# Patient Record
Sex: FEMALE | Race: BLACK OR AFRICAN AMERICAN | Employment: OTHER | URBAN - METROPOLITAN AREA
[De-identification: names, ages, dates, MRNs, and addresses within clinical notes are randomized per-mention and may not be internally consistent; named-entity substitution may affect disease eponyms.]

---

## 2017-05-18 ENCOUNTER — HOSPITAL ENCOUNTER (EMERGENCY)
Age: 42
Discharge: HOME | End: 2017-05-18
Attending: EMERGENCY MEDICINE
Payer: COMMERCIAL

## 2017-05-18 VITALS
HEIGHT: 65 IN | DIASTOLIC BLOOD PRESSURE: 105 MMHG | HEART RATE: 97 BPM | WEIGHT: 185 LBS | BODY MASS INDEX: 30.82 KG/M2 | TEMPERATURE: 97.9 F | RESPIRATION RATE: 16 BRPM | OXYGEN SATURATION: 99 % | SYSTOLIC BLOOD PRESSURE: 158 MMHG

## 2017-05-18 DIAGNOSIS — L60.0 INGROWING NAIL WITH INFECTION: Primary | ICD-10-CM

## 2017-05-18 PROCEDURE — 99283 EMERGENCY DEPT VISIT LOW MDM: CPT

## 2017-05-18 RX ORDER — ACETAMINOPHEN AND CODEINE PHOSPHATE 300; 30 MG/1; MG/1
1 TABLET ORAL EVERY 6 HOURS PRN
Qty: 20 TABLET | Refills: 0 | Status: SHIPPED | OUTPATIENT
Start: 2017-05-18 | End: 2017-07-10 | Stop reason: ALTCHOICE

## 2017-05-18 RX ORDER — DOXYCYCLINE HYCLATE 100 MG
100 TABLET ORAL 2 TIMES DAILY
Qty: 20 TABLET | Refills: 0 | Status: SHIPPED | OUTPATIENT
Start: 2017-05-18 | End: 2017-05-28

## 2017-05-18 ASSESSMENT — ENCOUNTER SYMPTOMS: COLOR CHANGE: 1

## 2017-05-18 ASSESSMENT — PAIN SCALES - GENERAL: PAINLEVEL_OUTOF10: 5

## 2017-05-18 ASSESSMENT — PAIN DESCRIPTION - FREQUENCY: FREQUENCY: CONTINUOUS

## 2017-05-18 ASSESSMENT — PAIN DESCRIPTION - ORIENTATION: ORIENTATION: LEFT

## 2017-05-18 ASSESSMENT — PAIN DESCRIPTION - LOCATION: LOCATION: TOE (COMMENT WHICH ONE)

## 2017-07-10 ENCOUNTER — OFFICE VISIT (OUTPATIENT)
Dept: PODIATRY | Age: 42
End: 2017-07-10
Payer: COMMERCIAL

## 2017-07-10 VITALS
WEIGHT: 175 LBS | BODY MASS INDEX: 29.16 KG/M2 | HEART RATE: 72 BPM | SYSTOLIC BLOOD PRESSURE: 130 MMHG | HEIGHT: 65 IN | DIASTOLIC BLOOD PRESSURE: 78 MMHG

## 2017-07-10 DIAGNOSIS — L60.1 ONYCHOLYSIS OF TOENAIL: Primary | ICD-10-CM

## 2017-07-10 DIAGNOSIS — M79.675 PAIN IN TOE OF LEFT FOOT: ICD-10-CM

## 2017-07-10 PROCEDURE — 11730 AVULSION NAIL PLATE SIMPLE 1: CPT | Performed by: PODIATRIST

## 2017-07-10 PROCEDURE — 99203 OFFICE O/P NEW LOW 30 MIN: CPT | Performed by: PODIATRIST

## 2017-07-10 RX ORDER — LIDOCAINE HYDROCHLORIDE 10 MG/ML
5 INJECTION, SOLUTION INFILTRATION; PERINEURAL ONCE
Status: COMPLETED | OUTPATIENT
Start: 2017-07-10 | End: 2017-07-10

## 2017-07-10 RX ADMIN — LIDOCAINE HYDROCHLORIDE 5 ML: 10 INJECTION, SOLUTION INFILTRATION; PERINEURAL at 14:52

## 2017-07-21 ENCOUNTER — TELEPHONE (OUTPATIENT)
Dept: PODIATRY | Age: 42
End: 2017-07-21

## 2017-07-24 ENCOUNTER — TELEPHONE (OUTPATIENT)
Dept: PODIATRY | Age: 42
End: 2017-07-24

## 2017-07-31 ENCOUNTER — OFFICE VISIT (OUTPATIENT)
Dept: PODIATRY | Age: 42
End: 2017-07-31
Payer: COMMERCIAL

## 2017-07-31 VITALS
HEIGHT: 65 IN | DIASTOLIC BLOOD PRESSURE: 93 MMHG | BODY MASS INDEX: 29.52 KG/M2 | WEIGHT: 177.2 LBS | SYSTOLIC BLOOD PRESSURE: 155 MMHG | TEMPERATURE: 98.3 F | HEART RATE: 77 BPM

## 2017-07-31 DIAGNOSIS — L60.1 ONYCHOLYSIS OF TOENAIL: Primary | ICD-10-CM

## 2017-07-31 DIAGNOSIS — M79.675 PAIN IN TOE OF LEFT FOOT: ICD-10-CM

## 2017-07-31 PROCEDURE — 99024 POSTOP FOLLOW-UP VISIT: CPT | Performed by: PODIATRIST

## 2017-10-30 ENCOUNTER — HOSPITAL ENCOUNTER (EMERGENCY)
Age: 42
Discharge: HOME | End: 2017-10-30
Attending: EMERGENCY MEDICINE
Payer: COMMERCIAL

## 2017-10-30 VITALS
TEMPERATURE: 98.1 F | DIASTOLIC BLOOD PRESSURE: 95 MMHG | SYSTOLIC BLOOD PRESSURE: 151 MMHG | WEIGHT: 180 LBS | HEART RATE: 83 BPM | OXYGEN SATURATION: 100 % | RESPIRATION RATE: 14 BRPM | BODY MASS INDEX: 29.99 KG/M2 | HEIGHT: 65 IN

## 2017-10-30 DIAGNOSIS — L03.031 CELLULITIS OF RIGHT TOE: ICD-10-CM

## 2017-10-30 DIAGNOSIS — I10 ESSENTIAL HYPERTENSION: Primary | ICD-10-CM

## 2017-10-30 PROCEDURE — 99282 EMERGENCY DEPT VISIT SF MDM: CPT

## 2017-10-30 RX ORDER — DOXYCYCLINE HYCLATE 100 MG
100 TABLET ORAL 2 TIMES DAILY
Qty: 20 TABLET | Refills: 0 | Status: SHIPPED | OUTPATIENT
Start: 2017-10-30 | End: 2017-11-20 | Stop reason: ALTCHOICE

## 2017-10-30 RX ORDER — AMLODIPINE BESYLATE 10 MG/1
10 TABLET ORAL DAILY
Qty: 20 TABLET | Refills: 0 | Status: SHIPPED | OUTPATIENT
Start: 2017-10-30 | End: 2017-12-18 | Stop reason: SDUPTHER

## 2017-10-30 ASSESSMENT — ENCOUNTER SYMPTOMS
COLOR CHANGE: 0
DIARRHEA: 0
SHORTNESS OF BREATH: 0
EYE DISCHARGE: 0
FACIAL SWELLING: 0
VOMITING: 0
EYE REDNESS: 0
COUGH: 0
ABDOMINAL PAIN: 0
CONSTIPATION: 0

## 2017-10-30 ASSESSMENT — PAIN SCALES - GENERAL: PAINLEVEL_OUTOF10: 6

## 2017-10-30 NOTE — ED NOTES
\"pus from my toe\" had toenail removed 3 months ago denies injury also hasn't taken B/P meds for 2 months \"ran out no FMD\"        Aron Keys RN  10/30/17 7677

## 2017-10-30 NOTE — ED PROVIDER NOTES
[address] [place] [address] ED  eMERGENCY dEPARTMENT eNCOUnter      Pt Name: Perry Merida  MRN: [id number]  [place] 1975  Date of evaluation: 10/30/2017  Provider: David Em MD    CHIEF COMPLAINT       Chief Complaint   Patient presents with    Toe Pain     L great toe         HISTORY OF PRESENT ILLNESS  (Location/Symptom, Timing/Onset, Context/Setting, Quality, Duration, Modifying Factors, Severity.)   Perry Merida is a 43 y.o. female who presents to the emergency department For pain to his right great toe. She states a few months ago she had the toenail removed and now over the past few days it's been draining some pus. She also stopped taking her blood pressure medication a few months ago because she doesn't have a doctor. No chest pain headache or dizziness or shortness of breath. The pain in her toe is moderate and continuous. No injury specifically to the toe. Nursing Notes were reviewed. ALLERGIES     Asa [aspirin]; Ibuprofen; Lopid [gemfibrozil]; Nortriptyline; Pcn [penicillins]; and Sulfa antibiotics    CURRENT MEDICATIONS       Previous Medications    BLOOD PRESSURE KIT    Use as directed daily dx. hypertension       PAST MEDICAL HISTORY         Diagnosis Date    Abnormal Pap smear of cervix     ASCUS    Chronic back pain     Depression 2014    Headache(784.0)     Heart murmur     Hypertension     Migraine headache with aura 2013    Obesity 2013       SURGICAL HISTORY           Procedure Laterality Date    TUBAL LIGATION           FAMILY HISTORY           Problem Relation Age of Onset    Diabetes Mother     High Blood Pressure Mother      Family Status   Relation Status    Mother         SOCIAL HISTORY      reports that she has never smoked. She has never used smokeless tobacco. She reports that she drinks alcohol. She reports that she does not use drugs.     REVIEW OF SYSTEMS    (2-9 systems for level 4, 10 or more for level 5)     Review of Systems Constitutional: Negative for chills, fatigue and fever. HENT: Negative for congestion, ear discharge and facial swelling. Eyes: Negative for discharge and redness. Respiratory: Negative for cough and shortness of breath. Cardiovascular: Negative for chest pain. Gastrointestinal: Negative for abdominal pain, constipation, diarrhea and vomiting. Genitourinary: Negative for dysuria and hematuria. Musculoskeletal: Negative for arthralgias. Skin: Negative for color change and rash. Neurological: Negative for syncope, numbness and headaches. Hematological: Negative for adenopathy. Psychiatric/Behavioral: Negative for confusion. The patient is not nervous/anxious. Except as noted above the remainder of the review of systems was reviewed and negative. PHYSICAL EXAM    (up to 7 for level 4, 8 or more for level 5)     Vitals:    10/30/17 1525 10/30/17 1545   BP: (!) 205/122 (!) 151/95   Pulse: 70 83   Resp: 14    Temp: 98.1 °F (36.7 °C)    TempSrc: Oral    SpO2: 100%    Weight: 180 lb (81.6 kg)    Height: 5' 5\" (1.651 m)        Physical Exam      DIAGNOSTIC RESULTS     EKG: All EKG's are interpreted by the Emergency Department Physician who either signs or Co-signs this chart in the absence of a cardiologist.    Dictated    RADIOLOGY:   Non-plain film images such as CT, Ultrasound and MRI are read by the radiologist. Plain radiographic images are visualized and preliminarily interpreted by the emergency physician with the below findings:    Not indicated     Interpretation per the Radiologist below, if available at the time of this note:        LABS:  Labs Reviewed - No data to display    All other labs were within normal range or not returned as of this dictation.     EMERGENCY DEPARTMENT COURSE and DIFFERENTIAL DIAGNOSIS/MDM:   Vitals:    Vitals:    10/30/17 1525 10/30/17 1545   BP: (!) 205/122 (!) 151/95   Pulse: 70 83   Resp: 14    Temp: 98.1 °F (36.7 °C)    TempSrc: Oral    SpO2: 100% Weight: 180 lb (81.6 kg)    Height: 5' 5\" (1.651 m)        Orders Placed This Encounter   Medications    doxycycline hyclate (VIBRA-TABS) 100 MG tablet     Sig: Take 1 tablet by mouth 2 times daily     Dispense:  20 tablet     Refill:  0    amLODIPine (NORVASC) 10 MG tablet     Sig: Take 1 tablet by mouth daily     Dispense:  20 tablet     Refill:  0       Medical Decision Making: The patient's blood pressure is elevated and she is being given a refill for that medication. She will also be placed on an antibiotic for her toe and will follow-up with podiatry that she has seen previously. Treatment diagnosis and follow-up were discussed with the patient. CONSULTS:  None    PROCEDURES:  None    FINAL IMPRESSION      1. Essential hypertension    2. Cellulitis of right toe          DISPOSITION/PLAN   DISPOSITION Decision to Discharge    PATIENT REFERRED TO:   No follow-up provider specified.     DISCHARGE MEDICATIONS:     New Prescriptions    AMLODIPINE (NORVASC) 10 MG TABLET    Take 1 tablet by mouth daily    DOXYCYCLINE HYCLATE (VIBRA-TABS) 100 MG TABLET    Take 1 tablet by mouth 2 times daily         (Please note that portions of this note were completed with a voice recognition program.  Efforts were made to edit the dictations but occasionally words are mis-transcribed.)    Nicole Bonner MD  Attending Emergency Physician             Nicole Bonner MD  10/30/17 [id number]

## 2017-11-20 ENCOUNTER — OFFICE VISIT (OUTPATIENT)
Dept: PODIATRY | Age: 42
End: 2017-11-20
Payer: COMMERCIAL

## 2017-11-20 VITALS
DIASTOLIC BLOOD PRESSURE: 70 MMHG | TEMPERATURE: 98.1 F | WEIGHT: 184 LBS | HEIGHT: 65 IN | BODY MASS INDEX: 30.66 KG/M2 | SYSTOLIC BLOOD PRESSURE: 120 MMHG

## 2017-11-20 DIAGNOSIS — L60.1 ONYCHOLYSIS OF TOENAIL: Primary | ICD-10-CM

## 2017-11-20 DIAGNOSIS — L03.031 PARONYCHIA OF GREAT TOE OF RIGHT FOOT: ICD-10-CM

## 2017-11-20 PROCEDURE — 10060 I&D ABSCESS SIMPLE/SINGLE: CPT | Performed by: PODIATRIST

## 2017-11-20 NOTE — PROGRESS NOTES
Podiatry Clinic    HPI:   Maryjo Sethi is a 43 y.o. female who presents to clinic for follow up of right great toenail infection. Seen recently in ED where pus was drained and she was given PO antibiotics which she completed. It has been two weeks. She has been cleaning and soaking with antibiotic ointment and bandaid. No pain. Denies N/V/F/C/N/V/SOB/CP. PHYSICAL EXAM:     Vitals:    11/20/17 1458   BP: 120/70   Temp:      General: AAO x 3 in NAD. Vascular: DP/PT pulses are palpable bilaterally. CFT<3 seconds to digits bilaterally. Hair growth present to the level of the digits bilaterally. Neurological: Sensation intact to light touch to level of digits bilaterally. Musculoskeletal: Lower extremity muscle strength 5/5 bilaterally. No POP to left hallux and nail bed. Decreased 1st MTPJ ROM bilaterally L20 degrees DF, R25 degrees DF. Large dorsal exostosis bilaterally. Derm: Lower extremity skin is warm and dry bilaterally. Minimal purulence drained from Right great toe nail. No periungual rubor and edema. ASSESSMENT:     1. Onycholysis of toenail     2. Paronychia of great toe of right foot  02027 - [place] DRAIN SKIN ABSCESS SIMPLE       PLAN:   -Patient examined and evaluated. -Diagnosis and treatment options were discussed with patient.  -Debrided loose Right hallux nail and drained pus. Washed with soap and water.  Bacitracin and band-aid.  -No antibiotics necessary at this time  -RTC 2 weeks to check Right great toenail again  -Discussed with Dr. Nela Fernández    Electronically signed by Alfredo Hunt DPM  on 11/20/2017 at 3:27 PM

## 2017-12-04 ENCOUNTER — OFFICE VISIT (OUTPATIENT)
Dept: PODIATRY | Age: 42
End: 2017-12-04
Payer: COMMERCIAL

## 2017-12-04 ENCOUNTER — HOSPITAL ENCOUNTER (OUTPATIENT)
Age: 42
Discharge: HOME | End: 2017-12-04
Payer: COMMERCIAL

## 2017-12-04 ENCOUNTER — HOSPITAL ENCOUNTER (OUTPATIENT)
Dept: GENERAL RADIOLOGY | Age: 42
Discharge: HOME | End: 2017-12-04
Payer: COMMERCIAL

## 2017-12-04 VITALS
DIASTOLIC BLOOD PRESSURE: 86 MMHG | WEIGHT: 184.2 LBS | SYSTOLIC BLOOD PRESSURE: 150 MMHG | HEART RATE: 78 BPM | HEIGHT: 65 IN | BODY MASS INDEX: 30.69 KG/M2

## 2017-12-04 DIAGNOSIS — M79.675 PAIN IN TOE OF LEFT FOOT: Primary | ICD-10-CM

## 2017-12-04 DIAGNOSIS — M79.675 PAIN IN TOE OF LEFT FOOT: ICD-10-CM

## 2017-12-04 DIAGNOSIS — I73.9 CLAUDICATION (HCC): ICD-10-CM

## 2017-12-04 PROCEDURE — 73630 X-RAY EXAM OF FOOT: CPT

## 2017-12-04 PROCEDURE — 99213 OFFICE O/P EST LOW 20 MIN: CPT | Performed by: PODIATRIST

## 2017-12-04 NOTE — PROGRESS NOTES
Podiatry Clinic    HPI:   Teddy Aguillon is a 43 y.o. female who presents to clinic for follow up of right great toenail infection. Patient states she finished her antibiotics and has been doing daily soaks and bandaids with bacitracin. She states she is now having pain is digits 1-5 on the left. She also complains of severe cramping in her left calf while walking. She states that when she elevates her left leg her symptoms are worse and her entire foot goes numb. Denies n/v/f/c. Denies history of smoking or vascular issues. PHYSICAL EXAM:     Vitals:    12/04/17 1440   BP: (!) 150/86   Pulse: 78     General: AAO x 3 in NAD. Vascular: DP/PT pulses are palpable right, nonpalpable left. L DP/PT pulse monophasic and faint on doppler. CFT delayed to digits on left. Hair growth present to the level of the digits bilaterally. Neurological: Sensation intact to light touch to level of digits bilaterally. Musculoskeletal: Lower extremity muscle strength 5/5 bilaterally. No POP to left hallux and nail bed. Decreased 1st MTPJ ROM bilaterally L20 degrees DF, R25 degrees DF. Large dorsal exostosis bilaterally. Derm:  Digits of left foot cool and cyanotic. No erythema. No purulent drainage. No open wounds. ASSESSMENT:     1. Pain in toe of left foot  VL Lower Extremity Arterial Segmental Pressures W Ppg    XR FOOT LEFT (MIN 3 VIEWS)   2. Claudication (HCC)  VL Lower Extremity Arterial Segmental Pressures W Ppg       PLAN:   -Patient examined and evaluated. -Diagnosis and treatment options were discussed with patient. -PVR w/ PPG  -XRAY L foot  -Nitroglycerin ointment to toes  -Avoid cold to foot and tight shoe gear  -foot in dependent position   -Avoid nicotine/smoking  -RTC 1 week. Electronically signed by Hannah Magana DPM  on 12/4/2017 at 3:28 PM   I performed a history and physical examination of the patient and discussed management with the resident.  I reviewed the residents note and agree

## 2017-12-04 NOTE — PATIENT INSTRUCTIONS
Appointment December 18th at 130pm  [facility]     [address] [address] [phone]

## 2017-12-05 ENCOUNTER — HOSPITAL ENCOUNTER (OUTPATIENT)
Dept: VASCULAR LAB | Age: 42
Discharge: HOME | End: 2017-12-05
Payer: COMMERCIAL

## 2017-12-05 DIAGNOSIS — I73.9 CLAUDICATION (HCC): ICD-10-CM

## 2017-12-05 DIAGNOSIS — M79.675 PAIN IN TOE OF LEFT FOOT: ICD-10-CM

## 2017-12-05 PROCEDURE — 93923 UPR/LXTR ART STDY 3+ LVLS: CPT

## 2017-12-11 ENCOUNTER — OFFICE VISIT (OUTPATIENT)
Dept: PODIATRY | Age: 42
End: 2017-12-11
Payer: COMMERCIAL

## 2017-12-11 VITALS
HEART RATE: 84 BPM | BODY MASS INDEX: 30.82 KG/M2 | DIASTOLIC BLOOD PRESSURE: 86 MMHG | HEIGHT: 65 IN | WEIGHT: 185 LBS | SYSTOLIC BLOOD PRESSURE: 137 MMHG

## 2017-12-11 DIAGNOSIS — I73.9 CLAUDICATION (HCC): Primary | ICD-10-CM

## 2017-12-11 DIAGNOSIS — L60.1 ONYCHOLYSIS OF TOENAIL: ICD-10-CM

## 2017-12-11 PROCEDURE — 99213 OFFICE O/P EST LOW 20 MIN: CPT | Performed by: STUDENT IN AN ORGANIZED HEALTH CARE EDUCATION/TRAINING PROGRAM

## 2017-12-15 ENCOUNTER — INITIAL CONSULT (OUTPATIENT)
Dept: VASCULAR SURGERY | Age: 42
End: 2017-12-15
Payer: COMMERCIAL

## 2017-12-15 VITALS
RESPIRATION RATE: 17 BRPM | OXYGEN SATURATION: 98 % | HEART RATE: 74 BPM | WEIGHT: 184.97 LBS | BODY MASS INDEX: 30.82 KG/M2 | HEIGHT: 65 IN | SYSTOLIC BLOOD PRESSURE: 138 MMHG | DIASTOLIC BLOOD PRESSURE: 80 MMHG

## 2017-12-15 DIAGNOSIS — I73.9 CLAUDICATION OF LEFT LOWER EXTREMITY (HCC): Primary | ICD-10-CM

## 2017-12-15 PROCEDURE — 99244 OFF/OP CNSLTJ NEW/EST MOD 40: CPT | Performed by: SURGERY

## 2017-12-15 PROCEDURE — G8417 CALC BMI ABV UP PARAM F/U: HCPCS | Performed by: SURGERY

## 2017-12-15 PROCEDURE — G8427 DOCREV CUR MEDS BY ELIG CLIN: HCPCS | Performed by: SURGERY

## 2017-12-15 PROCEDURE — G8484 FLU IMMUNIZE NO ADMIN: HCPCS | Performed by: SURGERY

## 2017-12-15 ASSESSMENT — ENCOUNTER SYMPTOMS
GASTROINTESTINAL NEGATIVE: 1
COLOR CHANGE: 1
EYES NEGATIVE: 1
ALLERGIC/IMMUNOLOGIC NEGATIVE: 1
SHORTNESS OF BREATH: 0

## 2017-12-15 NOTE — LETTER
[address] [facility] [place]    [address]  MOB 2 [address] [facility]  [address] [address]  Phone: [phone]  Fax: [phone]    Nury Berry MD        December 15, 2017     Christiana Crouch  [address] [facility] [address] [place] [address] [facility] [address] [id number]    Patient: Perry Merida  MR Number: [id number]  YOB: 1975  Date of Visit: 12/15/2017    Dear Dr. Christiana Crouch: I saw Kb Coates today in the office for left calf pain. Below are the relevant portions of my assessment and plan of care. If you have questions, please do not hesitate to call me. I look forward to following Gale along with you.     Sincerely,        Nury Berry MD

## 2017-12-15 NOTE — PROGRESS NOTES
Division of Vascular Surgery        New Consult      Physician Requesting Consult:  Adarsh Santiago DPM     Reason for Consult:   Left foot pain, abnormal FELIPA/PVR    Chief Complaint:      My left leg hurts    History of Present Illness:      Marilu Rothman is a 43 y.o. woman who presents with left calf pain with ambulation. Over the past several months she has noticed decreasing walking distance before the pain starts. She use to be able to walk several blocks before the pain started, now she can barely make it a block before the calf pain gets triggered. She does notice relief when she stops and takes a break. She has noticed some numbness and tingling with prolonged walking. She describes the pain as cramps and a dull ache. She has also noticed some swelling in her left leg when she has been on her feet for a long time. Medical History:     HTN    Surgical History:     Tubal ligation    Family History:     Family History   Problem Relation Age of Onset    Diabetes Mother     High Blood Pressure Mother        Allergies:       Asa [aspirin]; Ibuprofen; Lopid [gemfibrozil]; Nortriptyline; Pcn [penicillins]; and Sulfa antibiotics    Social History:     Tobacco:    reports that she has never smoked. She has never used smokeless tobacco.  Alcohol:      reports that she drinks alcohol. Drug Use:  reports that she does not use drugs. Occupation:  Works at [employer], on her feet most of the time    Review of Systems:     Review of Systems   Constitutional: Negative. HENT: Negative. Eyes: Negative. Respiratory: Negative for shortness of breath. Cardiovascular: Positive for leg swelling. Negative for chest pain and palpitations. Gastrointestinal: Negative. Endocrine: Negative. Genitourinary: Negative. Musculoskeletal: Negative. Skin: Positive for color change. Allergic/Immunologic: Negative. Neurological: Negative for numbness. Hematological: Negative. Jeyson Bentley MD on 12/15/17 at 10:34 AM      [address] [place] [address],[address]: [phone]  C: [phone]  Email: [email or web address]

## 2017-12-18 ENCOUNTER — HOSPITAL ENCOUNTER (OUTPATIENT)
Age: 42
Setting detail: SPECIMEN
Discharge: HOME | End: 2017-12-18
Payer: COMMERCIAL

## 2017-12-18 ENCOUNTER — OFFICE VISIT (OUTPATIENT)
Dept: FAMILY MEDICINE CLINIC | Age: 42
End: 2017-12-18
Payer: COMMERCIAL

## 2017-12-18 VITALS
WEIGHT: 185.2 LBS | TEMPERATURE: 97.6 F | BODY MASS INDEX: 30.86 KG/M2 | HEIGHT: 65 IN | DIASTOLIC BLOOD PRESSURE: 86 MMHG | SYSTOLIC BLOOD PRESSURE: 150 MMHG | HEART RATE: 64 BPM

## 2017-12-18 DIAGNOSIS — Z13.1 DIABETES MELLITUS SCREENING: ICD-10-CM

## 2017-12-18 DIAGNOSIS — I10 ESSENTIAL HYPERTENSION: Primary | ICD-10-CM

## 2017-12-18 DIAGNOSIS — E78.5 HYPERLIPIDEMIA, UNSPECIFIED HYPERLIPIDEMIA TYPE: ICD-10-CM

## 2017-12-18 DIAGNOSIS — Z23 NEED FOR INFLUENZA VACCINATION: ICD-10-CM

## 2017-12-18 LAB
CHOLESTEROL/HDL RATIO: 4.9
CHOLESTEROL: 226 MG/DL
ESTIMATED AVERAGE GLUCOSE: 123 MG/DL
HBA1C MFR BLD: 5.9 % (ref 4–6)
HDLC SERPL-MCNC: 46 MG/DL
LDL CHOLESTEROL: 136 MG/DL (ref 0–130)
TRIGL SERPL-MCNC: 222 MG/DL
VLDLC SERPL CALC-MCNC: ABNORMAL MG/DL (ref 1–30)

## 2017-12-18 PROCEDURE — G8427 DOCREV CUR MEDS BY ELIG CLIN: HCPCS | Performed by: STUDENT IN AN ORGANIZED HEALTH CARE EDUCATION/TRAINING PROGRAM

## 2017-12-18 PROCEDURE — 1036F TOBACCO NON-USER: CPT | Performed by: STUDENT IN AN ORGANIZED HEALTH CARE EDUCATION/TRAINING PROGRAM

## 2017-12-18 PROCEDURE — 90688 IIV4 VACCINE SPLT 0.5 ML IM: CPT | Performed by: STUDENT IN AN ORGANIZED HEALTH CARE EDUCATION/TRAINING PROGRAM

## 2017-12-18 PROCEDURE — G8417 CALC BMI ABV UP PARAM F/U: HCPCS | Performed by: STUDENT IN AN ORGANIZED HEALTH CARE EDUCATION/TRAINING PROGRAM

## 2017-12-18 PROCEDURE — 99213 OFFICE O/P EST LOW 20 MIN: CPT | Performed by: STUDENT IN AN ORGANIZED HEALTH CARE EDUCATION/TRAINING PROGRAM

## 2017-12-18 PROCEDURE — G8484 FLU IMMUNIZE NO ADMIN: HCPCS | Performed by: STUDENT IN AN ORGANIZED HEALTH CARE EDUCATION/TRAINING PROGRAM

## 2017-12-18 PROCEDURE — 90471 IMMUNIZATION ADMIN: CPT | Performed by: STUDENT IN AN ORGANIZED HEALTH CARE EDUCATION/TRAINING PROGRAM

## 2017-12-18 RX ORDER — BLOOD PRESSURE TEST KIT
1 KIT MISCELLANEOUS DAILY
Qty: 1 KIT | Refills: 0 | Status: SHIPPED | OUTPATIENT
Start: 2017-12-18 | End: 2019-12-23 | Stop reason: ALTCHOICE

## 2017-12-18 RX ORDER — AMLODIPINE BESYLATE 10 MG/1
10 TABLET ORAL DAILY
Qty: 20 TABLET | Refills: 0 | Status: SHIPPED | OUTPATIENT
Start: 2017-12-18 | End: 2018-01-09 | Stop reason: SDUPTHER

## 2017-12-18 ASSESSMENT — ENCOUNTER SYMPTOMS
ABDOMINAL PAIN: 0
SHORTNESS OF BREATH: 0
CONSTIPATION: 0
CHEST TIGHTNESS: 0
DIARRHEA: 0
COUGH: 0

## 2017-12-18 NOTE — PROGRESS NOTES
Attending Physician Statement  I have discussed the care off. First name Sykesincluding pertinent history and exam findings,  with the resident. I have seen and examined the patient and the key elements of all parts of the encounter have been performed by me. I agree with the assessment, plan and orders as documented by the resident.   (GC Modifier)    NP to establish care  HTN- out of meds  Lipid Panel-  HM- updated  Pap next visit
Negative. Neurological: Positive for headaches. Negative for weakness and light-headedness. Objective:    BP (!) 150/86 (Site: Left Arm, Position: Sitting, Cuff Size: Large Adult)   Pulse 64   Temp 97.6 °F (36.4 °C) (Temporal)   Ht 5' 5\" (1.651 m)   Wt 185 lb 3.2 oz (84 kg)   LMP 11/30/2017   BMI 30.82 kg/m²    BP Readings from Last 3 Encounters:   12/18/17 (!) 150/86   12/15/17 138/80   12/11/17 137/86       Physical Exam   Constitutional: She is oriented to person, place, and time. She appears well-developed and well-nourished. Cardiovascular: Normal rate and regular rhythm. Pulmonary/Chest: Breath sounds normal. She is in respiratory distress. Musculoskeletal: Normal range of motion. Neurological: She is alert and oriented to person, place, and time. Lab Results   Component Value Date    WBC 7.7 11/15/2015    HGB 13.2 11/15/2015    HCT 38.6 11/15/2015     11/15/2015    CHOL 208 (H) 07/23/2014    TRIG 339 (H) 07/23/2014    HDL 37 (L) 07/23/2014    LDLDIRECT 108 (H) 05/13/2013    ALT 17 11/15/2015    AST 25 11/15/2015     11/15/2015    K 3.4 (L) 11/15/2015    CL 99 11/15/2015    CREATININE 0.65 11/15/2015    BUN 9 11/15/2015    CO2 24 11/15/2015    TSH 2.10 07/23/2014    LABA1C 5.7 07/23/2014     Lab Results   Component Value Date    CALCIUM 8.7 11/15/2015     Lab Results   Component Value Date    LDLCHOLESTEROL 103 07/23/2014    LDLDIRECT 108 (H) 05/13/2013       Assessment and Plan:    1. Essential hypertension  - Pt reports non compliance in last 3 weeks. Discussed importance of daily BP medication for control  - amLODIPine (NORVASC) 10 MG tablet; Take 1 tablet by mouth daily  Dispense: 20 tablet; Refill: 0  - Blood Pressure KIT; 1 Package by Does not apply route daily  Dispense: 1 kit; Refill: 0    2. Hyperlipidemia, unspecified hyperlipidemia type  - Based on Lipid Panel in 7/2014: ASCVD of 9.6 %. Will repeat today  - Lipid Panel; Future    3.  Diabetes

## 2017-12-18 NOTE — PATIENT INSTRUCTIONS
Thank you for letting us take care of you today. We hope all your questions were addressed. If a question was overlooked or something else comes to mind after you return home, please contact a member of your Care Team listed below. Please make sure you have a routine office visit set up to follow-up on [address]. Your Care Team at [place] [address] is Team #1  Rah Nowak MD (Faculty)  Haily Perdue MD (Faculty  Triciasiri Leone MD (Resident)  Lee Ann Szymanski MD (Resident)  Ning Miranda MD (Resident)  Lin Mayers MD (Resident)  Luis Patricio MD (Resident)  Jessa Kincaid [facility]  Martell Nava [place], [facility], ([address] [facility])  IFRAH Castillo, ([address] [facility])  Layne Cunha, Ph.D., ([address] [facility])  Papi Mack [facility] (Clinical Pharmacist)     Office phone number: [phone]    If you need to get in right away due to illness, please be advised we have \"Same Day\" appointments available Monday-Friday. Please call us at [phone] option #1 to schedule your \"Same Day\" appointment.

## 2017-12-19 ENCOUNTER — TELEPHONE (OUTPATIENT)
Dept: FAMILY MEDICINE CLINIC | Age: 42
End: 2017-12-19

## 2017-12-19 ENCOUNTER — HOSPITAL ENCOUNTER (OUTPATIENT)
Dept: CARDIAC CATH/INVASIVE PROCEDURES | Age: 42
Discharge: HOME | End: 2017-12-19
Payer: COMMERCIAL

## 2017-12-19 VITALS
TEMPERATURE: 97.9 F | SYSTOLIC BLOOD PRESSURE: 149 MMHG | OXYGEN SATURATION: 98 % | BODY MASS INDEX: 30.82 KG/M2 | HEIGHT: 65 IN | HEART RATE: 97 BPM | DIASTOLIC BLOOD PRESSURE: 81 MMHG | WEIGHT: 185 LBS | RESPIRATION RATE: 14 BRPM

## 2017-12-19 DIAGNOSIS — E78.5 HYPERLIPIDEMIA, UNSPECIFIED HYPERLIPIDEMIA TYPE: Primary | ICD-10-CM

## 2017-12-19 LAB
GFR NON-AFRICAN AMERICAN: >60 ML/MIN
GFR SERPL CREATININE-BSD FRML MDRD: >60 ML/MIN
GFR SERPL CREATININE-BSD FRML MDRD: NORMAL ML/MIN/{1.73_M2}
GLUCOSE BLD-MCNC: 104 MG/DL (ref 74–100)
HCG, PREGNANCY URINE (POC): NEGATIVE
POC CHLORIDE: 105 MMOL/L (ref 98–107)
POC CREATININE: 0.57 MG/DL (ref 0.51–1.19)
POC HEMATOCRIT: 40 % (ref 36–46)
POC HEMOGLOBIN: 13.6 G/DL (ref 12–16)
POC POTASSIUM: 3.9 MMOL/L (ref 3.5–4.5)
POC SODIUM: 144 MMOL/L (ref 138–146)

## 2017-12-19 PROCEDURE — C1760 CLOSURE DEV, VASC: HCPCS

## 2017-12-19 PROCEDURE — 76937 US GUIDE VASCULAR ACCESS: CPT | Performed by: SURGERY

## 2017-12-19 PROCEDURE — 75710 ARTERY X-RAYS ARM/LEG: CPT

## 2017-12-19 PROCEDURE — 6360000002 HC RX W HCPCS

## 2017-12-19 PROCEDURE — C1894 INTRO/SHEATH, NON-LASER: HCPCS

## 2017-12-19 PROCEDURE — 7100000011 HC PHASE II RECOVERY - ADDTL 15 MIN

## 2017-12-19 PROCEDURE — 75774 ARTERY X-RAY EACH VESSEL: CPT

## 2017-12-19 PROCEDURE — 85014 HEMATOCRIT: CPT

## 2017-12-19 PROCEDURE — 7100000010 HC PHASE II RECOVERY - FIRST 15 MIN

## 2017-12-19 PROCEDURE — 84132 ASSAY OF SERUM POTASSIUM: CPT

## 2017-12-19 PROCEDURE — 82565 ASSAY OF CREATININE: CPT

## 2017-12-19 PROCEDURE — 82435 ASSAY OF BLOOD CHLORIDE: CPT

## 2017-12-19 PROCEDURE — 6360000002 HC RX W HCPCS: Performed by: INTERNAL MEDICINE

## 2017-12-19 PROCEDURE — 84295 ASSAY OF SERUM SODIUM: CPT

## 2017-12-19 PROCEDURE — 75710 ARTERY X-RAYS ARM/LEG: CPT | Performed by: SURGERY

## 2017-12-19 PROCEDURE — 36247 INS CATH ABD/L-EXT ART 3RD: CPT

## 2017-12-19 PROCEDURE — C1887 CATHETER, GUIDING: HCPCS

## 2017-12-19 PROCEDURE — 75625 CONTRAST EXAM ABDOMINL AORTA: CPT | Performed by: SURGERY

## 2017-12-19 PROCEDURE — 84703 CHORIONIC GONADOTROPIN ASSAY: CPT

## 2017-12-19 PROCEDURE — 75625 CONTRAST EXAM ABDOMINL AORTA: CPT

## 2017-12-19 PROCEDURE — 82947 ASSAY GLUCOSE BLOOD QUANT: CPT

## 2017-12-19 PROCEDURE — C1769 GUIDE WIRE: HCPCS

## 2017-12-19 PROCEDURE — 36247 INS CATH ABD/L-EXT ART 3RD: CPT | Performed by: SURGERY

## 2017-12-19 RX ORDER — SODIUM CHLORIDE 9 MG/ML
INJECTION, SOLUTION INTRAVENOUS CONTINUOUS
Status: DISCONTINUED | OUTPATIENT
Start: 2017-12-19 | End: 2017-12-20 | Stop reason: HOSPADM

## 2017-12-19 RX ORDER — ONDANSETRON 2 MG/ML
4 INJECTION INTRAMUSCULAR; INTRAVENOUS ONCE
Status: COMPLETED | OUTPATIENT
Start: 2017-12-19 | End: 2017-12-19

## 2017-12-19 RX ORDER — ATORVASTATIN CALCIUM 10 MG/1
10 TABLET, FILM COATED ORAL DAILY
Qty: 30 TABLET | Refills: 3 | Status: ON HOLD | OUTPATIENT
Start: 2017-12-19 | End: 2018-10-06

## 2017-12-19 RX ADMIN — ONDANSETRON 4 MG: 2 INJECTION, SOLUTION INTRAMUSCULAR; INTRAVENOUS at 10:57

## 2017-12-19 RX ADMIN — SODIUM CHLORIDE: 9 INJECTION, SOLUTION INTRAVENOUS at 08:24

## 2017-12-19 NOTE — PROGRESS NOTES
Returned to room 8 on [facility] awake alert, rt. Groin bandaid in tact clean et dry, post procedure instructions given to pt. And family, taking fluids well, both feet warm to touch pulses audible.

## 2017-12-19 NOTE — TELEPHONE ENCOUNTER
Based on new results for Lipid panel screening, pts ASCVD score of 9.9 %. Started patient on Lipitor 10mg. Called patient and left voicemail indicating need to  medications from her pharmacy and to call back if any questions.

## 2017-12-20 ENCOUNTER — TELEPHONE (OUTPATIENT)
Dept: VASCULAR SURGERY | Age: 42
End: 2017-12-20

## 2017-12-20 DIAGNOSIS — I99.8 ISCHEMIC LEG: Primary | ICD-10-CM

## 2017-12-20 DIAGNOSIS — I73.9 CLAUDICATION (HCC): ICD-10-CM

## 2017-12-20 NOTE — OP NOTE
[address] [facility] [address]                                 OPERATIVE REPORT    PATIENT NAME: Delvin Floyd                    :        1975  MED REC NO:   [id number]                             ROOM:  ACCOUNT NO:   [de-identified]                           ADMIT DATE: 2017  PROVIDER:     Aubrey Gray MD    DATE OF PROCEDURE:  2017    PREOPERATIVE DIAGNOSIS:  Lifestyle-limiting left leg claudication. POSTOPERATIVE DIAGNOSIS:  Lifestyle-limiting left leg claudication. PROCEDURE:  1. Ultrasound-guided access to the right common femoral artery. 2.  Abdominal aortogram.  3.  Selective catheterization of left superficial femoral artery. 4.  Left lower extremity angiogram with run-off to the foot. 5.  Percutaneous closure of right common femoral artery with StarClose. ANESTHESIA:  Local with moderate sedation. SURGEON:  Aubrey Gray MD    ASSISTANT:  Burt Durán MD    INDICATIONS FOR PROCEDURE:  The patient is a 42-year-old female who  presented to clinic with progressive worsening of claudication symptoms of  her left leg. It started roughly over the past couple of years, but over  the last several months, it has gotten progressively worse, to the point  where she can barely walk one block before having excruciating cramping  and burning pain down her left calf. She noticed some darkening and  discoloration of her left toes. She denies any rest pain, numbness, or  tingling, and does not have any wounds. On exam, she had no palpable pedal  pulses on the left side. Given these findings, she was recommended to  undergo a left lower extremity angiogram.  She understood the risks and  benefits of the procedure and elected to continue. DESCRIPTION OF PROCEDURE:  The patient was brought to the catheterization  suite. An appropriate timeout was performed.   Bilateral groin sites were  prepped and draped in the standard sterile fashion. The patient was given  sedation with fentanyl and Versed. The right groin was anesthetized and  under ultrasound guidance, the right common femoral artery was  compressible. It was accessed using micropuncture technique and upsized to  a short 5-Yi sheath. An Omni Flush catheter was then brought up into  the abdominal aorta over a wire and an abdominal aortogram was performed. The patient had a very narrow aortic bifurcation and there was some  difficulty getting up and over with the Omni Flush catheter, so using a  Glidewire and angled Glidecath, we went over the aortic bifurcation and  parked the Gregg Jamaalreedy in the left common femoral artery and performed an  oblique-view femoral angiogram.  At this point the superficial femoral artery was  selectively catheterized, and then a right lower extremity angiogram with  runoff to the foot was performed with multiple views. No intervention was done. The catheter  and wires were removed. The right common femoral artery was successfully  StarClosed. RADIOGRAPHIC FINDINGS:  The patient has normal-appearing abdominal aorta. Bilateral common iliac, internal iliac, and external iliac are patent. There is a small plaque in the common femoral artery on the left side,  which is not flow-limiting. The profunda and superficial femoral artery  are patent without any evidence of stenosis. There is a abrupt occlusion  of the popliteal artery right at the level of the knee. There is  significant amount of geniculate collateralization that reconstitute a  peroneal artery at the mid to distal calf. The peroneal artery is the main  runoff to the foot, which gives branches that fill the plantars. The  anterior tibial artery and posterior tibial artery are occluded. There is  some faint reconstitution of a DP through the branches of the peroneal  artery at the level of ankle.     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 She is not  amenable to percutaneous intervention and she will need open  revascularization. I will have her evaluated by Cardiology for risk  stratification, as well as performing an echocardiogram to look for a  potential source of an embolic event that might have caused her pathology. I will also obtain bilateral arterial duplexes to rule out popliteal  aneurysms as the source of reason for occlusion or pathology leading to  this, given that she is young and without any atherosclerotic risk factors. I will also get vein mapping to evaluate her conduit for potential distal  bypass.         Chio Mix MD    D: 12/19/2017 17:37:11       T: 12/20/2017 4:25:25     [email or web address]  Job#: [id number]     Doc#: [id number]    CC:

## 2018-01-03 ENCOUNTER — HOSPITAL ENCOUNTER (OUTPATIENT)
Dept: VASCULAR LAB | Age: 43
Discharge: HOME | End: 2018-01-03
Payer: COMMERCIAL

## 2018-01-03 DIAGNOSIS — I99.8 ISCHEMIC LEG: ICD-10-CM

## 2018-01-03 DIAGNOSIS — I73.9 CLAUDICATION (HCC): ICD-10-CM

## 2018-01-03 PROCEDURE — 93925 LOWER EXTREMITY STUDY: CPT

## 2018-01-03 PROCEDURE — 93970 EXTREMITY STUDY: CPT

## 2018-01-07 ENCOUNTER — APPOINTMENT (OUTPATIENT)
Dept: GENERAL RADIOLOGY | Age: 43
End: 2018-01-07
Payer: COMMERCIAL

## 2018-01-07 ENCOUNTER — HOSPITAL ENCOUNTER (EMERGENCY)
Age: 43
Discharge: HOME | End: 2018-01-07
Attending: EMERGENCY MEDICINE
Payer: COMMERCIAL

## 2018-01-07 VITALS
DIASTOLIC BLOOD PRESSURE: 72 MMHG | SYSTOLIC BLOOD PRESSURE: 139 MMHG | HEART RATE: 71 BPM | OXYGEN SATURATION: 98 % | RESPIRATION RATE: 16 BRPM | TEMPERATURE: 98.1 F

## 2018-01-07 DIAGNOSIS — L03.032 CELLULITIS OF TOE OF LEFT FOOT: Primary | ICD-10-CM

## 2018-01-07 LAB
C-REACTIVE PROTEIN: 6.3 MG/L (ref 0–5)
HCT VFR BLD CALC: 39.5 % (ref 36.3–47.1)
HEMOGLOBIN: 12.6 G/DL (ref 11.9–15.1)
MCH RBC QN AUTO: 29.4 PG (ref 25.2–33.5)
MCHC RBC AUTO-ENTMCNC: 31.9 G/DL (ref 28.4–34.8)
MCV RBC AUTO: 92.3 FL (ref 82.6–102.9)
PDW BLD-RTO: 12 % (ref 11.8–14.4)
PLATELET # BLD: 376 K/UL (ref 138–453)
PMV BLD AUTO: 9.7 FL (ref 8.1–13.5)
RBC # BLD: 4.28 M/UL (ref 3.95–5.11)
URIC ACID: 5.2 MG/DL (ref 2.4–5.7)
WBC # BLD: 9.1 K/UL (ref 3.5–11.3)

## 2018-01-07 PROCEDURE — 84550 ASSAY OF BLOOD/URIC ACID: CPT

## 2018-01-07 PROCEDURE — 85027 COMPLETE CBC AUTOMATED: CPT

## 2018-01-07 PROCEDURE — G0382 LEV 3 HOSP TYPE B ED VISIT: HCPCS

## 2018-01-07 PROCEDURE — 6370000000 HC RX 637 (ALT 250 FOR IP): Performed by: EMERGENCY MEDICINE

## 2018-01-07 PROCEDURE — 73630 X-RAY EXAM OF FOOT: CPT

## 2018-01-07 PROCEDURE — 86140 C-REACTIVE PROTEIN: CPT

## 2018-01-07 RX ORDER — ACETAMINOPHEN 325 MG/1
650 TABLET ORAL EVERY 6 HOURS PRN
Qty: 30 TABLET | Refills: 0 | Status: SHIPPED | OUTPATIENT
Start: 2018-01-07 | End: 2018-10-22

## 2018-01-07 RX ORDER — ACETAMINOPHEN 325 MG/1
650 TABLET ORAL ONCE
Status: COMPLETED | OUTPATIENT
Start: 2018-01-07 | End: 2018-01-07

## 2018-01-07 RX ORDER — DOXYCYCLINE 100 MG/1
100 TABLET ORAL 2 TIMES DAILY
Qty: 14 TABLET | Refills: 0 | Status: SHIPPED | OUTPATIENT
Start: 2018-01-07 | End: 2018-01-14

## 2018-01-07 RX ADMIN — ACETAMINOPHEN 650 MG: 325 TABLET ORAL at 17:38

## 2018-01-07 ASSESSMENT — PAIN SCALES - GENERAL
PAINLEVEL_OUTOF10: 5
PAINLEVEL_OUTOF10: 5

## 2018-01-07 ASSESSMENT — ENCOUNTER SYMPTOMS
DIARRHEA: 0
COLOR CHANGE: 1
CONSTIPATION: 0
NAUSEA: 0
SHORTNESS OF BREATH: 0
VOMITING: 0

## 2018-01-07 NOTE — ED PROVIDER NOTES
History Narrative    No narrative on file       Family History   Problem Relation Age of Onset    Diabetes Mother     High Blood Pressure Mother     Heart Attack Mother     Heart Disease Mother     Kidney Disease Mother     High Blood Pressure Father        Allergies:  Rozanna Knight [aspirin]; Ibuprofen; Lopid [gemfibrozil]; Nortriptyline; Pcn [penicillins]; and Sulfa antibiotics    Home Medications:  Prior to Admission medications    Medication Sig Start Date End Date Taking? Authorizing Provider   acetaminophen (TYLENOL) 325 MG tablet Take 2 tablets by mouth every 6 hours as needed for Pain 1/7/18  Yes Tor Maradiaga MD   doxycycline monohydrate (ADOXA) 100 MG tablet Take 1 tablet by mouth 2 times daily for 7 days 1/7/18 1/14/18 Yes Tor Maradiaga MD   atorvastatin (LIPITOR) 10 MG tablet Take 1 tablet by mouth daily 12/19/17   Gavin Medina MD   amLODIPine (NORVASC) 10 MG tablet Take 1 tablet by mouth daily 12/18/17   Gavin Medina MD   Blood Pressure KIT 1 Package by Does not apply route daily 12/18/17   Gavin Medina MD   nitroglycerin (NITRO-BID) 2 % ointment Place 0.5 inches onto the skin every 6 hours 12/4/17   Tomi Osei DPM       REVIEW OF SYSTEMS    (2-9 systems for level 4, 10 or more for level 5)      Review of Systems   Constitutional: Positive for chills. Negative for fatigue and fever. Respiratory: Negative for shortness of breath. Cardiovascular: Negative for chest pain. Gastrointestinal: Negative for constipation, diarrhea, nausea and vomiting. Skin: Positive for color change and wound. PHYSICAL EXAM   (up to 7 for level 4, 8 or more for level 5)      INITIAL VITALS:   /72   Pulse 71   Temp 98.1 °F (36.7 °C) (Oral)   Resp 16   LMP 11/30/2017   SpO2 98%     Physical Exam   Constitutional: She appears well-developed and well-nourished. No distress. HENT:   Head: Normocephalic and atraumatic.    Cardiovascular: Normal rate, regular rhythm, normal heart

## 2018-01-08 ENCOUNTER — OFFICE VISIT (OUTPATIENT)
Dept: VASCULAR SURGERY | Age: 43
End: 2018-01-08
Payer: COMMERCIAL

## 2018-01-08 VITALS
BODY MASS INDEX: 30.82 KG/M2 | HEIGHT: 65 IN | OXYGEN SATURATION: 98 % | HEART RATE: 71 BPM | DIASTOLIC BLOOD PRESSURE: 70 MMHG | RESPIRATION RATE: 17 BRPM | WEIGHT: 184.97 LBS | SYSTOLIC BLOOD PRESSURE: 118 MMHG

## 2018-01-08 DIAGNOSIS — I73.9 PAD (PERIPHERAL ARTERY DISEASE) (HCC): Primary | ICD-10-CM

## 2018-01-08 PROCEDURE — 99213 OFFICE O/P EST LOW 20 MIN: CPT | Performed by: SURGERY

## 2018-01-08 RX ORDER — DOXYCYCLINE HYCLATE 100 MG/1
100 CAPSULE ORAL 2 TIMES DAILY
COMMUNITY
End: 2018-01-15 | Stop reason: ALTCHOICE

## 2018-01-08 ASSESSMENT — ENCOUNTER SYMPTOMS
GASTROINTESTINAL NEGATIVE: 1
SHORTNESS OF BREATH: 0
CHEST TIGHTNESS: 0
ALLERGIC/IMMUNOLOGIC NEGATIVE: 1
EYES NEGATIVE: 1

## 2018-01-08 NOTE — PROGRESS NOTES
Division of Vascular Surgery        Postoperative Follow Up    Left lower extremity angiogram (12/19/2017)    History of Present Illness:      Shima Quintanilla is a 43 y.o. woman presents for postoperative follow up after undergoing a diagnostic left lower extremity angiogram which revealed popliteal artery occlusion with reconstitution of her peroneal artery which gave branches to fill the plantar vessels in her foot. This was an unusual finding in her since she does not have all the classical features of someone with this much extensive arterial occlusive disease. She is not a diabetic or smoker. She was told that she had a heart murmur as a child and has felt palpitations. She denies any chest pain or shortness of breath. Today she is feeling much better, she is able to walk a little bit longer before the pain comes into her feet. She still has intermittent numbness and tingling in her toes but denies symptoms suggestive of rest pain. She has developed a toe nail infection from her left big toe and was seen in the emergency room yesterday. An x-ray was performed which did not reveal osteo, but given the pain and drainage she was started on antibiotics. Review of Systems:     Review of Systems   Constitutional: Negative for chills and fever. HENT: Negative. Eyes: Negative. Respiratory: Negative for chest tightness and shortness of breath. Cardiovascular: Positive for palpitations. Negative for chest pain and leg swelling. Gastrointestinal: Negative. Endocrine: Negative. Genitourinary: Negative. Musculoskeletal: Negative. Skin: Positive for wound. Allergic/Immunologic: Negative. Neurological: Positive for numbness. Hematological: Negative. Psychiatric/Behavioral: Negative.       Physical Exam:     Vitals:  /70 (Site: Left Arm, Position: Sitting, Cuff Size: Medium Adult)   Pulse 71   Resp 17   Ht 5' 5\" (1.651 m)   Wt 184 lb 15.5 oz (83.9 kg)   LMP

## 2018-01-10 ENCOUNTER — HOSPITAL ENCOUNTER (OUTPATIENT)
Age: 43
Discharge: HOME | End: 2018-01-10
Payer: COMMERCIAL

## 2018-01-10 LAB
THYROXINE, FREE: 1.34 NG/DL (ref 0.93–1.7)
TSH SERPL DL<=0.05 MIU/L-ACNC: 2.22 MIU/L (ref 0.3–5)

## 2018-01-10 PROCEDURE — 84439 ASSAY OF FREE THYROXINE: CPT

## 2018-01-10 PROCEDURE — 84443 ASSAY THYROID STIM HORMONE: CPT

## 2018-01-10 PROCEDURE — 36415 COLL VENOUS BLD VENIPUNCTURE: CPT

## 2018-01-16 ENCOUNTER — ANESTHESIA (OUTPATIENT)
Dept: OPERATING ROOM | Age: 43
DRG: 181 | End: 2018-01-16
Payer: COMMERCIAL

## 2018-01-16 ENCOUNTER — APPOINTMENT (OUTPATIENT)
Dept: GENERAL RADIOLOGY | Age: 43
DRG: 181 | End: 2018-01-16
Attending: SURGERY
Payer: COMMERCIAL

## 2018-01-16 ENCOUNTER — HOSPITAL ENCOUNTER (INPATIENT)
Age: 43
LOS: 6 days | Discharge: HOME | DRG: 181 | End: 2018-01-22
Attending: SURGERY | Admitting: SURGERY
Payer: COMMERCIAL

## 2018-01-16 ENCOUNTER — ANESTHESIA EVENT (OUTPATIENT)
Dept: OPERATING ROOM | Age: 43
DRG: 181 | End: 2018-01-16
Payer: COMMERCIAL

## 2018-01-16 VITALS — OXYGEN SATURATION: 100 % | TEMPERATURE: 98.6 F

## 2018-01-16 VITALS — SYSTOLIC BLOOD PRESSURE: 124 MMHG | TEMPERATURE: 98.5 F | OXYGEN SATURATION: 100 % | DIASTOLIC BLOOD PRESSURE: 58 MMHG

## 2018-01-16 DIAGNOSIS — Z98.890 S/P PERIPHERAL ARTERY BYPASS: Primary | ICD-10-CM

## 2018-01-16 PROBLEM — I70.209 OCCLUSION OF ARTERY OF LEG (HCC): Status: ACTIVE | Noted: 2018-01-16

## 2018-01-16 PROBLEM — I70.202 LEFT POPLITEAL ARTERY OCCLUSION (HCC): Status: ACTIVE | Noted: 2018-01-16

## 2018-01-16 LAB
ACTIVATED CLOTTING TIME: 152 SEC (ref 79–149)
ALLEN TEST: ABNORMAL
ALLEN TEST: ABNORMAL
CALCIUM IONIZED: 1.11 MMOL/L (ref 1.13–1.33)
CALCIUM IONIZED: 1.17 MMOL/L (ref 1.13–1.33)
CARBOXYHEMOGLOBIN: 1.2 % (ref 0–5)
CARBOXYHEMOGLOBIN: 1.3 % (ref 0–5)
CHLORIDE, WHOLE BLOOD: 104 MMOL/L (ref 98–110)
CHLORIDE, WHOLE BLOOD: 105 MMOL/L (ref 98–110)
FIO2: ABNORMAL
FIO2: ABNORMAL
GFR NON-AFRICAN AMERICAN: 54 ML/MIN
GFR SERPL CREATININE-BSD FRML MDRD: >60 ML/MIN
GFR SERPL CREATININE-BSD FRML MDRD: ABNORMAL ML/MIN/{1.73_M2}
GLUCOSE BLD-MCNC: 110 MG/DL (ref 74–100)
GLUCOSE BLD-MCNC: 127 MG/DL (ref 65–105)
GLUCOSE BLD-MCNC: 148 MG/DL (ref 65–105)
HCG, PREGNANCY URINE (POC): NEGATIVE
HCO3 ARTERIAL: 22.6 MMOL/L (ref 22–27)
HCO3 ARTERIAL: 22.6 MMOL/L (ref 22–27)
HCT VFR BLD CALC: 29.7 %
HCT VFR BLD CALC: 29.8 %
HEMOGLOBIN: 9.6 GM/DL
HEMOGLOBIN: 9.6 GM/DL
METHEMOGLOBIN: ABNORMAL % (ref 0–1.5)
METHEMOGLOBIN: ABNORMAL % (ref 0–1.5)
MODE: ABNORMAL
MODE: ABNORMAL
NEGATIVE BASE EXCESS, ART: 0.5 MMOL/L (ref 0–2)
NEGATIVE BASE EXCESS, ART: 0.7 MMOL/L (ref 0–2)
NOTIFICATION TIME: ABNORMAL
NOTIFICATION TIME: ABNORMAL
NOTIFICATION: ABNORMAL
NOTIFICATION: ABNORMAL
O2 DEVICE/FLOW/%: ABNORMAL
O2 DEVICE/FLOW/%: ABNORMAL
O2 SAT, ARTERIAL: 99.8 % (ref 94–100)
O2 SAT, ARTERIAL: 99.8 % (ref 94–100)
OXYHEMOGLOBIN: ABNORMAL % (ref 95–98)
OXYHEMOGLOBIN: ABNORMAL % (ref 95–98)
PATIENT TEMP: 37
PATIENT TEMP: 37
PCO2 ARTERIAL: 33.1 MMHG (ref 32–45)
PCO2 ARTERIAL: 33.8 MMHG (ref 32–45)
PCO2, ART, TEMP ADJ: ABNORMAL (ref 32–45)
PCO2, ART, TEMP ADJ: ABNORMAL (ref 32–45)
PEEP/CPAP: ABNORMAL
PEEP/CPAP: ABNORMAL
PH ARTERIAL: 7.44 (ref 7.35–7.45)
PH ARTERIAL: 7.45 (ref 7.35–7.45)
PH, ART, TEMP ADJ: ABNORMAL (ref 7.35–7.45)
PH, ART, TEMP ADJ: ABNORMAL (ref 7.35–7.45)
PO2 ARTERIAL: 182 MMHG (ref 75–95)
PO2 ARTERIAL: 207 MMHG (ref 75–95)
PO2, ART, TEMP ADJ: ABNORMAL MMHG (ref 75–95)
PO2, ART, TEMP ADJ: ABNORMAL MMHG (ref 75–95)
POC CREATININE: 1.11 MG/DL (ref 0.51–1.19)
POSITIVE BASE EXCESS, ART: ABNORMAL MMOL/L (ref 0–2)
POSITIVE BASE EXCESS, ART: ABNORMAL MMOL/L (ref 0–2)
POTASSIUM, WHOLE BLOOD: 3.8 MMOL/L (ref 3.6–5)
POTASSIUM, WHOLE BLOOD: 4.2 MMOL/L (ref 3.6–5)
PSV: ABNORMAL
PSV: ABNORMAL
PT. POSITION: ABNORMAL
PT. POSITION: ABNORMAL
RESPIRATORY RATE: ABNORMAL
RESPIRATORY RATE: ABNORMAL
SAMPLE SITE: ABNORMAL
SAMPLE SITE: ABNORMAL
SET RATE: ABNORMAL
SET RATE: ABNORMAL
SODIUM, WHOLE BLOOD: 137 MMOL/L (ref 136–145)
SODIUM, WHOLE BLOOD: 138 MMOL/L (ref 136–145)
TEXT FOR RESPIRATORY: ABNORMAL
TEXT FOR RESPIRATORY: ABNORMAL
TOTAL HB: ABNORMAL G/DL (ref 12–16)
TOTAL HB: ABNORMAL G/DL (ref 12–16)
TOTAL RATE: ABNORMAL
TOTAL RATE: ABNORMAL
VT: ABNORMAL
VT: ABNORMAL

## 2018-01-16 PROCEDURE — 82962 GLUCOSE BLOOD TEST: CPT

## 2018-01-16 PROCEDURE — 82435 ASSAY OF BLOOD CHLORIDE: CPT

## 2018-01-16 PROCEDURE — 04CL0ZZ EXTIRPATION OF MATTER FROM LEFT FEMORAL ARTERY, OPEN APPROACH: ICD-10-PCS | Performed by: SURGERY

## 2018-01-16 PROCEDURE — 87086 URINE CULTURE/COLONY COUNT: CPT

## 2018-01-16 PROCEDURE — 2500000003 HC RX 250 WO HCPCS: Performed by: NURSE ANESTHETIST, CERTIFIED REGISTERED

## 2018-01-16 PROCEDURE — 35875 REMOVAL OF CLOT IN GRAFT: CPT | Performed by: SURGERY

## 2018-01-16 PROCEDURE — 86920 COMPATIBILITY TEST SPIN: CPT

## 2018-01-16 PROCEDURE — 2580000003 HC RX 258: Performed by: NURSE ANESTHETIST, CERTIFIED REGISTERED

## 2018-01-16 PROCEDURE — 3700000001 HC ADD 15 MINUTES (ANESTHESIA): Performed by: SURGERY

## 2018-01-16 PROCEDURE — 2720000010 HC SURG SUPPLY STERILE: Performed by: SURGERY

## 2018-01-16 PROCEDURE — 82330 ASSAY OF CALCIUM: CPT

## 2018-01-16 PROCEDURE — 84703 CHORIONIC GONADOTROPIN ASSAY: CPT

## 2018-01-16 PROCEDURE — 2580000003 HC RX 258: Performed by: ANESTHESIOLOGY

## 2018-01-16 PROCEDURE — 3700000000 HC ANESTHESIA ATTENDED CARE: Performed by: SURGERY

## 2018-01-16 PROCEDURE — 86870 RBC ANTIBODY IDENTIFICATION: CPT

## 2018-01-16 PROCEDURE — 7100000000 HC PACU RECOVERY - FIRST 15 MIN: Performed by: SURGERY

## 2018-01-16 PROCEDURE — 6360000002 HC RX W HCPCS: Performed by: NURSE ANESTHETIST, CERTIFIED REGISTERED

## 2018-01-16 PROCEDURE — 6360000002 HC RX W HCPCS: Performed by: SURGERY

## 2018-01-16 PROCEDURE — 041L09M BYPASS LEFT FEMORAL ARTERY TO PERONEAL ARTERY WITH AUTOLOGOUS VENOUS TISSUE, OPEN APPROACH: ICD-10-PCS | Performed by: SURGERY

## 2018-01-16 PROCEDURE — 85018 HEMOGLOBIN: CPT

## 2018-01-16 PROCEDURE — 2580000003 HC RX 258: Performed by: SURGERY

## 2018-01-16 PROCEDURE — 7100000001 HC PACU RECOVERY - ADDTL 15 MIN: Performed by: SURGERY

## 2018-01-16 PROCEDURE — B41GZZZ FLUOROSCOPY OF LEFT LOWER EXTREMITY ARTERIES: ICD-10-PCS | Performed by: SURGERY

## 2018-01-16 PROCEDURE — C1769 GUIDE WIRE: HCPCS | Performed by: SURGERY

## 2018-01-16 PROCEDURE — 86901 BLOOD TYPING SEROLOGIC RH(D): CPT

## 2018-01-16 PROCEDURE — 82947 ASSAY GLUCOSE BLOOD QUANT: CPT

## 2018-01-16 PROCEDURE — 73590 X-RAY EXAM OF LOWER LEG: CPT

## 2018-01-16 PROCEDURE — A6402 STERILE GAUZE <= 16 SQ IN: HCPCS | Performed by: SURGERY

## 2018-01-16 PROCEDURE — 6360000002 HC RX W HCPCS: Performed by: ANESTHESIOLOGY

## 2018-01-16 PROCEDURE — 6360000002 HC RX W HCPCS: Performed by: STUDENT IN AN ORGANIZED HEALTH CARE EDUCATION/TRAINING PROGRAM

## 2018-01-16 PROCEDURE — A6403 STERILE GAUZE>16 <= 48 SQ IN: HCPCS | Performed by: SURGERY

## 2018-01-16 PROCEDURE — 85347 COAGULATION TIME ACTIVATED: CPT

## 2018-01-16 PROCEDURE — C1894 INTRO/SHEATH, NON-LASER: HCPCS | Performed by: SURGERY

## 2018-01-16 PROCEDURE — 0QBK0ZZ EXCISION OF LEFT FIBULA, OPEN APPROACH: ICD-10-PCS | Performed by: SURGERY

## 2018-01-16 PROCEDURE — 88311 DECALCIFY TISSUE: CPT

## 2018-01-16 PROCEDURE — 86880 COOMBS TEST DIRECT: CPT

## 2018-01-16 PROCEDURE — 84295 ASSAY OF SERUM SODIUM: CPT

## 2018-01-16 PROCEDURE — 6360000002 HC RX W HCPCS

## 2018-01-16 PROCEDURE — 35566 ART BYP FEM-ANT-POST TIB/PRL: CPT | Performed by: SURGERY

## 2018-01-16 PROCEDURE — 3600000003 HC SURGERY LEVEL 3 BASE: Performed by: SURGERY

## 2018-01-16 PROCEDURE — 82805 BLOOD GASES W/O2 SATURATION: CPT

## 2018-01-16 PROCEDURE — 36600 WITHDRAWAL OF ARTERIAL BLOOD: CPT

## 2018-01-16 PROCEDURE — 3600000013 HC SURGERY LEVEL 3 ADDTL 15MIN: Performed by: SURGERY

## 2018-01-16 PROCEDURE — 84132 ASSAY OF SERUM POTASSIUM: CPT

## 2018-01-16 PROCEDURE — A6454 SELF-ADHER BAND W>=3" <5"/YD: HCPCS | Performed by: SURGERY

## 2018-01-16 PROCEDURE — A6446 CONFORM BAND S W>=3" <5"/YD: HCPCS | Performed by: SURGERY

## 2018-01-16 PROCEDURE — 6370000000 HC RX 637 (ALT 250 FOR IP): Performed by: SURGERY

## 2018-01-16 PROCEDURE — 6360000004 HC RX CONTRAST MEDICATION: Performed by: SURGERY

## 2018-01-16 PROCEDURE — 2580000003 HC RX 258: Performed by: STUDENT IN AN ORGANIZED HEALTH CARE EDUCATION/TRAINING PROGRAM

## 2018-01-16 PROCEDURE — 06BQ0ZZ EXCISION OF LEFT SAPHENOUS VEIN, OPEN APPROACH: ICD-10-PCS | Performed by: SURGERY

## 2018-01-16 PROCEDURE — 86902 BLOOD TYPE ANTIGEN DONOR EA: CPT

## 2018-01-16 PROCEDURE — 2000000000 HC ICU R&B

## 2018-01-16 PROCEDURE — 2580000003 HC RX 258

## 2018-01-16 PROCEDURE — 86900 BLOOD TYPING SEROLOGIC ABO: CPT

## 2018-01-16 PROCEDURE — 85014 HEMATOCRIT: CPT

## 2018-01-16 PROCEDURE — 2500000003 HC RX 250 WO HCPCS

## 2018-01-16 PROCEDURE — 88304 TISSUE EXAM BY PATHOLOGIST: CPT

## 2018-01-16 PROCEDURE — 2500000003 HC RX 250 WO HCPCS: Performed by: SURGERY

## 2018-01-16 PROCEDURE — 86850 RBC ANTIBODY SCREEN: CPT

## 2018-01-16 PROCEDURE — 82565 ASSAY OF CREATININE: CPT

## 2018-01-16 RX ORDER — SODIUM CHLORIDE 9 MG/ML
INJECTION, SOLUTION INTRAVENOUS CONTINUOUS
Status: DISCONTINUED | OUTPATIENT
Start: 2018-01-17 | End: 2018-01-22 | Stop reason: HOSPADM

## 2018-01-16 RX ORDER — HEPARIN SODIUM 10000 [USP'U]/100ML
18 INJECTION, SOLUTION INTRAVENOUS CONTINUOUS
Status: DISCONTINUED | OUTPATIENT
Start: 2018-01-16 | End: 2018-01-16

## 2018-01-16 RX ORDER — FENTANYL CITRATE 50 UG/ML
50 INJECTION, SOLUTION INTRAMUSCULAR; INTRAVENOUS EVERY 5 MIN PRN
Status: DISCONTINUED | OUTPATIENT
Start: 2018-01-16 | End: 2018-01-16 | Stop reason: HOSPADM

## 2018-01-16 RX ORDER — HEPARIN SODIUM 1000 [USP'U]/ML
80 INJECTION, SOLUTION INTRAVENOUS; SUBCUTANEOUS PRN
Status: DISCONTINUED | OUTPATIENT
Start: 2018-01-16 | End: 2018-01-19

## 2018-01-16 RX ORDER — FENTANYL CITRATE 50 UG/ML
25 INJECTION, SOLUTION INTRAMUSCULAR; INTRAVENOUS EVERY 5 MIN PRN
Status: DISCONTINUED | OUTPATIENT
Start: 2018-01-16 | End: 2018-01-16 | Stop reason: HOSPADM

## 2018-01-16 RX ORDER — LABETALOL HYDROCHLORIDE 5 MG/ML
5 INJECTION, SOLUTION INTRAVENOUS EVERY 10 MIN PRN
Status: DISCONTINUED | OUTPATIENT
Start: 2018-01-16 | End: 2018-01-16 | Stop reason: HOSPADM

## 2018-01-16 RX ORDER — HYDRALAZINE HYDROCHLORIDE 20 MG/ML
5 INJECTION INTRAMUSCULAR; INTRAVENOUS EVERY 10 MIN PRN
Status: DISCONTINUED | OUTPATIENT
Start: 2018-01-16 | End: 2018-01-16 | Stop reason: HOSPADM

## 2018-01-16 RX ORDER — HEPARIN SODIUM 1000 [USP'U]/ML
40 INJECTION, SOLUTION INTRAVENOUS; SUBCUTANEOUS PRN
Status: DISCONTINUED | OUTPATIENT
Start: 2018-01-16 | End: 2018-01-19

## 2018-01-16 RX ORDER — ONDANSETRON 2 MG/ML
4 INJECTION INTRAMUSCULAR; INTRAVENOUS
Status: COMPLETED | OUTPATIENT
Start: 2018-01-16 | End: 2018-01-16

## 2018-01-16 RX ORDER — OXYCODONE HYDROCHLORIDE AND ACETAMINOPHEN 5; 325 MG/1; MG/1
1 TABLET ORAL EVERY 4 HOURS PRN
Status: DISCONTINUED | OUTPATIENT
Start: 2018-01-16 | End: 2018-01-16 | Stop reason: HOSPADM

## 2018-01-16 RX ORDER — NEOSTIGMINE METHYLSULFATE 1 MG/ML
INJECTION, SOLUTION INTRAVENOUS PRN
Status: DISCONTINUED | OUTPATIENT
Start: 2018-01-16 | End: 2018-01-16 | Stop reason: SDUPTHER

## 2018-01-16 RX ORDER — SODIUM CHLORIDE 0.9 % (FLUSH) 0.9 %
10 SYRINGE (ML) INJECTION PRN
Status: DISCONTINUED | OUTPATIENT
Start: 2018-01-16 | End: 2018-01-22 | Stop reason: HOSPADM

## 2018-01-16 RX ORDER — CLINDAMYCIN PHOSPHATE 150 MG/ML
INJECTION, SOLUTION INTRAVENOUS PRN
Status: DISCONTINUED | OUTPATIENT
Start: 2018-01-16 | End: 2018-01-16 | Stop reason: SDUPTHER

## 2018-01-16 RX ORDER — HEPARIN SODIUM 10000 [USP'U]/100ML
18 INJECTION, SOLUTION INTRAVENOUS CONTINUOUS
Status: DISCONTINUED | OUTPATIENT
Start: 2018-01-17 | End: 2018-01-19

## 2018-01-16 RX ORDER — LIDOCAINE HYDROCHLORIDE 10 MG/ML
INJECTION, SOLUTION INFILTRATION; PERINEURAL PRN
Status: DISCONTINUED | OUTPATIENT
Start: 2018-01-16 | End: 2018-01-16 | Stop reason: SDUPTHER

## 2018-01-16 RX ORDER — ROCURONIUM BROMIDE 10 MG/ML
INJECTION, SOLUTION INTRAVENOUS PRN
Status: DISCONTINUED | OUTPATIENT
Start: 2018-01-16 | End: 2018-01-16 | Stop reason: SDUPTHER

## 2018-01-16 RX ORDER — FAMOTIDINE 20 MG/1
20 TABLET, FILM COATED ORAL 2 TIMES DAILY
Status: DISCONTINUED | OUTPATIENT
Start: 2018-01-17 | End: 2018-01-22 | Stop reason: HOSPADM

## 2018-01-16 RX ORDER — MORPHINE SULFATE 2 MG/ML
2 INJECTION, SOLUTION INTRAMUSCULAR; INTRAVENOUS
Status: CANCELLED | OUTPATIENT
Start: 2018-01-16

## 2018-01-16 RX ORDER — GLYCOPYRROLATE 0.2 MG/ML
INJECTION INTRAMUSCULAR; INTRAVENOUS PRN
Status: DISCONTINUED | OUTPATIENT
Start: 2018-01-16 | End: 2018-01-16 | Stop reason: SDUPTHER

## 2018-01-16 RX ORDER — MORPHINE SULFATE/0.9% NACL/PF 1 MG/ML
SYRINGE (ML) INJECTION CONTINUOUS
Status: DISCONTINUED | OUTPATIENT
Start: 2018-01-16 | End: 2018-01-20

## 2018-01-16 RX ORDER — DEXAMETHASONE SODIUM PHOSPHATE 10 MG/ML
INJECTION INTRAMUSCULAR; INTRAVENOUS PRN
Status: DISCONTINUED | OUTPATIENT
Start: 2018-01-16 | End: 2018-01-16 | Stop reason: SDUPTHER

## 2018-01-16 RX ORDER — MORPHINE SULFATE 4 MG/ML
4 INJECTION, SOLUTION INTRAMUSCULAR; INTRAVENOUS
Status: CANCELLED | OUTPATIENT
Start: 2018-01-16

## 2018-01-16 RX ORDER — CLOPIDOGREL BISULFATE 75 MG/1
300 TABLET ORAL ONCE
Status: COMPLETED | OUTPATIENT
Start: 2018-01-17 | End: 2018-01-17

## 2018-01-16 RX ORDER — HEPARIN SODIUM 1000 [USP'U]/ML
INJECTION, SOLUTION INTRAVENOUS; SUBCUTANEOUS PRN
Status: DISCONTINUED | OUTPATIENT
Start: 2018-01-16 | End: 2018-01-16 | Stop reason: HOSPADM

## 2018-01-16 RX ORDER — ACETAMINOPHEN 325 MG/1
650 TABLET ORAL EVERY 4 HOURS PRN
Status: DISCONTINUED | OUTPATIENT
Start: 2018-01-16 | End: 2018-01-22 | Stop reason: HOSPADM

## 2018-01-16 RX ORDER — ONDANSETRON 2 MG/ML
4 INJECTION INTRAMUSCULAR; INTRAVENOUS EVERY 6 HOURS PRN
Status: DISCONTINUED | OUTPATIENT
Start: 2018-01-16 | End: 2018-01-20

## 2018-01-16 RX ORDER — SODIUM CHLORIDE, SODIUM LACTATE, POTASSIUM CHLORIDE, CALCIUM CHLORIDE 600; 310; 30; 20 MG/100ML; MG/100ML; MG/100ML; MG/100ML
INJECTION, SOLUTION INTRAVENOUS CONTINUOUS
Status: DISCONTINUED | OUTPATIENT
Start: 2018-01-16 | End: 2018-01-16

## 2018-01-16 RX ORDER — MAGNESIUM HYDROXIDE 1200 MG/15ML
LIQUID ORAL CONTINUOUS PRN
Status: DISCONTINUED | OUTPATIENT
Start: 2018-01-16 | End: 2018-01-16 | Stop reason: HOSPADM

## 2018-01-16 RX ORDER — HEPARIN SODIUM 1000 [USP'U]/ML
80 INJECTION, SOLUTION INTRAVENOUS; SUBCUTANEOUS PRN
Status: DISCONTINUED | OUTPATIENT
Start: 2018-01-16 | End: 2018-01-16

## 2018-01-16 RX ORDER — HEPARIN SODIUM 1000 [USP'U]/ML
80 INJECTION, SOLUTION INTRAVENOUS; SUBCUTANEOUS ONCE
Status: COMPLETED | OUTPATIENT
Start: 2018-01-17 | End: 2018-01-17

## 2018-01-16 RX ORDER — PROPOFOL 10 MG/ML
INJECTION, EMULSION INTRAVENOUS PRN
Status: DISCONTINUED | OUTPATIENT
Start: 2018-01-16 | End: 2018-01-16 | Stop reason: SDUPTHER

## 2018-01-16 RX ORDER — HEPARIN SODIUM 1000 [USP'U]/ML
80 INJECTION, SOLUTION INTRAVENOUS; SUBCUTANEOUS ONCE
Status: DISCONTINUED | OUTPATIENT
Start: 2018-01-16 | End: 2018-01-16

## 2018-01-16 RX ORDER — HEPARIN SODIUM 1000 [USP'U]/ML
40 INJECTION, SOLUTION INTRAVENOUS; SUBCUTANEOUS PRN
Status: DISCONTINUED | OUTPATIENT
Start: 2018-01-16 | End: 2018-01-16

## 2018-01-16 RX ORDER — ESMOLOL HYDROCHLORIDE 10 MG/ML
INJECTION INTRAVENOUS PRN
Status: DISCONTINUED | OUTPATIENT
Start: 2018-01-16 | End: 2018-01-16 | Stop reason: SDUPTHER

## 2018-01-16 RX ORDER — CLINDAMYCIN PHOSPHATE 900 MG/50ML
900 INJECTION INTRAVENOUS EVERY 8 HOURS
Status: COMPLETED | OUTPATIENT
Start: 2018-01-17 | End: 2018-01-17

## 2018-01-16 RX ORDER — ATORVASTATIN CALCIUM 10 MG/1
10 TABLET, FILM COATED ORAL DAILY
Status: DISCONTINUED | OUTPATIENT
Start: 2018-01-17 | End: 2018-01-22 | Stop reason: HOSPADM

## 2018-01-16 RX ORDER — OXYCODONE HYDROCHLORIDE 5 MG/1
5 TABLET ORAL EVERY 4 HOURS PRN
Status: DISCONTINUED | OUTPATIENT
Start: 2018-01-16 | End: 2018-01-22 | Stop reason: HOSPADM

## 2018-01-16 RX ORDER — SODIUM CHLORIDE 0.9 % (FLUSH) 0.9 %
10 SYRINGE (ML) INJECTION EVERY 12 HOURS SCHEDULED
Status: DISCONTINUED | OUTPATIENT
Start: 2018-01-17 | End: 2018-01-22 | Stop reason: HOSPADM

## 2018-01-16 RX ORDER — SODIUM CHLORIDE, SODIUM LACTATE, POTASSIUM CHLORIDE, CALCIUM CHLORIDE 600; 310; 30; 20 MG/100ML; MG/100ML; MG/100ML; MG/100ML
INJECTION, SOLUTION INTRAVENOUS CONTINUOUS PRN
Status: DISCONTINUED | OUTPATIENT
Start: 2018-01-16 | End: 2018-01-16 | Stop reason: SDUPTHER

## 2018-01-16 RX ORDER — CLOPIDOGREL BISULFATE 75 MG/1
75 TABLET ORAL DAILY
Status: DISCONTINUED | OUTPATIENT
Start: 2018-01-17 | End: 2018-01-22 | Stop reason: HOSPADM

## 2018-01-16 RX ORDER — OXYCODONE HYDROCHLORIDE 5 MG/1
10 TABLET ORAL EVERY 4 HOURS PRN
Status: DISCONTINUED | OUTPATIENT
Start: 2018-01-16 | End: 2018-01-22 | Stop reason: HOSPADM

## 2018-01-16 RX ORDER — SODIUM CHLORIDE 9 MG/ML
INJECTION, SOLUTION INTRAVENOUS CONTINUOUS PRN
Status: DISCONTINUED | OUTPATIENT
Start: 2018-01-16 | End: 2018-01-16 | Stop reason: SDUPTHER

## 2018-01-16 RX ORDER — HEPARIN SODIUM 10000 [USP'U]/100ML
INJECTION, SOLUTION INTRAVENOUS CONTINUOUS PRN
Status: DISCONTINUED | OUTPATIENT
Start: 2018-01-16 | End: 2018-01-16 | Stop reason: SDUPTHER

## 2018-01-16 RX ORDER — NALOXONE HYDROCHLORIDE 0.4 MG/ML
0.4 INJECTION, SOLUTION INTRAMUSCULAR; INTRAVENOUS; SUBCUTANEOUS PRN
Status: DISCONTINUED | OUTPATIENT
Start: 2018-01-16 | End: 2018-01-20

## 2018-01-16 RX ORDER — NITROGLYCERIN 20 MG/100ML
5 INJECTION INTRAVENOUS
Status: DISCONTINUED | OUTPATIENT
Start: 2018-01-16 | End: 2018-01-16

## 2018-01-16 RX ORDER — ONDANSETRON 2 MG/ML
4 INJECTION INTRAMUSCULAR; INTRAVENOUS EVERY 6 HOURS PRN
Status: DISCONTINUED | OUTPATIENT
Start: 2018-01-16 | End: 2018-01-16 | Stop reason: SDUPTHER

## 2018-01-16 RX ORDER — NITROGLYCERIN 10 MG/100ML
INJECTION INTRAVENOUS CONTINUOUS PRN
Status: DISCONTINUED | OUTPATIENT
Start: 2018-01-16 | End: 2018-01-16 | Stop reason: HOSPADM

## 2018-01-16 RX ORDER — ONDANSETRON 2 MG/ML
INJECTION INTRAMUSCULAR; INTRAVENOUS PRN
Status: DISCONTINUED | OUTPATIENT
Start: 2018-01-16 | End: 2018-01-16 | Stop reason: SDUPTHER

## 2018-01-16 RX ORDER — AMLODIPINE BESYLATE 10 MG/1
10 TABLET ORAL DAILY
Status: DISCONTINUED | OUTPATIENT
Start: 2018-01-17 | End: 2018-01-22 | Stop reason: HOSPADM

## 2018-01-16 RX ORDER — FENTANYL CITRATE 50 UG/ML
INJECTION, SOLUTION INTRAMUSCULAR; INTRAVENOUS PRN
Status: DISCONTINUED | OUTPATIENT
Start: 2018-01-16 | End: 2018-01-16 | Stop reason: SDUPTHER

## 2018-01-16 RX ORDER — LIDOCAINE HYDROCHLORIDE 10 MG/ML
INJECTION, SOLUTION EPIDURAL; INFILTRATION; INTRACAUDAL; PERINEURAL PRN
Status: DISCONTINUED | OUTPATIENT
Start: 2018-01-16 | End: 2018-01-16 | Stop reason: SDUPTHER

## 2018-01-16 RX ORDER — HEPARIN SODIUM 5000 [USP'U]/ML
5000 INJECTION, SOLUTION INTRAVENOUS; SUBCUTANEOUS ONCE
Status: COMPLETED | OUTPATIENT
Start: 2018-01-16 | End: 2018-01-16

## 2018-01-16 RX ORDER — HEPARIN SODIUM AND DEXTROSE 10000; 5 [USP'U]/100ML; G/100ML
1000 INJECTION INTRAVENOUS CONTINUOUS
Status: DISPENSED | OUTPATIENT
Start: 2018-01-16 | End: 2018-01-16

## 2018-01-16 RX ORDER — DIPHENHYDRAMINE HYDROCHLORIDE 50 MG/ML
12.5 INJECTION INTRAMUSCULAR; INTRAVENOUS
Status: DISCONTINUED | OUTPATIENT
Start: 2018-01-16 | End: 2018-01-16 | Stop reason: HOSPADM

## 2018-01-16 RX ADMIN — GLYCOPYRROLATE 0.6 MG: 0.2 INJECTION INTRAMUSCULAR; INTRAVENOUS at 21:43

## 2018-01-16 RX ADMIN — FENTANYL CITRATE 50 MCG: 50 INJECTION INTRAMUSCULAR; INTRAVENOUS at 19:51

## 2018-01-16 RX ADMIN — FENTANYL CITRATE 50 MCG: 50 INJECTION INTRAMUSCULAR; INTRAVENOUS at 15:32

## 2018-01-16 RX ADMIN — PROPOFOL 50 MG: 10 INJECTION, EMULSION INTRAVENOUS at 08:47

## 2018-01-16 RX ADMIN — HEPARIN SODIUM AND DEXTROSE 18 UNITS/KG/HR: 10000; 5 INJECTION INTRAVENOUS at 18:47

## 2018-01-16 RX ADMIN — PHENYLEPHRINE HYDROCHLORIDE 50 MCG/MIN: 10 INJECTION INTRAVENOUS at 07:56

## 2018-01-16 RX ADMIN — FENTANYL CITRATE 50 MCG: 50 INJECTION INTRAMUSCULAR; INTRAVENOUS at 08:47

## 2018-01-16 RX ADMIN — PHENYLEPHRINE HYDROCHLORIDE 100 MCG: 10 INJECTION INTRAVENOUS at 07:50

## 2018-01-16 RX ADMIN — SODIUM CHLORIDE, POTASSIUM CHLORIDE, SODIUM LACTATE AND CALCIUM CHLORIDE: 600; 310; 30; 20 INJECTION, SOLUTION INTRAVENOUS at 19:06

## 2018-01-16 RX ADMIN — PHENYLEPHRINE HYDROCHLORIDE 100 MCG: 10 INJECTION INTRAVENOUS at 19:55

## 2018-01-16 RX ADMIN — PHENYLEPHRINE HYDROCHLORIDE 100 MCG: 10 INJECTION INTRAVENOUS at 07:56

## 2018-01-16 RX ADMIN — FENTANYL CITRATE 50 MCG: 50 INJECTION, SOLUTION INTRAMUSCULAR; INTRAVENOUS at 17:15

## 2018-01-16 RX ADMIN — PROPOFOL 150 MG: 10 INJECTION, EMULSION INTRAVENOUS at 07:39

## 2018-01-16 RX ADMIN — NEOSTIGMINE METHYLSULFATE 4 MG: 1 INJECTION, SOLUTION INTRAVENOUS at 21:43

## 2018-01-16 RX ADMIN — ROCURONIUM BROMIDE 50 MG: 10 INJECTION INTRAVENOUS at 19:18

## 2018-01-16 RX ADMIN — FENTANYL CITRATE 50 MCG: 50 INJECTION INTRAMUSCULAR; INTRAVENOUS at 21:45

## 2018-01-16 RX ADMIN — FENTANYL CITRATE 25 MCG: 50 INJECTION INTRAMUSCULAR; INTRAVENOUS at 11:10

## 2018-01-16 RX ADMIN — ONDANSETRON 4 MG: 2 INJECTION INTRAMUSCULAR; INTRAVENOUS at 21:45

## 2018-01-16 RX ADMIN — ROCURONIUM BROMIDE 50 MG: 10 INJECTION INTRAVENOUS at 07:39

## 2018-01-16 RX ADMIN — GLYCOPYRROLATE 0.4 MG: 0.2 INJECTION INTRAMUSCULAR; INTRAVENOUS at 15:30

## 2018-01-16 RX ADMIN — ONDANSETRON 8 MG: 2 INJECTION INTRAMUSCULAR; INTRAVENOUS at 07:39

## 2018-01-16 RX ADMIN — HYDROMORPHONE HYDROCHLORIDE 0.5 MG: 1 INJECTION, SOLUTION INTRAMUSCULAR; INTRAVENOUS; SUBCUTANEOUS at 18:04

## 2018-01-16 RX ADMIN — ESMOLOL HYDROCHLORIDE 40 MG: 10 INJECTION INTRAVENOUS at 08:18

## 2018-01-16 RX ADMIN — NEOSTIGMINE METHYLSULFATE 3 MG: 1 INJECTION, SOLUTION INTRAMUSCULAR; INTRAVENOUS; SUBCUTANEOUS at 15:30

## 2018-01-16 RX ADMIN — FENTANYL CITRATE 100 MCG: 50 INJECTION INTRAMUSCULAR; INTRAVENOUS at 09:41

## 2018-01-16 RX ADMIN — PHENYLEPHRINE HYDROCHLORIDE 100 MCG: 10 INJECTION INTRAVENOUS at 20:23

## 2018-01-16 RX ADMIN — ESMOLOL HYDROCHLORIDE 30 MG: 10 INJECTION INTRAVENOUS at 07:45

## 2018-01-16 RX ADMIN — FENTANYL CITRATE 100 MCG: 50 INJECTION INTRAMUSCULAR; INTRAVENOUS at 07:39

## 2018-01-16 RX ADMIN — FENTANYL CITRATE 50 MCG: 50 INJECTION INTRAMUSCULAR; INTRAVENOUS at 21:57

## 2018-01-16 RX ADMIN — ESMOLOL HYDROCHLORIDE 20 MG: 10 INJECTION INTRAVENOUS at 16:34

## 2018-01-16 RX ADMIN — SODIUM CHLORIDE, POTASSIUM CHLORIDE, SODIUM LACTATE AND CALCIUM CHLORIDE: 600; 310; 30; 20 INJECTION, SOLUTION INTRAVENOUS at 16:16

## 2018-01-16 RX ADMIN — SODIUM CHLORIDE: 9 INJECTION, SOLUTION INTRAVENOUS at 23:57

## 2018-01-16 RX ADMIN — FENTANYL CITRATE 50 MCG: 50 INJECTION INTRAMUSCULAR; INTRAVENOUS at 16:42

## 2018-01-16 RX ADMIN — FENTANYL CITRATE 50 MCG: 50 INJECTION INTRAMUSCULAR; INTRAVENOUS at 20:14

## 2018-01-16 RX ADMIN — ROCURONIUM BROMIDE 25 MG: 10 INJECTION INTRAVENOUS at 12:48

## 2018-01-16 RX ADMIN — CLINDAMYCIN PHOSPHATE 900 MG: 150 INJECTION, SOLUTION INTRAMUSCULAR; INTRAVENOUS at 08:08

## 2018-01-16 RX ADMIN — FENTANYL CITRATE 25 MCG: 50 INJECTION INTRAMUSCULAR; INTRAVENOUS at 15:44

## 2018-01-16 RX ADMIN — SODIUM CHLORIDE, POTASSIUM CHLORIDE, SODIUM LACTATE AND CALCIUM CHLORIDE: 600; 310; 30; 20 INJECTION, SOLUTION INTRAVENOUS at 07:44

## 2018-01-16 RX ADMIN — DEXAMETHASONE SODIUM PHOSPHATE 10 MG: 10 INJECTION INTRAMUSCULAR; INTRAVENOUS at 20:32

## 2018-01-16 RX ADMIN — ROCURONIUM BROMIDE 10 MG: 10 INJECTION INTRAVENOUS at 11:10

## 2018-01-16 RX ADMIN — PHENYLEPHRINE HYDROCHLORIDE 100 MCG: 10 INJECTION INTRAVENOUS at 08:34

## 2018-01-16 RX ADMIN — FENTANYL CITRATE 50 MCG: 50 INJECTION INTRAMUSCULAR; INTRAVENOUS at 21:29

## 2018-01-16 RX ADMIN — MORPHINE SULFATE 30 MG: 1 INJECTION INTRAVENOUS at 22:50

## 2018-01-16 RX ADMIN — FENTANYL CITRATE 50 MCG: 50 INJECTION INTRAMUSCULAR; INTRAVENOUS at 19:29

## 2018-01-16 RX ADMIN — HEPARIN SODIUM 5000 UNITS: 5000 INJECTION, SOLUTION INTRAVENOUS; SUBCUTANEOUS at 18:43

## 2018-01-16 RX ADMIN — CLINDAMYCIN PHOSPHATE 900 MG: 150 INJECTION, SOLUTION INTRAMUSCULAR; INTRAVENOUS at 14:45

## 2018-01-16 RX ADMIN — HEPARIN SODIUM 1000 UNITS: 1000 INJECTION, SOLUTION INTRAVENOUS; SUBCUTANEOUS at 13:50

## 2018-01-16 RX ADMIN — FENTANYL CITRATE 50 MCG: 50 INJECTION, SOLUTION INTRAMUSCULAR; INTRAVENOUS at 16:57

## 2018-01-16 RX ADMIN — SODIUM CHLORIDE, POTASSIUM CHLORIDE, SODIUM LACTATE AND CALCIUM CHLORIDE: 600; 310; 30; 20 INJECTION, SOLUTION INTRAVENOUS at 12:21

## 2018-01-16 RX ADMIN — FENTANYL CITRATE 50 MCG: 50 INJECTION INTRAMUSCULAR; INTRAVENOUS at 19:18

## 2018-01-16 RX ADMIN — HEPARIN SODIUM 8000 UNITS: 1000 INJECTION, SOLUTION INTRAVENOUS; SUBCUTANEOUS at 12:20

## 2018-01-16 RX ADMIN — SODIUM CHLORIDE, POTASSIUM CHLORIDE, SODIUM LACTATE AND CALCIUM CHLORIDE: 600; 310; 30; 20 INJECTION, SOLUTION INTRAVENOUS at 06:49

## 2018-01-16 RX ADMIN — FENTANYL CITRATE 50 MCG: 50 INJECTION INTRAMUSCULAR; INTRAVENOUS at 16:12

## 2018-01-16 RX ADMIN — FENTANYL CITRATE 50 MCG: 50 INJECTION INTRAMUSCULAR; INTRAVENOUS at 22:15

## 2018-01-16 RX ADMIN — SODIUM CHLORIDE: 9 INJECTION, SOLUTION INTRAVENOUS at 15:16

## 2018-01-16 RX ADMIN — HEPARIN SODIUM 1000 UNITS: 1000 INJECTION, SOLUTION INTRAVENOUS; SUBCUTANEOUS at 13:13

## 2018-01-16 RX ADMIN — FENTANYL CITRATE 50 MCG: 50 INJECTION INTRAMUSCULAR; INTRAVENOUS at 08:40

## 2018-01-16 RX ADMIN — LIDOCAINE HYDROCHLORIDE 50 MG: 10 INJECTION, SOLUTION INFILTRATION; PERINEURAL at 19:18

## 2018-01-16 RX ADMIN — SODIUM CHLORIDE, POTASSIUM CHLORIDE, SODIUM LACTATE AND CALCIUM CHLORIDE: 600; 310; 30; 20 INJECTION, SOLUTION INTRAVENOUS at 12:22

## 2018-01-16 RX ADMIN — DEXAMETHASONE SODIUM PHOSPHATE 10 MG: 10 INJECTION INTRAMUSCULAR; INTRAVENOUS at 07:48

## 2018-01-16 RX ADMIN — FENTANYL CITRATE 25 MCG: 50 INJECTION INTRAMUSCULAR; INTRAVENOUS at 15:52

## 2018-01-16 RX ADMIN — ROCURONIUM BROMIDE 15 MG: 10 INJECTION INTRAVENOUS at 14:39

## 2018-01-16 RX ADMIN — PROPOFOL 200 MG: 10 INJECTION, EMULSION INTRAVENOUS at 19:18

## 2018-01-16 RX ADMIN — FENTANYL CITRATE 75 MCG: 50 INJECTION INTRAMUSCULAR; INTRAVENOUS at 12:20

## 2018-01-16 RX ADMIN — ROCURONIUM BROMIDE 50 MG: 10 INJECTION INTRAVENOUS at 09:41

## 2018-01-16 RX ADMIN — PHENYLEPHRINE HYDROCHLORIDE 100 MCG: 10 INJECTION INTRAVENOUS at 11:40

## 2018-01-16 RX ADMIN — HEPARIN SODIUM AND DEXTROSE 15000 UNITS/HR: 10000; 5 INJECTION INTRAVENOUS at 19:10

## 2018-01-16 RX ADMIN — LIDOCAINE HYDROCHLORIDE 50 MG: 10 INJECTION, SOLUTION EPIDURAL; INFILTRATION; INTRACAUDAL; PERINEURAL at 07:39

## 2018-01-16 RX ADMIN — HEPARIN SODIUM AND DEXTROSE 500 UNITS/HR: 10000; 5 INJECTION INTRAVENOUS at 15:58

## 2018-01-16 ASSESSMENT — PULMONARY FUNCTION TESTS
PIF_VALUE: 21
PIF_VALUE: 23
PIF_VALUE: 21
PIF_VALUE: 20
PIF_VALUE: 22
PIF_VALUE: 22
PIF_VALUE: 21
PIF_VALUE: 23
PIF_VALUE: 21
PIF_VALUE: 25
PIF_VALUE: 17
PIF_VALUE: 21
PIF_VALUE: 23
PIF_VALUE: 23
PIF_VALUE: 3
PIF_VALUE: 26
PIF_VALUE: 23
PIF_VALUE: 20
PIF_VALUE: 21
PIF_VALUE: 21
PIF_VALUE: 25
PIF_VALUE: 23
PIF_VALUE: 22
PIF_VALUE: 21
PIF_VALUE: 28
PIF_VALUE: 21
PIF_VALUE: 21
PIF_VALUE: 22
PIF_VALUE: 23
PIF_VALUE: 22
PIF_VALUE: 23
PIF_VALUE: 22
PIF_VALUE: 23
PIF_VALUE: 3
PIF_VALUE: 23
PIF_VALUE: 21
PIF_VALUE: 22
PIF_VALUE: 23
PIF_VALUE: 2
PIF_VALUE: 26
PIF_VALUE: 21
PIF_VALUE: 23
PIF_VALUE: 21
PIF_VALUE: 22
PIF_VALUE: 21
PIF_VALUE: 21
PIF_VALUE: 23
PIF_VALUE: 22
PIF_VALUE: 2
PIF_VALUE: 22
PIF_VALUE: 21
PIF_VALUE: 21
PIF_VALUE: 22
PIF_VALUE: 23
PIF_VALUE: 23
PIF_VALUE: 20
PIF_VALUE: 26
PIF_VALUE: 22
PIF_VALUE: 19
PIF_VALUE: 23
PIF_VALUE: 21
PIF_VALUE: 21
PIF_VALUE: 24
PIF_VALUE: 25
PIF_VALUE: 23
PIF_VALUE: 4
PIF_VALUE: 21
PIF_VALUE: 28
PIF_VALUE: 26
PIF_VALUE: 23
PIF_VALUE: 23
PIF_VALUE: 22
PIF_VALUE: 23
PIF_VALUE: 21
PIF_VALUE: 21
PIF_VALUE: 22
PIF_VALUE: 23
PIF_VALUE: 17
PIF_VALUE: 23
PIF_VALUE: 4
PIF_VALUE: 23
PIF_VALUE: 24
PIF_VALUE: 17
PIF_VALUE: 23
PIF_VALUE: 21
PIF_VALUE: 21
PIF_VALUE: 3
PIF_VALUE: 21
PIF_VALUE: 21
PIF_VALUE: 22
PIF_VALUE: 16
PIF_VALUE: 21
PIF_VALUE: 21
PIF_VALUE: 20
PIF_VALUE: 3
PIF_VALUE: 20
PIF_VALUE: 22
PIF_VALUE: 20
PIF_VALUE: 25
PIF_VALUE: 1
PIF_VALUE: 4
PIF_VALUE: 23
PIF_VALUE: 0
PIF_VALUE: 17
PIF_VALUE: 22
PIF_VALUE: 21
PIF_VALUE: 5
PIF_VALUE: 21
PIF_VALUE: 23
PIF_VALUE: 23
PIF_VALUE: 21
PIF_VALUE: 26
PIF_VALUE: 23
PIF_VALUE: 28
PIF_VALUE: 21
PIF_VALUE: 24
PIF_VALUE: 22
PIF_VALUE: 21
PIF_VALUE: 23
PIF_VALUE: 26
PIF_VALUE: 21
PIF_VALUE: 21
PIF_VALUE: 0
PIF_VALUE: 21
PIF_VALUE: 23
PIF_VALUE: 5
PIF_VALUE: 21
PIF_VALUE: 23
PIF_VALUE: 26
PIF_VALUE: 21
PIF_VALUE: 25
PIF_VALUE: 20
PIF_VALUE: 27
PIF_VALUE: 21
PIF_VALUE: 0
PIF_VALUE: 23
PIF_VALUE: 23
PIF_VALUE: 17
PIF_VALUE: 26
PIF_VALUE: 2
PIF_VALUE: 6
PIF_VALUE: 19
PIF_VALUE: 23
PIF_VALUE: 22
PIF_VALUE: 23
PIF_VALUE: 4
PIF_VALUE: 21
PIF_VALUE: 21
PIF_VALUE: 27
PIF_VALUE: 21
PIF_VALUE: 1
PIF_VALUE: 24
PIF_VALUE: 21
PIF_VALUE: 21
PIF_VALUE: 23
PIF_VALUE: 21
PIF_VALUE: 1
PIF_VALUE: 21
PIF_VALUE: 28
PIF_VALUE: 22
PIF_VALUE: 23
PIF_VALUE: 20
PIF_VALUE: 23
PIF_VALUE: 25
PIF_VALUE: 22
PIF_VALUE: 23
PIF_VALUE: 21
PIF_VALUE: 28
PIF_VALUE: 26
PIF_VALUE: 21
PIF_VALUE: 26
PIF_VALUE: 23
PIF_VALUE: 21
PIF_VALUE: 23
PIF_VALUE: 23
PIF_VALUE: 22
PIF_VALUE: 25
PIF_VALUE: 16
PIF_VALUE: 22
PIF_VALUE: 21
PIF_VALUE: 20
PIF_VALUE: 18
PIF_VALUE: 21
PIF_VALUE: 23
PIF_VALUE: 24
PIF_VALUE: 2
PIF_VALUE: 3
PIF_VALUE: 20
PIF_VALUE: 21
PIF_VALUE: 26
PIF_VALUE: 20
PIF_VALUE: 23
PIF_VALUE: 17
PIF_VALUE: 26
PIF_VALUE: 23
PIF_VALUE: 22
PIF_VALUE: 21
PIF_VALUE: 17
PIF_VALUE: 26
PIF_VALUE: 22
PIF_VALUE: 21
PIF_VALUE: 17
PIF_VALUE: 25
PIF_VALUE: 25
PIF_VALUE: 21
PIF_VALUE: 25
PIF_VALUE: 23
PIF_VALUE: 23
PIF_VALUE: 22
PIF_VALUE: 21
PIF_VALUE: 27
PIF_VALUE: 21
PIF_VALUE: 22
PIF_VALUE: 1
PIF_VALUE: 26
PIF_VALUE: 22
PIF_VALUE: 21
PIF_VALUE: 21
PIF_VALUE: 23
PIF_VALUE: 26
PIF_VALUE: 23
PIF_VALUE: 21
PIF_VALUE: 23
PIF_VALUE: 2
PIF_VALUE: 26
PIF_VALUE: 20
PIF_VALUE: 22
PIF_VALUE: 23
PIF_VALUE: 17
PIF_VALUE: 2
PIF_VALUE: 21
PIF_VALUE: 22
PIF_VALUE: 15
PIF_VALUE: 17
PIF_VALUE: 23
PIF_VALUE: 23
PIF_VALUE: 22
PIF_VALUE: 21
PIF_VALUE: 21
PIF_VALUE: 23
PIF_VALUE: 22
PIF_VALUE: 21
PIF_VALUE: 23
PIF_VALUE: 21
PIF_VALUE: 21
PIF_VALUE: 23
PIF_VALUE: 21
PIF_VALUE: 23
PIF_VALUE: 6
PIF_VALUE: 21
PIF_VALUE: 0
PIF_VALUE: 20
PIF_VALUE: 23
PIF_VALUE: 26
PIF_VALUE: 21
PIF_VALUE: 26
PIF_VALUE: 2
PIF_VALUE: 17
PIF_VALUE: 26
PIF_VALUE: 21
PIF_VALUE: 23
PIF_VALUE: 23
PIF_VALUE: 24
PIF_VALUE: 1
PIF_VALUE: 21
PIF_VALUE: 27
PIF_VALUE: 21
PIF_VALUE: 5
PIF_VALUE: 24
PIF_VALUE: 26
PIF_VALUE: 21
PIF_VALUE: 26
PIF_VALUE: 1
PIF_VALUE: 22
PIF_VALUE: 17
PIF_VALUE: 21
PIF_VALUE: 17
PIF_VALUE: 22
PIF_VALUE: 26
PIF_VALUE: 20
PIF_VALUE: 23
PIF_VALUE: 21
PIF_VALUE: 23
PIF_VALUE: 21
PIF_VALUE: 5
PIF_VALUE: 21
PIF_VALUE: 16
PIF_VALUE: 23
PIF_VALUE: 23
PIF_VALUE: 22
PIF_VALUE: 21
PIF_VALUE: 21
PIF_VALUE: 1
PIF_VALUE: 23
PIF_VALUE: 21
PIF_VALUE: 25
PIF_VALUE: 21
PIF_VALUE: 18
PIF_VALUE: 22
PIF_VALUE: 23
PIF_VALUE: 24
PIF_VALUE: 23
PIF_VALUE: 21
PIF_VALUE: 0
PIF_VALUE: 21
PIF_VALUE: 4
PIF_VALUE: 21
PIF_VALUE: 3
PIF_VALUE: 21
PIF_VALUE: 26
PIF_VALUE: 22
PIF_VALUE: 21
PIF_VALUE: 23
PIF_VALUE: 21
PIF_VALUE: 21
PIF_VALUE: 22
PIF_VALUE: 20
PIF_VALUE: 21
PIF_VALUE: 27
PIF_VALUE: 26
PIF_VALUE: 26
PIF_VALUE: 34
PIF_VALUE: 21
PIF_VALUE: 22
PIF_VALUE: 17
PIF_VALUE: 21
PIF_VALUE: 22
PIF_VALUE: 21
PIF_VALUE: 23
PIF_VALUE: 2
PIF_VALUE: 22
PIF_VALUE: 26
PIF_VALUE: 21
PIF_VALUE: 4
PIF_VALUE: 21
PIF_VALUE: 23
PIF_VALUE: 23
PIF_VALUE: 21
PIF_VALUE: 22
PIF_VALUE: 22
PIF_VALUE: 23
PIF_VALUE: 21
PIF_VALUE: 2
PIF_VALUE: 23
PIF_VALUE: 21
PIF_VALUE: 20
PIF_VALUE: 25
PIF_VALUE: 17
PIF_VALUE: 22
PIF_VALUE: 23
PIF_VALUE: 25
PIF_VALUE: 21
PIF_VALUE: 21
PIF_VALUE: 26
PIF_VALUE: 20
PIF_VALUE: 23
PIF_VALUE: 1
PIF_VALUE: 21
PIF_VALUE: 22
PIF_VALUE: 21
PIF_VALUE: 20
PIF_VALUE: 22
PIF_VALUE: 21
PIF_VALUE: 37
PIF_VALUE: 21
PIF_VALUE: 16
PIF_VALUE: 21
PIF_VALUE: 25
PIF_VALUE: 17
PIF_VALUE: 37
PIF_VALUE: 21
PIF_VALUE: 2
PIF_VALUE: 1
PIF_VALUE: 21
PIF_VALUE: 25
PIF_VALUE: 21
PIF_VALUE: 23
PIF_VALUE: 23
PIF_VALUE: 26
PIF_VALUE: 21
PIF_VALUE: 21
PIF_VALUE: 25
PIF_VALUE: 23
PIF_VALUE: 17
PIF_VALUE: 23
PIF_VALUE: 28
PIF_VALUE: 23
PIF_VALUE: 21
PIF_VALUE: 23
PIF_VALUE: 21
PIF_VALUE: 26
PIF_VALUE: 21
PIF_VALUE: 22
PIF_VALUE: 22
PIF_VALUE: 17
PIF_VALUE: 23
PIF_VALUE: 23
PIF_VALUE: 19
PIF_VALUE: 16
PIF_VALUE: 22
PIF_VALUE: 25
PIF_VALUE: 21
PIF_VALUE: 4
PIF_VALUE: 23
PIF_VALUE: 22
PIF_VALUE: 23
PIF_VALUE: 23
PIF_VALUE: 25
PIF_VALUE: 23
PIF_VALUE: 26
PIF_VALUE: 23
PIF_VALUE: 26
PIF_VALUE: 21
PIF_VALUE: 23
PIF_VALUE: 21
PIF_VALUE: 26
PIF_VALUE: 23
PIF_VALUE: 21
PIF_VALUE: 17
PIF_VALUE: 22
PIF_VALUE: 26
PIF_VALUE: 22
PIF_VALUE: 23
PIF_VALUE: 19
PIF_VALUE: 23
PIF_VALUE: 21
PIF_VALUE: 24
PIF_VALUE: 23
PIF_VALUE: 6
PIF_VALUE: 21
PIF_VALUE: 21
PIF_VALUE: 23
PIF_VALUE: 21
PIF_VALUE: 25
PIF_VALUE: 23
PIF_VALUE: 21
PIF_VALUE: 1
PIF_VALUE: 5
PIF_VALUE: 26
PIF_VALUE: 23
PIF_VALUE: 20
PIF_VALUE: 21
PIF_VALUE: 23
PIF_VALUE: 21
PIF_VALUE: 4
PIF_VALUE: 21
PIF_VALUE: 21
PIF_VALUE: 19
PIF_VALUE: 26
PIF_VALUE: 6
PIF_VALUE: 23
PIF_VALUE: 24
PIF_VALUE: 21
PIF_VALUE: 22
PIF_VALUE: 21
PIF_VALUE: 25
PIF_VALUE: 24
PIF_VALUE: 20
PIF_VALUE: 21
PIF_VALUE: 23
PIF_VALUE: 21
PIF_VALUE: 22
PIF_VALUE: 21
PIF_VALUE: 26
PIF_VALUE: 22
PIF_VALUE: 22
PIF_VALUE: 21
PIF_VALUE: 5
PIF_VALUE: 23
PIF_VALUE: 21
PIF_VALUE: 21
PIF_VALUE: 10
PIF_VALUE: 20
PIF_VALUE: 22
PIF_VALUE: 16
PIF_VALUE: 22
PIF_VALUE: 2
PIF_VALUE: 21
PIF_VALUE: 21
PIF_VALUE: 22
PIF_VALUE: 21
PIF_VALUE: 21
PIF_VALUE: 0
PIF_VALUE: 21
PIF_VALUE: 23
PIF_VALUE: 22
PIF_VALUE: 22
PIF_VALUE: 20
PIF_VALUE: 21
PIF_VALUE: 23
PIF_VALUE: 22
PIF_VALUE: 18
PIF_VALUE: 17
PIF_VALUE: 21
PIF_VALUE: 23
PIF_VALUE: 1
PIF_VALUE: 21
PIF_VALUE: 23
PIF_VALUE: 22
PIF_VALUE: 23
PIF_VALUE: 20
PIF_VALUE: 21
PIF_VALUE: 3
PIF_VALUE: 24
PIF_VALUE: 21
PIF_VALUE: 28
PIF_VALUE: 23
PIF_VALUE: 26
PIF_VALUE: 21
PIF_VALUE: 14
PIF_VALUE: 21
PIF_VALUE: 23
PIF_VALUE: 20
PIF_VALUE: 24
PIF_VALUE: 2
PIF_VALUE: 21
PIF_VALUE: 26
PIF_VALUE: 21
PIF_VALUE: 21
PIF_VALUE: 22
PIF_VALUE: 21
PIF_VALUE: 17
PIF_VALUE: 23
PIF_VALUE: 5
PIF_VALUE: 23
PIF_VALUE: 27
PIF_VALUE: 22
PIF_VALUE: 21
PIF_VALUE: 22
PIF_VALUE: 7
PIF_VALUE: 21
PIF_VALUE: 27
PIF_VALUE: 22
PIF_VALUE: 23
PIF_VALUE: 20
PIF_VALUE: 22
PIF_VALUE: 25
PIF_VALUE: 17
PIF_VALUE: 21
PIF_VALUE: 21
PIF_VALUE: 23
PIF_VALUE: 23
PIF_VALUE: 26
PIF_VALUE: 23
PIF_VALUE: 20
PIF_VALUE: 17
PIF_VALUE: 23
PIF_VALUE: 0
PIF_VALUE: 14
PIF_VALUE: 27
PIF_VALUE: 21
PIF_VALUE: 26
PIF_VALUE: 2
PIF_VALUE: 21
PIF_VALUE: 23
PIF_VALUE: 16
PIF_VALUE: 18
PIF_VALUE: 21
PIF_VALUE: 21
PIF_VALUE: 24
PIF_VALUE: 16
PIF_VALUE: 22
PIF_VALUE: 22
PIF_VALUE: 20
PIF_VALUE: 22
PIF_VALUE: 23
PIF_VALUE: 21
PIF_VALUE: 26
PIF_VALUE: 18
PIF_VALUE: 26
PIF_VALUE: 17
PIF_VALUE: 16
PIF_VALUE: 25
PIF_VALUE: 21
PIF_VALUE: 23
PIF_VALUE: 17
PIF_VALUE: 21
PIF_VALUE: 21
PIF_VALUE: 19
PIF_VALUE: 27
PIF_VALUE: 26
PIF_VALUE: 21
PIF_VALUE: 18
PIF_VALUE: 22
PIF_VALUE: 26
PIF_VALUE: 21
PIF_VALUE: 23
PIF_VALUE: 23
PIF_VALUE: 21
PIF_VALUE: 17
PIF_VALUE: 24
PIF_VALUE: 20
PIF_VALUE: 21
PIF_VALUE: 21
PIF_VALUE: 25
PIF_VALUE: 20
PIF_VALUE: 23
PIF_VALUE: 27
PIF_VALUE: 1
PIF_VALUE: 21
PIF_VALUE: 23
PIF_VALUE: 21
PIF_VALUE: 16
PIF_VALUE: 21
PIF_VALUE: 21
PIF_VALUE: 25
PIF_VALUE: 23
PIF_VALUE: 21
PIF_VALUE: 6
PIF_VALUE: 26
PIF_VALUE: 17
PIF_VALUE: 25
PIF_VALUE: 21
PIF_VALUE: 21
PIF_VALUE: 20
PIF_VALUE: 22
PIF_VALUE: 22
PIF_VALUE: 25
PIF_VALUE: 26
PIF_VALUE: 21
PIF_VALUE: 17
PIF_VALUE: 21
PIF_VALUE: 22
PIF_VALUE: 26
PIF_VALUE: 25
PIF_VALUE: 21
PIF_VALUE: 25
PIF_VALUE: 23
PIF_VALUE: 23
PIF_VALUE: 21
PIF_VALUE: 21
PIF_VALUE: 23
PIF_VALUE: 20
PIF_VALUE: 20
PIF_VALUE: 21
PIF_VALUE: 17
PIF_VALUE: 20
PIF_VALUE: 23
PIF_VALUE: 26
PIF_VALUE: 21
PIF_VALUE: 22
PIF_VALUE: 23
PIF_VALUE: 23
PIF_VALUE: 21
PIF_VALUE: 22
PIF_VALUE: 21
PIF_VALUE: 17
PIF_VALUE: 22
PIF_VALUE: 22
PIF_VALUE: 23
PIF_VALUE: 16
PIF_VALUE: 25
PIF_VALUE: 26
PIF_VALUE: 23
PIF_VALUE: 2
PIF_VALUE: 21
PIF_VALUE: 20
PIF_VALUE: 18
PIF_VALUE: 26
PIF_VALUE: 18
PIF_VALUE: 26
PIF_VALUE: 22
PIF_VALUE: 23
PIF_VALUE: 2
PIF_VALUE: 3
PIF_VALUE: 20
PIF_VALUE: 21
PIF_VALUE: 27
PIF_VALUE: 17

## 2018-01-16 ASSESSMENT — PAIN SCALES - GENERAL
PAINLEVEL_OUTOF10: 8
PAINLEVEL_OUTOF10: 10
PAINLEVEL_OUTOF10: 8
PAINLEVEL_OUTOF10: 0
PAINLEVEL_OUTOF10: 10
PAINLEVEL_OUTOF10: 10

## 2018-01-16 ASSESSMENT — PAIN DESCRIPTION - LOCATION
LOCATION: TOE (COMMENT WHICH ONE)
LOCATION: LEG;TOE (COMMENT WHICH ONE)
LOCATION: TOE (COMMENT WHICH ONE)
LOCATION: LEG

## 2018-01-16 ASSESSMENT — LIFESTYLE VARIABLES
SMOKING_STATUS: 0
SMOKING_STATUS: 0

## 2018-01-16 ASSESSMENT — PAIN - FUNCTIONAL ASSESSMENT: PAIN_FUNCTIONAL_ASSESSMENT: 0-10

## 2018-01-16 ASSESSMENT — PAIN DESCRIPTION - PAIN TYPE
TYPE: ACUTE PAIN
TYPE: ACUTE PAIN;SURGICAL PAIN

## 2018-01-16 ASSESSMENT — PAIN DESCRIPTION - DESCRIPTORS
DESCRIPTORS: CONSTANT
DESCRIPTORS: CONSTANT
DESCRIPTORS: BURNING

## 2018-01-16 ASSESSMENT — PAIN DESCRIPTION - ORIENTATION: ORIENTATION: LEFT
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
## 2018-01-16 NOTE — ANESTHESIA PRE PROCEDURE
Department of Anesthesiology  Preprocedure Note       Name:  Demetrio Navarro   Age:  43 y.o.  :  1975                                          MRN:  [id number]         Date:  2018      Surgeon: Eric Chavez): Lea Ragsdale MD    Procedure: Procedure(s):  LEG BYPASS FEMORAL TO PERONEAL WITH VEIN    Medications prior to admission:   Prior to Admission medications    Medication Sig Start Date End Date Taking?  Authorizing Provider   amLODIPine (NORVASC) 10 MG tablet take 1 tablet by mouth once daily 18  Yes Js Arciniega MD   acetaminophen (TYLENOL) 325 MG tablet Take 2 tablets by mouth every 6 hours as needed for Pain 18  Yes Edin Olivas MD   atorvastatin (LIPITOR) 10 MG tablet Take 1 tablet by mouth daily 17  Yes Js Arciniega MD   Blood Pressure KIT 1 Package by Does not apply route daily 17   Js Arciniega MD       Current medications:    Current Facility-Administered Medications   Medication Dose Route Frequency Provider Last Rate Last Dose    lactated ringers infusion   Intravenous Continuous Milan Smith  mL/hr at 18 0649      fentaNYL (SUBLIMAZE) injection 25 mcg  25 mcg Intravenous Q5 Min PRN Milan Smith MD        fentaNYL (SUBLIMAZE) injection 50 mcg  50 mcg Intravenous Q5 Min PRN Milan Smith MD   50 mcg at 18 1715    gelatin adsorbable (GELFOAM) powder    PRN Lea Ragsdale MD   1 g at 18 0730    heparin (porcine) injection    PRN Lea Ragsdale MD   1,000 Units at 18 1350    thrombin spray    PRN Lea Ragsdale MD   10,000 Units at 18 0730    sodium chloride 0.9 % irrigation    Continuous PRN Lea Ragsdale MD   2,000 mL at 18 1500    gelatin adsorbable (GELFOAM) sponge    PRN Lea Ragsdale MD   1 each at 18 1503    iohexol (OMNIPAQUE 180) injection    PRN Lea Ragsdale MD   50 mL at 18 1526    HYDROmorphone (DILAUDID) injection 0.25 mg with intra op angiography of leg    TUBAL LIGATION  1997       Social History:    Social History   Substance Use Topics    Smoking status: Never Smoker    Smokeless tobacco: Never Used    Alcohol use Yes      Comment: WINE 4 O 6 GLASSES  A YEAR                                Counseling given: Not Answered      Vital Signs (Current):   Vitals:    01/16/18 1715 01/16/18 1730 01/16/18 1745 01/16/18 1800   BP: (!) 158/87 (!) 149/84     Pulse: 111 102 111    Resp: 13 13 16 12   Temp:       TempSrc:       SpO2: 100% 100% 99%    Weight:       Height:                                                  BP Readings from Last 3 Encounters:   01/16/18 (!) 149/84   01/16/18 (!) 124/58   01/08/18 118/70       NPO Status: Time of last liquid consumption: 2345                        Time of last solid consumption: 2345                        Date of last liquid consumption: 01/15/18                        Date of last solid food consumption: 01/15/18    BMI:   Wt Readings from Last 3 Encounters:   01/16/18 187 lb (84.8 kg)   01/08/18 184 lb 15.5 oz (83.9 kg)   12/19/17 185 lb (83.9 kg)     Body mass index is 31.12 kg/m².     CBC:   Lab Results   Component Value Date    WBC 9.1 01/07/2018    RBC 4.28 01/07/2018    HGB 9.6 01/16/2018    HCT 29.7 01/16/2018    MCV 92.3 01/07/2018    RDW 12.0 01/07/2018     01/07/2018       CMP:   Lab Results   Component Value Date     01/16/2018     11/15/2015    K 4.2 01/16/2018    K 3.4 11/15/2015    CL 99 11/15/2015    CO2 24 11/15/2015    BUN 9 11/15/2015    CREATININE 1.11 01/16/2018    CREATININE 0.65 11/15/2015    GFRAA >60 11/15/2015    LABGLOM 54 01/16/2018    GLUCOSE 116 11/15/2015    PROT 7.7 11/15/2015    CALCIUM 8.7 11/15/2015    BILITOT 0.41 11/15/2015    ALKPHOS 76 11/15/2015    AST 25 11/15/2015    ALT 17 11/15/2015       POC Tests:   Recent Labs      01/16/18   1526   POCGLU  127*       Coags: No results found for: PROTIME, INR, APTT    HCG (If Applicable):   Lab 1/11/18  Yes Gavin Medina MD   acetaminophen (TYLENOL) 325 MG tablet Take 2 tablets by mouth every 6 hours as needed for Pain 1/7/18  Yes Tor Maradiaga MD   atorvastatin (LIPITOR) 10 MG tablet Take 1 tablet by mouth daily 12/19/17  Yes Gavin Medina MD   Blood Pressure KIT 1 Package by Does not apply route daily 12/18/17   Gavin Medina MD       Current medications:    Current Facility-Administered Medications   Medication Dose Route Frequency Provider Last Rate Last Dose    lactated ringers infusion   Intravenous Continuous Indira Urbina  mL/hr at 01/16/18 0649      fentaNYL (SUBLIMAZE) injection 25 mcg  25 mcg Intravenous Q5 Min PRN Indira Urbina MD        fentaNYL (SUBLIMAZE) injection 50 mcg  50 mcg Intravenous Q5 Min PRN Indira Urbina MD   50 mcg at 01/16/18 1715    gelatin adsorbable (GELFOAM) powder    PRN Susi Robles MD   1 g at 01/16/18 0730    heparin (porcine) injection    PRN Susi Robles MD   1,000 Units at 01/16/18 1350    thrombin spray    PRN Susi Robles MD   10,000 Units at 01/16/18 0730    sodium chloride 0.9 % irrigation    Continuous PRN Susi Robles MD   2,000 mL at 01/16/18 1500    gelatin adsorbable (GELFOAM) sponge    PRCHRIST Robles MD   1 each at 01/16/18 1503    iohexol (OMNIPAQUE 180) injection    PRCHRIST Robles MD   50 mL at 01/16/18 1526    HYDROmorphone (DILAUDID) injection 0.25 mg  0.25 mg Intravenous Q5 Min PRCHRIST Urbina MD        HYDROmorphone (DILAUDID) injection 0.5 mg  0.5 mg Intravenous Q5 Min PRCHRIST Urbina MD   0.5 mg at 01/16/18 1804    heparin (porcine) injection 6,800 Units  80 Units/kg Intravenous PRN Susi Robles MD        heparin (porcine) injection 3,400 Units  40 Units/kg Intravenous PRN Susi Robles MD        heparin 25,000 units in dextrose 5% 250 mL infusion  18 Units/kg/hr Intravenous Continuous Susi Robles MD SpO2: 100% 100% 99%    Weight:       Height:                                                  BP Readings from Last 3 Encounters:   01/16/18 (!) 149/84   01/16/18 (!) 124/58   01/08/18 118/70       NPO Status: Time of last liquid consumption: 2345                        Time of last solid consumption: 2345                        Date of last liquid consumption: 01/15/18                        Date of last solid food consumption: 01/15/18    BMI:   Wt Readings from Last 3 Encounters:   01/16/18 187 lb (84.8 kg)   01/08/18 184 lb 15.5 oz (83.9 kg)   12/19/17 185 lb (83.9 kg)     Body mass index is 31.12 kg/m².     CBC:   Lab Results   Component Value Date    WBC 9.1 01/07/2018    RBC 4.28 01/07/2018    HGB 9.6 01/16/2018    HCT 29.7 01/16/2018    MCV 92.3 01/07/2018    RDW 12.0 01/07/2018     01/07/2018       CMP:   Lab Results   Component Value Date     01/16/2018     11/15/2015    K 4.2 01/16/2018    K 3.4 11/15/2015    CL 99 11/15/2015    CO2 24 11/15/2015    BUN 9 11/15/2015    CREATININE 1.11 01/16/2018    CREATININE 0.65 11/15/2015    GFRAA >60 11/15/2015    LABGLOM 54 01/16/2018    GLUCOSE 116 11/15/2015    PROT 7.7 11/15/2015    CALCIUM 8.7 11/15/2015    BILITOT 0.41 11/15/2015    ALKPHOS 76 11/15/2015    AST 25 11/15/2015    ALT 17 11/15/2015       POC Tests:   Recent Labs      01/16/18   1526   POCGLU  127*       Coags: No results found for: PROTIME, INR, APTT    HCG (If Applicable):   Lab Results   Component Value Date    PREGTESTUR NEGATIVE 11/15/2015    HCG NEGATIVE 01/16/2018        ABGs:   Lab Results   Component Value Date    PHART 7.450 01/16/2018    PO2ART 207.0 01/16/2018    TRS1FEG 33.1 01/16/2018    EYF2VNC 22.6 01/16/2018    R6BOKPLF 99.8 01/16/2018        Type & Screen (If Applicable):  No results found for: LABABO, [address]    Anesthesia Evaluation  Patient summary reviewed no history of anesthetic complications:   Airway: Mallampati: II  TM distance: >3 FB   Neck ROM: full  Mouth opening: > = 3 FB Dental: normal exam         Pulmonary:Negative Pulmonary ROS and normal exam        (-) not a current smoker          Patient did not smoke on day of surgery. Cardiovascular:  Exercise tolerance: poor (<4 METS),   (+) hypertension: no interval change, hyperlipidemia      ECG reviewed      Echocardiogram reviewed    Cleared by cardiology              Neuro/Psych:   (+) headaches:, psychiatric history: stable with treatment            GI/Hepatic/Renal: Neg GI/Hepatic/Renal ROS            Endo/Other:    (+) : arthritis: OA., . Pt had PAT visit. Abdominal:           Vascular:   + PVD, aortic or cerebral, . Anesthesia Plan      general     ASA 3       Induction: intravenous. arterial line    Anesthetic plan and risks discussed with patient. Plan discussed with CRNA.                   Carmen Jamison MD   1/16/2018

## 2018-01-17 LAB
ABSOLUTE EOS #: <0.03 K/UL (ref 0–0.44)
ABSOLUTE IMMATURE GRANULOCYTE: 0.08 K/UL (ref 0–0.3)
ABSOLUTE LYMPH #: 0.92 K/UL (ref 1.1–3.7)
ABSOLUTE MONO #: 0.37 K/UL (ref 0.1–1.2)
ANION GAP SERPL CALCULATED.3IONS-SCNC: 14 MMOL/L (ref 9–17)
ANION GAP SERPL CALCULATED.3IONS-SCNC: 16 MMOL/L (ref 9–17)
BASOPHILS # BLD: 0 % (ref 0–2)
BASOPHILS ABSOLUTE: <0.03 K/UL (ref 0–0.2)
BUN BLDV-MCNC: 10 MG/DL (ref 6–20)
BUN BLDV-MCNC: 8 MG/DL (ref 6–20)
BUN/CREAT BLD: ABNORMAL (ref 9–20)
BUN/CREAT BLD: ABNORMAL (ref 9–20)
CALCIUM SERPL-MCNC: 7.9 MG/DL (ref 8.6–10.4)
CALCIUM SERPL-MCNC: 8.4 MG/DL (ref 8.6–10.4)
CHLORIDE BLD-SCNC: 100 MMOL/L (ref 98–107)
CHLORIDE BLD-SCNC: 97 MMOL/L (ref 98–107)
CO2: 22 MMOL/L (ref 20–31)
CO2: 22 MMOL/L (ref 20–31)
CREAT SERPL-MCNC: 0.69 MG/DL (ref 0.5–0.9)
CREAT SERPL-MCNC: 0.7 MG/DL (ref 0.5–0.9)
CULTURE: NO GROWTH
CULTURE: NORMAL
DIFFERENTIAL TYPE: ABNORMAL
EKG ATRIAL RATE: 143 BPM
EKG P AXIS: 78 DEGREES
EKG P-R INTERVAL: 130 MS
EKG Q-T INTERVAL: 352 MS
EKG QRS DURATION: 76 MS
EKG QTC CALCULATION (BAZETT): 543 MS
EKG R AXIS: -3 DEGREES
EKG T AXIS: 49 DEGREES
EKG VENTRICULAR RATE: 143 BPM
EOSINOPHILS RELATIVE PERCENT: 0 % (ref 1–4)
GFR AFRICAN AMERICAN: >60 ML/MIN
GFR AFRICAN AMERICAN: >60 ML/MIN
GFR NON-AFRICAN AMERICAN: >60 ML/MIN
GFR NON-AFRICAN AMERICAN: >60 ML/MIN
GFR SERPL CREATININE-BSD FRML MDRD: ABNORMAL ML/MIN/{1.73_M2}
GLUCOSE BLD-MCNC: 164 MG/DL (ref 70–99)
GLUCOSE BLD-MCNC: 178 MG/DL (ref 65–105)
GLUCOSE BLD-MCNC: 179 MG/DL (ref 70–99)
HCT VFR BLD CALC: 24.9 % (ref 36.3–47.1)
HCT VFR BLD CALC: 29.6 % (ref 36.3–47.1)
HCT VFR BLD CALC: 31.1 % (ref 36.3–47.1)
HEMOGLOBIN: 10.2 G/DL (ref 11.9–15.1)
HEMOGLOBIN: 8.1 G/DL (ref 11.9–15.1)
HEMOGLOBIN: 9.6 G/DL (ref 11.9–15.1)
IMMATURE GRANULOCYTES: 1 %
LYMPHOCYTES # BLD: 6 % (ref 24–43)
Lab: NORMAL
MAGNESIUM: 1.5 MG/DL (ref 1.6–2.6)
MAGNESIUM: 2.3 MG/DL (ref 1.6–2.6)
MCH RBC QN AUTO: 29.1 PG (ref 25.2–33.5)
MCH RBC QN AUTO: 29.2 PG (ref 25.2–33.5)
MCHC RBC AUTO-ENTMCNC: 32.4 G/DL (ref 28.4–34.8)
MCHC RBC AUTO-ENTMCNC: 32.8 G/DL (ref 28.4–34.8)
MCV RBC AUTO: 89.1 FL (ref 82.6–102.9)
MCV RBC AUTO: 89.7 FL (ref 82.6–102.9)
MONOCYTES # BLD: 2 % (ref 3–12)
NRBC AUTOMATED: 0 PER 100 WBC
NRBC AUTOMATED: 0 PER 100 WBC
PARTIAL THROMBOPLASTIN TIME: 117.6 SEC (ref 21.3–31.3)
PARTIAL THROMBOPLASTIN TIME: 39.4 SEC (ref 21.3–31.3)
PARTIAL THROMBOPLASTIN TIME: 51.4 SEC (ref 21.3–31.3)
PARTIAL THROMBOPLASTIN TIME: 58.2 SEC (ref 21.3–31.3)
PDW BLD-RTO: 12.5 % (ref 11.8–14.4)
PDW BLD-RTO: 12.5 % (ref 11.8–14.4)
PHOSPHORUS: 2.7 MG/DL (ref 2.6–4.5)
PLATELET # BLD: 267 K/UL (ref 138–453)
PLATELET # BLD: 281 K/UL (ref 138–453)
PLATELET ESTIMATE: ABNORMAL
PMV BLD AUTO: 10.2 FL (ref 8.1–13.5)
PMV BLD AUTO: 10.4 FL (ref 8.1–13.5)
POTASSIUM SERPL-SCNC: 3.7 MMOL/L (ref 3.7–5.3)
POTASSIUM SERPL-SCNC: 4.5 MMOL/L (ref 3.7–5.3)
RBC # BLD: 3.3 M/UL (ref 3.95–5.11)
RBC # BLD: 3.49 M/UL (ref 3.95–5.11)
RBC # BLD: ABNORMAL 10*6/UL
SEG NEUTROPHILS: 91 % (ref 36–65)
SEGMENTED NEUTROPHILS ABSOLUTE COUNT: 14.25 K/UL (ref 1.5–8.1)
SODIUM BLD-SCNC: 135 MMOL/L (ref 135–144)
SODIUM BLD-SCNC: 136 MMOL/L (ref 135–144)
SPECIMEN DESCRIPTION: NORMAL
STATUS: NORMAL
TROPONIN INTERP: NORMAL
TROPONIN INTERP: NORMAL
TROPONIN T: <0.03 NG/ML
TROPONIN T: <0.03 NG/ML
WBC # BLD: 14.1 K/UL (ref 3.5–11.3)
WBC # BLD: 15.6 K/UL (ref 3.5–11.3)
WBC # BLD: ABNORMAL 10*3/UL

## 2018-01-17 PROCEDURE — 93005 ELECTROCARDIOGRAM TRACING: CPT

## 2018-01-17 PROCEDURE — 2060000000 HC ICU INTERMEDIATE R&B

## 2018-01-17 PROCEDURE — 83735 ASSAY OF MAGNESIUM: CPT

## 2018-01-17 PROCEDURE — 80048 BASIC METABOLIC PNL TOTAL CA: CPT

## 2018-01-17 PROCEDURE — 6370000000 HC RX 637 (ALT 250 FOR IP): Performed by: STUDENT IN AN ORGANIZED HEALTH CARE EDUCATION/TRAINING PROGRAM

## 2018-01-17 PROCEDURE — 85027 COMPLETE CBC AUTOMATED: CPT

## 2018-01-17 PROCEDURE — 85730 THROMBOPLASTIN TIME PARTIAL: CPT

## 2018-01-17 PROCEDURE — 2500000003 HC RX 250 WO HCPCS: Performed by: STUDENT IN AN ORGANIZED HEALTH CARE EDUCATION/TRAINING PROGRAM

## 2018-01-17 PROCEDURE — 84484 ASSAY OF TROPONIN QUANT: CPT

## 2018-01-17 PROCEDURE — 94770 HC ETCO2 MONITOR DAILY: CPT

## 2018-01-17 PROCEDURE — 99024 POSTOP FOLLOW-UP VISIT: CPT | Performed by: SURGERY

## 2018-01-17 PROCEDURE — 6360000002 HC RX W HCPCS: Performed by: STUDENT IN AN ORGANIZED HEALTH CARE EDUCATION/TRAINING PROGRAM

## 2018-01-17 PROCEDURE — 94762 N-INVAS EAR/PLS OXIMTRY CONT: CPT

## 2018-01-17 PROCEDURE — 82947 ASSAY GLUCOSE BLOOD QUANT: CPT

## 2018-01-17 PROCEDURE — 2500000003 HC RX 250 WO HCPCS

## 2018-01-17 PROCEDURE — 84100 ASSAY OF PHOSPHORUS: CPT

## 2018-01-17 PROCEDURE — 2580000003 HC RX 258: Performed by: STUDENT IN AN ORGANIZED HEALTH CARE EDUCATION/TRAINING PROGRAM

## 2018-01-17 PROCEDURE — 85025 COMPLETE CBC W/AUTO DIFF WBC: CPT

## 2018-01-17 PROCEDURE — 85018 HEMOGLOBIN: CPT

## 2018-01-17 PROCEDURE — 85014 HEMATOCRIT: CPT

## 2018-01-17 RX ORDER — POTASSIUM CHLORIDE 20 MEQ/1
40 TABLET, EXTENDED RELEASE ORAL PRN
Status: DISCONTINUED | OUTPATIENT
Start: 2018-01-17 | End: 2018-01-22 | Stop reason: HOSPADM

## 2018-01-17 RX ORDER — POTASSIUM CHLORIDE 29.8 MG/ML
20 INJECTION INTRAVENOUS PRN
Status: DISCONTINUED | OUTPATIENT
Start: 2018-01-17 | End: 2018-01-17

## 2018-01-17 RX ORDER — CLINDAMYCIN HYDROCHLORIDE 150 MG/1
300 CAPSULE ORAL EVERY 8 HOURS SCHEDULED
Status: DISCONTINUED | OUTPATIENT
Start: 2018-01-17 | End: 2018-01-22 | Stop reason: HOSPADM

## 2018-01-17 RX ORDER — LORAZEPAM 2 MG/ML
0.5 INJECTION INTRAMUSCULAR EVERY 6 HOURS PRN
Status: DISCONTINUED | OUTPATIENT
Start: 2018-01-17 | End: 2018-01-22 | Stop reason: HOSPADM

## 2018-01-17 RX ORDER — HYDRALAZINE HYDROCHLORIDE 20 MG/ML
10 INJECTION INTRAMUSCULAR; INTRAVENOUS EVERY 6 HOURS PRN
Status: DISCONTINUED | OUTPATIENT
Start: 2018-01-17 | End: 2018-01-22 | Stop reason: HOSPADM

## 2018-01-17 RX ORDER — POTASSIUM CHLORIDE 7.45 MG/ML
10 INJECTION INTRAVENOUS PRN
Status: DISCONTINUED | OUTPATIENT
Start: 2018-01-17 | End: 2018-01-22 | Stop reason: HOSPADM

## 2018-01-17 RX ORDER — LABETALOL HYDROCHLORIDE 5 MG/ML
10 INJECTION, SOLUTION INTRAVENOUS EVERY 4 HOURS PRN
Status: DISCONTINUED | OUTPATIENT
Start: 2018-01-17 | End: 2018-01-18

## 2018-01-17 RX ORDER — METOPROLOL TARTRATE 5 MG/5ML
5 INJECTION INTRAVENOUS ONCE
Status: COMPLETED | OUTPATIENT
Start: 2018-01-17 | End: 2018-01-17

## 2018-01-17 RX ORDER — POTASSIUM CHLORIDE 7.45 MG/ML
10 INJECTION INTRAVENOUS PRN
Status: DISCONTINUED | OUTPATIENT
Start: 2018-01-17 | End: 2018-01-17

## 2018-01-17 RX ORDER — METOPROLOL TARTRATE 5 MG/5ML
INJECTION INTRAVENOUS
Status: COMPLETED
Start: 2018-01-17 | End: 2018-01-17

## 2018-01-17 RX ORDER — PROMETHAZINE HYDROCHLORIDE 25 MG/ML
12.5 INJECTION, SOLUTION INTRAMUSCULAR; INTRAVENOUS ONCE
Status: COMPLETED | OUTPATIENT
Start: 2018-01-17 | End: 2018-01-17

## 2018-01-17 RX ORDER — MAGNESIUM SULFATE 1 G/100ML
1 INJECTION INTRAVENOUS PRN
Status: DISCONTINUED | OUTPATIENT
Start: 2018-01-17 | End: 2018-01-22 | Stop reason: HOSPADM

## 2018-01-17 RX ORDER — POTASSIUM CHLORIDE 20MEQ/15ML
40 LIQUID (ML) ORAL PRN
Status: DISCONTINUED | OUTPATIENT
Start: 2018-01-17 | End: 2018-01-22 | Stop reason: HOSPADM

## 2018-01-17 RX ORDER — MAGNESIUM SULFATE 1 G/100ML
2 INJECTION INTRAVENOUS ONCE
Status: DISCONTINUED | OUTPATIENT
Start: 2018-01-17 | End: 2018-01-22 | Stop reason: HOSPADM

## 2018-01-17 RX ORDER — LABETALOL HYDROCHLORIDE 5 MG/ML
10 INJECTION, SOLUTION INTRAVENOUS ONCE
Status: COMPLETED | OUTPATIENT
Start: 2018-01-17 | End: 2018-01-17

## 2018-01-17 RX ADMIN — FAMOTIDINE 20 MG: 20 TABLET, FILM COATED ORAL at 00:52

## 2018-01-17 RX ADMIN — HYDRALAZINE HYDROCHLORIDE 10 MG: 20 INJECTION INTRAMUSCULAR; INTRAVENOUS at 20:43

## 2018-01-17 RX ADMIN — OXYCODONE HYDROCHLORIDE 10 MG: 5 TABLET ORAL at 14:39

## 2018-01-17 RX ADMIN — CLINDAMYCIN HYDROCHLORIDE 300 MG: 150 CAPSULE ORAL at 21:44

## 2018-01-17 RX ADMIN — MORPHINE SULFATE 30 MG: 1 INJECTION INTRAVENOUS at 14:39

## 2018-01-17 RX ADMIN — LORAZEPAM 0.5 MG: 2 INJECTION INTRAMUSCULAR; INTRAVENOUS at 23:05

## 2018-01-17 RX ADMIN — CLINDAMYCIN PHOSPHATE 900 MG: 18 INJECTION, SOLUTION INTRAVENOUS at 00:31

## 2018-01-17 RX ADMIN — OXYCODONE HYDROCHLORIDE 10 MG: 5 TABLET ORAL at 00:53

## 2018-01-17 RX ADMIN — CLINDAMYCIN PHOSPHATE 900 MG: 18 INJECTION, SOLUTION INTRAVENOUS at 08:14

## 2018-01-17 RX ADMIN — AMLODIPINE BESYLATE 10 MG: 10 TABLET ORAL at 18:14

## 2018-01-17 RX ADMIN — METOPROLOL TARTRATE 5 MG: 5 INJECTION, SOLUTION INTRAVENOUS at 19:05

## 2018-01-17 RX ADMIN — OXYCODONE HYDROCHLORIDE 10 MG: 5 TABLET ORAL at 10:16

## 2018-01-17 RX ADMIN — OXYCODONE HYDROCHLORIDE 10 MG: 5 TABLET ORAL at 18:18

## 2018-01-17 RX ADMIN — ONDANSETRON 4 MG: 2 INJECTION INTRAMUSCULAR; INTRAVENOUS at 01:34

## 2018-01-17 RX ADMIN — OXYCODONE HYDROCHLORIDE 10 MG: 5 TABLET ORAL at 22:19

## 2018-01-17 RX ADMIN — HEPARIN SODIUM 6800 UNITS: 1000 INJECTION, SOLUTION INTRAVENOUS; SUBCUTANEOUS at 00:57

## 2018-01-17 RX ADMIN — HEPARIN SODIUM AND DEXTROSE 17 UNITS/KG/HR: 10000; 5 INJECTION INTRAVENOUS at 10:16

## 2018-01-17 RX ADMIN — HEPARIN SODIUM 3400 UNITS: 1000 INJECTION, SOLUTION INTRAVENOUS; SUBCUTANEOUS at 23:03

## 2018-01-17 RX ADMIN — OXYCODONE HYDROCHLORIDE 10 MG: 5 TABLET ORAL at 05:43

## 2018-01-17 RX ADMIN — CLOPIDOGREL 75 MG: 75 TABLET, FILM COATED ORAL at 08:08

## 2018-01-17 RX ADMIN — MAGNESIUM SULFATE HEPTAHYDRATE 1 G: 1 INJECTION, SOLUTION INTRAVENOUS at 03:48

## 2018-01-17 RX ADMIN — LABETALOL HYDROCHLORIDE 10 MG: 5 INJECTION, SOLUTION INTRAVENOUS at 21:05

## 2018-01-17 RX ADMIN — POTASSIUM CHLORIDE 40 MEQ: 20 TABLET, EXTENDED RELEASE ORAL at 02:44

## 2018-01-17 RX ADMIN — CLOPIDOGREL BISULFATE 300 MG: 75 TABLET ORAL at 00:53

## 2018-01-17 RX ADMIN — FAMOTIDINE 20 MG: 20 TABLET, FILM COATED ORAL at 21:44

## 2018-01-17 RX ADMIN — POTASSIUM PHOSPHATE, MONOBASIC AND POTASSIUM PHOSPHATE, DIBASIC 20 MMOL: 224; 236 INJECTION, SOLUTION, CONCENTRATE INTRAVENOUS at 04:55

## 2018-01-17 RX ADMIN — PROMETHAZINE HYDROCHLORIDE 12.5 MG: 25 INJECTION INTRAMUSCULAR; INTRAVENOUS at 03:17

## 2018-01-17 RX ADMIN — LABETALOL HYDROCHLORIDE 10 MG: 5 INJECTION, SOLUTION INTRAVENOUS at 01:37

## 2018-01-17 RX ADMIN — ATORVASTATIN CALCIUM 10 MG: 10 TABLET, FILM COATED ORAL at 08:08

## 2018-01-17 RX ADMIN — MAGNESIUM SULFATE HEPTAHYDRATE 1 G: 1 INJECTION, SOLUTION INTRAVENOUS at 02:45

## 2018-01-17 RX ADMIN — FAMOTIDINE 20 MG: 20 TABLET, FILM COATED ORAL at 08:08

## 2018-01-17 RX ADMIN — METOROPROLOL TARTRATE 5 MG: 5 INJECTION, SOLUTION INTRAVENOUS at 03:45

## 2018-01-17 ASSESSMENT — PAIN SCALES - GENERAL
PAINLEVEL_OUTOF10: 10
PAINLEVEL_OUTOF10: 8

## 2018-01-17 NOTE — OP NOTE
[address] [facility] [address]                                 OPERATIVE REPORT    PATIENT NAME: Courtney Barragan                    :        1975  MED REC NO:   [id number]                             ROOM:       1004  ACCOUNT NO:   [de-identified]                           ADMIT DATE: 2018  PROVIDER:     Mohini Rangel MD    DATE OF PROCEDURE:  2018    PREOPERATIVE DIAGNOSIS:  Occlusion of left leg SFA to peroneal artery  bypass. POSTOPERATIVE DIAGNOSIS:  Occlusion of left SFA to peroneal artery bypass  graft secondary to vasospasm. SURGEON:  Mohini Rangel MD    ANESTHESIA:  General.    INDICATION FOR PROCEDURE:  The patient just recently underwent a left leg  distal bypass. I evaluated her in the PACU and could not feel a graft  pulse or a dopplerable signal.  I was concerned that the bypass graft was  occluded. I recommended reevaluation in the operating room in an attempt  to salvage the bypass. I did explain to her that her arteries were  spasming and that this could be the cause. I explained to her that it is  a high risk that the graft may not stay open despite all my efforts given  her outflow to her foot. She understood the risks and benefits of  procedures, and I was able to obtain verbal consent from her and her  sister. DESCRIPTION OF PROCEDURE:  The patient was brought to the operating room  after appropriate anesthesia was delivered. The entire left leg was  prepped and draped in a standard sterile fashion. Time-out was performed  and agreed upon. I began by opening up the lateral calf incision and  identifying the bypass graft. It appeared to have an acute thrombus in it. A small graftotomy was created. An attempt at passing a Karuna catheter  proximally was unsuccessful given the valves. I tried passing a wire up  first; however, this was still unsuccessful.   I could not get arteries in her  foot.         Daniel Palomo MD    D: 01/16/2018 23:25:31       T: 01/16/2018 23:28:04     [email or web address]  Job#: [id number]     Doc#: [id number]    CC:

## 2018-01-17 NOTE — OP NOTE
the anastomosis,  it was flushed to remove any clot or debris. The anastomosis was completed  and did require several repair stitches for hemostasis. At this point the  bypass graft was then accessed from the medial thigh using the  micropuncture needle, and the micropuncture sheath was then placed  antegrade. A completion angiogram was performed, which initially showed  good filling of the bypass graft and the peroneal artery; however, there  was an area that appeared to be vasospasm distal to the anastomosis, and a  magged-up view angiogram was performed at the distal ankle and foot. At  this time, no contrast reached the foot. There appeared to be bleeding  noticed from one of the counterincisions. An angiogram was performed,  which showed extravasation from one of the side branches that had been  originally tied off. Another counterincision was made over this area. Bypass graft was controlled, and the side branch was identified and closed  with 7-0 Prolene suture. A repeat angiogram was performed, which again  showed filling of the bypass graft distally with flow retrograde and  antegrade in the peroneal artery, again there was a small area of filling defect  noted distal to the anastomosis, but contrast was getting past it, and this  likely appeared to be vasospasm related to the coronary dilators. There  was good continuous wave Doppler distal to the anastomosis that augmented  with compression of the graft. At this point, all the wound beds had been  irrigated and dried. The medial thigh incisions were closed in several  layers of Vicryl suture followed by Monocryl skin glue. The  counterincisions were closed with interrupted vertical mattress nylon  sutures, as well as the lateral calf incision. There was good pulse that  could be felt in the bypass graft as crossed over the leg. Sterile  dressings were applied along with a soft cast using Webril and Coban.   A  hole was made to access the bypass graft for vascular interrogation. The  patient was then extubated and brought to the recovery room in stable  condition.         Andre Carver MD    D: 01/16/2018 23:13:50       T: 01/16/2018 23:21:18     [email or web address]  Job#: [id number]     Doc#: [id number]    CC:

## 2018-01-17 NOTE — ANESTHESIA PRE PROCEDURE
ANGIOGRAM OF LEFT LEG    OTHER SURGICAL HISTORY Left 01/16/2018    femoral to peroneal bypass using vein    OTHER SURGICAL HISTORY Left 01/16/2018    fibulectomy with intra op angiography of leg    TUBAL LIGATION  1997       Social History:    Social History   Substance Use Topics    Smoking status: Never Smoker    Smokeless tobacco: Never Used    Alcohol use Yes      Comment: WINE 4 O 6 GLASSES  A YEAR                                Counseling given: Not Answered      Vital Signs (Current): There were no vitals filed for this visit.                                            BP Readings from Last 3 Encounters:   01/16/18 (!) 145/78   01/16/18 (!) 124/58   01/08/18 118/70       NPO Status:                                                                                 BMI:   Wt Readings from Last 3 Encounters:   01/16/18 187 lb (84.8 kg)   01/08/18 184 lb 15.5 oz (83.9 kg)   12/19/17 185 lb (83.9 kg)     There is no height or weight on file to calculate BMI.    CBC:   Lab Results   Component Value Date    WBC 9.1 01/07/2018    RBC 4.28 01/07/2018    HGB 9.6 01/16/2018    HCT 29.7 01/16/2018    MCV 92.3 01/07/2018    RDW 12.0 01/07/2018     01/07/2018       CMP:   Lab Results   Component Value Date     01/16/2018     11/15/2015    K 4.2 01/16/2018    K 3.4 11/15/2015    CL 99 11/15/2015    CO2 24 11/15/2015    BUN 9 11/15/2015    CREATININE 1.11 01/16/2018    CREATININE 0.65 11/15/2015    GFRAA >60 11/15/2015    LABGLOM 54 01/16/2018    GLUCOSE 116 11/15/2015    PROT 7.7 11/15/2015    CALCIUM 8.7 11/15/2015    BILITOT 0.41 11/15/2015    ALKPHOS 76 11/15/2015    AST 25 11/15/2015    ALT 17 11/15/2015       POC Tests:   Recent Labs      01/16/18   1526   POCGLU  127*       Coags: No results found for: PROTIME, INR, APTT    HCG (If Applicable):   Lab Results   Component Value Date    PREGTESTUR NEGATIVE 11/15/2015    HCG NEGATIVE 01/16/2018        ABGs:   Lab Results   Component Value Date

## 2018-01-18 ENCOUNTER — APPOINTMENT (OUTPATIENT)
Dept: GENERAL RADIOLOGY | Age: 43
DRG: 181 | End: 2018-01-18
Attending: SURGERY
Payer: COMMERCIAL

## 2018-01-18 LAB
ANION GAP SERPL CALCULATED.3IONS-SCNC: 10 MMOL/L (ref 9–17)
BUN BLDV-MCNC: 10 MG/DL (ref 6–20)
BUN/CREAT BLD: ABNORMAL (ref 9–20)
CALCIUM IONIZED: 1.06 MMOL/L (ref 1.13–1.33)
CALCIUM SERPL-MCNC: 7.4 MG/DL (ref 8.6–10.4)
CHLORIDE BLD-SCNC: 96 MMOL/L (ref 98–107)
CO2: 25 MMOL/L (ref 20–31)
CREAT SERPL-MCNC: 0.56 MG/DL (ref 0.5–0.9)
GFR AFRICAN AMERICAN: >60 ML/MIN
GFR NON-AFRICAN AMERICAN: >60 ML/MIN
GFR SERPL CREATININE-BSD FRML MDRD: ABNORMAL ML/MIN/{1.73_M2}
GFR SERPL CREATININE-BSD FRML MDRD: ABNORMAL ML/MIN/{1.73_M2}
GLUCOSE BLD-MCNC: 154 MG/DL (ref 70–99)
MAGNESIUM: 2.1 MG/DL (ref 1.6–2.6)
PARTIAL THROMBOPLASTIN TIME: 56.3 SEC (ref 21.3–31.3)
PARTIAL THROMBOPLASTIN TIME: 60.6 SEC (ref 21.3–31.3)
PARTIAL THROMBOPLASTIN TIME: 79 SEC (ref 21.3–31.3)
PLATELET # BLD: 261 K/UL (ref 138–453)
POTASSIUM SERPL-SCNC: 4.1 MMOL/L (ref 3.7–5.3)
SODIUM BLD-SCNC: 131 MMOL/L (ref 135–144)

## 2018-01-18 PROCEDURE — 6370000000 HC RX 637 (ALT 250 FOR IP): Performed by: STUDENT IN AN ORGANIZED HEALTH CARE EDUCATION/TRAINING PROGRAM

## 2018-01-18 PROCEDURE — 2580000003 HC RX 258: Performed by: STUDENT IN AN ORGANIZED HEALTH CARE EDUCATION/TRAINING PROGRAM

## 2018-01-18 PROCEDURE — 80048 BASIC METABOLIC PNL TOTAL CA: CPT

## 2018-01-18 PROCEDURE — 2060000000 HC ICU INTERMEDIATE R&B

## 2018-01-18 PROCEDURE — 2500000003 HC RX 250 WO HCPCS: Performed by: STUDENT IN AN ORGANIZED HEALTH CARE EDUCATION/TRAINING PROGRAM

## 2018-01-18 PROCEDURE — 99024 POSTOP FOLLOW-UP VISIT: CPT | Performed by: SURGERY

## 2018-01-18 PROCEDURE — 36415 COLL VENOUS BLD VENIPUNCTURE: CPT

## 2018-01-18 PROCEDURE — 85049 AUTOMATED PLATELET COUNT: CPT

## 2018-01-18 PROCEDURE — 6360000002 HC RX W HCPCS: Performed by: STUDENT IN AN ORGANIZED HEALTH CARE EDUCATION/TRAINING PROGRAM

## 2018-01-18 PROCEDURE — 6370000000 HC RX 637 (ALT 250 FOR IP): Performed by: SURGERY

## 2018-01-18 PROCEDURE — 94762 N-INVAS EAR/PLS OXIMTRY CONT: CPT

## 2018-01-18 PROCEDURE — 94770 HC ETCO2 MONITOR DAILY: CPT

## 2018-01-18 PROCEDURE — 51798 US URINE CAPACITY MEASURE: CPT

## 2018-01-18 PROCEDURE — 83735 ASSAY OF MAGNESIUM: CPT

## 2018-01-18 PROCEDURE — 85730 THROMBOPLASTIN TIME PARTIAL: CPT

## 2018-01-18 PROCEDURE — 74018 RADEX ABDOMEN 1 VIEW: CPT

## 2018-01-18 PROCEDURE — 51701 INSERT BLADDER CATHETER: CPT

## 2018-01-18 PROCEDURE — 82330 ASSAY OF CALCIUM: CPT

## 2018-01-18 RX ORDER — 0.9 % SODIUM CHLORIDE 0.9 %
500 INTRAVENOUS SOLUTION INTRAVENOUS ONCE
Status: COMPLETED | OUTPATIENT
Start: 2018-01-18 | End: 2018-01-18

## 2018-01-18 RX ORDER — METOPROLOL TARTRATE 5 MG/5ML
5 INJECTION INTRAVENOUS EVERY 6 HOURS PRN
Status: DISCONTINUED | OUTPATIENT
Start: 2018-01-18 | End: 2018-01-22 | Stop reason: HOSPADM

## 2018-01-18 RX ORDER — METOPROLOL TARTRATE 5 MG/5ML
5 INJECTION INTRAVENOUS ONCE
Status: COMPLETED | OUTPATIENT
Start: 2018-01-18 | End: 2018-01-18

## 2018-01-18 RX ORDER — 0.9 % SODIUM CHLORIDE 0.9 %
500 INTRAVENOUS SOLUTION INTRAVENOUS ONCE
Status: DISCONTINUED | OUTPATIENT
Start: 2018-01-18 | End: 2018-01-18 | Stop reason: SDUPTHER

## 2018-01-18 RX ORDER — DOCUSATE SODIUM 100 MG/1
100 CAPSULE, LIQUID FILLED ORAL 2 TIMES DAILY
Status: DISCONTINUED | OUTPATIENT
Start: 2018-01-18 | End: 2018-01-22 | Stop reason: HOSPADM

## 2018-01-18 RX ORDER — DIPHENHYDRAMINE HYDROCHLORIDE 50 MG/ML
25 INJECTION INTRAMUSCULAR; INTRAVENOUS EVERY 6 HOURS PRN
Status: DISCONTINUED | OUTPATIENT
Start: 2018-01-18 | End: 2018-01-22 | Stop reason: HOSPADM

## 2018-01-18 RX ADMIN — FAMOTIDINE 20 MG: 20 TABLET, FILM COATED ORAL at 09:23

## 2018-01-18 RX ADMIN — SODIUM CHLORIDE 500 ML: 9 INJECTION, SOLUTION INTRAVENOUS at 20:31

## 2018-01-18 RX ADMIN — CLOPIDOGREL 75 MG: 75 TABLET, FILM COATED ORAL at 09:23

## 2018-01-18 RX ADMIN — CLINDAMYCIN HYDROCHLORIDE 300 MG: 150 CAPSULE ORAL at 20:11

## 2018-01-18 RX ADMIN — CLINDAMYCIN HYDROCHLORIDE 300 MG: 150 CAPSULE ORAL at 06:45

## 2018-01-18 RX ADMIN — Medication 10 ML: at 21:02

## 2018-01-18 RX ADMIN — ACETAMINOPHEN 650 MG: 325 TABLET ORAL at 11:35

## 2018-01-18 RX ADMIN — HEPARIN SODIUM AND DEXTROSE 19 UNITS/KG/HR: 10000; 5 INJECTION INTRAVENOUS at 03:28

## 2018-01-18 RX ADMIN — OXYCODONE HYDROCHLORIDE 5 MG: 5 TABLET ORAL at 23:56

## 2018-01-18 RX ADMIN — ONDANSETRON 4 MG: 2 INJECTION INTRAMUSCULAR; INTRAVENOUS at 21:00

## 2018-01-18 RX ADMIN — ATORVASTATIN CALCIUM 10 MG: 10 TABLET, FILM COATED ORAL at 09:23

## 2018-01-18 RX ADMIN — HEPARIN SODIUM 3400 UNITS: 1000 INJECTION, SOLUTION INTRAVENOUS; SUBCUTANEOUS at 15:25

## 2018-01-18 RX ADMIN — METOPROLOL TARTRATE 5 MG: 5 INJECTION, SOLUTION INTRAVENOUS at 14:37

## 2018-01-18 RX ADMIN — MORPHINE SULFATE 30 MG: 1 INJECTION INTRAVENOUS at 01:04

## 2018-01-18 RX ADMIN — METOPROLOL TARTRATE 5 MG: 5 INJECTION, SOLUTION INTRAVENOUS at 05:23

## 2018-01-18 RX ADMIN — OXYCODONE HYDROCHLORIDE 10 MG: 5 TABLET ORAL at 09:23

## 2018-01-18 RX ADMIN — AMLODIPINE BESYLATE 10 MG: 10 TABLET ORAL at 09:23

## 2018-01-18 RX ADMIN — METOPROLOL TARTRATE 5 MG: 5 INJECTION, SOLUTION INTRAVENOUS at 22:32

## 2018-01-18 RX ADMIN — OXYCODONE HYDROCHLORIDE 10 MG: 5 TABLET ORAL at 13:50

## 2018-01-18 RX ADMIN — DOCUSATE SODIUM 100 MG: 100 CAPSULE ORAL at 20:11

## 2018-01-18 RX ADMIN — FAMOTIDINE 20 MG: 20 TABLET, FILM COATED ORAL at 20:11

## 2018-01-18 RX ADMIN — MORPHINE SULFATE 30 MG: 1 INJECTION INTRAVENOUS at 16:40

## 2018-01-18 RX ADMIN — METOPROLOL TARTRATE 5 MG: 5 INJECTION, SOLUTION INTRAVENOUS at 02:00

## 2018-01-18 RX ADMIN — ONDANSETRON 4 MG: 2 INJECTION INTRAMUSCULAR; INTRAVENOUS at 05:21

## 2018-01-18 RX ADMIN — CALCIUM GLUCONATE 2 G: 94 INJECTION, SOLUTION INTRAVENOUS at 15:24

## 2018-01-18 RX ADMIN — CLINDAMYCIN HYDROCHLORIDE 300 MG: 150 CAPSULE ORAL at 15:07

## 2018-01-18 RX ADMIN — DOCUSATE SODIUM 100 MG: 100 CAPSULE ORAL at 11:36

## 2018-01-18 RX ADMIN — HEPARIN SODIUM AND DEXTROSE 21 UNITS/KG/HR: 10000; 5 INJECTION INTRAVENOUS at 18:01

## 2018-01-18 RX ADMIN — DIPHENHYDRAMINE HYDROCHLORIDE 25 MG: 50 INJECTION, SOLUTION INTRAMUSCULAR; INTRAVENOUS at 15:06

## 2018-01-18 RX ADMIN — OXYCODONE HYDROCHLORIDE 10 MG: 5 TABLET ORAL at 03:41

## 2018-01-18 ASSESSMENT — PAIN SCALES - GENERAL
PAINLEVEL_OUTOF10: 10
PAINLEVEL_OUTOF10: 8
PAINLEVEL_OUTOF10: 9

## 2018-01-18 ASSESSMENT — PAIN DESCRIPTION - PAIN TYPE: TYPE: SURGICAL PAIN

## 2018-01-18 ASSESSMENT — PAIN DESCRIPTION - LOCATION: LOCATION: FOOT

## 2018-01-18 ASSESSMENT — PAIN DESCRIPTION - DESCRIPTORS: DESCRIPTORS: CONSTANT

## 2018-01-18 NOTE — FLOWSHEET NOTE
visited patient per request for  visit. Patient was laying down in hospital bed, with her son, daughter, and family friend present in room supporting her. Patient shared that she underwent \"surgery\" and her \"wound is opening and bleeding. \" Patient shared that she is \"in pain\" and is \"feeling funny. \" Patient requested prayer from .  provided presence, support, and prayer at bedside. Patient also requested  inform nurse of patient's needs.  spoke with nurse about patient's concern over her \"blood pressure. \" Patient thanked  for presence and support. Chaplains will remain available to offer spiritual and emotional support as needed. Lindsey Moya     01/17/18 6949   Encounter Summary   Services provided to: Patient and family together   Referral/Consult From: Patient   Support System Children;Family members   Continue Visiting (1/17/2018)   Complexity of Encounter Moderate   Length of Encounter 15 minutes   Spiritual Assessment Completed Yes   Routine   Type Post-procedure   Assessment Tearful; Anxious; Fearful;Helplessness   Intervention Active listening;Explored feelings, thoughts, concerns;Nurtured hope;Prayer;Sustaining presence/ Ministry of presence   Outcome Comfort;Expressed gratitude   Spiritual/Quaker   Type Spiritual support

## 2018-01-19 LAB
ABSOLUTE EOS #: 0.06 K/UL (ref 0–0.44)
ABSOLUTE IMMATURE GRANULOCYTE: 0.13 K/UL (ref 0–0.3)
ABSOLUTE LYMPH #: 1.69 K/UL (ref 1.1–3.7)
ABSOLUTE MONO #: 1.4 K/UL (ref 0.1–1.2)
BASOPHILS # BLD: 0 % (ref 0–2)
BASOPHILS ABSOLUTE: 0.03 K/UL (ref 0–0.2)
CALCIUM IONIZED: 1.1 MMOL/L (ref 1.13–1.33)
DIFFERENTIAL TYPE: ABNORMAL
EOSINOPHILS RELATIVE PERCENT: 0 % (ref 1–4)
HCT VFR BLD CALC: 16.1 % (ref 36.3–47.1)
HCT VFR BLD CALC: 16.2 % (ref 36.3–47.1)
HEMOGLOBIN: 5.2 G/DL (ref 11.9–15.1)
HEMOGLOBIN: 5.2 G/DL (ref 11.9–15.1)
IMMATURE GRANULOCYTES: 1 %
LYMPHOCYTES # BLD: 10 % (ref 24–43)
MCH RBC QN AUTO: 29.4 PG (ref 25.2–33.5)
MCH RBC QN AUTO: 29.4 PG (ref 25.2–33.5)
MCHC RBC AUTO-ENTMCNC: 32.1 G/DL (ref 28.4–34.8)
MCHC RBC AUTO-ENTMCNC: 32.3 G/DL (ref 28.4–34.8)
MCV RBC AUTO: 91 FL (ref 82.6–102.9)
MCV RBC AUTO: 91.5 FL (ref 82.6–102.9)
MONOCYTES # BLD: 8 % (ref 3–12)
NRBC AUTOMATED: 0 PER 100 WBC
NRBC AUTOMATED: 0 PER 100 WBC
PARTIAL THROMBOPLASTIN TIME: 71.6 SEC (ref 21.3–31.3)
PDW BLD-RTO: 12.7 % (ref 11.8–14.4)
PDW BLD-RTO: 12.8 % (ref 11.8–14.4)
PLATELET # BLD: 197 K/UL (ref 138–453)
PLATELET # BLD: 209 K/UL (ref 138–453)
PLATELET ESTIMATE: ABNORMAL
PMV BLD AUTO: 10.3 FL (ref 8.1–13.5)
PMV BLD AUTO: 10.4 FL (ref 8.1–13.5)
RBC # BLD: 1.77 M/UL (ref 3.95–5.11)
RBC # BLD: 1.77 M/UL (ref 3.95–5.11)
RBC # BLD: ABNORMAL 10*6/UL
SEG NEUTROPHILS: 81 % (ref 36–65)
SEGMENTED NEUTROPHILS ABSOLUTE COUNT: 13.79 K/UL (ref 1.5–8.1)
SURGICAL PATHOLOGY REPORT: NORMAL
WBC # BLD: 17.1 K/UL (ref 3.5–11.3)
WBC # BLD: 17.4 K/UL (ref 3.5–11.3)
WBC # BLD: ABNORMAL 10*3/UL

## 2018-01-19 PROCEDURE — 86900 BLOOD TYPING SEROLOGIC ABO: CPT

## 2018-01-19 PROCEDURE — 94770 HC ETCO2 MONITOR DAILY: CPT

## 2018-01-19 PROCEDURE — 6360000002 HC RX W HCPCS: Performed by: STUDENT IN AN ORGANIZED HEALTH CARE EDUCATION/TRAINING PROGRAM

## 2018-01-19 PROCEDURE — 6370000000 HC RX 637 (ALT 250 FOR IP): Performed by: STUDENT IN AN ORGANIZED HEALTH CARE EDUCATION/TRAINING PROGRAM

## 2018-01-19 PROCEDURE — 36430 TRANSFUSION BLD/BLD COMPNT: CPT

## 2018-01-19 PROCEDURE — 36415 COLL VENOUS BLD VENIPUNCTURE: CPT

## 2018-01-19 PROCEDURE — 85730 THROMBOPLASTIN TIME PARTIAL: CPT

## 2018-01-19 PROCEDURE — 94762 N-INVAS EAR/PLS OXIMTRY CONT: CPT

## 2018-01-19 PROCEDURE — 82330 ASSAY OF CALCIUM: CPT

## 2018-01-19 PROCEDURE — 6370000000 HC RX 637 (ALT 250 FOR IP): Performed by: SURGERY

## 2018-01-19 PROCEDURE — 2060000000 HC ICU INTERMEDIATE R&B

## 2018-01-19 PROCEDURE — P9016 RBC LEUKOCYTES REDUCED: HCPCS

## 2018-01-19 PROCEDURE — 85027 COMPLETE CBC AUTOMATED: CPT

## 2018-01-19 PROCEDURE — 2580000003 HC RX 258: Performed by: STUDENT IN AN ORGANIZED HEALTH CARE EDUCATION/TRAINING PROGRAM

## 2018-01-19 PROCEDURE — 85025 COMPLETE CBC W/AUTO DIFF WBC: CPT

## 2018-01-19 RX ORDER — 0.9 % SODIUM CHLORIDE 0.9 %
250 INTRAVENOUS SOLUTION INTRAVENOUS ONCE
Status: COMPLETED | OUTPATIENT
Start: 2018-01-19 | End: 2018-01-19

## 2018-01-19 RX ORDER — BISACODYL 10 MG
10 SUPPOSITORY, RECTAL RECTAL DAILY
Status: DISCONTINUED | OUTPATIENT
Start: 2018-01-19 | End: 2018-01-22 | Stop reason: HOSPADM

## 2018-01-19 RX ORDER — POLYETHYLENE GLYCOL 3350 17 G/17G
17 POWDER, FOR SOLUTION ORAL DAILY
Status: DISCONTINUED | OUTPATIENT
Start: 2018-01-19 | End: 2018-01-22 | Stop reason: HOSPADM

## 2018-01-19 RX ORDER — KETOROLAC TROMETHAMINE 30 MG/ML
30 INJECTION, SOLUTION INTRAMUSCULAR; INTRAVENOUS EVERY 6 HOURS
Status: DISCONTINUED | OUTPATIENT
Start: 2018-01-19 | End: 2018-01-22 | Stop reason: HOSPADM

## 2018-01-19 RX ORDER — HEPARIN SODIUM 5000 [USP'U]/ML
5000 INJECTION, SOLUTION INTRAVENOUS; SUBCUTANEOUS EVERY 8 HOURS SCHEDULED
Status: DISCONTINUED | OUTPATIENT
Start: 2018-01-19 | End: 2018-01-22 | Stop reason: HOSPADM

## 2018-01-19 RX ADMIN — KETOROLAC TROMETHAMINE 30 MG: 30 INJECTION, SOLUTION INTRAMUSCULAR at 23:29

## 2018-01-19 RX ADMIN — Medication 10 ML: at 23:29

## 2018-01-19 RX ADMIN — POLYETHYLENE GLYCOL 3350 17 G: 17 POWDER, FOR SOLUTION ORAL at 09:18

## 2018-01-19 RX ADMIN — DOCUSATE SODIUM 100 MG: 100 CAPSULE ORAL at 09:17

## 2018-01-19 RX ADMIN — CLINDAMYCIN HYDROCHLORIDE 300 MG: 150 CAPSULE ORAL at 06:11

## 2018-01-19 RX ADMIN — OXYCODONE HYDROCHLORIDE 5 MG: 5 TABLET ORAL at 21:05

## 2018-01-19 RX ADMIN — MORPHINE SULFATE 30 MG: 1 INJECTION INTRAVENOUS at 09:54

## 2018-01-19 RX ADMIN — ONDANSETRON 4 MG: 2 INJECTION INTRAMUSCULAR; INTRAVENOUS at 14:01

## 2018-01-19 RX ADMIN — CLINDAMYCIN HYDROCHLORIDE 300 MG: 150 CAPSULE ORAL at 14:53

## 2018-01-19 RX ADMIN — FAMOTIDINE 20 MG: 20 TABLET, FILM COATED ORAL at 09:28

## 2018-01-19 RX ADMIN — DOCUSATE SODIUM 100 MG: 100 CAPSULE ORAL at 21:05

## 2018-01-19 RX ADMIN — Medication 10 ML: at 21:09

## 2018-01-19 RX ADMIN — SODIUM CHLORIDE 250 ML: 9 INJECTION, SOLUTION INTRAVENOUS at 08:55

## 2018-01-19 RX ADMIN — CALCIUM GLUCONATE 2 G: 98 INJECTION, SOLUTION INTRAVENOUS at 14:53

## 2018-01-19 RX ADMIN — FAMOTIDINE 20 MG: 20 TABLET, FILM COATED ORAL at 21:09

## 2018-01-19 RX ADMIN — CLINDAMYCIN HYDROCHLORIDE 300 MG: 150 CAPSULE ORAL at 21:05

## 2018-01-19 RX ADMIN — BISACODYL 10 MG: 10 SUPPOSITORY RECTAL at 17:31

## 2018-01-19 RX ADMIN — KETOROLAC TROMETHAMINE 30 MG: 30 INJECTION, SOLUTION INTRAMUSCULAR at 17:31

## 2018-01-19 RX ADMIN — Medication 10 ML: at 09:18

## 2018-01-19 RX ADMIN — ATORVASTATIN CALCIUM 10 MG: 10 TABLET, FILM COATED ORAL at 09:17

## 2018-01-19 RX ADMIN — CLOPIDOGREL 75 MG: 75 TABLET, FILM COATED ORAL at 09:17

## 2018-01-19 RX ADMIN — MAGNESIUM HYDROXIDE 30 ML: 400 SUSPENSION ORAL at 06:10

## 2018-01-19 RX ADMIN — HEPARIN SODIUM 5000 UNITS: 5000 INJECTION, SOLUTION INTRAVENOUS; SUBCUTANEOUS at 21:09

## 2018-01-19 ASSESSMENT — PAIN DESCRIPTION - PAIN TYPE
TYPE: SURGICAL PAIN
TYPE: SURGICAL PAIN

## 2018-01-19 ASSESSMENT — PAIN DESCRIPTION - LOCATION
LOCATION: LEG
LOCATION: LEG

## 2018-01-19 ASSESSMENT — PAIN DESCRIPTION - ORIENTATION
ORIENTATION: LEFT
ORIENTATION: LEFT

## 2018-01-19 ASSESSMENT — PAIN SCALES - GENERAL
PAINLEVEL_OUTOF10: 8
PAINLEVEL_OUTOF10: 10
PAINLEVEL_OUTOF10: 10
PAINLEVEL_OUTOF10: 9

## 2018-01-20 LAB
ABO/RH: NORMAL
ANTIBODY IDENTIFICATION: NORMAL
ANTIBODY SCREEN: NEGATIVE
ARM BAND NUMBER: NORMAL
BLD PROD TYP BPU: NORMAL
BLD PROD TYP BPU: NORMAL
CALCIUM IONIZED: 1.12 MMOL/L (ref 1.13–1.33)
CROSSMATCH RESULT: NORMAL
CROSSMATCH RESULT: NORMAL
DAT IGG: NEGATIVE
DISPENSE STATUS BLOOD BANK: NORMAL
DISPENSE STATUS BLOOD BANK: NORMAL
EXPIRATION DATE: NORMAL
HCT VFR BLD CALC: 21.1 % (ref 36.3–47.1)
HEMOGLOBIN: 7.2 G/DL (ref 11.9–15.1)
MCH RBC QN AUTO: 30.6 PG (ref 25.2–33.5)
MCHC RBC AUTO-ENTMCNC: 34.1 G/DL (ref 28.4–34.8)
MCV RBC AUTO: 89.8 FL (ref 82.6–102.9)
NRBC AUTOMATED: 0 PER 100 WBC
PDW BLD-RTO: 13.5 % (ref 11.8–14.4)
PLATELET # BLD: 175 K/UL (ref 138–453)
PMV BLD AUTO: 10.2 FL (ref 8.1–13.5)
RBC # BLD: 2.35 M/UL (ref 3.95–5.11)
TRANSFUSION STATUS: NORMAL
TRANSFUSION STATUS: NORMAL
UNIT DIVISION: 0
UNIT DIVISION: 0
UNIT NUMBER: NORMAL
UNIT NUMBER: NORMAL
WBC # BLD: 12.6 K/UL (ref 3.5–11.3)

## 2018-01-20 PROCEDURE — 6360000002 HC RX W HCPCS: Performed by: STUDENT IN AN ORGANIZED HEALTH CARE EDUCATION/TRAINING PROGRAM

## 2018-01-20 PROCEDURE — 82330 ASSAY OF CALCIUM: CPT

## 2018-01-20 PROCEDURE — 36415 COLL VENOUS BLD VENIPUNCTURE: CPT

## 2018-01-20 PROCEDURE — 6370000000 HC RX 637 (ALT 250 FOR IP): Performed by: SURGERY

## 2018-01-20 PROCEDURE — 97116 GAIT TRAINING THERAPY: CPT

## 2018-01-20 PROCEDURE — G8978 MOBILITY CURRENT STATUS: HCPCS

## 2018-01-20 PROCEDURE — G8979 MOBILITY GOAL STATUS: HCPCS

## 2018-01-20 PROCEDURE — 6370000000 HC RX 637 (ALT 250 FOR IP): Performed by: STUDENT IN AN ORGANIZED HEALTH CARE EDUCATION/TRAINING PROGRAM

## 2018-01-20 PROCEDURE — 2060000000 HC ICU INTERMEDIATE R&B

## 2018-01-20 PROCEDURE — 97162 PT EVAL MOD COMPLEX 30 MIN: CPT

## 2018-01-20 PROCEDURE — 99024 POSTOP FOLLOW-UP VISIT: CPT | Performed by: SURGERY

## 2018-01-20 PROCEDURE — 2580000003 HC RX 258: Performed by: STUDENT IN AN ORGANIZED HEALTH CARE EDUCATION/TRAINING PROGRAM

## 2018-01-20 PROCEDURE — 97530 THERAPEUTIC ACTIVITIES: CPT

## 2018-01-20 PROCEDURE — 85027 COMPLETE CBC AUTOMATED: CPT

## 2018-01-20 PROCEDURE — 94762 N-INVAS EAR/PLS OXIMTRY CONT: CPT

## 2018-01-20 RX ORDER — MORPHINE SULFATE 4 MG/ML
4 INJECTION, SOLUTION INTRAMUSCULAR; INTRAVENOUS
Status: DISCONTINUED | OUTPATIENT
Start: 2018-01-20 | End: 2018-01-22 | Stop reason: HOSPADM

## 2018-01-20 RX ORDER — MORPHINE SULFATE 2 MG/ML
2 INJECTION, SOLUTION INTRAMUSCULAR; INTRAVENOUS
Status: DISCONTINUED | OUTPATIENT
Start: 2018-01-20 | End: 2018-01-22 | Stop reason: HOSPADM

## 2018-01-20 RX ADMIN — SODIUM CHLORIDE: 9 INJECTION, SOLUTION INTRAVENOUS at 03:13

## 2018-01-20 RX ADMIN — LORAZEPAM 0.5 MG: 2 INJECTION INTRAMUSCULAR; INTRAVENOUS at 23:23

## 2018-01-20 RX ADMIN — Medication 10 ML: at 22:43

## 2018-01-20 RX ADMIN — DOCUSATE SODIUM 100 MG: 100 CAPSULE ORAL at 08:38

## 2018-01-20 RX ADMIN — MORPHINE SULFATE 2 MG: 2 INJECTION, SOLUTION INTRAMUSCULAR; INTRAVENOUS at 14:45

## 2018-01-20 RX ADMIN — MORPHINE SULFATE 30 MG: 1 INJECTION INTRAVENOUS at 02:08

## 2018-01-20 RX ADMIN — OXYCODONE HYDROCHLORIDE 5 MG: 5 TABLET ORAL at 08:37

## 2018-01-20 RX ADMIN — HEPARIN SODIUM 5000 UNITS: 5000 INJECTION, SOLUTION INTRAVENOUS; SUBCUTANEOUS at 13:18

## 2018-01-20 RX ADMIN — KETOROLAC TROMETHAMINE 30 MG: 30 INJECTION, SOLUTION INTRAMUSCULAR at 06:06

## 2018-01-20 RX ADMIN — OXYCODONE HYDROCHLORIDE 5 MG: 5 TABLET ORAL at 18:21

## 2018-01-20 RX ADMIN — OXYCODONE HYDROCHLORIDE 5 MG: 5 TABLET ORAL at 13:17

## 2018-01-20 RX ADMIN — MORPHINE SULFATE 4 MG: 4 INJECTION INTRAVENOUS at 22:31

## 2018-01-20 RX ADMIN — ATORVASTATIN CALCIUM 10 MG: 10 TABLET, FILM COATED ORAL at 08:38

## 2018-01-20 RX ADMIN — FAMOTIDINE 20 MG: 20 TABLET, FILM COATED ORAL at 22:41

## 2018-01-20 RX ADMIN — OXYCODONE HYDROCHLORIDE 5 MG: 5 TABLET ORAL at 04:38

## 2018-01-20 RX ADMIN — KETOROLAC TROMETHAMINE 30 MG: 30 INJECTION, SOLUTION INTRAMUSCULAR at 11:41

## 2018-01-20 RX ADMIN — CLINDAMYCIN HYDROCHLORIDE 300 MG: 150 CAPSULE ORAL at 06:07

## 2018-01-20 RX ADMIN — FAMOTIDINE 20 MG: 20 TABLET, FILM COATED ORAL at 08:38

## 2018-01-20 RX ADMIN — HEPARIN SODIUM 5000 UNITS: 5000 INJECTION, SOLUTION INTRAVENOUS; SUBCUTANEOUS at 06:07

## 2018-01-20 RX ADMIN — KETOROLAC TROMETHAMINE 30 MG: 30 INJECTION, SOLUTION INTRAMUSCULAR at 18:20

## 2018-01-20 RX ADMIN — CLINDAMYCIN HYDROCHLORIDE 300 MG: 150 CAPSULE ORAL at 13:18

## 2018-01-20 RX ADMIN — Medication 10 ML: at 06:07

## 2018-01-20 RX ADMIN — HEPARIN SODIUM 5000 UNITS: 5000 INJECTION, SOLUTION INTRAVENOUS; SUBCUTANEOUS at 22:42

## 2018-01-20 RX ADMIN — POLYETHYLENE GLYCOL 3350 17 G: 17 POWDER, FOR SOLUTION ORAL at 08:38

## 2018-01-20 RX ADMIN — CLINDAMYCIN HYDROCHLORIDE 300 MG: 150 CAPSULE ORAL at 22:41

## 2018-01-20 RX ADMIN — MAGNESIUM HYDROXIDE 30 ML: 400 SUSPENSION ORAL at 06:06

## 2018-01-20 RX ADMIN — CLOPIDOGREL 75 MG: 75 TABLET, FILM COATED ORAL at 08:38

## 2018-01-20 ASSESSMENT — PAIN DESCRIPTION - ORIENTATION: ORIENTATION: LEFT

## 2018-01-20 ASSESSMENT — PAIN SCALES - GENERAL
PAINLEVEL_OUTOF10: 10
PAINLEVEL_OUTOF10: 7
PAINLEVEL_OUTOF10: 10
PAINLEVEL_OUTOF10: 4
PAINLEVEL_OUTOF10: 7
PAINLEVEL_OUTOF10: 5
PAINLEVEL_OUTOF10: 0

## 2018-01-20 ASSESSMENT — PAIN DESCRIPTION - PAIN TYPE: TYPE: ACUTE PAIN;SURGICAL PAIN

## 2018-01-20 ASSESSMENT — PAIN DESCRIPTION - LOCATION: LOCATION: LEG

## 2018-01-20 NOTE — PLAN OF CARE
Problem: Cardiovascular  Goal: Heart rate and blood presure are normal for age  Outcome: Ongoing  SR, ST on monitor. Denies CP, Denies SOB VS stable as charted. Problem: Falls - Risk of  Goal: Absence of falls  Outcome: Ongoing  Patient alert, oriented times 4 uses call light appropriately. Patient able to stand pivot with 1-2 standby assist with standard walker to and from bed to commode/chair without difficulty. Slipper sock on right foot, tray table, call light within reach, bed/chair wheels are locked, hourly rounding provided. Problem: Pain:  Goal: Pain level will decrease  Pain level will decrease   Outcome: Ongoing  Patient states pain is improving with currently medications. Medication given as needed See MAR.

## 2018-01-21 PROCEDURE — 6370000000 HC RX 637 (ALT 250 FOR IP): Performed by: STUDENT IN AN ORGANIZED HEALTH CARE EDUCATION/TRAINING PROGRAM

## 2018-01-21 PROCEDURE — 97535 SELF CARE MNGMENT TRAINING: CPT

## 2018-01-21 PROCEDURE — G8987 SELF CARE CURRENT STATUS: HCPCS

## 2018-01-21 PROCEDURE — 2060000000 HC ICU INTERMEDIATE R&B

## 2018-01-21 PROCEDURE — 97165 OT EVAL LOW COMPLEX 30 MIN: CPT

## 2018-01-21 PROCEDURE — 94762 N-INVAS EAR/PLS OXIMTRY CONT: CPT

## 2018-01-21 PROCEDURE — G8988 SELF CARE GOAL STATUS: HCPCS

## 2018-01-21 PROCEDURE — 6360000002 HC RX W HCPCS: Performed by: STUDENT IN AN ORGANIZED HEALTH CARE EDUCATION/TRAINING PROGRAM

## 2018-01-21 PROCEDURE — 2580000003 HC RX 258: Performed by: STUDENT IN AN ORGANIZED HEALTH CARE EDUCATION/TRAINING PROGRAM

## 2018-01-21 RX ORDER — PSEUDOEPHEDRINE HCL 30 MG
100 TABLET ORAL 2 TIMES DAILY
Qty: 60 CAPSULE | Refills: 0 | Status: SHIPPED | OUTPATIENT
Start: 2018-01-21 | End: 2019-03-21 | Stop reason: ALTCHOICE

## 2018-01-21 RX ORDER — OXYCODONE HYDROCHLORIDE 5 MG/1
TABLET ORAL
Qty: 30 TABLET | Refills: 0 | Status: SHIPPED | OUTPATIENT
Start: 2018-01-21 | End: 2018-01-28

## 2018-01-21 RX ORDER — CLOPIDOGREL BISULFATE 75 MG/1
75 TABLET ORAL DAILY
Qty: 30 TABLET | Refills: 3 | Status: SHIPPED | OUTPATIENT
Start: 2018-01-22 | End: 2018-08-03 | Stop reason: SDUPTHER

## 2018-01-21 RX ORDER — CLINDAMYCIN HYDROCHLORIDE 300 MG/1
300 CAPSULE ORAL EVERY 8 HOURS SCHEDULED
Qty: 15 CAPSULE | Refills: 0 | Status: SHIPPED | OUTPATIENT
Start: 2018-01-21 | End: 2018-01-26

## 2018-01-21 RX ADMIN — FAMOTIDINE 20 MG: 20 TABLET, FILM COATED ORAL at 09:21

## 2018-01-21 RX ADMIN — KETOROLAC TROMETHAMINE 30 MG: 30 INJECTION, SOLUTION INTRAMUSCULAR at 00:17

## 2018-01-21 RX ADMIN — Medication 10 ML: at 21:54

## 2018-01-21 RX ADMIN — CLINDAMYCIN HYDROCHLORIDE 300 MG: 150 CAPSULE ORAL at 06:22

## 2018-01-21 RX ADMIN — OXYCODONE HYDROCHLORIDE 5 MG: 5 TABLET ORAL at 17:47

## 2018-01-21 RX ADMIN — KETOROLAC TROMETHAMINE 30 MG: 30 INJECTION, SOLUTION INTRAMUSCULAR at 06:22

## 2018-01-21 RX ADMIN — MORPHINE SULFATE 4 MG: 4 INJECTION INTRAVENOUS at 01:40

## 2018-01-21 RX ADMIN — HEPARIN SODIUM 5000 UNITS: 5000 INJECTION, SOLUTION INTRAVENOUS; SUBCUTANEOUS at 21:41

## 2018-01-21 RX ADMIN — OXYCODONE HYDROCHLORIDE 5 MG: 5 TABLET ORAL at 06:22

## 2018-01-21 RX ADMIN — CLINDAMYCIN HYDROCHLORIDE 300 MG: 150 CAPSULE ORAL at 21:41

## 2018-01-21 RX ADMIN — HEPARIN SODIUM 5000 UNITS: 5000 INJECTION, SOLUTION INTRAVENOUS; SUBCUTANEOUS at 14:19

## 2018-01-21 RX ADMIN — OXYCODONE HYDROCHLORIDE 5 MG: 5 TABLET ORAL at 12:07

## 2018-01-21 RX ADMIN — CLOPIDOGREL 75 MG: 75 TABLET, FILM COATED ORAL at 09:21

## 2018-01-21 RX ADMIN — KETOROLAC TROMETHAMINE 30 MG: 30 INJECTION, SOLUTION INTRAMUSCULAR at 12:06

## 2018-01-21 RX ADMIN — CLINDAMYCIN HYDROCHLORIDE 300 MG: 150 CAPSULE ORAL at 14:19

## 2018-01-21 RX ADMIN — OXYCODONE HYDROCHLORIDE 5 MG: 5 TABLET ORAL at 21:52

## 2018-01-21 RX ADMIN — AMLODIPINE BESYLATE 10 MG: 10 TABLET ORAL at 09:21

## 2018-01-21 RX ADMIN — KETOROLAC TROMETHAMINE 30 MG: 30 INJECTION, SOLUTION INTRAMUSCULAR at 17:47

## 2018-01-21 RX ADMIN — FAMOTIDINE 20 MG: 20 TABLET, FILM COATED ORAL at 21:41

## 2018-01-21 RX ADMIN — ATORVASTATIN CALCIUM 10 MG: 10 TABLET, FILM COATED ORAL at 09:21

## 2018-01-21 RX ADMIN — HEPARIN SODIUM 5000 UNITS: 5000 INJECTION, SOLUTION INTRAVENOUS; SUBCUTANEOUS at 06:22

## 2018-01-21 RX ADMIN — OXYCODONE HYDROCHLORIDE 5 MG: 5 TABLET ORAL at 02:05

## 2018-01-21 ASSESSMENT — PAIN DESCRIPTION - PAIN TYPE
TYPE: ACUTE PAIN;SURGICAL PAIN

## 2018-01-21 ASSESSMENT — PAIN DESCRIPTION - ORIENTATION
ORIENTATION: LEFT

## 2018-01-21 ASSESSMENT — PAIN SCALES - GENERAL
PAINLEVEL_OUTOF10: 10
PAINLEVEL_OUTOF10: 6
PAINLEVEL_OUTOF10: 6
PAINLEVEL_OUTOF10: 4
PAINLEVEL_OUTOF10: 9
PAINLEVEL_OUTOF10: 3
PAINLEVEL_OUTOF10: 8
PAINLEVEL_OUTOF10: 5
PAINLEVEL_OUTOF10: 9

## 2018-01-21 ASSESSMENT — PAIN DESCRIPTION - LOCATION
LOCATION: LEG;INCISION
LOCATION: LEG
LOCATION: INCISION;LEG
LOCATION: LEG
LOCATION: LEG

## 2018-01-22 VITALS
RESPIRATION RATE: 14 BRPM | BODY MASS INDEX: 32.42 KG/M2 | WEIGHT: 194.6 LBS | DIASTOLIC BLOOD PRESSURE: 50 MMHG | SYSTOLIC BLOOD PRESSURE: 119 MMHG | HEIGHT: 65 IN | TEMPERATURE: 98.6 F | HEART RATE: 94 BPM | OXYGEN SATURATION: 100 %

## 2018-01-22 LAB
ALLEN TEST: POSITIVE
ANION GAP SERPL CALCULATED.3IONS-SCNC: 14 MMOL/L (ref 9–17)
BUN BLDV-MCNC: 10 MG/DL (ref 6–20)
BUN/CREAT BLD: ABNORMAL (ref 9–20)
CALCIUM SERPL-MCNC: 8.6 MG/DL (ref 8.6–10.4)
CHLORIDE BLD-SCNC: 100 MMOL/L (ref 98–107)
CO2: 22 MMOL/L (ref 20–31)
CREAT SERPL-MCNC: 0.59 MG/DL (ref 0.5–0.9)
EKG ATRIAL RATE: 103 BPM
EKG P AXIS: 48 DEGREES
EKG P-R INTERVAL: 160 MS
EKG Q-T INTERVAL: 356 MS
EKG QRS DURATION: 84 MS
EKG QTC CALCULATION (BAZETT): 466 MS
EKG R AXIS: -5 DEGREES
EKG T AXIS: -3 DEGREES
EKG VENTRICULAR RATE: 103 BPM
FIO2: 28
GFR AFRICAN AMERICAN: >60 ML/MIN
GFR NON-AFRICAN AMERICAN: >60 ML/MIN
GFR SERPL CREATININE-BSD FRML MDRD: ABNORMAL ML/MIN/{1.73_M2}
GFR SERPL CREATININE-BSD FRML MDRD: ABNORMAL ML/MIN/{1.73_M2}
GLUCOSE BLD-MCNC: 112 MG/DL (ref 70–99)
GLUCOSE BLD-MCNC: 116 MG/DL (ref 74–100)
HCT VFR BLD CALC: 23.3 % (ref 36.3–47.1)
HEMOGLOBIN: 7.2 G/DL (ref 11.9–15.1)
MCH RBC QN AUTO: 29.5 PG (ref 25.2–33.5)
MCHC RBC AUTO-ENTMCNC: 30.9 G/DL (ref 28.4–34.8)
MCV RBC AUTO: 95.5 FL (ref 82.6–102.9)
MODE: ABNORMAL
NEGATIVE BASE EXCESS, ART: ABNORMAL (ref 0–2)
NRBC AUTOMATED: 0.1 PER 100 WBC
O2 DEVICE/FLOW/%: ABNORMAL
PATIENT TEMP: ABNORMAL
PDW BLD-RTO: 13.1 % (ref 11.8–14.4)
PLATELET # BLD: 301 K/UL (ref 138–453)
PMV BLD AUTO: 9.7 FL (ref 8.1–13.5)
POC HCO3: 27 MMOL/L (ref 21–28)
POC O2 SATURATION: 99 % (ref 94–98)
POC PCO2 TEMP: ABNORMAL MM HG
POC PCO2: 34.4 MM HG (ref 35–48)
POC PH TEMP: ABNORMAL
POC PH: 7.5 (ref 7.35–7.45)
POC PO2 TEMP: ABNORMAL MM HG
POC PO2: 109.9 MM HG (ref 83–108)
POSITIVE BASE EXCESS, ART: 4 (ref 0–3)
POTASSIUM SERPL-SCNC: 4.4 MMOL/L (ref 3.7–5.3)
RBC # BLD: 2.44 M/UL (ref 3.95–5.11)
SAMPLE SITE: ABNORMAL
SODIUM BLD-SCNC: 136 MMOL/L (ref 135–144)
TCO2 (CALC), ART: 28 MMOL/L (ref 22–29)
TROPONIN INTERP: NORMAL
TROPONIN T: <0.03 NG/ML
WBC # BLD: 14.3 K/UL (ref 3.5–11.3)

## 2018-01-22 PROCEDURE — 6360000002 HC RX W HCPCS: Performed by: STUDENT IN AN ORGANIZED HEALTH CARE EDUCATION/TRAINING PROGRAM

## 2018-01-22 PROCEDURE — 97530 THERAPEUTIC ACTIVITIES: CPT

## 2018-01-22 PROCEDURE — 6370000000 HC RX 637 (ALT 250 FOR IP): Performed by: STUDENT IN AN ORGANIZED HEALTH CARE EDUCATION/TRAINING PROGRAM

## 2018-01-22 PROCEDURE — 80048 BASIC METABOLIC PNL TOTAL CA: CPT

## 2018-01-22 PROCEDURE — 36415 COLL VENOUS BLD VENIPUNCTURE: CPT

## 2018-01-22 PROCEDURE — 36600 WITHDRAWAL OF ARTERIAL BLOOD: CPT

## 2018-01-22 PROCEDURE — 97116 GAIT TRAINING THERAPY: CPT

## 2018-01-22 PROCEDURE — 82947 ASSAY GLUCOSE BLOOD QUANT: CPT

## 2018-01-22 PROCEDURE — 6370000000 HC RX 637 (ALT 250 FOR IP): Performed by: SURGERY

## 2018-01-22 PROCEDURE — 93005 ELECTROCARDIOGRAM TRACING: CPT

## 2018-01-22 PROCEDURE — 85027 COMPLETE CBC AUTOMATED: CPT

## 2018-01-22 PROCEDURE — 2580000003 HC RX 258: Performed by: STUDENT IN AN ORGANIZED HEALTH CARE EDUCATION/TRAINING PROGRAM

## 2018-01-22 PROCEDURE — 84484 ASSAY OF TROPONIN QUANT: CPT

## 2018-01-22 PROCEDURE — 82803 BLOOD GASES ANY COMBINATION: CPT

## 2018-01-22 PROCEDURE — 99024 POSTOP FOLLOW-UP VISIT: CPT | Performed by: SURGERY

## 2018-01-22 RX ADMIN — FAMOTIDINE 20 MG: 20 TABLET, FILM COATED ORAL at 08:31

## 2018-01-22 RX ADMIN — CLOPIDOGREL 75 MG: 75 TABLET, FILM COATED ORAL at 08:30

## 2018-01-22 RX ADMIN — ATORVASTATIN CALCIUM 10 MG: 10 TABLET, FILM COATED ORAL at 08:32

## 2018-01-22 RX ADMIN — Medication 10 ML: at 12:17

## 2018-01-22 RX ADMIN — KETOROLAC TROMETHAMINE 30 MG: 30 INJECTION, SOLUTION INTRAMUSCULAR at 00:34

## 2018-01-22 RX ADMIN — DOCUSATE SODIUM 100 MG: 100 CAPSULE ORAL at 08:30

## 2018-01-22 RX ADMIN — OXYCODONE HYDROCHLORIDE 10 MG: 5 TABLET ORAL at 08:30

## 2018-01-22 RX ADMIN — OXYCODONE HYDROCHLORIDE 10 MG: 5 TABLET ORAL at 14:23

## 2018-01-22 RX ADMIN — LORAZEPAM 0.5 MG: 2 INJECTION INTRAMUSCULAR; INTRAVENOUS at 00:34

## 2018-01-22 RX ADMIN — KETOROLAC TROMETHAMINE 30 MG: 30 INJECTION, SOLUTION INTRAMUSCULAR at 05:37

## 2018-01-22 RX ADMIN — HEPARIN SODIUM 5000 UNITS: 5000 INJECTION, SOLUTION INTRAVENOUS; SUBCUTANEOUS at 05:38

## 2018-01-22 RX ADMIN — KETOROLAC TROMETHAMINE 30 MG: 30 INJECTION, SOLUTION INTRAMUSCULAR at 12:17

## 2018-01-22 RX ADMIN — POLYETHYLENE GLYCOL 3350 17 G: 17 POWDER, FOR SOLUTION ORAL at 08:31

## 2018-01-22 RX ADMIN — CLINDAMYCIN HYDROCHLORIDE 300 MG: 150 CAPSULE ORAL at 05:37

## 2018-01-22 RX ADMIN — AMLODIPINE BESYLATE 10 MG: 10 TABLET ORAL at 08:30

## 2018-01-22 ASSESSMENT — PAIN SCALES - GENERAL
PAINLEVEL_OUTOF10: 10
PAINLEVEL_OUTOF10: 6
PAINLEVEL_OUTOF10: 10
PAINLEVEL_OUTOF10: 10
PAINLEVEL_OUTOF10: 7
PAINLEVEL_OUTOF10: 9

## 2018-01-22 ASSESSMENT — PAIN DESCRIPTION - PAIN TYPE
TYPE: ACUTE PAIN;SURGICAL PAIN
TYPE: ACUTE PAIN;SURGICAL PAIN

## 2018-01-22 ASSESSMENT — PAIN DESCRIPTION - ORIENTATION
ORIENTATION: LEFT
ORIENTATION: LEFT

## 2018-01-22 ASSESSMENT — PAIN DESCRIPTION - LOCATION
LOCATION: INCISION;LEG
LOCATION: INCISION;LEG

## 2018-01-22 NOTE — FLOWSHEET NOTE
RRT was called at 3:02am. Pt was being evaluated when  arrived on unit. Per RN pt got out of bed and walked to nursing station sat down was confused so RN called RRT. Per MD Pt appears to be oriented to person and stabilized.      01/22/18 0302   Encounter Summary   Services provided to: Patient   Referral/Consult From: Multi-disciplinary team   Continue Visiting (1/22/18)   Complexity of Encounter High   Length of Encounter 1 hour   Crisis   Type Rapid response   Assessment Approachable   Intervention Active listening;Sustaining presence/ Ministry of presence   Outcome Coping

## 2018-01-22 NOTE — PLAN OF CARE
Problem: Falls - Risk of  Goal: Absence of falls  Outcome: Ongoing  No falls this shift. Bed locked and in low position, side rails x2, bed alarm on, call light within reach. Non skid socks on. Patient calls out for assistance. Will continue to monitor. Problem: Risk for Impaired Skin Integrity  Goal: Tissue integrity - skin and mucous membranes  Structural intactness and normal physiological function of skin and  mucous membranes. Outcome: Ongoing  No signs of skin breakdown. Assisted with repositioning as needed. Will continue to monitor. Problem: Pain:  Goal: Pain level will decrease  Pain level will decrease   Outcome: Ongoing  Pain control with Monique q4hrs, morphine q2rhs, and Toradol scheduled q6hrs.

## 2018-01-22 NOTE — PROGRESS NOTES
Division of Vascular Surgery             Progress Note      Name: Leonarda Washington  MRN: [id number]         Overnight Events:     Tachycardic overnight given 500cc NS bolus without response and 5mg lopressor X1 with brief relief. Straight cath X1 for urinary retention      Subjective:   Pt seen and examined. C/o R LE pain along incision and foot. Sensation intact RLE. C/o Abdominal pain and bloating this am, no bm, minimal flatus. No nausea or vomiting. C/o slight sob, fatigue. Denies cp. Physical Exam:     Vitals:  /74   Pulse 130   Temp 99.3 °F (37.4 °C) (Oral)   Resp 18   Ht 5' 5\" (1.651 m)   Wt 187 lb (84.8 kg)   LMP 01/03/2018 (Exact Date)   SpO2 100%   BMI 31.12 kg/m²     General appearance - alert, well appearing and in no acute distress  Mental status - oriented to person, place and time with normal affect  Head - normocephalic and atraumatic  Neck - supple, no carotid bruits, thyroid not palpable, no JVD  Chest - clear to auscultation, normal effort  Heart - Tachycardic, S1+S2  Abdomen - soft, mild ttp LLQ, mild distended, no rebound or guarding. Neurological - normal speech, no focal findings or movement disorder noted, cranial nerves II through XII grossly intact  Extremities - L GSV fem to peroneal bypass with palpable pulse, Doppler signal to L graft and peroneal a distal to anastomosis. LLE PT/DP venous signals appreciated. RLE palpable DP/PT. SILT to L foot, moves all toes and dorsi/plantar flexion. Dressing taken down, small amount dry blood medial distal thigh incision. All incisions c/d/i no active bleeding noted. No purulent drainage. Mild edema, and hematoma noted medial thight LLE. Compartments softDressing and ace wrap re-applied.    Skin - no gross lesions, rashes, or induration noted        Imaging:         Assessment:   POD#3 s/p L SFA to peroneal a RGSV bypass, complicated postoperatively with loss of pulse taken back to OR for:   1) Re-exploration of proximal and
Division of Vascular Surgery             Progress Note      Name: Loc Barahona  MRN: [id number]         Overnight Events:     none      Subjective:   Pt seen and examined. Pt able to ambulate yesterday with assistance and walker, in room. C/o pain RLE this mornng, still adequately controlled with analgesics. +flatus/bm. Tolerating diet. Denies cp/sob. Physical Exam:     Vitals:  /72   Pulse 95   Temp 98.6 °F (37 °C) (Oral)   Resp 16   Ht 5' 5\" (1.651 m)   Wt 192 lb (87.1 kg)   LMP 01/03/2018 (Exact Date)   SpO2 100%   BMI 31.95 kg/m²     General appearance - alert, well appearing and in no acute distress  Mental status - oriented to person, place and time with normal affect  Head - normocephalic and atraumatic  Neck - supple, no carotid bruits, thyroid not palpable, no JVD  Chest - clear to auscultation, normal effort  Heart - Tachycardic, S1+S2  Abdomen - soft, nttp, mild distended, no rebound or guarding. Neurological - normal speech, no focal findings or movement disorder noted, cranial nerves II through XII grossly intact  Extremities - L GSV fem to peroneal bypass with palpable pulse, Doppler signal to L graft and peroneal a distal to anastomosis. LLE PT/DP venous signals appreciated. RLE palpable DP/PT. SILT to L foot, moves all toes and dorsi/plantar flexion. No drainage from incision noted. Incision c/d/i. Mild edema, and hematoma noted medial thight LLE. Skin - no gross lesions, rashes, or induration noted        Imaging:         Assessment:   POD#5 s/p L SFA to peroneal a RGSV bypass, complicated postoperatively with loss of pulse taken back to OR for:   1) Re-exploration of proximal and distal left SFA to peroneal artery RGSV bypass  2) Graft and peroneal artery thrombectomy  3) Completion angiogram  4) Infusion of nitroglycerin into peroneal artery  5) Acute blood loss anemia 2/2 to surgery - improving       Plan:     1. Continue medical and supportive care  2.  Continue abx -
Division of Vascular Surgery             Progress Note      Name: Radha Shaffer  MRN: [id number]         Overnight Events:     Pt HTN overnight night, during re-positioning, RLE distal thigh dressing became saturated with blood. Dressing taken down, no active bleeding noted. Maintained doppler signals to graft, at/dp with diastolic flow. Dressing reapplied. hgb 8.1. Subjective:   Pt seen and examined. C/o R LE pain along incision and foot. Sensation intact RLE. Also c/o LLQ abdominal pain, no flatus since sx or bm for 4 days. No nausea or belching. Denies cp/sob. Physical Exam:     Vitals:  /65   Pulse 111   Temp 100 °F (37.8 °C) (Oral)   Resp 20   Ht 5' 5\" (1.651 m)   Wt 187 lb (84.8 kg)   LMP 01/03/2018 (Exact Date)   SpO2 100%   BMI 31.12 kg/m²     General appearance - alert, well appearing and in no acute distress  Mental status - oriented to person, place and time with normal affect  Head - normocephalic and atraumatic  Neck - supple, no carotid bruits, thyroid not palpable, no JVD  Chest - clear to auscultation, normal effort  Heart - normal rate, regular rhythm, no murmurs  Abdomen - soft, non-tender, non-distended, bowel sounds present all four quadrants, no masses  Neurological - normal speech, no focal findings or movement disorder noted, cranial nerves II through XII grossly intact  Extremities - L GSV fem to peroneal bypass with palpable pulse, Doppler signal to L AT and PT. RLE palpable DP/PT. SILT to L foot, moves all toes and dorsi/plantar flexion.    Skin - no gross lesions, rashes, or induration noted        Imaging:         Assessment:   POD#2 s/p L SFA to peroneal a RGSV bypass, complicated postoperatively with loss of pulse taken back to OR for:   1) Re-exploration of proximal and distal left SFA to peroneal artery RGSV bypass  2) Graft and peroneal artery thrombectomy  3) Completion angiogram  4) Infusion of nitroglycerin into peroneal artery      Plan:
Patient with bleeding from upper thigh, medial incision. Bleeding was noted to occur with increase in BP from 110's to 140 up to 190's, 's. She was also repositioned and rolled to her side when an episode of bleeding occurred. She is complaining of pain to her knee. Lopressor 5 mg IV x1 ordered with improvement in BP and HR. On arrival no active bleeding noted. Compartments soft. Sensation intact, gross motor intact. Doppler signal present at graft, DP, and PT. Dressing replaced.      - continue vascular checks  - SBP goal < 140  - ativan prn anxiety  - continue plavix and heparin gtt with PTT goal 70-90  - will continue to follow closely     Electronically signed by [employer],  on 1/17/2018 at 11:40 PM
Physical Therapy  DATE: 2018    NAME: Carter Wells  MRN: [id number]   : 1975    Patient not seen this date for Physical Therapy due to:  [] Blood transfusion in progress  [] Hemodialysis  []  Patient Declined  [] Spine Precautions   [] Strict Bedrest  [] Surgery/ Procedure  [] Testing      [x] Other- low Hgb (5.3) plans to transfuse per RN Ninoska. Will check back after transfusion. [] PT being discontinued at this time. Patient independent. No further needs. [] PT being discontinued at this time as the patient has been transferred to palliative care. No further needs.     Ernie Ghosh, PT
Physical Therapy  DATE: 2018    NAME: Courtney Cannon  MRN: [id number]   : 1975    Patient not seen this date for Physical Therapy due to:  [] Blood transfusion in progress  [] Hemodialysis  [x]  Patient Declined--pt's pain 10/10; pt due for pain meds at 1320; discussed with RN--will return around 1400 and mobilize pt. Pt agreeable to this plan  [] Spine Precautions   [] Strict Bedrest  [] Surgery/ Procedure  [] Testing      [] Other        [] PT being discontinued at this time. Patient independent. No further needs. [] PT being discontinued at this time as the patient has been transferred to palliative care. No further needs.     Galina Ceron, PT
Physical Therapy  DATE: 2018    NAME: Ervin Martinez  MRN: [id number]   : 1975    Patient not seen this date for Physical Therapy due to:  [] Blood transfusion in progress  [] Hemodialysis  []  Patient Declined  [] Spine Precautions   [] Strict Bedrest  [] Surgery/ Procedure  [] Testing      [x] Other--per RN \"pt having an allergic reaction\" 2nd attempt to see pt; will check 18         [] PT being discontinued at this time. Patient independent. No further needs. [] PT being discontinued at this time as the patient has been transferred to palliative care. No further needs.     Audra Chavarria, PT
RN spoke with Dr. Yulisa Sykes regarding patient heart rate continuing to be in 120s at rest and patient not voiding since noon when sharpe d/c. Orders received to give 500ml NS bolus and to bladder scan and if scan greater than 350 to then straight cath.
Smoking Cessation - topics covered   []  Health Risks  []  Benefits of Quitting   []  Smoking Cessation  [x]  Patient has no history of tobacco use  []  Patient is former smoker. [x]  No need for tobacco cessation education. []  Booklet given  []  Patient verbalizes understanding. []  Patient denies need for tobacco cessation education.   Jacqueline Tan  8:26 AM
Assessment   Body structures, Functions, Activity limitations: Decreased functional mobility ; Decreased endurance  Assessment: Ambulation limited by significant pain; however, pt displays no LOB with RW   Prognosis: Good  Patient Education: importance of mobility  REQUIRES PT FOLLOW UP: Yes  Activity Tolerance  Activity Tolerance: Patient limited by pain     Goals  Short term goals  Time Frame for Short term goals: 14 visits  Short term goal 1: Pt to ambulate 150ft with RW and no LOB. Short term goal 2: Pt to perform all bed mob and transfers independently. Short term goal 3: Pt to tolerate 20-30 mins ther ex/act for improved strength and endurance. Short term goal 4: Pt to demonstrate good standing balance for decrease fall risk. Patient Goals   Patient goals : Home following discharge. Plan    Plan  Times per week: 5 x week  Times per day: Daily  Current Treatment Recommendations: Strengthening, Balance Training, Neuromuscular Re-education, Home Exercise Program, Safety Education & Training, Endurance Training, Functional Mobility Training, Transfer Training, Gait Training, Stair training  Safety Devices  Type of devices:  All fall risk precautions in place, Left in chair, Call light within reach, Gait belt     Therapy Time   Individual Concurrent Group Co-treatment   Time In 0820         Time Out 0851         Minutes [address], [place]
term goals: 14 visits  Short term goal 1: Pt to ambulate 150ft with RW and no LOB. Short term goal 2: Pt to perform all bed mob and transfers independently. Short term goal 3: Pt to tolerate 20-30 mins ther ex/act for improved strength and endurance. Short term goal 4: Pt to demonstrate good standing balance for decrease fall risk. Patient Goals   Patient goals : Home following discharge.         Therapy Time   Individual Concurrent Group Co-treatment   Time In 9777         Time Out 0859         Minutes 45                 Gretchen Vang PT, DPT

## 2018-02-05 ENCOUNTER — HOSPITAL ENCOUNTER (EMERGENCY)
Age: 43
Discharge: HOME | End: 2018-02-05
Attending: EMERGENCY MEDICINE
Payer: COMMERCIAL

## 2018-02-05 VITALS
WEIGHT: 180 LBS | HEART RATE: 86 BPM | OXYGEN SATURATION: 100 % | SYSTOLIC BLOOD PRESSURE: 134 MMHG | RESPIRATION RATE: 16 BRPM | BODY MASS INDEX: 29.99 KG/M2 | DIASTOLIC BLOOD PRESSURE: 76 MMHG | HEIGHT: 65 IN | TEMPERATURE: 98.1 F

## 2018-02-05 DIAGNOSIS — G89.18 POST-OP PAIN: Primary | ICD-10-CM

## 2018-02-05 LAB
ABSOLUTE EOS #: 0.2 K/UL (ref 0–0.4)
ABSOLUTE IMMATURE GRANULOCYTE: ABNORMAL K/UL (ref 0–0.3)
ABSOLUTE LYMPH #: 1.5 K/UL (ref 1–4.8)
ABSOLUTE MONO #: 0.5 K/UL (ref 0.2–0.8)
ANION GAP SERPL CALCULATED.3IONS-SCNC: 13 MMOL/L (ref 9–17)
BASOPHILS # BLD: 1 % (ref 0–2)
BASOPHILS ABSOLUTE: 0 K/UL (ref 0–0.2)
BUN BLDV-MCNC: 10 MG/DL (ref 6–20)
BUN/CREAT BLD: 14 (ref 9–20)
CALCIUM SERPL-MCNC: 8.8 MG/DL (ref 8.6–10.4)
CHLORIDE BLD-SCNC: 103 MMOL/L (ref 98–107)
CO2: 24 MMOL/L (ref 20–31)
CREAT SERPL-MCNC: 0.7 MG/DL (ref 0.5–0.9)
DIFFERENTIAL TYPE: ABNORMAL
EOSINOPHILS RELATIVE PERCENT: 2 % (ref 1–4)
GFR AFRICAN AMERICAN: >60 ML/MIN
GFR NON-AFRICAN AMERICAN: >60 ML/MIN
GFR SERPL CREATININE-BSD FRML MDRD: ABNORMAL ML/MIN/{1.73_M2}
GFR SERPL CREATININE-BSD FRML MDRD: ABNORMAL ML/MIN/{1.73_M2}
GLUCOSE BLD-MCNC: 106 MG/DL (ref 70–99)
HCT VFR BLD CALC: 25.9 % (ref 36–46)
HEMOGLOBIN: 8.8 G/DL (ref 12–16)
IMMATURE GRANULOCYTES: ABNORMAL %
LYMPHOCYTES # BLD: 18 % (ref 24–44)
MCH RBC QN AUTO: 31.2 PG (ref 26–34)
MCHC RBC AUTO-ENTMCNC: 34.1 G/DL (ref 31–37)
MCV RBC AUTO: 91.5 FL (ref 80–100)
MONOCYTES # BLD: 6 % (ref 1–7)
NRBC AUTOMATED: ABNORMAL PER 100 WBC
PDW BLD-RTO: 14.4 % (ref 11.5–14.5)
PLATELET # BLD: 574 K/UL (ref 130–400)
PLATELET ESTIMATE: ABNORMAL
PMV BLD AUTO: ABNORMAL FL (ref 6–12)
POTASSIUM SERPL-SCNC: 3.8 MMOL/L (ref 3.7–5.3)
RBC # BLD: 2.83 M/UL (ref 4–5.2)
RBC # BLD: ABNORMAL 10*6/UL
SEG NEUTROPHILS: 73 % (ref 36–66)
SEGMENTED NEUTROPHILS ABSOLUTE COUNT: 6.5 K/UL (ref 1.8–7.7)
SODIUM BLD-SCNC: 140 MMOL/L (ref 135–144)
WBC # BLD: 8.7 K/UL (ref 3.5–11)
WBC # BLD: ABNORMAL 10*3/UL

## 2018-02-05 PROCEDURE — 99283 EMERGENCY DEPT VISIT LOW MDM: CPT

## 2018-02-05 PROCEDURE — 80048 BASIC METABOLIC PNL TOTAL CA: CPT

## 2018-02-05 PROCEDURE — 85025 COMPLETE CBC W/AUTO DIFF WBC: CPT

## 2018-02-05 RX ORDER — OXYCODONE HYDROCHLORIDE AND ACETAMINOPHEN 5; 325 MG/1; MG/1
1 TABLET ORAL EVERY 6 HOURS PRN
Qty: 30 TABLET | Refills: 0 | Status: SHIPPED | OUTPATIENT
Start: 2018-02-05 | End: 2018-02-10

## 2018-02-05 ASSESSMENT — PAIN DESCRIPTION - ORIENTATION: ORIENTATION: LEFT

## 2018-02-05 ASSESSMENT — PAIN DESCRIPTION - DESCRIPTORS: DESCRIPTORS: SHARP;CONSTANT

## 2018-02-05 ASSESSMENT — ENCOUNTER SYMPTOMS
DIARRHEA: 0
VOMITING: 0
SINUS PRESSURE: 0
WHEEZING: 0
ABDOMINAL DISTENTION: 0
SORE THROAT: 0
SHORTNESS OF BREATH: 0
CHEST TIGHTNESS: 0
CONSTIPATION: 0
NAUSEA: 0

## 2018-02-05 ASSESSMENT — PAIN DESCRIPTION - LOCATION: LOCATION: LEG

## 2018-02-05 ASSESSMENT — PAIN SCALES - WONG BAKER: WONGBAKER_NUMERICALRESPONSE: 10

## 2018-02-05 ASSESSMENT — PAIN SCALES - GENERAL: PAINLEVEL_OUTOF10: 10

## 2018-02-05 NOTE — ED NOTES
Pt. C/o pain, swelling and redness to left leg getting worse since surgery 1/16. Pt. Has not had any post surgery followup. Left foot slightly swollen and red. Multiple incision sites to leg noted. Long distal thigh incision causing significant pain. Small amount of dark drainage noted with swelling. Pain and swelling also noted to small incision to shin. Denies known fever at home.       Rashard Morales RN  02/05/18 5003

## 2018-02-05 NOTE — ED PROVIDER NOTES
[address] [place] [address] ED  eMERGENCY dEPARTMENT eNCOUnter      Pt Name: Demi Montano  MRN: [id number]  [place] 1975  Date of evaluation: 2/5/2018  Provider: Stephanie Ho DPM    CHIEF COMPLAINT       Chief Complaint   Patient presents with    Post-op Problem     post-op 1/16 [facility] left leg         HISTORY OF PRESENT ILLNESS   (Location/Symptom, Timing/Onset, Context/Setting, Quality, Duration, Modifying Factors, Severity)  Note limiting factors. Demi Montano is a 43 y.o. female who presents to the emergency department with concerns of postoperative infection. Patient is s/p L SFA to popliteal bypass (DOS: 1/16/18). Patient was given clindamycin postoperatively. Patient states that she finished her antibiotics. Patient states that she is still taking her heparin and plavix. Patient states that she noticed worsening pain in her left leg. Patient states that she noticed some purulent discharge and blood from incisions. Patient states that she washes the incision every other day. Patient states that she has not submerged her incision in water. Patient states that the leg is painful to ambulate on. Pt denies any n/v/f. Pt admits to chills. [facility]    Nursing Notes were reviewed. REVIEW OF SYSTEMS    (2-9 systems for level 4, 10 or more for level 5)     Review of Systems   Constitutional: Positive for activity change and chills. Negative for appetite change and fever. HENT: Negative for congestion, sinus pressure and sore throat. Respiratory: Negative for chest tightness, shortness of breath and wheezing. Cardiovascular: Positive for leg swelling. Negative for chest pain and palpitations. Gastrointestinal: Negative for abdominal distention, constipation, diarrhea, nausea and vomiting. Musculoskeletal: Positive for gait problem and myalgias. Skin: Positive for wound. Neurological: Positive for numbness. Negative for dizziness, syncope and light-headedness.        Except as noted above the remainder

## 2018-02-05 NOTE — ED PROVIDER NOTES
The patient was seen and examined by me in conjunction with the resident. I agree with his/her assessment and treatment plan. Medical Decision Making: I have spoken to the patient's vascular surgeon, Dr. Ian Gonzalez, and follow-up is arranged for this Friday. She is provided Percocet. I do not suspect infection. Findings discussed with the patient. She was provided a prescription for Percocet. CONSULTS:  None    PROCEDURES:  None    FINAL IMPRESSION      1. Post-op pain         DISPOSITION/PLAN   DISPOSITION Decision To Discharge 02/05/2018 05:14:32 PM       PATIENT REFERRED TO:   Kulwinder Darden MD  [facility] [id number]      As needed    [facility] ED  [address] [facility] [address] [phone]    If symptoms worsen    [place] [place], [address] [facility] [id number]  [facility] [address] [phone]    Call in 1 day  you are to be seen on Friday this week      DISCHARGE MEDICATIONS:     New Prescriptions    OXYCODONE-ACETAMINOPHEN (PERCOCET) 5-325 MG PER TABLET    Take 1 tablet by mouth every 6 hours as needed for Pain for up to 5 days.          (Please note that portions of this note were completed with a voice recognition program.  Efforts were made to edit the dictations but occasionally words are mis-transcribed.)    Stu Ruiz MD  Attending Emergency Physician         Stu Ruiz MD  02/05/18 8998

## 2018-02-09 ENCOUNTER — OFFICE VISIT (OUTPATIENT)
Dept: VASCULAR SURGERY | Age: 43
End: 2018-02-09

## 2018-02-09 VITALS
WEIGHT: 179.9 LBS | DIASTOLIC BLOOD PRESSURE: 78 MMHG | SYSTOLIC BLOOD PRESSURE: 122 MMHG | HEART RATE: 78 BPM | RESPIRATION RATE: 17 BRPM | HEIGHT: 65 IN | OXYGEN SATURATION: 98 % | BODY MASS INDEX: 29.97 KG/M2

## 2018-02-09 DIAGNOSIS — L03.032 ABSCESS OF GREAT TOENAIL OF LEFT FOOT: ICD-10-CM

## 2018-02-09 DIAGNOSIS — I73.9 PAD (PERIPHERAL ARTERY DISEASE) (HCC): Primary | ICD-10-CM

## 2018-02-09 PROCEDURE — 99024 POSTOP FOLLOW-UP VISIT: CPT | Performed by: SURGERY

## 2018-02-09 RX ORDER — CLINDAMYCIN HYDROCHLORIDE 300 MG/1
300 CAPSULE ORAL 3 TIMES DAILY
Qty: 21 CAPSULE | Refills: 0 | Status: SHIPPED | OUTPATIENT
Start: 2018-02-09 | End: 2018-02-16

## 2018-02-10 ASSESSMENT — ENCOUNTER SYMPTOMS
EYES NEGATIVE: 1
GASTROINTESTINAL NEGATIVE: 1
SHORTNESS OF BREATH: 0
ALLERGIC/IMMUNOLOGIC NEGATIVE: 1

## 2018-02-10 NOTE — PROGRESS NOTES
serosang drainage from thigh incision. Bruising noted along bypass graft as it crosses over shin. Great doppler signal of bypass graft and DP     Imaging/Labs:     None perfomred    Assessment and Plan:     Left leg Revascularization  · Will start clindamycin for purulent drainage coming from toe nail  · Setting up referral to podiatry for evaluation, will likely need un-ji of left great toe nail  · Continue to increase mobility  · Wrap and elevated leg to help with swelling in foot  · Local wound care to all incisions and daily washing  · Follow up in 2 weeks for wound check and graft duplex    Electronically signed by Rito Jung MD on 2/10/18 at 12:00 PM      [address] [place] [address],[address]: [phone]  C: [phone]  Email: [email or web address]

## 2018-02-15 ENCOUNTER — HOSPITAL ENCOUNTER (OUTPATIENT)
Dept: VASCULAR LAB | Age: 43
Discharge: HOME | End: 2018-02-15
Payer: COMMERCIAL

## 2018-02-15 DIAGNOSIS — I73.9 PAD (PERIPHERAL ARTERY DISEASE) (HCC): ICD-10-CM

## 2018-02-15 PROCEDURE — 93926 LOWER EXTREMITY STUDY: CPT

## 2018-02-26 ENCOUNTER — OFFICE VISIT (OUTPATIENT)
Dept: VASCULAR SURGERY | Age: 43
End: 2018-02-26

## 2018-02-26 VITALS
HEART RATE: 70 BPM | HEIGHT: 65 IN | WEIGHT: 179.9 LBS | OXYGEN SATURATION: 98 % | BODY MASS INDEX: 29.97 KG/M2 | SYSTOLIC BLOOD PRESSURE: 120 MMHG | RESPIRATION RATE: 17 BRPM | DIASTOLIC BLOOD PRESSURE: 70 MMHG

## 2018-02-26 DIAGNOSIS — I73.9 PAD (PERIPHERAL ARTERY DISEASE) (HCC): Primary | ICD-10-CM

## 2018-02-26 PROCEDURE — 99024 POSTOP FOLLOW-UP VISIT: CPT | Performed by: SURGERY

## 2018-02-26 NOTE — PROGRESS NOTES
Shayna Bryant is here today for a wound check following removal of her sutures after undergoing left SFA to peroneal artery bypass with vein on 1/16/18. I had started her on antibiotics and wanted her to be seen by podiatry for purulent drainage noted from below her great toenail. She was unable to make the appointment but did finish the course of antibiotics. She denies any fevers, the pain in her toe is gone as is the drainage. Her wounds have essentially healed, there are a few spots of scabbing, but all her wounds are closed. The swelling has also decreased significantly. She continues to have a strong pulse in her bypass graft as it crosses over her shin, and continues to have biphasic signal in her DP. Overall I am happy with her progress, I would still like her to see podiatry to evaluate her toenail and I did not make her any promises that her toenail may need to be taken off or not, but I am glad that the purulent drainage has stopped. She should continue taking plavix and lipitor. She can return to work, but I did tell her to take it easy, and if she notices increased swelling in her legs because she is standing for long period of time then she may need to continue wrapping her legs. I would like to see her back in 3 months and get repeat duplex of her bypass for surveillance.     Electronically signed by Jacek Cruz MD on 2/26/18 at 2:45 PM

## 2018-05-09 DIAGNOSIS — I10 ESSENTIAL HYPERTENSION: ICD-10-CM

## 2018-05-12 RX ORDER — AMLODIPINE BESYLATE 10 MG/1
TABLET ORAL
Qty: 30 TABLET | Refills: 1 | Status: SHIPPED | OUTPATIENT
Start: 2018-05-12 | End: 2018-08-03 | Stop reason: SDUPTHER

## 2018-06-04 ENCOUNTER — TELEPHONE (OUTPATIENT)
Dept: VASCULAR SURGERY | Age: 43
End: 2018-06-04

## 2018-09-25 ENCOUNTER — TELEPHONE (OUTPATIENT)
Dept: ADMINISTRATIVE | Age: 43
End: 2018-09-25

## 2018-10-01 ENCOUNTER — HOSPITAL ENCOUNTER (INPATIENT)
Age: 43
LOS: 5 days | Discharge: HOME HEALTH | DRG: 206 | End: 2018-10-06
Attending: EMERGENCY MEDICINE | Admitting: INTERNAL MEDICINE
Payer: COMMERCIAL

## 2018-10-01 DIAGNOSIS — A41.9 SEPTICEMIA (HCC): Primary | ICD-10-CM

## 2018-10-01 DIAGNOSIS — E78.5 HYPERLIPIDEMIA, UNSPECIFIED HYPERLIPIDEMIA TYPE: ICD-10-CM

## 2018-10-01 DIAGNOSIS — E87.20 LACTIC ACIDOSIS: ICD-10-CM

## 2018-10-01 DIAGNOSIS — I99.8 ISCHEMIC LEG: ICD-10-CM

## 2018-10-01 DIAGNOSIS — N17.9 AKI (ACUTE KIDNEY INJURY) (HCC): ICD-10-CM

## 2018-10-01 DIAGNOSIS — I70.209 OCCLUSION OF ARTERY OF LEG (HCC): ICD-10-CM

## 2018-10-01 PROBLEM — L03.115 CELLULITIS OF RIGHT LOWER EXTREMITY: Status: ACTIVE | Noted: 2018-10-01

## 2018-10-01 LAB
ABSOLUTE EOS #: 0.04 K/UL (ref 0–0.44)
ABSOLUTE IMMATURE GRANULOCYTE: 0.12 K/UL (ref 0–0.3)
ABSOLUTE LYMPH #: 2.43 K/UL (ref 1.1–3.7)
ABSOLUTE MONO #: 0.92 K/UL (ref 0.1–1.2)
ALBUMIN SERPL-MCNC: 4.3 G/DL (ref 3.5–5.2)
ALBUMIN/GLOBULIN RATIO: 1.2 (ref 1–2.5)
ALP BLD-CCNC: 124 U/L (ref 35–104)
ALT SERPL-CCNC: 23 U/L (ref 5–33)
ANION GAP SERPL CALCULATED.3IONS-SCNC: 21 MMOL/L (ref 9–17)
AST SERPL-CCNC: 20 U/L
BASOPHILS # BLD: 0 % (ref 0–2)
BASOPHILS ABSOLUTE: 0.07 K/UL (ref 0–0.2)
BILIRUB SERPL-MCNC: 0.44 MG/DL (ref 0.3–1.2)
BUN BLDV-MCNC: 20 MG/DL (ref 6–20)
BUN/CREAT BLD: ABNORMAL (ref 9–20)
CALCIUM SERPL-MCNC: 9.9 MG/DL (ref 8.6–10.4)
CHLORIDE BLD-SCNC: 97 MMOL/L (ref 98–107)
CO2: 20 MMOL/L (ref 20–31)
CREAT SERPL-MCNC: 1.52 MG/DL (ref 0.5–0.9)
DIFFERENTIAL TYPE: ABNORMAL
EKG ATRIAL RATE: 103 BPM
EKG P AXIS: 54 DEGREES
EKG P-R INTERVAL: 142 MS
EKG Q-T INTERVAL: 374 MS
EKG QRS DURATION: 68 MS
EKG QTC CALCULATION (BAZETT): 489 MS
EKG R AXIS: -12 DEGREES
EKG T AXIS: 7 DEGREES
EKG VENTRICULAR RATE: 103 BPM
EOSINOPHILS RELATIVE PERCENT: 0 % (ref 1–4)
GFR AFRICAN AMERICAN: 45 ML/MIN
GFR NON-AFRICAN AMERICAN: 37 ML/MIN
GFR SERPL CREATININE-BSD FRML MDRD: ABNORMAL ML/MIN/{1.73_M2}
GFR SERPL CREATININE-BSD FRML MDRD: ABNORMAL ML/MIN/{1.73_M2}
GLUCOSE BLD-MCNC: 171 MG/DL (ref 70–99)
HCT VFR BLD CALC: 33 % (ref 36.3–47.1)
HEMOGLOBIN: 10.6 G/DL (ref 11.9–15.1)
IMMATURE GRANULOCYTES: 1 %
INR BLD: 2
LACTIC ACID, WHOLE BLOOD: 2.4 MMOL/L (ref 0.7–2.1)
LACTIC ACID, WHOLE BLOOD: 5.5 MMOL/L (ref 0.7–2.1)
LYMPHOCYTES # BLD: 14 % (ref 24–43)
MCH RBC QN AUTO: 28.6 PG (ref 25.2–33.5)
MCHC RBC AUTO-ENTMCNC: 32.1 G/DL (ref 28.4–34.8)
MCV RBC AUTO: 89.2 FL (ref 82.6–102.9)
MONOCYTES # BLD: 5 % (ref 3–12)
NRBC AUTOMATED: 0 PER 100 WBC
PARTIAL THROMBOPLASTIN TIME: 32 SEC (ref 20.5–30.5)
PDW BLD-RTO: 13.2 % (ref 11.8–14.4)
PLATELET # BLD: 764 K/UL (ref 138–453)
PLATELET ESTIMATE: ABNORMAL
PMV BLD AUTO: 9.1 FL (ref 8.1–13.5)
POTASSIUM SERPL-SCNC: 4.8 MMOL/L (ref 3.7–5.3)
PROTHROMBIN TIME: 20.6 SEC (ref 9–12)
RBC # BLD: 3.7 M/UL (ref 3.95–5.11)
RBC # BLD: ABNORMAL 10*6/UL
SEG NEUTROPHILS: 80 % (ref 36–65)
SEGMENTED NEUTROPHILS ABSOLUTE COUNT: 13.32 K/UL (ref 1.5–8.1)
SODIUM BLD-SCNC: 138 MMOL/L (ref 135–144)
TOTAL PROTEIN: 8 G/DL (ref 6.4–8.3)
WBC # BLD: 16.9 K/UL (ref 3.5–11.3)
WBC # BLD: ABNORMAL 10*3/UL

## 2018-10-01 PROCEDURE — 85610 PROTHROMBIN TIME: CPT

## 2018-10-01 PROCEDURE — 96374 THER/PROPH/DIAG INJ IV PUSH: CPT

## 2018-10-01 PROCEDURE — 93005 ELECTROCARDIOGRAM TRACING: CPT

## 2018-10-01 PROCEDURE — 80053 COMPREHEN METABOLIC PANEL: CPT

## 2018-10-01 PROCEDURE — G0384 LEV 5 HOSP TYPE B ED VISIT: HCPCS

## 2018-10-01 PROCEDURE — 2060000000 HC ICU INTERMEDIATE R&B

## 2018-10-01 PROCEDURE — 87040 BLOOD CULTURE FOR BACTERIA: CPT

## 2018-10-01 PROCEDURE — 85730 THROMBOPLASTIN TIME PARTIAL: CPT

## 2018-10-01 PROCEDURE — 2580000003 HC RX 258: Performed by: STUDENT IN AN ORGANIZED HEALTH CARE EDUCATION/TRAINING PROGRAM

## 2018-10-01 PROCEDURE — 6360000002 HC RX W HCPCS: Performed by: STUDENT IN AN ORGANIZED HEALTH CARE EDUCATION/TRAINING PROGRAM

## 2018-10-01 PROCEDURE — 2500000003 HC RX 250 WO HCPCS: Performed by: STUDENT IN AN ORGANIZED HEALTH CARE EDUCATION/TRAINING PROGRAM

## 2018-10-01 PROCEDURE — 6370000000 HC RX 637 (ALT 250 FOR IP): Performed by: STUDENT IN AN ORGANIZED HEALTH CARE EDUCATION/TRAINING PROGRAM

## 2018-10-01 PROCEDURE — 83605 ASSAY OF LACTIC ACID: CPT

## 2018-10-01 PROCEDURE — 85025 COMPLETE CBC W/AUTO DIFF WBC: CPT

## 2018-10-01 RX ORDER — DIPHENHYDRAMINE HCL 25 MG
25 TABLET ORAL ONCE
Status: COMPLETED | OUTPATIENT
Start: 2018-10-01 | End: 2018-10-01

## 2018-10-01 RX ORDER — 0.9 % SODIUM CHLORIDE 0.9 %
30 INTRAVENOUS SOLUTION INTRAVENOUS ONCE
Status: COMPLETED | OUTPATIENT
Start: 2018-10-01 | End: 2018-10-01

## 2018-10-01 RX ORDER — CLINDAMYCIN PHOSPHATE 600 MG/50ML
600 INJECTION INTRAVENOUS ONCE
Status: COMPLETED | OUTPATIENT
Start: 2018-10-01 | End: 2018-10-01

## 2018-10-01 RX ORDER — HEPARIN SODIUM 10000 [USP'U]/100ML
18 INJECTION, SOLUTION INTRAVENOUS CONTINUOUS
Status: DISCONTINUED | OUTPATIENT
Start: 2018-10-01 | End: 2018-10-05

## 2018-10-01 RX ORDER — HEPARIN SODIUM 1000 [USP'U]/ML
80 INJECTION, SOLUTION INTRAVENOUS; SUBCUTANEOUS ONCE
Status: DISCONTINUED | OUTPATIENT
Start: 2018-10-01 | End: 2018-10-02

## 2018-10-01 RX ORDER — HEPARIN SODIUM 1000 [USP'U]/ML
80 INJECTION, SOLUTION INTRAVENOUS; SUBCUTANEOUS PRN
Status: DISCONTINUED | OUTPATIENT
Start: 2018-10-01 | End: 2018-10-05

## 2018-10-01 RX ORDER — HEPARIN SODIUM 1000 [USP'U]/ML
40 INJECTION, SOLUTION INTRAVENOUS; SUBCUTANEOUS PRN
Status: DISCONTINUED | OUTPATIENT
Start: 2018-10-01 | End: 2018-10-05

## 2018-10-01 RX ORDER — FENTANYL CITRATE 50 UG/ML
50 INJECTION, SOLUTION INTRAMUSCULAR; INTRAVENOUS ONCE
Status: COMPLETED | OUTPATIENT
Start: 2018-10-01 | End: 2018-10-01

## 2018-10-01 RX ADMIN — SODIUM CHLORIDE 2448 ML: 9 INJECTION, SOLUTION INTRAVENOUS at 20:31

## 2018-10-01 RX ADMIN — FENTANYL CITRATE 50 MCG: 50 INJECTION INTRAMUSCULAR; INTRAVENOUS at 22:54

## 2018-10-01 RX ADMIN — HEPARIN SODIUM AND DEXTROSE 18 UNITS/KG/HR: 10000; 5 INJECTION INTRAVENOUS at 20:30

## 2018-10-01 RX ADMIN — DIPHENHYDRAMINE HCL 25 MG: 25 TABLET ORAL at 20:30

## 2018-10-01 RX ADMIN — FENTANYL CITRATE 50 MCG: 50 INJECTION INTRAMUSCULAR; INTRAVENOUS at 18:16

## 2018-10-01 RX ADMIN — CEFEPIME HYDROCHLORIDE 2 G: 2 INJECTION, POWDER, FOR SOLUTION INTRAVENOUS at 22:53

## 2018-10-01 RX ADMIN — VANCOMYCIN HYDROCHLORIDE 1500 MG: 1 INJECTION, POWDER, LYOPHILIZED, FOR SOLUTION INTRAVENOUS at 22:53

## 2018-10-01 RX ADMIN — CLINDAMYCIN PHOSPHATE 600 MG: 600 INJECTION, SOLUTION INTRAVENOUS at 20:30

## 2018-10-01 ASSESSMENT — ENCOUNTER SYMPTOMS
VOMITING: 0
TROUBLE SWALLOWING: 0
SORE THROAT: 0
NAUSEA: 0
COUGH: 0
ABDOMINAL PAIN: 0
CHEST TIGHTNESS: 0
WHEEZING: 0
PHOTOPHOBIA: 0
BACK PAIN: 0
SHORTNESS OF BREATH: 0

## 2018-10-01 ASSESSMENT — PAIN SCALES - GENERAL
PAINLEVEL_OUTOF10: 9
PAINLEVEL_OUTOF10: 10
PAINLEVEL_OUTOF10: 10

## 2018-10-01 ASSESSMENT — PAIN DESCRIPTION - LOCATION: LOCATION: LEG

## 2018-10-01 ASSESSMENT — PAIN DESCRIPTION - DESCRIPTORS: DESCRIPTORS: DISCOMFORT;SORE

## 2018-10-01 ASSESSMENT — PAIN DESCRIPTION - ORIENTATION: ORIENTATION: RIGHT;LOWER

## 2018-10-01 NOTE — ED NOTES
Pt to room 39 with EMS with c/o wound infection. Pt reports that a month ago she had surgery on her right lower leg to restore blood flow to her toes. Pt states that today she woke up and started having pain in her right leg. Pt states that she has been experiencing hot/cold flashes, dizziness and headaches. Pt has decreased pulses to right lower extremity, pt is slightly diaphoretic. Pt placed on continuous cardiac monitor, bp and pulse ox. EKG completed, IV established, blood work drawn. Pt alert and oriented x4, talking in complete sentences, respirations even and unlabored. Pt acting age appropriate. White board updated, will continue to monitor.        Adelaida Santiago RN  10/01/18 1800

## 2018-10-01 NOTE — CONSULTS
Katiuska Ag MD   atorvastatin (LIPITOR) 10 MG tablet Take 1 tablet by mouth daily 12/19/17   Britt Bell MD   Blood Pressure KIT 1 Package by Does not apply route daily 12/18/17   Britt Bell MD        Allergies:       Cathy Bears [aspirin]; Lopid [gemfibrozil]; Nortriptyline; Pcn [penicillins]; Sulfa antibiotics; and Ibuprofen    Social History:     Tobacco:    reports that she has never smoked. She has never used smokeless tobacco.  Alcohol:      reports that she drinks alcohol. Drug Use:  reports that she does not use drugs.     Family History:     Family History   Problem Relation Age of Onset    Diabetes Mother     High Blood Pressure Mother     Heart Attack Mother     Heart Disease Mother     Kidney Disease Mother     High Blood Pressure Father     Heart Disease Father     Heart Attack Father     Diabetes Sister     High Blood Pressure Sister     Diabetes Sister     High Blood Pressure Sister        Review of Systems:     Positive and Negative as described in HPI    Constitutional:  negative for  fevers, chills, reports malaise   HEENT:  negative for vision or hearing changes,   Respiratory:  negative for shortness of breath, cough, or congestion  Cardiovascular:  negative for  chest pain, palpitations  Gastrointestinal:  negative for nausea, vomiting, diarrhea, constipation, abdominal pain  Genitourinary:  negative for frequency, dysuria  Integument:  negative for rash; reports black skin on right toes   Chest/Breast:  No painful inspiration or expiration, no rib sternal pain  Musculoskeletal:  negative for muscle aches or joint pain  Neurological:  negative for headaches, dizziness, lightheadedness, numbness, pain and tingling extremities  Lymphatics: no lymphadenopathy or painful masses  Behavior/Psych:  negative for depression and anxiety    Physical Exam:     Vitals:  /77   Pulse 112   Temp 97.5 °F (36.4 °C) (Oral)   Resp 18   Wt 180 lb (81.6 kg)   SpO2 100%   BMI 29.95 tonight  5. Obtain records from [facility] to review op report/inpatient stay details  6. Discussed with Dr. Cristobal Nichols and he will review case and may take for angiogram of the RLE tomorrow. ------------------------------------------------------------------  Evaristo Dougherty DO, PGY-3  Department of [address] [place] [address].

## 2018-10-02 LAB
-: ABNORMAL
AMORPHOUS: ABNORMAL
ANION GAP SERPL CALCULATED.3IONS-SCNC: 12 MMOL/L (ref 9–17)
BACTERIA: ABNORMAL
BILIRUBIN URINE: NEGATIVE
BUN BLDV-MCNC: 18 MG/DL (ref 6–20)
BUN/CREAT BLD: ABNORMAL (ref 9–20)
CALCIUM SERPL-MCNC: 8.7 MG/DL (ref 8.6–10.4)
CASTS UA: ABNORMAL /LPF (ref 0–8)
CHLORIDE BLD-SCNC: 104 MMOL/L (ref 98–107)
CO2: 22 MMOL/L (ref 20–31)
COLOR: YELLOW
CREAT SERPL-MCNC: 0.85 MG/DL (ref 0.5–0.9)
CRYSTALS, UA: ABNORMAL /HPF
EPITHELIAL CELLS UA: ABNORMAL /HPF (ref 0–5)
GFR AFRICAN AMERICAN: >60 ML/MIN
GFR NON-AFRICAN AMERICAN: >60 ML/MIN
GFR SERPL CREATININE-BSD FRML MDRD: ABNORMAL ML/MIN/{1.73_M2}
GFR SERPL CREATININE-BSD FRML MDRD: ABNORMAL ML/MIN/{1.73_M2}
GLUCOSE BLD-MCNC: 107 MG/DL (ref 70–99)
GLUCOSE URINE: NEGATIVE
HCT VFR BLD CALC: 26.2 % (ref 36.3–47.1)
HEMOGLOBIN: 8.6 G/DL (ref 11.9–15.1)
KETONES, URINE: NEGATIVE
LACTIC ACID, WHOLE BLOOD: 0.9 MMOL/L (ref 0.7–2.1)
LEUKOCYTE ESTERASE, URINE: NEGATIVE
MCH RBC QN AUTO: 29.5 PG (ref 25.2–33.5)
MCHC RBC AUTO-ENTMCNC: 32.8 G/DL (ref 28.4–34.8)
MCV RBC AUTO: 89.7 FL (ref 82.6–102.9)
MUCUS: ABNORMAL
NITRITE, URINE: NEGATIVE
NRBC AUTOMATED: 0 PER 100 WBC
OTHER OBSERVATIONS UA: ABNORMAL
PARTIAL THROMBOPLASTIN TIME: 38.6 SEC (ref 20.5–30.5)
PARTIAL THROMBOPLASTIN TIME: 45.9 SEC (ref 20.5–30.5)
PARTIAL THROMBOPLASTIN TIME: 76.3 SEC (ref 20.5–30.5)
PARTIAL THROMBOPLASTIN TIME: 93.6 SEC (ref 20.5–30.5)
PARTIAL THROMBOPLASTIN TIME: >120 SEC (ref 20.5–30.5)
PDW BLD-RTO: 13.4 % (ref 11.8–14.4)
PH UA: 5.5 (ref 5–8)
PLATELET # BLD: 507 K/UL (ref 138–453)
PMV BLD AUTO: 9.1 FL (ref 8.1–13.5)
POTASSIUM SERPL-SCNC: 4.4 MMOL/L (ref 3.7–5.3)
PROTEIN UA: NEGATIVE
RBC # BLD: 2.92 M/UL (ref 3.95–5.11)
RBC UA: ABNORMAL /HPF (ref 0–4)
RENAL EPITHELIAL, UA: ABNORMAL /HPF
SODIUM BLD-SCNC: 138 MMOL/L (ref 135–144)
SPECIFIC GRAVITY UA: 1.02 (ref 1–1.03)
TRICHOMONAS: ABNORMAL
TURBIDITY: ABNORMAL
URINE HGB: NEGATIVE
UROBILINOGEN, URINE: NORMAL
WBC # BLD: 10.5 K/UL (ref 3.5–11.3)
WBC UA: ABNORMAL /HPF (ref 0–5)
YEAST: ABNORMAL

## 2018-10-02 PROCEDURE — 2580000003 HC RX 258: Performed by: INTERNAL MEDICINE

## 2018-10-02 PROCEDURE — 87040 BLOOD CULTURE FOR BACTERIA: CPT

## 2018-10-02 PROCEDURE — 85730 THROMBOPLASTIN TIME PARTIAL: CPT

## 2018-10-02 PROCEDURE — 2060000000 HC ICU INTERMEDIATE R&B

## 2018-10-02 PROCEDURE — 6370000000 HC RX 637 (ALT 250 FOR IP): Performed by: SURGERY

## 2018-10-02 PROCEDURE — 76937 US GUIDE VASCULAR ACCESS: CPT

## 2018-10-02 PROCEDURE — 75716 ARTERY X-RAYS ARMS/LEGS: CPT | Performed by: SURGERY

## 2018-10-02 PROCEDURE — 87086 URINE CULTURE/COLONY COUNT: CPT

## 2018-10-02 PROCEDURE — 36415 COLL VENOUS BLD VENIPUNCTURE: CPT

## 2018-10-02 PROCEDURE — 99222 1ST HOSP IP/OBS MODERATE 55: CPT | Performed by: INTERNAL MEDICINE

## 2018-10-02 PROCEDURE — 81001 URINALYSIS AUTO W/SCOPE: CPT

## 2018-10-02 PROCEDURE — 6360000002 HC RX W HCPCS: Performed by: STUDENT IN AN ORGANIZED HEALTH CARE EDUCATION/TRAINING PROGRAM

## 2018-10-02 PROCEDURE — 36247 INS CATH ABD/L-EXT ART 3RD: CPT | Performed by: SURGERY

## 2018-10-02 PROCEDURE — 6370000000 HC RX 637 (ALT 250 FOR IP): Performed by: NURSE PRACTITIONER

## 2018-10-02 PROCEDURE — 6370000000 HC RX 637 (ALT 250 FOR IP): Performed by: STUDENT IN AN ORGANIZED HEALTH CARE EDUCATION/TRAINING PROGRAM

## 2018-10-02 PROCEDURE — 85027 COMPLETE CBC AUTOMATED: CPT

## 2018-10-02 PROCEDURE — 80048 BASIC METABOLIC PNL TOTAL CA: CPT

## 2018-10-02 PROCEDURE — 2580000003 HC RX 258: Performed by: NURSE PRACTITIONER

## 2018-10-02 PROCEDURE — 2500000003 HC RX 250 WO HCPCS: Performed by: INTERNAL MEDICINE

## 2018-10-02 PROCEDURE — 6370000000 HC RX 637 (ALT 250 FOR IP): Performed by: INTERNAL MEDICINE

## 2018-10-02 PROCEDURE — 83605 ASSAY OF LACTIC ACID: CPT

## 2018-10-02 RX ORDER — NICOTINE 21 MG/24HR
1 PATCH, TRANSDERMAL 24 HOURS TRANSDERMAL DAILY PRN
Status: DISCONTINUED | OUTPATIENT
Start: 2018-10-02 | End: 2018-10-07 | Stop reason: HOSPADM

## 2018-10-02 RX ORDER — DIAZEPAM 2 MG/1
2 TABLET ORAL 3 TIMES DAILY PRN
COMMUNITY
End: 2019-03-21 | Stop reason: ALTCHOICE

## 2018-10-02 RX ORDER — ASCORBIC ACID 500 MG
500 TABLET ORAL DAILY
Status: DISCONTINUED | OUTPATIENT
Start: 2018-10-02 | End: 2018-10-07 | Stop reason: HOSPADM

## 2018-10-02 RX ORDER — AMLODIPINE BESYLATE 10 MG/1
10 TABLET ORAL NIGHTLY
Status: DISCONTINUED | OUTPATIENT
Start: 2018-10-02 | End: 2018-10-07 | Stop reason: HOSPADM

## 2018-10-02 RX ORDER — MIRTAZAPINE 15 MG/1
7.5 TABLET, FILM COATED ORAL NIGHTLY
Status: DISCONTINUED | OUTPATIENT
Start: 2018-10-02 | End: 2018-10-07 | Stop reason: HOSPADM

## 2018-10-02 RX ORDER — SODIUM CHLORIDE 9 MG/ML
INJECTION, SOLUTION INTRAVENOUS CONTINUOUS
Status: DISCONTINUED | OUTPATIENT
Start: 2018-10-02 | End: 2018-10-04

## 2018-10-02 RX ORDER — ATORVASTATIN CALCIUM 10 MG/1
10 TABLET, FILM COATED ORAL DAILY
Status: DISCONTINUED | OUTPATIENT
Start: 2018-10-02 | End: 2018-10-02

## 2018-10-02 RX ORDER — METOPROLOL SUCCINATE 25 MG/1
25 TABLET, EXTENDED RELEASE ORAL DAILY
Status: ON HOLD | COMMUNITY
End: 2018-10-06 | Stop reason: HOSPADM

## 2018-10-02 RX ORDER — ASCORBIC ACID 500 MG
500 TABLET ORAL DAILY
COMMUNITY
End: 2019-03-21 | Stop reason: ALTCHOICE

## 2018-10-02 RX ORDER — M-VIT,TX,IRON,MINS/CALC/FOLIC 27MG-0.4MG
1 TABLET ORAL DAILY
COMMUNITY
End: 2019-03-21 | Stop reason: ALTCHOICE

## 2018-10-02 RX ORDER — DOCUSATE SODIUM 100 MG/1
100 CAPSULE, LIQUID FILLED ORAL 2 TIMES DAILY
Status: DISCONTINUED | OUTPATIENT
Start: 2018-10-02 | End: 2018-10-07 | Stop reason: HOSPADM

## 2018-10-02 RX ORDER — BISACODYL 10 MG
10 SUPPOSITORY, RECTAL RECTAL DAILY PRN
Status: DISCONTINUED | OUTPATIENT
Start: 2018-10-02 | End: 2018-10-07 | Stop reason: HOSPADM

## 2018-10-02 RX ORDER — LISINOPRIL 5 MG/1
5 TABLET ORAL DAILY
Status: ON HOLD | COMMUNITY
End: 2018-10-06 | Stop reason: HOSPADM

## 2018-10-02 RX ORDER — MAGNESIUM SULFATE 1 G/100ML
1 INJECTION INTRAVENOUS PRN
Status: DISCONTINUED | OUTPATIENT
Start: 2018-10-02 | End: 2018-10-07 | Stop reason: HOSPADM

## 2018-10-02 RX ORDER — SODIUM CHLORIDE 0.9 % (FLUSH) 0.9 %
10 SYRINGE (ML) INJECTION PRN
Status: DISCONTINUED | OUTPATIENT
Start: 2018-10-02 | End: 2018-10-07 | Stop reason: HOSPADM

## 2018-10-02 RX ORDER — ACETAMINOPHEN 325 MG/1
650 TABLET ORAL EVERY 6 HOURS PRN
Status: DISCONTINUED | OUTPATIENT
Start: 2018-10-02 | End: 2018-10-07 | Stop reason: HOSPADM

## 2018-10-02 RX ORDER — CLOPIDOGREL BISULFATE 75 MG/1
75 TABLET ORAL DAILY
Status: DISCONTINUED | OUTPATIENT
Start: 2018-10-02 | End: 2018-10-07 | Stop reason: HOSPADM

## 2018-10-02 RX ORDER — DIAZEPAM 2 MG/1
2 TABLET ORAL 3 TIMES DAILY PRN
Status: DISCONTINUED | OUTPATIENT
Start: 2018-10-02 | End: 2018-10-07 | Stop reason: HOSPADM

## 2018-10-02 RX ORDER — VANCOMYCIN HYDROCHLORIDE 1 G/200ML
1000 INJECTION, SOLUTION INTRAVENOUS EVERY 12 HOURS
Status: DISCONTINUED | OUTPATIENT
Start: 2018-10-02 | End: 2018-10-03

## 2018-10-02 RX ORDER — POTASSIUM CHLORIDE 7.45 MG/ML
10 INJECTION INTRAVENOUS PRN
Status: DISCONTINUED | OUTPATIENT
Start: 2018-10-02 | End: 2018-10-07 | Stop reason: HOSPADM

## 2018-10-02 RX ORDER — METOPROLOL SUCCINATE 25 MG/1
25 TABLET, EXTENDED RELEASE ORAL DAILY
Status: DISCONTINUED | OUTPATIENT
Start: 2018-10-02 | End: 2018-10-02

## 2018-10-02 RX ORDER — DULOXETIN HYDROCHLORIDE 30 MG/1
30 CAPSULE, DELAYED RELEASE ORAL EVERY 12 HOURS
Status: DISCONTINUED | OUTPATIENT
Start: 2018-10-02 | End: 2018-10-07 | Stop reason: HOSPADM

## 2018-10-02 RX ORDER — OXYCODONE HYDROCHLORIDE AND ACETAMINOPHEN 5; 325 MG/1; MG/1
1 TABLET ORAL EVERY 4 HOURS PRN
Status: DISCONTINUED | OUTPATIENT
Start: 2018-10-02 | End: 2018-10-07 | Stop reason: HOSPADM

## 2018-10-02 RX ORDER — ACETAMINOPHEN 325 MG/1
650 TABLET ORAL EVERY 4 HOURS PRN
Status: DISCONTINUED | OUTPATIENT
Start: 2018-10-02 | End: 2018-10-07 | Stop reason: HOSPADM

## 2018-10-02 RX ORDER — POTASSIUM CHLORIDE 20 MEQ/1
40 TABLET, EXTENDED RELEASE ORAL PRN
Status: DISCONTINUED | OUTPATIENT
Start: 2018-10-02 | End: 2018-10-07 | Stop reason: HOSPADM

## 2018-10-02 RX ORDER — SODIUM CHLORIDE 0.9 % (FLUSH) 0.9 %
10 SYRINGE (ML) INJECTION EVERY 12 HOURS SCHEDULED
Status: DISCONTINUED | OUTPATIENT
Start: 2018-10-02 | End: 2018-10-07 | Stop reason: HOSPADM

## 2018-10-02 RX ORDER — WARFARIN SODIUM 7.5 MG/1
7.5 TABLET ORAL DAILY
Status: ON HOLD | COMMUNITY
End: 2018-10-06 | Stop reason: HOSPADM

## 2018-10-02 RX ORDER — POTASSIUM CHLORIDE 20MEQ/15ML
40 LIQUID (ML) ORAL PRN
Status: DISCONTINUED | OUTPATIENT
Start: 2018-10-02 | End: 2018-10-07 | Stop reason: HOSPADM

## 2018-10-02 RX ORDER — UREA 10 %
2 LOTION (ML) TOPICAL ONCE
Status: COMPLETED | OUTPATIENT
Start: 2018-10-02 | End: 2018-10-02

## 2018-10-02 RX ORDER — M-VIT,TX,IRON,MINS/CALC/FOLIC 27MG-0.4MG
1 TABLET ORAL DAILY
Status: DISCONTINUED | OUTPATIENT
Start: 2018-10-02 | End: 2018-10-07 | Stop reason: HOSPADM

## 2018-10-02 RX ORDER — ATORVASTATIN CALCIUM 40 MG/1
40 TABLET, FILM COATED ORAL DAILY
Status: DISCONTINUED | OUTPATIENT
Start: 2018-10-03 | End: 2018-10-07 | Stop reason: HOSPADM

## 2018-10-02 RX ORDER — CLINDAMYCIN PHOSPHATE 600 MG/50ML
600 INJECTION INTRAVENOUS EVERY 8 HOURS
Status: DISCONTINUED | OUTPATIENT
Start: 2018-10-02 | End: 2018-10-03

## 2018-10-02 RX ORDER — ONDANSETRON 2 MG/ML
4 INJECTION INTRAMUSCULAR; INTRAVENOUS EVERY 6 HOURS PRN
Status: DISCONTINUED | OUTPATIENT
Start: 2018-10-02 | End: 2018-10-07 | Stop reason: HOSPADM

## 2018-10-02 RX ORDER — DULOXETIN HYDROCHLORIDE 30 MG/1
30 CAPSULE, DELAYED RELEASE ORAL EVERY 12 HOURS
COMMUNITY
End: 2018-10-19 | Stop reason: SDUPTHER

## 2018-10-02 RX ORDER — MIRTAZAPINE 15 MG/1
7.5 TABLET, FILM COATED ORAL NIGHTLY
COMMUNITY
End: 2019-03-21 | Stop reason: ALTCHOICE

## 2018-10-02 RX ADMIN — SODIUM CHLORIDE: 9 INJECTION, SOLUTION INTRAVENOUS at 22:46

## 2018-10-02 RX ADMIN — OXYCODONE HYDROCHLORIDE AND ACETAMINOPHEN 500 MG: 500 TABLET ORAL at 20:39

## 2018-10-02 RX ADMIN — VANCOMYCIN HYDROCHLORIDE 1000 MG: 1 INJECTION, SOLUTION INTRAVENOUS at 23:19

## 2018-10-02 RX ADMIN — AMLODIPINE BESYLATE 10 MG: 10 TABLET ORAL at 20:39

## 2018-10-02 RX ADMIN — CLINDAMYCIN PHOSPHATE 600 MG: 600 INJECTION, SOLUTION INTRAVENOUS at 22:41

## 2018-10-02 RX ADMIN — ACETAMINOPHEN 650 MG: 325 TABLET ORAL at 18:33

## 2018-10-02 RX ADMIN — SODIUM CHLORIDE: 9 INJECTION, SOLUTION INTRAVENOUS at 09:30

## 2018-10-02 RX ADMIN — VANCOMYCIN HYDROCHLORIDE 1000 MG: 1 INJECTION, SOLUTION INTRAVENOUS at 12:50

## 2018-10-02 RX ADMIN — HEPARIN SODIUM AND DEXTROSE 19.98 UNITS/KG/HR: 10000; 5 INJECTION INTRAVENOUS at 16:12

## 2018-10-02 RX ADMIN — AZTREONAM 2 G: 2 INJECTION, POWDER, LYOPHILIZED, FOR SOLUTION INTRAMUSCULAR; INTRAVENOUS at 20:54

## 2018-10-02 RX ADMIN — Medication 2 MG: at 01:32

## 2018-10-02 RX ADMIN — DULOXETINE HYDROCHLORIDE 30 MG: 30 CAPSULE, DELAYED RELEASE ORAL at 20:39

## 2018-10-02 RX ADMIN — MIRTAZAPINE 7.5 MG: 15 TABLET, FILM COATED ORAL at 20:39

## 2018-10-02 RX ADMIN — OXYCODONE HYDROCHLORIDE AND ACETAMINOPHEN 1 TABLET: 5; 325 TABLET ORAL at 12:49

## 2018-10-02 RX ADMIN — ATORVASTATIN CALCIUM 10 MG: 10 TABLET, FILM COATED ORAL at 12:49

## 2018-10-02 RX ADMIN — OXYCODONE HYDROCHLORIDE AND ACETAMINOPHEN 1 TABLET: 5; 325 TABLET ORAL at 22:46

## 2018-10-02 RX ADMIN — HEPARIN SODIUM 3260 UNITS: 1000 INJECTION, SOLUTION INTRAVENOUS; SUBCUTANEOUS at 16:12

## 2018-10-02 RX ADMIN — Medication 10 ML: at 12:51

## 2018-10-02 RX ADMIN — MULTIPLE VITAMINS W/ MINERALS TAB 1 TABLET: TAB at 20:39

## 2018-10-02 RX ADMIN — DIAZEPAM 2 MG: 2 TABLET ORAL at 20:45

## 2018-10-02 RX ADMIN — CLOPIDOGREL 75 MG: 75 TABLET, FILM COATED ORAL at 12:49

## 2018-10-02 ASSESSMENT — PAIN DESCRIPTION - DESCRIPTORS: DESCRIPTORS: ACHING

## 2018-10-02 ASSESSMENT — PAIN SCALES - GENERAL
PAINLEVEL_OUTOF10: 7
PAINLEVEL_OUTOF10: 4
PAINLEVEL_OUTOF10: 8
PAINLEVEL_OUTOF10: 4
PAINLEVEL_OUTOF10: 10

## 2018-10-02 ASSESSMENT — PAIN DESCRIPTION - LOCATION: LOCATION: LEG

## 2018-10-02 ASSESSMENT — PAIN DESCRIPTION - ORIENTATION: ORIENTATION: RIGHT

## 2018-10-02 NOTE — ED NOTES
PT resting in bed. NAD noted. RR even and unlabored. Medication for sleep requested.        Nelson Castaneda RN  10/02/18 2673

## 2018-10-02 NOTE — ED PROVIDER NOTES
[address] [facility]  ED  Emergency Department Encounter  Emergency Medicine Resident     Pt Name: Alison Kehr  MRN: [id number]  Armstrongflev 1975  Date of evaluation: 10/1/18  PCP:  Peggy Pinto MD    [address] [place] [address]       Chief Complaint   Patient presents with    Wound Infection       HISTORY OF PRESENT ILLNESS  (Location/Symptom, Timing/Onset, Context/Setting, Quality, Duration, Modifying Factors, Severity.)      Alison Kehr is a 37 y.o. female who presents with Increasing right lower extremity pain, chills, sweats, fevers. Patient also experiencing nausea. Patient status post balloon angioplasty  for peripheral arterial disease. Patient with leg ischemia history to the left lower extremity as well. She states that she noticed after her wound VAC placement which was done shortly after balloon angioplasty procedure, patient began experiencing color change and the toes on the right lower extremities. PAST MEDICAL / SURGICAL / SOCIAL / FAMILY HISTORY      has a past medical history of Abnormal Pap smear of cervix; Chronic back pain; Claudication ([address] Depression; Headache(784.0); Heart murmur; Hyperlipidemia; Hypertension; Migraine headache with aura; Obesity; Osteoarthritis; and PVD (peripheral vascular disease) ([address] has a past surgical history that includes Tubal ligation ();  section (); other surgical history (2017); other surgical history (Left, 2018); other surgical history (Left, 2018); vascular surgery (Left, 2018); pr reoperation, bypass graft (Left, 2018); and pr vein bypass graft,fem-pop (Left, 2018). Social History     Social History    Marital status: Single     Spouse name: N/A    Number of children: N/A    Years of education: N/A     Occupational History    Not on file.      Social History Main Topics    Smoking status: Never Smoker    Smokeless tobacco: Never Used    Alcohol use Yes      Comment: WINE 4 O
[facility]     Emergency Department     Faculty Attestation    I performed a history and physical examination of the patient and discussed management with the resident. I reviewed the residents note and agree with the documented findings including all diagnostic interpretations and plan of care. Any areas of disagreement are noted on the chart. I was personally present for the key portions of any procedures. I have documented in the chart those procedures where I was not present during the key portions. I have reviewed the emergency nurses triage note. I agree with the chief complaint, past medical history, past surgical history, allergies, medications, social and family history as documented unless otherwise noted below. Documentation of the HPI, Physical Exam and Medical Decision Making performed by dewayneibyue is based on my personal performance of the HPI, PE and MDM. For Physician Assistant/ Nurse Practitioner cases/documentation I have personally evaluated this patient and have completed at least one if not all key elements of the E/M (history, physical exam, and MDM). Additional findings are as noted. Primary Care Physician: Edison Craft MD    History: This is a 37 y.o. female who presents to the Emergency Department with complaint of leg pain. Right leg pain. History of ischemic limb, status post surgery at [facility]. Currently has a wound VAC in place. Worsening pain in the leg. No fevers. Previously seen Dr. Lacie Aase at beginning of year for left leg ischemia. Physical:     oral temperature is 97.5 °F (36.4 °C). Her blood pressure is 108/77 and her pulse is 112. Her respiration is 18 and oxygen saturation is 100%. 37 y.o. female acutely distressed, cardiac exam tachycardic no murmurs or gallops complications clear bilaterally, abdomen is soft nontender nondistended.   Right leg shows a wound VAC in place with the wound margins showing no
Notable for the following:     Lactic Acid, Whole Blood 2.4 (*)     All other components within normal limits   PROTIME-INR - Abnormal; Notable for the following:     Protime 20.6 (*)     All other components within normal limits   APTT - Abnormal; Notable for the following:     PTT 32.0 (*)     All other components within normal limits   CULTURE BLOOD #1   CULTURE BLOOD #1   URINE CULTURE   URINALYSIS WITH MICROSCOPIC   APTT   APTT   APTT   APTT       No results found. RECENT VITALS:     Temp: 97.5 °F (36.4 °C),  Pulse: 84, Resp: 18, BP: 109/68, SpO2: 100 %    This patient is a 37 y.o. Female with Sepsis, wound infection, leukocytosis, lactic acidosis. Comes in with chills, sweats, fevers. Status post balloon angioplasty on the September 20 for peripheral artery disease. Sepsis vs peripheral vascular disease. Admitted to [facility]. Pending bed transfer. OUTSTANDING TASKS / RECOMMENDATIONS:    1. Pending bed placement     FINAL IMPRESSION:     1. Septicemia ([facility]    2. Ischemic leg    3. LALITA (acute kidney injury) ([facility]    4.  Lactic acidosis        DISPOSITION:         DISPOSITION:  []  Discharge   []  Transfer -    [x]  Admission -  [facility]   []  Against Medical Advice   []  Eloped   FOLLOW-UP: Annie Carmichael MD  [address] [place] [address]: New Prescriptions    No medications on file           Catia Sorensen MD  Emergency Medicine Resident  [facility]       Catia Sorensen MD  10/02/18 0283

## 2018-10-02 NOTE — PROGRESS NOTES
Pharmacy Vancomycin Consult     Vancomycin Day: 2  Current Dosin mg x 1 dose    Temp max:  98.4    Recent Labs      10/01/18   1749  10/02/18   0558   BUN  20  18       Recent Labs      10/01/18   1749  10/02/18   0558   CREATININE  1.52*  0.85       Recent Labs      10/01/18   1749  10/02/18   0558   WBC  16.9*  10.5       No intake or output data in the 24 hours ending 10/02/18 1057    Ht Readings from Last 1 Encounters:   10/02/18 5' 5\" (1.651 m)        Wt Readings from Last 1 Encounters:   10/02/18 177 lb (80.3 kg)         Body mass index is 29.45 kg/m². Estimated Creatinine Clearance: 89 mL/min (based on SCr of 0.85 mg/dL). Assessment/Plan:  Serum creatinine improved from 1.52 to 0.85 yesterday to today. Starting vancomycin 1000 mg q12h at 1200 today. Pharmacy will continue to follow while on vancomycin as requested.   Francia CalvoD, BCPS  10/2/2018  11:13 AM
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
## 2018-10-02 NOTE — ED NOTES
Lab called with abnormal lab result. Resident notified.   Abnormal lab was PTT > [address]  10/02/18 3727

## 2018-10-02 NOTE — CARE COORDINATION
Case Management Initial Discharge Plan  Gale Singer Onel,             Met with:patient to discuss discharge plans. Information verified: address, contacts, phone number, , insurance Yes  PCP: Lalo Puga MD  Date of last visit: past 6 months    Insurance Provider: [address] [place] [address] Drive    Discharge Planning    Living Arrangements:  Family Members   Support Systems:  Family Members    Home has 1 stories  2 stairs to climb to get into front door, stairs to climb to reach second floor  Location of bedroom/bathroom in home main    Patient able to perform ADL's:Independent    Current Services (outpatient & in home) [facility]  DME equipment: w/c - has  DME provider:     Pharmacy: [facility] Medications:  No  Does patient want to participate in local refill/ meds to beds program?  No    Potential Assistance Needed:       Patient agreeable to home care: Yes  Freedom of choice provided:  yes    Prior SNF/Rehab Placement and Facility: [address] [place] [address] in 2018  Agreeable to SNF/Rehab: No  Nicholls of choice provided: no   Evaluation: n/a    Expected Discharge date:     Patient expects to be discharged to: Follow Up Appointment: Best Day/ Time:      Transportation provider: self  Transportation arrangements needed for discharge: Yes    Readmission Risk              Risk of Unplanned Readmission:        0               Does patient have a readmission risk score greater than 14?: No  If yes, follow-up appointment must be made within 7 days of discharge. Discharge Plan: home with [facility], [facility]. - has Roberto verified via phone with Armando Puri.  MARTIN on chart          Electronically signed by Ventura Thurman RN on 10/2/18 at 9:22 AM

## 2018-10-02 NOTE — PROGRESS NOTES
Pharmacy Note  Vancomycin Consult    Annmarie Gomez is a 37 y.o. female started on Vancomycin for sepsis secondary to left leg ischemia; consult received from Dr. Kedar Taylor to manage therapy. Also receiving the following antibiotics: Cefepime and Clindamycin. Patient Active Problem List   Diagnosis    Migraine headache with aura    HTN (hypertension)    Obesity    Menometrorrhagia    Intertriginous candidiasis    Chest pain    Blood per rectum    Depression    Claudication in peripheral vascular disease ([facility]    Occlusion of artery of leg ([facility]    Left popliteal artery occlusion (H[facility])       Allergies:  Asa [aspirin]; Lopid [gemfibrozil]; Nortriptyline; Pcn [penicillins]; Sulfa antibiotics; and Ibuprofen     Temp max: 97.5F    Recent Labs      10/01/18   1749   BUN  20       Recent Labs      10/01/18   1749   CREATININE  1.52*       Recent Labs      10/01/18   1749   WBC  16.9*       No intake or output data in the 24 hours ending 10/01/18 2005    Culture Date      Source                       Results  10/1/18                BCx                            Pending    Ht Readings from Last 1 Encounters:   10/01/18 5' 4.96\" (1.65 m)        Wt Readings from Last 1 Encounters:   10/01/18 180 lb (81.6 kg)         Body mass index is 29.99 kg/m². Estimated Creatinine Clearance: 50 mL/min (A) (based on SCr of 1.52 mg/dL (H)). Goal Trough Level: 15-20 mcg/mL    Assessment/Plan:  1) Will initiate vancomycin 1500 mg (18 mg/kg) IV once as patient is currently experiencing LALITA (baseline SCr 0.5-0.7). Will dose by levels until renal function improves. 2) The next vancomycin level will be ordered for 10/2/18 unless clinically indicated sooner. (Pharmacy will order)  3) Pharmacy will follow patient renal function, Vancomycin levels and doses with you during the course of therapy. Additional recommendations will appear in follow up notes. Thank you for the consult. Juanjose Ortiz, Pharm. D.   PGY2

## 2018-10-03 ENCOUNTER — APPOINTMENT (OUTPATIENT)
Dept: CARDIAC CATH/INVASIVE PROCEDURES | Age: 43
DRG: 206 | End: 2018-10-03
Payer: COMMERCIAL

## 2018-10-03 LAB
ABSOLUTE EOS #: 0.19 K/UL (ref 0–0.44)
ABSOLUTE IMMATURE GRANULOCYTE: <0.03 K/UL (ref 0–0.3)
ABSOLUTE LYMPH #: 2.21 K/UL (ref 1.1–3.7)
ABSOLUTE MONO #: 0.59 K/UL (ref 0.1–1.2)
ABSOLUTE RETIC #: 0.07 M/UL (ref 0.03–0.08)
ANION GAP SERPL CALCULATED.3IONS-SCNC: 13 MMOL/L (ref 9–17)
BASOPHILS # BLD: 1 % (ref 0–2)
BASOPHILS ABSOLUTE: 0.04 K/UL (ref 0–0.2)
BUN BLDV-MCNC: 11 MG/DL (ref 6–20)
BUN/CREAT BLD: ABNORMAL (ref 9–20)
CALCIUM SERPL-MCNC: 8.2 MG/DL (ref 8.6–10.4)
CHLORIDE BLD-SCNC: 107 MMOL/L (ref 98–107)
CHOLESTEROL/HDL RATIO: 3.4
CHOLESTEROL: 98 MG/DL
CO2: 21 MMOL/L (ref 20–31)
CREAT SERPL-MCNC: 0.71 MG/DL (ref 0.5–0.9)
CULTURE: NORMAL
DIFFERENTIAL TYPE: ABNORMAL
EOSINOPHILS RELATIVE PERCENT: 3 % (ref 1–4)
FERRITIN: 257 UG/L (ref 13–150)
FOLATE: 10.2 NG/ML
GFR AFRICAN AMERICAN: >60 ML/MIN
GFR NON-AFRICAN AMERICAN: >60 ML/MIN
GFR SERPL CREATININE-BSD FRML MDRD: ABNORMAL ML/MIN/{1.73_M2}
GFR SERPL CREATININE-BSD FRML MDRD: ABNORMAL ML/MIN/{1.73_M2}
GLUCOSE BLD-MCNC: 107 MG/DL (ref 70–99)
HCT VFR BLD CALC: 24.6 % (ref 36.3–47.1)
HDLC SERPL-MCNC: 29 MG/DL
HEMOGLOBIN: 7.8 G/DL (ref 11.9–15.1)
IMMATURE GRANULOCYTES: 0 %
IMMATURE RETIC FRACT: 15.8 % (ref 2.7–18.3)
INR BLD: 1.5
IRON SATURATION: 24 % (ref 20–55)
IRON: 48 UG/DL (ref 37–145)
LDL CHOLESTEROL: 42 MG/DL (ref 0–130)
LV EF: 74 %
LVEF MODALITY: NORMAL
LYMPHOCYTES # BLD: 38 % (ref 24–43)
Lab: NORMAL
MCH RBC QN AUTO: 29.3 PG (ref 25.2–33.5)
MCHC RBC AUTO-ENTMCNC: 31.7 G/DL (ref 28.4–34.8)
MCV RBC AUTO: 92.5 FL (ref 82.6–102.9)
MONOCYTES # BLD: 10 % (ref 3–12)
NRBC AUTOMATED: 0 PER 100 WBC
PARTIAL THROMBOPLASTIN TIME: 105.8 SEC (ref 20.5–30.5)
PARTIAL THROMBOPLASTIN TIME: 58.2 SEC (ref 20.5–30.5)
PARTIAL THROMBOPLASTIN TIME: 73.7 SEC (ref 20.5–30.5)
PDW BLD-RTO: 13.5 % (ref 11.8–14.4)
PLATELET # BLD: 409 K/UL (ref 138–453)
PLATELET ESTIMATE: ABNORMAL
PMV BLD AUTO: 9 FL (ref 8.1–13.5)
POTASSIUM SERPL-SCNC: 4 MMOL/L (ref 3.7–5.3)
PROTHROMBIN TIME: 15.6 SEC (ref 9–12)
RBC # BLD: 2.66 M/UL (ref 3.95–5.11)
RBC # BLD: ABNORMAL 10*6/UL
RETIC %: 2.7 % (ref 0.5–1.9)
RETIC HEMOGLOBIN: 32 PG (ref 28.2–35.7)
SEG NEUTROPHILS: 48 % (ref 36–65)
SEGMENTED NEUTROPHILS ABSOLUTE COUNT: 2.8 K/UL (ref 1.5–8.1)
SODIUM BLD-SCNC: 141 MMOL/L (ref 135–144)
SPECIMEN DESCRIPTION: NORMAL
STATUS: NORMAL
TOTAL IRON BINDING CAPACITY: 203 UG/DL (ref 250–450)
TRIGL SERPL-MCNC: 137 MG/DL
UNSATURATED IRON BINDING CAPACITY: 155 UG/DL (ref 112–347)
VANCOMYCIN TROUGH DATE LAST DOSE: ABNORMAL
VANCOMYCIN TROUGH DOSE AMOUNT: ABNORMAL
VANCOMYCIN TROUGH TIME LAST DOSE: ABNORMAL
VANCOMYCIN TROUGH: 8.6 UG/ML (ref 10–20)
VITAMIN B-12: 961 PG/ML (ref 232–1245)
VLDLC SERPL CALC-MCNC: ABNORMAL MG/DL (ref 1–30)
WBC # BLD: 5.9 K/UL (ref 3.5–11.3)
WBC # BLD: ABNORMAL 10*3/UL

## 2018-10-03 PROCEDURE — 75625 CONTRAST EXAM ABDOMINL AORTA: CPT | Performed by: SURGERY

## 2018-10-03 PROCEDURE — 75774 ARTERY X-RAY EACH VESSEL: CPT

## 2018-10-03 PROCEDURE — 85025 COMPLETE CBC W/AUTO DIFF WBC: CPT

## 2018-10-03 PROCEDURE — B41DYZZ FLUOROSCOPY OF AORTA AND BILATERAL LOWER EXTREMITY ARTERIES USING OTHER CONTRAST: ICD-10-PCS | Performed by: SURGERY

## 2018-10-03 PROCEDURE — 75625 CONTRAST EXAM ABDOMINL AORTA: CPT

## 2018-10-03 PROCEDURE — 2500000003 HC RX 250 WO HCPCS: Performed by: INTERNAL MEDICINE

## 2018-10-03 PROCEDURE — 6370000000 HC RX 637 (ALT 250 FOR IP): Performed by: INTERNAL MEDICINE

## 2018-10-03 PROCEDURE — 83550 IRON BINDING TEST: CPT

## 2018-10-03 PROCEDURE — C1887 CATHETER, GUIDING: HCPCS

## 2018-10-03 PROCEDURE — 82607 VITAMIN B-12: CPT

## 2018-10-03 PROCEDURE — 6360000002 HC RX W HCPCS

## 2018-10-03 PROCEDURE — 93306 TTE W/DOPPLER COMPLETE: CPT

## 2018-10-03 PROCEDURE — 83540 ASSAY OF IRON: CPT

## 2018-10-03 PROCEDURE — 75716 ARTERY X-RAYS ARMS/LEGS: CPT

## 2018-10-03 PROCEDURE — 80202 ASSAY OF VANCOMYCIN: CPT

## 2018-10-03 PROCEDURE — 82728 ASSAY OF FERRITIN: CPT

## 2018-10-03 PROCEDURE — 99222 1ST HOSP IP/OBS MODERATE 55: CPT | Performed by: NURSE PRACTITIONER

## 2018-10-03 PROCEDURE — 36248 INS CATH ABD/L-EXT ART ADDL: CPT

## 2018-10-03 PROCEDURE — 6360000004 HC RX CONTRAST MEDICATION

## 2018-10-03 PROCEDURE — 6370000000 HC RX 637 (ALT 250 FOR IP): Performed by: SURGERY

## 2018-10-03 PROCEDURE — 2W0LX6Z CHANGE PRESSURE DRESSING ON RIGHT LOWER EXTREMITY: ICD-10-PCS | Performed by: SURGERY

## 2018-10-03 PROCEDURE — 80061 LIPID PANEL: CPT

## 2018-10-03 PROCEDURE — 1200000000 HC SEMI PRIVATE

## 2018-10-03 PROCEDURE — 6360000002 HC RX W HCPCS: Performed by: STUDENT IN AN ORGANIZED HEALTH CARE EDUCATION/TRAINING PROGRAM

## 2018-10-03 PROCEDURE — 80048 BASIC METABOLIC PNL TOTAL CA: CPT

## 2018-10-03 PROCEDURE — 85045 AUTOMATED RETICULOCYTE COUNT: CPT

## 2018-10-03 PROCEDURE — 99232 SBSQ HOSP IP/OBS MODERATE 35: CPT | Performed by: INTERNAL MEDICINE

## 2018-10-03 PROCEDURE — C1894 INTRO/SHEATH, NON-LASER: HCPCS

## 2018-10-03 PROCEDURE — 6370000000 HC RX 637 (ALT 250 FOR IP): Performed by: NURSE PRACTITIONER

## 2018-10-03 PROCEDURE — 85610 PROTHROMBIN TIME: CPT

## 2018-10-03 PROCEDURE — 36247 INS CATH ABD/L-EXT ART 3RD: CPT

## 2018-10-03 PROCEDURE — 85730 THROMBOPLASTIN TIME PARTIAL: CPT

## 2018-10-03 PROCEDURE — 2580000003 HC RX 258: Performed by: INTERNAL MEDICINE

## 2018-10-03 PROCEDURE — C1769 GUIDE WIRE: HCPCS

## 2018-10-03 PROCEDURE — 36415 COLL VENOUS BLD VENIPUNCTURE: CPT

## 2018-10-03 PROCEDURE — C1760 CLOSURE DEV, VASC: HCPCS

## 2018-10-03 PROCEDURE — 99254 IP/OBS CNSLTJ NEW/EST MOD 60: CPT | Performed by: INTERNAL MEDICINE

## 2018-10-03 PROCEDURE — 75774 ARTERY X-RAY EACH VESSEL: CPT | Performed by: SURGERY

## 2018-10-03 PROCEDURE — 2580000003 HC RX 258: Performed by: NURSE PRACTITIONER

## 2018-10-03 PROCEDURE — 2709999900 HC NON-CHARGEABLE SUPPLY

## 2018-10-03 PROCEDURE — 82746 ASSAY OF FOLIC ACID SERUM: CPT

## 2018-10-03 RX ORDER — CHLORHEXIDINE GLUCONATE 4 G/100ML
SOLUTION TOPICAL DAILY
Status: CANCELLED | OUTPATIENT
Start: 2018-10-03

## 2018-10-03 RX ADMIN — CLOPIDOGREL 75 MG: 75 TABLET, FILM COATED ORAL at 12:35

## 2018-10-03 RX ADMIN — Medication 10 ML: at 12:36

## 2018-10-03 RX ADMIN — OXYCODONE HYDROCHLORIDE AND ACETAMINOPHEN 1 TABLET: 5; 325 TABLET ORAL at 06:10

## 2018-10-03 RX ADMIN — DOCUSATE SODIUM 100 MG: 100 CAPSULE, LIQUID FILLED ORAL at 20:42

## 2018-10-03 RX ADMIN — DULOXETINE HYDROCHLORIDE 30 MG: 30 CAPSULE, DELAYED RELEASE ORAL at 20:42

## 2018-10-03 RX ADMIN — AMLODIPINE BESYLATE 10 MG: 10 TABLET ORAL at 20:42

## 2018-10-03 RX ADMIN — DOCUSATE SODIUM 100 MG: 100 CAPSULE, LIQUID FILLED ORAL at 12:35

## 2018-10-03 RX ADMIN — MULTIPLE VITAMINS W/ MINERALS TAB 1 TABLET: TAB at 12:34

## 2018-10-03 RX ADMIN — OXYCODONE HYDROCHLORIDE AND ACETAMINOPHEN 500 MG: 500 TABLET ORAL at 12:34

## 2018-10-03 RX ADMIN — HEPARIN SODIUM 3260 UNITS: 1000 INJECTION, SOLUTION INTRAVENOUS; SUBCUTANEOUS at 13:40

## 2018-10-03 RX ADMIN — DULOXETINE HYDROCHLORIDE 30 MG: 30 CAPSULE, DELAYED RELEASE ORAL at 12:34

## 2018-10-03 RX ADMIN — MIRTAZAPINE 7.5 MG: 15 TABLET, FILM COATED ORAL at 20:41

## 2018-10-03 RX ADMIN — OXYCODONE HYDROCHLORIDE AND ACETAMINOPHEN 1 TABLET: 5; 325 TABLET ORAL at 20:42

## 2018-10-03 RX ADMIN — CLINDAMYCIN PHOSPHATE 600 MG: 600 INJECTION, SOLUTION INTRAVENOUS at 03:27

## 2018-10-03 RX ADMIN — DESMOPRESSIN ACETATE 40 MG: 0.2 TABLET ORAL at 12:35

## 2018-10-03 RX ADMIN — AZTREONAM 2 G: 2 INJECTION, POWDER, LYOPHILIZED, FOR SOLUTION INTRAMUSCULAR; INTRAVENOUS at 03:28

## 2018-10-03 RX ADMIN — OXYCODONE HYDROCHLORIDE AND ACETAMINOPHEN 1 TABLET: 5; 325 TABLET ORAL at 15:07

## 2018-10-03 ASSESSMENT — PAIN DESCRIPTION - PAIN TYPE
TYPE: ACUTE PAIN
TYPE: ACUTE PAIN

## 2018-10-03 ASSESSMENT — PAIN SCALES - GENERAL
PAINLEVEL_OUTOF10: 7
PAINLEVEL_OUTOF10: 10
PAINLEVEL_OUTOF10: 10
PAINLEVEL_OUTOF10: 9
PAINLEVEL_OUTOF10: 8

## 2018-10-03 ASSESSMENT — PAIN DESCRIPTION - ORIENTATION
ORIENTATION: RIGHT;LOWER
ORIENTATION: RIGHT;LOWER

## 2018-10-03 ASSESSMENT — PAIN DESCRIPTION - LOCATION
LOCATION: LEG
LOCATION: LEG

## 2018-10-03 ASSESSMENT — PAIN DESCRIPTION - DESCRIPTORS
DESCRIPTORS: ACHING;DISCOMFORT
DESCRIPTORS: ACHING;DISCOMFORT

## 2018-10-03 ASSESSMENT — PAIN DESCRIPTION - FREQUENCY
FREQUENCY: CONTINUOUS
FREQUENCY: INTERMITTENT

## 2018-10-03 NOTE — PROGRESS NOTES
Admitted to pcc room #2 per stretcher. Bp monitor applied. All pedal pulses per doppler and sites marked. Right calf remains with wound vac. Right toes black and foot very painful to touch. Iv's infusing as flowsheet indicates. Consent signed. All procedures explained prior to implementation. Ready for procedure.

## 2018-10-03 NOTE — PLAN OF CARE
Shraddha Crockett 19    Second Visit Note  For more detailed information please refer to the progress note of the day      10/3/2018    5:31 PM    Name:   Andrew Washington  MRN:     [id number]     Acct:      [de-identified]   Room:   3019/3019-01   Day:  2  Admit Date:  10/1/2018  5:25 PM    PCP:   Angelika Silver MD  Code Status:  Full Code        Pt vitals were reviewed   New labs were reviewed   Patient was seen    Updated plan :     1. eliquis covered by her pharmacy and insurance  2.  Monitor Refugio Meléndez MD  10/3/2018  5:31 PM
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
## 2018-10-04 LAB
ABSOLUTE EOS #: 0.26 K/UL (ref 0–0.44)
ABSOLUTE EOS #: 0.29 K/UL (ref 0–0.44)
ABSOLUTE IMMATURE GRANULOCYTE: <0.03 K/UL (ref 0–0.3)
ABSOLUTE IMMATURE GRANULOCYTE: <0.03 K/UL (ref 0–0.3)
ABSOLUTE LYMPH #: 1.85 K/UL (ref 1.1–3.7)
ABSOLUTE LYMPH #: 2.06 K/UL (ref 1.1–3.7)
ABSOLUTE MONO #: 0.46 K/UL (ref 0.1–1.2)
ABSOLUTE MONO #: 0.47 K/UL (ref 0.1–1.2)
ABSOLUTE RETIC #: 0.08 M/UL (ref 0.03–0.08)
ANION GAP SERPL CALCULATED.3IONS-SCNC: 11 MMOL/L (ref 9–17)
BASOPHILS # BLD: 0 % (ref 0–2)
BASOPHILS # BLD: 1 % (ref 0–2)
BASOPHILS ABSOLUTE: 0.04 K/UL (ref 0–0.2)
BASOPHILS ABSOLUTE: <0.03 K/UL (ref 0–0.2)
BUN BLDV-MCNC: 6 MG/DL (ref 6–20)
BUN/CREAT BLD: ABNORMAL (ref 9–20)
CALCIUM SERPL-MCNC: 8.2 MG/DL (ref 8.6–10.4)
CHLORIDE BLD-SCNC: 108 MMOL/L (ref 98–107)
CO2: 18 MMOL/L (ref 20–31)
CREAT SERPL-MCNC: 0.57 MG/DL (ref 0.5–0.9)
DIFFERENTIAL TYPE: ABNORMAL
DIFFERENTIAL TYPE: ABNORMAL
EOSINOPHILS RELATIVE PERCENT: 4 % (ref 1–4)
EOSINOPHILS RELATIVE PERCENT: 4 % (ref 1–4)
GFR AFRICAN AMERICAN: >60 ML/MIN
GFR NON-AFRICAN AMERICAN: >60 ML/MIN
GFR SERPL CREATININE-BSD FRML MDRD: ABNORMAL ML/MIN/{1.73_M2}
GFR SERPL CREATININE-BSD FRML MDRD: ABNORMAL ML/MIN/{1.73_M2}
GLUCOSE BLD-MCNC: 95 MG/DL (ref 70–99)
HAPTOGLOBIN: 180 MG/DL (ref 30–200)
HCT VFR BLD CALC: 23.6 % (ref 36.3–47.1)
HCT VFR BLD CALC: 24.8 % (ref 36.3–47.1)
HEMOGLOBIN: 7.5 G/DL (ref 11.9–15.1)
HEMOGLOBIN: 7.6 G/DL (ref 11.9–15.1)
IMMATURE GRANULOCYTES: 0 %
IMMATURE GRANULOCYTES: 0 %
IMMATURE RETIC FRACT: 19.7 % (ref 2.7–18.3)
LACTATE DEHYDROGENASE: 144 U/L (ref 135–214)
LYMPHOCYTES # BLD: 30 % (ref 24–43)
LYMPHOCYTES # BLD: 31 % (ref 24–43)
MCH RBC QN AUTO: 28.7 PG (ref 25.2–33.5)
MCH RBC QN AUTO: 29.3 PG (ref 25.2–33.5)
MCHC RBC AUTO-ENTMCNC: 30.2 G/DL (ref 28.4–34.8)
MCHC RBC AUTO-ENTMCNC: 32.2 G/DL (ref 28.4–34.8)
MCV RBC AUTO: 91.1 FL (ref 82.6–102.9)
MCV RBC AUTO: 95 FL (ref 82.6–102.9)
MONOCYTES # BLD: 7 % (ref 3–12)
MONOCYTES # BLD: 8 % (ref 3–12)
NRBC AUTOMATED: 0 PER 100 WBC
NRBC AUTOMATED: 0 PER 100 WBC
PARTIAL THROMBOPLASTIN TIME: 44.9 SEC (ref 20.5–30.5)
PARTIAL THROMBOPLASTIN TIME: 51.9 SEC (ref 20.5–30.5)
PARTIAL THROMBOPLASTIN TIME: 59.2 SEC (ref 20.5–30.5)
PARTIAL THROMBOPLASTIN TIME: 66.3 SEC (ref 20.5–30.5)
PDW BLD-RTO: 13.3 % (ref 11.8–14.4)
PDW BLD-RTO: 13.4 % (ref 11.8–14.4)
PLATELET # BLD: 344 K/UL (ref 138–453)
PLATELET # BLD: 347 K/UL (ref 138–453)
PLATELET ESTIMATE: ABNORMAL
PLATELET ESTIMATE: ABNORMAL
PMV BLD AUTO: 9.4 FL (ref 8.1–13.5)
PMV BLD AUTO: 9.4 FL (ref 8.1–13.5)
POTASSIUM SERPL-SCNC: 3.9 MMOL/L (ref 3.7–5.3)
RBC # BLD: 2.59 M/UL (ref 3.95–5.11)
RBC # BLD: 2.61 M/UL (ref 3.95–5.11)
RBC # BLD: ABNORMAL 10*6/UL
RBC # BLD: ABNORMAL 10*6/UL
RETIC %: 2.9 % (ref 0.5–1.9)
RETIC HEMOGLOBIN: 32.4 PG (ref 28.2–35.7)
SEG NEUTROPHILS: 57 % (ref 36–65)
SEG NEUTROPHILS: 58 % (ref 36–65)
SEGMENTED NEUTROPHILS ABSOLUTE COUNT: 3.58 K/UL (ref 1.5–8.1)
SEGMENTED NEUTROPHILS ABSOLUTE COUNT: 3.73 K/UL (ref 1.5–8.1)
SODIUM BLD-SCNC: 137 MMOL/L (ref 135–144)
WBC # BLD: 6.2 K/UL (ref 3.5–11.3)
WBC # BLD: 6.6 K/UL (ref 3.5–11.3)
WBC # BLD: ABNORMAL 10*3/UL
WBC # BLD: ABNORMAL 10*3/UL

## 2018-10-04 PROCEDURE — 83615 LACTATE (LD) (LDH) ENZYME: CPT

## 2018-10-04 PROCEDURE — 2580000003 HC RX 258: Performed by: INTERNAL MEDICINE

## 2018-10-04 PROCEDURE — 80048 BASIC METABOLIC PNL TOTAL CA: CPT

## 2018-10-04 PROCEDURE — 6360000002 HC RX W HCPCS: Performed by: STUDENT IN AN ORGANIZED HEALTH CARE EDUCATION/TRAINING PROGRAM

## 2018-10-04 PROCEDURE — 6370000000 HC RX 637 (ALT 250 FOR IP): Performed by: SURGERY

## 2018-10-04 PROCEDURE — 85305 CLOT INHIBIT PROT S TOTAL: CPT

## 2018-10-04 PROCEDURE — 86146 BETA-2 GLYCOPROTEIN ANTIBODY: CPT

## 2018-10-04 PROCEDURE — 84165 PROTEIN E-PHORESIS SERUM: CPT

## 2018-10-04 PROCEDURE — 99254 IP/OBS CNSLTJ NEW/EST MOD 60: CPT | Performed by: INTERNAL MEDICINE

## 2018-10-04 PROCEDURE — 85025 COMPLETE CBC W/AUTO DIFF WBC: CPT

## 2018-10-04 PROCEDURE — 85306 CLOT INHIBIT PROT S FREE: CPT

## 2018-10-04 PROCEDURE — 36415 COLL VENOUS BLD VENIPUNCTURE: CPT

## 2018-10-04 PROCEDURE — 85303 CLOT INHIBIT PROT C ACTIVITY: CPT

## 2018-10-04 PROCEDURE — G0328 FECAL BLOOD SCRN IMMUNOASSAY: HCPCS

## 2018-10-04 PROCEDURE — 2580000003 HC RX 258: Performed by: NURSE PRACTITIONER

## 2018-10-04 PROCEDURE — 86147 CARDIOLIPIN ANTIBODY EA IG: CPT

## 2018-10-04 PROCEDURE — 85730 THROMBOPLASTIN TIME PARTIAL: CPT

## 2018-10-04 PROCEDURE — 85045 AUTOMATED RETICULOCYTE COUNT: CPT

## 2018-10-04 PROCEDURE — 6370000000 HC RX 637 (ALT 250 FOR IP): Performed by: INTERNAL MEDICINE

## 2018-10-04 PROCEDURE — 6370000000 HC RX 637 (ALT 250 FOR IP): Performed by: NURSE PRACTITIONER

## 2018-10-04 PROCEDURE — 84155 ASSAY OF PROTEIN SERUM: CPT

## 2018-10-04 PROCEDURE — 83010 ASSAY OF HAPTOGLOBIN QUANT: CPT

## 2018-10-04 PROCEDURE — 1200000000 HC SEMI PRIVATE

## 2018-10-04 PROCEDURE — 99232 SBSQ HOSP IP/OBS MODERATE 35: CPT | Performed by: INTERNAL MEDICINE

## 2018-10-04 PROCEDURE — 81241 F5 GENE: CPT

## 2018-10-04 PROCEDURE — 85302 CLOT INHIBIT PROT C ANTIGEN: CPT

## 2018-10-04 PROCEDURE — 99233 SBSQ HOSP IP/OBS HIGH 50: CPT | Performed by: INTERNAL MEDICINE

## 2018-10-04 PROCEDURE — 81240 F2 GENE: CPT

## 2018-10-04 RX ADMIN — OXYCODONE HYDROCHLORIDE AND ACETAMINOPHEN 1 TABLET: 5; 325 TABLET ORAL at 06:35

## 2018-10-04 RX ADMIN — OXYCODONE HYDROCHLORIDE AND ACETAMINOPHEN 500 MG: 500 TABLET ORAL at 10:15

## 2018-10-04 RX ADMIN — SODIUM CHLORIDE: 9 INJECTION, SOLUTION INTRAVENOUS at 03:24

## 2018-10-04 RX ADMIN — AMLODIPINE BESYLATE 10 MG: 10 TABLET ORAL at 20:37

## 2018-10-04 RX ADMIN — MIRTAZAPINE 7.5 MG: 15 TABLET, FILM COATED ORAL at 23:12

## 2018-10-04 RX ADMIN — DULOXETINE HYDROCHLORIDE 30 MG: 30 CAPSULE, DELAYED RELEASE ORAL at 20:40

## 2018-10-04 RX ADMIN — OXYCODONE HYDROCHLORIDE AND ACETAMINOPHEN 1 TABLET: 5; 325 TABLET ORAL at 18:48

## 2018-10-04 RX ADMIN — DESMOPRESSIN ACETATE 40 MG: 0.2 TABLET ORAL at 10:15

## 2018-10-04 RX ADMIN — HEPARIN SODIUM AND DEXTROSE 19.9 UNITS/KG/HR: 10000; 5 INJECTION INTRAVENOUS at 20:29

## 2018-10-04 RX ADMIN — Medication 10 ML: at 10:15

## 2018-10-04 RX ADMIN — OXYCODONE HYDROCHLORIDE AND ACETAMINOPHEN 1 TABLET: 5; 325 TABLET ORAL at 23:12

## 2018-10-04 RX ADMIN — MULTIPLE VITAMINS W/ MINERALS TAB 1 TABLET: TAB at 10:15

## 2018-10-04 RX ADMIN — OXYCODONE HYDROCHLORIDE AND ACETAMINOPHEN 1 TABLET: 5; 325 TABLET ORAL at 11:50

## 2018-10-04 RX ADMIN — DULOXETINE HYDROCHLORIDE 30 MG: 30 CAPSULE, DELAYED RELEASE ORAL at 10:15

## 2018-10-04 RX ADMIN — CLOPIDOGREL 75 MG: 75 TABLET, FILM COATED ORAL at 10:15

## 2018-10-04 RX ADMIN — OXYCODONE HYDROCHLORIDE AND ACETAMINOPHEN 1 TABLET: 5; 325 TABLET ORAL at 00:39

## 2018-10-04 RX ADMIN — DOCUSATE SODIUM 100 MG: 100 CAPSULE, LIQUID FILLED ORAL at 20:37

## 2018-10-04 RX ADMIN — HEPARIN SODIUM AND DEXTROSE 17.47 UNITS/KG/HR: 10000; 5 INJECTION INTRAVENOUS at 03:07

## 2018-10-04 ASSESSMENT — PAIN SCALES - GENERAL
PAINLEVEL_OUTOF10: 6
PAINLEVEL_OUTOF10: 7
PAINLEVEL_OUTOF10: 8
PAINLEVEL_OUTOF10: 10
PAINLEVEL_OUTOF10: 0
PAINLEVEL_OUTOF10: 6
PAINLEVEL_OUTOF10: 8
PAINLEVEL_OUTOF10: 8

## 2018-10-04 ASSESSMENT — PAIN DESCRIPTION - FREQUENCY: FREQUENCY: CONTINUOUS

## 2018-10-04 ASSESSMENT — PAIN DESCRIPTION - DESCRIPTORS: DESCRIPTORS: ACHING

## 2018-10-04 ASSESSMENT — PAIN DESCRIPTION - ORIENTATION: ORIENTATION: RIGHT;LOWER

## 2018-10-04 ASSESSMENT — PAIN DESCRIPTION - LOCATION: LOCATION: LEG

## 2018-10-04 ASSESSMENT — PAIN DESCRIPTION - PAIN TYPE: TYPE: ACUTE PAIN

## 2018-10-04 NOTE — OP NOTE
successfully percutaneously closed. Groin was hemostatic and a Band-Aid was applied. After completion of the angiogram, I did remove the wound VAC on the right  leg. The muscle was pink and viable and measured 17 x 5 cm. the  wound VAC was then applied and placed to suction without difficulty. The patient tolerated procedure well. RADIOGRAPHICAL FINDINGS:  The patient has patent bilateral single renal  arteries. Abdominal aorta, bilateral common iliac, external iliac,  internal iliac are widely patent. The bilateral common femoral, profunda,  and SFA are widely patent. The right popliteal artery is patent but no  evidence of stenosis. There is occlusion of the anterior tibial artery  shortly after its takeoff and heavily diseased. The posterior tibial  artery is main runoff to the foot. The peroneal goes down to the ankle and  give collaterals to plantar branches and selective catheterization of the  anterior tibial artery revealed complete occlusion of the anterior tibial  artery. The left popliteal artery is heavily diseased at the P3 segment. The femoral to peroneal bypass does not light up and appears to be  occluded. The peroneal artery does come back at the midcalf level and  gives very faint collaterals down to the foot; however, due to the diluted  contrast, could not see good image of the circulation in the foot. PLAN:  The patient likely had another embolic event to her leg. She did  seem to have a good response after lytic therapy to her right leg. No intervention is  needed. Her left leg bypass is also occluded, could have  been related to the embolic event or being tachycardic and hypotensive. She is asymptomatic from the occluded bypass, so no intervention  Is needed. For now, we will continue with the wound VAC therapy  to her lateral fasciotomy site. Her toes are mostly dry gangrene. There  is some area of blistering at the forefoot.   We will continue local

## 2018-10-04 NOTE — PROGRESS NOTES
chloride flush  10 mL Intravenous 2 times per day    atorvastatin  40 mg Oral Daily    DULoxetine  30 mg Oral Q12H    mirtazapine  7.5 mg Oral Nightly    therapeutic multivitamin-minerals  1 tablet Oral Daily    vitamin C  500 mg Oral Daily       Social History:     Social History     Social History    Marital status: Single     Spouse name: N/A    Number of children: N/A    Years of education: N/A     Occupational History    Not on file. Social History Main Topics    Smoking status: Never Smoker    Smokeless tobacco: Never Used    Alcohol use Yes      Comment: WINE 4 O 6 GLASSES  A YEAR    Drug use: No    Sexual activity: Not Currently     Other Topics Concern    Not on file     Social History Narrative    No narrative on file       Family History:     Family History   Problem Relation Age of Onset    Diabetes Mother     High Blood Pressure Mother     Heart Attack Mother     Heart Disease Mother     Kidney Disease Mother     High Blood Pressure Father     Heart Disease Father     Heart Attack Father     Diabetes Sister     High Blood Pressure Sister     Diabetes Sister     High Blood Pressure Sister         Allergies:   Min Helen [aspirin]; Lopid [gemfibrozil]; Nortriptyline; Pcn [penicillins]; Sulfa antibiotics; and Ibuprofen     Review of Systems:   Constitutional: No fevers or chills. No systemic complaints  Head: No headaches  Cardiovascular: No chest pain or palpitations. No shortness of breath. No ORONA  Lung: No shortness of breath or cough. No sputum production  Abdomen: No nausea, vomiting, diarrhea, or abdominal pain. Georgana Boop No cramps. Genitourinary: No increased urinary frequency, or dysuria. No hematuria. No suprapubic or CVA pain  Musculoskeletal: No muscle aches or pains. No joint effusions, swelling or deformities. Neurologic: No headache, weakness, numbness, or tingling. Psychiatric: No depression.      Physical Examination :     Patient Vitals for the past 8 hrs:   BP Temp Temp for allowing us to participate in the care of this patient. Please call with questions. Sarah Young MD  Pager: [phone] - Office: [phone]    ATTESTATION:    I have discussed the case, including pertinent history and exam findings with the APRN. I have seen and examined the patient and the key elements of the encounter have been performed by me. I have reviewed the laboratory data, other diagnostic studies and discussed them with the APRN. I have updated the medical record where necessary. I agree with the assessment, plan and orders as documented by the APRN  .     Skylar Clark MD.

## 2018-10-04 NOTE — CONSULTS
discharge, double vision, or eye pain   HEENT: negative for sore mouth, sore throat, hoarseness and voice change   Respiratory: negative for cough , sputum, dyspnea, wheezing, hemoptysis, chest pain   Cardiovascular: negative for chest pain, dyspnea, palpitations, orthopnea, PND   Gastrointestinal: negative for nausea, vomiting, diarrhea, constipation, abdominal pain, Dysphagia, hematemesis and hematochezia   Genitourinary: negative for frequency, dysuria, nocturia, urinary incontinence, and hematuria   Integument: negative for rash, skin lesions, bruises. Hematologic/Lymphatic: negative for easy bruising, bleeding, lymphadenopathy, or petechiae   Endocrine: negative for heat or cold intolerance,weight changes, change in bowel habits and hair loss   Musculoskeletal: negative for myalgias, arthralgias, pain, joint swelling,and bone pain   Neurological: negative for headaches, dizziness, seizures, weakness.  Positive paresthesias to the right lower extremity    PHYSICAL EXAM:      BP (!) 115/55   Pulse 85   Temp 99.2 °F (37.3 °C) (Oral)   Resp 17   Ht 5' 5\" (1.651 m)   Wt 174 lb 11.2 oz (79.2 kg)   SpO2 98%   BMI 29.07 kg/m²    Temp (24hrs), Av.3 °F (36.8 °C), Min:97.9 °F (36.6 °C), Max:99.2 °F (37.3 °C)    General appearance - well appearing, no in pain or distress   Mental status - alert and cooperative   Eyes - pupils equal and reactive, extraocular eye movements intact   Ears - bilateral TM's and external ear canals normal   Mouth - mucous membranes moist, pharynx normal without lesions   Neck - supple, no significant adenopathy   Lymphatics - no palpable lymphadenopathy, no hepatosplenomegaly   Chest - clear to auscultation, no wheezes, rales or rhonchi, symmetric air entry   Heart - normal rate, regular rhythm, normal S1, S2, no murmurs  Abdomen - soft, nontender, nondistended, no masses or organomegaly   Neurological - alert, oriented, normal speech, no focal findings or movement disorder noted

## 2018-10-04 NOTE — CARE COORDINATION
Transition planning: plan remains for pt to return home with formerly [facility], she has a wound vac for home.

## 2018-10-04 NOTE — PROGRESS NOTES
last 72 hours. I/O (24Hr):     Intake/Output Summary (Last 24 hours) at 10/04/18 1712  Last data filed at 10/04/18 0636   Gross per 24 hour   Intake             2898 ml   Output             1000 ml   Net             1898 ml       Labs:    Hematology:  Recent Labs      10/01/18   1749  10/02/18   0558  10/03/18   0633  10/04/18   0616   WBC  16.9*  10.5  5.9  6.2   RBC  3.70*  2.92*  2.66*  2.61*   HGB  10.6*  8.6*  7.8*  7.5*   HCT  33.0*  26.2*  24.6*  24.8*   MCV  89.2  89.7  92.5  95.0   MCH  28.6  29.5  29.3  28.7   MCHC  32.1  32.8  31.7  30.2   RDW  13.2  13.4  13.5  13.4   PLT  764*  507*  409  347   MPV  9.1  9.1  9.0  9.4   INR  2.0   --   1.5   --      Chemistry:  Recent Labs      10/01/18   1749  10/01/18   2001  10/02/18   0558  10/02/18   0807  10/03/18   0633  10/04/18   0616   NA  138   --   138   --   141  137   K  4.8   --   4.4   --   4.0  3.9   CL  97*   --   104   --   107  108*   CO2  20   --   22   --   21  18*   GLUCOSE  171*   --   107*   --   107*  95   BUN  20   --   18   --   11  6   CREATININE  1.52*   --   0.85   --   0.71  0.57   ANIONGAP  21*   --   12   --   13  11   LABGLOM  37*   --   >60   --   >60  >60   GFRAA  45*   --   >60   --   >60  >60   CALCIUM  9.9   --   8.7   --   8.2*  8.2*   LACTACIDWB  5.5*  2.4*   --   0.9   --    --      Recent Labs      10/01/18   1749  10/03/18   0633   PROT  8.0   --    LABALBU  4.3   --    AST  20   --    ALT  23   --    ALKPHOS  124*   --    BILITOT  0.44   --    CHOL   --   98   HDL   --   29*   LDLCHOLESTEROL   --   42   CHOLHDLRATIO   --   3.4   TRIG   --   137   VLDL   --   NOT REPORTED         Lab Results   Component Value Date/Time    SPECIAL NOT REPORTED 10/02/2018 07:19 PM     Lab Results   Component Value Date/Time    CULTURE NO SIGNIFICANT GROWTH 10/02/2018 07:19 PM       Lab Results   Component Value Date    POCPH 7.503 01/22/2018    PHART 7.440 01/16/2018    POCPCO2 34.4 01/22/2018    VPD1XGT 33.8 01/16/2018    POCPO2 109.9

## 2018-10-04 NOTE — PROGRESS NOTES
Vascular Progress Note:    -Sutures removed at bedside.  -Recommend continued local wound care  -Recommend NOAC outpatient for life.   -Ok to DC from Vascular Standpoint  -Patient to follow up outpatient with   -Please contact Vascular surgery with any questions    Rey Burrell DO  Orthopedic Surgery Resident, PGY-1  [facility]

## 2018-10-04 NOTE — CONSULTS
OTHER SURGICAL HISTORY Left 01/16/2018    fibulectomy with intra op angiography of leg    [place] REOPERATION, BYPASS GRAFT Left 1/16/2018    RE-EXPLORATION OF LEFT LEG, THROMBO-EMBOLECTOMY OF FEMORAL-PERONEAL BYPASS, WITH INTRA OPERATIVE  ANGIOGRAMS AND INFUSION OF nITRO GLYCERIN performed by Reyna Calderon MD at [address] Left 1/16/2018    LEFT LEG FEMORAL TO PERONEAL BYPASS USING VEIN, (DONAR SITE LEFT SAPHNOUS) LEFT FIBULECTOMY, WITH INTRA OP ANGIOGRAPHY OF LEFT LEG performed by Reyna Calderon MD at [facility] VASCULAR SURGERY Left 01/16/2018    re-exploration femerol-perioneal vein bypass/intra op angiogram       ALLERGIES:  Allergies   Allergen Reactions    Asa [Aspirin] Swelling    Lopid [Gemfibrozil] Itching and Swelling    Nortriptyline Itching and Swelling    Pcn [Penicillins] Hives and Swelling    Sulfa Antibiotics Swelling    Ibuprofen Rash       HOME MEDICATIONS:  Prior to Admission medications    Medication Sig Start Date End Date Taking? Authorizing Provider   apixaban (ELIQUIS) 5 MG TABS tablet Take 1 tablet by mouth 2 times daily 10/3/18  Yes Juliana Andrade MD   Multiple Vitamins-Minerals (THERAPEUTIC MULTIVITAMIN-MINERALS) tablet Take 1 tablet by mouth daily   Yes Historical Provider, MD   diazepam (VALIUM) 2 MG tablet Take 2 mg by mouth 3 times daily as needed for Anxiety. .   Yes Historical Provider, MD   warfarin (COUMADIN) 7.5 MG tablet Take 7.5 mg by mouth daily   Yes Historical Provider, MD   vitamin C (ASCORBIC ACID) 500 MG tablet Take 500 mg by mouth daily   Yes Historical Provider, MD   DULoxetine (CYMBALTA) 30 MG extended release capsule Take 30 mg by mouth every 12 hours   Yes Historical Provider, MD   mirtazapine (REMERON) 15 MG tablet Take 7.5 mg by mouth nightly   Yes Historical Provider, MD   metoprolol succinate (TOPROL XL) 25 MG extended release tablet Take 25 mg by mouth daily   Yes Historical Provider, MD

## 2018-10-05 LAB
ABSOLUTE EOS #: 0.43 K/UL (ref 0–0.44)
ABSOLUTE IMMATURE GRANULOCYTE: <0.03 K/UL (ref 0–0.3)
ABSOLUTE LYMPH #: 2.1 K/UL (ref 1.1–3.7)
ABSOLUTE MONO #: 0.43 K/UL (ref 0.1–1.2)
ALBUMIN (CALCULATED): 3.5 G/DL (ref 3.2–5.2)
ALBUMIN PERCENT: 60 % (ref 45–65)
ALPHA 1 PERCENT: 4 % (ref 3–6)
ALPHA 2 PERCENT: 10 % (ref 6–13)
ALPHA-1-GLOBULIN: 0.2 G/DL (ref 0.1–0.4)
ALPHA-2-GLOBULIN: 0.6 G/DL (ref 0.5–0.9)
BASOPHILS # BLD: 1 % (ref 0–2)
BASOPHILS ABSOLUTE: 0.03 K/UL (ref 0–0.2)
BETA GLOBULIN: 0.7 G/DL (ref 0.5–1.1)
BETA PERCENT: 12 % (ref 11–19)
BLOOD BANK SPECIMEN: NORMAL
BUN BLDV-MCNC: 7 MG/DL (ref 6–20)
CREAT SERPL-MCNC: 0.66 MG/DL (ref 0.5–0.9)
DATE, STOOL #1: NORMAL
DATE, STOOL #2: NORMAL
DATE, STOOL #3: NORMAL
DIFFERENTIAL TYPE: ABNORMAL
EOSINOPHILS RELATIVE PERCENT: 7 % (ref 1–4)
GAMMA GLOBULIN %: 14 % (ref 9–20)
GAMMA GLOBULIN: 0.8 G/DL (ref 0.5–1.5)
GFR AFRICAN AMERICAN: >60 ML/MIN
GFR NON-AFRICAN AMERICAN: >60 ML/MIN
GFR SERPL CREATININE-BSD FRML MDRD: NORMAL ML/MIN/{1.73_M2}
GFR SERPL CREATININE-BSD FRML MDRD: NORMAL ML/MIN/{1.73_M2}
HCT VFR BLD CALC: 23.5 % (ref 36.3–47.1)
HEMOCCULT SP1 STL QL: NEGATIVE
HEMOCCULT SP2 STL QL: NORMAL
HEMOCCULT SP3 STL QL: NORMAL
HEMOGLOBIN: 7.2 G/DL (ref 11.9–15.1)
IMMATURE GRANULOCYTES: 0 %
LYMPHOCYTES # BLD: 34 % (ref 24–43)
MCH RBC QN AUTO: 29 PG (ref 25.2–33.5)
MCHC RBC AUTO-ENTMCNC: 30.6 G/DL (ref 28.4–34.8)
MCV RBC AUTO: 94.8 FL (ref 82.6–102.9)
MONOCYTES # BLD: 7 % (ref 3–12)
NRBC AUTOMATED: 0 PER 100 WBC
PARTIAL THROMBOPLASTIN TIME: 48.2 SEC (ref 20.5–30.5)
PATHOLOGIST: ABNORMAL
PDW BLD-RTO: 13.3 % (ref 11.8–14.4)
PLATELET # BLD: 320 K/UL (ref 138–453)
PLATELET ESTIMATE: ABNORMAL
PMV BLD AUTO: 9.7 FL (ref 8.1–13.5)
PROTEIN C ACTIVITY: 84 %
PROTEIN ELECTROPHORESIS, SERUM: ABNORMAL
PROTEIN S ACTIVITY: 107 % (ref 59–130)
RBC # BLD: 2.48 M/UL (ref 3.95–5.11)
RBC # BLD: ABNORMAL 10*6/UL
SEG NEUTROPHILS: 51 % (ref 36–65)
SEGMENTED NEUTROPHILS ABSOLUTE COUNT: 3.16 K/UL (ref 1.5–8.1)
SURGICAL PATHOLOGY REPORT: NORMAL
TIME, STOOL #1: NORMAL
TIME, STOOL #2: NORMAL
TIME, STOOL #3: NORMAL
TOTAL PROT. SUM,%: 100 % (ref 98–102)
TOTAL PROT. SUM: 5.8 G/DL (ref 6.3–8.2)
TOTAL PROTEIN: 5.8 G/DL (ref 6.4–8.3)
WBC # BLD: 6.2 K/UL (ref 3.5–11.3)
WBC # BLD: ABNORMAL 10*3/UL

## 2018-10-05 PROCEDURE — 36430 TRANSFUSION BLD/BLD COMPNT: CPT

## 2018-10-05 PROCEDURE — 6370000000 HC RX 637 (ALT 250 FOR IP): Performed by: NURSE PRACTITIONER

## 2018-10-05 PROCEDURE — 85025 COMPLETE CBC W/AUTO DIFF WBC: CPT

## 2018-10-05 PROCEDURE — 99232 SBSQ HOSP IP/OBS MODERATE 35: CPT | Performed by: INTERNAL MEDICINE

## 2018-10-05 PROCEDURE — 86850 RBC ANTIBODY SCREEN: CPT

## 2018-10-05 PROCEDURE — 2580000003 HC RX 258: Performed by: NURSE PRACTITIONER

## 2018-10-05 PROCEDURE — 82565 ASSAY OF CREATININE: CPT

## 2018-10-05 PROCEDURE — 36415 COLL VENOUS BLD VENIPUNCTURE: CPT

## 2018-10-05 PROCEDURE — 86920 COMPATIBILITY TEST SPIN: CPT

## 2018-10-05 PROCEDURE — 99233 SBSQ HOSP IP/OBS HIGH 50: CPT | Performed by: INTERNAL MEDICINE

## 2018-10-05 PROCEDURE — 86901 BLOOD TYPING SEROLOGIC RH(D): CPT

## 2018-10-05 PROCEDURE — 84520 ASSAY OF UREA NITROGEN: CPT

## 2018-10-05 PROCEDURE — 1200000000 HC SEMI PRIVATE

## 2018-10-05 PROCEDURE — 6370000000 HC RX 637 (ALT 250 FOR IP): Performed by: SURGERY

## 2018-10-05 PROCEDURE — 6370000000 HC RX 637 (ALT 250 FOR IP): Performed by: INTERNAL MEDICINE

## 2018-10-05 PROCEDURE — 86900 BLOOD TYPING SEROLOGIC ABO: CPT

## 2018-10-05 PROCEDURE — 6360000002 HC RX W HCPCS: Performed by: STUDENT IN AN ORGANIZED HEALTH CARE EDUCATION/TRAINING PROGRAM

## 2018-10-05 PROCEDURE — P9016 RBC LEUKOCYTES REDUCED: HCPCS

## 2018-10-05 RX ORDER — 0.9 % SODIUM CHLORIDE 0.9 %
250 INTRAVENOUS SOLUTION INTRAVENOUS ONCE
Status: DISCONTINUED | OUTPATIENT
Start: 2018-10-05 | End: 2018-10-07 | Stop reason: HOSPADM

## 2018-10-05 RX ADMIN — APIXABAN 5 MG: 5 TABLET, FILM COATED ORAL at 20:42

## 2018-10-05 RX ADMIN — DOCUSATE SODIUM 100 MG: 100 CAPSULE, LIQUID FILLED ORAL at 20:42

## 2018-10-05 RX ADMIN — Medication 10 ML: at 20:43

## 2018-10-05 RX ADMIN — OXYCODONE HYDROCHLORIDE AND ACETAMINOPHEN 1 TABLET: 5; 325 TABLET ORAL at 05:55

## 2018-10-05 RX ADMIN — CLOPIDOGREL 75 MG: 75 TABLET, FILM COATED ORAL at 09:00

## 2018-10-05 RX ADMIN — OXYCODONE HYDROCHLORIDE AND ACETAMINOPHEN 500 MG: 500 TABLET ORAL at 09:00

## 2018-10-05 RX ADMIN — DULOXETINE HYDROCHLORIDE 30 MG: 30 CAPSULE, DELAYED RELEASE ORAL at 09:00

## 2018-10-05 RX ADMIN — MIRTAZAPINE 7.5 MG: 15 TABLET, FILM COATED ORAL at 20:42

## 2018-10-05 RX ADMIN — HEPARIN SODIUM 3260 UNITS: 1000 INJECTION, SOLUTION INTRAVENOUS; SUBCUTANEOUS at 04:54

## 2018-10-05 RX ADMIN — APIXABAN 5 MG: 5 TABLET, FILM COATED ORAL at 14:15

## 2018-10-05 RX ADMIN — AMLODIPINE BESYLATE 10 MG: 10 TABLET ORAL at 20:42

## 2018-10-05 RX ADMIN — MULTIPLE VITAMINS W/ MINERALS TAB 1 TABLET: TAB at 09:00

## 2018-10-05 RX ADMIN — OXYCODONE HYDROCHLORIDE AND ACETAMINOPHEN 1 TABLET: 5; 325 TABLET ORAL at 22:07

## 2018-10-05 RX ADMIN — OXYCODONE HYDROCHLORIDE AND ACETAMINOPHEN 1 TABLET: 5; 325 TABLET ORAL at 17:38

## 2018-10-05 RX ADMIN — DULOXETINE HYDROCHLORIDE 30 MG: 30 CAPSULE, DELAYED RELEASE ORAL at 20:42

## 2018-10-05 RX ADMIN — DESMOPRESSIN ACETATE 40 MG: 0.2 TABLET ORAL at 10:31

## 2018-10-05 ASSESSMENT — PAIN SCALES - GENERAL
PAINLEVEL_OUTOF10: 5
PAINLEVEL_OUTOF10: 10
PAINLEVEL_OUTOF10: 0
PAINLEVEL_OUTOF10: 5
PAINLEVEL_OUTOF10: 0
PAINLEVEL_OUTOF10: 10

## 2018-10-05 NOTE — PROGRESS NOTES
Physical Examination :     Patient Vitals for the past 8 hrs:   BP Pulse Resp SpO2   10/05/18 1203 (!) 107/52 83 16 99 %     General Appearance: Awake, alert, and in no apparent distress  Head:  Normocephalic, no trauma  Eyes: Pupils equal, round, reactive to light and accommodation; extraocular movements intact; sclera anicteric; conjunctivae pink. ENT: Oropharynx clear, without erythema, exudate, or thrush. No tenderness of sinuses. Mouth/throat: mucosa pink and moist. No lesions. Dentition in good repair. Neck:Supple, without lymphadenopathy. Thyroid normal, No bruits. Pulmonary/Chest: Clear to auscultation, without wheezes, rales, or rhonchi. Cardiovascular: Regular rate and rhythm without murmurs, rubs, or gallops. Abdomen: Soft, non tender. Bowel sounds normal. No organomegaly  All four Extremities:Rt LE with wound to vac laterally, medial sutures CDI, toes with necrosis, foot with 1-2 edema  Neurologic: No gross sensory or motor deficits. Skin: Warm and dry with good turgor.     Medical Decision Making -Laboratory:   I have independently reviewed/ordered the following labs:    CBC with Differential:   Recent Labs      10/04/18   1824  10/05/18   0621   WBC  6.6  6.2   HGB  7.6*  7.2*   HCT  23.6*  23.5*   PLT  344  320   LYMPHOPCT  31  34   MONOPCT  7  7     BMP:   Recent Labs      10/03/18   0633  10/04/18   0616  10/05/18   0621   NA  141  137   --    K  4.0  3.9   --    CL  107  108*   --    CO2  21  18*   --    BUN  11  6  7   CREATININE  0.71  0.57  0.66     Hepatic Function Panel:   Recent Labs      10/04/18   1824   PROT  5.8*       Lab Results   Component Value Date    MUCUS NOT REPORTED 10/02/2018    RBC 2.48 10/05/2018    TRICHOMONAS NOT REPORTED 10/02/2018    WBC 6.2 10/05/2018    YEAST NOT REPORTED 10/02/2018    TURBIDITY CLOUDY 10/02/2018     Lab Results   Component Value Date    CREATININE 0.66 10/05/2018    GLUCOSE 95 10/04/2018       Medical Decision Making-Imaging:       Medical Decision Making-Other: Thank you for allowing us to participate in the care of this patient. Please call with questions. Pauline Prado MD  Pager: [phone] - Office: [phone]    ATTESTATION:    I have discussed the case, including pertinent history and exam findings with the APRN. I have seen and examined the patient and the key elements of the encounter have been performed by me. I have reviewed the laboratory data, other diagnostic studies and discussed them with the APRN. I have updated the medical record where necessary. I agree with the assessment, plan and orders as documented by the APRN  .     Jayleen Heath MD.

## 2018-10-05 NOTE — FLOWSHEET NOTE
Wound vac dressing change provided. Wound to right lateral calf site with edges well approximated, good aggranulation noted to wound bed. No odor from wound noted per writer, and no s/s's inf/infl noted per writer. Wound gently cleaned/flushed  with .9NS. Black foam applied to wound bed to edges and cleard drsing applied. Wound vac 125mmhg. Pt tolerated procedure well.

## 2018-10-05 NOTE — PROGRESS NOTES
Progress Note               Date:                           10/5/2018  Patient name:           Lyndsey Campoverde  Date of admission:  10/1/2018  5:25 PM  MRN:   [id number]  YOB: 1975  PCP:                           Michelle Barker MD        Subjective:     Hemodynamically stable  Afebrile overnight  Blood cultures negative x 3 days  Hgb down to 7.2 today, plts 320. On Eliquis  Pain somewhat better if leg. HPI in Brief:     The patient is a 37 y.o.  female who is admitted to the hospital for increasing pain to the right upper extremity. Patient was recently discharged from [address] [place] [address] 2 days prior to admission. Patient has a history of ischemic limited to the right lower extremity and apparently had an embolectomy done at that time. Patient has a right lower extremity wound VAC in place as she had 4 compartment fasciotomy secondary to compartment syndrome to the right lower extremity. Patient was found to have ischemic changes and underwent angiogram on 10/3/2018. Patient was found to have right-sided occluded right anterior tibial artery. Patient's left femoral to peroneal bypass was occluded. Patient had showering of emboli to her feet. Patient follows with Dr. Shruti Galvan from vascular surgery.      Patient denies any family history of any clotting disorders. Patient is never had a hypercoagulable workup done in the past.  Patient reports her mother was prone to bleeding. Patient denies any spontaneous abortions. Patient is a nonsmoker.   Patient denies any prior history of blood clots in the past.    Problem Lists:   Primary Problem:  Ischemic leg   Current Problems:  Active Hospital Problems    Diagnosis Date Noted    Hypercoagulable state [facility]) [D68.59]     Lactic acidosis [E87.2]     Ischemic leg [I99.8]     Septicemia ([facility] [address] [A41.9]     [facility] [N17.9]     Cellulitis of right lower extremity [L03.115] 10/01/2018 pending; Total Protein C antigen pending; Total protein S antigen pending; Functional Protein S 107; Functional Protein C activity 84; Haptoglobin 180;   2. Follow up peripheral blood smear  3. Stool for occult blood due to be collected. 4. Needs life long anticoagulation  5. Started on Eliquis  6. On Plavix  7. Transfuse 1 u prbc now as pt hgb has been trending down, spoke with nurse to obtain stool for occult blood. 8. Will need outpatient follow up with hematology. Carlen Phoenix M.D.  PGY-3 Internal Medicine  [facility]. Attending Physician Statement   I have discussed the care of this patient, including pertinent history and exam findings, with the resident. I have seen and examined the patient and the key elements of all parts of the encounter have been performed by me. I agree with the assessment, plan and orders as documented by the resident and with changes made to the note. Follow hypercoagulable workup results  Related follow-up with hematology as outpatient    Romario Valentine MD  Hematology/Oncology    Cell: [phone]          This note is created with the assistance of a speech recognition program.  While intending to generate a document that actually reflects the content of the visit, the document can still have some errors including those of syntax and sound a like substitutions which may escape proof reading. It such instances, actual meaning can be extrapolated by contextual diversion.

## 2018-10-05 NOTE — PROGRESS NOTES
(24Hr):     Intake/Output Summary (Last 24 hours) at 10/05/18 1706  Last data filed at 10/05/18 0539   Gross per 24 hour   Intake             1566 ml   Output              900 ml   Net              666 ml       Labs:    Hematology:  Recent Labs      10/03/18   0633  10/04/18   0616  10/04/18   1824  10/05/18   0621   WBC  5.9  6.2  6.6  6.2   RBC  2.66*  2.61*  2.59*  2.48*   HGB  7.8*  7.5*  7.6*  7.2*   HCT  24.6*  24.8*  23.6*  23.5*   MCV  92.5  95.0  91.1  94.8   MCH  29.3  28.7  29.3  29.0   MCHC  31.7  30.2  32.2  30.6   RDW  13.5  13.4  13.3  13.3   PLT  409  347  344  320   MPV  9.0  9.4  9.4  9.7   INR  1.5   --    --    --      Chemistry:  Recent Labs      10/03/18   0633  10/04/18   0616  10/05/18   0621   NA  141  137   --    K  4.0  3.9   --    CL  107  108*   --    CO2  21  18*   --    GLUCOSE  107*  95   --    BUN  11  6  7   CREATININE  0.71  0.57  0.66   ANIONGAP  13  11   --    LABGLOM  >60  >60  >60   GFRAA  >60  >60  >60   CALCIUM  8.2*  8.2*   --      Recent Labs      10/03/18   0633  10/04/18   1824   PROT   --   5.8*   LDH   --   144   CHOL  98   --    HDL  29*   --    LDLCHOLESTEROL  42   --    CHOLHDLRATIO  3.4   --    TRIG  137   --    VLDL  NOT REPORTED   --          Lab Results   Component Value Date/Time    SPECIAL NOT REPORTED 10/02/2018 07:19 PM     Lab Results   Component Value Date/Time    CULTURE NO SIGNIFICANT GROWTH 10/02/2018 07:19 PM       Lab Results   Component Value Date    POCPH 7.503 01/22/2018    PHART 7.440 01/16/2018    POCPCO2 34.4 01/22/2018    ZNZ5UZV 33.8 01/16/2018    POCPO2 109.9 01/22/2018    PO2ART 182.0 01/16/2018    POCHCO3 27.0 01/22/2018    PSN7DFB 22.6 01/16/2018    NBEA NOT REPORTED 01/22/2018    PBEA 4 01/22/2018    GOE0DPO 28 01/22/2018    QKZG7FQP 99 01/22/2018    M0VYTPDO 99.8 01/16/2018    FIO2 28.0 01/22/2018       Radiology:        Physical Examination:        General appearance:  alert, cooperative and no distress  Mental Status:  oriented to person, place and time and normal affect  Lungs:  clear to auscultation bilaterally, normal effort  Heart:  regular rate and rhythm, no murmur  Abdomen:  soft, nontender, nondistended, normal bowel sounds, no masses, hepatomegaly, splenomegaly  Extremities:  Wound to the right lower extremity with wound VAC,  Skin:  no gross lesions, rashes, induration    Assessment:        Primary Problem  Ischemic leg    Active Hospital Problems    Diagnosis Date Noted    Hypercoagulable state [facility]) [D68.59]     Lactic acidosis [E87.2]     Ischemic leg [I99.8]     Septicemia ([facility] [A41.9]     LALITA (acute kidney injury) ([facility] [N17.9]     Cellulitis of right lower extremity [U59.410] 10/01/2018    Occlusion of artery of leg ([facility] [I70.209] 01/16/2018    HTN (hypertension) [I10] 05/13/2013       Plan:        1. Stop heparin drip and change to eliquis  2. Agree with blood transfusion, appreciate hematology recommendations hypercoagulable workup in progress   3. Follow up on stool occult blood  4. Discussed with nurse about Complaining of dysuria check urinalysis  5.  Not on any antibiotics, appreciate infectious disease recommendations  Cristel Enamorado MD  10/5/2018  5:06 PM

## 2018-10-06 VITALS
WEIGHT: 179.5 LBS | DIASTOLIC BLOOD PRESSURE: 57 MMHG | RESPIRATION RATE: 16 BRPM | OXYGEN SATURATION: 97 % | HEIGHT: 65 IN | HEART RATE: 77 BPM | BODY MASS INDEX: 29.91 KG/M2 | TEMPERATURE: 98.2 F | SYSTOLIC BLOOD PRESSURE: 127 MMHG

## 2018-10-06 PROBLEM — I70.209 OCCLUSION OF ARTERY OF LEG (HCC): Status: RESOLVED | Noted: 2018-01-16 | Resolved: 2018-10-06

## 2018-10-06 PROBLEM — L03.115 CELLULITIS OF RIGHT LOWER EXTREMITY: Status: RESOLVED | Noted: 2018-10-01 | Resolved: 2018-10-06

## 2018-10-06 LAB
-: ABNORMAL
ABSOLUTE EOS #: 0.47 K/UL (ref 0–0.44)
ABSOLUTE IMMATURE GRANULOCYTE: <0.03 K/UL (ref 0–0.3)
ABSOLUTE LYMPH #: 1.57 K/UL (ref 1.1–3.7)
ABSOLUTE MONO #: 0.53 K/UL (ref 0.1–1.2)
AMORPHOUS: ABNORMAL
BACTERIA: ABNORMAL
BASOPHILS # BLD: 0 % (ref 0–2)
BASOPHILS ABSOLUTE: <0.03 K/UL (ref 0–0.2)
BILIRUBIN URINE: NEGATIVE
CASTS UA: ABNORMAL /LPF (ref 0–2)
COLOR: YELLOW
COMMENT UA: ABNORMAL
CRYSTALS, UA: ABNORMAL /HPF
DIFFERENTIAL TYPE: ABNORMAL
EOSINOPHILS RELATIVE PERCENT: 7 % (ref 1–4)
EPITHELIAL CELLS UA: ABNORMAL /HPF (ref 0–5)
GLUCOSE URINE: NEGATIVE
HCT VFR BLD CALC: 26.1 % (ref 36.3–47.1)
HEMOGLOBIN: 8.4 G/DL (ref 11.9–15.1)
IMMATURE GRANULOCYTES: 0 %
KETONES, URINE: NEGATIVE
LEUKOCYTE ESTERASE, URINE: ABNORMAL
LYMPHOCYTES # BLD: 22 % (ref 24–43)
MCH RBC QN AUTO: 27.6 PG (ref 25.2–33.5)
MCHC RBC AUTO-ENTMCNC: 32.2 G/DL (ref 28.4–34.8)
MCV RBC AUTO: 85.9 FL (ref 82.6–102.9)
MONOCYTES # BLD: 8 % (ref 3–12)
MUCUS: ABNORMAL
NITRITE, URINE: NEGATIVE
NRBC AUTOMATED: 0 PER 100 WBC
OTHER OBSERVATIONS UA: ABNORMAL
PATHOLOGIST REVIEW: NORMAL
PDW BLD-RTO: 18.1 % (ref 11.8–14.4)
PH UA: 6 (ref 5–8)
PLATELET # BLD: 309 K/UL (ref 138–453)
PLATELET ESTIMATE: ABNORMAL
PMV BLD AUTO: 9.3 FL (ref 8.1–13.5)
PROTEIN UA: NEGATIVE
RBC # BLD: 3.04 M/UL (ref 3.95–5.11)
RBC # BLD: ABNORMAL 10*6/UL
RBC UA: ABNORMAL /HPF (ref 0–2)
RENAL EPITHELIAL, UA: ABNORMAL /HPF
SEG NEUTROPHILS: 63 % (ref 36–65)
SEGMENTED NEUTROPHILS ABSOLUTE COUNT: 4.46 K/UL (ref 1.5–8.1)
SPECIFIC GRAVITY UA: 1.02 (ref 1–1.03)
TRICHOMONAS: ABNORMAL
TURBIDITY: ABNORMAL
URINE HGB: ABNORMAL
UROBILINOGEN, URINE: NORMAL
WBC # BLD: 7.1 K/UL (ref 3.5–11.3)
WBC # BLD: ABNORMAL 10*3/UL
WBC UA: ABNORMAL /HPF (ref 0–5)
YEAST: ABNORMAL

## 2018-10-06 PROCEDURE — 6370000000 HC RX 637 (ALT 250 FOR IP): Performed by: NURSE PRACTITIONER

## 2018-10-06 PROCEDURE — 6370000000 HC RX 637 (ALT 250 FOR IP): Performed by: INTERNAL MEDICINE

## 2018-10-06 PROCEDURE — 99238 HOSP IP/OBS DSCHRG MGMT 30/<: CPT | Performed by: INTERNAL MEDICINE

## 2018-10-06 PROCEDURE — 99232 SBSQ HOSP IP/OBS MODERATE 35: CPT | Performed by: INTERNAL MEDICINE

## 2018-10-06 PROCEDURE — 85025 COMPLETE CBC W/AUTO DIFF WBC: CPT

## 2018-10-06 PROCEDURE — 81001 URINALYSIS AUTO W/SCOPE: CPT

## 2018-10-06 PROCEDURE — 2580000003 HC RX 258: Performed by: NURSE PRACTITIONER

## 2018-10-06 PROCEDURE — 87086 URINE CULTURE/COLONY COUNT: CPT

## 2018-10-06 PROCEDURE — 6370000000 HC RX 637 (ALT 250 FOR IP): Performed by: SURGERY

## 2018-10-06 PROCEDURE — 36415 COLL VENOUS BLD VENIPUNCTURE: CPT

## 2018-10-06 RX ORDER — ATORVASTATIN CALCIUM 40 MG/1
40 TABLET, FILM COATED ORAL DAILY
Qty: 30 TABLET | Refills: 2 | Status: SHIPPED | OUTPATIENT
Start: 2018-10-06 | End: 2018-10-19 | Stop reason: SDUPTHER

## 2018-10-06 RX ORDER — CIPROFLOXACIN 500 MG/1
500 TABLET, FILM COATED ORAL EVERY 12 HOURS SCHEDULED
Status: DISCONTINUED | OUTPATIENT
Start: 2018-10-06 | End: 2018-10-07 | Stop reason: HOSPADM

## 2018-10-06 RX ORDER — OXYCODONE HYDROCHLORIDE AND ACETAMINOPHEN 5; 325 MG/1; MG/1
1 TABLET ORAL EVERY 6 HOURS PRN
Qty: 20 TABLET | Refills: 0 | Status: SHIPPED | OUTPATIENT
Start: 2018-10-06 | End: 2018-10-11

## 2018-10-06 RX ORDER — CIPROFLOXACIN 500 MG/1
500 TABLET, FILM COATED ORAL EVERY 12 HOURS SCHEDULED
Qty: 9 TABLET | Refills: 0 | Status: SHIPPED | OUTPATIENT
Start: 2018-10-06 | End: 2018-10-11

## 2018-10-06 RX ADMIN — OXYCODONE HYDROCHLORIDE AND ACETAMINOPHEN 1 TABLET: 5; 325 TABLET ORAL at 12:52

## 2018-10-06 RX ADMIN — CLOPIDOGREL 75 MG: 75 TABLET, FILM COATED ORAL at 08:30

## 2018-10-06 RX ADMIN — OXYCODONE HYDROCHLORIDE AND ACETAMINOPHEN 500 MG: 500 TABLET ORAL at 08:29

## 2018-10-06 RX ADMIN — Medication 10 ML: at 08:30

## 2018-10-06 RX ADMIN — MIRTAZAPINE 7.5 MG: 15 TABLET, FILM COATED ORAL at 20:52

## 2018-10-06 RX ADMIN — OXYCODONE HYDROCHLORIDE AND ACETAMINOPHEN 1 TABLET: 5; 325 TABLET ORAL at 08:20

## 2018-10-06 RX ADMIN — APIXABAN 5 MG: 5 TABLET, FILM COATED ORAL at 20:52

## 2018-10-06 RX ADMIN — CIPROFLOXACIN 500 MG: 500 TABLET ORAL at 20:52

## 2018-10-06 RX ADMIN — DOCUSATE SODIUM 100 MG: 100 CAPSULE, LIQUID FILLED ORAL at 20:51

## 2018-10-06 RX ADMIN — DULOXETINE HYDROCHLORIDE 30 MG: 30 CAPSULE, DELAYED RELEASE ORAL at 08:28

## 2018-10-06 RX ADMIN — APIXABAN 5 MG: 5 TABLET, FILM COATED ORAL at 08:29

## 2018-10-06 RX ADMIN — DULOXETINE HYDROCHLORIDE 30 MG: 30 CAPSULE, DELAYED RELEASE ORAL at 20:51

## 2018-10-06 RX ADMIN — AMLODIPINE BESYLATE 10 MG: 10 TABLET ORAL at 20:51

## 2018-10-06 RX ADMIN — OXYCODONE HYDROCHLORIDE AND ACETAMINOPHEN 1 TABLET: 5; 325 TABLET ORAL at 02:09

## 2018-10-06 RX ADMIN — MULTIPLE VITAMINS W/ MINERALS TAB 1 TABLET: TAB at 08:30

## 2018-10-06 RX ADMIN — OXYCODONE HYDROCHLORIDE AND ACETAMINOPHEN 1 TABLET: 5; 325 TABLET ORAL at 18:45

## 2018-10-06 RX ADMIN — DESMOPRESSIN ACETATE 40 MG: 0.2 TABLET ORAL at 08:30

## 2018-10-06 ASSESSMENT — PAIN SCALES - GENERAL
PAINLEVEL_OUTOF10: 10
PAINLEVEL_OUTOF10: 8
PAINLEVEL_OUTOF10: 7
PAINLEVEL_OUTOF10: 10
PAINLEVEL_OUTOF10: 8
PAINLEVEL_OUTOF10: 10
PAINLEVEL_OUTOF10: 10
PAINLEVEL_OUTOF10: 8

## 2018-10-06 ASSESSMENT — PAIN DESCRIPTION - DESCRIPTORS
DESCRIPTORS: ACHING;THROBBING

## 2018-10-06 ASSESSMENT — PAIN DESCRIPTION - PAIN TYPE
TYPE: ACUTE PAIN
TYPE: ACUTE PAIN;SURGICAL PAIN
TYPE: ACUTE PAIN

## 2018-10-06 ASSESSMENT — PAIN DESCRIPTION - PROGRESSION
CLINICAL_PROGRESSION: GRADUALLY WORSENING
CLINICAL_PROGRESSION: GRADUALLY IMPROVING
CLINICAL_PROGRESSION: GRADUALLY IMPROVING
CLINICAL_PROGRESSION: GRADUALLY WORSENING

## 2018-10-06 ASSESSMENT — ACTIVITIES OF DAILY LIVING (ADL): EFFECT OF PAIN ON DAILY ACTIVITIES: YES

## 2018-10-06 ASSESSMENT — PAIN DESCRIPTION - LOCATION
LOCATION: LEG

## 2018-10-06 ASSESSMENT — PAIN DESCRIPTION - ORIENTATION
ORIENTATION: RIGHT;LOWER

## 2018-10-06 ASSESSMENT — PAIN DESCRIPTION - FREQUENCY
FREQUENCY: CONTINUOUS

## 2018-10-06 ASSESSMENT — PAIN DESCRIPTION - ONSET: ONSET: PROGRESSIVE
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
## 2018-10-06 NOTE — DISCHARGE SUMMARY
Shraddha Crockett 19    Discharge Summary     Patient ID: Cayetano Crockett  :  1975   MRN: [id number]     ACCOUNT:  [de-identified]   Patient's PCP: Nato Nolasco MD  Admit Date: 10/1/2018   Discharge Date: 10/6/2018     Length of Stay: 5  Code Status:  Full Code  Admitting Physician: Edgar Guardado MD  Discharge Physician: Judith Elise MD     Active Discharge Diagnoses:     Hospital Problem Lists:  Principal Problem (Resolved):    Ischemic leg  Active Problems:    HTN (hypertension)    Hypercoagulable state ([address]  Resolved Problems:    Occlusion of artery of leg ([address]    Cellulitis of right lower extremity    Septicemia ([address]    LALITA (acute kidney injury) ([address]    Lactic acidosis      Admission Condition:  poor     Discharged Condition: good    Hospital Stay:   This is a 70-year-old female who came into the hospital because of increasing pain in the right lower extremity.  Patient was just discharged from [facility] 2 days ago. Bennett Santoyo has a history of ischemic limb to the right lower extremity and apparently had had an embolectomy done over there as well.  She also has a wound VAC.  She was found to have ischemic changes in her dose.  Basilar surgery was called in who recommended heparin drip and referred for admission.  She was also found to have leukocytosis with lactic acidosis and concern for sepsis.  She was given Broad-spectrum antibiotics. She was taken to operating room by vascular and no intervention was done. Vascular surgery recommended eliquis on discharge. That was arranged. She was also evaluated by infectious disease and they stopped all her antibiotics. She was evaluated by GI and recommended outpatient follow-up. Heme onc ordered hypercoag w up and will follow outpatient. Patient did get a unit of blood. There was no evidence of bleeding and her hemoglobin remained stable.   She did have dysuria and much to take        CONTINUE taking these medications    acetaminophen 325 MG tablet  Commonly known as:  TYLENOL  Take 2 tablets by mouth every 6 hours as needed for Pain     amLODIPine 10 MG tablet  Commonly known as:  NORVASC  take 1 tablet by mouth once daily     Blood Pressure Kit  1 Package by Does not apply route daily     clopidogrel 75 MG tablet  Commonly known as:  PLAVIX  take 1 tablet by mouth once daily     diazepam 2 MG tablet  Commonly known as:  VALIUM     docusate 100 MG Caps  Commonly known as:  COLACE, DULCOLAX  Take 100 mg by mouth 2 times daily     DULoxetine 30 MG extended release capsule  Commonly known as:  CYMBALTA     mirtazapine 15 MG tablet  Commonly known as:  REMERON     therapeutic multivitamin-minerals tablet     vitamin C 500 MG tablet  Commonly known as:  ASCORBIC ACID        STOP taking these medications    lisinopril 5 MG tablet  Commonly known as:  PRINIVIL;ZESTRIL     metoprolol succinate 25 MG extended release tablet  Commonly known as:  TOPROL XL     warfarin 7.5 MG tablet  Commonly known as:  COUMADIN           Where to Get Your Medications      These medications were sent to [address] [place] [address]    Phone:  [phone]   · apixaban 5 MG Tabs tablet  · atorvastatin 40 MG tablet  · ciprofloxacin 500 MG tablet     You can get these medications from any pharmacy    Bring a paper prescription for each of these medications  · oxyCODONE-acetaminophen 5-325 MG per tablet         Time Spent on discharge is 20mins in patient examination, evaluation, counseling as well as medication reconciliation, prescriptions for required medications, discharge plan and follow up. Electronically signed by   Dustin Macario MD  10/6/2018  4:13 PM      Thank you Dr. Peggy Pinto MD for the opportunity to be involved in this patient's care.

## 2018-10-06 NOTE — PROGRESS NOTES
[facility] Progress Note    Cordelia Marley is a 37 y.o. female patient. Hospitalization Day:5      Chief consult reason:   Anemia, questionable blood in stool  Subjective:  Pt seen and examined. Pt denies any obvious signs of bleeding and is requesting to be discharge if UA is negative. Other services agree. Hgb stable  VITALS:  BP (!) 103/59   Pulse 77   Temp 97.6 °F (36.4 °C) (Oral)   Resp 16   Ht 5' 5\" (1.651 m)   Wt 179 lb 8 oz (81.4 kg)   SpO2 99%   BMI 29.87 kg/m²   TEMPERATURE:  Current - Temp: 97.6 °F (36.4 °C); Max - Temp  Av.2 °F (36.8 °C)  Min: 97.6 °F (36.4 °C)  Max: 98.6 °F (37 °C)    Physical Assessment:  General appearance:  alert, cooperative and no distress  Mental Status:  oriented to person, place and time and normal affect  Lungs:  clear to auscultation bilaterally, normal effort  Heart:  regular rate and rhythm, no murmur  Abdomen:  soft, nontender, nondistended, normal bowel sounds, no masses, hepatomegaly, splenomegaly  Extremities:  necrotic areas over toes, previous surgical lines and wound vac on right lower leg  Skin:  no gross lesions, rashes, induration    Data Review:  LABS and IMAGING:     CBC  Recent Labs      10/04/18   0616  10/04/18   1824  10/05/18   0621  10/06/18   0650   WBC  6.2  6.6  6.2  7.1   HGB  7.5*  7.6*  7.2*  8.4*   MCV  95.0  91.1  94.8  85.9   RDW  13.4  13.3  13.3  18.1*   PLT  347  344  320  309       ANEMIA STUDIES  No results for input(s): LABIRON, TIBC, FERRITIN, [id number], FOLATE, OCCULTBLD in the last 72 hours. BMP  Recent Labs      10/04/18   0616  10/05/18   0621   NA  137   --    K  3.9   --    CL  108*   --    CO2  18*   --    BUN  6  7   CREATININE  0.57  0.66   GLUCOSE  95   --    CALCIUM  8.2*   --        LFTS  No results for input(s): ALKPHOS, ALT, AST, BILITOT, BILIDIR, LABALBU in the last 72 hours. AMYLASE/LIPASE/AMMONIA  No results for input(s): AMYLASE, LIPASE, AMMONIA in the last 72 hours.     PT/INR  No

## 2018-10-06 NOTE — PROGRESS NOTES
Progress Note               Date:                           10/6/2018  Patient name:           Cordelia Marley  Date of admission:  10/1/2018  5:25 PM  MRN:   [id number]  YOB: 1975  PCP:                           Cal Everett MD        Subjective:     Hemodynamically stable  Afebrile overnight  Pain is controlled. Only has a little bit of sensation in the right foot. Continues to be on anticoagulation. No open up to 8.4. No schistocytes seen on peripheral smear. Stool occult blood negative. HPI in Brief:     The patient is a 37 y.o.  female who is admitted to the hospital for increasing pain to the right upper extremity. Patient was recently discharged from [facility] 2 days prior to admission. Patient has a history of ischemic limited to the right lower extremity and apparently had an embolectomy done at that time. Patient has a right lower extremity wound VAC in place as she had 4 compartment fasciotomy secondary to compartment syndrome to the right lower extremity. Patient was found to have ischemic changes and underwent angiogram on 10/3/2018. Patient was found to have right-sided occluded right anterior tibial artery. Patient's left femoral to peroneal bypass was occluded. Patient had showering of emboli to her feet. Patient follows with Dr. Annalisa Rivera from vascular surgery.      Patient denies any family history of any clotting disorders. Patient is never had a hypercoagulable workup done in the past.  Patient reports her mother was prone to bleeding. Patient denies any spontaneous abortions. Patient is a nonsmoker.   Patient denies any prior history of blood clots in the past.    Problem Lists:   Primary Problem:  Ischemic leg   Current Problems:  Active Hospital Problems    Diagnosis Date Noted    Hypercoagulable state [facility]) [D68.59]     Lactic acidosis [E87.2]     Ischemic leg [I99.8]     Septicemia ([facility] [A41.9]     LALITA Eyes - pupils equal and reactive, extraocular eye movements intact   Ears - bilateral TM's and external ear canals normal   Mouth - mucous membranes moist, pharynx normal without lesions   Neck - supple, no significant adenopathy   Lymphatics - no palpable lymphadenopathy, no hepatosplenomegaly   Chest - clear to auscultation, no wheezes, rales or rhonchi, symmetric air entry   Heart - normal rate, regular rhythm, normal S1, S2, no murmurs  Abdomen - soft, nontender, nondistended, no masses or organomegaly   Neurological - alert, oriented, normal speech, no focal findings or movement disorder noted   Musculoskeletal - no joint tenderness, deformity or swelling   Extremities - right lower extremity is warm, patient has ischemic toes with demarcated lines. Patient has a wound VAC to the right lower extremity. There are sutures in place on the medial side of the right leg. Skin - no rashes, no suspicious skin lesions noted. Ischemic toes on the right likely from embolic event. Data:  No intake/output data recorded. No intake/output data recorded. CBC:   Recent Labs      10/04/18   1824  10/05/18   0621  10/06/18   0650   WBC  6.6  6.2  7.1   HGB  7.6*  7.2*  8.4*   PLT  344  320  309     BMP:    Recent Labs      10/04/18   0616  10/05/18   0621   NA  137   --    K  3.9   --    CL  108*   --    CO2  18*   --    BUN  6  7   CREATININE  0.57  0.66   GLUCOSE  95   --      Hepatic: No results for input(s): AST, ALT, ALB, BILITOT, ALKPHOS in the last 72 hours. INR:   No results for input(s): INR in the last 72 hours. IMAGING DATA:  none    Assessment      1. 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 Right lower extremity wound VAC in place  3. Peripheral vascular disease  4. Anemia secondary to acute blood loss  5. Hypertension  6. LALITA  7. Lactic acidosis  8. Hypercoagulable state    Plan     1.  Follow up hypercoagulable workup: Factor V Leiden pending; Prothrombin

## 2018-10-07 LAB
ANTI B2-GLYCOPROTEIN IGG: 0 SGU (ref 0–20)
ANTI B2-GLYCOPROTEIN IGM: 14 SMU (ref 0–20)
CULTURE: ABNORMAL
CULTURE: NORMAL
CULTURE: NORMAL
Lab: ABNORMAL
Lab: NORMAL
Lab: NORMAL
PROTEIN C ANTIGEN: 93 % (ref 63–153)
SPECIMEN DESCRIPTION: ABNORMAL
SPECIMEN DESCRIPTION: NORMAL
SPECIMEN DESCRIPTION: NORMAL
STATUS: ABNORMAL
STATUS: NORMAL
STATUS: NORMAL

## 2018-10-08 ENCOUNTER — TELEPHONE (OUTPATIENT)
Dept: INFECTIOUS DISEASES | Age: 43
End: 2018-10-08

## 2018-10-08 LAB
ABO/RH: NORMAL
ANTIBODY SCREEN: NEGATIVE
ARM BAND NUMBER: NORMAL
BLD PROD TYP BPU: NORMAL
BLD PROD TYP BPU: NORMAL
CROSSMATCH RESULT: NORMAL
CROSSMATCH RESULT: NORMAL
CULTURE: NORMAL
CULTURE: NORMAL
DISPENSE STATUS BLOOD BANK: NORMAL
DISPENSE STATUS BLOOD BANK: NORMAL
EXPIRATION DATE: NORMAL
Lab: NORMAL
Lab: NORMAL
PROTEIN S ANTIGEN, TOTAL: 78 % (ref 63–126)
SPECIMEN DESCRIPTION: NORMAL
SPECIMEN DESCRIPTION: NORMAL
STATUS: NORMAL
STATUS: NORMAL
TRANSFUSION STATUS: NORMAL
TRANSFUSION STATUS: NORMAL
UNIT DIVISION: 0
UNIT DIVISION: 0
UNIT NUMBER: NORMAL
UNIT NUMBER: NORMAL

## 2018-10-10 LAB
FACTOR V LEIDEN MUTATION: NORMAL
PROTHROMBIN G20210A MUTATION: NORMAL

## 2018-10-11 ENCOUNTER — TELEPHONE (OUTPATIENT)
Dept: FAMILY MEDICINE CLINIC | Age: 43
End: 2018-10-11

## 2018-10-11 ENCOUNTER — HOSPITAL ENCOUNTER (OUTPATIENT)
Facility: MEDICAL CENTER | Age: 43
End: 2018-10-11
Payer: COMMERCIAL

## 2018-10-11 LAB
ANTICARDIOLIPIN IGA ANTIBODY: 1.2 APU
ANTICARDIOLIPIN IGG ANTIBODY: 2.7 GPU
CARDIOLIPIN AB IGM: 5.2 MPU

## 2018-10-12 ENCOUNTER — TELEPHONE (OUTPATIENT)
Dept: VASCULAR SURGERY | Age: 43
End: 2018-10-12

## 2018-10-12 NOTE — TELEPHONE ENCOUNTER
Lynn Singh from [facility] called stating that patient's wound on R Leg is draining yellow drainage but that she didn't start her antibiotic yet because the pharmacy thought she was still taking coumadin. Another physician of the patient has sent correspondance to the pharmacy stating she is not taking coumadin and patient will be filling her script for the antibiotic. Dr Dk Pedroza wants patient to start the abx. Lex Gerardo states that the patients toes on her right foot have blisters that are open,weeping, and raw. Dr Dk Pedroza would like patient to have dry dressing between her toes to absorb moisture. Lynn Singh verbalized understanding.

## 2018-10-17 ENCOUNTER — TELEPHONE (OUTPATIENT)
Dept: ONCOLOGY | Age: 43
End: 2018-10-17

## 2018-10-17 ENCOUNTER — OFFICE VISIT (OUTPATIENT)
Dept: ONCOLOGY | Age: 43
End: 2018-10-17
Payer: COMMERCIAL

## 2018-10-17 VITALS
HEART RATE: 85 BPM | BODY MASS INDEX: 28.32 KG/M2 | SYSTOLIC BLOOD PRESSURE: 111 MMHG | TEMPERATURE: 97.4 F | WEIGHT: 170.2 LBS | DIASTOLIC BLOOD PRESSURE: 71 MMHG | RESPIRATION RATE: 22 BRPM

## 2018-10-17 DIAGNOSIS — I73.9 CLAUDICATION IN PERIPHERAL VASCULAR DISEASE (HCC): Primary | ICD-10-CM

## 2018-10-17 DIAGNOSIS — I70.202 LEFT POPLITEAL ARTERY OCCLUSION (HCC): ICD-10-CM

## 2018-10-17 DIAGNOSIS — D68.59 HYPERCOAGULABLE STATE (HCC): ICD-10-CM

## 2018-10-17 PROCEDURE — 1111F DSCHRG MED/CURRENT MED MERGE: CPT | Performed by: INTERNAL MEDICINE

## 2018-10-17 PROCEDURE — G8417 CALC BMI ABV UP PARAM F/U: HCPCS | Performed by: INTERNAL MEDICINE

## 2018-10-17 PROCEDURE — 1036F TOBACCO NON-USER: CPT | Performed by: INTERNAL MEDICINE

## 2018-10-17 PROCEDURE — G8484 FLU IMMUNIZE NO ADMIN: HCPCS | Performed by: INTERNAL MEDICINE

## 2018-10-17 PROCEDURE — 99214 OFFICE O/P EST MOD 30 MIN: CPT | Performed by: INTERNAL MEDICINE

## 2018-10-17 PROCEDURE — G8427 DOCREV CUR MEDS BY ELIG CLIN: HCPCS | Performed by: INTERNAL MEDICINE

## 2018-10-17 PROCEDURE — 99211 OFF/OP EST MAY X REQ PHY/QHP: CPT

## 2018-10-17 PROCEDURE — G8598 ASA/ANTIPLAT THER USED: HCPCS | Performed by: INTERNAL MEDICINE

## 2018-10-19 ENCOUNTER — OFFICE VISIT (OUTPATIENT)
Dept: FAMILY MEDICINE CLINIC | Age: 43
End: 2018-10-19
Payer: COMMERCIAL

## 2018-10-19 VITALS — DIASTOLIC BLOOD PRESSURE: 76 MMHG | HEART RATE: 94 BPM | SYSTOLIC BLOOD PRESSURE: 123 MMHG | TEMPERATURE: 97 F

## 2018-10-19 DIAGNOSIS — N92.0 MENORRHAGIA WITH REGULAR CYCLE: ICD-10-CM

## 2018-10-19 DIAGNOSIS — I10 ESSENTIAL HYPERTENSION: Primary | ICD-10-CM

## 2018-10-19 DIAGNOSIS — Z76.0 MEDICATION REFILL: ICD-10-CM

## 2018-10-19 DIAGNOSIS — E78.5 HYPERLIPIDEMIA, UNSPECIFIED HYPERLIPIDEMIA TYPE: ICD-10-CM

## 2018-10-19 DIAGNOSIS — L97.511 SKIN ULCER OF RIGHT FOOT INCLUDING TOES, LIMITED TO BREAKDOWN OF SKIN (HCC): ICD-10-CM

## 2018-10-19 DIAGNOSIS — D64.9 ANEMIA, UNSPECIFIED TYPE: ICD-10-CM

## 2018-10-19 PROCEDURE — 1111F DSCHRG MED/CURRENT MED MERGE: CPT | Performed by: STUDENT IN AN ORGANIZED HEALTH CARE EDUCATION/TRAINING PROGRAM

## 2018-10-19 PROCEDURE — G8484 FLU IMMUNIZE NO ADMIN: HCPCS | Performed by: STUDENT IN AN ORGANIZED HEALTH CARE EDUCATION/TRAINING PROGRAM

## 2018-10-19 PROCEDURE — G8417 CALC BMI ABV UP PARAM F/U: HCPCS | Performed by: STUDENT IN AN ORGANIZED HEALTH CARE EDUCATION/TRAINING PROGRAM

## 2018-10-19 PROCEDURE — 1036F TOBACCO NON-USER: CPT | Performed by: STUDENT IN AN ORGANIZED HEALTH CARE EDUCATION/TRAINING PROGRAM

## 2018-10-19 PROCEDURE — 99213 OFFICE O/P EST LOW 20 MIN: CPT | Performed by: STUDENT IN AN ORGANIZED HEALTH CARE EDUCATION/TRAINING PROGRAM

## 2018-10-19 PROCEDURE — G8598 ASA/ANTIPLAT THER USED: HCPCS | Performed by: STUDENT IN AN ORGANIZED HEALTH CARE EDUCATION/TRAINING PROGRAM

## 2018-10-19 PROCEDURE — G8427 DOCREV CUR MEDS BY ELIG CLIN: HCPCS | Performed by: STUDENT IN AN ORGANIZED HEALTH CARE EDUCATION/TRAINING PROGRAM

## 2018-10-19 RX ORDER — LISINOPRIL 5 MG/1
5 TABLET ORAL DAILY
COMMUNITY
End: 2018-10-19 | Stop reason: SDUPTHER

## 2018-10-19 RX ORDER — LISINOPRIL 5 MG/1
5 TABLET ORAL DAILY
Qty: 30 TABLET | Refills: 1 | Status: SHIPPED | OUTPATIENT
Start: 2018-10-19 | End: 2018-12-12 | Stop reason: SDUPTHER

## 2018-10-19 RX ORDER — DULOXETIN HYDROCHLORIDE 30 MG/1
30 CAPSULE, DELAYED RELEASE ORAL EVERY 12 HOURS
Qty: 30 CAPSULE | Refills: 0 | Status: SHIPPED | OUTPATIENT
Start: 2018-10-19 | End: 2018-11-13 | Stop reason: SDUPTHER

## 2018-10-19 RX ORDER — MUPIROCIN CALCIUM 20 MG/G
CREAM TOPICAL
Qty: 1 TUBE | Refills: 1 | Status: SHIPPED | OUTPATIENT
Start: 2018-10-19 | End: 2018-11-18

## 2018-10-19 RX ORDER — AMLODIPINE BESYLATE 10 MG/1
TABLET ORAL
Qty: 10 TABLET | Refills: 1 | Status: SHIPPED | OUTPATIENT
Start: 2018-10-19 | End: 2018-11-21 | Stop reason: SDUPTHER

## 2018-10-19 RX ORDER — ATORVASTATIN CALCIUM 40 MG/1
40 TABLET, FILM COATED ORAL DAILY
Qty: 30 TABLET | Refills: 1 | Status: SHIPPED | OUTPATIENT
Start: 2018-10-19 | End: 2019-01-22 | Stop reason: SDUPTHER

## 2018-10-19 ASSESSMENT — ENCOUNTER SYMPTOMS
CONSTIPATION: 0
VOMITING: 0
ABDOMINAL PAIN: 0
DIARRHEA: 0
NAUSEA: 0
COUGH: 0
SHORTNESS OF BREATH: 0
WHEEZING: 0

## 2018-10-19 ASSESSMENT — PATIENT HEALTH QUESTIONNAIRE - PHQ9
SUM OF ALL RESPONSES TO PHQ QUESTIONS 1-9: 0
SUM OF ALL RESPONSES TO PHQ QUESTIONS 1-9: 0
SUM OF ALL RESPONSES TO PHQ9 QUESTIONS 1 & 2: 0
1. LITTLE INTEREST OR PLEASURE IN DOING THINGS: 0
2. FEELING DOWN, DEPRESSED OR HOPELESS: 0

## 2018-10-22 ENCOUNTER — HOSPITAL ENCOUNTER (EMERGENCY)
Age: 43
Discharge: HOME | End: 2018-10-22
Attending: EMERGENCY MEDICINE
Payer: COMMERCIAL

## 2018-10-22 VITALS
TEMPERATURE: 97.3 F | BODY MASS INDEX: 26.63 KG/M2 | OXYGEN SATURATION: 100 % | SYSTOLIC BLOOD PRESSURE: 112 MMHG | DIASTOLIC BLOOD PRESSURE: 73 MMHG | WEIGHT: 160 LBS | HEART RATE: 91 BPM | RESPIRATION RATE: 18 BRPM

## 2018-10-22 DIAGNOSIS — I96 NECROSIS OF TOE (HCC): Primary | ICD-10-CM

## 2018-10-22 LAB
ABSOLUTE EOS #: 0.17 K/UL (ref 0–0.44)
ABSOLUTE IMMATURE GRANULOCYTE: 0.04 K/UL (ref 0–0.3)
ABSOLUTE LYMPH #: 2.61 K/UL (ref 1.1–3.7)
ABSOLUTE MONO #: 0.62 K/UL (ref 0.1–1.2)
ANION GAP SERPL CALCULATED.3IONS-SCNC: 20 MMOL/L (ref 9–17)
BASOPHILS # BLD: 1 % (ref 0–2)
BASOPHILS ABSOLUTE: 0.06 K/UL (ref 0–0.2)
BUN BLDV-MCNC: 11 MG/DL (ref 6–20)
BUN/CREAT BLD: ABNORMAL (ref 9–20)
C-REACTIVE PROTEIN: 26.8 MG/L (ref 0–5)
CALCIUM SERPL-MCNC: 9.3 MG/DL (ref 8.6–10.4)
CHLORIDE BLD-SCNC: 102 MMOL/L (ref 98–107)
CO2: 19 MMOL/L (ref 20–31)
CREAT SERPL-MCNC: 0.67 MG/DL (ref 0.5–0.9)
DIFFERENTIAL TYPE: ABNORMAL
EOSINOPHILS RELATIVE PERCENT: 2 % (ref 1–4)
GFR AFRICAN AMERICAN: >60 ML/MIN
GFR NON-AFRICAN AMERICAN: >60 ML/MIN
GFR SERPL CREATININE-BSD FRML MDRD: ABNORMAL ML/MIN/{1.73_M2}
GFR SERPL CREATININE-BSD FRML MDRD: ABNORMAL ML/MIN/{1.73_M2}
GLUCOSE BLD-MCNC: 143 MG/DL (ref 70–99)
HCT VFR BLD CALC: 35.8 % (ref 36.3–47.1)
HEMOGLOBIN: 11.4 G/DL (ref 11.9–15.1)
IMMATURE GRANULOCYTES: 0 %
INR BLD: 0.9
LYMPHOCYTES # BLD: 24 % (ref 24–43)
MCH RBC QN AUTO: 28.1 PG (ref 25.2–33.5)
MCHC RBC AUTO-ENTMCNC: 31.8 G/DL (ref 28.4–34.8)
MCV RBC AUTO: 88.2 FL (ref 82.6–102.9)
MONOCYTES # BLD: 6 % (ref 3–12)
NRBC AUTOMATED: 0 PER 100 WBC
PARTIAL THROMBOPLASTIN TIME: 25.5 SEC (ref 20.5–30.5)
PDW BLD-RTO: 16.2 % (ref 11.8–14.4)
PLATELET # BLD: 512 K/UL (ref 138–453)
PLATELET ESTIMATE: ABNORMAL
PMV BLD AUTO: 9.2 FL (ref 8.1–13.5)
POTASSIUM SERPL-SCNC: 3.8 MMOL/L (ref 3.7–5.3)
PROTHROMBIN TIME: 9.8 SEC (ref 9–12)
RBC # BLD: 4.06 M/UL (ref 3.95–5.11)
RBC # BLD: ABNORMAL 10*6/UL
SEDIMENTATION RATE, ERYTHROCYTE: 36 MM (ref 0–20)
SEG NEUTROPHILS: 67 % (ref 36–65)
SEGMENTED NEUTROPHILS ABSOLUTE COUNT: 7.59 K/UL (ref 1.5–8.1)
SODIUM BLD-SCNC: 141 MMOL/L (ref 135–144)
WBC # BLD: 11.1 K/UL (ref 3.5–11.3)
WBC # BLD: ABNORMAL 10*3/UL

## 2018-10-22 PROCEDURE — 2580000003 HC RX 258: Performed by: PHYSICIAN ASSISTANT

## 2018-10-22 PROCEDURE — 85730 THROMBOPLASTIN TIME PARTIAL: CPT

## 2018-10-22 PROCEDURE — 86140 C-REACTIVE PROTEIN: CPT

## 2018-10-22 PROCEDURE — 6360000002 HC RX W HCPCS: Performed by: PHYSICIAN ASSISTANT

## 2018-10-22 PROCEDURE — 85651 RBC SED RATE NONAUTOMATED: CPT

## 2018-10-22 PROCEDURE — 96374 THER/PROPH/DIAG INJ IV PUSH: CPT

## 2018-10-22 PROCEDURE — 99284 EMERGENCY DEPT VISIT MOD MDM: CPT

## 2018-10-22 PROCEDURE — 85025 COMPLETE CBC W/AUTO DIFF WBC: CPT

## 2018-10-22 PROCEDURE — 85610 PROTHROMBIN TIME: CPT

## 2018-10-22 PROCEDURE — 80048 BASIC METABOLIC PNL TOTAL CA: CPT

## 2018-10-22 RX ORDER — OXYCODONE HYDROCHLORIDE AND ACETAMINOPHEN 5; 325 MG/1; MG/1
1 TABLET ORAL EVERY 6 HOURS PRN
Qty: 15 TABLET | Refills: 0 | Status: SHIPPED | OUTPATIENT
Start: 2018-10-22 | End: 2018-10-26

## 2018-10-22 RX ORDER — MORPHINE SULFATE 4 MG/ML
4 INJECTION, SOLUTION INTRAMUSCULAR; INTRAVENOUS ONCE
Status: COMPLETED | OUTPATIENT
Start: 2018-10-22 | End: 2018-10-22

## 2018-10-22 RX ORDER — 0.9 % SODIUM CHLORIDE 0.9 %
500 INTRAVENOUS SOLUTION INTRAVENOUS ONCE
Status: COMPLETED | OUTPATIENT
Start: 2018-10-22 | End: 2018-10-22

## 2018-10-22 RX ADMIN — SODIUM CHLORIDE 500 ML: 9 INJECTION, SOLUTION INTRAVENOUS at 20:03

## 2018-10-22 RX ADMIN — MORPHINE SULFATE 4 MG: 4 INJECTION INTRAVENOUS at 20:02

## 2018-10-22 ASSESSMENT — ENCOUNTER SYMPTOMS
BACK PAIN: 0
SHORTNESS OF BREATH: 0
SORE THROAT: 0
VOMITING: 0
COUGH: 0
NAUSEA: 0
ABDOMINAL PAIN: 0
DIARRHEA: 0

## 2018-10-22 ASSESSMENT — PAIN DESCRIPTION - ORIENTATION: ORIENTATION: RIGHT

## 2018-10-22 ASSESSMENT — PAIN SCALES - GENERAL
PAINLEVEL_OUTOF10: 10
PAINLEVEL_OUTOF10: 0
PAINLEVEL_OUTOF10: 10

## 2018-10-22 ASSESSMENT — PAIN DESCRIPTION - DESCRIPTORS: DESCRIPTORS: SHARP;BURNING

## 2018-10-22 ASSESSMENT — PAIN DESCRIPTION - PROGRESSION: CLINICAL_PROGRESSION: GRADUALLY WORSENING

## 2018-10-22 ASSESSMENT — PAIN DESCRIPTION - PAIN TYPE: TYPE: ACUTE PAIN

## 2018-10-22 ASSESSMENT — PAIN DESCRIPTION - FREQUENCY: FREQUENCY: CONTINUOUS

## 2018-10-22 ASSESSMENT — PAIN DESCRIPTION - LOCATION: LOCATION: TOE (COMMENT WHICH ONE);FOOT;LEG

## 2018-10-22 NOTE — ED PROVIDER NOTES
Neck: Normal range of motion. Neck supple. Cardiovascular: Normal rate, regular rhythm, normal heart sounds and intact distal pulses. Exam reveals no gallop and no friction rub. No murmur heard. Pulmonary/Chest: Effort normal and breath sounds normal. No respiratory distress. She has no wheezes. She has no rales. Abdominal: Soft. Bowel sounds are normal. She exhibits no distension and no mass. There is no tenderness. There is no rebound and no guarding. No hernia. Musculoskeletal: Normal range of motion. Legs:       Feet:    Necrosing of multiple toes on the right foot. It is warmth and swelling comparatively to the left foot with no lymphangitic streaking. No calf pain. Patient is neurovascularly intact. Motor function is 5 out of 5 bilaterally in all extremities. She is able to wiggle her toes. Distal pulses and deep tendon reflexes are within normal limits. Proprioception is within normal limits. Light sensation is intact. Cap refill is less than 2 seconds in a few of her right toes and not in the necrosing ones. Neurological: She is alert and oriented to person, place, and time. No cranial nerve deficit. Skin: Skin is warm and dry. Capillary refill takes less than 2 seconds. She is not diaphoretic. Psychiatric: She has a normal mood and affect.  Her behavior is normal. Judgment and thought content normal.         DIAGNOSTIC RESULTS       No orders to display         LABS:  Labs Reviewed   CBC WITH AUTO DIFFERENTIAL - Abnormal; Notable for the following:        Result Value    Hemoglobin 11.4 (*)     Hematocrit 35.8 (*)     RDW 16.2 (*)     Platelets 626 (*)     Seg Neutrophils 67 (*)     All other components within normal limits   BASIC METABOLIC PANEL - Abnormal; Notable for the following:     Glucose 143 (*)     CO2 19 (*)     Anion Gap 20 (*)     All other components within normal limits   C-REACTIVE PROTEIN - Abnormal; Notable for the following:     CRP 26.8 (*)     All other components within normal limits   SEDIMENTATION RATE - Abnormal; Notable for the following:     Sed Rate 36 (*)     All other components within normal limits   PROTIME-INR   APTT           EMERGENCY DEPARTMENT COURSE and DIFFERENTIAL DIAGNOSIS/MDM:   Vitals:    Vitals:    10/22/18 1929 10/22/18 1932   BP:  119/80   Pulse: 106    Resp: 18    Temp: 97.3 °F (36.3 °C)    TempSrc: Oral    SpO2: 100%    Weight: 160 lb (72.6 kg)        This is a 60-year-old female presenting to the emergency department with necrosing right toes from vascular surgery done last month. We will obtain basic laboratory studies at this time including a CRP and sed rate. We will consult vascular surgery at this time. I did give her morphine here for pain. Vascular surgery stated that they would not like any imaging at this time and she has good pulses and she can follow up as scheduled with her surgeon and continue with her wound care and nursing care at home. Her laboratory studies are unremarkable aside from a slight elevation in her CRP and sed rate but according to vascular surgery nothing significant. We will discharge the patient home in stable condition and nontoxic-appearing with Percocet to take as needed. She should follow-up with vascular surgery as scheduled. Patient understood and will comply. Patient is satisfied. All questions answered. Attending physician, myself, and patient agree no further workup is necessary at this time. Patient was given very strict return protocols and is completely agreeable with this plan. Please see photo for further evaluation. This patient was seen by the attending [phone] they agreed with the assessment and plan. CONSULTS:  IP CONSULT TO VASCULAR SURGERY    PROCEDURES:  Procedures    FINAL IMPRESSION      1.  Necrosis of toe [facility])          DISPOSITION/PLAN   DISPOSITION        PATIENT REFERRED TO:  Vernon Colon MD  [facility] [address] [phone]    Schedule an appointment

## 2018-10-23 ENCOUNTER — HOSPITAL ENCOUNTER (OUTPATIENT)
Age: 43
Setting detail: OBSERVATION
Discharge: HOME | End: 2018-10-23
Attending: EMERGENCY MEDICINE | Admitting: EMERGENCY MEDICINE
Payer: COMMERCIAL

## 2018-10-23 VITALS
SYSTOLIC BLOOD PRESSURE: 114 MMHG | RESPIRATION RATE: 18 BRPM | WEIGHT: 160 LBS | BODY MASS INDEX: 26.66 KG/M2 | TEMPERATURE: 98.1 F | HEIGHT: 65 IN | DIASTOLIC BLOOD PRESSURE: 62 MMHG | HEART RATE: 73 BPM | OXYGEN SATURATION: 98 %

## 2018-10-23 DIAGNOSIS — M79.605 PAIN OF LEFT LOWER EXTREMITY: Primary | ICD-10-CM

## 2018-10-23 PROBLEM — M79.606 LEG PAIN: Status: ACTIVE | Noted: 2018-10-23

## 2018-10-23 LAB — TOTAL CK: 128 U/L (ref 26–192)

## 2018-10-23 PROCEDURE — 6370000000 HC RX 637 (ALT 250 FOR IP): Performed by: EMERGENCY MEDICINE

## 2018-10-23 PROCEDURE — 96374 THER/PROPH/DIAG INJ IV PUSH: CPT

## 2018-10-23 PROCEDURE — 96376 TX/PRO/DX INJ SAME DRUG ADON: CPT

## 2018-10-23 PROCEDURE — 82550 ASSAY OF CK (CPK): CPT

## 2018-10-23 PROCEDURE — 6360000002 HC RX W HCPCS: Performed by: EMERGENCY MEDICINE

## 2018-10-23 PROCEDURE — G0378 HOSPITAL OBSERVATION PER HR: HCPCS

## 2018-10-23 PROCEDURE — 6370000000 HC RX 637 (ALT 250 FOR IP): Performed by: STUDENT IN AN ORGANIZED HEALTH CARE EDUCATION/TRAINING PROGRAM

## 2018-10-23 PROCEDURE — 99284 EMERGENCY DEPT VISIT MOD MDM: CPT

## 2018-10-23 PROCEDURE — 2580000003 HC RX 258: Performed by: EMERGENCY MEDICINE

## 2018-10-23 RX ORDER — DULOXETIN HYDROCHLORIDE 30 MG/1
30 CAPSULE, DELAYED RELEASE ORAL EVERY 12 HOURS
Status: DISCONTINUED | OUTPATIENT
Start: 2018-10-23 | End: 2018-10-23 | Stop reason: HOSPADM

## 2018-10-23 RX ORDER — DOCUSATE SODIUM 100 MG/1
100 CAPSULE, LIQUID FILLED ORAL 2 TIMES DAILY
Status: DISCONTINUED | OUTPATIENT
Start: 2018-10-23 | End: 2018-10-23 | Stop reason: HOSPADM

## 2018-10-23 RX ORDER — CLOPIDOGREL BISULFATE 75 MG/1
75 TABLET ORAL DAILY
Status: DISCONTINUED | OUTPATIENT
Start: 2018-10-23 | End: 2018-10-23 | Stop reason: HOSPADM

## 2018-10-23 RX ORDER — OXYCODONE HYDROCHLORIDE AND ACETAMINOPHEN 5; 325 MG/1; MG/1
2 TABLET ORAL EVERY 4 HOURS PRN
Status: DISCONTINUED | OUTPATIENT
Start: 2018-10-23 | End: 2018-10-23 | Stop reason: HOSPADM

## 2018-10-23 RX ORDER — MORPHINE SULFATE 4 MG/ML
4 INJECTION, SOLUTION INTRAMUSCULAR; INTRAVENOUS ONCE
Status: DISCONTINUED | OUTPATIENT
Start: 2018-10-23 | End: 2018-10-23

## 2018-10-23 RX ORDER — DIAZEPAM 2 MG/1
2 TABLET ORAL 3 TIMES DAILY PRN
Status: DISCONTINUED | OUTPATIENT
Start: 2018-10-23 | End: 2018-10-23 | Stop reason: HOSPADM

## 2018-10-23 RX ORDER — MORPHINE SULFATE 4 MG/ML
4 INJECTION, SOLUTION INTRAMUSCULAR; INTRAVENOUS
Status: DISCONTINUED | OUTPATIENT
Start: 2018-10-23 | End: 2018-10-23 | Stop reason: HOSPADM

## 2018-10-23 RX ORDER — SODIUM CHLORIDE 0.9 % (FLUSH) 0.9 %
10 SYRINGE (ML) INJECTION EVERY 12 HOURS SCHEDULED
Status: DISCONTINUED | OUTPATIENT
Start: 2018-10-23 | End: 2018-10-23 | Stop reason: HOSPADM

## 2018-10-23 RX ORDER — SODIUM CHLORIDE 0.9 % (FLUSH) 0.9 %
10 SYRINGE (ML) INJECTION PRN
Status: DISCONTINUED | OUTPATIENT
Start: 2018-10-23 | End: 2018-10-23 | Stop reason: HOSPADM

## 2018-10-23 RX ORDER — MORPHINE SULFATE 4 MG/ML
2 INJECTION, SOLUTION INTRAMUSCULAR; INTRAVENOUS
Status: DISCONTINUED | OUTPATIENT
Start: 2018-10-23 | End: 2018-10-23 | Stop reason: HOSPADM

## 2018-10-23 RX ORDER — ACETAMINOPHEN 325 MG/1
650 TABLET ORAL EVERY 4 HOURS PRN
Status: DISCONTINUED | OUTPATIENT
Start: 2018-10-23 | End: 2018-10-23 | Stop reason: HOSPADM

## 2018-10-23 RX ORDER — LISINOPRIL 5 MG/1
5 TABLET ORAL DAILY
Status: DISCONTINUED | OUTPATIENT
Start: 2018-10-23 | End: 2018-10-23 | Stop reason: HOSPADM

## 2018-10-23 RX ORDER — MORPHINE SULFATE 4 MG/ML
4 INJECTION, SOLUTION INTRAMUSCULAR; INTRAVENOUS ONCE
Status: COMPLETED | OUTPATIENT
Start: 2018-10-23 | End: 2018-10-23

## 2018-10-23 RX ORDER — MIRTAZAPINE 15 MG/1
7.5 TABLET, FILM COATED ORAL NIGHTLY
Status: DISCONTINUED | OUTPATIENT
Start: 2018-10-23 | End: 2018-10-23 | Stop reason: HOSPADM

## 2018-10-23 RX ORDER — AMLODIPINE BESYLATE 10 MG/1
10 TABLET ORAL DAILY
Status: DISCONTINUED | OUTPATIENT
Start: 2018-10-23 | End: 2018-10-23 | Stop reason: HOSPADM

## 2018-10-23 RX ORDER — FENTANYL CITRATE 50 UG/ML
50 INJECTION, SOLUTION INTRAMUSCULAR; INTRAVENOUS ONCE
Status: COMPLETED | OUTPATIENT
Start: 2018-10-23 | End: 2018-10-23

## 2018-10-23 RX ADMIN — FENTANYL CITRATE 50 MCG: 50 INJECTION INTRAMUSCULAR; INTRAVENOUS at 01:42

## 2018-10-23 RX ADMIN — MORPHINE SULFATE 4 MG: 4 INJECTION INTRAVENOUS at 08:47

## 2018-10-23 RX ADMIN — MORPHINE SULFATE 4 MG: 4 INJECTION INTRAVENOUS at 03:23

## 2018-10-23 RX ADMIN — AMLODIPINE BESYLATE 10 MG: 10 TABLET ORAL at 11:44

## 2018-10-23 RX ADMIN — OXYCODONE HYDROCHLORIDE AND ACETAMINOPHEN 2 TABLET: 5; 325 TABLET ORAL at 14:11

## 2018-10-23 RX ADMIN — LISINOPRIL 5 MG: 5 TABLET ORAL at 11:43

## 2018-10-23 RX ADMIN — APIXABAN 5 MG: 5 TABLET, FILM COATED ORAL at 11:44

## 2018-10-23 RX ADMIN — Medication 10 ML: at 11:49

## 2018-10-23 RX ADMIN — DULOXETINE HYDROCHLORIDE 30 MG: 30 CAPSULE, DELAYED RELEASE ORAL at 11:43

## 2018-10-23 RX ADMIN — OXYCODONE HYDROCHLORIDE AND ACETAMINOPHEN 2 TABLET: 5; 325 TABLET ORAL at 10:17

## 2018-10-23 RX ADMIN — CLOPIDOGREL 75 MG: 75 TABLET, FILM COATED ORAL at 11:44

## 2018-10-23 RX ADMIN — MORPHINE SULFATE 4 MG: 4 INJECTION INTRAVENOUS at 05:39

## 2018-10-23 ASSESSMENT — PAIN DESCRIPTION - FREQUENCY: FREQUENCY: CONTINUOUS

## 2018-10-23 ASSESSMENT — PAIN SCALES - GENERAL
PAINLEVEL_OUTOF10: 10
PAINLEVEL_OUTOF10: 10
PAINLEVEL_OUTOF10: 9
PAINLEVEL_OUTOF10: 10
PAINLEVEL_OUTOF10: 8
PAINLEVEL_OUTOF10: 9
PAINLEVEL_OUTOF10: 7
PAINLEVEL_OUTOF10: 10

## 2018-10-23 ASSESSMENT — PAIN DESCRIPTION - PAIN TYPE
TYPE: ACUTE PAIN
TYPE: ACUTE PAIN

## 2018-10-23 ASSESSMENT — PAIN DESCRIPTION - ORIENTATION
ORIENTATION: RIGHT;LOWER
ORIENTATION: RIGHT

## 2018-10-23 ASSESSMENT — PAIN DESCRIPTION - LOCATION
LOCATION: LEG
LOCATION: LEG

## 2018-10-23 ASSESSMENT — PAIN DESCRIPTION - DESCRIPTORS: DESCRIPTORS: THROBBING

## 2018-10-23 NOTE — CONSULTS
ROS: no chest pain or dyspnea on exertion  Gastrointestinal ROS: no abdominal pain, constipation, hematochezia  Genito-Urinary ROS: no dysuria, trouble voiding, or hematuria  Musculoskeletal ROS: negative for - joint pain, joint swelling or muscle pain  Neurological ROS:  TIA or stroke like symptom      PHYSICAL EXAM:    VITALS:  /73   Pulse 91   Temp 97.3 °F (36.3 °C) (Oral)   Resp 18   Wt 160 lb (72.6 kg)   SpO2 100%   BMI 26.63 kg/m²   INTAKE/OUTPUT:   No intake or output data in the 24 hours ending 10/22/18 2230      CONSTITUTIONAL:  Alert and oriented, no acute distress  HEAD: normocephalic, atraumatic  EYES: Pupils equal and reactive to light, Extraocular muscles intact, sclera non icteric  ENT: Mucus membranes moist, No otorrhea, no rhinorrhea  NECK:  supple, symmetrical, trachea midline   LUNGS:  Good air movement bilaterally, unlabored respirations, no wheezes or rhonchi  CARDIOVASCULAR: Regular rate and rhythm, no murmurs rubs or gallops  ABDOMEN: soft, non tender, non distended, no rebound or guarding, no hernias, no hepatomegaly, no splenomegly  : Deferred    Rectal:  Deferred   MUSCULOSKELETAL:  Equal strength bilaterally, normal muscle tone  SKIN: No abscess or rash  EXTREMITIES: Wound vac in place to right lateral fasciotomy incision, Dopplerable signals on DP, PT.  Necrosis of R great toe and digits 2-4 progressed from images in chart on 10/3, sensation intact, movement intact to MTP joints, no active drainage present, foot is warm to touch  NEUROLOGIC:  Cranial nerves 2-12 grossly intact, no focal deficits  PSYCH: affect appropriate      Labs:   Lab Results   Component Value Date    WBC 11.1 10/22/2018    HGB 11.4 10/22/2018    HCT 35.8 10/22/2018    MCV 88.2 10/22/2018     10/22/2018     Lab Results   Component Value Date     10/22/2018    K 3.8 10/22/2018     10/22/2018    CO2 19 10/22/2018    BUN 11 10/22/2018    CREATININE 0.67 10/22/2018    GLUCOSE 143 10/22/2018

## 2018-10-23 NOTE — DISCHARGE SUMMARY
Resident Physician    This dictation was generated by voice recognition computer software. Although all attempts are made to edit the dictation for accuracy, there may be errors in the transcription that are not intended.

## 2018-10-29 ENCOUNTER — OFFICE VISIT (OUTPATIENT)
Dept: VASCULAR SURGERY | Age: 43
End: 2018-10-29
Payer: COMMERCIAL

## 2018-10-29 VITALS
RESPIRATION RATE: 19 BRPM | SYSTOLIC BLOOD PRESSURE: 102 MMHG | WEIGHT: 160.05 LBS | HEART RATE: 119 BPM | BODY MASS INDEX: 26.67 KG/M2 | OXYGEN SATURATION: 97 % | HEIGHT: 65 IN | DIASTOLIC BLOOD PRESSURE: 60 MMHG

## 2018-10-29 DIAGNOSIS — I96 ISCHEMIC NECROSIS OF TOE (HCC): Primary | ICD-10-CM

## 2018-10-29 PROCEDURE — 99213 OFFICE O/P EST LOW 20 MIN: CPT | Performed by: SURGERY

## 2018-10-29 PROCEDURE — 1036F TOBACCO NON-USER: CPT | Performed by: SURGERY

## 2018-10-29 PROCEDURE — G8417 CALC BMI ABV UP PARAM F/U: HCPCS | Performed by: SURGERY

## 2018-10-29 PROCEDURE — 1111F DSCHRG MED/CURRENT MED MERGE: CPT | Performed by: SURGERY

## 2018-10-29 PROCEDURE — G8427 DOCREV CUR MEDS BY ELIG CLIN: HCPCS | Performed by: SURGERY

## 2018-10-29 PROCEDURE — G8484 FLU IMMUNIZE NO ADMIN: HCPCS | Performed by: SURGERY

## 2018-10-29 PROCEDURE — G8598 ASA/ANTIPLAT THER USED: HCPCS | Performed by: SURGERY

## 2018-10-30 NOTE — PROGRESS NOTES
Kinza Goldman comes in today for follow up from the hospital.  She has a complicated recent history but it stems down to having embolic events to her lower extremities. Most recently she was treated at [facility] with thrombolysis and fasciotomy. Her toes seem to have taken a blunt of the injury with showering to the tips of her toes. She has been changing her dressings every other day along with wound vac changes to her lateral fasciotomy site which was left open. She denies any rest pain to her leg, no fevers or chills. All her dressings were removed. There were two sutures still left in place that were removed from her fasciotomy site. There was some granulation over growth on the superior aspect of fasciotomy that was sharply excised and silver nitrate applied. The fasciotomy site is covered with healthy beefy red granulation tissue. Her toes have their skin sloughed off, the tips of her great toe, third and fourth toes are gangrenous and dry. We will continue wound vac therapy to her fasciotomy site and transition to wet to dry in a few weeks. I showed her and explained to her how to keep her toes clean and dry by flossing it with gauze everyday and change the dressings twice a day. For now she does not require any amputation because they are not infected and they are not causing her too much pain. They likely will not come back and I did explain to her that ideally if we keep them dry then hopefully she only looses the tips of her toes. She will follow up in one month for re-evaluation.     Electronically signed by Dominique Martinez MD on 10/30/18 at 11:03 AM

## 2018-11-04 ENCOUNTER — HOSPITAL ENCOUNTER (EMERGENCY)
Age: 43
Discharge: HOME | End: 2018-11-04
Attending: EMERGENCY MEDICINE
Payer: COMMERCIAL

## 2018-11-04 VITALS
HEIGHT: 65 IN | DIASTOLIC BLOOD PRESSURE: 79 MMHG | BODY MASS INDEX: 26.66 KG/M2 | TEMPERATURE: 98 F | RESPIRATION RATE: 16 BRPM | WEIGHT: 160 LBS | HEART RATE: 91 BPM | OXYGEN SATURATION: 99 % | SYSTOLIC BLOOD PRESSURE: 138 MMHG

## 2018-11-04 DIAGNOSIS — M79.604 RIGHT LEG PAIN: Primary | ICD-10-CM

## 2018-11-04 PROCEDURE — 6370000000 HC RX 637 (ALT 250 FOR IP): Performed by: EMERGENCY MEDICINE

## 2018-11-04 PROCEDURE — 99284 EMERGENCY DEPT VISIT MOD MDM: CPT

## 2018-11-04 RX ORDER — HYDROCODONE BITARTRATE AND ACETAMINOPHEN 5; 325 MG/1; MG/1
1 TABLET ORAL ONCE
Status: COMPLETED | OUTPATIENT
Start: 2018-11-04 | End: 2018-11-04

## 2018-11-04 RX ORDER — OXYCODONE HYDROCHLORIDE AND ACETAMINOPHEN 5; 325 MG/1; MG/1
1 TABLET ORAL ONCE
Status: DISCONTINUED | OUTPATIENT
Start: 2018-11-04 | End: 2018-11-04

## 2018-11-04 RX ADMIN — HYDROCODONE BITARTRATE AND ACETAMINOPHEN 1 TABLET: 5; 325 TABLET ORAL at 21:44

## 2018-11-04 ASSESSMENT — PAIN SCALES - GENERAL
PAINLEVEL_OUTOF10: 8
PAINLEVEL_OUTOF10: 8

## 2018-11-04 ASSESSMENT — ENCOUNTER SYMPTOMS
NAUSEA: 0
COLOR CHANGE: 0
CHEST TIGHTNESS: 0
SHORTNESS OF BREATH: 0
ABDOMINAL PAIN: 0
VOMITING: 0

## 2018-11-04 ASSESSMENT — PAIN DESCRIPTION - DESCRIPTORS: DESCRIPTORS: BURNING

## 2018-11-04 ASSESSMENT — PAIN DESCRIPTION - LOCATION: LOCATION: LEG

## 2018-11-04 ASSESSMENT — PAIN DESCRIPTION - ORIENTATION: ORIENTATION: RIGHT

## 2018-11-04 ASSESSMENT — PAIN DESCRIPTION - PAIN TYPE: TYPE: ACUTE PAIN

## 2018-11-05 NOTE — ED PROVIDER NOTES
[facility]  Emergency Department Encounter  EmergencyMedicine Resident     Pt Rae Rendon  MRN: [id number]  Emigdiogflev 1975  Date of evaluation: 18  PCP:  Yvette Jones MD    [address] [place] [address]       Chief Complaint   Patient presents with    Leg Pain     right wound        HISTORY OF PRESENT ILLNESS  (Location/Symptom, Timing/Onset, Context/Setting, Quality, Duration, Modifying Factors, Severity.)      Gustavo Ngo is a 37 y.o. female who presents with Right leg pain. Patient has a history of thrombosis of her leg which was treated but then required fasciotomy secondary to intense swelling compartment syndrome. She now has a chronic wound VAC in the right lower extremity and necrotic toes. She is being followed by vascular surgery. She comes in reports increasing pain and some drainage from the wound VAC. Patient has been doing her dressing changes daily. She states her wound VAC is still functioning well. She denies any fevers, chills, nausea or vomiting. PAST MEDICAL / SURGICAL / SOCIAL / FAMILY HISTORY      has a past medical history of Abnormal Pap smear of cervix; Chronic back pain; Claudication ([address] Depression; Headache(784.0); Heart murmur; Hyperlipidemia; Hypertension; Migraine headache with aura; Obesity; Osteoarthritis; and PVD (peripheral vascular disease) ([address] has a past surgical history that includes Tubal ligation ();  section (); other surgical history (2017); other surgical history (Left, 2018); other surgical history (Left, 2018); vascular surgery (Left, 2018); pr reoperation, bypass graft (Left, 2018); and pr vein bypass graft,fem-pop (Left, 2018). Social History     Social History    Marital status: Single     Spouse name: N/A    Number of children: N/A    Years of education: N/A     Occupational History    Not on file.      Social History Main Topics    Smoking status: Never Smoker  Smokeless tobacco: Never Used    Alcohol use Yes      Comment: WINE 4 O 6 GLASSES  A YEAR    Drug use: No    Sexual activity: Not Currently     Other Topics Concern    Not on file     Social History Narrative    No narrative on file       Family History   Problem Relation Age of Onset    Diabetes Mother     High Blood Pressure Mother     Heart Attack Mother     Heart Disease Mother     Kidney Disease Mother     High Blood Pressure Father     Heart Disease Father     Heart Attack Father     Diabetes Sister     High Blood Pressure Sister     Diabetes Sister     High Blood Pressure Sister        Allergies:  Terra Peterson [aspirin]; Lopid [gemfibrozil]; Nortriptyline; Pcn [penicillins]; Sulfa antibiotics; and Ibuprofen    Home Medications:  Prior to Admission medications    Medication Sig Start Date End Date Taking? Authorizing Provider   amLODIPine (NORVASC) 10 MG tablet take 1 tablet by mouth once daily 10/19/18  Yes Fredo Thakkar MD   atorvastatin (LIPITOR) 40 MG tablet Take 1 tablet by mouth daily 10/19/18  Yes Fredo Thakkar MD   DULoxetine (CYMBALTA) 30 MG extended release capsule Take 1 capsule by mouth every 12 hours 10/19/18  Yes Fredo Thakkar MD   lisinopril (PRINIVIL;ZESTRIL) 5 MG tablet Take 1 tablet by mouth daily 10/19/18  Yes Fredo Thakkar MD   mupirocin (BACTROBAN) 2 % cream Apply 3 times daily. 10/19/18 11/18/18 Yes Fredo Thakkar MD   apixaban (ELIQUIS) 5 MG TABS tablet Take 1 tablet by mouth 2 times daily 10/3/18  Yes Nichole Estrada MD   Multiple Vitamins-Minerals (THERAPEUTIC MULTIVITAMIN-MINERALS) tablet Take 1 tablet by mouth daily   Yes Historical Provider, MD   diazepam (VALIUM) 2 MG tablet Take 2 mg by mouth 3 times daily as needed for Anxiety. .   Yes Historical Provider, MD   vitamin C (ASCORBIC ACID) 500 MG tablet Take 500 mg by mouth daily   Yes Historical Provider, MD   clopidogrel (PLAVIX) 75 MG tablet take 1 tablet by mouth once daily 8/3/18  Yes Vernon Colon MD   docusate [id number]

## 2018-11-05 NOTE — ED NOTES
Pt resting in bed, no needs at this time. Will continue to monitor.       Mateo June RN  11/04/18 [id number]

## 2018-11-05 NOTE — ED PROVIDER NOTES
[facility] [id number]     Emergency Department     Faculty Attestation    I performed a history and physical examination of the patient and discussed management with the resident. I reviewed the residents note and agree with the documented findings and plan of care. Any areas of disagreement are noted on the chart. I was personally present for the key portions of any procedures. I have documented in the chart those procedures where I was not present during the key portions. I have reviewed the emergency nurses triage note. I agree with the chief complaint, past medical history, past surgical history, allergies, medications, social and family history as documented unless otherwise noted below. For Physician Assistant/ Nurse Practitioner cases/documentation I have personally evaluated this patient and have completed at least one if not all key elements of the E/M (history, physical exam, and MDM). Additional findings are as noted. I have personally seen and evaluated the patient. I find the patient's history and physical exam are consistent with the NP/PA documentation. I agree with the care provided, treatment rendered, disposition and follow-up plan. Necrotic digits of the right foot is compartments all. B saw neurovascular      Drea Acosta M.D.   Attending Emergency  Physician              Maisha Rodas MD  11/04/18 [id number]

## 2018-11-12 ENCOUNTER — TELEPHONE (OUTPATIENT)
Dept: VASCULAR SURGERY | Age: 43
End: 2018-11-12

## 2018-11-13 DIAGNOSIS — Z76.0 MEDICATION REFILL: ICD-10-CM

## 2018-11-14 RX ORDER — DULOXETIN HYDROCHLORIDE 30 MG/1
30 CAPSULE, DELAYED RELEASE ORAL EVERY 12 HOURS
Qty: 30 CAPSULE | Refills: 0 | Status: SHIPPED | OUTPATIENT
Start: 2018-11-14 | End: 2019-03-21 | Stop reason: ALTCHOICE

## 2018-11-21 DIAGNOSIS — I10 ESSENTIAL HYPERTENSION: ICD-10-CM

## 2018-11-21 RX ORDER — AMLODIPINE BESYLATE 10 MG/1
TABLET ORAL
Qty: 10 TABLET | Refills: 1 | Status: SHIPPED | OUTPATIENT
Start: 2018-11-21 | End: 2018-12-12 | Stop reason: SDUPTHER

## 2018-11-26 ENCOUNTER — OFFICE VISIT (OUTPATIENT)
Dept: VASCULAR SURGERY | Age: 43
End: 2018-11-26
Payer: COMMERCIAL

## 2018-11-26 VITALS
WEIGHT: 160.05 LBS | OXYGEN SATURATION: 99 % | DIASTOLIC BLOOD PRESSURE: 60 MMHG | HEIGHT: 65 IN | SYSTOLIC BLOOD PRESSURE: 118 MMHG | HEART RATE: 61 BPM | RESPIRATION RATE: 17 BRPM | BODY MASS INDEX: 26.67 KG/M2

## 2018-11-26 DIAGNOSIS — Z09 POSTOP CHECK: Primary | ICD-10-CM

## 2018-11-26 PROBLEM — S81.801A WOUND OF RIGHT LEG: Status: ACTIVE | Noted: 2018-11-26

## 2018-11-26 PROBLEM — I73.9 PAD (PERIPHERAL ARTERY DISEASE) (HCC): Status: ACTIVE | Noted: 2018-11-26

## 2018-11-26 PROCEDURE — 99212 OFFICE O/P EST SF 10 MIN: CPT | Performed by: SURGERY

## 2018-11-26 PROCEDURE — G8417 CALC BMI ABV UP PARAM F/U: HCPCS | Performed by: SURGERY

## 2018-11-26 PROCEDURE — G8598 ASA/ANTIPLAT THER USED: HCPCS | Performed by: SURGERY

## 2018-11-26 PROCEDURE — G8484 FLU IMMUNIZE NO ADMIN: HCPCS | Performed by: SURGERY

## 2018-11-26 PROCEDURE — G8427 DOCREV CUR MEDS BY ELIG CLIN: HCPCS | Performed by: SURGERY

## 2018-11-26 PROCEDURE — 1036F TOBACCO NON-USER: CPT | Performed by: SURGERY

## 2018-11-28 PROBLEM — I51.81 TAKOTSUBO CARDIOMYOPATHY: Status: ACTIVE | Noted: 2018-09-14

## 2018-12-12 DIAGNOSIS — I10 ESSENTIAL HYPERTENSION: ICD-10-CM

## 2018-12-13 RX ORDER — LISINOPRIL 5 MG/1
TABLET ORAL
Qty: 30 TABLET | Refills: 1 | Status: SHIPPED | OUTPATIENT
Start: 2018-12-13 | End: 2019-03-26 | Stop reason: SDUPTHER

## 2018-12-13 RX ORDER — AMLODIPINE BESYLATE 10 MG/1
TABLET ORAL
Qty: 10 TABLET | Refills: 1 | Status: SHIPPED | OUTPATIENT
Start: 2018-12-13 | End: 2019-01-22 | Stop reason: SDUPTHER

## 2018-12-17 ENCOUNTER — OFFICE VISIT (OUTPATIENT)
Dept: VASCULAR SURGERY | Age: 43
End: 2018-12-17

## 2018-12-17 VITALS
HEART RATE: 70 BPM | SYSTOLIC BLOOD PRESSURE: 118 MMHG | HEIGHT: 65 IN | WEIGHT: 160.05 LBS | OXYGEN SATURATION: 99 % | BODY MASS INDEX: 26.67 KG/M2 | RESPIRATION RATE: 17 BRPM | DIASTOLIC BLOOD PRESSURE: 72 MMHG

## 2018-12-17 DIAGNOSIS — Z09 POSTOP CHECK: Primary | ICD-10-CM

## 2018-12-17 PROCEDURE — 99024 POSTOP FOLLOW-UP VISIT: CPT | Performed by: SURGERY

## 2019-01-09 ENCOUNTER — TELEPHONE (OUTPATIENT)
Dept: FAMILY MEDICINE CLINIC | Age: 44
End: 2019-01-09

## 2019-01-22 ENCOUNTER — OFFICE VISIT (OUTPATIENT)
Dept: FAMILY MEDICINE CLINIC | Age: 44
End: 2019-01-22
Payer: COMMERCIAL

## 2019-01-22 VITALS
DIASTOLIC BLOOD PRESSURE: 77 MMHG | TEMPERATURE: 96.4 F | HEART RATE: 64 BPM | WEIGHT: 172 LBS | SYSTOLIC BLOOD PRESSURE: 124 MMHG | BODY MASS INDEX: 28.62 KG/M2

## 2019-01-22 DIAGNOSIS — E78.5 HYPERLIPIDEMIA, UNSPECIFIED HYPERLIPIDEMIA TYPE: ICD-10-CM

## 2019-01-22 DIAGNOSIS — N93.9 ABNORMAL UTERINE BLEEDING: ICD-10-CM

## 2019-01-22 DIAGNOSIS — I73.9 PAD (PERIPHERAL ARTERY DISEASE) (HCC): Primary | ICD-10-CM

## 2019-01-22 DIAGNOSIS — I10 ESSENTIAL HYPERTENSION: ICD-10-CM

## 2019-01-22 PROCEDURE — G8417 CALC BMI ABV UP PARAM F/U: HCPCS | Performed by: FAMILY MEDICINE

## 2019-01-22 PROCEDURE — G8484 FLU IMMUNIZE NO ADMIN: HCPCS | Performed by: FAMILY MEDICINE

## 2019-01-22 PROCEDURE — G8598 ASA/ANTIPLAT THER USED: HCPCS | Performed by: FAMILY MEDICINE

## 2019-01-22 PROCEDURE — G8427 DOCREV CUR MEDS BY ELIG CLIN: HCPCS | Performed by: FAMILY MEDICINE

## 2019-01-22 PROCEDURE — 1036F TOBACCO NON-USER: CPT | Performed by: FAMILY MEDICINE

## 2019-01-22 PROCEDURE — 99213 OFFICE O/P EST LOW 20 MIN: CPT | Performed by: STUDENT IN AN ORGANIZED HEALTH CARE EDUCATION/TRAINING PROGRAM

## 2019-01-22 RX ORDER — AMLODIPINE BESYLATE 10 MG/1
TABLET ORAL
Qty: 30 TABLET | Refills: 3 | Status: ON HOLD | OUTPATIENT
Start: 2019-01-22 | End: 2019-08-04 | Stop reason: SDUPTHER

## 2019-01-22 RX ORDER — ATORVASTATIN CALCIUM 40 MG/1
40 TABLET, FILM COATED ORAL DAILY
Qty: 30 TABLET | Refills: 3 | Status: ON HOLD | OUTPATIENT
Start: 2019-01-22 | End: 2019-08-04 | Stop reason: SDUPTHER

## 2019-01-22 ASSESSMENT — ENCOUNTER SYMPTOMS
ABDOMINAL PAIN: 0
SHORTNESS OF BREATH: 0

## 2019-01-28 ENCOUNTER — OFFICE VISIT (OUTPATIENT)
Dept: VASCULAR SURGERY | Age: 44
End: 2019-01-28

## 2019-01-28 VITALS
HEART RATE: 67 BPM | DIASTOLIC BLOOD PRESSURE: 81 MMHG | WEIGHT: 171.96 LBS | OXYGEN SATURATION: 95 % | HEIGHT: 65 IN | BODY MASS INDEX: 28.65 KG/M2 | SYSTOLIC BLOOD PRESSURE: 116 MMHG

## 2019-01-28 DIAGNOSIS — S81.801D WOUND OF RIGHT LOWER EXTREMITY, SUBSEQUENT ENCOUNTER: ICD-10-CM

## 2019-01-28 DIAGNOSIS — Z09 POSTOP CHECK: ICD-10-CM

## 2019-01-28 DIAGNOSIS — I73.9 PAD (PERIPHERAL ARTERY DISEASE) (HCC): Primary | ICD-10-CM

## 2019-01-28 PROCEDURE — 99024 POSTOP FOLLOW-UP VISIT: CPT | Performed by: SURGERY

## 2019-02-01 ENCOUNTER — TELEPHONE (OUTPATIENT)
Dept: VASCULAR SURGERY | Age: 44
End: 2019-02-01

## 2019-02-04 ENCOUNTER — OFFICE VISIT (OUTPATIENT)
Dept: PODIATRY | Age: 44
End: 2019-02-04
Payer: MEDICAID

## 2019-02-04 VITALS
SYSTOLIC BLOOD PRESSURE: 132 MMHG | HEIGHT: 65 IN | DIASTOLIC BLOOD PRESSURE: 81 MMHG | WEIGHT: 178 LBS | HEART RATE: 71 BPM | BODY MASS INDEX: 29.66 KG/M2

## 2019-02-04 DIAGNOSIS — Z95.9 STATUS POST ARTERIAL STENT: ICD-10-CM

## 2019-02-04 DIAGNOSIS — Z86.718 HISTORY OF DVT (DEEP VEIN THROMBOSIS): ICD-10-CM

## 2019-02-04 DIAGNOSIS — D68.9 CLOTTING DISORDER (HCC): ICD-10-CM

## 2019-02-04 DIAGNOSIS — I73.9 PVD (PERIPHERAL VASCULAR DISEASE) (HCC): Primary | ICD-10-CM

## 2019-02-04 DIAGNOSIS — M96.89: ICD-10-CM

## 2019-02-04 DIAGNOSIS — S81.801A OPEN WOUND OF LOWER LEG, RIGHT, INITIAL ENCOUNTER: ICD-10-CM

## 2019-02-04 DIAGNOSIS — I96 DRY GANGRENE (HCC): ICD-10-CM

## 2019-02-04 DIAGNOSIS — M79.A21: ICD-10-CM

## 2019-02-04 PROCEDURE — 11045 DBRDMT SUBQ TISS EACH ADDL: CPT | Performed by: STUDENT IN AN ORGANIZED HEALTH CARE EDUCATION/TRAINING PROGRAM

## 2019-02-04 PROCEDURE — 99212 OFFICE O/P EST SF 10 MIN: CPT | Performed by: STUDENT IN AN ORGANIZED HEALTH CARE EDUCATION/TRAINING PROGRAM

## 2019-02-04 PROCEDURE — G8484 FLU IMMUNIZE NO ADMIN: HCPCS | Performed by: STUDENT IN AN ORGANIZED HEALTH CARE EDUCATION/TRAINING PROGRAM

## 2019-02-04 PROCEDURE — G8417 CALC BMI ABV UP PARAM F/U: HCPCS | Performed by: STUDENT IN AN ORGANIZED HEALTH CARE EDUCATION/TRAINING PROGRAM

## 2019-02-04 PROCEDURE — G8598 ASA/ANTIPLAT THER USED: HCPCS | Performed by: STUDENT IN AN ORGANIZED HEALTH CARE EDUCATION/TRAINING PROGRAM

## 2019-02-04 PROCEDURE — 99213 OFFICE O/P EST LOW 20 MIN: CPT | Performed by: STUDENT IN AN ORGANIZED HEALTH CARE EDUCATION/TRAINING PROGRAM

## 2019-02-04 PROCEDURE — G8427 DOCREV CUR MEDS BY ELIG CLIN: HCPCS | Performed by: STUDENT IN AN ORGANIZED HEALTH CARE EDUCATION/TRAINING PROGRAM

## 2019-02-04 PROCEDURE — 1036F TOBACCO NON-USER: CPT | Performed by: STUDENT IN AN ORGANIZED HEALTH CARE EDUCATION/TRAINING PROGRAM

## 2019-02-04 PROCEDURE — 11042 DBRDMT SUBQ TIS 1ST 20SQCM/<: CPT | Performed by: STUDENT IN AN ORGANIZED HEALTH CARE EDUCATION/TRAINING PROGRAM

## 2019-02-04 RX ORDER — ACETAMINOPHEN 650 MG
TABLET, EXTENDED RELEASE ORAL
Qty: 1 BOTTLE | Refills: 0 | Status: SHIPPED | OUTPATIENT
Start: 2019-02-04 | End: 2019-02-11

## 2019-02-18 ENCOUNTER — TELEPHONE (OUTPATIENT)
Dept: PODIATRY | Age: 44
End: 2019-02-18

## 2019-03-21 ENCOUNTER — OFFICE VISIT (OUTPATIENT)
Dept: OBGYN | Age: 44
End: 2019-03-21
Payer: COMMERCIAL

## 2019-03-21 ENCOUNTER — HOSPITAL ENCOUNTER (OUTPATIENT)
Age: 44
Setting detail: SPECIMEN
Discharge: HOME | End: 2019-03-21
Payer: COMMERCIAL

## 2019-03-21 VITALS
HEIGHT: 65 IN | SYSTOLIC BLOOD PRESSURE: 144 MMHG | WEIGHT: 178.9 LBS | BODY MASS INDEX: 29.81 KG/M2 | HEART RATE: 70 BPM | DIASTOLIC BLOOD PRESSURE: 86 MMHG

## 2019-03-21 DIAGNOSIS — N93.9 ABNORMAL UTERINE BLEEDING (AUB): Primary | ICD-10-CM

## 2019-03-21 PROBLEM — M79.673 FOOT PAIN: Status: ACTIVE | Noted: 2017-02-10

## 2019-03-21 PROCEDURE — G8484 FLU IMMUNIZE NO ADMIN: HCPCS | Performed by: OBSTETRICS & GYNECOLOGY

## 2019-03-21 PROCEDURE — G8417 CALC BMI ABV UP PARAM F/U: HCPCS | Performed by: OBSTETRICS & GYNECOLOGY

## 2019-03-21 PROCEDURE — 1036F TOBACCO NON-USER: CPT | Performed by: OBSTETRICS & GYNECOLOGY

## 2019-03-21 PROCEDURE — 99203 OFFICE O/P NEW LOW 30 MIN: CPT | Performed by: OBSTETRICS & GYNECOLOGY

## 2019-03-21 PROCEDURE — G8427 DOCREV CUR MEDS BY ELIG CLIN: HCPCS | Performed by: OBSTETRICS & GYNECOLOGY

## 2019-03-21 PROCEDURE — G8598 ASA/ANTIPLAT THER USED: HCPCS | Performed by: OBSTETRICS & GYNECOLOGY

## 2019-03-21 PROCEDURE — 99213 OFFICE O/P EST LOW 20 MIN: CPT | Performed by: OBSTETRICS & GYNECOLOGY

## 2019-03-21 RX ORDER — POVIDONE IODINE 10% 100 MG/ML
LIQUID TOPICAL
Refills: 0 | COMMUNITY
Start: 2019-03-01 | End: 2019-03-21 | Stop reason: ALTCHOICE

## 2019-03-25 LAB
HPV SAMPLE: ABNORMAL
HPV, GENOTYPE 16: DETECTED
HPV, GENOTYPE 18: NOT DETECTED
HPV, HIGH RISK OTHER: NOT DETECTED
HPV, INTERPRETATION: ABNORMAL
SPECIMEN DESCRIPTION: ABNORMAL

## 2019-03-26 DIAGNOSIS — I10 ESSENTIAL HYPERTENSION: ICD-10-CM

## 2019-03-29 RX ORDER — LISINOPRIL 5 MG/1
TABLET ORAL
Qty: 30 TABLET | Refills: 0 | Status: SHIPPED | OUTPATIENT
Start: 2019-03-29 | End: 2019-05-28 | Stop reason: SDUPTHER

## 2019-04-02 ENCOUNTER — APPOINTMENT (OUTPATIENT)
Dept: GENERAL RADIOLOGY | Age: 44
End: 2019-04-02
Payer: COMMERCIAL

## 2019-04-02 ENCOUNTER — HOSPITAL ENCOUNTER (EMERGENCY)
Age: 44
Discharge: HOME | End: 2019-04-02
Attending: EMERGENCY MEDICINE
Payer: COMMERCIAL

## 2019-04-02 VITALS
SYSTOLIC BLOOD PRESSURE: 176 MMHG | HEART RATE: 95 BPM | TEMPERATURE: 98.2 F | OXYGEN SATURATION: 98 % | RESPIRATION RATE: 18 BRPM | DIASTOLIC BLOOD PRESSURE: 95 MMHG

## 2019-04-02 DIAGNOSIS — I96 GANGRENE (HCC): ICD-10-CM

## 2019-04-02 DIAGNOSIS — I73.9 PAD (PERIPHERAL ARTERY DISEASE) (HCC): Primary | ICD-10-CM

## 2019-04-02 LAB
ABSOLUTE EOS #: 0.11 K/UL (ref 0–0.44)
ABSOLUTE IMMATURE GRANULOCYTE: 0.03 K/UL (ref 0–0.3)
ABSOLUTE LYMPH #: 2.5 K/UL (ref 1.1–3.7)
ABSOLUTE MONO #: 0.56 K/UL (ref 0.1–1.2)
ANION GAP SERPL CALCULATED.3IONS-SCNC: 14 MMOL/L (ref 9–17)
BASOPHILS # BLD: 1 % (ref 0–2)
BASOPHILS ABSOLUTE: 0.04 K/UL (ref 0–0.2)
BUN BLDV-MCNC: 11 MG/DL (ref 6–20)
BUN/CREAT BLD: NORMAL (ref 9–20)
C-REACTIVE PROTEIN: 8.3 MG/L (ref 0–5)
CALCIUM SERPL-MCNC: 10 MG/DL (ref 8.6–10.4)
CHLORIDE BLD-SCNC: 102 MMOL/L (ref 98–107)
CO2: 22 MMOL/L (ref 20–31)
CREAT SERPL-MCNC: 0.58 MG/DL (ref 0.5–0.9)
DIFFERENTIAL TYPE: ABNORMAL
EOSINOPHILS RELATIVE PERCENT: 2 % (ref 1–4)
GFR AFRICAN AMERICAN: >60 ML/MIN
GFR NON-AFRICAN AMERICAN: >60 ML/MIN
GFR SERPL CREATININE-BSD FRML MDRD: NORMAL ML/MIN/{1.73_M2}
GFR SERPL CREATININE-BSD FRML MDRD: NORMAL ML/MIN/{1.73_M2}
GLUCOSE BLD-MCNC: 94 MG/DL (ref 70–99)
HCT VFR BLD CALC: 45.6 % (ref 36.3–47.1)
HEMOGLOBIN: 15 G/DL (ref 11.9–15.1)
IMMATURE GRANULOCYTES: 0 %
LYMPHOCYTES # BLD: 33 % (ref 24–43)
MCH RBC QN AUTO: 28.6 PG (ref 25.2–33.5)
MCHC RBC AUTO-ENTMCNC: 32.9 G/DL (ref 28.4–34.8)
MCV RBC AUTO: 86.9 FL (ref 82.6–102.9)
MONOCYTES # BLD: 7 % (ref 3–12)
NRBC AUTOMATED: 0 PER 100 WBC
PDW BLD-RTO: 14.7 % (ref 11.8–14.4)
PLATELET # BLD: 322 K/UL (ref 138–453)
PLATELET ESTIMATE: ABNORMAL
PMV BLD AUTO: 10.1 FL (ref 8.1–13.5)
POTASSIUM SERPL-SCNC: 4.3 MMOL/L (ref 3.7–5.3)
RBC # BLD: 5.25 M/UL (ref 3.95–5.11)
RBC # BLD: ABNORMAL 10*6/UL
SEDIMENTATION RATE, ERYTHROCYTE: 8 MM (ref 0–20)
SEG NEUTROPHILS: 57 % (ref 36–65)
SEGMENTED NEUTROPHILS ABSOLUTE COUNT: 4.34 K/UL (ref 1.5–8.1)
SODIUM BLD-SCNC: 138 MMOL/L (ref 135–144)
WBC # BLD: 7.6 K/UL (ref 3.5–11.3)
WBC # BLD: ABNORMAL 10*3/UL

## 2019-04-02 PROCEDURE — 85025 COMPLETE CBC W/AUTO DIFF WBC: CPT

## 2019-04-02 PROCEDURE — 80048 BASIC METABOLIC PNL TOTAL CA: CPT

## 2019-04-02 PROCEDURE — 86140 C-REACTIVE PROTEIN: CPT

## 2019-04-02 PROCEDURE — 73630 X-RAY EXAM OF FOOT: CPT

## 2019-04-02 PROCEDURE — 99284 EMERGENCY DEPT VISIT MOD MDM: CPT

## 2019-04-02 PROCEDURE — 85651 RBC SED RATE NONAUTOMATED: CPT

## 2019-04-02 PROCEDURE — 6370000000 HC RX 637 (ALT 250 FOR IP): Performed by: PHYSICIAN ASSISTANT

## 2019-04-02 RX ORDER — OXYCODONE HYDROCHLORIDE AND ACETAMINOPHEN 5; 325 MG/1; MG/1
1 TABLET ORAL ONCE
Status: COMPLETED | OUTPATIENT
Start: 2019-04-02 | End: 2019-04-02

## 2019-04-02 RX ADMIN — OXYCODONE HYDROCHLORIDE AND ACETAMINOPHEN 1 TABLET: 5; 325 TABLET ORAL at 18:40

## 2019-04-02 ASSESSMENT — ENCOUNTER SYMPTOMS
NAUSEA: 0
BACK PAIN: 0
RHINORRHEA: 0
SHORTNESS OF BREATH: 0
COLOR CHANGE: 1
WHEEZING: 0
EYE PAIN: 0
DIARRHEA: 0
EYE DISCHARGE: 0
EYE ITCHING: 0
VOMITING: 0
COUGH: 0
SORE THROAT: 0

## 2019-04-02 ASSESSMENT — PAIN SCALES - GENERAL
PAINLEVEL_OUTOF10: 8
PAINLEVEL_OUTOF10: 8

## 2019-04-02 ASSESSMENT — PAIN DESCRIPTION - DESCRIPTORS: DESCRIPTORS: BURNING;ACHING;CONSTANT

## 2019-04-02 ASSESSMENT — PAIN DESCRIPTION - LOCATION: LOCATION: FOOT

## 2019-04-02 ASSESSMENT — PAIN DESCRIPTION - ORIENTATION: ORIENTATION: RIGHT

## 2019-04-02 NOTE — ED PROVIDER NOTES
[facility]     Emergency Department     Faculty Attestation    I performed a history and physical examination of the patient and discussed management with the resident. I reviewed the residents note and agree with the documented findings and plan of care. Any areas of disagreement are noted on the chart. I was personally present for the key portions of any procedures. I have documented in the chart those procedures where I was not present during the key portions. I have reviewed the emergency nurses triage note. I agree with the chief complaint, past medical history, past surgical history, allergies, medications, social and family history as documented unless otherwise noted below. For Physician Assistant/ Nurse Practitioner cases/documentation I have personally evaluated this patient and have completed at least one if not all key elements of the E/M (history, physical exam, and MDM). Additional findings are as noted. Dry gangrene right great and middle toes. Will check pulses by Doppler. Plan is for x-ray, lab work, podiatry consult.       Javan Carrillo MD  04/02/19 6244

## 2019-04-02 NOTE — ED PROVIDER NOTES
[facility] ED  Emergency Department Encounter  Advanced Practice Provider     Pt Name: Yulisa Fisher  MRN: [id number]  Emigdiogflev 1975  Date of evaluation: 4/2/19  PCP:  Hilda Maier MD    [address] [place] [address]       Chief Complaint   Patient presents with    Foot Pain     RIGHT FOOT HAS FIRST 2ND AND THIRD GANGRENE AS CHRONIC ISSUE FOR SOME TIME. HISTORY OF PRESENT ILLNESS  (Location/Symptom, Timing/Onset,Context/Setting, Quality, Duration, Modifying Factors, Severity.)      Gale Goldberg is a 37 y.o. female who presents with pain to the right great toe. Patient has Cronic gangrenous changes to the right first second and third toe. She states that the skin changes have been gradual but they have been worsening as well as a sharp increase in pain which started 2 days ago. She states that there has been some yellowish drainage from the area. She has been trying to walk more. Patient states that she was supposed to have some more testing done about a month ago and she has a history of blood clots as well as history of a stent placement due to vascular issue but her insurance had lapsed. She did reschedule her appointment with podiatry, Dr. Irma Roberts but it isn't until next month. She does report some subjective fever and chills.   She does have a history of bypass in the left leg    PAST MEDICAL /SURGICAL / SOCIAL / FAMILY HISTORY      has a past medical history of Abnormal Pap smear of cervix, Anemia, Chronic back pain, Claudication (HCC), Claudication in peripheral vascular disease ([address] Depression, Headache(784.0), Heart murmur, Hemorrhage of rectum and anus, HTN (hypertension), Hypercoagulable state ([address] Hyperlipidemia, Hypertension, Intertriginous candidiasis, Left popliteal artery occlusion ([address] Leg pain, Menometrorrhagia, Migraine headache with aura, Obesity, Osteoarthritis, PAD (peripheral artery disease) ([address] PVD (peripheral vascular disease) ([address] Takotsubo cardiomyopathy, and Wound of right leg.     has a past surgical history that includes Tubal ligation ();  section (); other surgical history (2017); other surgical history (Left, 2018); other surgical history (Left, 2018); vascular surgery (Left, 2018); pr reoperation, bypass graft (Left, 2018); and pr vein bypass graft,fem-pop (Left, 2018).     Social History     Socioeconomic History    Marital status: Single     Spouse name: Not on file    Number of children: Not on file    Years of education: Not on file    Highest education level: Not on file   Occupational History    Not on file   Social Needs    Financial resource strain: Not on file    Food insecurity:     Worry: Not on file     Inability: Not on file    Transportation needs:     Medical: Not on file     Non-medical: Not on file   Tobacco Use    Smoking status: Never Smoker    Smokeless tobacco: Never Used   Substance and Sexual Activity    Alcohol use: Yes     Comment: WINE 4 O 6 GLASSES  A YEAR    Drug use: No    Sexual activity: Yes     Partners: Male   Lifestyle    Physical activity:     Days per week: Not on file     Minutes per session: Not on file    Stress: Not on file   Relationships    Social connections:     Talks on phone: Not on file     Gets together: Not on file     Attends Latter-day service: Not on file     Active member of club or organization: Not on file     Attends meetings of clubs or organizations: Not on file     Relationship status: Not on file    Intimate partner violence:     Fear of current or ex partner: Not on file     Emotionally abused: Not on file     Physically abused: Not on file     Forced sexual activity: Not on file   Other Topics Concern    Not on file   Social History Narrative    Not on file       Family History   Problem Relation Age of Onset    Diabetes Mother     High Blood Pressure Mother     Heart Attack Mother     Heart Disease Mother    Jaciel Rose Kidney Disease Mother     High Blood Pressure Father     Heart Disease Father     Heart Attack Father     Diabetes Sister     High Blood Pressure Sister     Diabetes Sister     High Blood Pressure Sister        Allergies:  Sheran Devin [aspirin]; Lopid [gemfibrozil]; Nortriptyline; Pcn [penicillins]; Sulfa antibiotics; and Ibuprofen    Home Medications:  Prior to Admission medications    Medication Sig Start Date End Date Taking? Authorizing Provider   lisinopril (PRINIVIL;ZESTRIL) 5 MG tablet take 1 tablet by mouth once daily 3/29/19  Yes Livier Snyder MD   amLODIPine (NORVASC) 10 MG tablet take 1 tablet by mouth once daily 1/22/19  Yes Livier Snyder MD   atorvastatin (LIPITOR) 40 MG tablet Take 1 tablet by mouth daily 1/22/19  Yes Livier Snyder MD   apixaban (ELIQUIS) 5 MG TABS tablet Take 1 tablet by mouth 2 times daily 10/3/18  Yes Francisco Leyden, MD   clopidogrel (PLAVIX) 75 MG tablet take 1 tablet by mouth once daily 8/3/18  Yes Livier Snyder MD   Blood Pressure KIT 1 Package by Does not apply route daily 12/18/17   Livier Snyder MD       patient's medication list has been reviewed as entered by the nursing staff. REVIEW OF SYSTEMS    (2-9 systems for level 4, 10 or more for level 5)      Review of Systems   Constitutional: Negative for chills and fever. HENT: Negative for ear pain, rhinorrhea and sore throat. Eyes: Negative for pain, discharge and itching. Respiratory: Negative for cough and wheezing. Cardiovascular: Negative for chest pain and palpitations. Gastrointestinal: Negative for nausea and vomiting. Musculoskeletal: Positive for arthralgias and myalgias. Negative for back pain. Skin: Positive for color change and wound. Psychiatric/Behavioral: Negative for dysphoric mood. PHYSICAL EXAM  (up to 7 for level 4, 8 or more for level 5)      INITIAL VITALS:  oral temperature is 98.2 °F (36.8 °C). Her blood pressure is 176/95 (abnormal) and her pulse is 95.  Her respiration is 18 and oxygen saturation is 98%. Physical Exam   Constitutional: She is oriented to person, place, and time. She appears well-developed and well-nourished. HENT:   Head: Normocephalic and atraumatic. Right Ear: External ear normal.   Left Ear: External ear normal.   Eyes: Right eye exhibits no discharge. Left eye exhibits no discharge. No scleral icterus. Neck: Normal range of motion. No tracheal deviation present. Cardiovascular: Normal rate, regular rhythm and normal heart sounds. Exam reveals no gallop. No murmur heard. Pulmonary/Chest: Effort normal and breath sounds normal. No stridor. No respiratory distress. Abdominal: There is no tenderness. There is no rebound and no guarding. Musculoskeletal: She exhibits no edema. Right foot: There is decreased range of motion, tenderness, bony tenderness, swelling and decreased capillary refill. The right great toe second toe and third toe with gangrenous changes distally. There is a palpable dorsalis pedis pulse. There is no active drainage from the distal right great toe   Neurological: She is alert and oriented to person, place, and time. Coordination normal.   Skin: Skin is warm and dry. No rash (on exposed surfaces) noted. She is not diaphoretic. No pallor. Psychiatric: She has a normal mood and affect.  Her behavior is normal.       DIFFERENTIAL  DIAGNOSIS       Gangrene, cellulitis    PLAN (LABS / IMAGING / EKG):  Orders Placed This Encounter   Procedures    XR FOOT RIGHT (MIN 3 VIEWS)    VL ARTERIAL PVR LOWER WO EXERCISE    CBC Auto Differential    Basic Metabolic Panel    C-REACTIVE PROTEIN    Sedimentation Rate    Inpatient consult to Podiatry    Insert peripheral IV       MEDICATIONS ORDERED:  Orders Placed This Encounter   Medications    oxyCODONE-acetaminophen (PERCOCET) 5-325 MG per tablet 1 tablet       Controlled Substances Monitoring:      DIAGNOSTIC RESULTS / EMERGENCY DEPARTMENT COURSE / MDM         ED Course as of Apr 96 2034   Tue Apr 02, 2019 1915 7:15 PM  Spoke with podiatry, they will be in to see    [SUZAN]   2033 8:33 PM  The podiatrist has been in to see the patient. No further care is needed at that time. They would like us to reorder the arterial segmental pressure vascular study that the patient was unable to get it. I did print that prescription, we were unable to find the exact order but he did direct us in the appropriate order. It patient to continue wound care at home, return for worsening symptoms, call tomorrow to see if they can move up her appointment    [SUZAN]      ED Course User Index  [SUZAN] Jr Lockhart PA-C         RADIOLOGY:   I directly visualized (with the attending physician) the following  imagesand reviewed the radiologist interpretations:  No results found.     XR FOOT RIGHT (MIN 3 VIEWS)   Final Result   Soft tissue swelling but no gas         VL ARTERIAL PVR LOWER WO EXERCISE    (Results Pending)       LABS:  Results for orders placed or performed during the hospital encounter of 04/02/19   CBC Auto Differential   Result Value Ref Range    WBC 7.6 3.5 - 11.3 k/uL    RBC 5.25 (H) 3.95 - 5.11 m/uL    Hemoglobin 15.0 11.9 - 15.1 g/dL    Hematocrit 45.6 36.3 - 47.1 %    MCV 86.9 82.6 - 102.9 fL    MCH 28.6 25.2 - 33.5 pg    MCHC 32.9 28.4 - 34.8 g/dL    RDW 14.7 (H) 11.8 - 14.4 %    Platelets 543 518 - 568 k/uL    MPV 10.1 8.1 - 13.5 fL    NRBC Automated 0.0 0.0 per 100 WBC    Differential Type NOT REPORTED     Seg Neutrophils 57 36 - 65 %    Lymphocytes 33 24 - 43 %    Monocytes 7 3 - 12 %    Eosinophils % 2 1 - 4 %    Basophils 1 0 - 2 %    Immature Granulocytes 0 0 %    Segs Absolute 4.34 1.50 - 8.10 k/uL    Absolute Lymph # 2.50 1.10 - 3.70 k/uL    Absolute Mono # 0.56 0.10 - 1.20 k/uL    Absolute Eos # 0.11 0.00 - 0.44 k/uL    Basophils # 0.04 0.00 - 0.20 k/uL    Absolute Immature Granulocyte 0.03 0.00 - 0.30 k/uL    WBC Morphology NOT REPORTED     RBC Morphology ANISOCYTOSIS PRESENT Platelet Estimate NOT REPORTED    Basic Metabolic Panel   Result Value Ref Range    Glucose 94 70 - 99 mg/dL    BUN 11 6 - 20 mg/dL    CREATININE 0.58 0.50 - 0.90 mg/dL    Bun/Cre Ratio NOT REPORTED 9 - 20    Calcium 10.0 8.6 - 10.4 mg/dL    Sodium 138 135 - 144 mmol/L    Potassium 4.3 3.7 - 5.3 mmol/L    Chloride 102 98 - 107 mmol/L    CO2 22 20 - 31 mmol/L    Anion Gap 14 9 - 17 mmol/L    GFR Non-African American >60 >60 mL/min    GFR African American >60 >60 mL/min    GFR Comment          GFR Staging NOT REPORTED    C-REACTIVE PROTEIN   Result Value Ref Range    CRP 8.3 (H) 0.0 - 5.0 mg/L   Sedimentation Rate   Result Value Ref Range    Sed Rate 8 0 - 20 mm         CONSULTS:  None    PROCEDURES:  None    FINAL IMPRESSION      1. PAD (peripheral artery disease) (HCC)    2. Gangrene ([address]          DISPOSITION / PLAN     DISPOSITION Decision To Discharge    PATIENT REFERRED TO:  Barrett Oviedo DPM  [address] [facility] [address]  [address] [facility]  [phone]      call tomorrow to see if they can move up your appt      DISCHARGE MEDICATIONS:  New Prescriptions    No medications on file       Douglas Cardozo PA-C   Emergency Medicine Physician Assistant    (Please note that portions of this note were completed with a voice recognition program.  Efforts were made to edit thedictations but occasionally words are mis-transcribed.)        Douglas Cardozo PA-C  04/02/19 2034

## 2019-04-03 NOTE — CONSULTS
Consultation Note  Podiatric Medicine and Surgery     Subjective     Chief Complaint: Right foot wound/pain    HPI:  Tom Blas is a 37 y.o. female seen at [facility] ED for right foot dry gangrene wounds and increased pain to digits 1-3. She reports subjective fever and nausea 4 days ago and increased sharp, shooting pain in her foot at night for the past 2 nights , which wakes her out of her sleep, which brought her into the ED. Patient underwent fasciotomy and thrombolysis at [facility] in September. She has been following with Dr. Charlene Joe and had bilateral LE angios in October. She follows with Dr. Jay Flores at the [facility] podiatry clinic and has been doing local wound care to her affected digits. She increased her walking as instructed and reports sanguinous drainage from the affected toes. She did not get her PVRs due to losing her insurance, but states she recently got it back. She denies being diabetic and smoking. She denies n/v/f/c/sob. PCP is Feliz Harris MD    ROS:   Review of Systems   Constitutional: Positive for chills. Negative for fever. Respiratory: Negative for cough and shortness of breath. Cardiovascular: Negative for chest pain. Gastrointestinal: Negative for diarrhea, nausea and vomiting. Musculoskeletal: Positive for myalgias. Skin: Positive for color change and wound. Neurological: Positive for numbness. Negative for dizziness. Psychiatric/Behavioral: Negative for agitation. The patient is not nervous/anxious.         Past Medical History   has a past medical history of Abnormal Pap smear of cervix, Anemia, Chronic back pain, Claudication (HCC), Claudication in peripheral vascular disease ([address] Depression, Headache(784.0), Heart murmur, Hemorrhage of rectum and anus, HTN (hypertension), Hypercoagulable state ([address] Hyperlipidemia, Hypertension, Intertriginous candidiasis, Left popliteal artery occlusion ([address] Leg pain, Menometrorrhagia, Migraine headache with aura, Obesity, 19 1800 (!) 176/95 98.2 °F (36.8 °C) Oral 95 18 98 %     Average, Min, and Max for last 24 hours Vitals:  TEMPERATURE:  Temp  Av.2 °F (36.8 °C)  Min: 98.2 °F (36.8 °C)  Max: 98.2 °F (36.8 °C)    RESPIRATIONS RANGE: Resp  Av  Min: 18  Max: 18    PULSE RANGE: Pulse  Av  Min: 95  Max: 95    BLOOD PRESSURE RANGE:  Systolic (09HJZ), WID:580 , Min:176 , LEL:237   ; Diastolic (27XEE), NEI:98, Min:95, Max:95      PULSE OXIMETRY RANGE: SpO2  Av %  Min: 98 %  Max: 98 %  I&O:  No intake/output data recorded. CBC:  Recent Labs     19  1828   WBC 7.6   HGB 15.0   HCT 45.6      CRP 8.3*        BMP:  Recent Labs     19  1828      K 4.3      CO2 22   BUN 11   CREATININE 0.58   GLUCOSE 94   CALCIUM 10.0        Coags:  No results for input(s): APTT, PROT, INR in the last 72 hours. Lab Results   Component Value Date    LABA1C 5.9 2017     Lab Results   Component Value Date    SEDRATE 8 2019     Lab Results   Component Value Date    CRP 8.3 (H) 2019         Lower Extremity Physical Exam:  Vascular: DP and PT pulses are non-palpable. DP bilateral weak signal on doppler, PT strong signal on doppler. CFT <3 seconds to digits 1-5 on the left, 2,4,5 on the right. No CFT on digits 1 and 3 on the right secondary to eschar. Hair growth is present to the level of the digits. No edema noted. Bilateral feet cool to the touch. Neuro: Saph/sural/SP/DP/plantar sensation intact to light touch. Musculoskeletal: Muscle strength is 3/5 on the right and 4/5 on the left. Gross deformity is absent. Pain on palpation to the distal 1st and 3rd digit on the right and pain with ROM. Dermatologic: Dry, stable eschar to digits 1,2,3 on the right. Previous cicatrix noted to the right medial and lateral leg secondary to fasciotomy. No open wounds, no fluctuance, or purulence noted.       Imaging:   XR FOOT RIGHT (MIN 3 VIEWS)   Final Result   Soft tissue swelling but no gas

## 2019-04-04 LAB — CYTOLOGY REPORT: NORMAL

## 2019-04-05 ENCOUNTER — APPOINTMENT (OUTPATIENT)
Dept: GENERAL RADIOLOGY | Age: 44
End: 2019-04-05
Payer: COMMERCIAL

## 2019-04-05 ENCOUNTER — APPOINTMENT (OUTPATIENT)
Dept: CT IMAGING | Age: 44
End: 2019-04-05
Payer: COMMERCIAL

## 2019-04-05 ENCOUNTER — HOSPITAL ENCOUNTER (OUTPATIENT)
Age: 44
Setting detail: OBSERVATION
Discharge: HOME | End: 2019-04-06
Attending: EMERGENCY MEDICINE | Admitting: EMERGENCY MEDICINE
Payer: COMMERCIAL

## 2019-04-05 DIAGNOSIS — R07.9 CHEST PAIN, UNSPECIFIED TYPE: Primary | ICD-10-CM

## 2019-04-05 LAB
ABSOLUTE EOS #: 0.08 K/UL (ref 0–0.44)
ABSOLUTE IMMATURE GRANULOCYTE: 0.04 K/UL (ref 0–0.3)
ABSOLUTE LYMPH #: 4.25 K/UL (ref 1.1–3.7)
ABSOLUTE MONO #: 0.82 K/UL (ref 0.1–1.2)
ANION GAP SERPL CALCULATED.3IONS-SCNC: 18 MMOL/L (ref 9–17)
BASOPHILS # BLD: 0 % (ref 0–2)
BASOPHILS ABSOLUTE: 0.04 K/UL (ref 0–0.2)
BNP INTERPRETATION: NORMAL
BUN BLDV-MCNC: 12 MG/DL (ref 6–20)
BUN/CREAT BLD: ABNORMAL (ref 9–20)
CALCIUM SERPL-MCNC: 9.5 MG/DL (ref 8.6–10.4)
CHLORIDE BLD-SCNC: 100 MMOL/L (ref 98–107)
CO2: 19 MMOL/L (ref 20–31)
CREAT SERPL-MCNC: 0.64 MG/DL (ref 0.5–0.9)
D-DIMER QUANTITATIVE: 0.91 MG/L FEU
DIFFERENTIAL TYPE: ABNORMAL
EOSINOPHILS RELATIVE PERCENT: 1 % (ref 1–4)
GFR AFRICAN AMERICAN: >60 ML/MIN
GFR NON-AFRICAN AMERICAN: >60 ML/MIN
GFR SERPL CREATININE-BSD FRML MDRD: ABNORMAL ML/MIN/{1.73_M2}
GFR SERPL CREATININE-BSD FRML MDRD: ABNORMAL ML/MIN/{1.73_M2}
GLUCOSE BLD-MCNC: 139 MG/DL (ref 70–99)
HCG QUALITATIVE: NEGATIVE
HCT VFR BLD CALC: 40.9 % (ref 36.3–47.1)
HEMOGLOBIN: 13.5 G/DL (ref 11.9–15.1)
IMMATURE GRANULOCYTES: 0 %
LYMPHOCYTES # BLD: 41 % (ref 24–43)
MCH RBC QN AUTO: 29.3 PG (ref 25.2–33.5)
MCHC RBC AUTO-ENTMCNC: 33 G/DL (ref 28.4–34.8)
MCV RBC AUTO: 88.7 FL (ref 82.6–102.9)
MONOCYTES # BLD: 8 % (ref 3–12)
NRBC AUTOMATED: 0 PER 100 WBC
PDW BLD-RTO: 14.5 % (ref 11.8–14.4)
PLATELET # BLD: 329 K/UL (ref 138–453)
PLATELET ESTIMATE: ABNORMAL
PMV BLD AUTO: 10.4 FL (ref 8.1–13.5)
POTASSIUM SERPL-SCNC: 3.8 MMOL/L (ref 3.7–5.3)
PRO-BNP: <20 PG/ML
RBC # BLD: 4.61 M/UL (ref 3.95–5.11)
RBC # BLD: ABNORMAL 10*6/UL
SEG NEUTROPHILS: 50 % (ref 36–65)
SEGMENTED NEUTROPHILS ABSOLUTE COUNT: 5.12 K/UL (ref 1.5–8.1)
SODIUM BLD-SCNC: 137 MMOL/L (ref 135–144)
TROPONIN INTERP: NORMAL
TROPONIN INTERP: NORMAL
TROPONIN T: NORMAL NG/ML
TROPONIN T: NORMAL NG/ML
TROPONIN, HIGH SENSITIVITY: 6 NG/L (ref 0–14)
TROPONIN, HIGH SENSITIVITY: 7 NG/L (ref 0–14)
TSH SERPL DL<=0.05 MIU/L-ACNC: 1.07 MIU/L (ref 0.3–5)
WBC # BLD: 10.4 K/UL (ref 3.5–11.3)
WBC # BLD: ABNORMAL 10*3/UL

## 2019-04-05 PROCEDURE — 93005 ELECTROCARDIOGRAM TRACING: CPT

## 2019-04-05 PROCEDURE — 6370000000 HC RX 637 (ALT 250 FOR IP): Performed by: EMERGENCY MEDICINE

## 2019-04-05 PROCEDURE — 84443 ASSAY THYROID STIM HORMONE: CPT

## 2019-04-05 PROCEDURE — 84703 CHORIONIC GONADOTROPIN ASSAY: CPT

## 2019-04-05 PROCEDURE — 6360000004 HC RX CONTRAST MEDICATION: Performed by: EMERGENCY MEDICINE

## 2019-04-05 PROCEDURE — G0378 HOSPITAL OBSERVATION PER HR: HCPCS

## 2019-04-05 PROCEDURE — 2500000003 HC RX 250 WO HCPCS: Performed by: EMERGENCY MEDICINE

## 2019-04-05 PROCEDURE — 96375 TX/PRO/DX INJ NEW DRUG ADDON: CPT

## 2019-04-05 PROCEDURE — 83880 ASSAY OF NATRIURETIC PEPTIDE: CPT

## 2019-04-05 PROCEDURE — 96374 THER/PROPH/DIAG INJ IV PUSH: CPT

## 2019-04-05 PROCEDURE — 6360000002 HC RX W HCPCS

## 2019-04-05 PROCEDURE — 85379 FIBRIN DEGRADATION QUANT: CPT

## 2019-04-05 PROCEDURE — 71046 X-RAY EXAM CHEST 2 VIEWS: CPT

## 2019-04-05 PROCEDURE — 99285 EMERGENCY DEPT VISIT HI MDM: CPT

## 2019-04-05 PROCEDURE — 84484 ASSAY OF TROPONIN QUANT: CPT

## 2019-04-05 PROCEDURE — 6360000002 HC RX W HCPCS: Performed by: EMERGENCY MEDICINE

## 2019-04-05 PROCEDURE — 85025 COMPLETE CBC W/AUTO DIFF WBC: CPT

## 2019-04-05 PROCEDURE — 2580000003 HC RX 258: Performed by: EMERGENCY MEDICINE

## 2019-04-05 PROCEDURE — 71260 CT THORAX DX C+: CPT

## 2019-04-05 PROCEDURE — 80048 BASIC METABOLIC PNL TOTAL CA: CPT

## 2019-04-05 RX ORDER — MORPHINE SULFATE 4 MG/ML
4 INJECTION, SOLUTION INTRAMUSCULAR; INTRAVENOUS ONCE
Status: COMPLETED | OUTPATIENT
Start: 2019-04-05 | End: 2019-04-05

## 2019-04-05 RX ORDER — ACETAMINOPHEN 500 MG
1000 TABLET ORAL ONCE
Status: COMPLETED | OUTPATIENT
Start: 2019-04-05 | End: 2019-04-05

## 2019-04-05 RX ORDER — 0.9 % SODIUM CHLORIDE 0.9 %
1000 INTRAVENOUS SOLUTION INTRAVENOUS ONCE
Status: COMPLETED | OUTPATIENT
Start: 2019-04-05 | End: 2019-04-05

## 2019-04-05 RX ORDER — ONDANSETRON 2 MG/ML
INJECTION INTRAMUSCULAR; INTRAVENOUS
Status: COMPLETED
Start: 2019-04-05 | End: 2019-04-05

## 2019-04-05 RX ORDER — METOPROLOL TARTRATE 5 MG/5ML
2.5 INJECTION INTRAVENOUS ONCE
Status: DISCONTINUED | OUTPATIENT
Start: 2019-04-05 | End: 2019-04-05

## 2019-04-05 RX ORDER — METOPROLOL TARTRATE 5 MG/5ML
5 INJECTION INTRAVENOUS ONCE
Status: DISCONTINUED | OUTPATIENT
Start: 2019-04-05 | End: 2019-04-05

## 2019-04-05 RX ORDER — ONDANSETRON 2 MG/ML
4 INJECTION INTRAMUSCULAR; INTRAVENOUS ONCE
Status: COMPLETED | OUTPATIENT
Start: 2019-04-05 | End: 2019-04-05

## 2019-04-05 RX ADMIN — IOVERSOL 75 ML: 741 INJECTION INTRA-ARTERIAL; INTRAVENOUS at 18:53

## 2019-04-05 RX ADMIN — MORPHINE SULFATE 4 MG: 4 INJECTION INTRAVENOUS at 16:47

## 2019-04-05 RX ADMIN — ONDANSETRON 4 MG: 2 INJECTION INTRAMUSCULAR; INTRAVENOUS at 16:47

## 2019-04-05 RX ADMIN — SODIUM CHLORIDE 1000 ML: 9 INJECTION, SOLUTION INTRAVENOUS at 18:31

## 2019-04-05 RX ADMIN — ACETAMINOPHEN 1000 MG: 500 TABLET ORAL at 20:13

## 2019-04-05 ASSESSMENT — ENCOUNTER SYMPTOMS
SORE THROAT: 0
ABDOMINAL PAIN: 0
VOMITING: 0
NAUSEA: 1
SHORTNESS OF BREATH: 1
COUGH: 0
COLOR CHANGE: 1

## 2019-04-05 ASSESSMENT — PAIN DESCRIPTION - PAIN TYPE: TYPE: ACUTE PAIN

## 2019-04-05 ASSESSMENT — PAIN SCALES - GENERAL
PAINLEVEL_OUTOF10: 9
PAINLEVEL_OUTOF10: 10
PAINLEVEL_OUTOF10: 10

## 2019-04-05 ASSESSMENT — PAIN DESCRIPTION - LOCATION: LOCATION: ABDOMEN;CHEST

## 2019-04-05 ASSESSMENT — PAIN DESCRIPTION - DESCRIPTORS: DESCRIPTORS: SQUEEZING

## 2019-04-05 ASSESSMENT — PAIN DESCRIPTION - FREQUENCY: FREQUENCY: CONTINUOUS

## 2019-04-05 NOTE — ED NOTES
Pt back from CT, NAD noted. Call light within reach, will continue to monitor.       Suzan Cabrera RN  04/05/19 3924

## 2019-04-05 NOTE — ED PROVIDER NOTES
[facility] ED  eMERGENCY dEPARTMENT eNCOUnter   Attending Attestation     Pt Name: Ismael Crocker  MRN: [id number]  Emigdiogflev 1975  Date of evaluation: 4/5/19       Ismael Crocker is a 37 y.o. female who presents with Shortness of Breath and Chest Pain (started approx 1/2 hr ago, watching TV, nauseated, )      History: Pt presents with chest pain while watching TV. Pt has heart score of 4 plan for admission for chest pain and elevated heart score      EKG Interpretation    Interpreted by emergency department physician    Rhythm: normal sinus   Rate: normal  Axis: left  Ectopy: none  Conduction: normal  ST Segments: no acute change  T Waves: no acute change  Q Waves: v1-v3    Clinical Impression: non-specific EKG      Kareen Dumont M.D. I performed a history and physical examination of the patient and discussed management with the resident. I reviewed the residents note and agree with the documented findings and plan of care. Any areas of disagreement are noted on the chart. I was personally present for the key portions of any procedures. I have documented in the chart those procedures where I was not present during the key portions. I have personally reviewed all images and agree with the resident's interpretation. I have reviewed the emergency nurses triage note. I agree with the chief complaint, past medical history, past surgical history, allergies, medications, social and family history as documented unless otherwise noted below. Documentation of the HPI, Physical Exam and Medical Decision Making performed by medical students or scribes is based on my personal performance of the HPI, PE and MDM. For Phys Assistant/ Nurse Practitioner cases/documentation I have had a face to face evaluation of this patient and have completed at least one if not all key elements of the E/M (history, physical exam, and MDM). Additional findings are as noted.     For APC cases I have personally evaluated and examined the patient in conjunction with the APC and agree with the treatment plan and disposition of the patient as recorded by the APC.     Gustavo Brower MD  Attending Emergency  Physician        Kayleen Garrido MD  04/14/19 4807

## 2019-04-05 NOTE — ED PROVIDER NOTES
[facility]  ED  Emergency Department Encounter  Emergency Medicine Resident     Patient Name: Felix Yeung  MRN: [id number]  [place]: 1975  Date of evaluation: 4/5/19  PCP:  Gerard Garcia MD    [address] [place] [address]       Chief Complaint   Patient presents with    Shortness of Breath    Chest Pain     started approx 1/2 hr ago, watching TV, nauseated,        HISTORY OF PRESENT ILLNESS  (Location/Symptom, Timing/Onset, Context/Setting, Quality, Duration, Modifying Factors, Severity.)      Felix Yeung is a 37 y.o. female who presents with patient presents with chief complaint of rapid heart rate for the past half hour. Duration is constant since onset, quality is moderate. Patient reports her heart races occasionally, occasionally wakes her up out of sleep. Patient's are so recording left-sided chest pain, quality squeezing, onset half hour ago, duration constant since onset, without radiation to her back. Patient has never had a heart attack or stent placement. Patient does not follow with the cardiologist.  Patient is on Eliquis and Plavix for history of vascular disease. Patient is allergic to aspirin. Patient's risk factors include hypertension, hyperlipidemia, and obesity.     PAST MEDICAL / SURGICAL / SOCIAL / FAMILY HISTORY      has a past medical history of Abnormal Pap smear of cervix, Anemia, Chronic back pain, Claudication (HCC), Claudication in peripheral vascular disease ([address] Depression, Headache(784.0), Heart murmur, Hemorrhage of rectum and anus, HTN (hypertension), Hypercoagulable state ([address] Hyperlipidemia, Hypertension, Intertriginous candidiasis, Left popliteal artery occlusion ([address] Leg pain, Menometrorrhagia, Migraine headache with aura, Obesity, Osteoarthritis, PAD (peripheral artery disease) ([address] PVD (peripheral vascular disease) ([address] Takotsubo cardiomyopathy, and Wound of right leg.     has a past surgical history that includes Tubal ligation (1997); Diabetes Sister     High Blood Pressure Sister     Diabetes Sister     High Blood Pressure Sister      Allergies:  Asa [aspirin]; Lopid [gemfibrozil]; Nortriptyline; Pcn [penicillins]; Sulfa antibiotics; and Ibuprofen    Home Medications:  Prior to Admission medications    Medication Sig Start Date End Date Taking? Authorizing Provider   lisinopril (PRINIVIL;ZESTRIL) 5 MG tablet take 1 tablet by mouth once daily 3/29/19   Feliz Harris MD   amLODIPine (NORVASC) 10 MG tablet take 1 tablet by mouth once daily 1/22/19   Feliz Harris MD   atorvastatin (LIPITOR) 40 MG tablet Take 1 tablet by mouth daily 1/22/19   Feliz Harris MD   apixaban (ELIQUIS) 5 MG TABS tablet Take 1 tablet by mouth 2 times daily 10/3/18   Lauri Russell MD   Blood Pressure KIT 1 Package by Does not apply route daily 12/18/17   Feliz Harris MD       REVIEW OF SYSTEMS    (2-9 systems for level 4, 10 or more for level 5)      Review of Systems   Constitutional: Negative for chills and fever. HENT: Negative for sore throat. Respiratory: Positive for shortness of breath. Negative for cough. Cardiovascular: Positive for chest pain and palpitations. Gastrointestinal: Positive for nausea. Negative for abdominal pain and vomiting. Genitourinary: Negative for dysuria. Musculoskeletal: Negative for myalgias. Skin: Positive for color change (to toes). Neurological: Positive for light-headedness. Negative for syncope. Hematological: Bruises/bleeds easily. PHYSICAL EXAM   (up to 7 for level 4, 8 or more for level 5)      INITIAL VITALS:   BP (!) 114/54   Pulse 74   Temp 98.4 °F (36.9 °C) (Oral)   Resp 25   Ht 5' 5\" (1.651 m)   Wt 180 lb (81.6 kg)   LMP 03/11/2019   SpO2 97%   BMI 29.95 kg/m²     Physical Exam   Constitutional: She is oriented to person, place, and time. She appears well-developed and well-nourished. She appears distressed (appears anxious and occasionally tremulous).    HENT:   Head: Normocephalic and or performed during the hospital encounter of 04/05/19   CBC Auto Differential   Result Value Ref Range    WBC 10.4 3.5 - 11.3 k/uL    RBC 4.61 3.95 - 5.11 m/uL    Hemoglobin 13.5 11.9 - 15.1 g/dL    Hematocrit 40.9 36.3 - 47.1 %    MCV 88.7 82.6 - 102.9 fL    MCH 29.3 25.2 - 33.5 pg    MCHC 33.0 28.4 - 34.8 g/dL    RDW 14.5 (H) 11.8 - 14.4 %    Platelets 883 406 - 658 k/uL    MPV 10.4 8.1 - 13.5 fL    NRBC Automated 0.0 0.0 per 100 WBC    Differential Type NOT REPORTED     Seg Neutrophils 50 36 - 65 %    Lymphocytes 41 24 - 43 %    Monocytes 8 3 - 12 %    Eosinophils % 1 1 - 4 %    Basophils 0 0 - 2 %    Immature Granulocytes 0 0 %    Segs Absolute 5.12 1.50 - 8.10 k/uL    Absolute Lymph # 4.25 (H) 1.10 - 3.70 k/uL    Absolute Mono # 0.82 0.10 - 1.20 k/uL    Absolute Eos # 0.08 0.00 - 0.44 k/uL    Basophils # 0.04 0.00 - 0.20 k/uL    Absolute Immature Granulocyte 0.04 0.00 - 0.30 k/uL    WBC Morphology NOT REPORTED     RBC Morphology ANISOCYTOSIS PRESENT     Platelet Estimate NOT REPORTED    Basic Metabolic Panel w/ Reflex to MG   Result Value Ref Range    Glucose 139 (H) 70 - 99 mg/dL    BUN 12 6 - 20 mg/dL    CREATININE 0.64 0.50 - 0.90 mg/dL    Bun/Cre Ratio NOT REPORTED 9 - 20    Calcium 9.5 8.6 - 10.4 mg/dL    Sodium 137 135 - 144 mmol/L    Potassium 3.8 3.7 - 5.3 mmol/L    Chloride 100 98 - 107 mmol/L    CO2 19 (L) 20 - 31 mmol/L    Anion Gap 18 (H) 9 - 17 mmol/L    GFR Non-African American >60 >60 mL/min    GFR African American >60 >60 mL/min    GFR Comment          GFR Staging NOT REPORTED    Troponin   Result Value Ref Range    Troponin, High Sensitivity 6 0 - 14 ng/L    Troponin T NOT REPORTED <0.03 ng/mL    Troponin Interp NOT REPORTED    Troponin   Result Value Ref Range    Troponin, High Sensitivity 7 0 - 14 ng/L    Troponin T NOT REPORTED <0.03 ng/mL    Troponin Interp NOT REPORTED    Brain Natriuretic Peptide   Result Value Ref Range    Pro-BNP <20 <300 pg/mL    BNP Interpretation Pro-BNP Reference Range:    D-Dimer, Quantitative   Result Value Ref Range    D-Dimer, Quant 0.91 mg/L FEU   HCG Qualitative, Serum   Result Value Ref Range    hCG Qual NEGATIVE NEGATIVE   TSH with Reflex   Result Value Ref Range    TSH 1.07 0.30 - 5.00 mIU/L       RADIOLOGY:  Xr Chest Standard (2 Vw)    Result Date: 4/5/2019  EXAMINATION: TWO VIEWS OF THE CHEST 4/5/2019 4:58 pm COMPARISON: 11/15/2015 HISTORY: ORDERING SYSTEM PROVIDED HISTORY: chest pain TECHNOLOGIST PROVIDED HISTORY: chest pain FINDINGS: The lungs are without acute focal process. There is no effusion or pneumothorax. The cardiomediastinal silhouette is stable. The osseous structures are stable. No acute process. Ct Chest Pulmonary Embolism W Contrast    Result Date: 4/5/2019  EXAMINATION: CTA OF THE CHEST 4/5/2019 6:49 pm TECHNIQUE: CTA of the chest was performed after the administration of intravenous contrast.  Multiplanar reformatted images are provided for review. MIP images are provided for review. Dose modulation, iterative reconstruction, and/or weight based adjustment of the mA/kV was utilized to reduce the radiation dose to as low as reasonably achievable. COMPARISON: None. HISTORY: ORDERING SYSTEM PROVIDED HISTORY: elevated d-dimer, SOB, tachy FINDINGS: Pulmonary Arteries: Pulmonary arteries are adequately opacified for evaluation. No evidence of intraluminal filling defect to suggest pulmonary embolism. Main pulmonary artery is normal in caliber. Mediastinum: No evidence of mediastinal lymphadenopathy. The heart and pericardium demonstrate no acute abnormality. There is no acute abnormality of the thoracic aorta. Lungs/pleura: The lungs are without acute process. No focal consolidation or pulmonary edema. No evidence of pleural effusion or pneumothorax. Upper Abdomen: Limited images of the upper abdomen are unremarkable. Soft Tissues/Bones: No acute bone or soft tissue abnormality.      No evidence of pulmonary embolism or acute pulmonary abnormality. EKG  Rhythm: sinus tachycardia  Rate: tachycardia  Axis: normal  Ectopy: none  Conduction: normal  ST Segments: no acute change  T Waves: inversion in  v1  Q Waves: aVr    Clinical Impression: non-specific EKG, ARETHA in prior is less prominent on today's EKG. Prior dated 10/1/2018    Josy Beaver DO     All EKG's are interpreted by the Emergency Department Physician who either signs or Co-signs this chart in the absence of a cardiologist.    HEART Risk Score for Chest Pain Patients   History and Physical Exam Suspicion Level  (Nausea, Vomiting, Diaphoresis, Radiation, Exertion)   Slightly Suspicious (0 pts)   Moderately Suspicious (1 pt)   Highly Suspicious (2 pts)   EKG Interpretation   Normal (0 pts)   Non-Specific Repolarization Disturbance (1 pt)   Significant ST-Depression (2 pts)   Age of Patient (in years)   = 39 (0 pts)   46-64 (1 pt)   = 65 (2 pts)   Risk Factors   No Risk Factors (0 pts)   1-2 Risk Factors (1 pt)   = 3 Risk Factors (2 pts)   Risk Factors Include:   Hypercholesterolemia   Hypertension   Diabetes Mellitus   Cigarette smoking   Positive family history   Obesity   CAD   (SLE, CKDz, HIV, Cocaine abuse)   Troponin Levels   = Normal Limit (0 pts)   1-3 Times Normal Limit (1 pt)   > 3 Times Normal Limit (2 pts)  TOTAL: 4    Percent Risk for Major Adverse Cardiac Event (MACE)  0-3 pts indicates low risk for MACE   2.5% (DISCHARGE)   4-7 pts indicates moderate risk for MACE  20.3% (OBS)  8-10 pts indicates high risk for MACE  72.7% (EARLY INVASIVE TX)     DIFFERENTIAL DIAGNOSIS:  pulmonary embolism, pneumothorax, ACS/MI, pericarditis, thoracic aortic dissection, pneumonia, CHF, arrhythmia, hypo/hyper-thyroidism    EMERGENCY DEPARTMENT COURSE & MDM:  37 y.o. female presents with a chief complaint of palpitations, rapid heart rate, and chest pain. Will get a cardiac workup to further assess.      ED Course as of Apr 10 1347 Fri Apr 05, 2019   1716 Patient's chest x-ray is negative for acute process including no pneumonia, pneumothorax, or widened mediastinum to suggest aortic dissection. XR CHEST STANDARD (2 VW) [BJ]   1716 No anemia or leukocytosis noted on CBC. D-dimer elevated - will order CTa of the chest to assess for PE.    [BJ]   1745 TSH normal - doubt hyper or hypo thryoidism   TSH: 1.07 [BJ]   1745 Trop normal   Troponin, High Sensitivity: 6 [BJ]   1745 Patient's pro-BNP is within normal, therefore doubt acute onset CHF. [BJ]   1951 Patient resting comfortably and reports she feels a lot better. Heart score of 4. Will admit for cardiac work up at this time. Patient reports her toes have been black since September. [BJ]   Sat Apr 06, 2019   3610 Patient Signed out to Dr. Estiven Ro while awaiting a bed on the floor.     [KW]      ED Course User Index  [BJ] Digna Mix DO  [KW] Gladys Chong DO     Patient reports Dr. Zoltan Qiu is aware of her chronic toe discoloration therefore will not page from the ED. CT PE without PE.    10:36 PM patient's tachycardia has resolved    PROCEDURES:  None    CONSULTS:  IP CONSULT TO CARDIOLOGY    CRITICAL CARE:  Please see attending physician note. FINAL IMPRESSION      1.  Chest pain, unspecified type          DISPOSITION / PLAN     DISPOSITION Admitted 04/05/2019 07:54:43 PM    PATIENT REFERRED TO:  Gary Mckoy MD  [facility] [id number]  [address] Cantor MD  [address] [id number] [phone]            DISCHARGE MEDICATIONS:  New Prescriptions    No medications on file       Digna Mix DO  Emergency Medicine Resident    (Please note that portions ofthis note were completed with a voice recognition program.  Efforts were made to edit the dictations but occasionally words are mis-transcribed.)        Digna Mix DO  Resident  04/05/19 355 Diana Campbell DO  Resident  04/10/19 2601

## 2019-04-05 NOTE — ED NOTES
Dr. Milagro Bailey at bedside      Joseluis Mcmanus, [facility]  04/05/19 6215

## 2019-04-05 NOTE — ED NOTES
Pt resting in bed, NAD noted rr even and non labored. Pt's anxiety eased up and pt states she is feeling much better. Pt updated on poc. Call light within reach, will continue to monitor.       Penny Dudley RN  04/05/19 0851

## 2019-04-06 ENCOUNTER — APPOINTMENT (OUTPATIENT)
Dept: NUCLEAR MEDICINE | Age: 44
End: 2019-04-06
Payer: COMMERCIAL

## 2019-04-06 VITALS
BODY MASS INDEX: 29.99 KG/M2 | DIASTOLIC BLOOD PRESSURE: 70 MMHG | HEART RATE: 60 BPM | TEMPERATURE: 98.6 F | SYSTOLIC BLOOD PRESSURE: 118 MMHG | HEIGHT: 65 IN | WEIGHT: 180 LBS | OXYGEN SATURATION: 98 % | RESPIRATION RATE: 18 BRPM

## 2019-04-06 LAB
EKG ATRIAL RATE: 122 BPM
EKG ATRIAL RATE: 59 BPM
EKG P AXIS: 10 DEGREES
EKG P AXIS: 51 DEGREES
EKG P-R INTERVAL: 146 MS
EKG P-R INTERVAL: 156 MS
EKG Q-T INTERVAL: 314 MS
EKG Q-T INTERVAL: 436 MS
EKG QRS DURATION: 74 MS
EKG QRS DURATION: 84 MS
EKG QTC CALCULATION (BAZETT): 431 MS
EKG QTC CALCULATION (BAZETT): 447 MS
EKG R AXIS: -5 DEGREES
EKG R AXIS: -9 DEGREES
EKG T AXIS: -13 DEGREES
EKG T AXIS: 14 DEGREES
EKG VENTRICULAR RATE: 122 BPM
EKG VENTRICULAR RATE: 59 BPM
LV EF: 68 %
LVEF MODALITY: NORMAL
TROPONIN INTERP: NORMAL
TROPONIN T: NORMAL NG/ML
TROPONIN, HIGH SENSITIVITY: 7 NG/L (ref 0–14)

## 2019-04-06 PROCEDURE — G0378 HOSPITAL OBSERVATION PER HR: HCPCS

## 2019-04-06 PROCEDURE — 93017 CV STRESS TEST TRACING ONLY: CPT | Performed by: NURSE PRACTITIONER

## 2019-04-06 PROCEDURE — 84484 ASSAY OF TROPONIN QUANT: CPT

## 2019-04-06 PROCEDURE — 78452 HT MUSCLE IMAGE SPECT MULT: CPT

## 2019-04-06 PROCEDURE — 3430000000 HC RX DIAGNOSTIC RADIOPHARMACEUTICAL: Performed by: STUDENT IN AN ORGANIZED HEALTH CARE EDUCATION/TRAINING PROGRAM

## 2019-04-06 PROCEDURE — 96376 TX/PRO/DX INJ SAME DRUG ADON: CPT

## 2019-04-06 PROCEDURE — 6370000000 HC RX 637 (ALT 250 FOR IP): Performed by: EMERGENCY MEDICINE

## 2019-04-06 PROCEDURE — 36415 COLL VENOUS BLD VENIPUNCTURE: CPT

## 2019-04-06 PROCEDURE — 2580000003 HC RX 258: Performed by: STUDENT IN AN ORGANIZED HEALTH CARE EDUCATION/TRAINING PROGRAM

## 2019-04-06 PROCEDURE — A9500 TC99M SESTAMIBI: HCPCS | Performed by: STUDENT IN AN ORGANIZED HEALTH CARE EDUCATION/TRAINING PROGRAM

## 2019-04-06 PROCEDURE — 6360000002 HC RX W HCPCS: Performed by: STUDENT IN AN ORGANIZED HEALTH CARE EDUCATION/TRAINING PROGRAM

## 2019-04-06 PROCEDURE — 93005 ELECTROCARDIOGRAM TRACING: CPT

## 2019-04-06 PROCEDURE — 6360000002 HC RX W HCPCS: Performed by: EMERGENCY MEDICINE

## 2019-04-06 RX ORDER — ACETAMINOPHEN 325 MG/1
650 TABLET ORAL EVERY 4 HOURS PRN
Status: DISCONTINUED | OUTPATIENT
Start: 2019-04-06 | End: 2019-04-06 | Stop reason: HOSPADM

## 2019-04-06 RX ORDER — SODIUM CHLORIDE 0.9 % (FLUSH) 0.9 %
10 SYRINGE (ML) INJECTION PRN
Status: DISCONTINUED | OUTPATIENT
Start: 2019-04-06 | End: 2019-04-06 | Stop reason: HOSPADM

## 2019-04-06 RX ORDER — METOPROLOL TARTRATE 5 MG/5ML
2.5 INJECTION INTRAVENOUS PRN
Status: DISCONTINUED | OUTPATIENT
Start: 2019-04-06 | End: 2019-04-06

## 2019-04-06 RX ORDER — SODIUM CHLORIDE 0.9 % (FLUSH) 0.9 %
10 SYRINGE (ML) INJECTION EVERY 12 HOURS SCHEDULED
Status: DISCONTINUED | OUTPATIENT
Start: 2019-04-06 | End: 2019-04-06 | Stop reason: HOSPADM

## 2019-04-06 RX ORDER — SODIUM CHLORIDE 9 MG/ML
INJECTION, SOLUTION INTRAVENOUS ONCE
Status: DISCONTINUED | OUTPATIENT
Start: 2019-04-06 | End: 2019-04-06

## 2019-04-06 RX ORDER — AMINOPHYLLINE DIHYDRATE 25 MG/ML
100 INJECTION, SOLUTION INTRAVENOUS
Status: DISCONTINUED | OUTPATIENT
Start: 2019-04-06 | End: 2019-04-06

## 2019-04-06 RX ORDER — HYDROCODONE BITARTRATE AND ACETAMINOPHEN 5; 325 MG/1; MG/1
1 TABLET ORAL EVERY 4 HOURS PRN
Status: DISCONTINUED | OUTPATIENT
Start: 2019-04-06 | End: 2019-04-06 | Stop reason: HOSPADM

## 2019-04-06 RX ORDER — AMLODIPINE BESYLATE 10 MG/1
10 TABLET ORAL DAILY
Status: DISCONTINUED | OUTPATIENT
Start: 2019-04-06 | End: 2019-04-06 | Stop reason: HOSPADM

## 2019-04-06 RX ORDER — LISINOPRIL 5 MG/1
5 TABLET ORAL DAILY
Status: DISCONTINUED | OUTPATIENT
Start: 2019-04-06 | End: 2019-04-06 | Stop reason: HOSPADM

## 2019-04-06 RX ORDER — ATORVASTATIN CALCIUM 40 MG/1
40 TABLET, FILM COATED ORAL DAILY
Status: DISCONTINUED | OUTPATIENT
Start: 2019-04-06 | End: 2019-04-06 | Stop reason: HOSPADM

## 2019-04-06 RX ORDER — SODIUM CHLORIDE 0.9 % (FLUSH) 0.9 %
10 SYRINGE (ML) INJECTION PRN
Status: DISCONTINUED | OUTPATIENT
Start: 2019-04-06 | End: 2019-04-06

## 2019-04-06 RX ORDER — NITROGLYCERIN 0.4 MG/1
0.4 TABLET SUBLINGUAL EVERY 5 MIN PRN
Status: DISCONTINUED | OUTPATIENT
Start: 2019-04-06 | End: 2019-04-06

## 2019-04-06 RX ORDER — SODIUM CHLORIDE 9 MG/ML
INJECTION, SOLUTION INTRAVENOUS CONTINUOUS
Status: DISCONTINUED | OUTPATIENT
Start: 2019-04-06 | End: 2019-04-06 | Stop reason: HOSPADM

## 2019-04-06 RX ORDER — ONDANSETRON 2 MG/ML
4 INJECTION INTRAMUSCULAR; INTRAVENOUS EVERY 8 HOURS PRN
Status: DISCONTINUED | OUTPATIENT
Start: 2019-04-06 | End: 2019-04-06 | Stop reason: HOSPADM

## 2019-04-06 RX ADMIN — TETRAKIS(2-METHOXYISOBUTYLISOCYANIDE)COPPER(I) TETRAFLUOROBORATE 35.3 MILLICURIE: 1 INJECTION, POWDER, LYOPHILIZED, FOR SOLUTION INTRAVENOUS at 10:55

## 2019-04-06 RX ADMIN — ACETAMINOPHEN 650 MG: 325 TABLET ORAL at 08:19

## 2019-04-06 RX ADMIN — AMLODIPINE BESYLATE 10 MG: 10 TABLET ORAL at 14:30

## 2019-04-06 RX ADMIN — DESMOPRESSIN ACETATE 40 MG: 0.2 TABLET ORAL at 14:30

## 2019-04-06 RX ADMIN — Medication 10 ML: at 10:48

## 2019-04-06 RX ADMIN — APIXABAN 5 MG: 5 TABLET, FILM COATED ORAL at 14:30

## 2019-04-06 RX ADMIN — SODIUM CHLORIDE, PRESERVATIVE FREE 10 ML: 5 INJECTION INTRAVENOUS at 10:55

## 2019-04-06 RX ADMIN — ACETAMINOPHEN 650 MG: 325 TABLET ORAL at 02:33

## 2019-04-06 RX ADMIN — SODIUM CHLORIDE, PRESERVATIVE FREE 10 ML: 5 INJECTION INTRAVENOUS at 09:20

## 2019-04-06 RX ADMIN — LISINOPRIL 5 MG: 5 TABLET ORAL at 14:30

## 2019-04-06 RX ADMIN — REGADENOSON 0.4 MG: 0.08 INJECTION, SOLUTION INTRAVENOUS at 10:54

## 2019-04-06 RX ADMIN — ONDANSETRON 4 MG: 2 INJECTION INTRAMUSCULAR; INTRAVENOUS at 08:19

## 2019-04-06 RX ADMIN — TETRAKIS(2-METHOXYISOBUTYLISOCYANIDE)COPPER(I) TETRAFLUOROBORATE 13.4 MILLICURIE: 1 INJECTION, POWDER, LYOPHILIZED, FOR SOLUTION INTRAVENOUS at 09:20

## 2019-04-06 ASSESSMENT — PAIN SCALES - GENERAL
PAINLEVEL_OUTOF10: 7
PAINLEVEL_OUTOF10: 10

## 2019-04-06 ASSESSMENT — PAIN DESCRIPTION - PAIN TYPE: TYPE: ACUTE PAIN

## 2019-04-06 ASSESSMENT — PAIN DESCRIPTION - LOCATION: LOCATION: HEAD

## 2019-04-06 ASSESSMENT — PAIN DESCRIPTION - ORIENTATION: ORIENTATION: POSTERIOR

## 2019-04-06 ASSESSMENT — PAIN DESCRIPTION - DESCRIPTORS: DESCRIPTORS: ACHING

## 2019-04-06 NOTE — ED PROVIDER NOTES
[facility] ED  Emergency Department  Emergency Medicine Resident Sign-out     Care of Mare Rai was assumed from Dr. Emilee Alexander and is being seen for Shortness of Breath and Chest Pain (started approx 1/2 hr ago, watching TV, nauseated, )  . The patient's initial evaluation and plan have been discussed with the prior provider who initially evaluated the patient. EMERGENCY DEPARTMENT COURSE / MEDICAL DECISION MAKING:       MEDICATIONS GIVEN:  Orders Placed This Encounter   Medications    DISCONTD: metoprolol (LOPRESSOR) injection 5 mg    ondansetron (ZOFRAN) injection 4 mg    morphine injection 4 mg    ondansetron (ZOFRAN) 4 MG/2ML injection     YUNIEL JAMESON: cabinet override    DISCONTD: metoprolol (LOPRESSOR) injection 2.5 mg    0.9 % sodium chloride bolus    ioversol (OPTIRAY) 74 % injection 75 mL    acetaminophen (TYLENOL) tablet 650 mg    ondansetron (ZOFRAN) injection 4 mg    acetaminophen (TYLENOL) tablet 1,000 mg       LABS / RADIOLOGY:     Labs Reviewed   CBC WITH AUTO DIFFERENTIAL - Abnormal; Notable for the following components:       Result Value    RDW 14.5 (*)     Absolute Lymph # 4.25 (*)     All other components within normal limits   BASIC METABOLIC PANEL W/ REFLEX TO MG FOR LOW K - Abnormal; Notable for the following components:    Glucose 139 (*)     CO2 19 (*)     Anion Gap 18 (*)     All other components within normal limits   TROPONIN   TROPONIN   BRAIN NATRIURETIC PEPTIDE   D-DIMER, QUANTITATIVE   HCG, SERUM, QUALITATIVE   TSH WITH REFLEX       Xr Chest Standard (2 Vw)    Result Date: 4/5/2019  EXAMINATION: TWO VIEWS OF THE CHEST 4/5/2019 4:58 pm COMPARISON: 11/15/2015 HISTORY: ORDERING SYSTEM PROVIDED HISTORY: chest pain TECHNOLOGIST PROVIDED HISTORY: chest pain FINDINGS: The lungs are without acute focal process. There is no effusion or pneumothorax. The cardiomediastinal silhouette is stable. The osseous structures are stable. No acute process. Xr Foot Right (min 3 Views)    Result Date: 4/2/2019  EXAMINATION: 3 XRAY VIEWS OF THE RIGHT FOOT 4/2/2019 6:40 pm COMPARISON: None. HISTORY: ORDERING SYSTEM PROVIDED HISTORY: Gangrenous changes, rule out gas TECHNOLOGIST PROVIDED HISTORY: Gangrenous changes, rule out gas FINDINGS: Diffuse demineralization. Sclerosis 1st metatarsal phalangeal joint. Diffuse soft tissue swelling. No subcutaneous gas. Soft tissue swelling but no gas     Ct Chest Pulmonary Embolism W Contrast    Result Date: 4/5/2019  EXAMINATION: CTA OF THE CHEST 4/5/2019 6:49 pm TECHNIQUE: CTA of the chest was performed after the administration of intravenous contrast.  Multiplanar reformatted images are provided for review. MIP images are provided for review. Dose modulation, iterative reconstruction, and/or weight based adjustment of the mA/kV was utilized to reduce the radiation dose to as low as reasonably achievable. COMPARISON: None. HISTORY: ORDERING SYSTEM PROVIDED HISTORY: elevated d-dimer, SOB, tachy FINDINGS: Pulmonary Arteries: Pulmonary arteries are adequately opacified for evaluation. No evidence of intraluminal filling defect to suggest pulmonary embolism. Main pulmonary artery is normal in caliber. Mediastinum: No evidence of mediastinal lymphadenopathy. The heart and pericardium demonstrate no acute abnormality. There is no acute abnormality of the thoracic aorta. Lungs/pleura: The lungs are without acute process. No focal consolidation or pulmonary edema. No evidence of pleural effusion or pneumothorax. Upper Abdomen: Limited images of the upper abdomen are unremarkable. Soft Tissues/Bones: No acute bone or soft tissue abnormality. No evidence of pulmonary embolism or acute pulmonary abnormality. RECENT VITALS:     Temp: 98.4 °F (36.9 °C),  Pulse: 68, Resp: 18, BP: 116/81, SpO2: 98 %    This patient is a 37 y.o.  Female with palpitations, CP, SOB, elevated HEART score, neg CT PE, admitted to obs for cards chase.      OUTSTANDING TASKS / RECOMMENDATIONS:    1. Admitted     FINAL IMPRESSION:     1.  Chest pain, unspecified type        DISPOSITION:         DISPOSITION:  []  Discharge   []  Transfer -    [x]  Admission -     []  Against Medical Advice   []  Eloped   FOLLOW-UP: Dennis Jeffries MD  [facility] [id number]  [address] Mindy Cantor MD  [address] [facility]: New Prescriptions    No medications on file           Grecia Swanson DO  Emergency Medicine Resident  [facility]        Grecia Swanson [place]  Resident  04/09/19 DO [place]  Resident  04/09/19 0214

## 2019-04-06 NOTE — ED NOTES
Pt sitting up in bed, Dr. BOWLING [facility], obs resident at bedside for eval and update on pt plan of care. Pt A&Ox4, resp even and non labored. Pt waiting admit bed.       Pérez Kirkland RN  04/06/19 3697

## 2019-04-06 NOTE — PROGRESS NOTES
CDU Daily Progress Note  Attending Physician       Pt Name: Alisha Louie  MRN: [id number]  [place] 1975  Date of evaluation: 4/6/19    I performed a history and physical examination of the patient and discussed management with the resident. I reviewed the residents note and agree with the documented findings and plan of care. Any areas of disagreement are noted on the chart. I was personally present for the key portions of any procedures. I have documented in the chart those procedures where I was not present during the key portions. I have reviewed the emergency nurses triage note. I agree with the chief complaint, past medical history, past surgical history, allergies, medications, social and family history as documented unless otherwise noted below. Documentation of the HPI, Physical Exam and Medical Decision Making performed by medical students or scribes is based on my personal performance of the HPI, PE and MDM. For Physician Assistant/ Nurse Practitioner cases/documentation I have personally evaluated this patient and have completed at least one if not all key elements of the E/M (history, physical exam, and MDM). Additional findings are as noted. The Family History, Social History and Review of Systems are unchanged from the previous day. No significant events overnight. Palpitation w L sided chest pain squeezing assoc w SOB and nausea, PMHx: Vascular dz on plavix and eliquis, HTN, dyslipidemia, PAD, PVD, OA, takotsubo, rectal bleeding, OA,chronic back pain, depression headache. HST 7, CTPE neg.  Lexiscan stress ordered    Allegra Yin MD  Attending Physician  Critical Decision Unit

## 2019-04-06 NOTE — PROCEDURES
[address] [facility] [address]                              CARDIAC STRESS TEST    PATIENT NAME: Ja Martinez                    :        1975  MED REC NO:   [id number]                             ROOM:       [id number]  ACCOUNT NO:   [de-identified]                           ADMIT DATE: 2019  PROVIDER:     Declan Vargas    DATE OF STUDY:  2019    ORDERING PROVIDER:  Bret Christianson    PRIMARY CARE PROVIDER:  Iris Chery    INTERPRETING PHYSICIAN:  ARIC Sanders STRESS TESTING    The test was explained and consent signed. INDICATIONS:  Chest pain. Resting heart rate 78 beats per minute. Resting blood pressure 115/81 mmHg. Infusion heart rate 117 beats per minute. Infusion blood pressure 120/78 mmHg. Protocol:  Lexiscan 0.4 mg. Resting EKG:  Normal.  Infusion heart response:  Normal.  Infusion blood pressure response:  Appropriate. Infusion EKG:  Abnormal.  Chest discomfort:  None. Ischemic EKG changes:  Borderline. IMPRESSION  Borderline changes in the electrocardiographic portion of the Lexiscan  stress test.  Nuclear Medicine report to follow.             [facility]    D: 2019 13:19:52       T: 2019 13:20:50     [email or web address]  Job#: [id number]     Doc#: Unknown    CC:    ()

## 2019-04-06 NOTE — ED PROVIDER NOTES
Date: 4/2/2019  EXAMINATION: 3 XRAY VIEWS OF THE RIGHT FOOT 4/2/2019 6:40 pm COMPARISON: None. HISTORY: ORDERING SYSTEM PROVIDED HISTORY: Gangrenous changes, rule out gas TECHNOLOGIST PROVIDED HISTORY: Gangrenous changes, rule out gas FINDINGS: Diffuse demineralization. Sclerosis 1st metatarsal phalangeal joint. Diffuse soft tissue swelling. No subcutaneous gas. Soft tissue swelling but no gas     Ct Chest Pulmonary Embolism W Contrast    Result Date: 4/5/2019  EXAMINATION: CTA OF THE CHEST 4/5/2019 6:49 pm TECHNIQUE: CTA of the chest was performed after the administration of intravenous contrast.  Multiplanar reformatted images are provided for review. MIP images are provided for review. Dose modulation, iterative reconstruction, and/or weight based adjustment of the mA/kV was utilized to reduce the radiation dose to as low as reasonably achievable. COMPARISON: None. HISTORY: ORDERING SYSTEM PROVIDED HISTORY: elevated d-dimer, SOB, tachy FINDINGS: Pulmonary Arteries: Pulmonary arteries are adequately opacified for evaluation. No evidence of intraluminal filling defect to suggest pulmonary embolism. Main pulmonary artery is normal in caliber. Mediastinum: No evidence of mediastinal lymphadenopathy. The heart and pericardium demonstrate no acute abnormality. There is no acute abnormality of the thoracic aorta. Lungs/pleura: The lungs are without acute process. No focal consolidation or pulmonary edema. No evidence of pleural effusion or pneumothorax. Upper Abdomen: Limited images of the upper abdomen are unremarkable. Soft Tissues/Bones: No acute bone or soft tissue abnormality. No evidence of pulmonary embolism or acute pulmonary abnormality. RECENT VITALS:     Temp: 98.4 °F (36.9 °C),  Pulse: 68, Resp: 18, BP: 116/81, SpO2: 98 %      This patient is a 37 y.o.  Female with complain shortness of breath and palpitations EKG as well as troponins don't reveal any acute findings, patient admitted to observation unit for cardiac evaluation. ED Course as of Apr 06 0640 Fri Apr 05, 2019   1716 Patient's chest x-ray is negative for acute process including no pneumonia, pneumothorax, or widened mediastinum to suggest aortic dissection. XR CHEST STANDARD (2 VW) [BJ]   1716 No anemia or leukocytosis noted on CBC. D-dimer elevated - will order CTa of the chest to assess for PE.    [BJ]   1745 TSH normal - doubt hyper or hypo thryoidism   TSH: 1.07 [BJ]   1745 Trop normal   Troponin, High Sensitivity: 6 [BJ]   1745 Patient's pro-BNP is within normal, therefore doubt acute onset CHF. [BJ]   1951 Patient resting comfortably and reports she feels a lot better. Heart score of 4. Will admit for cardiac work up at this time. Patient reports her toes have been black since September. [BJ]   Sat Apr 06, 2019   7528 Patient Signed out to Dr. Jessica Nathan while awaiting a bed on the floor.     [KW]      ED Course User Index  [BJ] Ariel Scott DO  [KW] Keturah Dye DO       OUTSTANDING TASKS / RECOMMENDATIONS:    1. Waiting for a bed on the floor. FINAL IMPRESSION:     1.  Chest pain, unspecified type        DISPOSITION:         DISPOSITION:  []  Discharge   []  Transfer -    [x]  Admission -     []  Against Medical Advice   []  Eloped   FOLLOW-UP: Bjorn Martinez MD  [facility] [address]  60 Mindy Cantor MD  [address]  [facility]: New Prescriptions    No medications on file          Keturah Dye DO  Emergency Medicine Resident  [facility] [place]  04/06/19 4048

## 2019-04-06 NOTE — CARE COORDINATION
Case Management Initial Discharge Plan  Gale Kowalski,             Met with:patient to discuss discharge plans. Information verified: address, contacts, phone number, , insurance Yes  PCP: Bjorn Martinez MD  Date of last visit: March    Insurance Provider: [facility]    Discharge Planning    Living Arrangements:      Support Systems:       Home has 1 stories  5 stairs to climb to get into front door. Patient able to perform ADL's:Independent    Current Services (outpatient & in home) none  DME equipment: walker, w/c, bp machine  DME provider:     Pharmacy: [facility] on [address]   Potential Assistance Purchasing Medications:     Does patient want to participate in local refill/ meds to beds program?       Potential Assistance Needed:       Patient agreeable to home care: No  [place] of choice provided:  n/a    Prior SNF/Rehab Placement and Facility:   Agreeable to SNF/Rehab: No  [place] of choice provided: n/a   Evaluation: no    Expected Discharge date:     Patient expects to be discharged to: Follow Up Appointment: Best Day/ Time:      Transportation provider: medical cab  Transportation arrangements needed for discharge: No    Readmission Risk              Risk of Unplanned Readmission:        14             Does patient have a readmission risk score greater than 14?: 14%  If yes, follow-up appointment must be made within 7 days of discharge. Discharge Plan: return to home, no skilled needs identified.           Electronically signed by Clyde Fulton RN on 19 at 2:23 PM

## 2019-04-06 NOTE — ED NOTES
Pt resting in bed, NAD noted rr even and non labored. Call light within reach, will continue to monitor.       Milan Carmen RN  04/05/19 [id number]

## 2019-04-06 NOTE — ED NOTES
Pt resting on the bed, no distress noted.  Pt updated with room assignment     Ana Luisa Emerson RN  04/06/19 [id number]

## 2019-04-07 NOTE — DISCHARGE SUMMARY
CDU Discharge Summary        Patient:  Felix Yeung  YOB: 1975    MRN: [id number]   Acct: [de-identified]    Primary Care Physician: Gerard Garcia MD    Admit date:  4/5/2019  4:33 PM  Discharge date: 4/6/2019  4:00 PM     Discharge Diagnoses:     1. 1.) Acute onset left-sided chest pain, etiology undetermined, relieved on its own      · Cardiac workup unremarkable in the emergency department including CTA of the chest  · Patient is anticoagulated due to past vasculopathy's  · No previous cardiac testing, Stress test ordered and unremarkable. Clear for discharge      Follow-up:  Call today/tomorrow for a follow up appointment with Gerard Garcia MD , or return to the Emergency Room with worsening symptoms    Stressed to patient the importance of following up with primary care doctor for further workup/management of symptoms. Pt verbalizes understanding and agrees with plan.     Discharge Medication Changes:       Medication List      CONTINUE taking these medications    amLODIPine 10 MG tablet  Commonly known as:  NORVASC  take 1 tablet by mouth once daily     apixaban 5 MG Tabs tablet  Commonly known as:  ELIQUIS  Take 1 tablet by mouth 2 times daily     atorvastatin 40 MG tablet  Commonly known as:  LIPITOR  Take 1 tablet by mouth daily     Blood Pressure Kit  1 Package by Does not apply route daily     lisinopril 5 MG tablet  Commonly known as:  PRINIVIL;ZESTRIL  take 1 tablet by mouth once daily        STOP taking these medications    clopidogrel 75 MG tablet  Commonly known as:  PLAVIX            Diet:  No diet orders on file, advance as tolerated     Activity:  As tolerated    Consultants: None    Procedures:  Not indicated     Diagnostic Test:   Results for orders placed or performed during the hospital encounter of 04/05/19   CBC Auto Differential   Result Value Ref Range    WBC 10.4 3.5 - 11.3 k/uL    RBC 4.61 3.95 - 5.11 m/uL    Hemoglobin 13.5 11.9 - 15.1 g/dL    Hematocrit 40.9 36.3 - 47.1 % MCV 88.7 82.6 - 102.9 fL    MCH 29.3 25.2 - 33.5 pg    MCHC 33.0 28.4 - 34.8 g/dL    RDW 14.5 (H) 11.8 - 14.4 %    Platelets 225 415 - 519 k/uL    MPV 10.4 8.1 - 13.5 fL    NRBC Automated 0.0 0.0 per 100 WBC    Differential Type NOT REPORTED     Seg Neutrophils 50 36 - 65 %    Lymphocytes 41 24 - 43 %    Monocytes 8 3 - 12 %    Eosinophils % 1 1 - 4 %    Basophils 0 0 - 2 %    Immature Granulocytes 0 0 %    Segs Absolute 5.12 1.50 - 8.10 k/uL    Absolute Lymph # 4.25 (H) 1.10 - 3.70 k/uL    Absolute Mono # 0.82 0.10 - 1.20 k/uL    Absolute Eos # 0.08 0.00 - 0.44 k/uL    Basophils # 0.04 0.00 - 0.20 k/uL    Absolute Immature Granulocyte 0.04 0.00 - 0.30 k/uL    WBC Morphology NOT REPORTED     RBC Morphology ANISOCYTOSIS PRESENT     Platelet Estimate NOT REPORTED    Basic Metabolic Panel w/ Reflex to MG   Result Value Ref Range    Glucose 139 (H) 70 - 99 mg/dL    BUN 12 6 - 20 mg/dL    CREATININE 0.64 0.50 - 0.90 mg/dL    Bun/Cre Ratio NOT REPORTED 9 - 20    Calcium 9.5 8.6 - 10.4 mg/dL    Sodium 137 135 - 144 mmol/L    Potassium 3.8 3.7 - 5.3 mmol/L    Chloride 100 98 - 107 mmol/L    CO2 19 (L) 20 - 31 mmol/L    Anion Gap 18 (H) 9 - 17 mmol/L    GFR Non-African American >60 >60 mL/min    GFR African American >60 >60 mL/min    GFR Comment          GFR Staging NOT REPORTED    Troponin   Result Value Ref Range    Troponin, High Sensitivity 6 0 - 14 ng/L    Troponin T NOT REPORTED <0.03 ng/mL    Troponin Interp NOT REPORTED    Troponin   Result Value Ref Range    Troponin, High Sensitivity 7 0 - 14 ng/L    Troponin T NOT REPORTED <0.03 ng/mL    Troponin Interp NOT REPORTED    Brain Natriuretic Peptide   Result Value Ref Range    Pro-BNP <20 <300 pg/mL    BNP Interpretation Pro-BNP Reference Range:    D-Dimer, Quantitative   Result Value Ref Range    D-Dimer, Quant 0.91 mg/L FEU   HCG Qualitative, Serum   Result Value Ref Range    hCG Qual NEGATIVE NEGATIVE   TSH with Reflex   Result Value Ref Range    TSH 1.07 0.30 - 5.00 mIU/L   Troponin   Result Value Ref Range    Troponin, High Sensitivity 7 0 - 14 ng/L    Troponin T NOT REPORTED <0.03 ng/mL    Troponin Interp NOT REPORTED    EKG 12 Lead   Result Value Ref Range    Ventricular Rate 122 BPM    Atrial Rate 122 BPM    P-R Interval 146 ms    QRS Duration 74 ms    Q-T Interval 314 ms    QTc Calculation (Bazett) 447 ms    P Axis 51 degrees    R Axis -5 degrees    T Axis 14 degrees   EKG 12 Lead   Result Value Ref Range    Ventricular Rate 59 BPM    Atrial Rate 59 BPM    P-R Interval 156 ms    QRS Duration 84 ms    Q-T Interval 436 ms    QTc Calculation (Bazett) 431 ms    P Axis 10 degrees    R Axis -9 degrees    T Axis -13 degrees     Xr Chest Standard (2 Vw)    Result Date: 4/5/2019  EXAMINATION: TWO VIEWS OF THE CHEST 4/5/2019 4:58 pm COMPARISON: 11/15/2015 HISTORY: ORDERING SYSTEM PROVIDED HISTORY: chest pain TECHNOLOGIST PROVIDED HISTORY: chest pain FINDINGS: The lungs are without acute focal process. There is no effusion or pneumothorax. The cardiomediastinal silhouette is stable. The osseous structures are stable. No acute process. Ct Chest Pulmonary Embolism W Contrast    Result Date: 4/5/2019  EXAMINATION: CTA OF THE CHEST 4/5/2019 6:49 pm TECHNIQUE: CTA of the chest was performed after the administration of intravenous contrast.  Multiplanar reformatted images are provided for review. MIP images are provided for review. Dose modulation, iterative reconstruction, and/or weight based adjustment of the mA/kV was utilized to reduce the radiation dose to as low as reasonably achievable. COMPARISON: None. HISTORY: ORDERING SYSTEM PROVIDED HISTORY: elevated d-dimer, SOB, tachy FINDINGS: Pulmonary Arteries: Pulmonary arteries are adequately opacified for evaluation. No evidence of intraluminal filling defect to suggest pulmonary embolism. Main pulmonary artery is normal in caliber. Mediastinum: No evidence of mediastinal lymphadenopathy.   The heart and pericardium demonstrate no acute abnormality. There is no acute abnormality of the thoracic aorta. Lungs/pleura: The lungs are without acute process. No focal consolidation or pulmonary edema. No evidence of pleural effusion or pneumothorax. Upper Abdomen: Limited images of the upper abdomen are unremarkable. Soft Tissues/Bones: No acute bone or soft tissue abnormality. No evidence of pulmonary embolism or acute pulmonary abnormality. Nm Myocardial Spect Rest Exercise Or Rx    Result Date: 4/6/2019  EXAMINATION: MYOCARDIAL PERFUSION IMAGING 4/6/2019 10:13 am TECHNIQUE: For the rest study, 13.4 mCi of Tc 99 labeled sestamibi were injected. SPECT images were acquired. Under cardiology supervision, 0.4mg Mariah Hector was infused. After pharmacologic stress, 35.3 mCi of Tc 99 labeled sestamibi were injected. SPECT images with ECG gating were acquired. COMPARISON: None Available. HISTORY: ORDERING SYSTEM PROVIDED HISTORY: TECHNOLOGIST PROVIDED HISTORY: Ordering Physician Provided Reason for Exam: chest pain, HTN, nausea and shortness of breath and palpitations Relevant Medical/Surgical History: Claudication in peripheral vascular disease , wound right leg, FINDINGS: There is normal accumulation of tracer throughout the myocardium on rest images and stress images. There are no fixed or reversible defects. End diastolic volume is 105 ml. The ejection fraction equals 68% with no focal wall motion abnormalities. There is no left ventricular dilatation. 1. No pharmacologically induced reversible perfusion defects to suggest ischemia 2. Ejection fraction of 68% 3.  No focal wall motion abnormality           Physical Exam:    General appearance - NAD, AOx 3   Lungs -CTAB, no R/R/R  Heart - RRR, no M/R/G  Abdomen - Soft, NT/ND  Neurological:  MAEx4, No focal motor deficit, sensory loss  Extremities - Cap refil <2 sec in all ext., no edema  Skin -warm, dry      Hospital Course:  Clinical course has improved, labs and imaging reviewed. Marisol Garrido originally presented to the hospital on 4/5/2019  4:33 PM with chest pain. Workup in the ED unremarkable. Pt admitted for cardiology consultation, stress test performed and unremarkable, cleared for discharge. At that time it was determined that She required further observation and testing and consultation. Labs and imaging were followed daily. Imaging results as above. She is medically stable to be discharged. Disposition: Home    Patient stated that they will not drive themselves home from the hospital if they have gotten pain killers/ narcotics earlier that day and that they will arrange for transportation on their own or work with the  for a ride. Patient counseled NOT to drive while under the influence of narcotics/ pain killers. Condition: Good    Patient stable and ready for discharge home. I have discussed plan of care with patient and they are in understanding. They were instructed to read discharge paperwork. All of their questions and concerns were addressed. Time Spent: 0 day      --  Tommie Frankel DO  Emergency Medicine Resident Physician    This dictation was generated by voice recognition computer software. Although all attempts are made to edit the dictation for accuracy, there may be errors in the transcription that are not intended.

## 2019-04-08 ENCOUNTER — TELEPHONE (OUTPATIENT)
Dept: FAMILY MEDICINE CLINIC | Age: 44
End: 2019-04-08

## 2019-04-08 NOTE — TELEPHONE ENCOUNTER
[place] [facility] Transitions Initial Follow Up Call    Outreach made within 2 business days of discharge: Yes    Patient: Betsy Bowers Patient : 1975   MRN: [id number]  Reason for Admission: There are no discharge diagnoses documented for the most recent discharge. Discharge Date: 19       Spoke with: LM on patients phone to call office back to make an appointment.      Discharge department/facility: [facility]    Scheduled appointment with PCP within 7-14 days    Follow Up  Future Appointments   Date Time Provider Amira English   2019  2:00 PM SCHEDULE, [facility] [address] [facility]   [facility]   2019 11:30 AM STV ULTRASOUND RM 1 STVZ US STV Radiolog   2019 12:30 PM STV VASCULAR RM ST[facility]Z VASC [facility]   2019  2:15 PM Fernanda Avila MD [address] [place] [address] Estrella MA

## 2019-04-29 DIAGNOSIS — N93.9 ABNORMAL UTERINE BLEEDING (AUB): Primary | ICD-10-CM

## 2019-04-30 ENCOUNTER — TELEPHONE (OUTPATIENT)
Dept: OBGYN | Age: 44
End: 2019-04-30

## 2019-04-30 DIAGNOSIS — N93.9 ABNORMAL UTERINE BLEEDING (AUB): Primary | ICD-10-CM

## 2019-05-02 ENCOUNTER — TELEPHONE (OUTPATIENT)
Dept: OBGYN | Age: 44
End: 2019-05-02

## 2019-05-28 DIAGNOSIS — I10 ESSENTIAL HYPERTENSION: ICD-10-CM

## 2019-05-28 RX ORDER — LISINOPRIL 5 MG/1
TABLET ORAL
Qty: 30 TABLET | Refills: 0 | Status: SHIPPED | OUTPATIENT
Start: 2019-05-28 | End: 2019-07-26 | Stop reason: SDUPTHER

## 2019-05-28 NOTE — TELEPHONE ENCOUNTER
Lisinopril pending for refill     Health Maintenance   Topic Date Due    Pneumococcal 0-64 years Vaccine (1 of 1 - PPSV23) 05/06/1981    HIV screen  05/06/1990    DTaP/Tdap/Td vaccine (1 - Tdap) 05/06/1994    A1C test (Diabetic or Prediabetic)  12/18/2018    Flu vaccine (Season Ended) 09/01/2019    Cervical cancer screen  03/21/2020    Potassium monitoring  04/05/2020    Creatinine monitoring  04/05/2020    Lipid screen  10/03/2023             (applicable per patient's age: Cancer Screenings, Depression Screening, Fall Risk Screening, Immunizations)    Hemoglobin A1C (%)   Date Value   12/18/2017 5.9   07/23/2014 5.7     LDL Cholesterol (mg/dL)   Date Value   10/03/2018 42     AST (U/L)   Date Value   10/01/2018 20     ALT (U/L)   Date Value   10/01/2018 23     BUN (mg/dL)   Date Value   04/05/2019 12      (goal A1C is < 7)   (goal LDL is <100) need 30-50% reduction from baseline     BP Readings from Last 3 Encounters:   04/06/19 118/70   04/02/19 (!) 176/95   03/21/19 (!) 144/86    (goal /80)      All Future Testing planned in [facility]:  Lab Frequency Next Occurrence   US PELVIS COMPLETE Once 10/19/2019   VL LOWER EXTREMITY ARTERIAL SEGMENTAL PRESSURES W PPG Once 02/04/2020   XR FOOT RIGHT (MIN 3 VIEWS) Once 02/04/2020   US Pelvis Complete Once 03/21/2020   VL ARTERIAL PVR LOWER WO EXERCISE Once 04/02/2019   US Non OB Transvaginal Once 04/30/2020       Next Visit Date:  Future Appointments   Date Time Provider Amira English   5/31/2019 11:00 AM STV ULTRASOUND RM 1 STVZ US STV Radiolog   5/31/2019 11:30 AM STV VASCULAR RM [facility] VASC [facility]   6/3/2019  2:15 PM Sampson Jarquin MD heartvasc [facility]            Patient Active Problem List:     Migraine headache with aura     HTN (hypertension)     Obesity     Menometrorrhagia     Intertriginous candidiasis     Depression     Claudication in peripheral vascular disease ([address]     Left popliteal artery occlusion (HCC)     Hypercoagulable state ([facility] [facility]     Hemorrhage of rectum and anus     Anemia     Foot pain     Wound of right leg     PAD (peripheral artery disease) (HCC)     Takotsubo cardiomyopathy     Chest pain

## 2019-05-31 NOTE — PROGRESS NOTES
Patient instructed to remove shoes and socks, instructed to sit in exam chair. Current PCP name is Michael and date of last visit 12/18/2017. Do you have a follow up visit scheduled?    no
vaginal bleeding

## 2019-06-06 ENCOUNTER — HOSPITAL ENCOUNTER (EMERGENCY)
Age: 44
Discharge: HOME | End: 2019-06-06
Attending: EMERGENCY MEDICINE
Payer: COMMERCIAL

## 2019-06-06 VITALS
DIASTOLIC BLOOD PRESSURE: 82 MMHG | RESPIRATION RATE: 14 BRPM | SYSTOLIC BLOOD PRESSURE: 147 MMHG | BODY MASS INDEX: 29.95 KG/M2 | TEMPERATURE: 97.4 F | WEIGHT: 180 LBS | OXYGEN SATURATION: 100 %

## 2019-06-06 DIAGNOSIS — M79.605 LEFT LEG PAIN: Primary | ICD-10-CM

## 2019-06-06 LAB
ABSOLUTE EOS #: 0.11 K/UL (ref 0–0.44)
ABSOLUTE IMMATURE GRANULOCYTE: 0.04 K/UL (ref 0–0.3)
ABSOLUTE LYMPH #: 2.2 K/UL (ref 1.1–3.7)
ABSOLUTE MONO #: 0.66 K/UL (ref 0.1–1.2)
ANION GAP SERPL CALCULATED.3IONS-SCNC: 12 MMOL/L (ref 9–17)
BASOPHILS # BLD: 0 % (ref 0–2)
BASOPHILS ABSOLUTE: 0.04 K/UL (ref 0–0.2)
BUN BLDV-MCNC: 15 MG/DL (ref 6–20)
BUN/CREAT BLD: ABNORMAL (ref 9–20)
CALCIUM SERPL-MCNC: 9 MG/DL (ref 8.6–10.4)
CHLORIDE BLD-SCNC: 103 MMOL/L (ref 98–107)
CO2: 21 MMOL/L (ref 20–31)
CREAT SERPL-MCNC: 0.68 MG/DL (ref 0.5–0.9)
DIFFERENTIAL TYPE: ABNORMAL
EOSINOPHILS RELATIVE PERCENT: 1 % (ref 1–4)
GFR AFRICAN AMERICAN: >60 ML/MIN
GFR NON-AFRICAN AMERICAN: >60 ML/MIN
GFR SERPL CREATININE-BSD FRML MDRD: ABNORMAL ML/MIN/{1.73_M2}
GFR SERPL CREATININE-BSD FRML MDRD: ABNORMAL ML/MIN/{1.73_M2}
GLUCOSE BLD-MCNC: 105 MG/DL (ref 70–99)
HCT VFR BLD CALC: 42 % (ref 36.3–47.1)
HEMOGLOBIN: 13.1 G/DL (ref 11.9–15.1)
IMMATURE GRANULOCYTES: 0 %
INR BLD: 0.9
LYMPHOCYTES # BLD: 24 % (ref 24–43)
MCH RBC QN AUTO: 28.8 PG (ref 25.2–33.5)
MCHC RBC AUTO-ENTMCNC: 31.2 G/DL (ref 28.4–34.8)
MCV RBC AUTO: 92.3 FL (ref 82.6–102.9)
MONOCYTES # BLD: 7 % (ref 3–12)
NRBC AUTOMATED: 0 PER 100 WBC
PARTIAL THROMBOPLASTIN TIME: 22.2 SEC (ref 20.5–30.5)
PDW BLD-RTO: 14.4 % (ref 11.8–14.4)
PLATELET # BLD: 308 K/UL (ref 138–453)
PLATELET ESTIMATE: ABNORMAL
PMV BLD AUTO: 10.5 FL (ref 8.1–13.5)
POTASSIUM SERPL-SCNC: 4.2 MMOL/L (ref 3.7–5.3)
PROTHROMBIN TIME: 9.6 SEC (ref 9–12)
RBC # BLD: 4.55 M/UL (ref 3.95–5.11)
RBC # BLD: ABNORMAL 10*6/UL
SEG NEUTROPHILS: 68 % (ref 36–65)
SEGMENTED NEUTROPHILS ABSOLUTE COUNT: 5.98 K/UL (ref 1.5–8.1)
SODIUM BLD-SCNC: 136 MMOL/L (ref 135–144)
WBC # BLD: 9 K/UL (ref 3.5–11.3)
WBC # BLD: ABNORMAL 10*3/UL

## 2019-06-06 PROCEDURE — 99284 EMERGENCY DEPT VISIT MOD MDM: CPT

## 2019-06-06 PROCEDURE — 96374 THER/PROPH/DIAG INJ IV PUSH: CPT

## 2019-06-06 PROCEDURE — 6370000000 HC RX 637 (ALT 250 FOR IP): Performed by: EMERGENCY MEDICINE

## 2019-06-06 PROCEDURE — 96375 TX/PRO/DX INJ NEW DRUG ADDON: CPT

## 2019-06-06 PROCEDURE — 85730 THROMBOPLASTIN TIME PARTIAL: CPT

## 2019-06-06 PROCEDURE — 80048 BASIC METABOLIC PNL TOTAL CA: CPT

## 2019-06-06 PROCEDURE — 93970 EXTREMITY STUDY: CPT

## 2019-06-06 PROCEDURE — 6360000002 HC RX W HCPCS: Performed by: EMERGENCY MEDICINE

## 2019-06-06 PROCEDURE — 85025 COMPLETE CBC W/AUTO DIFF WBC: CPT

## 2019-06-06 PROCEDURE — 85610 PROTHROMBIN TIME: CPT

## 2019-06-06 RX ORDER — CLOPIDOGREL BISULFATE 75 MG/1
75 TABLET ORAL DAILY
Qty: 30 TABLET | Refills: 0 | Status: ON HOLD | OUTPATIENT
Start: 2019-06-06 | End: 2019-08-04 | Stop reason: HOSPADM

## 2019-06-06 RX ORDER — ACETAMINOPHEN 325 MG/1
650 TABLET ORAL ONCE
Status: COMPLETED | OUTPATIENT
Start: 2019-06-06 | End: 2019-06-06

## 2019-06-06 RX ORDER — MORPHINE SULFATE 4 MG/ML
4 INJECTION, SOLUTION INTRAMUSCULAR; INTRAVENOUS ONCE
Status: COMPLETED | OUTPATIENT
Start: 2019-06-06 | End: 2019-06-06

## 2019-06-06 RX ORDER — CLOPIDOGREL BISULFATE 75 MG/1
75 TABLET ORAL ONCE
Status: COMPLETED | OUTPATIENT
Start: 2019-06-06 | End: 2019-06-06

## 2019-06-06 RX ORDER — ONDANSETRON 2 MG/ML
4 INJECTION INTRAMUSCULAR; INTRAVENOUS ONCE
Status: COMPLETED | OUTPATIENT
Start: 2019-06-06 | End: 2019-06-06

## 2019-06-06 RX ADMIN — APIXABAN 10 MG: 5 TABLET, FILM COATED ORAL at 18:25

## 2019-06-06 RX ADMIN — ACETAMINOPHEN 650 MG: 325 TABLET ORAL at 16:05

## 2019-06-06 RX ADMIN — MORPHINE SULFATE 4 MG: 4 INJECTION INTRAVENOUS at 14:15

## 2019-06-06 RX ADMIN — ONDANSETRON 4 MG: 2 INJECTION INTRAMUSCULAR; INTRAVENOUS at 14:15

## 2019-06-06 RX ADMIN — CLOPIDOGREL 75 MG: 75 TABLET, FILM COATED ORAL at 18:10

## 2019-06-06 ASSESSMENT — ENCOUNTER SYMPTOMS
SORE THROAT: 0
NAUSEA: 1
VOMITING: 0
EYE PAIN: 0
COUGH: 0
SHORTNESS OF BREATH: 0
DIARRHEA: 0
EYE DISCHARGE: 0
ABDOMINAL PAIN: 0

## 2019-06-06 ASSESSMENT — PAIN SCALES - GENERAL: PAINLEVEL_OUTOF10: 9

## 2019-06-06 ASSESSMENT — PAIN DESCRIPTION - ORIENTATION: ORIENTATION: LEFT;LOWER

## 2019-06-06 ASSESSMENT — PAIN DESCRIPTION - LOCATION: LOCATION: LEG

## 2019-06-06 NOTE — ED PROVIDER NOTES
Pt to be seen by Dr. Kaycee Saldivar.  Pt taken to vascular lab shortly after presentation by resident and I was not able to see patient.       Pao Head MD  06/06/19 [id number]

## 2019-06-06 NOTE — ED PROVIDER NOTES
[facility] ED     Emergency Department     Faculty Attestation        I performed a history and physical examination of the patient and discussed management with the resident. I reviewed the residents note and agree with the documented findings and plan of care. Any areas of disagreement are noted on the chart. I was personally present for the key portions of any procedures. I have documented in the chart those procedures where I was not present during the key portions. I have reviewed the emergency nurses triage note. I agree with the chief complaint, past medical history, past surgical history, allergies, medications, social and family history as documented unless otherwise noted below. For Physician Assistant/ Nurse Practitioner cases/documentation I have personally evaluated this patient and have completed at least one if not all key elements of the E/M (history, physical exam, and MDM). Additional findings are as noted. Vital Signs: BP: (!) 147/82     Resp: 14  Temp: 97.4 °F (36.3 °C) SpO2: 100 %  PCP:  Dede Funez MD    Pertinent Comments:     Patient is a 45-year-old female who has severe peripheral vascular disease followed by vascular surgery who is presenting with unilateral extremity pain on the left and unfortunately unable to afford her Eliquis that she supposed to be on. Dopplerable pulses are present however not palpable similar to previous exam after review of note. Plan: Dopplers as well as possible PVRs after discussion with vascular surgery    Critical Care  None      (Please note that portions of this note were completed with a voice recognition program. Efforts were made to edit the dictations but occasionally words are mis-transcribed.  Whenever words are used in this note in any gender, they shall be construed as though they were used in the gender appropriate to the circumstances; and whenever words are used in this note in the singular or plural form, they shall be construed as though they were used in the form appropriate to the circumstances.)    MD Guerda Dill  Attending Emergency Medicine Physician            Susi De MD  06/06/19 [id number]

## 2019-06-06 NOTE — ED NOTES
Pt returned from vascular, reports diffuse MOREL. Dr Pascual Jacksontiff notified and order for tylenol received.   Pt given phone at her request.     Yakelin Adames RN  06/06/19 [id number]

## 2019-06-06 NOTE — ED PROVIDER NOTES
[facility]  ED  Emergency Department Encounter  EmergencyMedicine Resident     Pt Matty Pro  MRN: [id number]  [place] 1975  Date of evaluation: 6/6/19  PCP:  Alvenia Severance, MD    [address] [place] [address]       Chief Complaint   Patient presents with    Leg Pain     pt has hx of multiple blood clots in her legs that required surgical intervention. pt has had pain in her left calf since 0300. pt has been out of her blood thinners, eloquis, coumadin, plavix for 1 1/2 months        HISTORY OF PRESENT ILLNESS  (Location/Symptom, Timing/Onset, Context/Setting, Quality, Duration, Modifying Factors, Severity.)      Idalia Santos is a 40 y.o. female pmhx PAD, left SFA to peroneal artery bypass with vein on 1/16/18, right leg fasciotomy after toe ischemia following catheter directed thrombolysis, history of DVTs, Eliquis and Plavix although she has not been taking them for the last month and a half as she has had trouble with the pharmacy filling prescriptions    who presents with left calf pain that started yesterday. Patient states she was walking around a lot more than normal, started having left calf pain is much worse today. States that its much worse with palpation. Denies any new numbness, tingling or weakness to her lower extremities, swelling to her calves, fever or chills.     PAST MEDICAL / SURGICAL / SOCIAL / FAMILY HISTORY      has a past medical history of Abnormal Pap smear of cervix, Anemia, Chronic back pain, Claudication (HCC), Claudication in peripheral vascular disease ([address] Depression, Headache(784.0), Heart murmur, Hemorrhage of rectum and anus, HTN (hypertension), Hypercoagulable state ([address] Hyperlipidemia, Hypertension, Intertriginous candidiasis, Left popliteal artery occlusion ([address] Leg pain, Menometrorrhagia, Migraine headache with aura, Obesity, Osteoarthritis, PAD (peripheral artery disease) ([address] PVD (peripheral vascular disease) ([address] Takotsubo cardiomyopathy, and Wound of right leg.     has a past surgical history that includes Tubal ligation ();  section (); other surgical history (2017); other surgical history (Left, 2018); other surgical history (Left, 2018); vascular surgery (Left, 2018); pr reoperation, bypass graft (Left, 2018); and pr vein bypass graft,fem-pop (Left, 2018).     Social History     Socioeconomic History    Marital status: Single     Spouse name: Not on file    Number of children: Not on file    Years of education: Not on file    Highest education level: Not on file   Occupational History    Not on file   Social Needs    Financial resource strain: Not on file    Food insecurity:     Worry: Not on file     Inability: Not on file    Transportation needs:     Medical: Not on file     Non-medical: Not on file   Tobacco Use    Smoking status: Never Smoker    Smokeless tobacco: Never Used   Substance and Sexual Activity    Alcohol use: Yes     Comment: WINE 4 O 6 GLASSES  A YEAR    Drug use: No    Sexual activity: Yes     Partners: Male   Lifestyle    Physical activity:     Days per week: Not on file     Minutes per session: Not on file    Stress: Not on file   Relationships    Social connections:     Talks on phone: Not on file     Gets together: Not on file     Attends Jew service: Not on file     Active member of club or organization: Not on file     Attends meetings of clubs or organizations: Not on file     Relationship status: Not on file    Intimate partner violence:     Fear of current or ex partner: Not on file     Emotionally abused: Not on file     Physically abused: Not on file     Forced sexual activity: Not on file   Other Topics Concern    Not on file   Social History Narrative    Not on file       Family History   Problem Relation Age of Onset    Diabetes Mother     High Blood Pressure Mother     Heart Attack Mother     Heart Disease Mother     Kidney Disease Mother     High Blood Pressure Father     Heart Disease Father     Heart Attack Father     Diabetes Sister     High Blood Pressure Sister     Diabetes Sister     High Blood Pressure Sister        Allergies:  Evelyne Croft [aspirin]; Lopid [gemfibrozil]; Nortriptyline; Pcn [penicillins]; Sulfa antibiotics; and Ibuprofen    Home Medications:  Prior to Admission medications    Medication Sig Start Date End Date Taking? Authorizing Provider   apixaban (ELIQUIS STARTER PACK) 5 MG TABS tablet Take 10 mg (2 tablets) orally twice daily for 7 days, then take 5 mg (1 tablet) orally twice daily thereafter. 6/6/19  Yes Felizardo Handler, DO   clopidogrel (PLAVIX) 75 MG tablet Take 1 tablet by mouth daily 6/6/19  Yes Felizardo Handler, DO   lisinopril (PRINIVIL;ZESTRIL) 5 MG tablet take 1 tablet by mouth once daily 5/28/19   Hola Torrez MD   amLODIPine (NORVASC) 10 MG tablet take 1 tablet by mouth once daily 1/22/19   Hola Torrez MD   atorvastatin (LIPITOR) 40 MG tablet Take 1 tablet by mouth daily 1/22/19   Hola Torrez MD   Blood Pressure KIT 1 Package by Does not apply route daily 12/18/17   Hola Torrez MD       REVIEW OF SYSTEMS    (2-9 systems for level 4, 10 or more for level 5)      Review of Systems   Constitutional: Negative for chills, diaphoresis and fever. HENT: Negative for congestion and sore throat. Eyes: Negative for pain and discharge. Respiratory: Negative for cough and shortness of breath. Cardiovascular: Negative for chest pain and leg swelling. Gastrointestinal: Positive for nausea. Negative for abdominal pain, diarrhea and vomiting. Endocrine: Negative for polydipsia and polyuria. Genitourinary: Negative for dysuria and hematuria. Musculoskeletal: Negative for neck pain and neck stiffness. Skin: Negative for pallor and rash. Allergic/Immunologic: Negative for environmental allergies and food allergies. Neurological: Negative for weakness, numbness and headaches.    Hematological: Negative for adenopathy. Does not bruise/bleed easily. Psychiatric/Behavioral: Negative for hallucinations and suicidal ideas. PHYSICAL EXAM   (up to 7 for level 4, 8 or more for level 5)      INITIAL VITALS:   BP (!) 147/82   Temp 97.4 °F (36.3 °C)   Resp 14   Wt 180 lb (81.6 kg)   LMP 05/20/2019   SpO2 100%   BMI 29.95 kg/m²     Physical Exam   Constitutional: She is oriented to person, place, and time. She appears well-developed and well-nourished. HENT:   Head: Normocephalic and atraumatic. Mouth/Throat: Oropharynx is clear and moist.   Eyes: Pupils are equal, round, and reactive to light. Conjunctivae are normal.   Neck: Normal range of motion. Neck supple. Cardiovascular: Normal rate and regular rhythm. Exam reveals no gallop and no friction rub. No murmur heard. Pulmonary/Chest: Effort normal and breath sounds normal. No respiratory distress. She has no wheezes. She has no rales. Abdominal: Soft. Bowel sounds are normal. There is no tenderness. There is no rebound and no guarding. Abdomen soft, nontender   Musculoskeletal: Normal range of motion. She exhibits no edema. Patient with calf tenderness to palpation on the left and positive Homans sign, well-healed scars to bilateral lower extremities, no unilateral leg swelling, pitting edema, erythema or warmth  Left foot is slightly cooler than the right but not cold to touch, strength and sensation are intact to bilateral lower extremities  Chronic necrotic wounds to right toes. Nonpalpable DP and PT pulses bilaterally, bilateral PT pulses are dopplerable   Neurological: She is alert and oriented to person, place, and time. She has normal reflexes. Skin: Skin is warm and dry. No rash noted. Psychiatric: She has a normal mood and affect. Thought content normal.   Nursing note and vitals reviewed.       DIFFERENTIAL  DIAGNOSIS     PLAN (LABS / IMAGING / EKG):  Orders Placed This Encounter   Procedures    VL DUP LOWER EXTREMITY VENOUS BILATERAL    CBC WITH AUTO DIFFERENTIAL    BASIC METABOLIC PANEL    PROTIME-INR    APTT    Inpatient consult to Vascular Surgery       MEDICATIONS ORDERED:  Orders Placed This Encounter   Medications    morphine injection 4 mg    ondansetron (ZOFRAN) injection 4 mg    acetaminophen (TYLENOL) tablet 650 mg    apixaban (ELIQUIS) tablet 10 mg    clopidogrel (PLAVIX) tablet 75 mg    apixaban (ELIQUIS STARTER PACK) 5 MG TABS tablet     Sig: Take 10 mg (2 tablets) orally twice daily for 7 days, then take 5 mg (1 tablet) orally twice daily thereafter.      Dispense:  74 tablet     Refill:  0    clopidogrel (PLAVIX) 75 MG tablet     Sig: Take 1 tablet by mouth daily     Dispense:  30 tablet     Refill:  0       DDX: DVT, ischemia, Baker's cyst, calf strain, cellulitis, chronic wound    DIAGNOSTIC RESULTS / EMERGENCY DEPARTMENT COURSE / MDM     LABS:  Results for orders placed or performed during the hospital encounter of 06/06/19   CBC WITH AUTO DIFFERENTIAL   Result Value Ref Range    WBC 9.0 3.5 - 11.3 k/uL    RBC 4.55 3.95 - 5.11 m/uL    Hemoglobin 13.1 11.9 - 15.1 g/dL    Hematocrit 42.0 36.3 - 47.1 %    MCV 92.3 82.6 - 102.9 fL    MCH 28.8 25.2 - 33.5 pg    MCHC 31.2 28.4 - 34.8 g/dL    RDW 14.4 11.8 - 14.4 %    Platelets 401 713 - 088 k/uL    MPV 10.5 8.1 - 13.5 fL    NRBC Automated 0.0 0.0 per 100 WBC    Differential Type NOT REPORTED     Seg Neutrophils 68 (H) 36 - 65 %    Lymphocytes 24 24 - 43 %    Monocytes 7 3 - 12 %    Eosinophils % 1 1 - 4 %    Basophils 0 0 - 2 %    Immature Granulocytes 0 0 %    Segs Absolute 5.98 1.50 - 8.10 k/uL    Absolute Lymph # 2.20 1.10 - 3.70 k/uL    Absolute Mono # 0.66 0.10 - 1.20 k/uL    Absolute Eos # 0.11 0.00 - 0.44 k/uL    Basophils # 0.04 0.00 - 0.20 k/uL    Absolute Immature Granulocyte 0.04 0.00 - 0.30 k/uL    WBC Morphology NOT REPORTED     RBC Morphology NOT REPORTED     Platelet Estimate NOT REPORTED    BASIC METABOLIC PANEL   Result Value Ref Range    Glucose 105 (H) 70 - 99 mg/dL    BUN 15 6 - 20 mg/dL    CREATININE 0.68 0.50 - 0.90 mg/dL    Bun/Cre Ratio NOT REPORTED 9 - 20    Calcium 9.0 8.6 - 10.4 mg/dL    Sodium 136 135 - 144 mmol/L    Potassium 4.2 3.7 - 5.3 mmol/L    Chloride 103 98 - 107 mmol/L    CO2 21 20 - 31 mmol/L    Anion Gap 12 9 - 17 mmol/L    GFR Non-African American >60 >60 mL/min    GFR African American >60 >60 mL/min    GFR Comment          GFR Staging NOT REPORTED    PROTIME-INR   Result Value Ref Range    Protime 9.6 9.0 - 12.0 sec    INR 0.9    APTT   Result Value Ref Range    PTT 22.2 20.5 - 30.5 sec       IMPRESSION: 42-year-old female with history of arterial bypass presents with left calf pain, not taking her Eliquis or Plavix for the last month and a half. Also history of DVT. All patient's left foot is slightly cooler than the right, it is not cold to touch, strength and sensation are intact, dopplerable PT pulses bilaterally which, looking back at patient's recent vascular notes, appears to be her baseline. We will immediately consult vascular surgery for evaluation, order bilateral duplex ultrasounds to rule out DVT, basic lab work consult to manage treatment, reassess.     RADIOLOGY:  VL DUP LOWER EXTREMITY VENOUS BILATERAL   Final Result            EKG  None    All EKG's are interpreted by the Emergency Department Physician who either signs or Co-signs this chart in the absence of a cardiologist.    EMERGENCY DEPARTMENT COURSE:  Ultrasound without any DVT, did show occlusion of anterior tibial bypass graft at the distal segment, reviewed notes and this is an old finding    Vascular surgery evaluated patient, able to Doppler bilateral PT's and right DP pulses, evaluated wound on right foot which appears chronic and not infected, they emphasized to patient that she must restart her Eliquist and Plavix and follow-up with vascular surgery in the next week, we provided patient with 30 day Eliquis card to obtain drug for free, discussed follow-up plan with her and return precautions, patient understands and agrees with discharge plan, pain well controlled before she left    PROCEDURES:  None    CONSULTS:  IP CONSULT TO VASCULAR SURGERY    CRITICAL CARE:  None    FINAL IMPRESSION      1. Left leg pain          DISPOSITION / PLAN     DISPOSITION Decision To Discharge 06/06/2019 06:04:24 PM      PATIENT REFERRED TO:  Torres Up, [address] [facility] [id number]  [facility] [address]  [phone]    Schedule an appointment as soon as possible for a visit         DISCHARGE MEDICATIONS:  New Prescriptions    APIXABAN (ELIQUIS STARTER PACK) 5 MG TABS TABLET    Take 10 mg (2 tablets) orally twice daily for 7 days, then take 5 mg (1 tablet) orally twice daily thereafter.     CLOPIDOGREL (PLAVIX) 75 MG TABLET    Take 1 tablet by mouth daily       Birgit Tyler DO  Emergency Medicine Resident    (Please note that portions of thisnote were completed with a voice recognition program.  Efforts were made to edit the dictations but occasionally words are mis-transcribed.)        Birgit Tyler DO  Resident  06/06/19 [id number]

## 2019-06-07 NOTE — CONSULTS
Pt with history PAD s/p LLE bypass with known occlusion of distal graft. Pt presented to ED due to acute LLE calf pain that started yesterday after walking 1.5 blocks. Normally pt able to walk 1 block prior to pain, but pushed herself. She does has h/o venous thrombus in LE, along with PAD which she was taking plavix and eliquis. Due to mix up with pharmacy unable to fill script last 1.5months. Has been following with podiatry but has missed last 1 to 2 vascular surgery appointments. Per her pain has significantly improved. He wounds the her RLE (foot) are stable denies drainage or erythema. Podiatry care for her dry gangrene of 1-3 digit. R lateral fasciotomy site 100% epithelized. RLE - doppler signal to PT/DP, drygangrene toes 1-3, no erythema or discharge  LLE - doppler signal to PT, no wounds to foot    Venous duplex preformed in ED negative for DVT. Pt ok to d/c, rec script for eliquis. Follow up with Dr. Carter Cuenca w/in next 1-2 weeks. Patient agreeable.      Gemini Shirley PGY3

## 2019-06-24 ENCOUNTER — HOSPITAL ENCOUNTER (OUTPATIENT)
Dept: VASCULAR LAB | Age: 44
Discharge: HOME | End: 2019-06-24
Payer: COMMERCIAL

## 2019-06-24 ENCOUNTER — HOSPITAL ENCOUNTER (OUTPATIENT)
Dept: ULTRASOUND IMAGING | Age: 44
Discharge: HOME | End: 2019-06-26
Payer: COMMERCIAL

## 2019-06-24 ENCOUNTER — OFFICE VISIT (OUTPATIENT)
Dept: VASCULAR SURGERY | Age: 44
End: 2019-06-24
Payer: COMMERCIAL

## 2019-06-24 VITALS
HEIGHT: 65 IN | SYSTOLIC BLOOD PRESSURE: 146 MMHG | OXYGEN SATURATION: 99 % | HEART RATE: 78 BPM | WEIGHT: 189 LBS | DIASTOLIC BLOOD PRESSURE: 102 MMHG | BODY MASS INDEX: 31.49 KG/M2 | TEMPERATURE: 97 F

## 2019-06-24 DIAGNOSIS — I73.9 PAD (PERIPHERAL ARTERY DISEASE) (HCC): ICD-10-CM

## 2019-06-24 DIAGNOSIS — N93.9 ABNORMAL UTERINE BLEEDING (AUB): ICD-10-CM

## 2019-06-24 DIAGNOSIS — I73.9 PAD (PERIPHERAL ARTERY DISEASE) (HCC): Primary | ICD-10-CM

## 2019-06-24 PROCEDURE — 76856 US EXAM PELVIC COMPLETE: CPT

## 2019-06-24 PROCEDURE — 99213 OFFICE O/P EST LOW 20 MIN: CPT | Performed by: SURGERY

## 2019-06-24 PROCEDURE — 93923 UPR/LXTR ART STDY 3+ LVLS: CPT

## 2019-06-24 PROCEDURE — 76830 TRANSVAGINAL US NON-OB: CPT
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
## 2019-06-28 ASSESSMENT — ENCOUNTER SYMPTOMS
CHEST TIGHTNESS: 0
COLOR CHANGE: 1
ABDOMINAL PAIN: 0
ALLERGIC/IMMUNOLOGIC NEGATIVE: 1
SHORTNESS OF BREATH: 0

## 2019-07-16 ENCOUNTER — TELEPHONE (OUTPATIENT)
Dept: OBGYN | Age: 44
End: 2019-07-16

## 2019-07-22 ENCOUNTER — TELEPHONE (OUTPATIENT)
Dept: PODIATRY | Age: 44
End: 2019-07-22

## 2019-07-22 ENCOUNTER — OFFICE VISIT (OUTPATIENT)
Dept: PODIATRY | Age: 44
End: 2019-07-22
Payer: COMMERCIAL

## 2019-07-22 VITALS
HEIGHT: 65 IN | DIASTOLIC BLOOD PRESSURE: 117 MMHG | BODY MASS INDEX: 31.16 KG/M2 | SYSTOLIC BLOOD PRESSURE: 188 MMHG | WEIGHT: 187 LBS | HEART RATE: 68 BPM

## 2019-07-22 DIAGNOSIS — L97.503: ICD-10-CM

## 2019-07-22 DIAGNOSIS — I73.9 PVD (PERIPHERAL VASCULAR DISEASE) (HCC): ICD-10-CM

## 2019-07-22 DIAGNOSIS — D68.9 CLOTTING DISORDER (HCC): ICD-10-CM

## 2019-07-22 DIAGNOSIS — I96 DRY GANGRENE (HCC): Primary | ICD-10-CM

## 2019-07-22 DIAGNOSIS — B35.1 ONYCHOMYCOSIS OF GREAT TOE: ICD-10-CM

## 2019-07-22 PROCEDURE — 1036F TOBACCO NON-USER: CPT | Performed by: STUDENT IN AN ORGANIZED HEALTH CARE EDUCATION/TRAINING PROGRAM

## 2019-07-22 PROCEDURE — G8417 CALC BMI ABV UP PARAM F/U: HCPCS | Performed by: STUDENT IN AN ORGANIZED HEALTH CARE EDUCATION/TRAINING PROGRAM

## 2019-07-22 PROCEDURE — 11720 DEBRIDE NAIL 1-5: CPT | Performed by: STUDENT IN AN ORGANIZED HEALTH CARE EDUCATION/TRAINING PROGRAM

## 2019-07-22 PROCEDURE — G8427 DOCREV CUR MEDS BY ELIG CLIN: HCPCS | Performed by: STUDENT IN AN ORGANIZED HEALTH CARE EDUCATION/TRAINING PROGRAM

## 2019-07-22 PROCEDURE — 99202 OFFICE O/P NEW SF 15 MIN: CPT | Performed by: STUDENT IN AN ORGANIZED HEALTH CARE EDUCATION/TRAINING PROGRAM

## 2019-07-22 PROCEDURE — 99213 OFFICE O/P EST LOW 20 MIN: CPT | Performed by: STUDENT IN AN ORGANIZED HEALTH CARE EDUCATION/TRAINING PROGRAM

## 2019-07-22 PROCEDURE — G8598 ASA/ANTIPLAT THER USED: HCPCS | Performed by: STUDENT IN AN ORGANIZED HEALTH CARE EDUCATION/TRAINING PROGRAM

## 2019-07-22 NOTE — PROGRESS NOTES
Patient instructed to remove shoes and socks, instructed to sit in exam chair. Current PCP name is Adrián Person and date of last visit 1/22/19. Do you have a follow up visit scheduled?  no
evaluated  · Diagnosis and treatment options discussed in detail  · Reviewed FELIPA/PVRs and vascular note  · Dispensed surgical shoe to patient with 3rd digit cutout  · Debrided right hallux nail using nippers without incident  · Patient scheduled for right 3rd digit amputation. Consent was signed and scanned in  · Patient to RTC post op  · Please, call the office with any questions or concerns   · Discussed with Dr. Giovanna Story    No orders of the defined types were placed in this encounter.     Orders Placed This Encounter   Procedures    33290 - [place] DEBRIDEMENT OF NAIL(S), 1-5       Caitie Rdz DPM   Podiatric Medicine & Surgery   7/22/2019 at 3:34 PM

## 2019-07-26 DIAGNOSIS — I10 ESSENTIAL HYPERTENSION: ICD-10-CM

## 2019-07-29 RX ORDER — LISINOPRIL 5 MG/1
TABLET ORAL
Qty: 30 TABLET | Refills: 0 | Status: ON HOLD | OUTPATIENT
Start: 2019-07-29 | End: 2019-08-04 | Stop reason: SDUPTHER

## 2019-08-02 ENCOUNTER — APPOINTMENT (OUTPATIENT)
Dept: CT IMAGING | Age: 44
DRG: 181 | End: 2019-08-02
Payer: COMMERCIAL

## 2019-08-02 ENCOUNTER — HOSPITAL ENCOUNTER (INPATIENT)
Age: 44
LOS: 2 days | Discharge: HOME | DRG: 181 | End: 2019-08-04
Attending: EMERGENCY MEDICINE | Admitting: INTERNAL MEDICINE
Payer: COMMERCIAL

## 2019-08-02 ENCOUNTER — APPOINTMENT (OUTPATIENT)
Dept: CARDIAC CATH/INVASIVE PROCEDURES | Age: 44
DRG: 181 | End: 2019-08-02
Payer: COMMERCIAL

## 2019-08-02 DIAGNOSIS — E78.5 HYPERLIPIDEMIA, UNSPECIFIED HYPERLIPIDEMIA TYPE: ICD-10-CM

## 2019-08-02 DIAGNOSIS — I10 ESSENTIAL HYPERTENSION: ICD-10-CM

## 2019-08-02 DIAGNOSIS — I99.8 PAIN OF LEFT LOWER EXTREMITY DUE TO ISCHEMIA: ICD-10-CM

## 2019-08-02 DIAGNOSIS — M79.605 LEFT LEG PAIN: Primary | ICD-10-CM

## 2019-08-02 DIAGNOSIS — T82.898A: ICD-10-CM

## 2019-08-02 DIAGNOSIS — M79.605 PAIN OF LEFT LOWER EXTREMITY DUE TO ISCHEMIA: ICD-10-CM

## 2019-08-02 PROBLEM — I74.3 FEMOROPOPLITEAL ARTERIAL THROMBOSIS OF LEFT LOWER EXTREMITY (HCC): Status: ACTIVE | Noted: 2019-08-02

## 2019-08-02 LAB
ABSOLUTE EOS #: 0.13 K/UL (ref 0–0.44)
ABSOLUTE IMMATURE GRANULOCYTE: 0.06 K/UL (ref 0–0.3)
ABSOLUTE LYMPH #: 2.32 K/UL (ref 1.1–3.7)
ABSOLUTE MONO #: 0.59 K/UL (ref 0.1–1.2)
ANION GAP SERPL CALCULATED.3IONS-SCNC: 14 MMOL/L (ref 9–17)
BASOPHILS # BLD: 0 % (ref 0–2)
BASOPHILS ABSOLUTE: 0.04 K/UL (ref 0–0.2)
BUN BLDV-MCNC: 17 MG/DL (ref 6–20)
BUN/CREAT BLD: ABNORMAL (ref 9–20)
CALCIUM SERPL-MCNC: 9.7 MG/DL (ref 8.6–10.4)
CHLORIDE BLD-SCNC: 99 MMOL/L (ref 98–107)
CO2: 23 MMOL/L (ref 20–31)
CREAT SERPL-MCNC: 0.89 MG/DL (ref 0.5–0.9)
DIFFERENTIAL TYPE: ABNORMAL
EOSINOPHILS RELATIVE PERCENT: 1 % (ref 1–4)
FIBRINOGEN: 226 MG/DL (ref 140–420)
FIBRINOGEN: 253 MG/DL (ref 140–420)
GFR AFRICAN AMERICAN: >60 ML/MIN
GFR NON-AFRICAN AMERICAN: >60 ML/MIN
GFR SERPL CREATININE-BSD FRML MDRD: ABNORMAL ML/MIN/{1.73_M2}
GFR SERPL CREATININE-BSD FRML MDRD: ABNORMAL ML/MIN/{1.73_M2}
GLUCOSE BLD-MCNC: 114 MG/DL (ref 70–99)
HCT VFR BLD CALC: 37.4 % (ref 36.3–47.1)
HCT VFR BLD CALC: 40.9 % (ref 36.3–47.1)
HEMOGLOBIN: 11.8 G/DL (ref 11.9–15.1)
HEMOGLOBIN: 13 G/DL (ref 11.9–15.1)
IMMATURE GRANULOCYTES: 1 %
INR BLD: 0.9
LYMPHOCYTES # BLD: 24 % (ref 24–43)
MCH RBC QN AUTO: 28.8 PG (ref 25.2–33.5)
MCH RBC QN AUTO: 30.5 PG (ref 25.2–33.5)
MCHC RBC AUTO-ENTMCNC: 31.6 G/DL (ref 28.4–34.8)
MCHC RBC AUTO-ENTMCNC: 31.8 G/DL (ref 28.4–34.8)
MCV RBC AUTO: 90.7 FL (ref 82.6–102.9)
MCV RBC AUTO: 96.6 FL (ref 82.6–102.9)
MONOCYTES # BLD: 6 % (ref 3–12)
NRBC AUTOMATED: 0 PER 100 WBC
NRBC AUTOMATED: 0 PER 100 WBC
PARTIAL THROMBOPLASTIN TIME: 18.5 SEC (ref 20.5–30.5)
PARTIAL THROMBOPLASTIN TIME: 20.8 SEC (ref 20.5–30.5)
PARTIAL THROMBOPLASTIN TIME: 44.7 SEC (ref 20.5–30.5)
PARTIAL THROMBOPLASTIN TIME: 50 SEC (ref 20.5–30.5)
PARTIAL THROMBOPLASTIN TIME: >120 SEC (ref 20.5–30.5)
PDW BLD-RTO: 13.2 % (ref 11.8–14.4)
PDW BLD-RTO: 13.2 % (ref 11.8–14.4)
PLATELET # BLD: 252 K/UL (ref 138–453)
PLATELET # BLD: 303 K/UL (ref 138–453)
PLATELET ESTIMATE: ABNORMAL
PMV BLD AUTO: 10.1 FL (ref 8.1–13.5)
PMV BLD AUTO: 10.2 FL (ref 8.1–13.5)
POTASSIUM SERPL-SCNC: 4.3 MMOL/L (ref 3.7–5.3)
PROTHROMBIN TIME: 9.5 SEC (ref 9–12)
RBC # BLD: 3.87 M/UL (ref 3.95–5.11)
RBC # BLD: 4.51 M/UL (ref 3.95–5.11)
RBC # BLD: ABNORMAL 10*6/UL
SEG NEUTROPHILS: 68 % (ref 36–65)
SEGMENTED NEUTROPHILS ABSOLUTE COUNT: 6.5 K/UL (ref 1.5–8.1)
SODIUM BLD-SCNC: 136 MMOL/L (ref 135–144)
WBC # BLD: 10.1 K/UL (ref 3.5–11.3)
WBC # BLD: 9.6 K/UL (ref 3.5–11.3)
WBC # BLD: ABNORMAL 10*3/UL

## 2019-08-02 PROCEDURE — 96376 TX/PRO/DX INJ SAME DRUG ADON: CPT

## 2019-08-02 PROCEDURE — 99254 IP/OBS CNSLTJ NEW/EST MOD 60: CPT | Performed by: SURGERY

## 2019-08-02 PROCEDURE — 6360000004 HC RX CONTRAST MEDICATION

## 2019-08-02 PROCEDURE — 6360000002 HC RX W HCPCS: Performed by: SURGERY

## 2019-08-02 PROCEDURE — 75710 ARTERY X-RAYS ARM/LEG: CPT | Performed by: SURGERY

## 2019-08-02 PROCEDURE — 2709999900 HC NON-CHARGEABLE SUPPLY

## 2019-08-02 PROCEDURE — 6360000002 HC RX W HCPCS: Performed by: STUDENT IN AN ORGANIZED HEALTH CARE EDUCATION/TRAINING PROGRAM

## 2019-08-02 PROCEDURE — 36415 COLL VENOUS BLD VENIPUNCTURE: CPT

## 2019-08-02 PROCEDURE — 99284 EMERGENCY DEPT VISIT MOD MDM: CPT

## 2019-08-02 PROCEDURE — 04HL33Z INSERTION OF INFUSION DEVICE INTO LEFT FEMORAL ARTERY, PERCUTANEOUS APPROACH: ICD-10-PCS | Performed by: SURGERY

## 2019-08-02 PROCEDURE — 76937 US GUIDE VASCULAR ACCESS: CPT | Performed by: SURGERY

## 2019-08-02 PROCEDURE — 75774 ARTERY X-RAY EACH VESSEL: CPT | Performed by: SURGERY

## 2019-08-02 PROCEDURE — 2500000003 HC RX 250 WO HCPCS

## 2019-08-02 PROCEDURE — 2580000003 HC RX 258: Performed by: SURGERY

## 2019-08-02 PROCEDURE — 36246 INS CATH ABD/L-EXT ART 2ND: CPT | Performed by: SURGERY

## 2019-08-02 PROCEDURE — B41G1ZZ FLUOROSCOPY OF LEFT LOWER EXTREMITY ARTERIES USING LOW OSMOLAR CONTRAST: ICD-10-PCS | Performed by: SURGERY

## 2019-08-02 PROCEDURE — 6360000002 HC RX W HCPCS: Performed by: NURSE PRACTITIONER

## 2019-08-02 PROCEDURE — C1887 CATHETER, GUIDING: HCPCS

## 2019-08-02 PROCEDURE — 85730 THROMBOPLASTIN TIME PARTIAL: CPT

## 2019-08-02 PROCEDURE — 85384 FIBRINOGEN ACTIVITY: CPT

## 2019-08-02 PROCEDURE — 85027 COMPLETE CBC AUTOMATED: CPT

## 2019-08-02 PROCEDURE — B41C1ZZ FLUOROSCOPY OF PELVIC ARTERIES USING LOW OSMOLAR CONTRAST: ICD-10-PCS | Performed by: SURGERY

## 2019-08-02 PROCEDURE — 85025 COMPLETE CBC W/AUTO DIFF WBC: CPT

## 2019-08-02 PROCEDURE — 6360000002 HC RX W HCPCS

## 2019-08-02 PROCEDURE — 36247 INS CATH ABD/L-EXT ART 3RD: CPT | Performed by: SURGERY

## 2019-08-02 PROCEDURE — 6370000000 HC RX 637 (ALT 250 FOR IP): Performed by: NURSE PRACTITIONER

## 2019-08-02 PROCEDURE — 3E05317 INTRODUCTION OF OTHER THROMBOLYTIC INTO PERIPHERAL ARTERY, PERCUTANEOUS APPROACH: ICD-10-PCS | Performed by: SURGERY

## 2019-08-02 PROCEDURE — 80048 BASIC METABOLIC PNL TOTAL CA: CPT

## 2019-08-02 PROCEDURE — 2000000000 HC ICU R&B

## 2019-08-02 PROCEDURE — 96374 THER/PROPH/DIAG INJ IV PUSH: CPT

## 2019-08-02 PROCEDURE — 6360000002 HC RX W HCPCS: Performed by: EMERGENCY MEDICINE

## 2019-08-02 PROCEDURE — 37211 THROMBOLYTIC ART THERAPY: CPT | Performed by: SURGERY

## 2019-08-02 PROCEDURE — B41F1ZZ FLUOROSCOPY OF RIGHT LOWER EXTREMITY ARTERIES USING LOW OSMOLAR CONTRAST: ICD-10-PCS | Performed by: SURGERY

## 2019-08-02 PROCEDURE — 96375 TX/PRO/DX INJ NEW DRUG ADDON: CPT

## 2019-08-02 PROCEDURE — 6360000004 HC RX CONTRAST MEDICATION: Performed by: EMERGENCY MEDICINE

## 2019-08-02 PROCEDURE — C1769 GUIDE WIRE: HCPCS

## 2019-08-02 PROCEDURE — 99223 1ST HOSP IP/OBS HIGH 75: CPT | Performed by: INTERNAL MEDICINE

## 2019-08-02 PROCEDURE — 73706 CT ANGIO LWR EXTR W/O&W/DYE: CPT

## 2019-08-02 PROCEDURE — C1894 INTRO/SHEATH, NON-LASER: HCPCS

## 2019-08-02 PROCEDURE — 85610 PROTHROMBIN TIME: CPT

## 2019-08-02 PROCEDURE — 2580000003 HC RX 258: Performed by: STUDENT IN AN ORGANIZED HEALTH CARE EDUCATION/TRAINING PROGRAM

## 2019-08-02 RX ORDER — SODIUM CHLORIDE 0.9 % (FLUSH) 0.9 %
10 SYRINGE (ML) INJECTION PRN
Status: DISCONTINUED | OUTPATIENT
Start: 2019-08-02 | End: 2019-08-04 | Stop reason: HOSPADM

## 2019-08-02 RX ORDER — NICOTINE 21 MG/24HR
1 PATCH, TRANSDERMAL 24 HOURS TRANSDERMAL DAILY PRN
Status: DISCONTINUED | OUTPATIENT
Start: 2019-08-02 | End: 2019-08-04 | Stop reason: HOSPADM

## 2019-08-02 RX ORDER — HEPARIN SODIUM 10000 [USP'U]/100ML
18 INJECTION, SOLUTION INTRAVENOUS CONTINUOUS
Status: DISCONTINUED | OUTPATIENT
Start: 2019-08-02 | End: 2019-08-02

## 2019-08-02 RX ORDER — HYDROCODONE BITARTRATE AND ACETAMINOPHEN 5; 325 MG/1; MG/1
2 TABLET ORAL EVERY 4 HOURS PRN
Status: DISCONTINUED | OUTPATIENT
Start: 2019-08-02 | End: 2019-08-04

## 2019-08-02 RX ORDER — SODIUM CHLORIDE 9 MG/ML
INJECTION, SOLUTION INTRAVENOUS CONTINUOUS
Status: DISCONTINUED | OUTPATIENT
Start: 2019-08-02 | End: 2019-08-04 | Stop reason: HOSPADM

## 2019-08-02 RX ORDER — HEPARIN SODIUM 1000 [USP'U]/ML
80 INJECTION, SOLUTION INTRAVENOUS; SUBCUTANEOUS ONCE
Status: DISCONTINUED | OUTPATIENT
Start: 2019-08-02 | End: 2019-08-02

## 2019-08-02 RX ORDER — HYDROCODONE BITARTRATE AND ACETAMINOPHEN 5; 325 MG/1; MG/1
1 TABLET ORAL EVERY 4 HOURS PRN
Status: DISCONTINUED | OUTPATIENT
Start: 2019-08-02 | End: 2019-08-04

## 2019-08-02 RX ORDER — SODIUM CHLORIDE 9 MG/ML
INJECTION, SOLUTION INTRAVENOUS CONTINUOUS
Status: DISCONTINUED | OUTPATIENT
Start: 2019-08-02 | End: 2019-08-03

## 2019-08-02 RX ORDER — HEPARIN SODIUM 1000 [USP'U]/ML
40 INJECTION, SOLUTION INTRAVENOUS; SUBCUTANEOUS PRN
Status: DISCONTINUED | OUTPATIENT
Start: 2019-08-02 | End: 2019-08-03

## 2019-08-02 RX ORDER — SODIUM CHLORIDE 0.9 % (FLUSH) 0.9 %
10 SYRINGE (ML) INJECTION EVERY 12 HOURS SCHEDULED
Status: DISCONTINUED | OUTPATIENT
Start: 2019-08-02 | End: 2019-08-04 | Stop reason: HOSPADM

## 2019-08-02 RX ORDER — HEPARIN SODIUM 1000 [USP'U]/ML
80 INJECTION, SOLUTION INTRAVENOUS; SUBCUTANEOUS PRN
Status: DISCONTINUED | OUTPATIENT
Start: 2019-08-02 | End: 2019-08-03

## 2019-08-02 RX ORDER — MORPHINE SULFATE 2 MG/ML
2 INJECTION, SOLUTION INTRAMUSCULAR; INTRAVENOUS
Status: DISCONTINUED | OUTPATIENT
Start: 2019-08-02 | End: 2019-08-04

## 2019-08-02 RX ORDER — CLOPIDOGREL BISULFATE 75 MG/1
75 TABLET ORAL DAILY
Status: DISCONTINUED | OUTPATIENT
Start: 2019-08-02 | End: 2019-08-02

## 2019-08-02 RX ORDER — HEPARIN SODIUM 1000 [USP'U]/ML
80 INJECTION, SOLUTION INTRAVENOUS; SUBCUTANEOUS ONCE
Status: COMPLETED | OUTPATIENT
Start: 2019-08-02 | End: 2019-08-02

## 2019-08-02 RX ORDER — HEPARIN SODIUM 10000 [USP'U]/100ML
12.5 INJECTION, SOLUTION INTRAVENOUS CONTINUOUS
Status: DISCONTINUED | OUTPATIENT
Start: 2019-08-02 | End: 2019-08-03

## 2019-08-02 RX ORDER — POTASSIUM CHLORIDE 7.45 MG/ML
10 INJECTION INTRAVENOUS PRN
Status: DISCONTINUED | OUTPATIENT
Start: 2019-08-02 | End: 2019-08-04 | Stop reason: HOSPADM

## 2019-08-02 RX ORDER — AMLODIPINE BESYLATE 10 MG/1
10 TABLET ORAL DAILY
Status: DISCONTINUED | OUTPATIENT
Start: 2019-08-02 | End: 2019-08-02

## 2019-08-02 RX ORDER — ONDANSETRON 2 MG/ML
4 INJECTION INTRAMUSCULAR; INTRAVENOUS EVERY 6 HOURS PRN
Status: DISCONTINUED | OUTPATIENT
Start: 2019-08-02 | End: 2019-08-04 | Stop reason: HOSPADM

## 2019-08-02 RX ORDER — ONDANSETRON 2 MG/ML
4 INJECTION INTRAMUSCULAR; INTRAVENOUS ONCE
Status: COMPLETED | OUTPATIENT
Start: 2019-08-02 | End: 2019-08-02

## 2019-08-02 RX ORDER — ACETAMINOPHEN 325 MG/1
650 TABLET ORAL EVERY 4 HOURS PRN
Status: DISCONTINUED | OUTPATIENT
Start: 2019-08-02 | End: 2019-08-04 | Stop reason: HOSPADM

## 2019-08-02 RX ORDER — LISINOPRIL 5 MG/1
5 TABLET ORAL DAILY
Status: DISCONTINUED | OUTPATIENT
Start: 2019-08-02 | End: 2019-08-02

## 2019-08-02 RX ORDER — MORPHINE SULFATE 4 MG/ML
4 INJECTION, SOLUTION INTRAMUSCULAR; INTRAVENOUS ONCE
Status: COMPLETED | OUTPATIENT
Start: 2019-08-02 | End: 2019-08-02

## 2019-08-02 RX ORDER — ATORVASTATIN CALCIUM 40 MG/1
40 TABLET, FILM COATED ORAL DAILY
Status: DISCONTINUED | OUTPATIENT
Start: 2019-08-02 | End: 2019-08-04 | Stop reason: HOSPADM

## 2019-08-02 RX ORDER — MAGNESIUM SULFATE 1 G/100ML
1 INJECTION INTRAVENOUS PRN
Status: DISCONTINUED | OUTPATIENT
Start: 2019-08-02 | End: 2019-08-04 | Stop reason: HOSPADM

## 2019-08-02 RX ORDER — POTASSIUM CHLORIDE 20 MEQ/1
40 TABLET, EXTENDED RELEASE ORAL PRN
Status: DISCONTINUED | OUTPATIENT
Start: 2019-08-02 | End: 2019-08-04 | Stop reason: HOSPADM

## 2019-08-02 RX ORDER — MORPHINE SULFATE 4 MG/ML
4 INJECTION, SOLUTION INTRAMUSCULAR; INTRAVENOUS
Status: DISCONTINUED | OUTPATIENT
Start: 2019-08-02 | End: 2019-08-04

## 2019-08-02 RX ADMIN — HEPARIN SODIUM AND DEXTROSE 18 UNITS/KG/HR: 10000; 5 INJECTION INTRAVENOUS at 06:37

## 2019-08-02 RX ADMIN — HEPARIN SODIUM 6780 UNITS: 1000 INJECTION, SOLUTION INTRAVENOUS; SUBCUTANEOUS at 06:39

## 2019-08-02 RX ADMIN — SODIUM CHLORIDE: 9 INJECTION, SOLUTION INTRAVENOUS at 12:00

## 2019-08-02 RX ADMIN — MORPHINE SULFATE 4 MG: 4 INJECTION, SOLUTION INTRAMUSCULAR; INTRAVENOUS at 20:47

## 2019-08-02 RX ADMIN — DESMOPRESSIN ACETATE 40 MG: 0.2 TABLET ORAL at 11:07

## 2019-08-02 RX ADMIN — MORPHINE SULFATE 4 MG: 4 INJECTION, SOLUTION INTRAMUSCULAR; INTRAVENOUS at 09:14

## 2019-08-02 RX ADMIN — SODIUM CHLORIDE, PRESERVATIVE FREE 10 ML: 5 INJECTION INTRAVENOUS at 19:39

## 2019-08-02 RX ADMIN — ONDANSETRON 4 MG: 2 INJECTION INTRAMUSCULAR; INTRAVENOUS at 07:26

## 2019-08-02 RX ADMIN — IOHEXOL 125 ML: 350 INJECTION, SOLUTION INTRAVENOUS at 03:54

## 2019-08-02 RX ADMIN — HYDROCODONE BITARTRATE AND ACETAMINOPHEN 1 TABLET: 5; 325 TABLET ORAL at 15:01

## 2019-08-02 RX ADMIN — MORPHINE SULFATE 4 MG: 4 INJECTION INTRAVENOUS at 06:11

## 2019-08-02 RX ADMIN — MORPHINE SULFATE 4 MG: 4 INJECTION INTRAVENOUS at 03:05

## 2019-08-02 RX ADMIN — HEPARIN SODIUM: 1000 INJECTION INTRAVENOUS; SUBCUTANEOUS at 15:15

## 2019-08-02 RX ADMIN — ALTEPLASE 1 MG/HR: 2.2 INJECTION, POWDER, LYOPHILIZED, FOR SOLUTION INTRAVENOUS at 14:20

## 2019-08-02 RX ADMIN — ONDANSETRON 4 MG: 2 INJECTION INTRAMUSCULAR; INTRAVENOUS at 03:05

## 2019-08-02 RX ADMIN — SODIUM CHLORIDE: 9 INJECTION, SOLUTION INTRAVENOUS at 19:35

## 2019-08-02 RX ADMIN — HYDROCODONE BITARTRATE AND ACETAMINOPHEN 2 TABLET: 5; 325 TABLET ORAL at 07:26

## 2019-08-02 RX ADMIN — HYDROCODONE BITARTRATE AND ACETAMINOPHEN 2 TABLET: 5; 325 TABLET ORAL at 20:02

## 2019-08-02 ASSESSMENT — ENCOUNTER SYMPTOMS
SHORTNESS OF BREATH: 0
SORE THROAT: 0
ABDOMINAL PAIN: 0
COUGH: 0
NAUSEA: 0
EYE PAIN: 0
DIARRHEA: 0

## 2019-08-02 ASSESSMENT — PAIN SCALES - GENERAL
PAINLEVEL_OUTOF10: 10
PAINLEVEL_OUTOF10: 4
PAINLEVEL_OUTOF10: 10
PAINLEVEL_OUTOF10: 9
PAINLEVEL_OUTOF10: 7
PAINLEVEL_OUTOF10: 10
PAINLEVEL_OUTOF10: 9
PAINLEVEL_OUTOF10: 9
PAINLEVEL_OUTOF10: 8

## 2019-08-02 ASSESSMENT — PAIN DESCRIPTION - ORIENTATION: ORIENTATION: LEFT

## 2019-08-02 ASSESSMENT — PAIN DESCRIPTION - LOCATION: LOCATION: KNEE

## 2019-08-02 NOTE — H&P
Menometrorrhagia 2013    Migraine headache with aura 2013    Obesity 2013    Osteoarthritis     PAD [facility]peripheral artery disease) [facility] 2018    [facility] vascular [facility] 2018    Takotsubo cardiomyopathy 2018    Wound of right leg 2018        Past Surgical History:     Past Surgical History:   Procedure Laterality Date     SECTION      OTHER SURGICAL HISTORY  2017    ANGIOGRAM OF LEFT LEG    OTHER SURGICAL HISTORY Left 2018    femoral to peroneal bypass using vein    OTHER SURGICAL HISTORY Left 2018    fibulectomy with intra op angiography of leg    [place] REOPERATION, BYPASS GRAFT Left 2018    RE-EXPLORATION OF LEFT LEG, THROMBO-EMBOLECTOMY OF FEMORAL-PERONEAL BYPASS, WITH INTRA OPERATIVE  ANGIOGRAMS AND INFUSION OF nITRO GLYCERIN performed by Zach Steward MD at [address] [place] [address] Left 2018    LEFT LEG FEMORAL TO PERONEAL BYPASS USING VEIN, (DONAR SITE LEFT SAPHNOUS) LEFT FIBULECTOMY, WITH INTRA OP ANGIOGRAPHY OF LEFT LEG performed by Zach Steward MD at [facility] VASCULAR SURGERY Left 2018    re-exploration femerol-perioneal vein bypass/intra op angiogram        Medications Prior to Admission:     Prior to Admission medications    Medication Sig Start Date End Date Taking? Authorizing Provider   lisinopril (PRINIVIL;ZESTRIL) 5 MG tablet take 1 tablet by mouth once daily 19   Lori Tripp MD   apixaban (ELIQUIS STARTER PACK) 5 MG TABS tablet Take 10 mg (2 tablets) orally twice daily for 7 days, then take 5 mg (1 tablet) orally twice daily thereafter.  19   Juan Duarte,    clopidogrel (PLAVIX) 75 MG tablet Take 1 tablet by mouth daily 19   Reginald Clifford, DO   amLODIPine (NORVASC) 10 MG tablet take 1 tablet by mouth once daily 19   Lori Tripp MD   atorvastatin (LIPITOR) 40 MG tablet Take 1 tablet by mouth daily 19   Evelyn Diane Ortiz MD   Blood Pressure KIT 1 Package by Does not apply route daily 12/18/17   Chriss Lazar MD        Allergies:     Emmette Shah; Lopid [gemfibrozil]; Nortriptyline; Pcn [penicillins]; Sulfa antibiotics; and Ibuprofen    Social History:     Tobacco:    reports that she has never smoked. She has never used smokeless tobacco.  Alcohol:      reports that she drinks alcohol. Drug Use:  reports that she does not use drugs. Family History:     Family History   Problem Relation Age of Onset    Diabetes Mother     High Blood Pressure Mother     Heart Attack Mother     Heart Disease Mother     Kidney Disease Mother     High Blood Pressure Father     Heart Disease Father     Heart Attack Father     Diabetes Sister     High Blood Pressure Sister     Diabetes Sister     High Blood Pressure Sister        Review of Systems:     Positive and Negative as described in HPI. CONSTITUTIONAL:  negative for fevers, chills, sweats, fatigue, weight loss  HEENT:  negative for vision, hearing changes, runny nose, throat pain  RESPIRATORY:  negative for shortness of breath, cough, congestion, wheezing.   CARDIOVASCULAR:  negative for chest pain, palpitations  GASTROINTESTINAL:  negative for nausea, vomiting, diarrhea, constipation, change in bowel habits, abdominal pain   GENITOURINARY:  negative for difficulty of urination, burning with urination, frequency   INTEGUMENT:  negative for rash, skin lesions, easy bruising   ALLERGIC/IMMUNOLOGIC:  negative for urticaria , itching  ENDOCRINE:  negative increase in drinking, increase in urination  MUSCULOSKELETAL:  negative joint pains, muscle aches, swelling of joints  NEUROLOGICAL:  negative for headaches, dizziness, lightheadedness  BEHAVIOR/PSYCH:  negative for depression, anxiety    Physical Exam:   /68   Pulse 73   Temp 97.7 °F (36.5 °C) (Oral)   Resp 17   Ht 5' 5\" (1.651 m)   Wt 183 lb 8 oz (83.2 kg)   LMP 07/17/2019   SpO2 98%   BMI 30.54 kg/m²   Temp (24hrs), Av °F (36.7 °C), Min:97.7 °F (36.5 °C), Max:98.2 °F (36.8 °C)    No results for input(s): POCGLU in the last 72 hours. No intake or output data in the 24 hours ending 19 1425    General Appearance:  alert, well appearing, and in no acute distress  Mental status: oriented to person, place, and time with normal affect  Head:  normocephalic, atraumatic. Eye: no icterus, redness, pupils equal and reactive, extraocular eye movements intact, conjunctiva clear  Ear: normal external ear, no discharge, hearing intact  Nose:  no drainage noted  Mouth: mucous membranes moist  Neck: supple, no carotid bruits, thyroid not palpable  Lungs: Bilateral equal air entry, clear to ausculation, no wheezing, rales or rhonchi, normal effort  Cardiovascular: normal rate, regular rhythm, no murmur, gallop, rub.   Abdomen: Soft, nontender, nondistended, normal bowel sounds  Neurologic: There are no new focal motor or sensory deficits, normal muscle tone and bulk, no abnormal sensation, normal speech, cranial nerves II through XII grossly intact  Skin: No gross lesions, rashes, bruising or bleeding on exposed skin area  Extremities:  Left lower extremity decreased pulsation   Psych: normal affect    Investigations:      Laboratory Testing:  Recent Results (from the past 24 hour(s))   CBC WITH AUTO DIFFERENTIAL    Collection Time: 19  3:17 AM   Result Value Ref Range    WBC 9.6 3.5 - 11.3 k/uL    RBC 4.51 3.95 - 5.11 m/uL    Hemoglobin 13.0 11.9 - 15.1 g/dL    Hematocrit 40.9 36.3 - 47.1 %    MCV 90.7 82.6 - 102.9 fL    MCH 28.8 25.2 - 33.5 pg    MCHC 31.8 28.4 - 34.8 g/dL    RDW 13.2 11.8 - 14.4 %    Platelets 626 891 - 998 k/uL    MPV 10.2 8.1 - 13.5 fL    NRBC Automated 0.0 0.0 per 100 WBC    Differential Type NOT REPORTED     Seg Neutrophils 68 (H) 36 - 65 %    Lymphocytes 24 24 - 43 %    Monocytes 6 3 - 12 %    Eosinophils % 1 1 - 4 %    Basophils 0 0 - 2 %    Immature Granulocytes 1 (H) 0 %    Segs Absolute 6.50 popliteal artery. The mid to distal tibioperoneal runoff on the left is occluded. There is reconstitution of flow in the dorsalis pedis, although, blood flow to the left foot is likely extremely limited. 3. On the right, no significant femoral or popliteal artery disease. One-vessel runoff to the right foot is present, with blood flow supplied by the posterior tibial artery, as the anterior tibial artery is occluded distally. 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 [I73.9]     HTN (hypertension) [I10] 05/13/2013       Plan:     Patient status Admit as inpatient in the  Progressive Unit/Step down    - Labs and imaging reviewed  - Vascular consulted - plans for OR today  - Continue heparin infusion  - Resume home AC, antiplatelets per vascular  - Neurovascular checks  - Monitor BP, resume as tolerated - hold for now  - PT/OT post op when appropriate      Consultations:   IP CONSULT TO VASCULAR SURGERY  IP CONSULT TO HOSPITALIST  IP CONSULT TO [address] [place], [place] [address]way TO DOSE MEDICATION     Patient is admitted as inpatient status because of co-morbidities listed above, severity of signs and symptoms as outlined, requirement for current medical therapies and most importantly because of direct risk to patient if care not provided in a hospital setting.     Bartolo St MD  8/2/2019  2:25 PM    Copy sent to Dr. Myriam Ackerman MD

## 2019-08-02 NOTE — PROGRESS NOTES
Patient left for cath lab at [phone]. Sent with all belongings. Ty RN was given report at [phone].

## 2019-08-02 NOTE — ED PROVIDER NOTES
PATIENT NEEDS  A APPT FOR FURTHER REFILLS    amLODIPine (NORVASC) 10 MG tablet     Sig: take 1 tablet by mouth once daily     Dispense:  30 tablet     Refill:  3       DDX: Epiglottitis, lauryn's angina, retropharyngeal abscess/cellulitis, parapharyngeal abscess, peritonsillar abscess, mononucleosis, carotid dissection/aneurysm, alveolar osteitis (dry socket), pharyngitis, URI, foreign body aspiration or ingestion, trauma, dental cavitis, post-extraction pain, TMJ pain    DIAGNOSTIC RESULTS / EMERGENCY DEPARTMENT COURSE / MDM     LABS:  Results for orders placed or performed during the hospital encounter of 08/02/19   CBC WITH AUTO DIFFERENTIAL   Result Value Ref Range    WBC 9.6 3.5 - 11.3 k/uL    RBC 4.51 3.95 - 5.11 m/uL    Hemoglobin 13.0 11.9 - 15.1 g/dL    Hematocrit 40.9 36.3 - 47.1 %    MCV 90.7 82.6 - 102.9 fL    MCH 28.8 25.2 - 33.5 pg    MCHC 31.8 28.4 - 34.8 g/dL    RDW 13.2 11.8 - 14.4 %    Platelets 864 031 - 929 k/uL    MPV 10.2 8.1 - 13.5 fL    NRBC Automated 0.0 0.0 per 100 WBC    Differential Type NOT REPORTED     Seg Neutrophils 68 (H) 36 - 65 %    Lymphocytes 24 24 - 43 %    Monocytes 6 3 - 12 %    Eosinophils % 1 1 - 4 %    Basophils 0 0 - 2 %    Immature Granulocytes 1 (H) 0 %    Segs Absolute 6.50 1.50 - 8.10 k/uL    Absolute Lymph # 2.32 1.10 - 3.70 k/uL    Absolute Mono # 0.59 0.10 - 1.20 k/uL    Absolute Eos # 0.13 0.00 - 0.44 k/uL    Basophils # 0.04 0.00 - 0.20 k/uL    Absolute Immature Granulocyte 0.06 0.00 - 0.30 k/uL    WBC Morphology NOT REPORTED     RBC Morphology NOT REPORTED     Platelet Estimate NOT REPORTED    BASIC METABOLIC PANEL   Result Value Ref Range    Glucose 114 (H) 70 - 99 mg/dL    BUN 17 6 - 20 mg/dL    CREATININE 0.89 0.50 - 0.90 mg/dL    Bun/Cre Ratio NOT REPORTED 9 - 20    Calcium 9.7 8.6 - 10.4 mg/dL    Sodium 136 135 - 144 mmol/L    Potassium 4.3 3.7 - 5.3 mmol/L    Chloride 99 98 - 107 mmol/L    CO2 23 20 - 31 mmol/L    Anion Gap 14 9 - 17 mmol/L    GFR Non-African American >60 >60 mL/min    GFR African American >60 >60 mL/min    GFR Comment          GFR Staging NOT REPORTED    Protime-INR   Result Value Ref Range    Protime 9.5 9.0 - 12.0 sec    INR 0.9    APTT   Result Value Ref Range    PTT 20.8 20.5 - 30.5 sec   APTT   Result Value Ref Range    PTT 18.5 (L) 20.5 - 30.5 sec   CBC   Result Value Ref Range    WBC 10.1 3.5 - 11.3 k/uL    RBC 3.87 (L) 3.95 - 5.11 m/uL    Hemoglobin 11.8 (L) 11.9 - 15.1 g/dL    Hematocrit 37.4 36.3 - 47.1 %    MCV 96.6 82.6 - 102.9 fL    MCH 30.5 25.2 - 33.5 pg    MCHC 31.6 28.4 - 34.8 g/dL    RDW 13.2 11.8 - 14.4 %    Platelets 283 703 - 712 k/uL    MPV 10.1 8.1 - 13.5 fL    NRBC Automated 0.0 0.0 per 100 WBC   APTT   Result Value Ref Range    PTT 44.7 (H) 20.5 - 30.5 sec   FIBRINOGEN   Result Value Ref Range    Fibrinogen 253 140 - 420 mg/dL   FIBRINOGEN   Result Value Ref Range    Fibrinogen 282 140 - 420 mg/dL   APTT   Result Value Ref Range    PTT >120.0 (HH) 20.5 - 30.5 sec   CBC   Result Value Ref Range    WBC 7.2 3.5 - 11.3 k/uL    RBC 3.90 (L) 3.95 - 5.11 m/uL    Hemoglobin 11.3 (L) 11.9 - 15.1 g/dL    Hematocrit 35.4 (L) 36.3 - 47.1 %    MCV 90.8 82.6 - 102.9 fL    MCH 29.0 25.2 - 33.5 pg    MCHC 31.9 28.4 - 34.8 g/dL    RDW 13.3 11.8 - 14.4 %    Platelets 934 259 - 902 k/uL    MPV 10.2 8.1 - 13.5 fL    NRBC Automated 0.0 0.0 per 100 WBC   BASIC METABOLIC PANEL   Result Value Ref Range    Glucose 106 (H) 70 - 99 mg/dL    BUN 9 6 - 20 mg/dL    CREATININE 0.50 0.50 - 0.90 mg/dL    Bun/Cre Ratio NOT REPORTED 9 - 20    Calcium 8.1 (L) 8.6 - 10.4 mg/dL    Sodium 137 135 - 144 mmol/L    Potassium 3.3 (L) 3.7 - 5.3 mmol/L    Chloride 103 98 - 107 mmol/L    CO2 21 20 - 31 mmol/L    Anion Gap 13 9 - 17 mmol/L    GFR Non-African American >60 >60 mL/min    GFR African American >60 >60 mL/min    GFR Comment          GFR Staging NOT REPORTED    Protime-INR   Result Value Ref Range    Protime 10.0 9.0 - 12.0 sec    INR 0.9    APTT Result Value Ref Range    PTT 50.0 (H) 20.5 - 30.5 sec   Fibrinogen   Result Value Ref Range    Fibrinogen 226 140 - 420 mg/dL   APTT   Result Value Ref Range    PTT 42.4 (H) 20.5 - 30.5 sec   APTT   Result Value Ref Range    PTT 39.6 (H) 20.5 - 30.5 sec   HEMOGLOBIN AND HEMATOCRIT, BLOOD   Result Value Ref Range    Hemoglobin 12.8 11.9 - 15.1 g/dL    Hematocrit 38.9 36.3 - 47.1 %   CBC WITH AUTO DIFFERENTIAL   Result Value Ref Range    WBC 10.7 3.5 - 11.3 k/uL    RBC 4.27 3.95 - 5.11 m/uL    Hemoglobin 12.8 11.9 - 15.1 g/dL    Hematocrit 38.9 36.3 - 47.1 %    MCV 91.1 82.6 - 102.9 fL    MCH 30.0 25.2 - 33.5 pg    MCHC 32.9 28.4 - 34.8 g/dL    RDW 12.9 11.8 - 14.4 %    Platelets 895 092 - 942 k/uL    MPV 10.3 8.1 - 13.5 fL    NRBC Automated 0.0 0.0 per 100 WBC    Differential Type NOT REPORTED     Seg Neutrophils 76 (H) 36 - 65 %    Lymphocytes 16 (L) 24 - 43 %    Monocytes 7 3 - 12 %    Eosinophils % 1 1 - 4 %    Basophils 0 0 - 2 %    Immature Granulocytes 0 0 %    Segs Absolute 8.06 1.50 - 8.10 k/uL    Absolute Lymph # 1.75 1.10 - 3.70 k/uL    Absolute Mono # 0.73 0.10 - 1.20 k/uL    Absolute Eos # 0.09 0.00 - 0.44 k/uL    Basophils # 0.03 0.00 - 0.20 k/uL    Absolute Immature Granulocyte 0.04 0.00 - 0.30 k/uL    WBC Morphology NOT REPORTED     RBC Morphology NOT REPORTED     Platelet Estimate NOT REPORTED        RADIOLOGY:  Cta Lower Extremity Left W Contrast    Result Date: 8/2/2019  EXAMINATION: CTA OF THE LEFT LOWER EXTREMITY 8/2/2019 3:49 am TECHNIQUE: CTA of the left lower extremity was performed after the administration of intravenous contrast. Multiplanar reformatted images are provided for review. MIP images are provided for review. Dose modulation, iterative reconstruction, and/or weight based adjustment of the mA/kV was utilized to reduce the radiation dose to as low as reasonably achievable.  COMPARISON: 01/16/2018 HISTORY ORDERING SYSTEM PROVIDED HISTORY: PAD, hx of left fem-pop, acute left knee no significant femoral or popliteal artery disease. One-vessel runoff to the right foot is present, with blood flow supplied by the posterior tibial artery, as the anterior tibial artery is occluded distally. EKG  None    All EKG's are interpreted by the Emergency Department Physician who either signs or Co-signs this chart in the absence of a cardiologist.    EMERGENCY DEPARTMENT COURSE:  Pt seen and evaluated. She is sitting in the bed in discomfort, grabbing her left knee and crying out in pain. She is non-toxic appearing. On examination, she is in NSR, lungs are CTAB. She has no abdominal pain, but reports she is nauseated from the pain. She has ttp of the medial and lateral aspects of the knee, not posteriorly. She has no palpable pulses, but she does have dopplerable signals in the left AT, along the graft above and below the knee. She reports she has decreased sensation in her left foot as well. No other findings on exam. Labs ordered. Pt given pain meds and antiemetics. CTA of the LLE ordered to better assess the graft and arteries. No acute abnormalities on review of the lab results. Multiple rounds of pain meds given for recurrent pain symptoms for the pt. CTA with prelim results of high grade stenosis in the left CFA and occlusion of the popliteal artery, tibioperoneal trunk and proximal portions of the anterior tibial and peroneal arteries, and posterior tibial artery. There is some scattered flow via collaterals. Vascular surgery called. They recommended admission of the pt to medicine and starting the pt on a heparin gtt, which was done. Im paged for admission. Admission orders placed. PROCEDURES:  None    CONSULTS:  IP CONSULT TO VASCULAR SURGERY  IP CONSULT TO HOSPITALIST  PHARMACY TO DOSE MEDICATION    CRITICAL CARE:  None    FINAL IMPRESSION      1. Left leg pain    2.  Occlusion of left femoropopliteal bypass graft, initial encounter ([facility]    3. Pain of left lower extremity due to

## 2019-08-02 NOTE — CARE COORDINATION
Case Management Initial Discharge Plan  Gale Pepper,             Met with:patient or family member patient and daughter to discuss discharge plans. Information verified: address, contacts, phone number, , insurance Yes  PCP: Koffi Martinez MD  Date of last visit: may 2019    Insurance Provider: [address] [place] [address] Road    Discharge Planning    Living Arrangements:  Children   Support Systems:  [id number] [address] has 1 story    Patient able to perform ADL's:Independent    Current Services (outpatient & in home) none  DME equipment: walker, w/c  DME provider:     Pharmacy: Will be [facility] [place], had [facility] on [address] before   Potential Assistance Purchasing Medications:  No  Does patient want to participate in local refill/ meds to beds program?  Yes    Potential Assistance Needed:  N/A    Patient agreeable to home care: [facility] in the past  Freedom of choice provided:  No    Prior SNF/Rehab Placement and Facility: [facility] [address] [place] [address] to SNF/Rehab: No  [place] of choice provided: n/a   Evaluation: no    Expected Discharge date:  19  Patient expects to be discharged to:  home  Follow Up Appointment: Best Day/ Time:      Transportation provider: family  Transportation arrangements needed for discharge: No    Readmission Risk              Risk of Unplanned Readmission:        15             Does patient have a readmission risk score greater than 14?: Yes  If yes, follow-up appointment must be made within 7 days of discharge.      Discharge Plan: home          Electronically signed by Jannette Bella RN on 19 at 3:43 PM

## 2019-08-02 NOTE — ED NOTES
Pt ambulated to bathroom, noticeable limp. Pt states that pain has improved.       Reggie Cook RN  08/02/19 [id number]

## 2019-08-02 NOTE — ED PROVIDER NOTES
[facility]     Emergency Department     Faculty Attestation    I performed a history and physical examination of the patient and discussed management with the resident. I have reviewed and agree with the residents findings including all diagnostic interpretations, and treatment plans as written. Any areas of disagreement are noted on the chart. I was personally present for the key portions of any procedures. I have documented in the chart those procedures where I was not present during the key portions. I have reviewed the emergency nurses triage note. I agree with the chief complaint, past medical history, past surgical history, allergies, medications, social and family history as documented unless otherwise noted below. Documentation of the HPI, Physical Exam and Medical Decision Making performed by scribyue is based on my personal performance of the HPI, PE and MDM. For Physician Assistant/ Nurse Practitioner cases/documentation I have personally evaluated this patient and have completed at least one if not all key elements of the E/M (history, physical exam, and MDM). Additional findings are as noted. L knee pain that started suddenly tonight, Patient with previous L fem to peroneal bypass in 2017, she follows with Dr. Hilda Kirkland, ran out of her [facility] 2 days ago, also feeling numbness into her Left foot. Patient with significant tenderness to the patellar tendon, and pain with flexion. No deformity noted, no erythema, no effusion noted.  Scar consistent with previous fem to peroneal bypass, foot is cool to the touch, sensation decreased, doppler pulse present in post tib and pedal pulse     Plan for vascular consult, ct imaging, labs, pain control    Lili Smith D.O, M.P.H  Attending Emergency Medicine Physician         Lili Smith DO  08/02/19 [id number]

## 2019-08-03 LAB
ABSOLUTE EOS #: 0.09 K/UL (ref 0–0.44)
ABSOLUTE IMMATURE GRANULOCYTE: 0.04 K/UL (ref 0–0.3)
ABSOLUTE LYMPH #: 1.75 K/UL (ref 1.1–3.7)
ABSOLUTE MONO #: 0.73 K/UL (ref 0.1–1.2)
ANION GAP SERPL CALCULATED.3IONS-SCNC: 13 MMOL/L (ref 9–17)
BASOPHILS # BLD: 0 % (ref 0–2)
BASOPHILS ABSOLUTE: 0.03 K/UL (ref 0–0.2)
BUN BLDV-MCNC: 9 MG/DL (ref 6–20)
BUN/CREAT BLD: ABNORMAL (ref 9–20)
CALCIUM SERPL-MCNC: 8.1 MG/DL (ref 8.6–10.4)
CHLORIDE BLD-SCNC: 103 MMOL/L (ref 98–107)
CO2: 21 MMOL/L (ref 20–31)
CREAT SERPL-MCNC: 0.5 MG/DL (ref 0.5–0.9)
DIFFERENTIAL TYPE: ABNORMAL
EOSINOPHILS RELATIVE PERCENT: 1 % (ref 1–4)
FIBRINOGEN: 282 MG/DL (ref 140–420)
GFR AFRICAN AMERICAN: >60 ML/MIN
GFR NON-AFRICAN AMERICAN: >60 ML/MIN
GFR SERPL CREATININE-BSD FRML MDRD: ABNORMAL ML/MIN/{1.73_M2}
GFR SERPL CREATININE-BSD FRML MDRD: ABNORMAL ML/MIN/{1.73_M2}
GLUCOSE BLD-MCNC: 106 MG/DL (ref 70–99)
HCT VFR BLD CALC: 35.4 % (ref 36.3–47.1)
HCT VFR BLD CALC: 38.9 % (ref 36.3–47.1)
HCT VFR BLD CALC: 38.9 % (ref 36.3–47.1)
HEMOGLOBIN: 11.3 G/DL (ref 11.9–15.1)
HEMOGLOBIN: 12.8 G/DL (ref 11.9–15.1)
HEMOGLOBIN: 12.8 G/DL (ref 11.9–15.1)
IMMATURE GRANULOCYTES: 0 %
INR BLD: 0.9
LYMPHOCYTES # BLD: 16 % (ref 24–43)
MCH RBC QN AUTO: 29 PG (ref 25.2–33.5)
MCH RBC QN AUTO: 30 PG (ref 25.2–33.5)
MCHC RBC AUTO-ENTMCNC: 31.9 G/DL (ref 28.4–34.8)
MCHC RBC AUTO-ENTMCNC: 32.9 G/DL (ref 28.4–34.8)
MCV RBC AUTO: 90.8 FL (ref 82.6–102.9)
MCV RBC AUTO: 91.1 FL (ref 82.6–102.9)
MONOCYTES # BLD: 7 % (ref 3–12)
NRBC AUTOMATED: 0 PER 100 WBC
NRBC AUTOMATED: 0 PER 100 WBC
PARTIAL THROMBOPLASTIN TIME: 39.6 SEC (ref 20.5–30.5)
PARTIAL THROMBOPLASTIN TIME: 42.4 SEC (ref 20.5–30.5)
PDW BLD-RTO: 12.9 % (ref 11.8–14.4)
PDW BLD-RTO: 13.3 % (ref 11.8–14.4)
PLATELET # BLD: 210 K/UL (ref 138–453)
PLATELET # BLD: 224 K/UL (ref 138–453)
PLATELET ESTIMATE: ABNORMAL
PMV BLD AUTO: 10.2 FL (ref 8.1–13.5)
PMV BLD AUTO: 10.3 FL (ref 8.1–13.5)
POTASSIUM SERPL-SCNC: 3.3 MMOL/L (ref 3.7–5.3)
PROTHROMBIN TIME: 10 SEC (ref 9–12)
RBC # BLD: 3.9 M/UL (ref 3.95–5.11)
RBC # BLD: 4.27 M/UL (ref 3.95–5.11)
RBC # BLD: ABNORMAL 10*6/UL
SEG NEUTROPHILS: 76 % (ref 36–65)
SEGMENTED NEUTROPHILS ABSOLUTE COUNT: 8.06 K/UL (ref 1.5–8.1)
SODIUM BLD-SCNC: 137 MMOL/L (ref 135–144)
WBC # BLD: 10.7 K/UL (ref 3.5–11.3)
WBC # BLD: 7.2 K/UL (ref 3.5–11.3)
WBC # BLD: ABNORMAL 10*3/UL

## 2019-08-03 PROCEDURE — 75710 ARTERY X-RAYS ARM/LEG: CPT | Performed by: SURGERY

## 2019-08-03 PROCEDURE — 6360000002 HC RX W HCPCS: Performed by: NURSE PRACTITIONER

## 2019-08-03 PROCEDURE — 6360000002 HC RX W HCPCS

## 2019-08-03 PROCEDURE — 6370000000 HC RX 637 (ALT 250 FOR IP): Performed by: NURSE PRACTITIONER

## 2019-08-03 PROCEDURE — C1760 CLOSURE DEV, VASC: HCPCS

## 2019-08-03 PROCEDURE — 85730 THROMBOPLASTIN TIME PARTIAL: CPT

## 2019-08-03 PROCEDURE — 6360000002 HC RX W HCPCS: Performed by: INTERNAL MEDICINE

## 2019-08-03 PROCEDURE — 6360000004 HC RX CONTRAST MEDICATION

## 2019-08-03 PROCEDURE — C1769 GUIDE WIRE: HCPCS

## 2019-08-03 PROCEDURE — 047L3ZZ DILATION OF LEFT FEMORAL ARTERY, PERCUTANEOUS APPROACH: ICD-10-PCS | Performed by: SURGERY

## 2019-08-03 PROCEDURE — 37214 CESSJ THERAPY CATH REMOVAL: CPT | Performed by: SURGERY

## 2019-08-03 PROCEDURE — 06PY33Z REMOVAL OF INFUSION DEVICE FROM LOWER VEIN, PERCUTANEOUS APPROACH: ICD-10-PCS | Performed by: SURGERY

## 2019-08-03 PROCEDURE — 2060000000 HC ICU INTERMEDIATE R&B

## 2019-08-03 PROCEDURE — 37225 HC FEM POP TERRITORY ATHERECTOMY: CPT | Performed by: SURGERY

## 2019-08-03 PROCEDURE — 2580000003 HC RX 258: Performed by: STUDENT IN AN ORGANIZED HEALTH CARE EDUCATION/TRAINING PROGRAM

## 2019-08-03 PROCEDURE — 37224 HC FEM POP TERRITORY PLASTY: CPT | Performed by: SURGERY

## 2019-08-03 PROCEDURE — 85384 FIBRINOGEN ACTIVITY: CPT

## 2019-08-03 PROCEDURE — C1757 CATH, THROMBECTOMY/EMBOLECT: HCPCS

## 2019-08-03 PROCEDURE — 85025 COMPLETE CBC W/AUTO DIFF WBC: CPT

## 2019-08-03 PROCEDURE — 85014 HEMATOCRIT: CPT

## 2019-08-03 PROCEDURE — 37184 PRIM ART M-THRMBC 1ST VSL: CPT | Performed by: SURGERY

## 2019-08-03 PROCEDURE — 85018 HEMOGLOBIN: CPT

## 2019-08-03 PROCEDURE — 6370000000 HC RX 637 (ALT 250 FOR IP): Performed by: STUDENT IN AN ORGANIZED HEALTH CARE EDUCATION/TRAINING PROGRAM

## 2019-08-03 PROCEDURE — 80048 BASIC METABOLIC PNL TOTAL CA: CPT

## 2019-08-03 PROCEDURE — 2709999900 HC NON-CHARGEABLE SUPPLY

## 2019-08-03 PROCEDURE — 2580000003 HC RX 258: Performed by: SURGERY

## 2019-08-03 PROCEDURE — 85027 COMPLETE CBC AUTOMATED: CPT

## 2019-08-03 PROCEDURE — 99232 SBSQ HOSP IP/OBS MODERATE 35: CPT | Performed by: INTERNAL MEDICINE

## 2019-08-03 PROCEDURE — 37224 PR REVSC OPN/PRG FEM/POP W/ANGIOPLASTY UNI: CPT | Performed by: SURGERY

## 2019-08-03 PROCEDURE — 75774 ARTERY X-RAY EACH VESSEL: CPT | Performed by: SURGERY

## 2019-08-03 PROCEDURE — 85610 PROTHROMBIN TIME: CPT

## 2019-08-03 PROCEDURE — 6360000002 HC RX W HCPCS: Performed by: STUDENT IN AN ORGANIZED HEALTH CARE EDUCATION/TRAINING PROGRAM

## 2019-08-03 PROCEDURE — C1887 CATHETER, GUIDING: HCPCS

## 2019-08-03 PROCEDURE — 04CL3Z6: ICD-10-PCS | Performed by: SURGERY

## 2019-08-03 PROCEDURE — B41G1ZZ FLUOROSCOPY OF LEFT LOWER EXTREMITY ARTERIES USING LOW OSMOLAR CONTRAST: ICD-10-PCS | Performed by: SURGERY

## 2019-08-03 PROCEDURE — 6370000000 HC RX 637 (ALT 250 FOR IP): Performed by: INTERNAL MEDICINE

## 2019-08-03 PROCEDURE — 36415 COLL VENOUS BLD VENIPUNCTURE: CPT

## 2019-08-03 PROCEDURE — C1725 CATH, TRANSLUMIN NON-LASER: HCPCS

## 2019-08-03 RX ORDER — LABETALOL 20 MG/4 ML (5 MG/ML) INTRAVENOUS SYRINGE
10 ONCE
Status: DISCONTINUED | OUTPATIENT
Start: 2019-08-03 | End: 2019-08-04 | Stop reason: HOSPADM

## 2019-08-03 RX ORDER — LISINOPRIL 5 MG/1
5 TABLET ORAL DAILY
Status: DISCONTINUED | OUTPATIENT
Start: 2019-08-03 | End: 2019-08-04 | Stop reason: HOSPADM

## 2019-08-03 RX ORDER — PROMETHAZINE HYDROCHLORIDE 25 MG/ML
6.25 INJECTION, SOLUTION INTRAMUSCULAR; INTRAVENOUS EVERY 4 HOURS PRN
Status: DISCONTINUED | OUTPATIENT
Start: 2019-08-03 | End: 2019-08-04 | Stop reason: HOSPADM

## 2019-08-03 RX ORDER — AMLODIPINE BESYLATE 10 MG/1
10 TABLET ORAL DAILY
Status: DISCONTINUED | OUTPATIENT
Start: 2019-08-03 | End: 2019-08-04 | Stop reason: HOSPADM

## 2019-08-03 RX ADMIN — MORPHINE SULFATE 4 MG: 4 INJECTION, SOLUTION INTRAMUSCULAR; INTRAVENOUS at 04:20

## 2019-08-03 RX ADMIN — SODIUM CHLORIDE, PRESERVATIVE FREE 10 ML: 5 INJECTION INTRAVENOUS at 09:03

## 2019-08-03 RX ADMIN — DESMOPRESSIN ACETATE 40 MG: 0.2 TABLET ORAL at 14:18

## 2019-08-03 RX ADMIN — ALTEPLASE 1 MG/HR: 2.2 INJECTION, POWDER, LYOPHILIZED, FOR SOLUTION INTRAVENOUS at 09:07

## 2019-08-03 RX ADMIN — HYDROCODONE BITARTRATE AND ACETAMINOPHEN 2 TABLET: 5; 325 TABLET ORAL at 01:23

## 2019-08-03 RX ADMIN — POTASSIUM CHLORIDE 40 MEQ: 20 TABLET, EXTENDED RELEASE ORAL at 01:23

## 2019-08-03 RX ADMIN — HYDROCODONE BITARTRATE AND ACETAMINOPHEN 2 TABLET: 5; 325 TABLET ORAL at 23:26

## 2019-08-03 RX ADMIN — MORPHINE SULFATE 4 MG: 4 INJECTION, SOLUTION INTRAMUSCULAR; INTRAVENOUS at 08:54

## 2019-08-03 RX ADMIN — LISINOPRIL 5 MG: 5 TABLET ORAL at 14:17

## 2019-08-03 RX ADMIN — HEPARIN SODIUM AND DEXTROSE 14 UNITS/KG/HR: 10000; 5 INJECTION INTRAVENOUS at 08:55

## 2019-08-03 RX ADMIN — ONDANSETRON 4 MG: 2 INJECTION INTRAMUSCULAR; INTRAVENOUS at 13:26

## 2019-08-03 RX ADMIN — MORPHINE SULFATE 4 MG: 4 INJECTION, SOLUTION INTRAMUSCULAR; INTRAVENOUS at 13:48

## 2019-08-03 RX ADMIN — APIXABAN 5 MG: 5 TABLET, FILM COATED ORAL at 22:57

## 2019-08-03 RX ADMIN — AMLODIPINE BESYLATE 10 MG: 10 TABLET ORAL at 14:17

## 2019-08-03 RX ADMIN — PROMETHAZINE HYDROCHLORIDE 6.25 MG: 25 INJECTION INTRAMUSCULAR; INTRAVENOUS at 17:12

## 2019-08-03 RX ADMIN — HYDROCODONE BITARTRATE AND ACETAMINOPHEN 2 TABLET: 5; 325 TABLET ORAL at 05:24

## 2019-08-03 RX ADMIN — HYDROCODONE BITARTRATE AND ACETAMINOPHEN 2 TABLET: 5; 325 TABLET ORAL at 09:24

## 2019-08-03 RX ADMIN — HEPARIN SODIUM 3390 UNITS: 1000 INJECTION, SOLUTION INTRAVENOUS; SUBCUTANEOUS at 08:55

## 2019-08-03 ASSESSMENT — PAIN SCALES - GENERAL
PAINLEVEL_OUTOF10: 7
PAINLEVEL_OUTOF10: 0
PAINLEVEL_OUTOF10: 0
PAINLEVEL_OUTOF10: 7
PAINLEVEL_OUTOF10: 2
PAINLEVEL_OUTOF10: 6
PAINLEVEL_OUTOF10: 7
PAINLEVEL_OUTOF10: 7
PAINLEVEL_OUTOF10: 6
PAINLEVEL_OUTOF10: 7
PAINLEVEL_OUTOF10: 7
PAINLEVEL_OUTOF10: 0
PAINLEVEL_OUTOF10: 7
PAINLEVEL_OUTOF10: 8

## 2019-08-03 NOTE — PLAN OF CARE
Shraddha Crockett 19    Second Visit Note  For more detailed information please refer to the progress note of the day      8/3/2019    6:19 PM    Name:   Jinny Leslie  MRN:     [id number]     Flaquita:      [de-identified]   Room:   [address] [place] [address] Day:  1  Admit Date:  8/2/2019  2:43 AM    PCP:   Claudy Barney MD  Code Status:  Full Code        Pt vitals were reviewed   New labs were reviewed   Patient was seen    Updated plan :     1. Notified by RN of dizziness/nausea  2.  Phenergan ordered, improvement in symptoms  3. BP improved on home BP meds  4. DC planning tomorrow if cleared by vascular         Melanie Lo MD  8/3/2019  6:19 PM

## 2019-08-03 NOTE — PROGRESS NOTES
10/06/2018 12:07 PM     Lab Results   Component Value Date/Time    CULTURE (A) 10/06/2018 12:07 PM     PRESUMPTIVE ID: ISABEL ALBICANS 10 to 50,000 CFU/ML       Radiology:    Cta Lower Extremity Left W Contrast    Result Date: 8/2/2019  1. Severe, flow limiting stenosis of the left common femoral artery secondary to noncalcified intraluminal plaque. 2. Nearly occlusive stenosis of the distal left popliteal artery. The mid to distal tibioperoneal runoff on the left is occluded. There is reconstitution of flow in the dorsalis pedis, although, blood flow to the left foot is likely extremely limited. 3. On the right, no significant femoral or popliteal artery disease. One-vessel runoff to the right foot is present, with blood flow supplied by the posterior tibial artery, as the anterior tibial artery is occluded distally.        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 [I74.3] 08/02/2019    Pain of left lower extremity due to ischemia [I99.8]     Left leg pain [M79.605]     PAD (peripheral artery disease) ([facility] [I73.9] 11/26/2018    Hypercoagulable state ([facility] [D68.59]     Claudication in peripheral vascular disease ([facility] [I73.9]     HTN (hypertension) [I10] 05/13/2013       Plan:        - Vascular following - s/p catheter directed thrombolysis of left CFA and SFA  - Continue heparin infusion per vascular   - Resume home AC antiplatelets per vascular - start eliquis tonight  - Neurovascular checks  - Monitor BP, resume as tolerated   - PT/OT post op when appropriate  - DC planning - likely tomorrow       Rain Oro MD  8/3/2019  2:45 PM

## 2019-08-03 NOTE — PROGRESS NOTES
Division of Vascular Surgery             Progress Note      Name: Yajaira Corona  MRN: [id number]         Overnight Events:      No overnight events. Patient noted to have bilateral PT and DP signals overnight. Her pain in her left posterior knee has greatly improved since admission. Afebrile. No evidence of bleeding. Labs overnight within normal limits. Physical Exam:     Vitals:  BP (!) 145/66   Pulse 71   Temp 98.2 °F (36.8 °C) (Oral)   Resp 16   Ht 5' 5\" (1.651 m)   Wt 183 lb 6.8 oz (83.2 kg)   LMP 07/17/2019   SpO2 100%   BMI 30.52 kg/m²     General appearance - alert, well appearing and in no acute distress  Mental status - oriented to person, place and time with normal affect  Head - normocephalic and atraumatic  Neck - supple, no carotid bruits, thyroid not palpable, no JVD  Chest - clear to auscultation, normal effort  Heart - normal rate, regular rhythm, no murmurs  Abdomen - soft, non-tender, non-distended, bowel sounds present all four quadrants, no masses  Neurological -no gross motor or sensory deficits. Extremities -PT and DP signals noted bilaterally. 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 *      Plan:     1. Plan to return to the Cath Lab today for removal of TPA catheter. 2. Readmit to the ICU  3. IV fluids at 50 cc an hour  4. N.p.o. at this time. 5. Pain control: Norco and morphine as needed  6.  Medical management per primary team    Electronically signed by Duane Harris DO on 8/3/2019 at 9:11 AM

## 2019-08-03 NOTE — PLAN OF CARE
Problem: Health Behavior:  Goal: Ability to identify and alter actions that are detrimental to health will improve  Description  Ability to identify and alter actions that are detrimental to health will improve  Outcome: Ongoing     Problem: Tissue Perfusion:  Goal: Peripheral tissue perfusion will improve  Description  Peripheral tissue perfusion will improve  Outcome: Ongoing     Problem: Bleeding:  Goal: Will show no signs and symptoms of excessive bleeding  Description  Will show no signs and symptoms of excessive bleeding  Outcome: Ongoing     Problem: Pain:  Goal: Pain level will decrease  Description  Pain level will decrease  Outcome: Ongoing  Goal: Control of acute pain  Description  Control of acute pain  Outcome: Ongoing  Goal: Control of chronic pain  Description  Control of chronic pain  Outcome: Ongoing

## 2019-08-03 NOTE — OP NOTE
PROCEDURE:  The patient was brought to the  catheterization suite. After appropriate time-out was performed,  bilateral groin sites were prepped and draped in standard sterile  fashion. I began by evaluating the right common femoral artery under  ultrasound. It was compressible. Local anesthetic was given. I  accessed the right common femoral artery under ultrasound guidance using  a micropuncture needle. Permanent ultrasound images were saved. Wired  passed easily. Fluoroscopic image was taken to confirm access over the  femoral head. I then exchanged for a short 5-Cypriot sheath and brought  up an RBI catheter over the wire. I then went up and over the aortic  bifurcation and parked the catheter in the left external iliac artery  and performed a left lower extremity angiogram.  This revealed filling  defect at the femoral bifurcation. The SFA and profunda were patent. There was flow down to the popliteal artery. At the level of the P2  segment, there was chronic occlusion of the popliteal  artery. There was a chronically occluded anterior tibial and posterior  tibial artery as well. The peroneal artery came back at the mid to lower calf  Level thru geniculates. The dorsalis pedis comes back from collaterals off the peroneal crossing the ankle. At this point, I then upsized to a 6-Cypriot, 45-cm Hipolito sheath and  parked it in the left external iliac artery. I used a Glidewire to get  down into the popliteal artery and performed a hand-injection angiogram  to confirm that the popliteal artery has chronic disease. I then exchanged for a Alexandrea-Hardy 10 cm infusion length catheter and parked it in the common  femoral and superficial femoral artery. The sheath and catheter were  then sutured in place. 1 mg per hour of TPA was then ran through the  catheter and 500 units per hour of heparin was run through the sheath.    The patient tolerated the procedure well, and she was brought to the ICU  for

## 2019-08-03 NOTE — BRIEF OP NOTE
Brief Postoperative Note  ______________________________________________________________    Patient: Yajaira Corona  YOB: 1975  MRN: [id number]  Date of Procedure: 8/3/2019    Pre-Op Diagnosis: Left lower extremity ischemia    Post-Op Diagnosis: Same       Procedure:  1. Follow up on catheter directed thrombolysis therapy  2. Left lower extremity angiogram from previously placed sheath in left iliac artery  3. Percutaneous mechanical thrombectomy of CFA and SFA using 6F angiojet catheter  4. Percutaneous angioplasty with 6 mm balloon of CFA and SFA  5. Catheter into femoral artery, with selective hand injection angiogram  6. Percutaneous closure of right common femoral artery    Anesthesia: Local, IV sedation    Surgeon: Dr. Georgia Medina    Estimated Blood Loss (mL): 10 ml    Complications: None    Contrast: 30 ml    Findings: Remnant thrombus/filling defect at femoral bifurcation. After mechanical thrombectomy and angioplasty there is significantly improved flow. Continues to have chronically occluded popliteal and proximal tibial arteries, with reconstitution of mid peroneal, DP and PT past the ankle. Plan:  Bed rest for a few hours, then out of bed this afternoon. Continue heparin drip, if no groin sites issues, start maintenance eliquis dose tonight and stop heparin infusion. Will need meds to beds to make sure she has medications before she goes home. If no further issues, likely discharge tomorrow. No more labs need to be ordered for thrombolysis.     Marisela Avalos MD  Date: 8/3/2019  Time: 12:32 PM

## 2019-08-04 VITALS
HEART RATE: 62 BPM | OXYGEN SATURATION: 98 % | DIASTOLIC BLOOD PRESSURE: 52 MMHG | TEMPERATURE: 98.5 F | HEIGHT: 65 IN | WEIGHT: 183.42 LBS | RESPIRATION RATE: 26 BRPM | SYSTOLIC BLOOD PRESSURE: 138 MMHG | BODY MASS INDEX: 30.56 KG/M2

## 2019-08-04 PROCEDURE — 6370000000 HC RX 637 (ALT 250 FOR IP): Performed by: STUDENT IN AN ORGANIZED HEALTH CARE EDUCATION/TRAINING PROGRAM

## 2019-08-04 PROCEDURE — 99232 SBSQ HOSP IP/OBS MODERATE 35: CPT | Performed by: SURGERY

## 2019-08-04 PROCEDURE — 99239 HOSP IP/OBS DSCHRG MGMT >30: CPT | Performed by: INTERNAL MEDICINE

## 2019-08-04 PROCEDURE — 6370000000 HC RX 637 (ALT 250 FOR IP): Performed by: NURSE PRACTITIONER

## 2019-08-04 PROCEDURE — 6370000000 HC RX 637 (ALT 250 FOR IP): Performed by: INTERNAL MEDICINE

## 2019-08-04 PROCEDURE — 2580000003 HC RX 258: Performed by: NURSE PRACTITIONER

## 2019-08-04 PROCEDURE — 2580000003 HC RX 258: Performed by: SURGERY

## 2019-08-04 RX ORDER — ATORVASTATIN CALCIUM 40 MG/1
40 TABLET, FILM COATED ORAL DAILY
Qty: 30 TABLET | Refills: 3 | Status: SHIPPED | OUTPATIENT
Start: 2019-08-04 | End: 2019-12-23 | Stop reason: ALTCHOICE

## 2019-08-04 RX ORDER — CLOPIDOGREL BISULFATE 75 MG/1
75 TABLET ORAL DAILY
Qty: 30 TABLET | Refills: 3 | Status: SHIPPED | OUTPATIENT
Start: 2019-08-04 | End: 2019-08-04 | Stop reason: HOSPADM

## 2019-08-04 RX ORDER — HYDROCODONE BITARTRATE AND ACETAMINOPHEN 5; 325 MG/1; MG/1
1 TABLET ORAL EVERY 6 HOURS PRN
Status: DISCONTINUED | OUTPATIENT
Start: 2019-08-04 | End: 2019-08-04 | Stop reason: HOSPADM

## 2019-08-04 RX ORDER — LISINOPRIL 5 MG/1
TABLET ORAL
Qty: 30 TABLET | Refills: 0 | Status: SHIPPED | OUTPATIENT
Start: 2019-08-04 | End: 2020-03-05 | Stop reason: SDUPTHER

## 2019-08-04 RX ORDER — AMLODIPINE BESYLATE 10 MG/1
TABLET ORAL
Qty: 30 TABLET | Refills: 3 | Status: SHIPPED | OUTPATIENT
Start: 2019-08-04 | End: 2020-03-05 | Stop reason: SDUPTHER

## 2019-08-04 RX ORDER — CLOPIDOGREL BISULFATE 75 MG/1
75 TABLET ORAL DAILY
Qty: 30 TABLET | Refills: 3 | Status: SHIPPED | OUTPATIENT
Start: 2019-08-04 | End: 2019-08-04 | Stop reason: SDUPTHER

## 2019-08-04 RX ORDER — HYDROCODONE BITARTRATE AND ACETAMINOPHEN 5; 325 MG/1; MG/1
1 TABLET ORAL EVERY 6 HOURS PRN
Qty: 12 TABLET | Refills: 0 | Status: SHIPPED | OUTPATIENT
Start: 2019-08-04 | End: 2019-08-07

## 2019-08-04 RX ADMIN — DESMOPRESSIN ACETATE 40 MG: 0.2 TABLET ORAL at 08:40

## 2019-08-04 RX ADMIN — Medication 10 ML: at 08:42

## 2019-08-04 RX ADMIN — HYDROCODONE BITARTRATE AND ACETAMINOPHEN 1 TABLET: 5; 325 TABLET ORAL at 08:40

## 2019-08-04 RX ADMIN — SODIUM CHLORIDE, PRESERVATIVE FREE 10 ML: 5 INJECTION INTRAVENOUS at 08:41

## 2019-08-04 RX ADMIN — LISINOPRIL 5 MG: 5 TABLET ORAL at 10:35

## 2019-08-04 RX ADMIN — AMLODIPINE BESYLATE 10 MG: 10 TABLET ORAL at 10:35

## 2019-08-04 RX ADMIN — APIXABAN 5 MG: 5 TABLET, FILM COATED ORAL at 08:40

## 2019-08-04 ASSESSMENT — PAIN SCALES - GENERAL: PAINLEVEL_OUTOF10: 7

## 2019-08-04 NOTE — DISCHARGE SUMMARY
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 *      Admission Condition:  fair     Discharged Condition: stable    Hospital Stay:     Hospital Course: The patient is [phone] y.o. F with PMH significant for PVD, HTN who presented with leg pain. States she developed sudden onset left lower extremity pain last night. Has known history of PVD supposed to be on Eliquis/plavix, questionable compliance. Known history of femoral-popliteal bypass.      In ER underwent CTA demonstrating severe, flow limiting stenosis of the left common femoral artery secondary to noncalcified intraluminal plaque. Nearly occlusive stenosis of the distal left popliteal artery. The mid to distal tibioperoneal runoff on the left is occluded. Rita Nay is reconstitution of flow in the dorsalis pedis, although, blood flow to the left foot is likely extremely limited.     Seen by vascular with plans for OR. Admitted for further evaluation. Underwent OR per vascular, symptoms improved. DC home with meds to beds.          Significant therapeutic interventions:   Procedure:  1. Follow up on catheter directed thrombolysis therapy  2.   Left lower extremity angiogram from previously

## 2019-08-04 NOTE — PROGRESS NOTES
Division of Vascular Surgery             Progress Note      Name: Ivelisse Cat  MRN: [id number]         Overnight Events:     Patient seen and examined. States she does have some pain in her right foot she says the wound has opened up and it hurts to walk. Otherwise the pain she presented to the hospital is greatly decreased. She denies pain in her left lower extremity. She underwent angiogram and percutaneous mechanical thrombectomy yesterday with AngioJet. No acute events overnight. Tolerating diet. Transitioned from heparin drip to Eliquis last night. Physical Exam:     Vitals:  BP (!) 108/56   Pulse 65   Temp 98.5 °F (36.9 °C) (Oral)   Resp 14   Ht 5' 5\" (1.651 m)   Wt 183 lb 6.8 oz (83.2 kg)   LMP 07/17/2019   SpO2 100%   BMI 30.52 kg/m²     General appearance - alert, well appearing and in no acute distress  Mental status - oriented to person, place and time with normal affect  Head - normocephalic and atraumatic  Chest - clear to auscultation, normal effort  Heart - normal rate, regular rhythm  Abdomen - soft, non-tender, non-distended  Neurological -no gross motor or sensory deficits. Extremities -PT and DP signals noted bilaterally. 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 *      Plan:     1. General diet  2. Pain control with Norco  3. Transition to Eliquis overnight  4. Ambulating  5. Restart Plavix  6. Okay from a vascular standpoint for discharge today  7. Medical management per primary team  8.  Scripts for Eliquis, Plavix, and pain medication printed and

## 2019-08-04 NOTE — PROGRESS NOTES
CLINICAL PHARMACY NOTE: MEDS TO [address] Select Patient?: No  Total # of Prescriptions Filled: 2   The following medications were delivered to the patient:  · eliquis  · norco   Total # of Interventions Completed: 4  Time Spent (min): 30    Additional Documentation:  Atorvastatin too soon   Lisinopril too soon   Amlodipine too soon   clopidrogrel too soon   Patient is aware of all these being too soon

## 2019-08-04 NOTE — PROGRESS NOTES
 High Blood Pressure Sister        Vitals:  BP (!) 108/56   Pulse 65   Temp 98.5 °F (36.9 °C) (Oral)   Resp 14   Ht 5' 5\" (1.651 m)   Wt 183 lb 6.8 oz (83.2 kg)   LMP 2019   SpO2 100%   BMI 30.52 kg/m²   Temp (24hrs), Av.2 °F (36.8 °C), Min:98 °F (36.7 °C), Max:98.5 °F (36.9 °C)    No results for input(s): POCGLU in the last 72 hours. I/O (24Hr): Intake/Output Summary (Last 24 hours) at 2019 1024  Last data filed at 2019 0545  Gross per 24 hour   Intake 4576.86 ml   Output 1800 ml   Net 2776.86 ml       Labs:  Hematology:  Recent Labs     19  0946 19  0035 19  1548   WBC 9.6 10.1 7.2 10.7   RBC 4.51 3.87* 3.90* 4.27   HGB 13.0 11.8* 11.3* 12.8  12.8   HCT 40.9 37.4 35.4* 38.9  38.9   MCV 90.7 96.6 90.8 91.1   MCH 28.8 30.5 29.0 30.0   MCHC 31.8 31.6 31.9 32.9   RDW 13.2 13.2 13.3 12.9    252 210 224   MPV 10.2 10.1 10.2 10.3   INR 0.9  --  0.9  --      Chemistry:  Recent Labs     19  0035    137   K 4.3 3.3*   CL 99 103   CO2 23 21   GLUCOSE 114* 106*   BUN 17 9   CREATININE 0.89 0.50   ANIONGAP 14 13   LABGLOM >60 >60   GFRAA >60 >60   CALCIUM 9.7 8.1*   No results for input(s): PROT, LABALBU, LABA1C, Z8FZRJZ, D8TGVMM, FT4, TSH, AST, ALT, LDH, GGT, ALKPHOS, LABGGT, BILITOT, BILIDIR, AMMONIA, AMYLASE, LIPASE, LACTATE, CHOL, HDL, LDLCHOLESTEROL, CHOLHDLRATIO, TRIG, VLDL, UCY16WC, PHENYTOIN, PHENYF, URICACID, POCGLU in the last 72 hours.   ABG:  Lab Results   Component Value Date    POCPH 7.503 2018    PHART 7.440 2018    POCPCO2 34.4 2018    CYM0GTP 33.8 2018    POCPO2 109.9 2018    PO2ART 182.0 2018    POCHCO3 27.0 2018    TDW8OZA 22.6 2018    NBEA NOT REPORTED 2018    PBEA 4 2018    WSN9RGF 28 2018    GAZZ5XPY 99 2018    I9ISILDV 99.8 2018    FIO2 28.0 2018     Lab Results   Component Value Date/Time    SPECIAL NOT REPORTED

## 2019-08-05 ENCOUNTER — TELEPHONE (OUTPATIENT)
Dept: FAMILY MEDICINE CLINIC | Age: 44
End: 2019-08-05

## 2019-08-07 ENCOUNTER — TELEPHONE (OUTPATIENT)
Dept: PODIATRY | Age: 44
End: 2019-08-07

## 2019-08-13 ENCOUNTER — HOSPITAL ENCOUNTER (EMERGENCY)
Age: 44
Discharge: HOME | End: 2019-08-13
Attending: EMERGENCY MEDICINE
Payer: COMMERCIAL

## 2019-08-13 ENCOUNTER — HOSPITAL ENCOUNTER (OUTPATIENT)
Dept: PREADMISSION TESTING | Age: 44
Discharge: HOME | End: 2019-08-17
Payer: COMMERCIAL

## 2019-08-13 ENCOUNTER — APPOINTMENT (OUTPATIENT)
Dept: GENERAL RADIOLOGY | Age: 44
End: 2019-08-13
Payer: COMMERCIAL

## 2019-08-13 VITALS
OXYGEN SATURATION: 100 % | HEIGHT: 65 IN | RESPIRATION RATE: 16 BRPM | SYSTOLIC BLOOD PRESSURE: 135 MMHG | HEART RATE: 67 BPM | DIASTOLIC BLOOD PRESSURE: 79 MMHG | WEIGHT: 189.6 LBS | BODY MASS INDEX: 31.59 KG/M2

## 2019-08-13 VITALS
RESPIRATION RATE: 16 BRPM | SYSTOLIC BLOOD PRESSURE: 151 MMHG | TEMPERATURE: 97.8 F | HEART RATE: 63 BPM | OXYGEN SATURATION: 98 % | DIASTOLIC BLOOD PRESSURE: 80 MMHG | BODY MASS INDEX: 30.99 KG/M2 | HEIGHT: 65 IN | WEIGHT: 186 LBS

## 2019-08-13 DIAGNOSIS — L08.9 TOE INFECTION: Primary | ICD-10-CM

## 2019-08-13 LAB
ABSOLUTE EOS #: 0.16 K/UL (ref 0–0.44)
ABSOLUTE IMMATURE GRANULOCYTE: 0.05 K/UL (ref 0–0.3)
ABSOLUTE LYMPH #: 2.28 K/UL (ref 1.1–3.7)
ABSOLUTE MONO #: 0.68 K/UL (ref 0.1–1.2)
ANION GAP SERPL CALCULATED.3IONS-SCNC: 13 MMOL/L (ref 9–17)
BASOPHILS # BLD: 0 % (ref 0–2)
BASOPHILS ABSOLUTE: 0.03 K/UL (ref 0–0.2)
BUN BLDV-MCNC: 9 MG/DL (ref 6–20)
BUN/CREAT BLD: 13 (ref 9–20)
C-REACTIVE PROTEIN: 29.5 MG/L (ref 0–5)
CALCIUM SERPL-MCNC: 9.3 MG/DL (ref 8.6–10.4)
CHLORIDE BLD-SCNC: 103 MMOL/L (ref 98–107)
CO2: 23 MMOL/L (ref 20–31)
CREAT SERPL-MCNC: 0.68 MG/DL (ref 0.5–0.9)
DIFFERENTIAL TYPE: ABNORMAL
EOSINOPHILS RELATIVE PERCENT: 2 % (ref 1–4)
GFR AFRICAN AMERICAN: >60 ML/MIN
GFR NON-AFRICAN AMERICAN: >60 ML/MIN
GFR SERPL CREATININE-BSD FRML MDRD: NORMAL ML/MIN/{1.73_M2}
GFR SERPL CREATININE-BSD FRML MDRD: NORMAL ML/MIN/{1.73_M2}
GLUCOSE BLD-MCNC: 94 MG/DL (ref 70–99)
HCT VFR BLD CALC: 36.9 % (ref 36.3–47.1)
HEMOGLOBIN: 12 G/DL (ref 11.9–15.1)
IMMATURE GRANULOCYTES: 1 %
LYMPHOCYTES # BLD: 31 % (ref 24–43)
MCH RBC QN AUTO: 29.9 PG (ref 25.2–33.5)
MCHC RBC AUTO-ENTMCNC: 32.5 G/DL (ref 28.4–34.8)
MCV RBC AUTO: 91.8 FL (ref 82.6–102.9)
MONOCYTES # BLD: 9 % (ref 3–12)
NRBC AUTOMATED: 0 PER 100 WBC
PDW BLD-RTO: 12.8 % (ref 11.8–14.4)
PLATELET # BLD: 371 K/UL (ref 138–453)
PLATELET ESTIMATE: ABNORMAL
PMV BLD AUTO: 9.6 FL (ref 8.1–13.5)
POTASSIUM SERPL-SCNC: 3.8 MMOL/L (ref 3.7–5.3)
RBC # BLD: 4.02 M/UL (ref 3.95–5.11)
RBC # BLD: ABNORMAL 10*6/UL
SEDIMENTATION RATE, ERYTHROCYTE: 23 MM (ref 0–20)
SEG NEUTROPHILS: 57 % (ref 36–65)
SEGMENTED NEUTROPHILS ABSOLUTE COUNT: 4.1 K/UL (ref 1.5–8.1)
SODIUM BLD-SCNC: 139 MMOL/L (ref 135–144)
WBC # BLD: 7.3 K/UL (ref 3.5–11.3)
WBC # BLD: ABNORMAL 10*3/UL

## 2019-08-13 PROCEDURE — 6370000000 HC RX 637 (ALT 250 FOR IP): Performed by: NURSE PRACTITIONER

## 2019-08-13 PROCEDURE — 73630 X-RAY EXAM OF FOOT: CPT

## 2019-08-13 PROCEDURE — 86140 C-REACTIVE PROTEIN: CPT

## 2019-08-13 PROCEDURE — 99284 EMERGENCY DEPT VISIT MOD MDM: CPT

## 2019-08-13 PROCEDURE — 85651 RBC SED RATE NONAUTOMATED: CPT

## 2019-08-13 PROCEDURE — 80048 BASIC METABOLIC PNL TOTAL CA: CPT

## 2019-08-13 PROCEDURE — 85025 COMPLETE CBC W/AUTO DIFF WBC: CPT

## 2019-08-13 RX ORDER — HYDROCODONE BITARTRATE AND ACETAMINOPHEN 5; 325 MG/1; MG/1
1 TABLET ORAL ONCE
Status: COMPLETED | OUTPATIENT
Start: 2019-08-13 | End: 2019-08-13

## 2019-08-13 RX ORDER — DOXYCYCLINE HYCLATE 100 MG
100 TABLET ORAL 2 TIMES DAILY
Qty: 20 TABLET | Refills: 0 | Status: SHIPPED | OUTPATIENT
Start: 2019-08-13 | End: 2019-12-23 | Stop reason: ALTCHOICE

## 2019-08-13 RX ORDER — HYDROCODONE BITARTRATE AND ACETAMINOPHEN 5; 325 MG/1; MG/1
1 TABLET ORAL EVERY 6 HOURS PRN
Qty: 8 TABLET | Refills: 0 | Status: SHIPPED | OUTPATIENT
Start: 2019-08-13 | End: 2019-08-15

## 2019-08-13 RX ADMIN — HYDROCODONE BITARTRATE AND ACETAMINOPHEN 1 TABLET: 5; 325 TABLET ORAL at 19:09

## 2019-08-13 ASSESSMENT — ENCOUNTER SYMPTOMS
ABDOMINAL PAIN: 0
ABDOMINAL DISTENTION: 0
SORE THROAT: 0
COLOR CHANGE: 1
SHORTNESS OF BREATH: 0
CHEST TIGHTNESS: 0

## 2019-08-13 ASSESSMENT — PAIN - FUNCTIONAL ASSESSMENT: PAIN_FUNCTIONAL_ASSESSMENT: PREVENTS OR INTERFERES SOME ACTIVE ACTIVITIES AND ADLS

## 2019-08-13 ASSESSMENT — PAIN DESCRIPTION - DESCRIPTORS: DESCRIPTORS: BURNING

## 2019-08-13 ASSESSMENT — PAIN SCALES - GENERAL
PAINLEVEL_OUTOF10: 10
PAINLEVEL_OUTOF10: 4
PAINLEVEL_OUTOF10: 4

## 2019-08-13 ASSESSMENT — PAIN DESCRIPTION - ORIENTATION: ORIENTATION: RIGHT

## 2019-08-13 ASSESSMENT — PAIN DESCRIPTION - FREQUENCY: FREQUENCY: INTERMITTENT

## 2019-08-13 ASSESSMENT — PAIN DESCRIPTION - LOCATION: LOCATION: TOE (COMMENT WHICH ONE)

## 2019-08-13 ASSESSMENT — PAIN DESCRIPTION - PROGRESSION: CLINICAL_PROGRESSION: GRADUALLY WORSENING

## 2019-08-13 ASSESSMENT — PAIN DESCRIPTION - PAIN TYPE: TYPE: ACUTE PAIN

## 2019-08-13 NOTE — H&P
Diabetes Sister     High Blood Pressure Sister     Diabetes Sister     High Blood Pressure Sister        Review of Systems:     Positive and Negative as described in HPI. CONSTITUTIONAL: Negative for fevers, chills, sweats, fatigue, and weight loss. HEENT: Negative for glasses, hearing changes, rhinorrhea, and throat pain. RESPIRATORY: Denies asthma, COPD, and sleep apnea. Negative for shortness of breath, cough, congestion, and wheezing. CARDIOVASCULAR: History of blood clots in bilateral legs in 2018 and 2019. Negative for chest pain, irregular heartbeat, and palpitations. GASTROINTESTINAL: Negative for reflux, nausea, vomiting, diarrhea, constipation, change in bowel habits, and abdominal pain. GENITOURINARY: Negative for difficulty of urination, burning with urination, and frequency. INTEGUMENT: Infected right hallux. Right third toe necrosis. Easy bruising. Negative for rash. Incisions from recent vascular surgery have healed. HEMATOLOGIC/LYMPHATIC: Bilateral leg claudication. Negative for swelling/edema. ALLERGIC/IMMUNOLOGIC: Negative for urticaria and itching. ENDOCRINE: Negative for diabetes, increase in thirst, increase in urination, and heat or cold intolerance. MUSCULOSKELETAL: See HPI. NEUROLOGICAL: Migraines. Dizziness and lightheadedness when the pt's blood pressure is low. This occurs twice per month. Right foot numbness and tingling. Negative for seizures, strokes, syncope, current dizziness, and current lightheadedness. BEHAVIOR/PSYCH: Anxiety. Negative for depression. Physical Exam:   /79   Pulse 67   Resp 16   Ht 5' 5\" (1.651 m)   Wt 189 lb 9.5 oz (86 kg)   LMP 07/17/2019   SpO2 100%   BMI 31.55 kg/m²   Patient's last menstrual period was 07/17/2019. No results for input(s): POCGLU in the last 72 hours. General Appearance:  Alert, well appearing, and in no acute distress. Obese. Mental status:  Oriented to person, place, and time.   Head: Normocephalic and atraumatic. Eye:  No icterus, redness, pupils equal and reactive, extraocular eye movements intact, and conjunctiva clear. Ear:  Hearing grossly intact. Nose:  No drainage noted. Mouth:  Mucous membranes moist.  Neck:  Supple and no carotid bruits noted. Lungs:  Bilateral equal air entry, clear to auscultation, no wheezing, rales or rhonchi, and normal effort. Cardiovascular: Holosystolic 1/6 murmur. Normal rate, regular rhythm, no gallop or rub. Abdomen: Distended. Soft, non-tender, and active bowel sounds. Neurologic: Normal speech and cranial nerves II through XII grossly intact. Strength 5/5 bilaterally. Skin: Right hallux erythema, edema, tenderness, and purulent drainage. Necrotic right third toe. No rashes, bruising, or bleeding on exposed skin area. Extremities: Posterior tibial pulses 1+ bilaterally. No calf tenderness with palpation. Psych: Normal affect.      Investigations:      Laboratory Testing:  Recent Results (from the past 24 hour(s))   CBC Auto Differential    Collection Time: 08/13/19  4:36 PM   Result Value Ref Range    WBC 7.3 3.5 - 11.3 k/uL    RBC 4.02 3.95 - 5.11 m/uL    Hemoglobin 12.0 11.9 - 15.1 g/dL    Hematocrit 36.9 36.3 - 47.1 %    MCV 91.8 82.6 - 102.9 fL    MCH 29.9 25.2 - 33.5 pg    MCHC 32.5 28.4 - 34.8 g/dL    RDW 12.8 11.8 - 14.4 %    Platelets 189 689 - 204 k/uL    MPV 9.6 8.1 - 13.5 fL    NRBC Automated 0.0 0.0 per 100 WBC    Differential Type NOT REPORTED     Seg Neutrophils 57 36 - 65 %    Lymphocytes 31 24 - 43 %    Monocytes 9 3 - 12 %    Eosinophils % 2 1 - 4 %    Basophils 0 0 - 2 %    Immature Granulocytes 1 (H) 0 %    Segs Absolute 4.10 1.50 - 8.10 k/uL    Absolute Lymph # 2.28 1.10 - 3.70 k/uL    Absolute Mono # 0.68 0.10 - 1.20 k/uL    Absolute Eos # 0.16 0.00 - 0.44 k/uL    Basophils # 0.03 0.00 - 0.20 k/uL    Absolute Immature Granulocyte 0.05 0.00 - 0.30 k/uL    WBC Morphology NOT REPORTED     RBC Morphology NOT REPORTED     Platelet Estimate NOT REPORTED        Recent Labs     08/13/19  1636  08/03/19  0758 08/03/19  0035   HGB 12.0   < >  --  11.3*   HCT 36.9   < >  --  35.4*   WBC 7.3   < >  --  7.2   MCV 91.8   < >  --  90.8   NA  --   --   --  137   K  --   --   --  3.3*   CL  --   --   --  103   CO2  --   --   --  21   BUN  --   --   --  9   CREATININE  --   --   --  0.50   GLUCOSE  --   --   --  106*   INR  --   --   --  0.9   PROTIME  --   --   --  10.0   APTT  --   --  39.6* 42.4*    < > = values in this interval not displayed. Imaging/Diagnostics:    CTA Lower Extremity Left W Contrast  Result Date: 8/2/2019  EXAMINATION: CTA OF THE LEFT LOWER EXTREMITY 8/2/2019 3:49 am TECHNIQUE: CTA of the left lower extremity was performed after the administration of intravenous contrast. Multiplanar reformatted images are provided for review. MIP images are provided for review. Dose modulation, iterative reconstruction, and/or weight based adjustment of the mA/kV was utilized to reduce the radiation dose to as low as reasonably achievable. COMPARISON: 01/16/2018 HISTORY ORDERING SYSTEM PROVIDED HISTORY: PAD, hx of left fem-pop, acute left knee pain, foot numbness TECHNOLOGIST PROVIDED HISTORY: Reason for Exam: PAD, hx of left fem-pop, acute left knee pain, foot numbness Acuity: Unknown Type of Exam: Unknown FINDINGS: On the left, there are mild stenoses of the common iliac and external iliac arteries. The internal iliac artery is occluded at the origin. There is a focal intraluminal noncalcified plaque within the left common femoral artery, which is resulting in a severe stenosis. The profunda femoris is patent. The superficial femoral artery is patent. There is a severe stenosis of the distal popliteal artery, which is nearly occluded. The anterior tibial artery is occluded in the proximal calf. The posterior tibial artery is occluded in the proximal calf. The peroneal artery is occluded proximally.  There is reconstitution of flow in the dorsalis pedis. On the right, the common iliac and external iliac arteries are patent. There is a severe origin stenosis of the internal iliac artery. The common femoral artery is patent. The profunda femoris is patent. The superficial femoral artery and popliteal arteries are patent. The anterior tibial artery is occluded in the mid calf. The posterior tibial artery is patent to the foot. The peroneal artery is occluded at the ankle. There is diverticulosis, without evidence of diverticulitis. No evidence of appendicitis. The urinary bladder is nondistended. There is small pelvic free fluid, which is most likely physiologic. No acute osseous abnormality is demonstrated. Status post osteotomy of the distal left fibula. 1. Severe, flow limiting stenosis of the left common femoral artery secondary to noncalcified intraluminal plaque. 2. Nearly occlusive stenosis of the distal left popliteal artery. The mid to distal tibioperoneal runoff on the left is occluded. There is reconstitution of flow in the dorsalis pedis, although, blood flow to the left foot is likely extremely limited. 3. On the right, no significant femoral or popliteal artery disease. One-vessel runoff to the right foot is present, with blood flow supplied by the posterior tibial artery, as the anterior tibial artery is occluded distally. [id number]. See Epic. Diagnosis:      1. Right third digit necrosis    Plans:     1.  Right third digit toe amputation      NALLELY Donovan CNP  2019  4:59 PM

## 2019-08-13 NOTE — ED PROVIDER NOTES
The patient was seen and examined by me in conjunction with the mid-level provider. I agree with his/her assessment and treatment plan. X-ray findings are discussed with the patient and podiatry resident is to see the patient tonight.      Erinn Ken MD  08/13/19 [id number]

## 2019-08-13 NOTE — PRE-PROCEDURE INSTRUCTIONS
[facility] ON Thursday, August 15 at 9:00 AM    Once you enter the hospital lobby, take the elevators to the second floor. Check-In is at the surgery registration desk. If you have been given a blood band, you must bring it with you the day of surgery. Continue to take your home medications as you normally do up to and including the night before surgery with the exception of any blood thinning medications. Please stop any blood thinning medications as directed by your surgeon or prescribing physician. Failure to stop certain medications may interfere with your scheduled surgery. These may include:  Aspirin, Warfarin (Coumadin), Clopidogrel (Plavix), Ibuprofen (Motrin, Advil), Naproxen (Aleve), Meloxicam (Mobic), Celecoxib (Celebrex), Eliquis, Pradaxa, Xarelto, Effient, Fish Oil, Herbal supplements. Stop eliquis now    If you are diabetic, do not take any of your diabetic medications by mouth the morning of surgery. If you are taking insulin contact the doctor that manages your diabetes for instructions about any changes to your insulin dosages the day before surgery. Do not inject insulin or other injectable diabetic medications the morning of surgery unless otherwise instructed by the doctor who manages your diabetes. Please take the following medication(s) the day of surgery with a small sip of water:  amlodipine  Please use your inhalers at home the day of surgery. PREPARING FOR YOUR SURGERY:     Before surgery, you can play an important role in your own health. Because skin is not sterile, we need to be sure that your skin is as free of germs as possible before surgery by carefully washing before surgery. Preparing or prepping skin before surgery can reduce the risk of a surgical site infection.   Do not shave the area of your body where your surgery will be performed unless you received specific permission from your physician.     You will need to shower at home the Surgery/Procedure: You will need a friend or family member to drive you home after your procedure. Your  must be 25years of age or older and able to sign off on your discharge instructions. A taxi cab or any other form of public transportation is not acceptable. Your friend or family member must stay at the hospital throughout your procedure. Someone must remain with you for the first 24 hours after your surgery if you receive anesthesia or medication. If you do not have someone to stay with you, your procedure may be cancelled.       If you have any other questions regarding your procedure or the day of surgery, please call [phone]      _________________________  ____________________________  Signature (Patient)              Signature (Provider) & date

## 2019-08-13 NOTE — ED NOTES
Pt presents to er for evaluation. Pt was having pre-op testing done and was told to come to ed for further evaluation. Pt is scheduled to have her right 3rd toe amputated on 8/15. Pt has  Developed pain and drainage to right great toe. Pt has hx of pad. Pt denies fever or chills. Pt a&ox3. Skin warm and dry. Respirations even and non-labored.       Gina Austin RN  08/13/19 [id number]

## 2019-08-14 ENCOUNTER — ANESTHESIA EVENT (OUTPATIENT)
Dept: OPERATING ROOM | Age: 44
End: 2019-08-14
Payer: COMMERCIAL

## 2019-08-14 ASSESSMENT — ENCOUNTER SYMPTOMS
NAUSEA: 0
COUGH: 0
VOMITING: 0
DIARRHEA: 0
COLOR CHANGE: 0
ABDOMINAL PAIN: 0
SHORTNESS OF BREATH: 0
RHINORRHEA: 0
WHEEZING: 0
CONSTIPATION: 0
SINUS PRESSURE: 0
SORE THROAT: 0
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
## 2019-08-15 ENCOUNTER — ANESTHESIA (OUTPATIENT)
Dept: OPERATING ROOM | Age: 44
End: 2019-08-15
Payer: COMMERCIAL

## 2019-08-16 ENCOUNTER — TELEPHONE (OUTPATIENT)
Dept: PODIATRY | Age: 44
End: 2019-08-16

## 2019-08-22 ENCOUNTER — HOSPITAL ENCOUNTER (OUTPATIENT)
Age: 44
Setting detail: OUTPATIENT SURGERY
Discharge: HOME | End: 2019-08-22
Attending: PODIATRIST | Admitting: PODIATRIST
Payer: COMMERCIAL

## 2019-08-22 VITALS — DIASTOLIC BLOOD PRESSURE: 60 MMHG | OXYGEN SATURATION: 91 % | TEMPERATURE: 96.3 F | SYSTOLIC BLOOD PRESSURE: 119 MMHG

## 2019-08-22 VITALS
HEART RATE: 80 BPM | OXYGEN SATURATION: 99 % | BODY MASS INDEX: 31.49 KG/M2 | TEMPERATURE: 97.5 F | HEIGHT: 65 IN | SYSTOLIC BLOOD PRESSURE: 131 MMHG | WEIGHT: 189 LBS | DIASTOLIC BLOOD PRESSURE: 92 MMHG | RESPIRATION RATE: 14 BRPM

## 2019-08-22 DIAGNOSIS — Z98.890 STATUS POST RIGHT FOOT SURGERY: Primary | ICD-10-CM

## 2019-08-22 LAB
HCG, PREGNANCY URINE (POC): NEGATIVE
POTASSIUM SERPL-SCNC: 4.2 MMOL/L (ref 3.7–5.3)

## 2019-08-22 PROCEDURE — 2580000003 HC RX 258: Performed by: ANESTHESIOLOGY

## 2019-08-22 PROCEDURE — 3700000001 HC ADD 15 MINUTES (ANESTHESIA): Performed by: PODIATRIST

## 2019-08-22 PROCEDURE — 81025 URINE PREGNANCY TEST: CPT

## 2019-08-22 PROCEDURE — 86403 PARTICLE AGGLUT ANTBDY SCRN: CPT

## 2019-08-22 PROCEDURE — 3600000002 HC SURGERY LEVEL 2 BASE: Performed by: PODIATRIST

## 2019-08-22 PROCEDURE — 87176 TISSUE HOMOGENIZATION CULTR: CPT

## 2019-08-22 PROCEDURE — 6360000002 HC RX W HCPCS: Performed by: NURSE ANESTHETIST, CERTIFIED REGISTERED

## 2019-08-22 PROCEDURE — 88305 TISSUE EXAM BY PATHOLOGIST: CPT

## 2019-08-22 PROCEDURE — 87075 CULTR BACTERIA EXCEPT BLOOD: CPT

## 2019-08-22 PROCEDURE — 2500000003 HC RX 250 WO HCPCS: Performed by: PODIATRIST

## 2019-08-22 PROCEDURE — 2500000003 HC RX 250 WO HCPCS: Performed by: ANESTHESIOLOGY

## 2019-08-22 PROCEDURE — 3700000000 HC ANESTHESIA ATTENDED CARE: Performed by: PODIATRIST

## 2019-08-22 PROCEDURE — 7100000010 HC PHASE II RECOVERY - FIRST 15 MIN: Performed by: PODIATRIST

## 2019-08-22 PROCEDURE — 2500000003 HC RX 250 WO HCPCS: Performed by: NURSE ANESTHETIST, CERTIFIED REGISTERED

## 2019-08-22 PROCEDURE — 87070 CULTURE OTHR SPECIMN AEROBIC: CPT

## 2019-08-22 PROCEDURE — 3600000012 HC SURGERY LEVEL 2 ADDTL 15MIN: Performed by: PODIATRIST

## 2019-08-22 PROCEDURE — 88311 DECALCIFY TISSUE: CPT

## 2019-08-22 PROCEDURE — 84132 ASSAY OF SERUM POTASSIUM: CPT

## 2019-08-22 PROCEDURE — 87205 SMEAR GRAM STAIN: CPT

## 2019-08-22 PROCEDURE — 7100000000 HC PACU RECOVERY - FIRST 15 MIN: Performed by: PODIATRIST

## 2019-08-22 PROCEDURE — 2709999900 HC NON-CHARGEABLE SUPPLY: Performed by: PODIATRIST

## 2019-08-22 PROCEDURE — 7100000011 HC PHASE II RECOVERY - ADDTL 15 MIN: Performed by: PODIATRIST

## 2019-08-22 PROCEDURE — 7100000001 HC PACU RECOVERY - ADDTL 15 MIN: Performed by: PODIATRIST

## 2019-08-22 RX ORDER — OXYCODONE HYDROCHLORIDE AND ACETAMINOPHEN 5; 325 MG/1; MG/1
1 TABLET ORAL PRN
Status: DISCONTINUED | OUTPATIENT
Start: 2019-08-22 | End: 2019-08-22 | Stop reason: HOSPADM

## 2019-08-22 RX ORDER — PROMETHAZINE HYDROCHLORIDE 25 MG/ML
6.25 INJECTION, SOLUTION INTRAMUSCULAR; INTRAVENOUS
Status: DISCONTINUED | OUTPATIENT
Start: 2019-08-22 | End: 2019-08-22 | Stop reason: HOSPADM

## 2019-08-22 RX ORDER — FENTANYL CITRATE 50 UG/ML
25 INJECTION, SOLUTION INTRAMUSCULAR; INTRAVENOUS EVERY 5 MIN PRN
Status: DISCONTINUED | OUTPATIENT
Start: 2019-08-22 | End: 2019-08-22 | Stop reason: HOSPADM

## 2019-08-22 RX ORDER — SODIUM CHLORIDE, SODIUM LACTATE, POTASSIUM CHLORIDE, CALCIUM CHLORIDE 600; 310; 30; 20 MG/100ML; MG/100ML; MG/100ML; MG/100ML
INJECTION, SOLUTION INTRAVENOUS CONTINUOUS
Status: DISCONTINUED | OUTPATIENT
Start: 2019-08-23 | End: 2019-08-22 | Stop reason: SDUPTHER

## 2019-08-22 RX ORDER — SODIUM CHLORIDE 0.9 % (FLUSH) 0.9 %
10 SYRINGE (ML) INJECTION PRN
Status: DISCONTINUED | OUTPATIENT
Start: 2019-08-22 | End: 2019-08-22 | Stop reason: HOSPADM

## 2019-08-22 RX ORDER — HYDROCODONE BITARTRATE AND ACETAMINOPHEN 5; 325 MG/1; MG/1
1 TABLET ORAL EVERY 6 HOURS PRN
Qty: 28 TABLET | Refills: 0 | Status: SHIPPED | OUTPATIENT
Start: 2019-08-22 | End: 2019-08-29

## 2019-08-22 RX ORDER — LIDOCAINE HYDROCHLORIDE 10 MG/ML
INJECTION, SOLUTION EPIDURAL; INFILTRATION; INTRACAUDAL; PERINEURAL PRN
Status: DISCONTINUED | OUTPATIENT
Start: 2019-08-22 | End: 2019-08-22 | Stop reason: ALTCHOICE

## 2019-08-22 RX ORDER — LABETALOL HYDROCHLORIDE 5 MG/ML
5 INJECTION, SOLUTION INTRAVENOUS EVERY 10 MIN PRN
Status: DISCONTINUED | OUTPATIENT
Start: 2019-08-22 | End: 2019-08-22 | Stop reason: HOSPADM

## 2019-08-22 RX ORDER — HYDRALAZINE HYDROCHLORIDE 20 MG/ML
5 INJECTION INTRAMUSCULAR; INTRAVENOUS EVERY 10 MIN PRN
Status: DISCONTINUED | OUTPATIENT
Start: 2019-08-22 | End: 2019-08-22 | Stop reason: HOSPADM

## 2019-08-22 RX ORDER — SODIUM CHLORIDE 9 MG/ML
INJECTION, SOLUTION INTRAVENOUS CONTINUOUS
Status: DISCONTINUED | OUTPATIENT
Start: 2019-08-23 | End: 2019-08-22 | Stop reason: SDUPTHER

## 2019-08-22 RX ORDER — LIDOCAINE HYDROCHLORIDE 20 MG/ML
INJECTION, SOLUTION EPIDURAL; INFILTRATION; INTRACAUDAL; PERINEURAL PRN
Status: DISCONTINUED | OUTPATIENT
Start: 2019-08-22 | End: 2019-08-22 | Stop reason: SDUPTHER

## 2019-08-22 RX ORDER — SODIUM CHLORIDE 0.9 % (FLUSH) 0.9 %
10 SYRINGE (ML) INJECTION EVERY 12 HOURS SCHEDULED
Status: DISCONTINUED | OUTPATIENT
Start: 2019-08-22 | End: 2019-08-22 | Stop reason: HOSPADM

## 2019-08-22 RX ORDER — SODIUM CHLORIDE 0.9 % (FLUSH) 0.9 %
10 SYRINGE (ML) INJECTION PRN
Status: DISCONTINUED | OUTPATIENT
Start: 2019-08-22 | End: 2019-08-22 | Stop reason: SDUPTHER

## 2019-08-22 RX ORDER — LIDOCAINE HYDROCHLORIDE 10 MG/ML
1 INJECTION, SOLUTION EPIDURAL; INFILTRATION; INTRACAUDAL; PERINEURAL
Status: DISCONTINUED | OUTPATIENT
Start: 2019-08-23 | End: 2019-08-22 | Stop reason: HOSPADM

## 2019-08-22 RX ORDER — BUPIVACAINE HYDROCHLORIDE 5 MG/ML
INJECTION, SOLUTION EPIDURAL; INTRACAUDAL PRN
Status: DISCONTINUED | OUTPATIENT
Start: 2019-08-22 | End: 2019-08-22 | Stop reason: ALTCHOICE

## 2019-08-22 RX ORDER — SODIUM CHLORIDE 9 MG/ML
INJECTION, SOLUTION INTRAVENOUS CONTINUOUS
Status: DISCONTINUED | OUTPATIENT
Start: 2019-08-23 | End: 2019-08-22

## 2019-08-22 RX ORDER — MIDAZOLAM HYDROCHLORIDE 1 MG/ML
INJECTION INTRAMUSCULAR; INTRAVENOUS PRN
Status: DISCONTINUED | OUTPATIENT
Start: 2019-08-22 | End: 2019-08-22 | Stop reason: SDUPTHER

## 2019-08-22 RX ORDER — ONDANSETRON 2 MG/ML
INJECTION INTRAMUSCULAR; INTRAVENOUS PRN
Status: DISCONTINUED | OUTPATIENT
Start: 2019-08-22 | End: 2019-08-22 | Stop reason: SDUPTHER

## 2019-08-22 RX ORDER — ONDANSETRON 2 MG/ML
4 INJECTION INTRAMUSCULAR; INTRAVENOUS
Status: DISCONTINUED | OUTPATIENT
Start: 2019-08-22 | End: 2019-08-22 | Stop reason: HOSPADM

## 2019-08-22 RX ORDER — SODIUM CHLORIDE, SODIUM LACTATE, POTASSIUM CHLORIDE, CALCIUM CHLORIDE 600; 310; 30; 20 MG/100ML; MG/100ML; MG/100ML; MG/100ML
INJECTION, SOLUTION INTRAVENOUS CONTINUOUS
Status: DISCONTINUED | OUTPATIENT
Start: 2019-08-23 | End: 2019-08-22 | Stop reason: HOSPADM

## 2019-08-22 RX ORDER — SODIUM CHLORIDE 0.9 % (FLUSH) 0.9 %
10 SYRINGE (ML) INJECTION EVERY 12 HOURS SCHEDULED
Status: DISCONTINUED | OUTPATIENT
Start: 2019-08-22 | End: 2019-08-22 | Stop reason: SDUPTHER

## 2019-08-22 RX ORDER — LIDOCAINE HYDROCHLORIDE 10 MG/ML
1 INJECTION, SOLUTION EPIDURAL; INFILTRATION; INTRACAUDAL; PERINEURAL
Status: DISCONTINUED | OUTPATIENT
Start: 2019-08-22 | End: 2019-08-22 | Stop reason: SDUPTHER

## 2019-08-22 RX ORDER — DEXAMETHASONE SODIUM PHOSPHATE 10 MG/ML
INJECTION INTRAMUSCULAR; INTRAVENOUS PRN
Status: DISCONTINUED | OUTPATIENT
Start: 2019-08-22 | End: 2019-08-22 | Stop reason: SDUPTHER

## 2019-08-22 RX ORDER — PROPOFOL 10 MG/ML
INJECTION, EMULSION INTRAVENOUS PRN
Status: DISCONTINUED | OUTPATIENT
Start: 2019-08-22 | End: 2019-08-22 | Stop reason: SDUPTHER

## 2019-08-22 RX ORDER — OXYCODONE HYDROCHLORIDE AND ACETAMINOPHEN 5; 325 MG/1; MG/1
2 TABLET ORAL PRN
Status: DISCONTINUED | OUTPATIENT
Start: 2019-08-22 | End: 2019-08-22 | Stop reason: HOSPADM

## 2019-08-22 RX ORDER — KETOROLAC TROMETHAMINE 30 MG/ML
INJECTION, SOLUTION INTRAMUSCULAR; INTRAVENOUS PRN
Status: DISCONTINUED | OUTPATIENT
Start: 2019-08-22 | End: 2019-08-22 | Stop reason: SDUPTHER

## 2019-08-22 RX ORDER — HYDROMORPHONE HCL 110MG/55ML
0.5 PATIENT CONTROLLED ANALGESIA SYRINGE INTRAVENOUS EVERY 5 MIN PRN
Status: DISCONTINUED | OUTPATIENT
Start: 2019-08-22 | End: 2019-08-22 | Stop reason: HOSPADM

## 2019-08-22 RX ORDER — FENTANYL CITRATE 50 UG/ML
INJECTION, SOLUTION INTRAMUSCULAR; INTRAVENOUS PRN
Status: DISCONTINUED | OUTPATIENT
Start: 2019-08-22 | End: 2019-08-22 | Stop reason: SDUPTHER

## 2019-08-22 RX ADMIN — KETOROLAC TROMETHAMINE 30 MG: 30 INJECTION, SOLUTION INTRAMUSCULAR; INTRAVENOUS at 12:07

## 2019-08-22 RX ADMIN — FAMOTIDINE 20 MG: 10 INJECTION, SOLUTION INTRAVENOUS at 09:20

## 2019-08-22 RX ADMIN — Medication 25 MCG: at 11:40

## 2019-08-22 RX ADMIN — SODIUM CHLORIDE, POTASSIUM CHLORIDE, SODIUM LACTATE AND CALCIUM CHLORIDE: 600; 310; 30; 20 INJECTION, SOLUTION INTRAVENOUS at 10:52

## 2019-08-22 RX ADMIN — Medication 100 MCG: at 10:58

## 2019-08-22 RX ADMIN — ONDANSETRON 4 MG: 2 INJECTION, SOLUTION INTRAMUSCULAR; INTRAVENOUS at 11:39

## 2019-08-22 RX ADMIN — MIDAZOLAM 2 MG: 1 INJECTION INTRAMUSCULAR; INTRAVENOUS at 10:52

## 2019-08-22 RX ADMIN — PROPOFOL 200 MG: 10 INJECTION, EMULSION INTRAVENOUS at 10:58

## 2019-08-22 RX ADMIN — DEXAMETHASONE SODIUM PHOSPHATE 10 MG: 10 INJECTION INTRAMUSCULAR; INTRAVENOUS at 11:15

## 2019-08-22 RX ADMIN — Medication 25 MCG: at 11:38

## 2019-08-22 RX ADMIN — Medication 50 MCG: at 11:18

## 2019-08-22 RX ADMIN — LIDOCAINE HYDROCHLORIDE 100 MG: 20 INJECTION, SOLUTION EPIDURAL; INFILTRATION; INTRACAUDAL; PERINEURAL at 10:58

## 2019-08-22 RX ADMIN — SODIUM CHLORIDE, POTASSIUM CHLORIDE, SODIUM LACTATE AND CALCIUM CHLORIDE: 600; 310; 30; 20 INJECTION, SOLUTION INTRAVENOUS at 09:07

## 2019-08-22 ASSESSMENT — PULMONARY FUNCTION TESTS
PIF_VALUE: 2
PIF_VALUE: 5
PIF_VALUE: 19
PIF_VALUE: 2
PIF_VALUE: 10
PIF_VALUE: 2
PIF_VALUE: 2
PIF_VALUE: 15
PIF_VALUE: 11
PIF_VALUE: 20
PIF_VALUE: 2
PIF_VALUE: 19
PIF_VALUE: 15
PIF_VALUE: 2
PIF_VALUE: 0
PIF_VALUE: 19
PIF_VALUE: 19
PIF_VALUE: 10
PIF_VALUE: 0
PIF_VALUE: 19
PIF_VALUE: 2
PIF_VALUE: 2
PIF_VALUE: 15
PIF_VALUE: 2
PIF_VALUE: 19
PIF_VALUE: 19
PIF_VALUE: 2
PIF_VALUE: 15
PIF_VALUE: 19
PIF_VALUE: 2
PIF_VALUE: 1
PIF_VALUE: 2
PIF_VALUE: 19
PIF_VALUE: 20
PIF_VALUE: 19
PIF_VALUE: 19
PIF_VALUE: 2
PIF_VALUE: 19
PIF_VALUE: 20
PIF_VALUE: 19
PIF_VALUE: 20
PIF_VALUE: 0
PIF_VALUE: 15
PIF_VALUE: 2
PIF_VALUE: 2
PIF_VALUE: 20
PIF_VALUE: 2
PIF_VALUE: 10
PIF_VALUE: 2
PIF_VALUE: 19
PIF_VALUE: 10
PIF_VALUE: 2
PIF_VALUE: 19
PIF_VALUE: 1
PIF_VALUE: 2
PIF_VALUE: 15
PIF_VALUE: 15
PIF_VALUE: 19
PIF_VALUE: 19
PIF_VALUE: 15
PIF_VALUE: 15
PIF_VALUE: 19
PIF_VALUE: 0
PIF_VALUE: 15
PIF_VALUE: 2
PIF_VALUE: 10
PIF_VALUE: 24
PIF_VALUE: 15
PIF_VALUE: 2
PIF_VALUE: 19
PIF_VALUE: 19
PIF_VALUE: 2
PIF_VALUE: 1
PIF_VALUE: 2
PIF_VALUE: 11
PIF_VALUE: 10
PIF_VALUE: 19

## 2019-08-22 ASSESSMENT — PAIN SCALES - GENERAL
PAINLEVEL_OUTOF10: 0
PAINLEVEL_OUTOF10: 0

## 2019-08-22 ASSESSMENT — PAIN DESCRIPTION - DESCRIPTORS: DESCRIPTORS: ACHING;BURNING

## 2019-08-22 ASSESSMENT — PAIN - FUNCTIONAL ASSESSMENT: PAIN_FUNCTIONAL_ASSESSMENT: 0-10

## 2019-08-22 NOTE — BRIEF OP NOTE
PODIATRY BRIEF OP NOTE    PATIENT NAME: Tate Anderson  YOB: 1975  -  40 y.o. female  MRN: [id number]  DATE: 8/22/2019  BILLING #: [id number]    Surgeon(s):  Zayra Willingham DPM     ASSISTANTS: Juani Hill DPM    PRE-OP DIAGNOSIS:   1. Osteomyelitis right distal hallux  2. Osteomyelitis right 3rd digit    POST-OP DIAGNOSIS: Same as above. PROCEDURE:   1. Partial amputation right distal hallux  2. Partial amputation right 3rd digit    ANESTHESIA: General with 10 cc of 1% Lidocaine plain. HEMOSTASIS: Direct pressure    ESTIMATED BLOOD LOSS: Less than 5cc. MATERIALS: 2-0 PDS, 4-0 nylon  * No implants in log *    INJECTABLES: 10 cc of 0.5% Marcaine plain. SPECIMEN:   ID Type Source Tests Collected by Time Destination   1 : LEFT DISTAL PHALANX Tissue Toe TISSUE CULTURE Zayra Willingham DPM 8/22/2019 1125    A : LEFT DISTAL PHALANX Tissue Toe SURGICAL PATHOLOGY Zayra Willingham DPM 8/22/2019 1120    B : 3RD DIGIT OSTEOMYOLITIS Tissue Foot SURGICAL PATHOLOGY Zayra Willingham DPM 8/22/2019 0614        COMPLICATIONS: None    FINDINGS: Right hallux exposed distal phalanx, no purulence. Rigidly contracted right 3rd digit, distal opening exposed.     Juani Hill DPM   Podiatric Medicine & Surgery   8/22/2019 at 12:19 PM
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
## 2019-08-22 NOTE — H&P
distress. Heart: Heart sounds are normal.  HR 80 regular rate and rhythm without murmur, gallop or rub. Lungs: Normal respiratory effort with good air exchange, unlabored and clear to auscultation without wheezes or rales bilaterally   Abdomen: soft, nontender, nondistended with bowel sounds. Extremities: necrotic right third toe  Right hallux nondraining w/o redness  Pedal pulses strong  No calf tenderness or peripheral edema      Labs:  Recent Labs     08/13/19  1636  08/03/19  0758 08/03/19  0035   HGB 12.0   < >  --  11.3*   HCT 36.9   < >  --  35.4*   WBC 7.3   < >  --  7.2   MCV 91.8   < >  --  90.8      < >  --  210     --   --  137   K 3.8  --   --  3.3*     --   --  103   CO2 23  --   --  21   BUN 9  --   --  9   CREATININE 0.68  --   --  0.50   GLUCOSE 94  --   --  106*   INR  --   --   --  0.9   PROTIME  --   --   --  10.0   APTT  --   --  39.6* 42.4*    < > = values in this interval not displayed. MAIRA Leon-BC  Electronically signed 8/22/2019 at 8:54 AM        NALLELY Molina CNP   Nurse Practitioner   General Surgery   H&P   Cosign Needed   Date of Service:  8/13/2019  3:52 PM               Cosign Needed        Expand All Collapse All     Show:Clear all  [x]Manual[x]Template[]Copied    Added by:  [x]NALLELY Rivera CNP    []Fanny for details  History and Physical Service   [address] [place] [address]            Date of Evaluation:     8/13/2019  Patient name:              Juana Reilly  MRN:                           [id number]  YOB: 1975  PCP:                            Fifi Stoddard MD     History Obtained From:      Patient, medical records     History of Present Illness:       This is Juana Reilly a 40 y.o. female who presents for a pre-admission testing appointment for an upcoming right third digit toe amputation by Dr. Jaz Mayers scheduled on 08/15/2019 at 21 [phone] due to right vascular disease ([facility] [facility]      Depression 2014     NO RX    Headache(784.0)      Heart murmur     Hemorrhage of rectum and anus      HTN (hypertension) 2013    Hx of blood clots 2019     Bilateral legs.  Hypercoagulable state ([address]      Hyperlipidemia      Hypertension      Intertriginous candidiasis 2013    Left popliteal artery occlusion ([address] 2018    Leg pain 10/23/2018    Menometrorrhagia 2013    Migraine headache with aura 2013    Obesity 2013    Osteoarthritis      PAD (peripheral artery disease) ([address] 2018    PVD (peripheral vascular disease) ([facility] [facility] 2018    Takotsubo cardiomyopathy 2018    Wound of right leg 2018            Past Surgical History:      Past Surgical History         Past Surgical History:   Procedure Laterality Date     SECTION       OTHER SURGICAL HISTORY   2017     ANGIOGRAM OF LEFT LEG    OTHER SURGICAL HISTORY Left 2018     femoral to peroneal bypass using vein    OTHER SURGICAL HISTORY Left 2018     fibulectomy with intra op angiography of leg    CO REOPERATION, BYPASS GRAFT Left 2018     RE-EXPLORATION OF LEFT LEG, THROMBO-EMBOLECTOMY OF FEMORAL-PERONEAL BYPASS, WITH INTRA OPERATIVE  ANGIOGRAMS AND INFUSION OF nITRO GLYCERIN performed by Carissa Ramirez MD at [address] [place] [address] Left 2018     LEFT LEG FEMORAL TO PERONEAL BYPASS USING VEIN, (DONAR SITE LEFT SAPHNOUS) LEFT FIBULECTOMY, WITH INTRA OP ANGIOGRAPHY OF LEFT LEG performed by Carissa Ramirez MD at [place] [address] Right 2019     femoral artery    TUBAL LIGATION   1997    VASCULAR SURGERY Left 2018     re-exploration femerol-perioneal vein bypass/intra op angiogram            Medications Prior to Admission:      Home Medications           Prior to Admission medications    Medication Sig Start Date End Date Taking?  Authorizing Provider   apixaban (ELIQUIS STARTER PACK) 5 MG TABS tablet Take 1 tablet by mouth 2 times daily 19     Max Westfall MD   atorvastatin (LIPITOR) 40 MG tablet Take 1 tablet by mouth daily  Patient taking differently: Take 40 mg by mouth daily Indications: pt needs to get a refill and her next appointment is in 19     Max Westfall MD   lisinopril (PRINIVIL;ZESTRIL) 5 MG tablet take 1 tablet by mouth once daily 19     Max Westfall MD   amLODIPine (NORVASC) 10 MG tablet take 1 tablet by mouth once daily 19     Max Westfall MD   Blood Pressure KIT 1 Package by Does not apply route daily 17     Chriss Lazar MD            Allergies:      Chad Mode [aspirin]; Lopid [gemfibrozil]; Nortriptyline; Pcn [penicillins]; Sulfa antibiotics; and Ibuprofen     Social History:      Tobacco:    reports that she has never smoked. She has never used smokeless tobacco.  Alcohol:      reports that she drinks alcohol. Drug Use:  reports that she does not use drugs. Functional Capacity: Pt states if she did not have claudication, she could do the followin) Pt is able to walk 2 city blocks or more on level ground without SOB. 2) Pt is able to climb 2 flights of stairs without SOB. 3) Pt is able to walk up a hill for 1-2 city blocks without SOB. Family History:      Family History         Family History   Problem Relation Age of Onset    Diabetes Mother      High Blood Pressure Mother      Heart Attack Mother      Heart Disease Mother      Kidney Disease Mother      High Blood Pressure Father      Heart Disease Father      Heart Attack Father      Diabetes Sister      High Blood Pressure Sister      Diabetes Sister      High Blood Pressure Sister              Review of Systems:      Positive and Negative as described in HPI. CONSTITUTIONAL: Negative for fevers, chills, sweats, fatigue, and weight loss.   HEENT: Negative for glasses, hearing changes, rhinorrhea, and

## 2019-08-26 ENCOUNTER — OFFICE VISIT (OUTPATIENT)
Dept: PODIATRY | Age: 44
End: 2019-08-26
Payer: COMMERCIAL

## 2019-08-26 VITALS
WEIGHT: 188 LBS | HEART RATE: 65 BPM | DIASTOLIC BLOOD PRESSURE: 105 MMHG | BODY MASS INDEX: 32.1 KG/M2 | HEIGHT: 64 IN | SYSTOLIC BLOOD PRESSURE: 160 MMHG

## 2019-08-26 DIAGNOSIS — G89.18 POST-OP PAIN: ICD-10-CM

## 2019-08-26 DIAGNOSIS — S98.131A AMPUTATED TOE OF RIGHT FOOT (HCC): Primary | ICD-10-CM

## 2019-08-26 LAB — SURGICAL PATHOLOGY REPORT: NORMAL

## 2019-08-26 PROCEDURE — G8427 DOCREV CUR MEDS BY ELIG CLIN: HCPCS | Performed by: STUDENT IN AN ORGANIZED HEALTH CARE EDUCATION/TRAINING PROGRAM

## 2019-08-26 PROCEDURE — 99213 OFFICE O/P EST LOW 20 MIN: CPT | Performed by: STUDENT IN AN ORGANIZED HEALTH CARE EDUCATION/TRAINING PROGRAM

## 2019-08-26 PROCEDURE — 99212 OFFICE O/P EST SF 10 MIN: CPT | Performed by: STUDENT IN AN ORGANIZED HEALTH CARE EDUCATION/TRAINING PROGRAM

## 2019-08-26 PROCEDURE — G8598 ASA/ANTIPLAT THER USED: HCPCS | Performed by: STUDENT IN AN ORGANIZED HEALTH CARE EDUCATION/TRAINING PROGRAM

## 2019-08-26 PROCEDURE — G8417 CALC BMI ABV UP PARAM F/U: HCPCS | Performed by: STUDENT IN AN ORGANIZED HEALTH CARE EDUCATION/TRAINING PROGRAM

## 2019-08-26 PROCEDURE — 1036F TOBACCO NON-USER: CPT | Performed by: STUDENT IN AN ORGANIZED HEALTH CARE EDUCATION/TRAINING PROGRAM

## 2019-08-26 PROCEDURE — 1111F DSCHRG MED/CURRENT MED MERGE: CPT | Performed by: STUDENT IN AN ORGANIZED HEALTH CARE EDUCATION/TRAINING PROGRAM

## 2019-08-27 LAB
CULTURE: ABNORMAL
DIRECT EXAM: ABNORMAL
DIRECT EXAM: ABNORMAL
Lab: ABNORMAL
SPECIMEN DESCRIPTION: ABNORMAL

## 2019-09-09 ENCOUNTER — OFFICE VISIT (OUTPATIENT)
Dept: PODIATRY | Age: 44
End: 2019-09-09
Payer: COMMERCIAL

## 2019-09-09 ENCOUNTER — OFFICE VISIT (OUTPATIENT)
Dept: VASCULAR SURGERY | Age: 44
End: 2019-09-09
Payer: COMMERCIAL

## 2019-09-09 VITALS
HEART RATE: 74 BPM | DIASTOLIC BLOOD PRESSURE: 93 MMHG | WEIGHT: 193 LBS | TEMPERATURE: 98.2 F | OXYGEN SATURATION: 98 % | SYSTOLIC BLOOD PRESSURE: 150 MMHG | BODY MASS INDEX: 32.15 KG/M2 | HEIGHT: 65 IN

## 2019-09-09 VITALS
HEART RATE: 84 BPM | WEIGHT: 192 LBS | DIASTOLIC BLOOD PRESSURE: 74 MMHG | SYSTOLIC BLOOD PRESSURE: 133 MMHG | BODY MASS INDEX: 31.99 KG/M2 | HEIGHT: 65 IN

## 2019-09-09 DIAGNOSIS — I73.9 PAD (PERIPHERAL ARTERY DISEASE) (HCC): Primary | ICD-10-CM

## 2019-09-09 DIAGNOSIS — D68.9 CLOTTING DISORDER (HCC): ICD-10-CM

## 2019-09-09 DIAGNOSIS — Z95.9 STATUS POST ARTERIAL STENT: ICD-10-CM

## 2019-09-09 DIAGNOSIS — G89.18 POST-OP PAIN: ICD-10-CM

## 2019-09-09 DIAGNOSIS — I73.9 PVD (PERIPHERAL VASCULAR DISEASE) (HCC): ICD-10-CM

## 2019-09-09 DIAGNOSIS — S98.131A AMPUTATED TOE OF RIGHT FOOT (HCC): Primary | ICD-10-CM

## 2019-09-09 PROCEDURE — G8427 DOCREV CUR MEDS BY ELIG CLIN: HCPCS | Performed by: SURGERY

## 2019-09-09 PROCEDURE — 99213 OFFICE O/P EST LOW 20 MIN: CPT | Performed by: SURGERY

## 2019-09-09 PROCEDURE — 99212 OFFICE O/P EST SF 10 MIN: CPT | Performed by: STUDENT IN AN ORGANIZED HEALTH CARE EDUCATION/TRAINING PROGRAM

## 2019-09-09 PROCEDURE — G8417 CALC BMI ABV UP PARAM F/U: HCPCS | Performed by: SURGERY

## 2019-09-09 PROCEDURE — 1036F TOBACCO NON-USER: CPT | Performed by: SURGERY

## 2019-09-09 PROCEDURE — G8598 ASA/ANTIPLAT THER USED: HCPCS | Performed by: SURGERY

## 2019-09-09 PROCEDURE — 99024 POSTOP FOLLOW-UP VISIT: CPT | Performed by: STUDENT IN AN ORGANIZED HEALTH CARE EDUCATION/TRAINING PROGRAM
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
## 2019-09-09 NOTE — PROGRESS NOTES
Patient instructed to remove shoes and socks, instructed to sit in exam chair. Current PCP name is  and date of last visit 1/22/19. Do you have a follow up visit scheduled?   No

## 2019-09-15 ASSESSMENT — ENCOUNTER SYMPTOMS
SHORTNESS OF BREATH: 0
CHEST TIGHTNESS: 0
COLOR CHANGE: 1

## 2019-09-30 ENCOUNTER — TELEPHONE (OUTPATIENT)
Dept: PODIATRY | Age: 44
End: 2019-09-30

## 2019-10-26 ENCOUNTER — HOSPITAL ENCOUNTER (EMERGENCY)
Age: 44
Discharge: HOME | End: 2019-10-26
Attending: EMERGENCY MEDICINE
Payer: COMMERCIAL

## 2019-10-26 VITALS
TEMPERATURE: 98 F | OXYGEN SATURATION: 94 % | DIASTOLIC BLOOD PRESSURE: 105 MMHG | SYSTOLIC BLOOD PRESSURE: 159 MMHG | HEART RATE: 82 BPM | RESPIRATION RATE: 16 BRPM

## 2019-10-26 DIAGNOSIS — K64.8 INTERNAL HEMORRHOIDS: ICD-10-CM

## 2019-10-26 DIAGNOSIS — R19.7 DIARRHEA, UNSPECIFIED TYPE: Primary | ICD-10-CM

## 2019-10-26 LAB
ABSOLUTE EOS #: 0.1 K/UL (ref 0–0.44)
ABSOLUTE IMMATURE GRANULOCYTE: <0.03 K/UL (ref 0–0.3)
ABSOLUTE LYMPH #: 2.29 K/UL (ref 1.1–3.7)
ABSOLUTE MONO #: 0.63 K/UL (ref 0.1–1.2)
BASOPHILS # BLD: 0 % (ref 0–2)
BASOPHILS ABSOLUTE: 0.03 K/UL (ref 0–0.2)
DIFFERENTIAL TYPE: NORMAL
EOSINOPHILS RELATIVE PERCENT: 1 % (ref 1–4)
HCT VFR BLD CALC: 37.2 % (ref 36.3–47.1)
HEMOGLOBIN: 12.2 G/DL (ref 11.9–15.1)
IMMATURE GRANULOCYTES: 0 %
LYMPHOCYTES # BLD: 30 % (ref 24–43)
MCH RBC QN AUTO: 30 PG (ref 25.2–33.5)
MCHC RBC AUTO-ENTMCNC: 32.8 G/DL (ref 28.4–34.8)
MCV RBC AUTO: 91.4 FL (ref 82.6–102.9)
MONOCYTES # BLD: 8 % (ref 3–12)
NRBC AUTOMATED: 0 PER 100 WBC
PDW BLD-RTO: 13.2 % (ref 11.8–14.4)
PLATELET # BLD: 346 K/UL (ref 138–453)
PLATELET ESTIMATE: NORMAL
PMV BLD AUTO: 10.2 FL (ref 8.1–13.5)
RBC # BLD: 4.07 M/UL (ref 3.95–5.11)
RBC # BLD: NORMAL 10*6/UL
SEG NEUTROPHILS: 61 % (ref 36–65)
SEGMENTED NEUTROPHILS ABSOLUTE COUNT: 4.62 K/UL (ref 1.5–8.1)
WBC # BLD: 7.7 K/UL (ref 3.5–11.3)
WBC # BLD: NORMAL 10*3/UL

## 2019-10-26 PROCEDURE — 85025 COMPLETE CBC W/AUTO DIFF WBC: CPT

## 2019-10-26 PROCEDURE — 99284 EMERGENCY DEPT VISIT MOD MDM: CPT

## 2019-10-26 ASSESSMENT — PAIN DESCRIPTION - FREQUENCY: FREQUENCY: CONTINUOUS

## 2019-10-26 ASSESSMENT — ENCOUNTER SYMPTOMS
DIARRHEA: 1
ABDOMINAL PAIN: 1
CONSTIPATION: 0
ANAL BLEEDING: 1
VOICE CHANGE: 0
NAUSEA: 0
TROUBLE SWALLOWING: 0
BACK PAIN: 0
BLOOD IN STOOL: 1

## 2019-10-26 ASSESSMENT — PAIN DESCRIPTION - PAIN TYPE: TYPE: ACUTE PAIN

## 2019-10-26 ASSESSMENT — PAIN DESCRIPTION - ORIENTATION: ORIENTATION: LOWER

## 2019-10-26 ASSESSMENT — PAIN DESCRIPTION - LOCATION: LOCATION: RECTUM

## 2019-10-26 ASSESSMENT — PAIN SCALES - GENERAL: PAINLEVEL_OUTOF10: 3

## 2019-10-26 ASSESSMENT — PAIN DESCRIPTION - PROGRESSION: CLINICAL_PROGRESSION: NOT CHANGED

## 2019-10-26 ASSESSMENT — PAIN DESCRIPTION - ONSET: ONSET: ON-GOING

## 2019-10-26 ASSESSMENT — PAIN DESCRIPTION - DESCRIPTORS: DESCRIPTORS: CONSTANT

## 2019-11-16 ENCOUNTER — HOSPITAL ENCOUNTER (OUTPATIENT)
Age: 44
Setting detail: OBSERVATION
Discharge: HOME | End: 2019-11-17
Attending: EMERGENCY MEDICINE | Admitting: EMERGENCY MEDICINE
Payer: COMMERCIAL

## 2019-11-16 ENCOUNTER — APPOINTMENT (OUTPATIENT)
Dept: CT IMAGING | Age: 44
End: 2019-11-16
Payer: COMMERCIAL

## 2019-11-16 DIAGNOSIS — R06.02 SHORTNESS OF BREATH: ICD-10-CM

## 2019-11-16 DIAGNOSIS — R00.2 PALPITATIONS: ICD-10-CM

## 2019-11-16 DIAGNOSIS — R55 PRE-SYNCOPE: Primary | ICD-10-CM

## 2019-11-16 DIAGNOSIS — R11.0 NAUSEA: ICD-10-CM

## 2019-11-16 LAB
ABSOLUTE EOS #: 0.14 K/UL (ref 0–0.44)
ABSOLUTE IMMATURE GRANULOCYTE: 0.05 K/UL (ref 0–0.3)
ABSOLUTE LYMPH #: 3.39 K/UL (ref 1.1–3.7)
ABSOLUTE MONO #: 0.68 K/UL (ref 0.1–1.2)
ALBUMIN SERPL-MCNC: 4.5 G/DL (ref 3.5–5.2)
ALBUMIN/GLOBULIN RATIO: 1.4 (ref 1–2.5)
ALP BLD-CCNC: 86 U/L (ref 35–104)
ALT SERPL-CCNC: 11 U/L (ref 5–33)
ANION GAP SERPL CALCULATED.3IONS-SCNC: 16 MMOL/L (ref 9–17)
AST SERPL-CCNC: 15 U/L
BASOPHILS # BLD: 1 % (ref 0–2)
BASOPHILS ABSOLUTE: 0.05 K/UL (ref 0–0.2)
BILIRUB SERPL-MCNC: 0.2 MG/DL (ref 0.3–1.2)
BUN BLDV-MCNC: 10 MG/DL (ref 6–20)
BUN/CREAT BLD: ABNORMAL (ref 9–20)
CALCIUM SERPL-MCNC: 9.2 MG/DL (ref 8.6–10.4)
CHLORIDE BLD-SCNC: 105 MMOL/L (ref 98–107)
CO2: 20 MMOL/L (ref 20–31)
CREAT SERPL-MCNC: 0.62 MG/DL (ref 0.5–0.9)
D-DIMER QUANTITATIVE: 0.79 MG/L FEU
DIFFERENTIAL TYPE: ABNORMAL
EOSINOPHILS RELATIVE PERCENT: 2 % (ref 1–4)
GFR AFRICAN AMERICAN: >60 ML/MIN
GFR NON-AFRICAN AMERICAN: >60 ML/MIN
GFR SERPL CREATININE-BSD FRML MDRD: ABNORMAL ML/MIN/{1.73_M2}
GFR SERPL CREATININE-BSD FRML MDRD: ABNORMAL ML/MIN/{1.73_M2}
GLUCOSE BLD-MCNC: 139 MG/DL (ref 70–99)
HCG QUALITATIVE: NEGATIVE
HCT VFR BLD CALC: 38.5 % (ref 36.3–47.1)
HEMOGLOBIN: 12.8 G/DL (ref 11.9–15.1)
IMMATURE GRANULOCYTES: 1 %
LYMPHOCYTES # BLD: 37 % (ref 24–43)
MCH RBC QN AUTO: 30.2 PG (ref 25.2–33.5)
MCHC RBC AUTO-ENTMCNC: 33.2 G/DL (ref 28.4–34.8)
MCV RBC AUTO: 90.8 FL (ref 82.6–102.9)
MONOCYTES # BLD: 7 % (ref 3–12)
NRBC AUTOMATED: 0 PER 100 WBC
PDW BLD-RTO: 13.4 % (ref 11.8–14.4)
PLATELET # BLD: 300 K/UL (ref 138–453)
PLATELET ESTIMATE: ABNORMAL
PMV BLD AUTO: 10.3 FL (ref 8.1–13.5)
POTASSIUM SERPL-SCNC: 3.7 MMOL/L (ref 3.7–5.3)
RBC # BLD: 4.24 M/UL (ref 3.95–5.11)
RBC # BLD: ABNORMAL 10*6/UL
SEG NEUTROPHILS: 52 % (ref 36–65)
SEGMENTED NEUTROPHILS ABSOLUTE COUNT: 4.89 K/UL (ref 1.5–8.1)
SODIUM BLD-SCNC: 141 MMOL/L (ref 135–144)
TOTAL PROTEIN: 7.8 G/DL (ref 6.4–8.3)
TROPONIN INTERP: NORMAL
TROPONIN T: NORMAL NG/ML
TROPONIN, HIGH SENSITIVITY: 6 NG/L (ref 0–14)
TROPONIN, HIGH SENSITIVITY: 7 NG/L (ref 0–14)
TROPONIN, HIGH SENSITIVITY: 7 NG/L (ref 0–14)
TROPONIN, HIGH SENSITIVITY: <6 NG/L (ref 0–14)
TSH SERPL DL<=0.05 MIU/L-ACNC: 1.69 MIU/L (ref 0.3–5)
WBC # BLD: 9.2 K/UL (ref 3.5–11.3)
WBC # BLD: ABNORMAL 10*3/UL

## 2019-11-16 PROCEDURE — 85379 FIBRIN DEGRADATION QUANT: CPT

## 2019-11-16 PROCEDURE — 96372 THER/PROPH/DIAG INJ SC/IM: CPT

## 2019-11-16 PROCEDURE — 84703 CHORIONIC GONADOTROPIN ASSAY: CPT

## 2019-11-16 PROCEDURE — 2580000003 HC RX 258: Performed by: EMERGENCY MEDICINE

## 2019-11-16 PROCEDURE — 84484 ASSAY OF TROPONIN QUANT: CPT

## 2019-11-16 PROCEDURE — 6370000000 HC RX 637 (ALT 250 FOR IP): Performed by: EMERGENCY MEDICINE

## 2019-11-16 PROCEDURE — 2580000003 HC RX 258: Performed by: STUDENT IN AN ORGANIZED HEALTH CARE EDUCATION/TRAINING PROGRAM

## 2019-11-16 PROCEDURE — G0378 HOSPITAL OBSERVATION PER HR: HCPCS

## 2019-11-16 PROCEDURE — 36415 COLL VENOUS BLD VENIPUNCTURE: CPT

## 2019-11-16 PROCEDURE — 96374 THER/PROPH/DIAG INJ IV PUSH: CPT

## 2019-11-16 PROCEDURE — 6360000004 HC RX CONTRAST MEDICATION: Performed by: STUDENT IN AN ORGANIZED HEALTH CARE EDUCATION/TRAINING PROGRAM

## 2019-11-16 PROCEDURE — 93005 ELECTROCARDIOGRAM TRACING: CPT | Performed by: STUDENT IN AN ORGANIZED HEALTH CARE EDUCATION/TRAINING PROGRAM

## 2019-11-16 PROCEDURE — 99285 EMERGENCY DEPT VISIT HI MDM: CPT

## 2019-11-16 PROCEDURE — 80053 COMPREHEN METABOLIC PANEL: CPT

## 2019-11-16 PROCEDURE — 85025 COMPLETE CBC W/AUTO DIFF WBC: CPT

## 2019-11-16 PROCEDURE — 71260 CT THORAX DX C+: CPT

## 2019-11-16 PROCEDURE — 84443 ASSAY THYROID STIM HORMONE: CPT

## 2019-11-16 PROCEDURE — 6360000002 HC RX W HCPCS: Performed by: STUDENT IN AN ORGANIZED HEALTH CARE EDUCATION/TRAINING PROGRAM

## 2019-11-16 RX ORDER — SODIUM CHLORIDE 9 MG/ML
INJECTION, SOLUTION INTRAVENOUS CONTINUOUS
Status: DISCONTINUED | OUTPATIENT
Start: 2019-11-16 | End: 2019-11-17 | Stop reason: HOSPADM

## 2019-11-16 RX ORDER — LISINOPRIL 5 MG/1
5 TABLET ORAL DAILY
Status: DISCONTINUED | OUTPATIENT
Start: 2019-11-16 | End: 2019-11-17 | Stop reason: HOSPADM

## 2019-11-16 RX ORDER — ATORVASTATIN CALCIUM 40 MG/1
40 TABLET, FILM COATED ORAL DAILY
Status: DISCONTINUED | OUTPATIENT
Start: 2019-11-16 | End: 2019-11-17 | Stop reason: HOSPADM

## 2019-11-16 RX ORDER — ACETAMINOPHEN 325 MG/1
650 TABLET ORAL EVERY 4 HOURS PRN
Status: DISCONTINUED | OUTPATIENT
Start: 2019-11-16 | End: 2019-11-17 | Stop reason: HOSPADM

## 2019-11-16 RX ORDER — SODIUM CHLORIDE 0.9 % (FLUSH) 0.9 %
10 SYRINGE (ML) INJECTION PRN
Status: DISCONTINUED | OUTPATIENT
Start: 2019-11-16 | End: 2019-11-17 | Stop reason: HOSPADM

## 2019-11-16 RX ORDER — AMLODIPINE BESYLATE 10 MG/1
10 TABLET ORAL DAILY
Status: DISCONTINUED | OUTPATIENT
Start: 2019-11-16 | End: 2019-11-17 | Stop reason: HOSPADM

## 2019-11-16 RX ORDER — SODIUM CHLORIDE 0.9 % (FLUSH) 0.9 %
10 SYRINGE (ML) INJECTION EVERY 12 HOURS SCHEDULED
Status: DISCONTINUED | OUTPATIENT
Start: 2019-11-16 | End: 2019-11-17 | Stop reason: HOSPADM

## 2019-11-16 RX ORDER — PROMETHAZINE HYDROCHLORIDE 25 MG/ML
12.5 INJECTION, SOLUTION INTRAMUSCULAR; INTRAVENOUS ONCE
Status: COMPLETED | OUTPATIENT
Start: 2019-11-16 | End: 2019-11-16

## 2019-11-16 RX ORDER — ONDANSETRON 2 MG/ML
4 INJECTION INTRAMUSCULAR; INTRAVENOUS ONCE
Status: COMPLETED | OUTPATIENT
Start: 2019-11-16 | End: 2019-11-16

## 2019-11-16 RX ORDER — 0.9 % SODIUM CHLORIDE 0.9 %
1000 INTRAVENOUS SOLUTION INTRAVENOUS ONCE
Status: COMPLETED | OUTPATIENT
Start: 2019-11-16 | End: 2019-11-16

## 2019-11-16 RX ADMIN — APIXABAN 5 MG: 5 TABLET, FILM COATED ORAL at 20:17

## 2019-11-16 RX ADMIN — SODIUM CHLORIDE: 9 INJECTION, SOLUTION INTRAVENOUS at 12:00

## 2019-11-16 RX ADMIN — IOHEXOL 75 ML: 350 INJECTION, SOLUTION INTRAVENOUS at 08:32

## 2019-11-16 RX ADMIN — LISINOPRIL 5 MG: 5 TABLET ORAL at 12:18

## 2019-11-16 RX ADMIN — AMLODIPINE BESYLATE 10 MG: 10 TABLET ORAL at 12:17

## 2019-11-16 RX ADMIN — ACETAMINOPHEN 650 MG: 325 TABLET ORAL at 20:16

## 2019-11-16 RX ADMIN — PROMETHAZINE HYDROCHLORIDE 12.5 MG: 25 INJECTION INTRAMUSCULAR; INTRAVENOUS at 09:22

## 2019-11-16 RX ADMIN — APIXABAN 5 MG: 5 TABLET, FILM COATED ORAL at 12:17

## 2019-11-16 RX ADMIN — ONDANSETRON 4 MG: 2 INJECTION INTRAMUSCULAR; INTRAVENOUS at 08:18

## 2019-11-16 RX ADMIN — ACETAMINOPHEN 650 MG: 325 TABLET ORAL at 12:17

## 2019-11-16 RX ADMIN — SODIUM CHLORIDE 1000 ML: 9 INJECTION, SOLUTION INTRAVENOUS at 08:18

## 2019-11-16 ASSESSMENT — PAIN DESCRIPTION - PAIN TYPE
TYPE: ACUTE PAIN

## 2019-11-16 ASSESSMENT — ENCOUNTER SYMPTOMS
DIARRHEA: 1
SHORTNESS OF BREATH: 1
ABDOMINAL DISTENTION: 0
VOMITING: 0
SORE THROAT: 0
COUGH: 0
ABDOMINAL PAIN: 0
BLOOD IN STOOL: 0
NAUSEA: 1

## 2019-11-16 ASSESSMENT — PAIN DESCRIPTION - LOCATION
LOCATION: NECK
LOCATION: HEAD

## 2019-11-16 ASSESSMENT — PAIN SCALES - GENERAL
PAINLEVEL_OUTOF10: 8
PAINLEVEL_OUTOF10: 5
PAINLEVEL_OUTOF10: 8
PAINLEVEL_OUTOF10: 9

## 2019-11-17 VITALS
WEIGHT: 192 LBS | HEIGHT: 66 IN | SYSTOLIC BLOOD PRESSURE: 142 MMHG | BODY MASS INDEX: 30.86 KG/M2 | HEART RATE: 56 BPM | OXYGEN SATURATION: 97 % | DIASTOLIC BLOOD PRESSURE: 80 MMHG | TEMPERATURE: 98.4 F | RESPIRATION RATE: 18 BRPM

## 2019-11-17 LAB
TROPONIN INTERP: NORMAL
TROPONIN INTERP: NORMAL
TROPONIN T: NORMAL NG/ML
TROPONIN T: NORMAL NG/ML
TROPONIN, HIGH SENSITIVITY: 6 NG/L (ref 0–14)
TROPONIN, HIGH SENSITIVITY: 9 NG/L (ref 0–14)

## 2019-11-17 PROCEDURE — 2580000003 HC RX 258: Performed by: EMERGENCY MEDICINE

## 2019-11-17 PROCEDURE — G0378 HOSPITAL OBSERVATION PER HR: HCPCS

## 2019-11-17 PROCEDURE — 93226 XTRNL ECG REC<48 HR SCAN A/R: CPT

## 2019-11-17 PROCEDURE — 6370000000 HC RX 637 (ALT 250 FOR IP): Performed by: EMERGENCY MEDICINE

## 2019-11-17 PROCEDURE — 84484 ASSAY OF TROPONIN QUANT: CPT

## 2019-11-17 PROCEDURE — 36415 COLL VENOUS BLD VENIPUNCTURE: CPT

## 2019-11-17 PROCEDURE — 93225 XTRNL ECG REC<48 HRS REC: CPT

## 2019-11-17 RX ADMIN — APIXABAN 5 MG: 5 TABLET, FILM COATED ORAL at 08:57

## 2019-11-17 RX ADMIN — AMLODIPINE BESYLATE 10 MG: 10 TABLET ORAL at 08:56

## 2019-11-17 RX ADMIN — Medication 10 ML: at 08:58

## 2019-11-17 RX ADMIN — LISINOPRIL 5 MG: 5 TABLET ORAL at 08:57

## 2019-11-17 RX ADMIN — DESMOPRESSIN ACETATE 40 MG: 0.2 TABLET ORAL at 08:57

## 2019-11-18 ENCOUNTER — TELEPHONE (OUTPATIENT)
Dept: FAMILY MEDICINE CLINIC | Age: 44
End: 2019-11-18

## 2019-11-18 LAB
EKG ATRIAL RATE: 80 BPM
EKG P AXIS: 41 DEGREES
EKG P-R INTERVAL: 154 MS
EKG Q-T INTERVAL: 384 MS
EKG QRS DURATION: 82 MS
EKG QTC CALCULATION (BAZETT): 442 MS
EKG R AXIS: 5 DEGREES
EKG T AXIS: 8 DEGREES
EKG VENTRICULAR RATE: 80 BPM

## 2019-11-18 PROCEDURE — 93010 ELECTROCARDIOGRAM REPORT: CPT | Performed by: INTERNAL MEDICINE

## 2019-12-05 ENCOUNTER — TELEPHONE (OUTPATIENT)
Dept: VASCULAR SURGERY | Age: 44
End: 2019-12-05

## 2019-12-09 ENCOUNTER — OFFICE VISIT (OUTPATIENT)
Dept: VASCULAR SURGERY | Age: 44
End: 2019-12-09
Payer: COMMERCIAL

## 2019-12-09 VITALS
HEART RATE: 82 BPM | BODY MASS INDEX: 32.65 KG/M2 | RESPIRATION RATE: 18 BRPM | WEIGHT: 196 LBS | SYSTOLIC BLOOD PRESSURE: 138 MMHG | TEMPERATURE: 98 F | OXYGEN SATURATION: 99 % | HEIGHT: 65 IN | DIASTOLIC BLOOD PRESSURE: 90 MMHG

## 2019-12-09 DIAGNOSIS — I73.9 PAD (PERIPHERAL ARTERY DISEASE) (HCC): Primary | ICD-10-CM

## 2019-12-09 PROCEDURE — 99213 OFFICE O/P EST LOW 20 MIN: CPT | Performed by: SURGERY

## 2019-12-09 PROCEDURE — 1036F TOBACCO NON-USER: CPT | Performed by: SURGERY

## 2019-12-09 PROCEDURE — G8598 ASA/ANTIPLAT THER USED: HCPCS | Performed by: SURGERY

## 2019-12-09 PROCEDURE — G8484 FLU IMMUNIZE NO ADMIN: HCPCS | Performed by: SURGERY

## 2019-12-09 PROCEDURE — G8417 CALC BMI ABV UP PARAM F/U: HCPCS | Performed by: SURGERY

## 2019-12-09 PROCEDURE — G8427 DOCREV CUR MEDS BY ELIG CLIN: HCPCS | Performed by: SURGERY

## 2019-12-15 ASSESSMENT — ENCOUNTER SYMPTOMS
SHORTNESS OF BREATH: 0
ABDOMINAL PAIN: 0
COLOR CHANGE: 0
CHEST TIGHTNESS: 0
ALLERGIC/IMMUNOLOGIC NEGATIVE: 1

## 2019-12-23 ENCOUNTER — OFFICE VISIT (OUTPATIENT)
Dept: PODIATRY | Age: 44
End: 2019-12-23
Payer: COMMERCIAL

## 2019-12-23 VITALS
WEIGHT: 192 LBS | HEART RATE: 74 BPM | SYSTOLIC BLOOD PRESSURE: 133 MMHG | BODY MASS INDEX: 31.99 KG/M2 | HEIGHT: 65 IN | DIASTOLIC BLOOD PRESSURE: 84 MMHG

## 2019-12-23 DIAGNOSIS — M79.674 TOE PAIN, RIGHT: ICD-10-CM

## 2019-12-23 DIAGNOSIS — M20.41 HAMMERTOE OF SECOND TOE OF RIGHT FOOT: ICD-10-CM

## 2019-12-23 DIAGNOSIS — I73.9 PVD (PERIPHERAL VASCULAR DISEASE) (HCC): ICD-10-CM

## 2019-12-23 DIAGNOSIS — S98.131A AMPUTATED TOE OF RIGHT FOOT (HCC): Primary | ICD-10-CM

## 2019-12-23 PROCEDURE — G8484 FLU IMMUNIZE NO ADMIN: HCPCS | Performed by: PODIATRIST

## 2019-12-23 PROCEDURE — G8417 CALC BMI ABV UP PARAM F/U: HCPCS | Performed by: PODIATRIST

## 2019-12-23 PROCEDURE — 1036F TOBACCO NON-USER: CPT | Performed by: PODIATRIST

## 2019-12-23 PROCEDURE — 99213 OFFICE O/P EST LOW 20 MIN: CPT | Performed by: PODIATRIST

## 2019-12-23 PROCEDURE — G8427 DOCREV CUR MEDS BY ELIG CLIN: HCPCS | Performed by: PODIATRIST

## 2019-12-23 PROCEDURE — 99212 OFFICE O/P EST SF 10 MIN: CPT | Performed by: STUDENT IN AN ORGANIZED HEALTH CARE EDUCATION/TRAINING PROGRAM

## 2019-12-23 PROCEDURE — G8598 ASA/ANTIPLAT THER USED: HCPCS | Performed by: PODIATRIST

## 2020-01-21 ENCOUNTER — TELEPHONE (OUTPATIENT)
Dept: VASCULAR SURGERY | Age: 45
End: 2020-01-21

## 2020-01-31 ENCOUNTER — OFFICE VISIT (OUTPATIENT)
Dept: PODIATRY | Age: 45
End: 2020-01-31
Payer: COMMERCIAL

## 2020-01-31 VITALS
HEART RATE: 75 BPM | DIASTOLIC BLOOD PRESSURE: 82 MMHG | SYSTOLIC BLOOD PRESSURE: 139 MMHG | HEIGHT: 65 IN | BODY MASS INDEX: 32.32 KG/M2 | WEIGHT: 194 LBS

## 2020-01-31 PROCEDURE — 99212 OFFICE O/P EST SF 10 MIN: CPT | Performed by: STUDENT IN AN ORGANIZED HEALTH CARE EDUCATION/TRAINING PROGRAM

## 2020-01-31 PROCEDURE — G8417 CALC BMI ABV UP PARAM F/U: HCPCS | Performed by: STUDENT IN AN ORGANIZED HEALTH CARE EDUCATION/TRAINING PROGRAM

## 2020-01-31 PROCEDURE — G8427 DOCREV CUR MEDS BY ELIG CLIN: HCPCS | Performed by: STUDENT IN AN ORGANIZED HEALTH CARE EDUCATION/TRAINING PROGRAM

## 2020-01-31 PROCEDURE — G8484 FLU IMMUNIZE NO ADMIN: HCPCS | Performed by: STUDENT IN AN ORGANIZED HEALTH CARE EDUCATION/TRAINING PROGRAM

## 2020-01-31 PROCEDURE — 1036F TOBACCO NON-USER: CPT | Performed by: STUDENT IN AN ORGANIZED HEALTH CARE EDUCATION/TRAINING PROGRAM

## 2020-01-31 NOTE — PROGRESS NOTES
Patient instructed to remove shoes and socks, instructed to sit in exam chair. Current PCP name is Marina Dominguez and date of last visit 1-22-19. Do you have a follow up visit scheduled?   Yes or no    If yes the date is NO
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
## 2020-01-31 NOTE — PROGRESS NOTES
Called pts insurance and verified they will cover orthotics 100% with no deductible using [phone]

## 2020-03-05 ENCOUNTER — OFFICE VISIT (OUTPATIENT)
Dept: FAMILY MEDICINE CLINIC | Age: 45
End: 2020-03-05
Payer: COMMERCIAL

## 2020-03-05 VITALS
SYSTOLIC BLOOD PRESSURE: 155 MMHG | DIASTOLIC BLOOD PRESSURE: 84 MMHG | HEART RATE: 78 BPM | WEIGHT: 199.6 LBS | BODY MASS INDEX: 33.26 KG/M2 | HEIGHT: 65 IN

## 2020-03-05 LAB — HBA1C MFR BLD: 6.1 %

## 2020-03-05 PROCEDURE — 99211 OFF/OP EST MAY X REQ PHY/QHP: CPT | Performed by: FAMILY MEDICINE

## 2020-03-05 PROCEDURE — G8417 CALC BMI ABV UP PARAM F/U: HCPCS | Performed by: STUDENT IN AN ORGANIZED HEALTH CARE EDUCATION/TRAINING PROGRAM

## 2020-03-05 PROCEDURE — 99213 OFFICE O/P EST LOW 20 MIN: CPT | Performed by: STUDENT IN AN ORGANIZED HEALTH CARE EDUCATION/TRAINING PROGRAM

## 2020-03-05 PROCEDURE — G8484 FLU IMMUNIZE NO ADMIN: HCPCS | Performed by: STUDENT IN AN ORGANIZED HEALTH CARE EDUCATION/TRAINING PROGRAM

## 2020-03-05 PROCEDURE — 83036 HEMOGLOBIN GLYCOSYLATED A1C: CPT | Performed by: STUDENT IN AN ORGANIZED HEALTH CARE EDUCATION/TRAINING PROGRAM

## 2020-03-05 PROCEDURE — G8427 DOCREV CUR MEDS BY ELIG CLIN: HCPCS | Performed by: STUDENT IN AN ORGANIZED HEALTH CARE EDUCATION/TRAINING PROGRAM

## 2020-03-05 PROCEDURE — 1036F TOBACCO NON-USER: CPT | Performed by: STUDENT IN AN ORGANIZED HEALTH CARE EDUCATION/TRAINING PROGRAM

## 2020-03-05 RX ORDER — AMLODIPINE BESYLATE 10 MG/1
TABLET ORAL
Qty: 30 TABLET | Refills: 3 | Status: SHIPPED | OUTPATIENT
Start: 2020-03-05 | End: 2020-06-24 | Stop reason: SDUPTHER

## 2020-03-05 RX ORDER — LISINOPRIL 5 MG/1
TABLET ORAL
Qty: 30 TABLET | Refills: 0 | Status: SHIPPED | OUTPATIENT
Start: 2020-03-05 | End: 2020-03-30

## 2020-03-05 ASSESSMENT — ENCOUNTER SYMPTOMS
CHEST TIGHTNESS: 0
BACK PAIN: 0
SHORTNESS OF BREATH: 0

## 2020-03-05 NOTE — PATIENT INSTRUCTIONS
Thank you for letting us take care of you today. We hope all your questions were addressed. If a question was overlooked or something else comes to mind after you return home, please contact a member of your Care Team listed below. Please make sure you have a routine office visit set up to follow-up on [address]. Your Care Team at [place] [address] is Team #4  Heath Espinosa MD (Faculty)  Ty Zepeda MD (Faculty  German Alva MD (Resident)  Jeanmarie Cantu MD (Resident)  Marleni Buckner MD (Resident)  Kendra Monaco MD (Resident)  Amanda Goins MD (Resident)  IFRAH Carpio ,HUSAM CORBETT,HUSAM HEREDIA, HUSAM Quintanilla ([address] office)  Marleni Mendoza [facility] office)  Ubaldo Costello [facility] office)  Julio Cesar Moore, [address] ([address] [facility])  Jakub Hernandez, [address] [place] [address] (Clinical Pharmacist)       Office phone number: [phone]    If you need to get in right away due to illness, please be advised we have \"Same Day\" appointments available Monday-Friday. Please call us at [phone] option #3 to schedule your \"Same Day\" appointment.

## 2020-03-05 NOTE — PROGRESS NOTES
Attending Physician Statement    Wt Readings from Last 3 Encounters:   03/05/20 199 lb 9.6 oz (90.5 kg)   01/31/20 194 lb (88 kg)   12/23/19 192 lb (87.1 kg)     Temp Readings from Last 3 Encounters:   12/09/19 98 °F (36.7 °C) (Oral)   11/17/19 98.4 °F (36.9 °C) (Oral)   10/26/19 98 °F (36.7 °C)     BP Readings from Last 3 Encounters:   03/05/20 (!) 155/84   01/31/20 139/82   12/23/19 133/84     Pulse Readings from Last 3 Encounters:   03/05/20 78   01/31/20 75   12/23/19 74         I have discussed the care of Gale Montesette Nick, including pertinent history and exam findings with the resident. I have reviewed the key elements of all parts of the encounter with the resident. I agree with the assessment, plan and orders as documented by the resident.   (GE Modifier)

## 2020-03-05 NOTE — PROGRESS NOTES
Visit Information    Have you changed or started any medications since your last visit including any over-the-counter medicines, vitamins, or herbal medicines? no   Have you stopped taking any of your medications? Is so, why? -  no  Are you having any side effects from any of your medications? - no    Have you seen any other physician or provider since your last visit?  no   Have you had any other diagnostic tests since your last visit?  no   Have you been seen in the emergency room and/or had an admission in a hospital since we last saw you?  no   Have you had your routine dental cleaning in the past 6 months?  no     Do you have an active MyChart account? If no, what is the barrier?   Yes    Patient Care Team:  Gayla Laurent MD as PCP - General (Family Medicine)    Medical History Review  Past Medical, Family, and Social History reviewed and does contribute to the patient presenting condition    Health Maintenance   Topic Date Due    Pneumococcal 0-64 years Vaccine (1 of 1 - PPSV23) 05/06/1981    DTaP/Tdap/Td vaccine (1 - Tdap) 05/06/1986    HIV screen  05/06/1990    A1C test (Diabetic or Prediabetic)  12/18/2018    Flu vaccine (1) 09/01/2019    Cervical cancer screen  03/21/2020    Potassium monitoring  11/16/2020    Creatinine monitoring  11/16/2020    Lipid screen  10/03/2023    Shingles Vaccine (1 of 2) 05/06/2025    Hepatitis A vaccine  Aged Out    Hepatitis B vaccine  Aged Out    Hib vaccine  Aged Out    Meningococcal (ACWY) vaccine  Aged Out

## 2020-03-20 ENCOUNTER — TELEPHONE (OUTPATIENT)
Dept: FAMILY MEDICINE CLINIC | Age: 45
End: 2020-03-20

## 2020-03-26 ENCOUNTER — TELEPHONE (OUTPATIENT)
Dept: OBGYN | Age: 45
End: 2020-03-26

## 2020-03-26 NOTE — TELEPHONE ENCOUNTER
Patient called and was informed that she needs a colposcopy per Dr. Renate Ro.   Appt scheduled for 6/2/20

## 2020-03-30 ENCOUNTER — TELEPHONE (OUTPATIENT)
Dept: FAMILY MEDICINE CLINIC | Age: 45
End: 2020-03-30

## 2020-03-30 RX ORDER — LISINOPRIL 5 MG/1
TABLET ORAL
Qty: 30 TABLET | Refills: 0 | Status: SHIPPED | OUTPATIENT
Start: 2020-03-30 | End: 2020-04-29

## 2020-03-30 NOTE — TELEPHONE ENCOUNTER
Patient called back she would like for the physician to do a phone visit for her appointment tomorrow at 10:45pm.

## 2020-03-30 NOTE — TELEPHONE ENCOUNTER
Message left-Pt to call & let us know if she would like  to call her for her dirk.3-, due to the [facility] [place].

## 2020-04-02 ENCOUNTER — TELEPHONE (OUTPATIENT)
Dept: VASCULAR SURGERY | Age: 45
End: 2020-04-02

## 2020-04-02 ENCOUNTER — HOSPITAL ENCOUNTER (EMERGENCY)
Age: 45
Discharge: HOME | End: 2020-04-02
Attending: EMERGENCY MEDICINE
Payer: COMMERCIAL

## 2020-04-02 VITALS
OXYGEN SATURATION: 98 % | RESPIRATION RATE: 20 BRPM | SYSTOLIC BLOOD PRESSURE: 116 MMHG | HEART RATE: 125 BPM | TEMPERATURE: 98.2 F | DIASTOLIC BLOOD PRESSURE: 95 MMHG

## 2020-04-02 PROCEDURE — 6370000000 HC RX 637 (ALT 250 FOR IP): Performed by: EMERGENCY MEDICINE

## 2020-04-02 PROCEDURE — 99284 EMERGENCY DEPT VISIT MOD MDM: CPT

## 2020-04-02 PROCEDURE — 93970 EXTREMITY STUDY: CPT

## 2020-04-02 RX ORDER — ONDANSETRON 4 MG/1
4 TABLET, FILM COATED ORAL ONCE
Status: COMPLETED | OUTPATIENT
Start: 2020-04-02 | End: 2020-04-02

## 2020-04-02 RX ORDER — OXYCODONE HYDROCHLORIDE 5 MG/1
5 TABLET ORAL ONCE
Status: COMPLETED | OUTPATIENT
Start: 2020-04-02 | End: 2020-04-02

## 2020-04-02 RX ADMIN — OXYCODONE HYDROCHLORIDE 5 MG: 5 TABLET ORAL at 08:57

## 2020-04-02 RX ADMIN — ONDANSETRON HYDROCHLORIDE 4 MG: 4 TABLET, FILM COATED ORAL at 08:57

## 2020-04-02 ASSESSMENT — PAIN SCALES - GENERAL
PAINLEVEL_OUTOF10: 10
PAINLEVEL_OUTOF10: 10

## 2020-04-02 ASSESSMENT — PAIN DESCRIPTION - DESCRIPTORS: DESCRIPTORS: CRAMPING;TIGHTNESS

## 2020-04-02 ASSESSMENT — PAIN DESCRIPTION - ORIENTATION: ORIENTATION: RIGHT

## 2020-04-02 ASSESSMENT — PAIN DESCRIPTION - LOCATION: LOCATION: LEG

## 2020-04-02 ASSESSMENT — ENCOUNTER SYMPTOMS
VOMITING: 0
ABDOMINAL PAIN: 0
SHORTNESS OF BREATH: 0
BACK PAIN: 0
COUGH: 0
NAUSEA: 0

## 2020-04-02 ASSESSMENT — PAIN DESCRIPTION - FREQUENCY: FREQUENCY: CONTINUOUS

## 2020-04-02 ASSESSMENT — PAIN DESCRIPTION - PAIN TYPE: TYPE: ACUTE PAIN

## 2020-04-02 ASSESSMENT — PAIN DESCRIPTION - ONSET: ONSET: AWAKENED FROM SLEEP

## 2020-04-02 NOTE — ED PROVIDER NOTES
[facility]  ED  Emergency Department Encounter  EmergencyMedicine Resident     Pt Johnson Rey  MRN: [id number]  [place] 1975  Date of evaluation: 20  PCP:  Catina Benavides MD    [address] [place] [address]       Chief Complaint   Patient presents with    Leg Pain         HISTORY OF PRESENT ILLNESS  (Location/Symptom, Timing/Onset, Context/Setting, Quality, Duration,Modifying Factors, Severity.)      Gale Addison is a 40 y.o. female who presents with leg pain. Patient has right posterior leg pain that started at 3 AM.  It woke her from sleep. The pain is severe. No associated leg swelling. Patient has a longstanding history of DVT on Eliquis. She is been compliant with her medications. She has history of fasciotomy of the right leg secondary to blood clots. Denies any numbness or coldness or weakness in the right lower extremity. No chest pain no shortness of breath. She is having nausea from the pain. No history of pulmonary embolus unsure if she has a venous filter. Severity is moderate duration is constant context is leg pain          PAST MEDICAL / SURGICAL / SOCIAL / FAMILY HISTORY      has a past medical history of Abnormal Pap smear of cervix, Anemia, Chronic back pain, Claudication (HCC), Claudication in peripheral vascular disease ([address] Depression, Headache(784.0), Heart murmur, Hemorrhage of rectum and anus, HTN (hypertension), Hx of blood clots, Hypercoagulable state ([address] Hyperlipidemia, Hypertension, Intertriginous candidiasis, Left popliteal artery occlusion ([address] Leg pain, Menometrorrhagia, Migraine headache with aura, Obesity, Osteoarthritis, PAD (peripheral artery disease) ([address] PVD (peripheral vascular disease) ([address] Takotsubo cardiomyopathy, and Wound of right leg.      has a past surgical history that includes Tubal ligation ();   section (); other surgical history (2017); other surgical history (Left, 2018); other surgical history (Left, 01/16/2018); vascular surgery (Left, 01/16/2018); pr reoperation, bypass graft (Left, 1/16/2018); pr vein bypass graft,fem-pop (Left, 1/16/2018); thrombectomy (Right, 08/03/2019); IVC filter insertion; Toe amputation (Right, 08/22/2019); and Toe amputation (Right, 8/22/2019). Social History     Socioeconomic History    Marital status: Single     Spouse name: Not on file    Number of children: Not on file    Years of education: Not on file    Highest education level: Not on file   Occupational History    Not on file   Social Needs    Financial resource strain: Not on file    Food insecurity     Worry: Not on file     Inability: Not on file    Transportation needs     Medical: Not on file     Non-medical: Not on file   Tobacco Use    Smoking status: Never Smoker    Smokeless tobacco: Never Used   Substance and Sexual Activity    Alcohol use: Yes     Comment: occasionally     Drug use: No    Sexual activity: Yes     Partners: Male   Lifestyle    Physical activity     Days per week: Not on file     Minutes per session: Not on file    Stress: Not on file   Relationships    Social connections     Talks on phone: Not on file     Gets together: Not on file     Attends Islam service: Not on file     Active member of club or organization: Not on file     Attends meetings of clubs or organizations: Not on file     Relationship status: Not on file    Intimate partner violence     Fear of current or ex partner: Not on file     Emotionally abused: Not on file     Physically abused: Not on file     Forced sexual activity: Not on file   Other Topics Concern    Not on file   Social History Narrative    Not on file       Patient advised to stop smoking or to avoid tobacco use.      Family History   Problem Relation Age of Onset    Diabetes Mother     High Blood Pressure Mother     Heart Attack Mother     Heart Disease Mother     Kidney Disease Mother     High Blood Pressure Father     follow-up with a PCP soon as possible questions answered. Patient given return precautions including worsening pain swelling numbness or she has any other concerns. Patient understood was agreeable. Discussed results and plan with patient and patient isagreeable with plan. Patient is requesting discharge. Patient was given written and verbal instructions prior to discharge. Did ask the patient to return to the emergency department if symptoms continue, worsen, or if the patient has any other concerns. Also asked the patient to make an appointment with PCP as soon as possible. Patient understood and agreed. Patient had no further questions. PROCEDURES:  None    CONSULTS:  None      FINAL IMPRESSION      1.  Right leg pain          DISPOSITION / PLAN     DISPOSITION Decision To Discharge 04/02/2020 10:25:52 AM      PATIENT REFERRED TO:  [facility]  [address] [place] [address]  [phone]    As needed, If symptoms persist or worsen    Suzette Le MD  [address] [place] [address]  [phone]    Schedule an appointment as soon as possible for a visit         DISCHARGE MEDICATIONS:  New Prescriptions    No medications on file       Marnie Pike DO  Emergency Medicine Resident    (Please note that portions of thisnote were completed with a voice recognition program.  Efforts were made to edit the dictations but occasionally words are mis-transcribed.)     Marnie Pike DO  04/02/20 2620

## 2020-04-02 NOTE — TELEPHONE ENCOUNTER
Pt calls stating she has excruciating pain in her right leg, on a scale of 1-10 it is a 10. She states the pain feels like a knot. Asked pt if it feels like a cramp she said no. Pt states the pain is unbearable and it is the same kind of pain she had before she had surgery on her leg last time. Offered pt an appointment for tomorrow but she states she is going to go to [facility] ER because she can't take the pain anymore.

## 2020-04-02 NOTE — ED PROVIDER NOTES
[facility]     Emergency Department     Faculty Attestation    I performed a history and physical examination of the patient and discussed management with the resident. I reviewed the residents note and agree with the documented findings including all diagnostic interpretations and plan of care. Any areas of disagreement are noted on the chart. I was personally present for the key portions of any procedures. I have documented in the chart those procedures where I was not present during the key portions. I have reviewed the emergency nurses triage note. I agree with the chief complaint, past medical history, past surgical history, allergies, medications, social and family history as documented unless otherwise noted below. Documentation of the HPI, Physical Exam and Medical Decision Making performed by scribyue is based on my personal performance of the HPI, PE and MDM. For Physician Assistant/ Nurse Practitioner cases/documentation I have personally evaluated this patient and have completed at least one if not all key elements of the E/M (history, physical exam, and MDM). Additional findings are as noted. Primary Care Physician: Landon Ramirez MD    History: This is a 40 y.o. female who presents to the Emergency Department with complaint of leg pain. Right-sided predominantly in the thigh. Previous DVT on anticoagulation currently, feels like prior DVT. No color change no trauma. Physical:     oral temperature is 98.2 °F (36.8 °C). Her blood pressure is 116/95 (abnormal) and her pulse is 125. Her respiration is 20 and oxygen saturation is 98%. 40 y.o. female appears significantly uncomfortable, IcyHot patches on thigh. There is no obvious swelling there is some pain to palpation there is no bony tenderness. DP pulses 2+.   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

## 2020-04-27 NOTE — TELEPHONE ENCOUNTER
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

## 2020-04-29 RX ORDER — LISINOPRIL 5 MG/1
TABLET ORAL
Qty: 30 TABLET | Refills: 0 | Status: SHIPPED | OUTPATIENT
Start: 2020-04-29 | End: 2020-06-02

## 2020-05-01 ENCOUNTER — TELEPHONE (OUTPATIENT)
Dept: FAMILY MEDICINE CLINIC | Age: 45
End: 2020-05-01

## 2020-05-04 ENCOUNTER — HOSPITAL ENCOUNTER (OUTPATIENT)
Dept: VASCULAR LAB | Age: 45
Discharge: HOME | End: 2020-05-04
Payer: COMMERCIAL

## 2020-05-04 ENCOUNTER — OFFICE VISIT (OUTPATIENT)
Dept: VASCULAR SURGERY | Age: 45
End: 2020-05-04
Payer: COMMERCIAL

## 2020-05-04 VITALS
HEIGHT: 65 IN | OXYGEN SATURATION: 98 % | DIASTOLIC BLOOD PRESSURE: 78 MMHG | TEMPERATURE: 97.3 F | WEIGHT: 195 LBS | BODY MASS INDEX: 32.49 KG/M2 | SYSTOLIC BLOOD PRESSURE: 125 MMHG | HEART RATE: 70 BPM

## 2020-05-04 PROCEDURE — 1036F TOBACCO NON-USER: CPT | Performed by: SURGERY

## 2020-05-04 PROCEDURE — G8427 DOCREV CUR MEDS BY ELIG CLIN: HCPCS | Performed by: SURGERY

## 2020-05-04 PROCEDURE — 93923 UPR/LXTR ART STDY 3+ LVLS: CPT

## 2020-05-04 PROCEDURE — 99214 OFFICE O/P EST MOD 30 MIN: CPT | Performed by: SURGERY

## 2020-05-04 PROCEDURE — G8417 CALC BMI ABV UP PARAM F/U: HCPCS | Performed by: SURGERY

## 2020-05-04 NOTE — PROGRESS NOTES
INSERTION      GFF    OTHER SURGICAL HISTORY  12/19/2017    ANGIOGRAM OF LEFT LEG    OTHER SURGICAL HISTORY Left 01/16/2018    femoral to peroneal bypass using vein    OTHER SURGICAL HISTORY Left 01/16/2018    fibulectomy with intra op angiography of leg    SC REOPERATION, BYPASS GRAFT Left 1/16/2018    RE-EXPLORATION OF LEFT LEG, THROMBO-EMBOLECTOMY OF FEMORAL-PERONEAL BYPASS, WITH INTRA OPERATIVE  ANGIOGRAMS AND INFUSION OF nITRO GLYCERIN performed by Ele Petit MD at [address] Left 1/16/2018    LEFT LEG FEMORAL TO PERONEAL BYPASS USING VEIN, (DONAR SITE LEFT SAPHNOUS) LEFT FIBULECTOMY, WITH INTRA OP ANGIOGRAPHY OF LEFT LEG performed by Ele Petit MD at [place] [address] Right 08/03/2019    femoral artery    TOE AMPUTATION Right 08/22/2019    TOE AMPUTATION Right 8/22/2019    TOE AMPUTATION RIGHT 3RD DIGIT, PARTIAL HALLUX AMPUTATION RIGHT FOOT performed by David Lyle DPM at [address] Left 01/16/2018    re-exploration femerol-perioneal vein bypass/intra op angiogram       Family History:     Family History   Problem Relation Age of Onset    Diabetes Mother     High Blood Pressure Mother     Heart Attack Mother     Heart Disease Mother     Kidney Disease Mother     High Blood Pressure Father     Heart Disease Father     Heart Attack Father     Diabetes Sister     High Blood Pressure Sister     Diabetes Sister     High Blood Pressure Sister        Allergies:       Asa [aspirin]; Lopid [gemfibrozil];  Nortriptyline; Pcn [penicillins]; Sulfa antibiotics; and Ibuprofen    Medications:      Current Outpatient Medications   Medication Sig Dispense Refill    lisinopril (PRINIVIL;ZESTRIL) 5 MG tablet take 1 tablet by mouth once daily 30 tablet 0    amLODIPine (NORVASC) 10 MG tablet take 1 tablet by mouth once daily 30 tablet 3    apixaban (ELIQUIS) 5 MG TABS tablet Take 1 tablet by mouth 2 Tenderness: There is no abdominal tenderness. Musculoskeletal:      Right foot: Normal capillary refill. No tenderness. Left foot: Normal capillary refill. No tenderness. Feet:      Right foot:      Skin integrity: No ulcer or skin breakdown. Left foot:      Skin integrity: No ulcer or skin breakdown. Skin:     General: Skin is warm. Capillary Refill: Capillary refill takes less than 2 seconds. Neurological:      Mental Status: She is alert and oriented to person, place, and time. GCS: GCS eye subscore is 4. GCS verbal subscore is 5. GCS motor subscore is 6. Sensory: Sensation is intact. Motor: Motor function is intact. Psychiatric:         Mood and Affect: Mood normal.         Speech: Speech normal.         Behavior: Behavior normal.         Imaging/Labs:         Assessment and Plan:     PAD, previous embolic disease to bilateral lower extremities  · Continue anticoagulation with eliquis, will need to be on this lifelong due to multiple events of embolic ischemia to her feet  · Yearly surveillance with non-invasive testing (PVRs) sooner if symptoms return    Electronically signed by Paige Hughes MD on 5/4/20 at 3:16 PM EDT      [address] [facility]: [phone]  C: [phone]  Email: [email or web address]

## 2020-05-10 ASSESSMENT — ENCOUNTER SYMPTOMS
ABDOMINAL PAIN: 0
SHORTNESS OF BREATH: 0
COLOR CHANGE: 0
ALLERGIC/IMMUNOLOGIC NEGATIVE: 1
CHEST TIGHTNESS: 0

## 2020-05-15 ENCOUNTER — TELEPHONE (OUTPATIENT)
Dept: PODIATRY | Age: 45
End: 2020-05-15

## 2020-06-08 ENCOUNTER — APPOINTMENT (OUTPATIENT)
Dept: GENERAL RADIOLOGY | Age: 45
End: 2020-06-08
Payer: COMMERCIAL

## 2020-06-08 ENCOUNTER — HOSPITAL ENCOUNTER (EMERGENCY)
Age: 45
Discharge: HOME | End: 2020-06-08
Attending: EMERGENCY MEDICINE
Payer: COMMERCIAL

## 2020-06-08 ENCOUNTER — NURSE TRIAGE (OUTPATIENT)
Dept: OTHER | Facility: CLINIC | Age: 45
End: 2020-06-08

## 2020-06-08 VITALS
HEART RATE: 75 BPM | OXYGEN SATURATION: 98 % | BODY MASS INDEX: 31.62 KG/M2 | SYSTOLIC BLOOD PRESSURE: 132 MMHG | DIASTOLIC BLOOD PRESSURE: 73 MMHG | RESPIRATION RATE: 19 BRPM | WEIGHT: 190 LBS | TEMPERATURE: 98.1 F

## 2020-06-08 LAB
ABSOLUTE EOS #: 0.12 K/UL (ref 0–0.44)
ABSOLUTE IMMATURE GRANULOCYTE: 0.04 K/UL (ref 0–0.3)
ABSOLUTE LYMPH #: 2.19 K/UL (ref 1.1–3.7)
ABSOLUTE MONO #: 0.56 K/UL (ref 0.1–1.2)
ANION GAP SERPL CALCULATED.3IONS-SCNC: 15 MMOL/L (ref 9–17)
BASOPHILS # BLD: 0 % (ref 0–2)
BASOPHILS ABSOLUTE: 0.04 K/UL (ref 0–0.2)
BUN BLDV-MCNC: 11 MG/DL (ref 6–20)
BUN/CREAT BLD: ABNORMAL (ref 9–20)
CALCIUM SERPL-MCNC: 9.3 MG/DL (ref 8.6–10.4)
CHLORIDE BLD-SCNC: 101 MMOL/L (ref 98–107)
CO2: 19 MMOL/L (ref 20–31)
CREAT SERPL-MCNC: 0.66 MG/DL (ref 0.5–0.9)
DIFFERENTIAL TYPE: ABNORMAL
EOSINOPHILS RELATIVE PERCENT: 1 % (ref 1–4)
GFR AFRICAN AMERICAN: >60 ML/MIN
GFR NON-AFRICAN AMERICAN: >60 ML/MIN
GFR SERPL CREATININE-BSD FRML MDRD: ABNORMAL ML/MIN/{1.73_M2}
GFR SERPL CREATININE-BSD FRML MDRD: ABNORMAL ML/MIN/{1.73_M2}
GLUCOSE BLD-MCNC: 116 MG/DL (ref 70–99)
HCT VFR BLD CALC: 38.9 % (ref 36.3–47.1)
HEMOGLOBIN: 13.1 G/DL (ref 11.9–15.1)
IMMATURE GRANULOCYTES: 0 %
LYMPHOCYTES # BLD: 24 % (ref 24–43)
MCH RBC QN AUTO: 30.7 PG (ref 25.2–33.5)
MCHC RBC AUTO-ENTMCNC: 33.7 G/DL (ref 28.4–34.8)
MCV RBC AUTO: 91.1 FL (ref 82.6–102.9)
MONOCYTES # BLD: 6 % (ref 3–12)
NRBC AUTOMATED: 0 PER 100 WBC
PDW BLD-RTO: 13.4 % (ref 11.8–14.4)
PLATELET # BLD: ABNORMAL K/UL (ref 138–453)
PLATELET ESTIMATE: ABNORMAL
PLATELET, FLUORESCENCE: 289 K/UL (ref 138–453)
PLATELET, IMMATURE FRACTION: 4 % (ref 1.1–10.3)
PMV BLD AUTO: ABNORMAL FL (ref 8.1–13.5)
POTASSIUM SERPL-SCNC: 5.7 MMOL/L (ref 3.7–5.3)
RBC # BLD: 4.27 M/UL (ref 3.95–5.11)
RBC # BLD: ABNORMAL 10*6/UL
SEG NEUTROPHILS: 68 % (ref 36–65)
SEGMENTED NEUTROPHILS ABSOLUTE COUNT: 6.12 K/UL (ref 1.5–8.1)
SODIUM BLD-SCNC: 135 MMOL/L (ref 135–144)
TROPONIN INTERP: NORMAL
TROPONIN T: NORMAL NG/ML
TROPONIN, HIGH SENSITIVITY: <6 NG/L (ref 0–14)
WBC # BLD: 9.1 K/UL (ref 3.5–11.3)
WBC # BLD: ABNORMAL 10*3/UL

## 2020-06-08 PROCEDURE — 84484 ASSAY OF TROPONIN QUANT: CPT

## 2020-06-08 PROCEDURE — 71046 X-RAY EXAM CHEST 2 VIEWS: CPT

## 2020-06-08 PROCEDURE — 85025 COMPLETE CBC W/AUTO DIFF WBC: CPT

## 2020-06-08 PROCEDURE — 80048 BASIC METABOLIC PNL TOTAL CA: CPT

## 2020-06-08 PROCEDURE — 99285 EMERGENCY DEPT VISIT HI MDM: CPT

## 2020-06-08 PROCEDURE — 85055 RETICULATED PLATELET ASSAY: CPT

## 2020-06-08 PROCEDURE — 93005 ELECTROCARDIOGRAM TRACING: CPT | Performed by: STUDENT IN AN ORGANIZED HEALTH CARE EDUCATION/TRAINING PROGRAM

## 2020-06-08 RX ORDER — LISINOPRIL 5 MG/1
TABLET ORAL
Qty: 30 TABLET | Refills: 2 | Status: SHIPPED | OUTPATIENT
Start: 2020-06-08 | End: 2020-09-14

## 2020-06-08 NOTE — TELEPHONE ENCOUNTER
Received call from Masher, [place]. Patient called in c/o increased rapid heart beat that started a week ago. States it has been occurring intermittently, woke her out of her sleep, she breaths normal and it goes away, see triage assessment below. Patients self check of HR was 48 and 36 during triage assessment, patient asymptomatic at this time. After triage assessment and during care advice, patient informed triage RN that she has been drinking a lot, 1/5th of liquor daily for the past 4 days. Patient also noted that she has not had a BP machine for about 2 months. Care Advice provided; patient acknowledged understanding of care advice and is in agreement with plan. Patient stated that she will be going to [facility]; report called to JAVIER Forde. Please do not reply to the triage nurse through this encounter. Any subsequent communication should be directly with the patient. Reason for Disposition   Heart beating very slowly (e.g., < 50 / minute) (EXCEPTION: athlete)    Answer Assessment - Initial Assessment Questions  1. DESCRIPTION: \"Please describe your heart rate or heart beat that you are having\" (e.g., fast/slow, regular/irregular, skipped or extra beats, \"palpitations\")     Currently my heart beat is normal; but is experiencing intermittent episodes of rapid heart beat; described as really fast, skips a beat and then back normal  2. ONSET: \"When did it start? \" (Minutes, hours or days)       About a week ago  3. DURATION: \"How long does it last\" (e.g., seconds, minutes, hours)      Seconds  4. PATTERN \"Does it come and go, or has it been constant since it started? \"  \"Does it get worse with exertion? \"   \"Are you feeling it now? \"     Bernirniace Gtz and goes, only two episodes in the past week (6/3 and 6/7); I feel normal now  5. TAP: \"Using your hand, can you tap out what you are feeling on a chair or table in front of you, so that I can hear? \" (Note: not all patients can do

## 2020-06-08 NOTE — ED NOTES
Pt to ER with c/o heart palpitations x 2 weeks, denies SOB, chest pain, no cough. Afebrile. Placed on full cardiac monitor, no acute distress noted, rr even and NL. No cardiac history. Dr. Gabrielle Cho at bedside toe valuate the pt.       Alesha Prado, JAVIER  06/08/20 8261

## 2020-06-09 ENCOUNTER — TELEPHONE (OUTPATIENT)
Dept: FAMILY MEDICINE CLINIC | Age: 45
End: 2020-06-09

## 2020-06-09 ASSESSMENT — ENCOUNTER SYMPTOMS
EYE PAIN: 0
RHINORRHEA: 0
SHORTNESS OF BREATH: 0
EYE REDNESS: 0
COUGH: 0
CHEST TIGHTNESS: 0
SORE THROAT: 0
SINUS PAIN: 0
WHEEZING: 0
BLOOD IN STOOL: 0
TROUBLE SWALLOWING: 0
SINUS PRESSURE: 0
NAUSEA: 0
CONSTIPATION: 0
VOMITING: 0
DIARRHEA: 0
ABDOMINAL PAIN: 0

## 2020-06-09 NOTE — ED PROVIDER NOTES
[facility] ED  Emergency Department Encounter  EmergencyMedicine Resident     Pt Samina Drew  MRN: [id number]  [place] 1975  Date of evaluation: 20  PCP:  MD Gris Emanuel       Chief Complaint   Patient presents with    Palpitations     heart palpitations x 2 weeks, denies SOB, chest pain, no cough. Afebrile       HISTORY OF PRESENT ILLNESS  (Location/Symptom, Timing/Onset, Context/Setting, Quality, Duration, Modifying Factors, Severity.)      Gale Aguilera is a 39 y.o. female who presents with palpitations and heart fluttering. Patient states that over the past 2 weeks she has had intermittent episodes of feeling like she has palpitations. Denies any associated pain or shortness of breath but does state that palpitations woke her up from sleep last night. PAST MEDICAL / SURGICAL / SOCIAL / FAMILY HISTORY      has a past medical history of Abnormal Pap smear of cervix, Anemia, Chronic back pain, Claudication (HCC), Claudication in peripheral vascular disease ([address] Depression, Headache(784.0), Heart murmur, Hemorrhage of rectum and anus, HTN (hypertension), Hx of blood clots, Hypercoagulable state ([address] Hyperlipidemia, Hypertension, Intertriginous candidiasis, Left popliteal artery occlusion ([address] Leg pain, Menometrorrhagia, Migraine headache with aura, Obesity, Osteoarthritis, PAD (peripheral artery disease) ([address] PVD (peripheral vascular disease) ([address] Takotsubo cardiomyopathy, and Wound of right leg.       has a past surgical history that includes Tubal ligation ();  section (); other surgical history (2017); other surgical history (Left, 2018); other surgical history (Left, 2018); vascular surgery (Left, 2018); pr reoperation, bypass graft (Left, 2018); pr vein bypass graft,fem-pop (Left, 2018); thrombectomy (Right, 2019); IVC filter insertion;  Toe amputation (Right, 2019); and Toe medications    Medication Sig Start Date End Date Taking? Authorizing Provider   lisinopril (PRINIVIL;ZESTRIL) 5 MG tablet take 1 tablet by mouth once daily 6/8/20   Darrion Rivas MD   amLODIPine (NORVASC) 10 MG tablet take 1 tablet by mouth once daily 3/5/20   Darrion Rivas MD   apixaban (ELIQUIS) 5 MG TABS tablet Take 1 tablet by mouth 2 times daily 1/21/20   Stephen Rai MD       REVIEW OF SYSTEMS    (2-9 systems for level 4, 10 or more for level 5)      Review of Systems   Constitutional: Negative for appetite change, chills, fever and unexpected weight change. HENT: Negative for congestion, ear pain, postnasal drip, rhinorrhea, sinus pressure, sinus pain, sore throat and trouble swallowing. Eyes: Negative for pain, redness and visual disturbance. Respiratory: Negative for cough, chest tightness, shortness of breath and wheezing. Cardiovascular: Positive for palpitations. Negative for chest pain and leg swelling. Gastrointestinal: Negative for abdominal pain, blood in stool, constipation, diarrhea, nausea and vomiting. Endocrine: Negative for polydipsia and polyuria. Genitourinary: Negative for difficulty urinating, dysuria, frequency, hematuria, menstrual problem, pelvic pain, vaginal bleeding and vaginal discharge. Musculoskeletal: Negative for arthralgias, myalgias and neck stiffness. Skin: Negative for pallor and rash. Allergic/Immunologic: Negative for immunocompromised state. Neurological: Negative for dizziness, syncope, facial asymmetry, speech difficulty, weakness and numbness. Psychiatric/Behavioral: Negative for confusion. The patient is not nervous/anxious. PHYSICAL EXAM   (up to 7 for level 4, 8 or more for level 5)      INITIAL VITALS:   /73   Pulse 75   Temp 98.1 °F (36.7 °C) (Oral)   Resp 19   Wt 190 lb (86.2 kg)   SpO2 98%   BMI 31.62 kg/m²     Physical Exam  Constitutional:       General: She is not in acute distress.      Appearance: She is well-developed. She is not diaphoretic. HENT:      Head: Normocephalic. Right Ear: External ear normal.      Left Ear: External ear normal.      Mouth/Throat:      Pharynx: No oropharyngeal exudate. Eyes:      General: No scleral icterus. Right eye: No discharge. Left eye: No discharge. Pupils: Pupils are equal, round, and reactive to light. Neck:      Musculoskeletal: Normal range of motion and neck supple. Vascular: No JVD. Cardiovascular:      Rate and Rhythm: Normal rate and regular rhythm. Heart sounds: No murmur. No friction rub. No gallop. Pulmonary:      Effort: Pulmonary effort is normal. No respiratory distress. Breath sounds: Normal breath sounds. No stridor. No wheezing or rales. Chest:      Chest wall: No tenderness. Abdominal:      General: There is no distension. Palpations: Abdomen is soft. Tenderness: There is no abdominal tenderness. There is no guarding or rebound. Musculoskeletal: Normal range of motion. General: No tenderness or deformity. Skin:     General: Skin is warm and dry. Capillary Refill: Capillary refill takes less than 2 seconds. Coloration: Skin is not pale. Findings: No erythema or rash. Neurological:      Mental Status: She is alert and oriented to person, place, and time. Motor: No abnormal muscle tone. Coordination: Coordination normal.   Psychiatric:         Behavior: Behavior normal.         Thought Content: Thought content normal.         DIFFERENTIAL  DIAGNOSIS     PLAN (LABS / IMAGING / EKG):  Orders Placed This Encounter   Procedures    XR CHEST STANDARD (2 VW)    CBC Auto Differential    Basic Metabolic Panel w/ Reflex to MG    Troponin    Immature Platelet Fraction    EKG 12 Lead       MEDICATIONS ORDERED:  No orders of the defined types were placed in this encounter.       DDX: Arrhythmia, paroxysmal SVT, paroxysmal A. fib, paroxysmal a flutter, ACS    DIAGNOSTIC RESULTS / EMERGENCY DEPARTMENT COURSE / MDM   LAB RESULTS:  Results for orders placed or performed during the hospital encounter of 06/08/20   CBC Auto Differential   Result Value Ref Range    WBC 9.1 3.5 - 11.3 k/uL    RBC 4.27 3.95 - 5.11 m/uL    Hemoglobin 13.1 11.9 - 15.1 g/dL    Hematocrit 38.9 36.3 - 47.1 %    MCV 91.1 82.6 - 102.9 fL    MCH 30.7 25.2 - 33.5 pg    MCHC 33.7 28.4 - 34.8 g/dL    RDW 13.4 11.8 - 14.4 %    Platelets See Reflexed IPF Result 138 - 453 k/uL    MPV NOT REPORTED 8.1 - 13.5 fL    NRBC Automated 0.0 0.0 per 100 WBC    Differential Type NOT REPORTED     WBC Morphology NOT REPORTED     RBC Morphology NOT REPORTED     Platelet Estimate NOT REPORTED     Seg Neutrophils 68 (H) 36 - 65 %    Lymphocytes 24 24 - 43 %    Monocytes 6 3 - 12 %    Eosinophils % 1 1 - 4 %    Basophils 0 0 - 2 %    Immature Granulocytes 0 0 %    Segs Absolute 6.12 1.50 - 8.10 k/uL    Absolute Lymph # 2.19 1.10 - 3.70 k/uL    Absolute Mono # 0.56 0.10 - 1.20 k/uL    Absolute Eos # 0.12 0.00 - 0.44 k/uL    Basophils Absolute 0.04 0.00 - 0.20 k/uL    Absolute Immature Granulocyte 0.04 0.00 - 0.30 k/uL   Basic Metabolic Panel w/ Reflex to MG   Result Value Ref Range    Glucose 116 (H) 70 - 99 mg/dL    BUN 11 6 - 20 mg/dL    CREATININE 0.66 0.50 - 0.90 mg/dL    Bun/Cre Ratio NOT REPORTED 9 - 20    Calcium 9.3 8.6 - 10.4 mg/dL    Sodium 135 135 - 144 mmol/L    Potassium 5.7 (H) 3.7 - 5.3 mmol/L    Chloride 101 98 - 107 mmol/L    CO2 19 (L) 20 - 31 mmol/L    Anion Gap 15 9 - 17 mmol/L    GFR Non-African American >60 >60 mL/min    GFR African American >60 >60 mL/min    GFR Comment          GFR Staging NOT REPORTED    Troponin   Result Value Ref Range    Troponin, High Sensitivity <6 0 - 14 ng/L    Troponin T NOT REPORTED <0.03 ng/mL    Troponin Interp NOT REPORTED    Immature Platelet Fraction   Result Value Ref Range    Platelet, Immature Fraction 4.0 1.1 - 10.3 %    Platelet, Fluorescence 289 138 - 453 attending note    FINAL IMPRESSION      1.  Palpitations          DISPOSITION / PLAN     DISPOSITION Decision To Discharge 06/08/2020 03:53:06 PM      PATIENT REFERRED TO:  Alvaro Rendon MD  [place] [id number] [facility]    Schedule an appointment as soon as possible for a visit in 1 week  For re-evaluation    [facility]  [address] [place] [address]  [phone]  Schedule an appointment as soon as possible for a visit in 1 week  For re-evaluation      DISCHARGE MEDICATIONS:  Discharge Medication List as of 6/8/2020  3:54 PM          Rey Escamilla DO  Emergency Medicine Resident    (Please note that portions of thisnote were completed with a voice recognition program.  Efforts were made to edit the dictations but occasionally words are mis-transcribed.)        Rey Escamilla DO  Resident  06/09/20 1142

## 2020-06-10 LAB
EKG ATRIAL RATE: 68 BPM
EKG P AXIS: 37 DEGREES
EKG P-R INTERVAL: 136 MS
EKG Q-T INTERVAL: 406 MS
EKG QRS DURATION: 80 MS
EKG QTC CALCULATION (BAZETT): 431 MS
EKG R AXIS: -4 DEGREES
EKG T AXIS: 0 DEGREES
EKG VENTRICULAR RATE: 68 BPM

## 2020-06-10 PROCEDURE — 93010 ELECTROCARDIOGRAM REPORT: CPT | Performed by: INTERNAL MEDICINE

## 2020-06-23 RX ORDER — ATORVASTATIN CALCIUM 40 MG/1
40 TABLET, FILM COATED ORAL DAILY
Qty: 90 TABLET | Refills: 1 | Status: SHIPPED | OUTPATIENT
Start: 2020-06-23 | End: 2020-12-24 | Stop reason: SDUPTHER

## 2020-06-23 NOTE — TELEPHONE ENCOUNTER
Ordered Lipitor 40 mg.
score (Alexandre Richmond et al., 2013) failed to calculate for the following reasons: The valid total cholesterol range is 130 to 320 mg/dL      ASSESSMENT:    Hyperlipidemia Goal: Patient is not currently prescribed any-intensity statin therapy. PLAN:  Given the patient's PAD dx and was previously prescribed atorvastatin 40 mg once daily (most recently in 2019; unsure who instructed the patient to stop this medication, medication was removed from home med list on 12/23/19 \"Therapy Completed\" by MA), will send recommendation to PCP to resume atorvastatin 40 mg daily. Will outreach to patient if indicated.      Jozef Clark, PharmD, [facility]  Direct: [phone]  Department, toll free [phone], option 7

## 2020-06-25 RX ORDER — AMLODIPINE BESYLATE 10 MG/1
TABLET ORAL
Qty: 90 TABLET | Refills: 2 | Status: SHIPPED | OUTPATIENT
Start: 2020-06-25 | End: 2021-02-04 | Stop reason: SDUPTHER

## 2020-06-26 ENCOUNTER — APPOINTMENT (OUTPATIENT)
Dept: CT IMAGING | Age: 45
End: 2020-06-26
Payer: COMMERCIAL

## 2020-06-26 ENCOUNTER — HOSPITAL ENCOUNTER (EMERGENCY)
Age: 45
Discharge: HOME | End: 2020-06-26
Attending: EMERGENCY MEDICINE
Payer: COMMERCIAL

## 2020-06-26 ENCOUNTER — APPOINTMENT (OUTPATIENT)
Dept: GENERAL RADIOLOGY | Age: 45
End: 2020-06-26
Payer: COMMERCIAL

## 2020-06-26 VITALS
BODY MASS INDEX: 31.65 KG/M2 | OXYGEN SATURATION: 98 % | HEIGHT: 65 IN | DIASTOLIC BLOOD PRESSURE: 95 MMHG | TEMPERATURE: 97.6 F | RESPIRATION RATE: 22 BRPM | WEIGHT: 190 LBS | SYSTOLIC BLOOD PRESSURE: 144 MMHG | HEART RATE: 91 BPM

## 2020-06-26 LAB
-: NORMAL
ABSOLUTE EOS #: 0.1 K/UL (ref 0–0.44)
ABSOLUTE IMMATURE GRANULOCYTE: 0.05 K/UL (ref 0–0.3)
ABSOLUTE LYMPH #: 3.16 K/UL (ref 1.1–3.7)
ABSOLUTE MONO #: 0.55 K/UL (ref 0.1–1.2)
ALBUMIN SERPL-MCNC: 4.3 G/DL (ref 3.5–5.2)
ALBUMIN/GLOBULIN RATIO: 1.3 (ref 1–2.5)
ALP BLD-CCNC: 89 U/L (ref 35–104)
ALT SERPL-CCNC: 11 U/L (ref 5–33)
AMORPHOUS: NORMAL
ANION GAP SERPL CALCULATED.3IONS-SCNC: 19 MMOL/L (ref 9–17)
AST SERPL-CCNC: 13 U/L
BACTERIA: NORMAL
BASOPHILS # BLD: 0 % (ref 0–2)
BASOPHILS ABSOLUTE: 0.03 K/UL (ref 0–0.2)
BILIRUB SERPL-MCNC: 0.36 MG/DL (ref 0.3–1.2)
BILIRUBIN URINE: NEGATIVE
BUN BLDV-MCNC: 9 MG/DL (ref 6–20)
BUN/CREAT BLD: ABNORMAL (ref 9–20)
CALCIUM SERPL-MCNC: 8.9 MG/DL (ref 8.6–10.4)
CASTS UA: NORMAL /LPF (ref 0–8)
CHLORIDE BLD-SCNC: 101 MMOL/L (ref 98–107)
CO2: 18 MMOL/L (ref 20–31)
COLOR: YELLOW
COMMENT UA: ABNORMAL
CREAT SERPL-MCNC: 0.76 MG/DL (ref 0.5–0.9)
CRYSTALS, UA: NORMAL /HPF
DIFFERENTIAL TYPE: ABNORMAL
EOSINOPHILS RELATIVE PERCENT: 1 % (ref 1–4)
EPITHELIAL CELLS UA: NORMAL /HPF (ref 0–5)
GFR AFRICAN AMERICAN: >60 ML/MIN
GFR NON-AFRICAN AMERICAN: >60 ML/MIN
GFR SERPL CREATININE-BSD FRML MDRD: ABNORMAL ML/MIN/{1.73_M2}
GFR SERPL CREATININE-BSD FRML MDRD: ABNORMAL ML/MIN/{1.73_M2}
GLUCOSE BLD-MCNC: 124 MG/DL (ref 70–99)
GLUCOSE URINE: NEGATIVE
HCG QUALITATIVE: NEGATIVE
HCT VFR BLD CALC: 39.3 % (ref 36.3–47.1)
HEMOGLOBIN: 12.9 G/DL (ref 11.9–15.1)
IMMATURE GRANULOCYTES: 1 %
KETONES, URINE: ABNORMAL
LEUKOCYTE ESTERASE, URINE: NEGATIVE
LYMPHOCYTES # BLD: 35 % (ref 24–43)
MCH RBC QN AUTO: 30.2 PG (ref 25.2–33.5)
MCHC RBC AUTO-ENTMCNC: 32.8 G/DL (ref 28.4–34.8)
MCV RBC AUTO: 92 FL (ref 82.6–102.9)
MONOCYTES # BLD: 6 % (ref 3–12)
MUCUS: NORMAL
NITRITE, URINE: NEGATIVE
NRBC AUTOMATED: 0 PER 100 WBC
OTHER OBSERVATIONS UA: NORMAL
PDW BLD-RTO: 13.2 % (ref 11.8–14.4)
PH UA: 5.5 (ref 5–8)
PLATELET # BLD: 355 K/UL (ref 138–453)
PLATELET ESTIMATE: ABNORMAL
PMV BLD AUTO: 10 FL (ref 8.1–13.5)
POTASSIUM SERPL-SCNC: 3.5 MMOL/L (ref 3.7–5.3)
PROTEIN UA: ABNORMAL
RBC # BLD: 4.27 M/UL (ref 3.95–5.11)
RBC # BLD: ABNORMAL 10*6/UL
RBC UA: NORMAL /HPF (ref 0–4)
RENAL EPITHELIAL, UA: NORMAL /HPF
SEG NEUTROPHILS: 57 % (ref 36–65)
SEGMENTED NEUTROPHILS ABSOLUTE COUNT: 5.18 K/UL (ref 1.5–8.1)
SODIUM BLD-SCNC: 138 MMOL/L (ref 135–144)
SPECIFIC GRAVITY UA: 1.03 (ref 1–1.03)
TOTAL PROTEIN: 7.5 G/DL (ref 6.4–8.3)
TRICHOMONAS: NORMAL
TROPONIN INTERP: NORMAL
TROPONIN T: NORMAL NG/ML
TROPONIN, HIGH SENSITIVITY: 7 NG/L (ref 0–14)
TURBIDITY: CLEAR
URINE HGB: ABNORMAL
UROBILINOGEN, URINE: NORMAL
WBC # BLD: 9.1 K/UL (ref 3.5–11.3)
WBC # BLD: ABNORMAL 10*3/UL
WBC UA: NORMAL /HPF (ref 0–5)
YEAST: NORMAL

## 2020-06-26 PROCEDURE — 81001 URINALYSIS AUTO W/SCOPE: CPT

## 2020-06-26 PROCEDURE — 85025 COMPLETE CBC W/AUTO DIFF WBC: CPT

## 2020-06-26 PROCEDURE — 6360000002 HC RX W HCPCS: Performed by: STUDENT IN AN ORGANIZED HEALTH CARE EDUCATION/TRAINING PROGRAM

## 2020-06-26 PROCEDURE — 71045 X-RAY EXAM CHEST 1 VIEW: CPT

## 2020-06-26 PROCEDURE — 93005 ELECTROCARDIOGRAM TRACING: CPT | Performed by: STUDENT IN AN ORGANIZED HEALTH CARE EDUCATION/TRAINING PROGRAM

## 2020-06-26 PROCEDURE — 84484 ASSAY OF TROPONIN QUANT: CPT

## 2020-06-26 PROCEDURE — 99284 EMERGENCY DEPT VISIT MOD MDM: CPT

## 2020-06-26 PROCEDURE — 80053 COMPREHEN METABOLIC PANEL: CPT

## 2020-06-26 PROCEDURE — 51701 INSERT BLADDER CATHETER: CPT

## 2020-06-26 PROCEDURE — 84703 CHORIONIC GONADOTROPIN ASSAY: CPT

## 2020-06-26 PROCEDURE — 6360000004 HC RX CONTRAST MEDICATION: Performed by: EMERGENCY MEDICINE

## 2020-06-26 PROCEDURE — 71260 CT THORAX DX C+: CPT

## 2020-06-26 PROCEDURE — 96374 THER/PROPH/DIAG INJ IV PUSH: CPT

## 2020-06-26 RX ORDER — FENTANYL CITRATE 50 UG/ML
25 INJECTION, SOLUTION INTRAMUSCULAR; INTRAVENOUS ONCE
Status: COMPLETED | OUTPATIENT
Start: 2020-06-26 | End: 2020-06-26

## 2020-06-26 RX ORDER — POTASSIUM CHLORIDE 20 MEQ/1
40 TABLET, EXTENDED RELEASE ORAL ONCE
Status: DISCONTINUED | OUTPATIENT
Start: 2020-06-26 | End: 2020-06-26 | Stop reason: HOSPADM

## 2020-06-26 RX ORDER — LIDOCAINE 50 MG/G
1 PATCH TOPICAL DAILY
Qty: 30 PATCH | Refills: 0 | Status: ON HOLD | OUTPATIENT
Start: 2020-06-26 | End: 2021-03-08 | Stop reason: ALTCHOICE

## 2020-06-26 RX ADMIN — IOHEXOL 75 ML: 350 INJECTION, SOLUTION INTRAVENOUS at 18:04

## 2020-06-26 RX ADMIN — FENTANYL CITRATE 25 MCG: 50 INJECTION, SOLUTION INTRAMUSCULAR; INTRAVENOUS at 16:36

## 2020-06-26 ASSESSMENT — ENCOUNTER SYMPTOMS
SHORTNESS OF BREATH: 0
RHINORRHEA: 0
DIARRHEA: 0
NAUSEA: 0
ABDOMINAL PAIN: 0
VOMITING: 0
COUGH: 0

## 2020-06-26 ASSESSMENT — PAIN SCALES - GENERAL: PAINLEVEL_OUTOF10: 10

## 2020-06-26 NOTE — ED PROVIDER NOTES
[facility] ED  Emergency Department Encounter  EmergencyMedicine Resident     Pt Be Adams  MRN: [id number]  [place] 1975  Date of evaluation: 20  PCP:  Audrey Meléndez MD    [address] [place] [address]       Chief Complaint   Patient presents with    Flank Pain       HISTORY OF PRESENT ILLNESS  (Location/Symptom, Timing/Onset, Context/Setting, Quality, Duration, Modifying Factors, Severity.)      Aroldo Sawyer is a 39 y.o. female who presents with complaint of sided flank pain. States that this just started approximate 30 minutes ago while she was \"mopping\". Patient denies any shortness of breath states that this is spasmodic in nature starting in the left posterior aspect of her rib cage and wrapping around to her left chest wall. She denies any dysuria denies any nausea or vomiting she does not take anything prior to arrival denies any cigarette smoking. Patient is a history of hypertension, hypercoagulable state, femoral popliteal arterial thrombosis of the left leg PAD    PAST MEDICAL / SURGICAL / SOCIAL / FAMILY HISTORY      has a past medical history of Abnormal Pap smear of cervix, Anemia, Chronic back pain, Claudication (HCC), Claudication in peripheral vascular disease ([address] Depression, Headache(784.0), Heart murmur, Hemorrhage of rectum and anus, HTN (hypertension), Hx of blood clots, Hypercoagulable state ([address] Hyperlipidemia, Hypertension, Intertriginous candidiasis, Left popliteal artery occlusion ([address] Leg pain, Menometrorrhagia, Migraine headache with aura, Obesity, Osteoarthritis, PAD (peripheral artery disease) ([address] PVD (peripheral vascular disease) ([address] Takotsubo cardiomyopathy, and Wound of right leg.       has a past surgical history that includes Tubal ligation ();   section (); other surgical history (2017); other surgical history (Left, 2018); other surgical history (Left, 2018); vascular surgery (Left, 2018); pr movements intact. Neck:      Musculoskeletal: Normal range of motion. Cardiovascular:      Rate and Rhythm: Normal rate. Pulmonary:      Effort: Pulmonary effort is normal.   Abdominal:      Palpations: Abdomen is soft. Tenderness: There is no right CVA tenderness or left CVA tenderness. Musculoskeletal:         General: Tenderness present. No swelling, deformity or signs of injury. Right lower leg: No edema. Left lower leg: No edema. Skin:     General: Skin is warm. Capillary Refill: Capillary refill takes less than 2 seconds. Findings: No erythema, lesion or rash. Neurological:      Mental Status: She is alert and oriented to person, place, and time. DIFFERENTIAL  DIAGNOSIS     PLAN (LABS / IMAGING / EKG):  Orders Placed This Encounter   Procedures    XR CHEST PORTABLE    CT CHEST PULMONARY EMBOLISM W CONTRAST    CBC WITH AUTO DIFFERENTIAL    Comprehensive Metabolic Panel    Troponin    Urinalysis Reflex to Culture    HCG Qualitative, Serum    EKG 12 Lead    Place CT Compatible peripheral IV       MEDICATIONS ORDERED:  Orders Placed This Encounter   Medications    fentaNYL (SUBLIMAZE) injection 25 mcg    DISCONTD: potassium chloride (KLOR-CON M) extended release tablet 40 mEq    iohexol (OMNIPAQUE 350) solution 75 mL    lidocaine (LIDODERM) 5 %     Sig: Place 1 patch onto the skin daily 12 hours on, 12 hours off.      Dispense:  30 patch     Refill:  0       DDX: acs, atypical chest pain, pe, pneumonia, hepatitis, pleurisy, muscle strain    DIAGNOSTIC RESULTS / [address] [place] [address] / [facility]   LAB RESULTS:  Results for orders placed or performed during the hospital encounter of 06/26/20   CBC WITH AUTO DIFFERENTIAL   Result Value Ref Range    WBC 9.1 3.5 - 11.3 k/uL    RBC 4.27 3.95 - 5.11 m/uL    Hemoglobin 12.9 11.9 - 15.1 g/dL    Hematocrit 39.3 36.3 - 47.1 %    MCV 92.0 82.6 - 102.9 fL    MCH 30.2 25.2 - 33.5 pg    MCHC 32.8 28.4 - 34.8 g/dL    RDW Normal Normal    Nitrite, Urine NEGATIVE NEGATIVE    Leukocyte Esterase, Urine NEGATIVE NEGATIVE    Urinalysis Comments NOT REPORTED    HCG Qualitative, Serum   Result Value Ref Range    hCG Qual NEGATIVE NEGATIVE   Microscopic Urinalysis   Result Value Ref Range    -          WBC, UA 2 TO 5 0 - 5 /HPF    RBC, UA 2 TO 5 0 - 4 /HPF    Casts UA  0 - 8 /LPF     5 TO 10 HYALINE Reference range defined for non-centrifuged specimen. Crystals, UA NOT REPORTED None /HPF    Epithelial Cells UA 2 TO 5 0 - 5 /HPF    Renal Epithelial, UA NOT REPORTED 0 /HPF    Bacteria, UA NOT REPORTED None    Mucus, UA NOT REPORTED None    Trichomonas, UA NOT REPORTED None    Amorphous, UA NOT REPORTED None    Other Observations UA NOT REPORTED NOT REQ. Yeast, UA NOT REPORTED None   EKG 12 Lead   Result Value Ref Range    Ventricular Rate 77 BPM    Atrial Rate 77 BPM    P-R Interval 142 ms    QRS Duration 86 ms    Q-T Interval 400 ms    QTc Calculation (Bazett) 452 ms    P Axis 31 degrees    R Axis -14 degrees       IMPRESSION: uncomfortable appearing 42-year-old female complaining of sudden onset left-sided posterior chest wall pain radiating around from the posterior aspect to the left lateral chest wall which started roughly 30 minutes ago while she was mopping the floor. Patient denies any trauma lifting any heavy objects twisting abnormally normally. No evidence of rash on my exam patient is uncomfortable appearing having spasmodic jerking motions when the pain comes lasting seconds. Patient is able to speak in full sentences lungs are clear to auscultation bilaterally there is chest wall tenderness that is reproducible on palpation heart rate is regular rhythm normal rate abdomen is soft and nontender however limited abdominal exam secondary to patient discomfort and her not wanting to lie flat. No lower extremity edema no Homans sign no calf tenderness.   No JVD plan will be to obtain twelve-lead ECG CT PE CMP CBC serum CG defect to suggest pulmonary embolism. Main pulmonary artery is normal in caliber. Mediastinum: The thoracic aorta is normal in course and caliber. The heart is not enlarged. No pericardial effusion. No pathologic hilar or mediastinal adenopathy. Lungs/pleura: The lungs are without acute process. No focal consolidation or pulmonary edema. No evidence of pleural effusion or pneumothorax. Upper Abdomen: Limited images of the upper abdomen are unremarkable. Soft Tissues/Bones: No acute bone or soft tissue abnormality. No evidence of pulmonary embolism or acute pulmonary abnormality. EKG  Sinus rhythm at a rate of 77 leftward axis normal intervals poor R wave progression appropriate precordial T wave balance no acute ST-T wave changes, criteria for left ventricular hypertrophy. Compared with ECG from June 8, 2020 no change. All EKG's are interpreted by the Emergency Department Physician who either signs or Co-signs this chart in the absence of a cardiologist.    EMERGENCY DEPARTMENT COURSE:  X-ray imaging was obtained as well as CT PE which was negative for both any acute pulmonary abnormality including pulmonary embolism. Lab work was unremarkable twelve-lead ECG strip was unchanged from prior patient's symptoms improved with analgesia and time and was discharged home in stable condition with PCP follow-up. Patient was in agreement with this plan. PROCEDURES:  none    CONSULTS:  None    CRITICAL CARE:  Please see attending note    FINAL IMPRESSION      1.  Muscle strain          DISPOSITION / PLAN     DISPOSITION  [place] home with pcp followup      PATIENT REFERRED TO:  Catina Benavides MD  [facility] [address] [phone]    Call [facility]  [address] [facility] [address] [phone]  Go to   As needed, If symptoms worsen    Catina Benavides MD  [facility] [address] [phone]            DISCHARGE MEDICATIONS:  Discharge Medication List as

## 2020-06-26 NOTE — ED NOTES
Pt reports to the ED via triage with c/o Flank pain. Pt states she was sweeping and all of a sudden she had shooting and stabbing pain to her Lt flank that radiates to her Lt side, pt does have Hx of blood clots in her legs and does take a blood thinner, pt denies any Hx of kidney stones, pt is wheeled into room 19 and screaming due to pain, pt helped to bed. Pt is A&Ox4, RR even and non labored.       Gayla Daniel RN  06/26/20 2610

## 2020-06-28 LAB
EKG ATRIAL RATE: 77 BPM
EKG P AXIS: 31 DEGREES
EKG P-R INTERVAL: 142 MS
EKG Q-T INTERVAL: 400 MS
EKG QRS DURATION: 86 MS
EKG QTC CALCULATION (BAZETT): 452 MS
EKG R AXIS: -14 DEGREES
EKG VENTRICULAR RATE: 77 BPM

## 2020-06-28 PROCEDURE — 93010 ELECTROCARDIOGRAM REPORT: CPT | Performed by: INTERNAL MEDICINE

## 2020-08-17 ENCOUNTER — OFFICE VISIT (OUTPATIENT)
Dept: PODIATRY | Age: 45
End: 2020-08-17
Payer: COMMERCIAL

## 2020-08-17 VITALS
BODY MASS INDEX: 31.16 KG/M2 | HEIGHT: 65 IN | WEIGHT: 187 LBS | HEART RATE: 73 BPM | SYSTOLIC BLOOD PRESSURE: 127 MMHG | DIASTOLIC BLOOD PRESSURE: 79 MMHG

## 2020-08-17 PROCEDURE — 1036F TOBACCO NON-USER: CPT | Performed by: STUDENT IN AN ORGANIZED HEALTH CARE EDUCATION/TRAINING PROGRAM

## 2020-08-17 PROCEDURE — G8427 DOCREV CUR MEDS BY ELIG CLIN: HCPCS | Performed by: STUDENT IN AN ORGANIZED HEALTH CARE EDUCATION/TRAINING PROGRAM

## 2020-08-17 PROCEDURE — 99213 OFFICE O/P EST LOW 20 MIN: CPT | Performed by: STUDENT IN AN ORGANIZED HEALTH CARE EDUCATION/TRAINING PROGRAM

## 2020-08-17 PROCEDURE — G8417 CALC BMI ABV UP PARAM F/U: HCPCS | Performed by: STUDENT IN AN ORGANIZED HEALTH CARE EDUCATION/TRAINING PROGRAM
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
## 2020-08-17 NOTE — PROGRESS NOTES
Patient instructed to remove shoes and socks, instructed to sit in exam chair. Current PCP name is rene ESPINO and date of last visit 3-31-20. Do you have a follow up visit scheduled?   Yes or no    If yes the date is NO

## 2020-09-04 ENCOUNTER — HOSPITAL ENCOUNTER (OUTPATIENT)
Age: 45
Setting detail: SPECIMEN
Discharge: HOME | End: 2020-09-04
Payer: COMMERCIAL

## 2020-09-04 ENCOUNTER — PROCEDURE VISIT (OUTPATIENT)
Dept: OBGYN | Age: 45
End: 2020-09-04
Payer: COMMERCIAL

## 2020-09-04 VITALS
DIASTOLIC BLOOD PRESSURE: 79 MMHG | HEIGHT: 65 IN | HEART RATE: 80 BPM | WEIGHT: 191.2 LBS | BODY MASS INDEX: 31.86 KG/M2 | SYSTOLIC BLOOD PRESSURE: 134 MMHG

## 2020-09-04 PROBLEM — R87.618 ABNORMAL PAPANICOLAOU SMEAR OF CERVIX WITH POSITIVE HUMAN PAPILLOMA VIRUS (HPV) TEST: Status: ACTIVE | Noted: 2020-09-04

## 2020-09-04 LAB
CONTROL: PRESENT
PREGNANCY TEST URINE, POC: NEGATIVE

## 2020-09-04 PROCEDURE — 1036F TOBACCO NON-USER: CPT | Performed by: STUDENT IN AN ORGANIZED HEALTH CARE EDUCATION/TRAINING PROGRAM

## 2020-09-04 PROCEDURE — G8417 CALC BMI ABV UP PARAM F/U: HCPCS | Performed by: STUDENT IN AN ORGANIZED HEALTH CARE EDUCATION/TRAINING PROGRAM

## 2020-09-04 PROCEDURE — G8427 DOCREV CUR MEDS BY ELIG CLIN: HCPCS | Performed by: STUDENT IN AN ORGANIZED HEALTH CARE EDUCATION/TRAINING PROGRAM

## 2020-09-04 PROCEDURE — 81025 URINE PREGNANCY TEST: CPT | Performed by: STUDENT IN AN ORGANIZED HEALTH CARE EDUCATION/TRAINING PROGRAM

## 2020-09-04 PROCEDURE — 57454 BX/CURETT OF CERVIX W/SCOPE: CPT | Performed by: STUDENT IN AN ORGANIZED HEALTH CARE EDUCATION/TRAINING PROGRAM

## 2020-09-04 NOTE — PROGRESS NOTES
[facility] OB/GYN   Procedure Note    Gale Schaeffer  9/4/2020                       Primary Care Physician: Ellen Orantes MD      Subjective:   Gale Schaeffer 39 y.o. female [id number] is here for previously scheduled colposcopy due to history of HPV 16 on pap with negative cytology on 3/21/19. She has no complaints today. She is s/p tubal ligation. UPT today is negative. Vitals:   Blood pressure 134/79, pulse 80, height 5' 5\" (1.651 m), weight 191 lb 3.2 oz (86.7 kg), last menstrual period 08/24/2020, not currently breastfeeding. Procedure: Colposcopy with ECC     Indications: HPV 16 on pap with negative cytology on 3/21/19    Procedure Details: The patient was counseled on the procedure. Risks, benefits and alternatives were reviewed. The patient is aware that this is diagnostic and not curative and a second procedure may be needed. A consent was reviewed and obtained. The patient was positioned comfortably on the exam table. Colposcopy w/ Biopsy(s) and Endocervical Curettage  A sterile speculum was placed into the vagina and the cervix was identified. The colposcope was positioned and the cervix was examined revealing no visible lesions. Endocervical speculum was used to visualize the transformation zone. Acetoacetic acid was applied to the cervix, revealing  acetowhite lesion(s) noted at the 12 and 6 o'clock positions along the transformation zone. Lugol's Iodine was applied to the cervix revealing decreased uptake at the 12 and 6 o'clock positions along the transformation zone The exam was considered to be satisfactory with the transformation zone visualized. Biopsy(s) were taken at 6 and 12 o'clock. Endocervical curettage was then performed and tissue collected was sent to pathology. Monsel's was used for hemostasis. Good hemostasis was observed. Biopsy tissue was sent to pathology.        Impression: Satisfactory colposcopy with transformation acetowhite lesion(s) and decreased uptake of Lugol's solution noted at 6 and 12 o'clock    The patient tolerated the procedure well. Post procedure restrictions were reviewed and given to the patient. All counts and instruments were correct at the end of the procedure. Lab:  Pregnancy Test:  Lab Results   Component Value Date    PREGTESTUR negative 09/04/2020    HCG NEGATIVE 08/22/2019       Assessment & Plan:  Flor Mcdonnell 39 y.o. female [id number] with history of HPV 16 on pap with normal cytology on 3/21/19   - Afebrile, VSS   - Last pap with normal cytology and positive HPV 16 in March 2019    - Repeat pap with co-testing collected today    - Satisfactory colposcopy with transformation zone visualized    - Biopsies at 12 and 6 o'clock and ECC collected and sent to Pathology    - Low suspicion for high grade pathology, will await results. 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 ([address] 08/02/2019    Pain of left lower extremity due to ischemia     Left leg pain     Chest pain 04/05/2019    Wound of right leg 11/26/2018    PAD (peripheral artery disease) ([address] 11/26/2018    Anemia 10/19/2018    Hemorrhage of rectum and anus     Hypercoagulable state ([address]     Takotsubo cardiomyopathy 09/14/2018    Left popliteal artery occlusion ([address] 01/16/2018    Claudication in peripheral vascular disease ([address]     Foot pain 02/10/2017    Depression 06/16/2014    Menometrorrhagia 07/23/2013    Intertriginous candidiasis 07/23/2013    Migraine headache with aura 05/13/2013    HTN (hypertension) 05/13/2013    Obesity 05/13/2013       The patient was counseled on follow up and home care with restrictions noted.    Return in about

## 2020-09-04 NOTE — PROGRESS NOTES
Attending Physician Statement  I have discussed the care of Clayton Santana, including pertinent history and exam findings,  with the resident. I have seen and examined the patient and the key elements of all parts of the encounter have been performed by me. Transformation zone was visualized with the aid of an endocervical speculum. Mildly AW epithelium was noted on the anterior and posterior TZ but the upper limits of the lesions could not be visualized. I agree with the assessment, plan and orders as documented by the resident. VV planned in 2 weeks . Follow up will be based on results of histology and pap.    (GC Modifier)

## 2020-09-06 ENCOUNTER — TELEPHONE (OUTPATIENT)
Dept: OBGYN | Age: 45
End: 2020-09-06

## 2020-09-06 NOTE — TELEPHONE ENCOUNTER
OB/GYN Resident Interval Note    Contacted about patient having vaginal pain after colposcopy with biopsy. Tried to contact patient, no answer.     Michelle Story,   OB/GYN Resident  Pager #[phone]

## 2020-09-09 LAB — SURGICAL PATHOLOGY REPORT: NORMAL

## 2020-09-14 RX ORDER — LISINOPRIL 5 MG/1
TABLET ORAL
Qty: 30 TABLET | Refills: 2 | Status: SHIPPED | OUTPATIENT
Start: 2020-09-14 | End: 2020-12-21 | Stop reason: SDUPTHER

## 2020-09-14 NOTE — TELEPHONE ENCOUNTER
Last Visit Date:  3-31-20  Next Visit Date:  9/18/2020    Hemoglobin A1C (%)   Date Value   03/05/2020 6.1   12/18/2017 5.9   07/23/2014 5.7             ( goal A1C is < 7)   No results found for: LABMICR  LDL Cholesterol (mg/dL)   Date Value   10/03/2018 42       (goal LDL is <100)   AST (U/L)   Date Value   06/26/2020 13     ALT (U/L)   Date Value   06/26/2020 11     BUN (mg/dL)   Date Value   06/26/2020 9     BP Readings from Last 3 Encounters:   09/04/20 134/79   08/17/20 127/79   06/26/20 (!) 144/95          (goal 120/80)        Patient Active Problem List:     Migraine headache with aura     HTN (hypertension)     Obesity     Menometrorrhagia     Intertriginous candidiasis     Depression     Claudication in peripheral vascular disease ([facility]     Left popliteal artery occlusion (HCC)     Hypercoagulable state ([address]     Hemorrhage of rectum and anus     Anemia     Foot pain     Wound of right leg     PAD (peripheral artery disease) (HCC)     Takotsubo cardiomyopathy     Chest pain     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

## 2020-09-15 LAB
HPV SOURCE: NORMAL
HPV, GENOTYPE 16: NOT DETECTED
HPV, GENOTYPE 18: NOT DETECTED
HPV, HIGH RISK OTHER: NOT DETECTED

## 2020-09-16 LAB — CYTOLOGY REPORT: NORMAL

## 2020-09-17 ENCOUNTER — VIRTUAL VISIT (OUTPATIENT)
Dept: OBGYN | Age: 45
End: 2020-09-17
Payer: COMMERCIAL

## 2020-09-17 PROBLEM — N87.0 DYSPLASIA OF CERVIX, LOW GRADE (CIN 1): Status: ACTIVE | Noted: 2020-09-17

## 2020-09-17 PROCEDURE — G8427 DOCREV CUR MEDS BY ELIG CLIN: HCPCS | Performed by: STUDENT IN AN ORGANIZED HEALTH CARE EDUCATION/TRAINING PROGRAM

## 2020-09-17 PROCEDURE — 99442 PR PHYS/QHP TELEPHONE EVALUATION 11-20 MIN: CPT | Performed by: STUDENT IN AN ORGANIZED HEALTH CARE EDUCATION/TRAINING PROGRAM

## 2020-09-17 PROCEDURE — 1036F TOBACCO NON-USER: CPT | Performed by: STUDENT IN AN ORGANIZED HEALTH CARE EDUCATION/TRAINING PROGRAM

## 2020-09-17 PROCEDURE — G8417 CALC BMI ABV UP PARAM F/U: HCPCS | Performed by: STUDENT IN AN ORGANIZED HEALTH CARE EDUCATION/TRAINING PROGRAM

## 2020-09-17 NOTE — PROGRESS NOTES
Attending Physician Statement  I have discussed the care of Clayton Santana, including pertinent history and exam findings,  with the resident. I have reviewed the key elements of all parts of the encounter with the resident. I agree with the assessment, plan and orders as documented by the resident.   (GE Modifier)

## 2020-09-17 NOTE — PROGRESS NOTES
Lenny Rucker is a 39 y.o. female evaluated via telephone on 9/17/2020. Consent:  She and/or health care decision maker is aware that that she may receive a bill for this telephone service, depending on her insurance coverage, and has provided verbal consent to proceed: Yes      Documentation:  I communicated with the patient and/or health care decision maker about results of colposcopy. Details of this discussion including any medical advice provided: Discussed colposcopy showing mild cervical dysplasia (CIN1). Pap smear negative. Discussed follow up in 1 year. Patient had no further questions or concerns at this time. I affirm this is a Patient Initiated Episode with a Patient who has not had a related appointment within my department in the past 7 days or scheduled within the next 24 hours.     Patient identification was verified at the start of the visit: Yes    Total Time: minutes: 11-20 minutes    Note: not billable if this call serves to triage the patient into an appointment for the relevant concern      Dhiraj Recinos

## 2020-09-23 NOTE — RESULT ENCOUNTER NOTE
Negative pap and HPV but DAVID 1 found at the time of colposcopy. Continue with plan for follow up for DAVID 1 management . Repeat Co-testing in 12 months. No further action.

## 2020-11-03 PROBLEM — I73.9 PAD (PERIPHERAL ARTERY DISEASE) (HCC): Status: RESOLVED | Noted: 2018-11-26 | Resolved: 2020-11-03

## 2020-12-11 ENCOUNTER — OFFICE VISIT (OUTPATIENT)
Dept: PODIATRY | Age: 45
End: 2020-12-11
Payer: COMMERCIAL

## 2020-12-11 VITALS
HEIGHT: 65 IN | BODY MASS INDEX: 31.49 KG/M2 | SYSTOLIC BLOOD PRESSURE: 121 MMHG | WEIGHT: 189 LBS | DIASTOLIC BLOOD PRESSURE: 77 MMHG | HEART RATE: 62 BPM

## 2020-12-11 PROCEDURE — 1036F TOBACCO NON-USER: CPT | Performed by: STUDENT IN AN ORGANIZED HEALTH CARE EDUCATION/TRAINING PROGRAM

## 2020-12-11 PROCEDURE — G8427 DOCREV CUR MEDS BY ELIG CLIN: HCPCS | Performed by: STUDENT IN AN ORGANIZED HEALTH CARE EDUCATION/TRAINING PROGRAM

## 2020-12-11 PROCEDURE — G8484 FLU IMMUNIZE NO ADMIN: HCPCS | Performed by: STUDENT IN AN ORGANIZED HEALTH CARE EDUCATION/TRAINING PROGRAM

## 2020-12-11 PROCEDURE — G8417 CALC BMI ABV UP PARAM F/U: HCPCS | Performed by: STUDENT IN AN ORGANIZED HEALTH CARE EDUCATION/TRAINING PROGRAM

## 2020-12-11 PROCEDURE — 99212 OFFICE O/P EST SF 10 MIN: CPT | Performed by: STUDENT IN AN ORGANIZED HEALTH CARE EDUCATION/TRAINING PROGRAM

## 2020-12-11 PROCEDURE — 11720 DEBRIDE NAIL 1-5: CPT | Performed by: STUDENT IN AN ORGANIZED HEALTH CARE EDUCATION/TRAINING PROGRAM

## 2020-12-11 PROCEDURE — 99213 OFFICE O/P EST LOW 20 MIN: CPT | Performed by: STUDENT IN AN ORGANIZED HEALTH CARE EDUCATION/TRAINING PROGRAM

## 2020-12-11 NOTE — PROGRESS NOTES
Patient instructed to remove shoes and socks and instructed to sit in exam chair. Current PCP is Daniel James MD and date of last visit was 3/31/2020. Do you have a follow up visit scheduled?   No
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
## 2020-12-21 RX ORDER — LISINOPRIL 5 MG/1
TABLET ORAL
Qty: 30 TABLET | Refills: 5 | Status: SHIPPED | OUTPATIENT
Start: 2020-12-21 | End: 2021-02-04 | Stop reason: SDUPTHER

## 2020-12-21 NOTE — TELEPHONE ENCOUNTER
Last visit:   Last Med refill:   Does patient have enough medication for 72 hours: No:     Next Visit Date:  Future Appointments   Date Time Provider Amira Nelsoni   12/21/2020  3:45 PM Darryl Cesar MD [address] [place] [address] Maintenance   Topic Date Due    Pneumococcal 0-64 years Vaccine (1 of 1 - PPSV23) 05/06/1981    HIV screen  05/06/1990    DTaP/Tdap/Td vaccine (1 - Tdap) 05/06/1994    Lipid screen  10/03/2019    Flu vaccine (1) 09/01/2020    A1C test (Diabetic or Prediabetic)  03/05/2021    Potassium monitoring  06/26/2021    Creatinine monitoring  06/26/2021    Cervical cancer screen  09/04/2021    Hepatitis A vaccine  Aged Out    Hepatitis B vaccine  Aged Out    Hib vaccine  Aged Out    Meningococcal (ACWY) vaccine  Aged Out       Hemoglobin A1C (%)   Date Value   03/05/2020 6.1   12/18/2017 5.9   07/23/2014 5.7             ( goal A1C is < 7)   No results found for: LABMICR  LDL Cholesterol (mg/dL)   Date Value   10/03/2018 42   12/18/2017 136 (H)       (goal LDL is <100)   AST (U/L)   Date Value   06/26/2020 13     ALT (U/L)   Date Value   06/26/2020 11     BUN (mg/dL)   Date Value   06/26/2020 9     BP Readings from Last 3 Encounters:   12/11/20 121/77   09/04/20 134/79   08/17/20 127/79          (goal 120/80)    All Future Testing planned in [facility]  Lab Frequency Next Occurrence   VL ARTERIAL PVR LOWER WO EXERCISE Once 05/03/2021               Patient Active Problem List:     Migraine headache with aura     HTN (hypertension)     Obesity     Menometrorrhagia     Intertriginous candidiasis     Depression     Claudication in peripheral vascular disease (HCC)     Left popliteal artery occlusion (HCC)     Hypercoagulable state ([address]     Hemorrhage of rectum and anus     Anemia     Foot pain     Wound of right leg     Takotsubo cardiomyopathy     Chest pain     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 If I can be of assistance, please route to the applicable pool. Thank you.

## 2020-12-24 RX ORDER — ATORVASTATIN CALCIUM 40 MG/1
TABLET, FILM COATED ORAL
Qty: 30 TABLET | Refills: 0 | Status: SHIPPED | OUTPATIENT
Start: 2020-12-24 | End: 2021-02-04

## 2020-12-24 NOTE — TELEPHONE ENCOUNTER
Carlos Request for pending medication.     Last Visit Date: 3/31/2020  Next Visit Date:  Future Appointments   Date Time Provider Amira English   1/4/2021  3:30 PM Terry Torres MD [address] [place] [address] Maintenance   Topic Date Due    Hepatitis C screen  1975    Pneumococcal 0-64 years Vaccine (1 of 1 - PPSV23) 05/06/1981    HIV screen  05/06/1990    DTaP/Tdap/Td vaccine (1 - Tdap) 05/06/1994    Lipid screen  10/03/2019    Flu vaccine (1) 09/01/2020    A1C test (Diabetic or Prediabetic)  03/05/2021    Potassium monitoring  06/26/2021    Creatinine monitoring  06/26/2021    Cervical cancer screen  09/04/2021    Hepatitis A vaccine  Aged Out    Hepatitis B vaccine  Aged Out    Hib vaccine  Aged Out    Meningococcal (ACWY) vaccine  Aged Out       Hemoglobin A1C (%)   Date Value   03/05/2020 6.1   12/18/2017 5.9   07/23/2014 5.7             ( goal A1C is < 7)   No results found for: LABMICR  LDL Cholesterol (mg/dL)   Date Value   10/03/2018 42       (goal LDL is <100)   AST (U/L)   Date Value   06/26/2020 13     ALT (U/L)   Date Value   06/26/2020 11     BUN (mg/dL)   Date Value   06/26/2020 9     BP Readings from Last 3 Encounters:   12/11/20 121/77   09/04/20 134/79   08/17/20 127/79          (goal 120/80)    All Future Testing planned in [facility]  Lab Frequency Next Occurrence   VL ARTERIAL PVR LOWER WO EXERCISE Once 05/03/2021       Next Visit Date:  Future Appointments   Date Time Provider Amira English   1/4/2021  3:30 PM Terry Torres MD [facility]         Patient Active Problem List:     Migraine headache with aura     HTN (hypertension)     Obesity     Menometrorrhagia     Intertriginous candidiasis     Depression     Claudication in peripheral vascular disease ([address]     Left popliteal artery occlusion (HCC)     Hypercoagulable state ([facility]     Hemorrhage of rectum and anus     Anemia     Foot pain     Wound of right leg     Takotsubo cardiomyopathy     Chest pain 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

## 2021-02-02 NOTE — TELEPHONE ENCOUNTER
Last visit:   Last Med refill:   Does patient have enough medication for 72 hours: No: Next appt 2-4-21    Next Visit Date:  Future Appointments   Date Time Provider Amira English   2/4/2021  3:30 PM Jose Cao MD [address] [place] [address] Maintenance   Topic Date Due    Hepatitis C screen  1975    Pneumococcal 0-64 years Vaccine (1 of 1 - PPSV23) 05/06/1981    HIV screen  05/06/1990    DTaP/Tdap/Td vaccine (1 - Tdap) 05/06/1994    Flu vaccine (1) 09/01/2020    A1C test (Diabetic or Prediabetic)  03/05/2021    Potassium monitoring  06/26/2021    Creatinine monitoring  06/26/2021    Cervical cancer screen  09/04/2021    Lipid screen  10/03/2023    Hepatitis A vaccine  Aged Out    Hepatitis B vaccine  Aged Out    Hib vaccine  Aged Out    Meningococcal (ACWY) vaccine  Aged Out       Hemoglobin A1C (%)   Date Value   03/05/2020 6.1   12/18/2017 5.9   07/23/2014 5.7             ( goal A1C is < 7)   No results found for: LABMICR  LDL Cholesterol (mg/dL)   Date Value   10/03/2018 42   12/18/2017 136 (H)       (goal LDL is <100)   AST (U/L)   Date Value   06/26/2020 13     ALT (U/L)   Date Value   06/26/2020 11     BUN (mg/dL)   Date Value   06/26/2020 9     BP Readings from Last 3 Encounters:   12/11/20 121/77   09/04/20 134/79   08/17/20 127/79          (goal 120/80)    All Future Testing planned in [facility]  Lab Frequency Next Occurrence   VL ARTERIAL PVR LOWER WO EXERCISE Once 05/03/2021               Patient Active Problem List:     Migraine headache with aura     HTN (hypertension)     Obesity     Menometrorrhagia     Intertriginous candidiasis     Depression     Claudication in peripheral vascular disease (HCC)     Left popliteal artery occlusion (HCC)     Hypercoagulable state ([address]     Hemorrhage of rectum and anus     Anemia     Foot pain     Wound of right leg     Takotsubo cardiomyopathy     Chest pain     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 If I can be of assistance, please route to the applicable pool. Thank you.

## 2021-02-04 ENCOUNTER — OFFICE VISIT (OUTPATIENT)
Dept: FAMILY MEDICINE CLINIC | Age: 46
End: 2021-02-04
Payer: COMMERCIAL

## 2021-02-04 VITALS
TEMPERATURE: 97 F | WEIGHT: 188.4 LBS | SYSTOLIC BLOOD PRESSURE: 126 MMHG | BODY MASS INDEX: 31.39 KG/M2 | HEIGHT: 65 IN | HEART RATE: 96 BPM | DIASTOLIC BLOOD PRESSURE: 79 MMHG

## 2021-02-04 DIAGNOSIS — M79.671 RIGHT FOOT PAIN: Primary | ICD-10-CM

## 2021-02-04 DIAGNOSIS — I10 ESSENTIAL HYPERTENSION: ICD-10-CM

## 2021-02-04 DIAGNOSIS — I74.3 FEMOROPOPLITEAL ARTERIAL THROMBOSIS OF LEFT LOWER EXTREMITY (HCC): ICD-10-CM

## 2021-02-04 PROCEDURE — 99213 OFFICE O/P EST LOW 20 MIN: CPT | Performed by: STUDENT IN AN ORGANIZED HEALTH CARE EDUCATION/TRAINING PROGRAM

## 2021-02-04 PROCEDURE — 99211 OFF/OP EST MAY X REQ PHY/QHP: CPT | Performed by: FAMILY MEDICINE

## 2021-02-04 RX ORDER — ATORVASTATIN CALCIUM 40 MG/1
TABLET, FILM COATED ORAL
Qty: 30 TABLET | Refills: 0 | Status: SHIPPED | OUTPATIENT
Start: 2021-02-04 | End: 2021-02-04 | Stop reason: SDUPTHER

## 2021-02-04 RX ORDER — AMLODIPINE BESYLATE 10 MG/1
TABLET ORAL
Qty: 90 TABLET | Refills: 2 | Status: SHIPPED | OUTPATIENT
Start: 2021-02-04 | End: 2022-01-19 | Stop reason: SDUPTHER

## 2021-02-04 RX ORDER — ATORVASTATIN CALCIUM 40 MG/1
TABLET, FILM COATED ORAL
Qty: 30 TABLET | Refills: 3 | Status: SHIPPED | OUTPATIENT
Start: 2021-02-04 | End: 2021-07-06

## 2021-02-04 RX ORDER — LISINOPRIL 5 MG/1
TABLET ORAL
Qty: 30 TABLET | Refills: 5 | Status: SHIPPED | OUTPATIENT
Start: 2021-02-04 | End: 2022-02-21 | Stop reason: SDUPTHER

## 2021-02-04 ASSESSMENT — ENCOUNTER SYMPTOMS
COUGH: 0
ABDOMINAL PAIN: 0
NAUSEA: 0
SHORTNESS OF BREATH: 0
VOMITING: 0
SORE THROAT: 0
CHEST TIGHTNESS: 0
CONSTIPATION: 0
DIARRHEA: 0

## 2021-02-04 NOTE — PROGRESS NOTES
I have reviewed and discussed key elements of [address] [place] [address] with the resident including plan of care and follow up and agree with the care geno plan.

## 2021-02-04 NOTE — PROGRESS NOTES
Visit Information    Have you changed or started any medications since your last visit including any over-the-counter medicines, vitamins, or herbal medicines? no   Have you stopped taking any of your medications? Is so, why? -  no  Are you having any side effects from any of your medications? - no    Have you seen any other physician or provider since your last visit? yes - Podiatry    Have you had any other diagnostic tests since your last visit?  no   Have you been seen in the emergency room and/or had an admission in a hospital since we last saw you?  no   Have you had your routine dental cleaning in the past 6 months?  no     Do you have an active MyChart account? If no, what is the barrier?   Yes    Patient Care Team:  Jonatan Valencia MD as PCP - General (Family Medicine)    Medical History Review  Past Medical, Family, and Social History reviewed and does contribute to the patient presenting condition    Health Maintenance   Topic Date Due    Hepatitis C screen  1975    Pneumococcal 0-64 years Vaccine (1 of 1 - PPSV23) 05/06/1981    HIV screen  05/06/1990    DTaP/Tdap/Td vaccine (1 - Tdap) 05/06/1994    Lipid screen  10/03/2019    Flu vaccine (1) 09/01/2020    A1C test (Diabetic or Prediabetic)  03/05/2021    Potassium monitoring  06/26/2021    Creatinine monitoring  06/26/2021    Cervical cancer screen  09/04/2021    Hepatitis A vaccine  Aged Out    Hepatitis B vaccine  Aged Out    Hib vaccine  Aged Out    Meningococcal (ACWY) vaccine  Aged Out

## 2021-02-04 NOTE — PROGRESS NOTES
Subjective:    Preethi Wolf is a 39 y.o. female with  has a past medical history of Abnormal Pap smear of cervix, Anemia, Chronic back pain, Claudication (HCC), Claudication in peripheral vascular disease ([address] Depression, Headache(784.0), Heart murmur, Hemorrhage of rectum and anus, HTN (hypertension), Hx of blood clots, Hypercoagulable state ([address] Hyperlipidemia, Hypertension, Intertriginous candidiasis, Left popliteal artery occlusion ([address] Leg pain, Menometrorrhagia, Migraine headache with aura, Obesity, Osteoarthritis, PAD (peripheral artery disease) ([address] PVD (peripheral vascular disease) ([address] Takotsubo cardiomyopathy, and Wound of right leg. Family History   Problem Relation Age of Onset    Diabetes Mother     High Blood Pressure Mother     Heart Attack Mother     Heart Disease Mother     Kidney Disease Mother     High Blood Pressure Father     Heart Disease Father     Heart Attack Father     Diabetes Sister     High Blood Pressure Sister     Diabetes Sister     High Blood Pressure Sister        Presented tothe office today for:  Chief Complaint   Patient presents with    Annual Exam    Medication Refill    Foot Pain     right foot pain 8/10, pt follows w Podiatry        HPI    Chief Complaint: Right foot pain  When did it happen? 1 month  Mechanism? After vascular surgery Sept 2019  Location: Right foot  Intensity: 8/10  Quality: throbbing  Radiation:None  Getting better, worse or staying the same? worse  Aggravating: Walking and tight shoes  Treatments/Alleviating/Medications: not walking on it or not wearing shoes alleviates pain          Review of Systems   Constitutional: Negative for chills, fatigue, fever and unexpected weight change. HENT: Negative for congestion, mouth sores and sore throat. Eyes: Negative for visual disturbance. Respiratory: Negative for cough, chest tightness and shortness of breath. Cardiovascular: Negative for chest pain and leg swelling. Gastrointestinal: Negative for abdominal pain, constipation, diarrhea, nausea and vomiting. Genitourinary: Negative for difficulty urinating. Musculoskeletal: Negative for joint swelling. Left leg pain   Skin: Negative for rash. Neurological: Negative for dizziness, weakness and headaches. Objective:    /79 (Site: Left Upper Arm, Position: Sitting, Cuff Size: Large Adult)   Pulse 96   Temp 97 °F (36.1 °C) (Temporal)   Ht 5' 5\" (1.651 m)   Wt 188 lb 6.4 oz (85.5 kg)   BMI 31.35 kg/m²    BP Readings from Last 3 Encounters:   02/04/21 126/79   12/11/20 121/77   09/04/20 134/79     Physical Exam  Vitals signs and nursing note reviewed. Constitutional:       Appearance: She is well-developed. Cardiovascular:      Rate and Rhythm: Normal rate and regular rhythm. Heart sounds: Normal heart sounds. No murmur. No friction rub. No gallop. Pulmonary:      Effort: Pulmonary effort is normal. No respiratory distress. Breath sounds: Normal breath sounds. No wheezing or rales. Chest:      Chest wall: No tenderness. Abdominal:      General: Bowel sounds are normal. There is no distension. Palpations: Abdomen is soft. There is no mass. Tenderness: There is no abdominal tenderness. There is no guarding. Musculoskeletal:      Comments: Right foot exam:  - Vascualr surgery performed; Medial and later scars present  - No DP or PT pulses appreciated on right foot  - no calf pain  - Amputated toes   Skin:     Capillary Refill: Capillary refill takes less than 2 seconds. Neurological:      Mental Status: She is alert and oriented to person, place, and time.            Lab Results   Component Value Date    WBC 9.1 06/26/2020    HGB 12.9 06/26/2020    HCT 39.3 06/26/2020     06/26/2020    CHOL 98 10/03/2018    TRIG 137 10/03/2018    HDL 29 (L) 10/03/2018    LDLDIRECT 108 (H) 05/13/2013    ALT 11 06/26/2020    AST 13 06/26/2020     06/26/2020 K 3.5 (L) 06/26/2020     06/26/2020    CREATININE 0.76 06/26/2020    BUN 9 06/26/2020    CO2 18 (L) 06/26/2020    TSH 1.69 11/16/2019    INR 0.9 08/03/2019    LABA1C 6.1 03/05/2020     Lab Results   Component Value Date    CALCIUM 8.9 06/26/2020    PHOS 2.7 01/17/2018     Lab Results   Component Value Date    LDLCHOLESTEROL 42 10/03/2018    LDLDIRECT 108 (H) 05/13/2013       Assessment and Plan:    1. Right foot pain  - VL DUP LOWER EXTREMITY ARTERIES BILATERAL; Future  - Hx of vascular surgery to right leg  - Pt instructed to call Dr. Emily Russell for further management and care. Pt has Dr. Lion Tee office number and will call after visit today. Pt understands importance of making appointment with Dr. Emily Russell using teach back method. 2. Essential hypertension  - lisinopril (PRINIVIL;ZESTRIL) 5 MG tablet; take 1 tablet by mouth once daily  Dispense: 30 tablet; Refill: 5  - amLODIPine (NORVASC) 10 MG tablet; take 1 tablet by mouth once daily  Dispense: 90 tablet; Refill: 2    3. 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 Kath Kowalski in  2018 & 2019  - atorvastatin (LIPITOR) 40 MG tablet; take 1 tablet by mouth once daily  Dispense: 30 tablet; Refill: 3  - apixaban (ELIQUIS) 5 MG TABS tablet; Take 1 tablet by mouth 2 times daily  Dispense: 180 tablet;  Refill: 3          Requested Prescriptions     Signed Prescriptions Disp Refills    atorvastatin (LIPITOR) 40 MG tablet 30 tablet 3     Sig: take 1 tablet by mouth once daily    lisinopril (PRINIVIL;ZESTRIL) 5 MG tablet 30 tablet 5     Sig: take 1 tablet by mouth once daily    amLODIPine (NORVASC) 10 MG tablet 90 tablet 2     Sig: take 1 tablet by mouth once daily    apixaban (ELIQUIS) 5 MG TABS tablet 180 tablet 3     Sig: Take 1 tablet by mouth 2 times daily       Medications Discontinued During This Encounter   Medication Reason    apixaban (ELIQUIS) 5 MG TABS tablet REORDER  amLODIPine (NORVASC) 10 MG tablet REORDER    lisinopril (PRINIVIL;ZESTRIL) 5 MG tablet REORDER    atorvastatin (LIPITOR) 40 MG tablet REORDER       Return in about 4 weeks (around 3/4/2021) for foot pain f/uRoby Beasley received counseling on the following healthy behaviors: nutrition and exercise  Reviewed prior labs and health maintenance  Continue current medications, diet and exercise. Discussed use, benefit, and side effects of prescribed medications. Barriers to medication compliance addressed. Patient given educational materials - see patient instructions  Was a self-tracking handout given in paper form or via [employer]? Yes    Requested Prescriptions     Signed Prescriptions Disp Refills    atorvastatin (LIPITOR) 40 MG tablet 30 tablet 3     Sig: take 1 tablet by mouth once daily    lisinopril (PRINIVIL;ZESTRIL) 5 MG tablet 30 tablet 5     Sig: take 1 tablet by mouth once daily    amLODIPine (NORVASC) 10 MG tablet 90 tablet 2     Sig: take 1 tablet by mouth once daily    apixaban (ELIQUIS) 5 MG TABS tablet 180 tablet 3     Sig: Take 1 tablet by mouth 2 times daily       All patient questions answered. Patient voiced understanding. Quality Measures    Body mass index is 31.35 kg/m². Normal. Weight control planned discussed Healthy diet and regular exercise. BP: 126/79 Blood pressure is normal. Treatment plan consists of No treatment change needed.     Lab Results   Component Value Date    LDLCHOLESTEROL 42 10/03/2018    LDLDIRECT 108 (H) 05/13/2013    (goal LDL reduction with dx if diabetes is 50% LDL reduction)      PHQ Scores 10/19/2018   PHQ2 Score 0   PHQ9 Score 0     Interpretation of Total Score Depression Severity: 1-4 = Minimal depression, 5-9 = Mild depression, 10-14 = Moderate depression, 15-19 = Moderately severe depression, 20-27 = Severe depression

## 2021-02-08 ENCOUNTER — HOSPITAL ENCOUNTER (OUTPATIENT)
Dept: VASCULAR LAB | Age: 46
Discharge: HOME | End: 2021-02-08
Payer: COMMERCIAL

## 2021-02-08 ENCOUNTER — OFFICE VISIT (OUTPATIENT)
Dept: VASCULAR SURGERY | Age: 46
End: 2021-02-08
Payer: COMMERCIAL

## 2021-02-08 VITALS
WEIGHT: 188 LBS | BODY MASS INDEX: 31.32 KG/M2 | TEMPERATURE: 98.4 F | OXYGEN SATURATION: 93 % | HEIGHT: 65 IN | SYSTOLIC BLOOD PRESSURE: 136 MMHG | HEART RATE: 75 BPM | DIASTOLIC BLOOD PRESSURE: 82 MMHG | RESPIRATION RATE: 18 BRPM

## 2021-02-08 DIAGNOSIS — M79.671 RIGHT FOOT PAIN: ICD-10-CM

## 2021-02-08 DIAGNOSIS — I73.9 PAD (PERIPHERAL ARTERY DISEASE) (HCC): Primary | ICD-10-CM

## 2021-02-08 PROCEDURE — 99214 OFFICE O/P EST MOD 30 MIN: CPT | Performed by: SURGERY

## 2021-02-08 PROCEDURE — G8427 DOCREV CUR MEDS BY ELIG CLIN: HCPCS | Performed by: SURGERY

## 2021-02-08 PROCEDURE — 93923 UPR/LXTR ART STDY 3+ LVLS: CPT

## 2021-02-08 PROCEDURE — G8417 CALC BMI ABV UP PARAM F/U: HCPCS | Performed by: SURGERY

## 2021-02-08 PROCEDURE — G8484 FLU IMMUNIZE NO ADMIN: HCPCS | Performed by: SURGERY

## 2021-02-08 PROCEDURE — 1036F TOBACCO NON-USER: CPT | Performed by: SURGERY

## 2021-02-08 ASSESSMENT — ENCOUNTER SYMPTOMS
CHEST TIGHTNESS: 0
COLOR CHANGE: 0
ALLERGIC/IMMUNOLOGIC NEGATIVE: 1
SHORTNESS OF BREATH: 0
ABDOMINAL PAIN: 0
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
## 2021-02-19 ENCOUNTER — TELEPHONE (OUTPATIENT)
Dept: PODIATRY | Age: 46
End: 2021-02-19

## 2021-02-19 ENCOUNTER — OFFICE VISIT (OUTPATIENT)
Dept: PODIATRY | Age: 46
End: 2021-02-19
Payer: COMMERCIAL

## 2021-02-19 VITALS
BODY MASS INDEX: 30.99 KG/M2 | HEIGHT: 65 IN | SYSTOLIC BLOOD PRESSURE: 120 MMHG | WEIGHT: 186 LBS | DIASTOLIC BLOOD PRESSURE: 76 MMHG | HEART RATE: 77 BPM

## 2021-02-19 DIAGNOSIS — M20.41 HAMMERTOE OF SECOND TOE OF RIGHT FOOT: ICD-10-CM

## 2021-02-19 DIAGNOSIS — L85.9 HYPERKERATOSIS: ICD-10-CM

## 2021-02-19 DIAGNOSIS — Z01.818 PRE-OP EVALUATION: Primary | ICD-10-CM

## 2021-02-19 DIAGNOSIS — S98.131A AMPUTATED TOE OF RIGHT FOOT (HCC): ICD-10-CM

## 2021-02-19 PROCEDURE — 99212 OFFICE O/P EST SF 10 MIN: CPT | Performed by: STUDENT IN AN ORGANIZED HEALTH CARE EDUCATION/TRAINING PROGRAM

## 2021-02-19 PROCEDURE — G8427 DOCREV CUR MEDS BY ELIG CLIN: HCPCS | Performed by: STUDENT IN AN ORGANIZED HEALTH CARE EDUCATION/TRAINING PROGRAM

## 2021-02-19 PROCEDURE — 1036F TOBACCO NON-USER: CPT | Performed by: STUDENT IN AN ORGANIZED HEALTH CARE EDUCATION/TRAINING PROGRAM

## 2021-02-19 PROCEDURE — 99214 OFFICE O/P EST MOD 30 MIN: CPT | Performed by: STUDENT IN AN ORGANIZED HEALTH CARE EDUCATION/TRAINING PROGRAM

## 2021-02-19 PROCEDURE — G8484 FLU IMMUNIZE NO ADMIN: HCPCS | Performed by: STUDENT IN AN ORGANIZED HEALTH CARE EDUCATION/TRAINING PROGRAM

## 2021-02-19 PROCEDURE — G8417 CALC BMI ABV UP PARAM F/U: HCPCS | Performed by: STUDENT IN AN ORGANIZED HEALTH CARE EDUCATION/TRAINING PROGRAM

## 2021-02-19 NOTE — PROGRESS NOTES
Patient instructed to remove shoes and socks, instructed to sit in exam chair. Current PCP name is Ken Gillespie and date of last visit )2/4/2021. Do you have a follow up visit scheduled?   Yes     If yes the date is 3/4/2021

## 2021-02-19 NOTE — TELEPHONE ENCOUNTER
Spoke with patient to inform of testing and surgery dates and times. Informed of instructions, patient voiced understanding.

## 2021-02-19 NOTE — PROGRESS NOTES
[address]  [address] [place] [address], Doug S Lan Campbell  Tel: [phone]   Fax: [phone]    Subjective     CC: R 2nd digit pain     Interval history: 2/19/2021  Patient presents to clinic today due to 2nd digit pain of the right foot. Patient has history of RLE fasciotomies, right partial hallux amputation, right 3rd digit amputation, and bilateral LE DVTs. Patient reports the 2nd digit pain has progressively worsened and toe padding provides no relief. Patient would like to move forward with surgery to reduce the pain, which was discussed at her last appointment. Since her last appointment she also saw Dr. Celeste Oppenheim in-office who cleared patient to move forward with surgical intervention from a vascular standpoint. Patient denies any new open wounds or lesions. HPI:  Mateo Amador is a 39y.o. year old female who presents to clinic today for routine f/u s/p R foot partial hallux and 3rd digit amp (DOS: 8/22/19, Dr. Minoo Monk). Patient's has healed surgical site and only complaint is painful contracted right 2nd digit hammertoe; states it rubs on shoes and most painful aspect is the tip. She follows with Dr. Celeste Oppenheim who recommended Podiatry f/u for options regarding HT. Dr. Celeste Oppenheim performed RLE angio in 8/2019 for acute ischemia of RLE and patient is on life-long Eliquis. She denies being a smoker or diabetic. She relates cause of ischemia was unknown and she has been seen by a hematologist. Patient denies any current wounds or signs of infection. Primary care physician is Dashawn Mas MD.    ROS:    Constitutional: Denies nausea, vomiting, fever, chills. Neurologic: Patient states numbness tingling in the right foot, denies in left foot. Vascular: Denies claudication symptoms  Skin: S/P right distal hallux amputation and right third digit partial amputation  Otherwise negative except as noted in the HPI.      PMH:  Past Medical History:   Diagnosis Date    Abnormal Pap smear of [place]es Davidson DPM at [address] [place] [id number] VASCULAR SURGERY Left 01/16/2018    re-exploration femerol-perioneal vein bypass/intra op angiogram       Social History:  Social History     Tobacco Use    Smoking status: Never Smoker    Smokeless tobacco: Never Used   Substance Use Topics    Alcohol use: Not Currently     Comment: occasionally     Drug use: No       Medications:  Prior to Admission medications    Medication Sig Start Date End Date Taking? Authorizing Provider   atorvastatin (LIPITOR) 40 MG tablet take 1 tablet by mouth once daily 2/4/21  Yes Sohail Gregorio MD   lisinopril (PRINIVIL;ZESTRIL) 5 MG tablet take 1 tablet by mouth once daily 2/4/21  Yes Sohail Gregorio MD   amLODIPine (NORVASC) 10 MG tablet take 1 tablet by mouth once daily 2/4/21  Yes Sohail Gregorio MD   apixaban (ELIQUIS) 5 MG TABS tablet Take 1 tablet by mouth 2 times daily 2/4/21  Yes Sohail Gregorio MD   lidocaine (LIDODERM) 5 % Place 1 patch onto the skin daily 12 hours on, 12 hours off. Patient not taking: Reported on 2/19/2021 6/26/20   Sherlene Patch, DO       Objective     Vitals:    02/19/21 1329   BP: 120/76   Pulse: 77       Lab Results   Component Value Date    LABA1C 6.1 03/05/2020     Physical Exam:  General:  Alert and oriented x3. In no acute distress. Lower Extremity Physical Exam:    Vascular: R PT pulse palpable, DP non-palpable but audible on doppler. PT and DP palpable on the left. CFT <3 seconds to remaining digits on R foot and 1-5 on the left. No edema, Bilateral.  Hair growth is absent to the level of the digits, Bilateral.     Neuro: Saph/sural/SP/DP/plantar sensation slightly diminished. Musculoskeletal: EHL/FHL/GS/TA gross motor intact. S/p partial hallux and 3rd digit amputation right foot. R 2nd digit is contracted non-reducible at PIPJ with TTP at distal tuft. Nonreducible contracted 4th and 5th digits of right foot.     Dermatologic: No open wounds present to the feet bilaterally. Well-healed scars at previous amp and fasciotomy sites. Hyperkeratotic tissue at distal 2nd digit. NIVS: 2/8/2021  FELIPA-R 0.97, L 0.62  TBI-R 0.89, L 0.35    Assessment   Gale Nicole is a 39 y.o. female with     Diagnosis Orders   1. Pre-op evaluation  XR FOOT RIGHT (MIN 3 VIEWS)   2. Amputated toe of right foot ([address]     3. Hammertoe of second toe of right foot     4. Hyperkeratosis       Plan   · Labs & images reviewed. · Patient examined and evaluated w/ Dr. Maite Gee  · Diagnosis and treatment options discussed in detail  · Continue ambulation in supportive athletic shoes   · Discussed with patient conservative vs surgical intervention for right 2nd digit-partial 2nd digit amputation of right foot versus orthopedic extra depth shoes with custom molded inserts. Patient understands and has elected to move forward with surgical intervention at this time. · Educated pt to monitor for signs of infection/wounds, notify clinic or present to ED if they occur  · Per Dr. Caitlyn Johnson note, patient to hold Eliquis 1 day prior to surgery and resume the following day after surgery. · Plan is for right 2nd digit partial amputation. Following surgery patient will need custom shoes & inserts to prevent future breakdown. · Right foot xray ordered and to be obtained prior to surgery. · RTC post op      No orders of the defined types were placed in this encounter.     Orders Placed This Encounter   Procedures    XR FOOT RIGHT (MIN 3 VIEWS)     Standing Status:   Future     Standing Expiration Date:   2/19/2022     Scheduling Instructions:      Weight bearing     Order Specific Question:   Reason for exam:     Answer:   Pre-op Robe Salgado DPM PGY2  Podiatric Medicine & Surgery   2/21/2021 at 8:31 PM

## 2021-02-26 ENCOUNTER — HOSPITAL ENCOUNTER (OUTPATIENT)
Dept: PREADMISSION TESTING | Age: 46
Discharge: HOME | End: 2021-03-02
Payer: COMMERCIAL

## 2021-02-26 VITALS
DIASTOLIC BLOOD PRESSURE: 73 MMHG | SYSTOLIC BLOOD PRESSURE: 123 MMHG | OXYGEN SATURATION: 100 % | BODY MASS INDEX: 31.42 KG/M2 | WEIGHT: 188.6 LBS | RESPIRATION RATE: 16 BRPM | HEART RATE: 97 BPM | HEIGHT: 65 IN

## 2021-02-26 LAB
ANION GAP SERPL CALCULATED.3IONS-SCNC: 12 MMOL/L (ref 9–17)
BUN BLDV-MCNC: 10 MG/DL (ref 6–20)
CHLORIDE BLD-SCNC: 104 MMOL/L (ref 98–107)
CO2: 24 MMOL/L (ref 20–31)
CREAT SERPL-MCNC: 0.64 MG/DL (ref 0.5–0.9)
GFR AFRICAN AMERICAN: >60 ML/MIN
GFR NON-AFRICAN AMERICAN: >60 ML/MIN
GFR SERPL CREATININE-BSD FRML MDRD: NORMAL ML/MIN/{1.73_M2}
GFR SERPL CREATININE-BSD FRML MDRD: NORMAL ML/MIN/{1.73_M2}
HCT VFR BLD CALC: 40.2 % (ref 36.3–47.1)
HEMOGLOBIN: 13 G/DL (ref 11.9–15.1)
MCH RBC QN AUTO: 29.3 PG (ref 25.2–33.5)
MCHC RBC AUTO-ENTMCNC: 32.3 G/DL (ref 28.4–34.8)
MCV RBC AUTO: 90.5 FL (ref 82.6–102.9)
NRBC AUTOMATED: 0 PER 100 WBC
PDW BLD-RTO: 13.2 % (ref 11.8–14.4)
PLATELET # BLD: 364 K/UL (ref 138–453)
PMV BLD AUTO: 10.2 FL (ref 8.1–13.5)
POTASSIUM SERPL-SCNC: 4.1 MMOL/L (ref 3.7–5.3)
RBC # BLD: 4.44 M/UL (ref 3.95–5.11)
SODIUM BLD-SCNC: 140 MMOL/L (ref 135–144)
WBC # BLD: 8.2 K/UL (ref 3.5–11.3)

## 2021-02-26 PROCEDURE — 93005 ELECTROCARDIOGRAM TRACING: CPT | Performed by: ANESTHESIOLOGY

## 2021-02-26 PROCEDURE — 36415 COLL VENOUS BLD VENIPUNCTURE: CPT

## 2021-02-26 PROCEDURE — 84520 ASSAY OF UREA NITROGEN: CPT

## 2021-02-26 PROCEDURE — 85027 COMPLETE CBC AUTOMATED: CPT

## 2021-02-26 PROCEDURE — 80051 ELECTROLYTE PANEL: CPT

## 2021-02-26 PROCEDURE — 82565 ASSAY OF CREATININE: CPT

## 2021-02-26 ASSESSMENT — PAIN SCALES - GENERAL: PAINLEVEL_OUTOF10: 8

## 2021-02-26 ASSESSMENT — PAIN DESCRIPTION - LOCATION: LOCATION: FOOT

## 2021-02-26 ASSESSMENT — PAIN DESCRIPTION - DESCRIPTORS: DESCRIPTORS: BURNING

## 2021-02-26 ASSESSMENT — PAIN DESCRIPTION - ORIENTATION: ORIENTATION: RIGHT

## 2021-02-26 NOTE — PRE-PROCEDURE INSTRUCTIONS
surgery:      You will need to shower with warm water (not hot) and the CHG soap.  Use a clean wash cloth and a clean towel. Have clean clothes available to put on after the shower.   First wash your hair with regular shampoo. Rinse your hair and body thoroughly to remove the shampoo.  Wash your face and genital area (private parts) with your regular soap or water only. Thoroughly rinse your body with warm water from the neck down.  Turn water off to prevent rinsing the soap off too soon.  With a clean wet washcloth and half of the CHG soap in the bottle, lather your entire body from the neck down. Do not use CHG soap near your eyes or ears to avoid injury to those areas.  Wash thoroughly, paying special attention to the area where your surgery will be performed.  Wash your body gently for five (5) minutes. Avoid scrubbing your skin too hard.  Turn the water back on and rinse your body thoroughly.  Pat yourself dry with a clean, soft towel. Do not apply lotion, cream or powder.  Dress with clean freshly washed clothes. The morning of surgery:     Repeat shower following steps above - using remaining half of CHG soap in bottle. Patient Instructions:    [facility] If you are having any type of anesthesia you are to have nothing to eat or drink after midnight the night before your surgery. This includes gum, hard candy, mints, water or smoking or chewing tobacco.  The only exception to this is a small sip of water to take with any morning dose of heart, blood pressure, or seizure medications. No alcoholic beverages for 24 hours prior to surgery.  Brush your teeth but do not swallow water.  Bring your eye glasses and case with you. No contacts are to be worn the day of surgery. You also may bring your hearing aids. Most surgical procedures involving anesthesia will require that you remove your dentures prior to surgery.      If you are on C-PAP or Bi-PAP at (Provider)            Date

## 2021-02-27 LAB
EKG ATRIAL RATE: 70 BPM
EKG P AXIS: -17 DEGREES
EKG P-R INTERVAL: 154 MS
EKG Q-T INTERVAL: 398 MS
EKG QRS DURATION: 88 MS
EKG QTC CALCULATION (BAZETT): 429 MS
EKG R AXIS: -6 DEGREES
EKG T AXIS: -18 DEGREES
EKG VENTRICULAR RATE: 70 BPM

## 2021-02-27 PROCEDURE — 93010 ELECTROCARDIOGRAM REPORT: CPT | Performed by: INTERNAL MEDICINE

## 2021-03-01 NOTE — TELEPHONE ENCOUNTER
[address] [employer]        NAME: Campos Romero is a 72 y o  male  : 1955    MRN: [id number]  DATE: 2021  TIME: 4:53 PM    Pulse 68   Temp (!) 96 4 °F (35 8 °C) (Tympanic)   Resp 18   Ht 5' 8" (1 727 m)   Wt 83 9 kg (185 lb)   SpO2 98%   BMI 28 13 kg/m²     Assessment and Plan   Pharyngitis, unspecified etiology [J02 9]  1  Pharyngitis, unspecified etiology  Novel Coronavirus (Covid-19),PCR [facility] - Office Collection    Influenza A/B and RSV by PCR    POCT rapid strepA    Throat culture    clindamycin (CLEOCIN) 300 MG capsule   2  Encounter for laboratory testing for COVID-19 virus  clindamycin (CLEOCIN) 300 MG capsule         Patient Instructions       Follow up with PCP in 3-5 days  Proceed to  ER if symptoms worsen  Chief Complaint     Chief Complaint   Patient presents with    COVID-19     Pt c/o fatigue, headache, sore throat, and b/l ear pain  Pt states he had COVID in January when his son brought it home but was never formally tested  History of Present Illness       Pt with sore throat body aches congestion headache and ear pain  for several days       Review of Systems   Review of Systems   Constitutional: Negative  HENT: Positive for congestion, ear pain and sore throat  Eyes: Negative  Respiratory: Negative  Cardiovascular: Negative  Gastrointestinal: Negative  Endocrine: Negative  Genitourinary: Negative  Musculoskeletal: Positive for myalgias  Skin: Negative  Allergic/Immunologic: Negative  Neurological: Negative  Hematological: Negative  Psychiatric/Behavioral: Negative  All other systems reviewed and are negative          Current Medications       Current Outpatient Medications:     amLODIPine-benazepril (LOTREL 5-10) 5-10 MG per capsule, Take by mouth, Disp: , Rfl:     apixaban (ELIQUIS) 5 mg, Take 5 mg by mouth 2 (two) times a day, Disp: , Rfl:     clindamycin (CLEOCIN) 300 MG capsule, Take 1 capsule (300 mg total) by mouth 4 Patient called requesting a refill of Eliquis. It was originally prescribed by Dr. Chyna Goodson but she stated she switched to you and wanted to know if we could refill it. It would need to go to [facility] on [address] in [place]. (four) times a day for 10 days, Disp: 40 capsule, Rfl: 0    fexofenadine (ALLEGRA) 180 MG tablet, Take 180 mg by mouth daily, Disp: , Rfl:     metoprolol succinate (TOPROL-XL) 50 mg 24 hr tablet, Take 50 mg by mouth, Disp: , Rfl:     omega-3-acid ethyl esters (Lovaza) 1 g capsule, Take by mouth, Disp: , Rfl:     rosuvastatin (CRESTOR) 20 MG tablet, Take 20 mg by mouth daily, Disp: , Rfl:     tadalafil (CIALIS) 10 MG tablet, Take 10 mg by mouth, Disp: , Rfl:     valsartan (Diovan) 40 mg tablet, Take by mouth, Disp: , Rfl:     Current Allergies     Allergies as of 03/01/2021 - Reviewed 03/01/2021   Allergen Reaction Noted    Other Other (See Comments) 03/19/2015    Penicillins Other (See Comments) 11/23/2016            The following portions of the patient's history were reviewed and updated as appropriate: allergies, current medications, past family history, past medical history, past social history, past surgical history and problem list      History reviewed  No pertinent past medical history  History reviewed  No pertinent surgical history  History reviewed  No pertinent family history  Medications have been verified  Objective   Pulse 68   Temp (!) 96 4 °F (35 8 °C) (Tympanic)   Resp 18   Ht 5' 8" (1 727 m)   Wt 83 9 kg (185 lb)   SpO2 98%   BMI 28 13 kg/m²        Physical Exam     Physical Exam  Vitals signs and nursing note reviewed  Constitutional:       Appearance: Normal appearance  He is normal weight  Comments: Will do covid strep and flu testing   HENT:      Head: Normocephalic and atraumatic  Right Ear: Tympanic membrane, ear canal and external ear normal       Left Ear: Tympanic membrane, ear canal and external ear normal       Nose: Nose normal       Mouth/Throat:      Mouth: Mucous membranes are moist       Pharynx: Posterior oropharyngeal erythema present     Eyes:      Conjunctiva/sclera: Conjunctivae normal       Pupils: Pupils are equal, round, and reactive to light  Cardiovascular:      Rate and Rhythm: Normal rate and regular rhythm  Pulses: Normal pulses  Heart sounds: Normal heart sounds  Pulmonary:      Effort: Pulmonary effort is normal       Breath sounds: Normal breath sounds  Abdominal:      General: Abdomen is flat  Bowel sounds are normal       Palpations: Abdomen is soft  Musculoskeletal: Normal range of motion  Lymphadenopathy:      Cervical: Cervical adenopathy present  Skin:     General: Skin is warm  Capillary Refill: Capillary refill takes less than 2 seconds  Neurological:      General: No focal deficit present  Mental Status: He is alert and oriented to person, place, and time     Psychiatric:         Mood and Affect: Mood normal          Behavior: Behavior normal

## 2021-03-04 ENCOUNTER — HOSPITAL ENCOUNTER (OUTPATIENT)
Age: 46
Discharge: HOME | End: 2021-03-06
Payer: COMMERCIAL

## 2021-03-04 ENCOUNTER — HOSPITAL ENCOUNTER (OUTPATIENT)
Dept: LAB | Age: 46
Setting detail: SPECIMEN
Discharge: HOME | End: 2021-03-04
Payer: COMMERCIAL

## 2021-03-04 ENCOUNTER — HOSPITAL ENCOUNTER (OUTPATIENT)
Dept: GENERAL RADIOLOGY | Age: 46
Discharge: HOME | End: 2021-03-06
Payer: COMMERCIAL

## 2021-03-04 DIAGNOSIS — Z01.818 PRE-OP EVALUATION: ICD-10-CM

## 2021-03-04 DIAGNOSIS — Z01.818 PREOP TESTING: Primary | ICD-10-CM

## 2021-03-04 PROCEDURE — 73630 X-RAY EXAM OF FOOT: CPT

## 2021-03-04 PROCEDURE — U0003 INFECTIOUS AGENT DETECTION BY NUCLEIC ACID (DNA OR RNA); SEVERE ACUTE RESPIRATORY SYNDROME CORONAVIRUS 2 (SARS-COV-2) (CORONAVIRUS DISEASE [COVID-19]), AMPLIFIED PROBE TECHNIQUE, MAKING USE OF HIGH THROUGHPUT TECHNOLOGIES AS DESCRIBED BY CMS-2020-01-R: HCPCS

## 2021-03-04 PROCEDURE — U0005 INFEC AGEN DETEC AMPLI PROBE: HCPCS

## 2021-03-05 ENCOUNTER — ANESTHESIA EVENT (OUTPATIENT)
Dept: OPERATING ROOM | Age: 46
End: 2021-03-05
Payer: COMMERCIAL

## 2021-03-05 LAB
SARS-COV-2: NORMAL
SARS-COV-2: NOT DETECTED
SOURCE: NORMAL

## 2021-03-06 ENCOUNTER — TELEPHONE (OUTPATIENT)
Dept: PRIMARY CARE CLINIC | Age: 46
End: 2021-03-06

## 2021-03-08 ENCOUNTER — ANESTHESIA (OUTPATIENT)
Dept: OPERATING ROOM | Age: 46
End: 2021-03-08
Payer: COMMERCIAL

## 2021-03-08 ENCOUNTER — HOSPITAL ENCOUNTER (OUTPATIENT)
Age: 46
Setting detail: OUTPATIENT SURGERY
Discharge: HOME | End: 2021-03-08
Attending: PODIATRIST | Admitting: PODIATRIST
Payer: COMMERCIAL

## 2021-03-08 VITALS
BODY MASS INDEX: 31.68 KG/M2 | OXYGEN SATURATION: 95 % | TEMPERATURE: 96.8 F | RESPIRATION RATE: 20 BRPM | HEART RATE: 68 BPM | SYSTOLIC BLOOD PRESSURE: 131 MMHG | DIASTOLIC BLOOD PRESSURE: 65 MMHG | WEIGHT: 190.4 LBS

## 2021-03-08 VITALS — SYSTOLIC BLOOD PRESSURE: 88 MMHG | OXYGEN SATURATION: 99 % | DIASTOLIC BLOOD PRESSURE: 55 MMHG

## 2021-03-08 DIAGNOSIS — Z98.890 STATUS POST RIGHT FOOT SURGERY: ICD-10-CM

## 2021-03-08 DIAGNOSIS — G89.18 POSTOPERATIVE PAIN: Primary | ICD-10-CM

## 2021-03-08 LAB — HCG, PREGNANCY URINE (POC): NEGATIVE

## 2021-03-08 PROCEDURE — 88305 TISSUE EXAM BY PATHOLOGIST: CPT

## 2021-03-08 PROCEDURE — 7100000010 HC PHASE II RECOVERY - FIRST 15 MIN: Performed by: PODIATRIST

## 2021-03-08 PROCEDURE — 81025 URINE PREGNANCY TEST: CPT

## 2021-03-08 PROCEDURE — 3600000012 HC SURGERY LEVEL 2 ADDTL 15MIN: Performed by: PODIATRIST

## 2021-03-08 PROCEDURE — 2709999900 HC NON-CHARGEABLE SUPPLY: Performed by: PODIATRIST

## 2021-03-08 PROCEDURE — 2580000003 HC RX 258: Performed by: ANESTHESIOLOGY

## 2021-03-08 PROCEDURE — 2500000003 HC RX 250 WO HCPCS: Performed by: PODIATRIST

## 2021-03-08 PROCEDURE — 3700000000 HC ANESTHESIA ATTENDED CARE: Performed by: PODIATRIST

## 2021-03-08 PROCEDURE — 7100000011 HC PHASE II RECOVERY - ADDTL 15 MIN: Performed by: PODIATRIST

## 2021-03-08 PROCEDURE — 2500000003 HC RX 250 WO HCPCS: Performed by: NURSE ANESTHETIST, CERTIFIED REGISTERED

## 2021-03-08 PROCEDURE — 3600000002 HC SURGERY LEVEL 2 BASE: Performed by: PODIATRIST

## 2021-03-08 PROCEDURE — 3700000001 HC ADD 15 MINUTES (ANESTHESIA): Performed by: PODIATRIST

## 2021-03-08 PROCEDURE — 88311 DECALCIFY TISSUE: CPT

## 2021-03-08 PROCEDURE — 6360000002 HC RX W HCPCS: Performed by: NURSE ANESTHETIST, CERTIFIED REGISTERED

## 2021-03-08 RX ORDER — SODIUM CHLORIDE, SODIUM LACTATE, POTASSIUM CHLORIDE, CALCIUM CHLORIDE 600; 310; 30; 20 MG/100ML; MG/100ML; MG/100ML; MG/100ML
INJECTION, SOLUTION INTRAVENOUS CONTINUOUS
Status: DISCONTINUED | OUTPATIENT
Start: 2021-03-09 | End: 2021-03-08 | Stop reason: HOSPADM

## 2021-03-08 RX ORDER — HYDROMORPHONE HCL 110MG/55ML
0.25 PATIENT CONTROLLED ANALGESIA SYRINGE INTRAVENOUS EVERY 5 MIN PRN
Status: DISCONTINUED | OUTPATIENT
Start: 2021-03-08 | End: 2021-03-08 | Stop reason: HOSPADM

## 2021-03-08 RX ORDER — PROPOFOL 10 MG/ML
INJECTION, EMULSION INTRAVENOUS CONTINUOUS PRN
Status: DISCONTINUED | OUTPATIENT
Start: 2021-03-08 | End: 2021-03-08 | Stop reason: SDUPTHER

## 2021-03-08 RX ORDER — LIDOCAINE HYDROCHLORIDE 20 MG/ML
INJECTION, SOLUTION EPIDURAL; INFILTRATION; INTRACAUDAL; PERINEURAL PRN
Status: DISCONTINUED | OUTPATIENT
Start: 2021-03-08 | End: 2021-03-08 | Stop reason: SDUPTHER

## 2021-03-08 RX ORDER — MIDAZOLAM HYDROCHLORIDE 1 MG/ML
INJECTION INTRAMUSCULAR; INTRAVENOUS PRN
Status: DISCONTINUED | OUTPATIENT
Start: 2021-03-08 | End: 2021-03-08 | Stop reason: SDUPTHER

## 2021-03-08 RX ORDER — FENTANYL CITRATE 50 UG/ML
INJECTION, SOLUTION INTRAMUSCULAR; INTRAVENOUS PRN
Status: DISCONTINUED | OUTPATIENT
Start: 2021-03-08 | End: 2021-03-08 | Stop reason: SDUPTHER

## 2021-03-08 RX ORDER — BUPIVACAINE HYDROCHLORIDE 5 MG/ML
INJECTION, SOLUTION EPIDURAL; INTRACAUDAL
Status: DISCONTINUED
Start: 2021-03-08 | End: 2021-03-08 | Stop reason: HOSPADM

## 2021-03-08 RX ORDER — LIDOCAINE HYDROCHLORIDE 10 MG/ML
1 INJECTION, SOLUTION EPIDURAL; INFILTRATION; INTRACAUDAL; PERINEURAL
Status: DISCONTINUED | OUTPATIENT
Start: 2021-03-08 | End: 2021-03-08 | Stop reason: HOSPADM

## 2021-03-08 RX ORDER — ONDANSETRON 2 MG/ML
4 INJECTION INTRAMUSCULAR; INTRAVENOUS
Status: DISCONTINUED | OUTPATIENT
Start: 2021-03-08 | End: 2021-03-08 | Stop reason: HOSPADM

## 2021-03-08 RX ORDER — CLINDAMYCIN PHOSPHATE 900 MG/50ML
INJECTION INTRAVENOUS PRN
Status: DISCONTINUED | OUTPATIENT
Start: 2021-03-08 | End: 2021-03-08 | Stop reason: SDUPTHER

## 2021-03-08 RX ORDER — SODIUM CHLORIDE 9 MG/ML
INJECTION, SOLUTION INTRAVENOUS CONTINUOUS
Status: DISCONTINUED | OUTPATIENT
Start: 2021-03-09 | End: 2021-03-08

## 2021-03-08 RX ORDER — SODIUM CHLORIDE 0.9 % (FLUSH) 0.9 %
10 SYRINGE (ML) INJECTION EVERY 12 HOURS SCHEDULED
Status: DISCONTINUED | OUTPATIENT
Start: 2021-03-08 | End: 2021-03-08 | Stop reason: HOSPADM

## 2021-03-08 RX ORDER — SODIUM CHLORIDE 0.9 % (FLUSH) 0.9 %
10 SYRINGE (ML) INJECTION PRN
Status: DISCONTINUED | OUTPATIENT
Start: 2021-03-08 | End: 2021-03-08 | Stop reason: HOSPADM

## 2021-03-08 RX ORDER — HYDROCODONE BITARTRATE AND ACETAMINOPHEN 5; 325 MG/1; MG/1
1 TABLET ORAL EVERY 6 HOURS PRN
Qty: 28 TABLET | Refills: 0 | Status: SHIPPED | OUTPATIENT
Start: 2021-03-08 | End: 2021-03-15

## 2021-03-08 RX ORDER — KETAMINE HCL IN NACL, ISO-OSM 100MG/10ML
SYRINGE (ML) INJECTION PRN
Status: DISCONTINUED | OUTPATIENT
Start: 2021-03-08 | End: 2021-03-08 | Stop reason: SDUPTHER

## 2021-03-08 RX ORDER — FENTANYL CITRATE 50 UG/ML
25 INJECTION, SOLUTION INTRAMUSCULAR; INTRAVENOUS EVERY 5 MIN PRN
Status: DISCONTINUED | OUTPATIENT
Start: 2021-03-08 | End: 2021-03-08 | Stop reason: HOSPADM

## 2021-03-08 RX ORDER — LIDOCAINE HYDROCHLORIDE 10 MG/ML
INJECTION, SOLUTION INFILTRATION; PERINEURAL
Status: DISCONTINUED
Start: 2021-03-08 | End: 2021-03-08 | Stop reason: HOSPADM

## 2021-03-08 RX ADMIN — Medication 25 MG: at 11:58

## 2021-03-08 RX ADMIN — SODIUM CHLORIDE, POTASSIUM CHLORIDE, SODIUM LACTATE AND CALCIUM CHLORIDE: 600; 310; 30; 20 INJECTION, SOLUTION INTRAVENOUS at 10:38

## 2021-03-08 RX ADMIN — CLINDAMYCIN PHOSPHATE 900 MG: 18 INJECTION, SOLUTION INTRAMUSCULAR; INTRAVENOUS at 11:49

## 2021-03-08 RX ADMIN — LIDOCAINE HYDROCHLORIDE 100 MG: 20 INJECTION, SOLUTION EPIDURAL; INFILTRATION; INTRACAUDAL; PERINEURAL at 11:42

## 2021-03-08 RX ADMIN — MIDAZOLAM 2 MG: 1 INJECTION INTRAMUSCULAR; INTRAVENOUS at 11:42

## 2021-03-08 RX ADMIN — Medication 50 MCG: at 11:49

## 2021-03-08 RX ADMIN — PROPOFOL 100 MCG/KG/MIN: 10 INJECTION, EMULSION INTRAVENOUS at 11:42

## 2021-03-08 ASSESSMENT — PULMONARY FUNCTION TESTS
PIF_VALUE: 1

## 2021-03-08 ASSESSMENT — PAIN DESCRIPTION - DESCRIPTORS: DESCRIPTORS: BURNING

## 2021-03-08 ASSESSMENT — PAIN SCALES - GENERAL
PAINLEVEL_OUTOF10: 0
PAINLEVEL_OUTOF10: 0
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
## 2021-03-08 NOTE — OP NOTE
PODIATRY OP NOTE    PATIENT NAME: Sharita Santo  YOB: 1975  -  39 y.o. female  MRN: [id number]  DATE: 3/8/2021  BILLING #: [id number]    Surgeon(s):  Dae Goncalves DPM     ASSISTANTS: Raul Wesley DPM PGY2    PRE-OP DIAGNOSIS:   1. Contracture of 2nd digit, right foot  2. Chronic pain right 2nd digit  3. History of embolism RLE with resultant loss of digits     POST-OP DIAGNOSIS: Same as above. PROCEDURE:   1. Partial digital amputation, 2nd digit, right foot at the level of the PIPJ    ANESTHESIA: MAC with local    HEMOSTASIS: Tamponade    ESTIMATED BLOOD LOSS: Less than 5 cc. MATERIALS: 4-0 Monocryl, 3-0 Nylon  * No implants in log *    INJECTABLES: 10 cc of 1:1 mix of 0.5% marcaine plain and 1% lidocaine plain preoperatively    SPECIMEN:   ID Type Source Tests Collected by Time Destination   A : RIGHT SECOND DIGIT Tissue Toe SURGICAL PATHOLOGY Dae Goncalves DPM 3/8/2021 [id number]        COMPLICATIONS: None    FINDINGS: Persistent rigid contracture at the PIPJ following disarticulation at the DIPJ. No contracture following disarticulation at the PIPJ. No contracture of the MTPJ. Length of the 2nd toe better matches the length of the 1st and 3rd. The patient was counseled at length about the risks of izzy Covid-19 during their perioperative period and any recovery window from their procedure. The patient was made aware that izzy Covid-19  may worsen their prognosis for recovering from their procedure  and lend to a higher morbidity and/or mortality risk. All material risks, benefits, and reasonable alternatives including postponing the procedure were discussed. The patient does wish to proceed with the procedure at this time. INDICATION FOR PROCEDURE: Patient has flexion contracture of the 2nd digit of the right foot which is causing irritation and pain to the distal aspect of the digit. Patient has failed conservative treatment.  All risks and benefits discussed with the patient in detail. No guarantees were given or implied. Consent signed and in the chart. PROCEDURE IN DETAIL: The patient was transported from pre-op to the operating room and placed on the operating table in the supine position with a safety strap across the lap. After adequate sedation by the Anesthesia, a local block of 10cc of a 1:1 mixture of 1% lidocaine plain and 0.5% Marcaine plain was injected. The foot was then prepped and draped in the usual aseptic fashion. Attention was directed to the right 2nd digit. A #15 blade was used to create two converging semieliptical incisions around the distal interphalangeal joint. The 2nd digit was disarticulated at the DIPJ and passed to the back table for pathology. Digit was still noted to be contracted and intraoperatively decision was made to disarticulate the digit at the PIPJ which was also passed to the back table for pathology. Area was then irrigated copious amounts of sterile saline and closed in layers using 4-0 monocryl and 3-0 nylon. Dressings consisting of adaptic, 4 x 4s, 1\" hayley roll and an Ace bandage were applied. The patient tolerated the above procedure and anesthesia well without complications. The patient was transported from the operating room to the PACU with vital signs stable and neurovascularly intact to all digits of the right foot. Patient will be discharged home when stable from MDA standpoint. Patient will be WBAT to the heel only of the right foot in surgical shoe for transfers.         Electronically signed by Gilma Hope DPM on 3/8/2021 at 1:10 PM    Electronically signed by Duane Proctor DPM on 3/9/2021 at 6:08 PM
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
## 2021-03-09 ENCOUNTER — TELEPHONE (OUTPATIENT)
Dept: PODIATRY | Age: 46
End: 2021-03-09

## 2021-03-09 LAB — SURGICAL PATHOLOGY REPORT: NORMAL

## 2021-03-09 NOTE — TELEPHONE ENCOUNTER
Patient called stating she was having a upset stomach due to medication  Hydrocodone acetaminophen (norco). Patient had surgery on 3/8/2021 . Would like  to prescribe another med for pain if possible.

## 2021-03-12 ENCOUNTER — OFFICE VISIT (OUTPATIENT)
Dept: PODIATRY | Age: 46
End: 2021-03-12
Payer: COMMERCIAL

## 2021-03-12 VITALS
SYSTOLIC BLOOD PRESSURE: 118 MMHG | BODY MASS INDEX: 31.99 KG/M2 | HEART RATE: 83 BPM | HEIGHT: 65 IN | DIASTOLIC BLOOD PRESSURE: 61 MMHG | RESPIRATION RATE: 18 BRPM | WEIGHT: 192 LBS

## 2021-03-12 DIAGNOSIS — S98.131A AMPUTATED TOE OF RIGHT FOOT (HCC): Primary | ICD-10-CM

## 2021-03-12 PROCEDURE — 99024 POSTOP FOLLOW-UP VISIT: CPT | Performed by: STUDENT IN AN ORGANIZED HEALTH CARE EDUCATION/TRAINING PROGRAM

## 2021-03-12 RX ORDER — ACETAMINOPHEN 500 MG
1000 TABLET ORAL EVERY 6 HOURS PRN
Qty: 84 TABLET | Refills: 0 | Status: SHIPPED | OUTPATIENT
Start: 2021-03-12 | End: 2021-05-14

## 2021-03-12 RX ORDER — DOXYCYCLINE HYCLATE 100 MG
100 TABLET ORAL 2 TIMES DAILY
Qty: 14 TABLET | Refills: 0 | Status: SHIPPED | OUTPATIENT
Start: 2021-03-12 | End: 2021-03-19

## 2021-03-12 NOTE — PROGRESS NOTES
History:   Procedure Laterality Date     SECTION  1993    FOOT AMPUTATION Right 3/8/2021    RIGHT 2ND DIGIT PARTIAL  AMPUTATION performed by Heath Kennedy DPM at [address] [facility] IVC FILTER INSERTION      GFF    OTHER SURGICAL HISTORY  2017    ANGIOGRAM OF LEFT LEG    OTHER SURGICAL HISTORY Left 2018    femoral to peroneal bypass using vein    OTHER SURGICAL HISTORY Left 2018    fibulectomy with intra op angiography of leg    MD REOPERATION, BYPASS GRAFT Left 2018    RE-EXPLORATION OF LEFT LEG, THROMBO-EMBOLECTOMY OF FEMORAL-PERONEAL BYPASS, WITH INTRA OPERATIVE  ANGIOGRAMS AND INFUSION OF nITRO GLYCERIN performed by Coty Flowers MD at [address] [place] [address] Left 2018    LEFT LEG FEMORAL TO PERONEAL BYPASS USING VEIN, (DONAR SITE LEFT SAPHNOUS) LEFT FIBULECTOMY, WITH INTRA OP ANGIOGRAPHY OF LEFT LEG performed by Coty Flowers MD at [place] [address] Right 2019    femoral artery    TOE AMPUTATION Right 2019    TOE AMPUTATION Right 2019    TOE AMPUTATION RIGHT 3RD DIGIT, PARTIAL HALLUX AMPUTATION RIGHT FOOT performed by Cortes Davidson DPM at [address] [place] [address] VASCULAR SURGERY Left 2018    re-exploration femerol-perioneal vein bypass/intra op angiogram       Social History:  Social History     Tobacco Use    Smoking status: Never Smoker    Smokeless tobacco: Never Used   Substance Use Topics    Alcohol use: Not Currently     Comment: occasionally     Drug use: No       Medications:  Prior to Admission medications    Medication Sig Start Date End Date Taking?  Authorizing Provider   acetaminophen (APAP EXTRA STRENGTH) 500 MG tablet Take 2 tablets by mouth every 6 hours as needed for Pain 3/12/21 4/2/21 Yes Jennifer Patricia DPM   doxycycline hyclate (VIBRA-TABS) 100 MG tablet Take 1 tablet by mouth 2 times daily for 7 days 3/12/21 3/19/21 Yes Jennifer Patricia DPM   HYDROcodone-acetaminophen (NORCO) 5-325 MG per tablet Take 1 tablet by mouth every 6 hours as needed for Pain for up to 7 days. Intended supply: 7 days. Take lowest dose possible to manage pain 3/8/21 3/15/21 Yes Oneal Speaker Neymar DPM   atorvastatin (LIPITOR) 40 MG tablet take 1 tablet by mouth once daily 2/4/21  Yes Lora Duenas MD   lisinopril (PRINIVIL;ZESTRIL) 5 MG tablet take 1 tablet by mouth once daily 2/4/21  Yes Lora Duenas MD   amLODIPine (NORVASC) 10 MG tablet take 1 tablet by mouth once daily 2/4/21  Yes Lora Duenas MD   apixaban (ELIQUIS) 5 MG TABS tablet Take 1 tablet by mouth 2 times daily 2/4/21  Yes Lora Dueans MD       Objective     Vitals:    03/12/21 1438   BP: 118/61   Pulse: 83   Resp: 18       Lab Results   Component Value Date    LABA1C 6.1 03/05/2020       Physical Exam:  General:  Alert and oriented x3. In no acute distress. Lower Extremity Physical Exam:    Vascular: DP and PT pulses are non palpable, Bilateral. CFT <5 seconds to all digits, Bilateral.  Mild non pitting edema, right foot. Hair growth is absent to the level of the digits, Bilateral.     Neuro: Saph/sural/SP/DP/plantar sensation intact to light touch. Musculoskeletal: EHL/FHL/GS/TA gross motor intact. Tenderness to palpation of distal 2nd toe right foot. Gross deformity is s/p partial hallux, partial 2nd and 3rd toe amputation right foot. Neg pain on calf squeeze tex. Able to move digits at level of MPJ full aROM. Dermatologic: surgical incision right foot noted to be well approximated with no dehiscence, necrosis, fluctuance, drainage. Sutures intact. Mild erythema with increase in warmth noted to distal 2nd right toe extending <.5cm proximal to sutures. Interdigital maceration absent, Bilateral.  Nails 1-10 excluding toes 1-3 R foot are trimmed and intact. Matt Sun is a 39 y.o. female with     Diagnosis Orders   1.  Amputated toe of right foot ([address]          Plan   · Patient examined and

## 2021-03-17 ENCOUNTER — NURSE TRIAGE (OUTPATIENT)
Dept: OTHER | Facility: CLINIC | Age: 46
End: 2021-03-17

## 2021-03-17 NOTE — TELEPHONE ENCOUNTER
Reason for Disposition   Taking Coumadin (warfarin) or other strong blood thinner, or known bleeding disorder (e.g., thrombocytopenia)    Answer Assessment - Initial Assessment Questions  1. APPEARANCE of BLOOD: \"What color is it? \" \"Is it passed separately, on the surface of the stool, or mixed in with the stool? \"       Mixed    2. AMOUNT: \"How much blood was passed? \"       2 x day in the stool    3. FREQUENCY: \"How many times has blood been passed with the stools? \"     2 x a day    4. ONSET: \"When was the blood first seen in the stools? \" (Days or weeks)     2 weeks ago    5. DIARRHEA: \"Is there also some diarrhea? \" If so, ask: \"How many diarrhea stools were passed in past 24 hours? \"   No    6. CONSTIPATION: \"Do you have constipation? \" If so, \"How bad is it? \"    No    7. RECURRENT SYMPTOMS: \"Have you had blood in your stools before? \" If so, ask: \"When was the last time? \" and \"What happened that time? \"       Yes, 6 months ago, went to er states no bleeding source found    8. BLOOD THINNERS: \"Do you take any blood thinners? \" (e.g., Coumadin/warfarin, Pradaxa/dabigatran, aspirin)  Yes, eloquis    9. OTHER SYMPTOMS: \"Do you have any other symptoms? \"  (e.g., abdominal pain, vomiting, dizziness, fever)      No    10. PREGNANCY: \"Is there any chance you are pregnant? \" \"When was your last menstrual period? \"      No, 2/24/21    Protocols used: RECTAL BLEEDING-ADULT-[place]    Patient called Jimmie Macedo at [facility])  with red flag complaint. Brief description of triage: rectal bleeding    Triage indicates for patient to be seen today or seen at urgent care if no appointments availbale    Care advice provided, patient verbalizes understanding; denies any other questions or concerns; instructed to call back for any new or worsening symptoms. Writer provided warm transfer to Jimmie Macedo at [facility] for appointment scheduling. Attention Provider:   Thank you for allowing me to participate in the care of your patient. The patient was connected to triage in response to information provided to the E[facility]. Please do not respond through this encounter as the response is not directed to a shared pool.

## 2021-03-19 ENCOUNTER — OFFICE VISIT (OUTPATIENT)
Dept: PODIATRY | Age: 46
End: 2021-03-19
Payer: COMMERCIAL

## 2021-03-19 VITALS
DIASTOLIC BLOOD PRESSURE: 64 MMHG | WEIGHT: 190 LBS | HEIGHT: 65 IN | BODY MASS INDEX: 31.65 KG/M2 | SYSTOLIC BLOOD PRESSURE: 120 MMHG | HEART RATE: 80 BPM

## 2021-03-19 DIAGNOSIS — S98.131A AMPUTATED TOE OF RIGHT FOOT (HCC): Primary | ICD-10-CM

## 2021-03-19 PROCEDURE — 99024 POSTOP FOLLOW-UP VISIT: CPT | Performed by: STUDENT IN AN ORGANIZED HEALTH CARE EDUCATION/TRAINING PROGRAM

## 2021-03-19 NOTE — PROGRESS NOTES
Patient instructed to remove shoes and socks and instructed to sit in exam chair. Current PCP is Curtis Cordero MD and date of last visit was 2-8-21. Do you have a follow up visit scheduled?   No
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
## 2021-03-26 ENCOUNTER — OFFICE VISIT (OUTPATIENT)
Dept: PODIATRY | Age: 46
End: 2021-03-26
Payer: COMMERCIAL

## 2021-03-26 VITALS
WEIGHT: 190 LBS | BODY MASS INDEX: 31.62 KG/M2 | HEART RATE: 79 BPM | DIASTOLIC BLOOD PRESSURE: 81 MMHG | SYSTOLIC BLOOD PRESSURE: 126 MMHG | TEMPERATURE: 97.5 F

## 2021-03-26 DIAGNOSIS — I73.9 PVD (PERIPHERAL VASCULAR DISEASE) (HCC): ICD-10-CM

## 2021-03-26 DIAGNOSIS — S98.131A AMPUTATED TOE OF RIGHT FOOT (HCC): Primary | ICD-10-CM

## 2021-03-26 PROCEDURE — 99213 OFFICE O/P EST LOW 20 MIN: CPT | Performed by: STUDENT IN AN ORGANIZED HEALTH CARE EDUCATION/TRAINING PROGRAM

## 2021-03-26 PROCEDURE — 1036F TOBACCO NON-USER: CPT | Performed by: STUDENT IN AN ORGANIZED HEALTH CARE EDUCATION/TRAINING PROGRAM

## 2021-03-26 PROCEDURE — G8427 DOCREV CUR MEDS BY ELIG CLIN: HCPCS | Performed by: STUDENT IN AN ORGANIZED HEALTH CARE EDUCATION/TRAINING PROGRAM

## 2021-03-26 PROCEDURE — G8484 FLU IMMUNIZE NO ADMIN: HCPCS | Performed by: STUDENT IN AN ORGANIZED HEALTH CARE EDUCATION/TRAINING PROGRAM

## 2021-03-26 PROCEDURE — G8417 CALC BMI ABV UP PARAM F/U: HCPCS | Performed by: STUDENT IN AN ORGANIZED HEALTH CARE EDUCATION/TRAINING PROGRAM

## 2021-03-28 NOTE — PROGRESS NOTES
Past Surgical History:   Procedure Laterality Date     SECTION  1993    FOOT AMPUTATION Right 3/8/2021    RIGHT 2ND DIGIT PARTIAL  AMPUTATION performed by Ofelia Finn DPM at [facility] IVC FILTER INSERTION      GFF    OTHER SURGICAL HISTORY  2017    ANGIOGRAM OF LEFT LEG    OTHER SURGICAL HISTORY Left 2018    femoral to peroneal bypass using vein    OTHER SURGICAL HISTORY Left 2018    fibulectomy with intra op angiography of leg    OR REOPERATION, BYPASS GRAFT Left 2018    RE-EXPLORATION OF LEFT LEG, THROMBO-EMBOLECTOMY OF FEMORAL-PERONEAL BYPASS, WITH INTRA OPERATIVE  ANGIOGRAMS AND INFUSION OF nITRO GLYCERIN performed by Valencia Dougherty MD at [address] [place] [address] Left 2018    LEFT LEG FEMORAL TO PERONEAL BYPASS USING VEIN, (DONAR SITE LEFT SAPHNOUS) LEFT FIBULECTOMY, WITH INTRA OP ANGIOGRAPHY OF LEFT LEG performed by Valencia Dougherty MD at [place] [address] Right 2019    femoral artery    TOE AMPUTATION Right 2019    TOE AMPUTATION Right 2019    TOE AMPUTATION RIGHT 3RD DIGIT, PARTIAL HALLUX AMPUTATION RIGHT FOOT performed by Gilma Ramos DPM at [address] [place] [address] VASCULAR SURGERY Left 2018    re-exploration femerol-perioneal vein bypass/intra op angiogram       Social History:  Social History     Tobacco Use    Smoking status: Never Smoker    Smokeless tobacco: Never Used   Substance Use Topics    Alcohol use: Not Currently     Comment: occasionally     Drug use: No       Medications:  Prior to Admission medications    Medication Sig Start Date End Date Taking?  Authorizing Provider   acetaminophen (APAP EXTRA STRENGTH) 500 MG tablet Take 2 tablets by mouth every 6 hours as needed for Pain 3/12/21 4/2/21 Yes David Obando DPM   atorvastatin (LIPITOR) 40 MG tablet take 1 tablet by mouth once daily 21  Yes Kalpesh Cota MD   lisinopril (PRINIVIL;ZESTRIL) 5 MG tablet take infection. Instructed to call clinic immediately or go to ER if signs and symptoms of infection are present. · Continue WBAT in Sx shoe RLE  · Patient to RTC in 1week(s)  · Please, call the office with any questions or concerns   · Seen and discussed with Dr. Ramiro Camacho    No orders of the defined types were placed in this encounter. No orders of the defined types were placed in this encounter.       Lita Bull DPM   Podiatric Medicine & Surgery

## 2021-03-31 ENCOUNTER — OFFICE VISIT (OUTPATIENT)
Dept: PODIATRY | Age: 46
End: 2021-03-31
Payer: COMMERCIAL

## 2021-03-31 VITALS
BODY MASS INDEX: 31.62 KG/M2 | DIASTOLIC BLOOD PRESSURE: 71 MMHG | HEART RATE: 70 BPM | TEMPERATURE: 98 F | SYSTOLIC BLOOD PRESSURE: 113 MMHG | WEIGHT: 190 LBS

## 2021-03-31 DIAGNOSIS — M79.674 TOE PAIN, RIGHT: ICD-10-CM

## 2021-03-31 DIAGNOSIS — I73.9 PVD (PERIPHERAL VASCULAR DISEASE) (HCC): ICD-10-CM

## 2021-03-31 DIAGNOSIS — S98.131A AMPUTATED TOE OF RIGHT FOOT (HCC): Primary | ICD-10-CM

## 2021-03-31 PROCEDURE — 99024 POSTOP FOLLOW-UP VISIT: CPT | Performed by: STUDENT IN AN ORGANIZED HEALTH CARE EDUCATION/TRAINING PROGRAM

## 2021-03-31 NOTE — PROGRESS NOTES
Procedure Laterality Date     SECTION  1993    FOOT AMPUTATION Right 3/8/2021    RIGHT 2ND DIGIT PARTIAL  AMPUTATION performed by Alberto Liz DPM at [facility] IVC FILTER INSERTION      GFF    OTHER SURGICAL HISTORY  2017    ANGIOGRAM OF LEFT LEG    OTHER SURGICAL HISTORY Left 2018    femoral to peroneal bypass using vein    OTHER SURGICAL HISTORY Left 2018    fibulectomy with intra op angiography of leg    [place] REOPERATION, BYPASS GRAFT Left 2018    RE-EXPLORATION OF LEFT LEG, THROMBO-EMBOLECTOMY OF FEMORAL-PERONEAL BYPASS, WITH INTRA OPERATIVE  ANGIOGRAMS AND INFUSION OF nITRO GLYCERIN performed by Roman Mayers MD at [address] [place] [address] Left 2018    LEFT LEG FEMORAL TO PERONEAL BYPASS USING VEIN, (DONAR SITE LEFT SAPHNOUS) LEFT FIBULECTOMY, WITH INTRA OP ANGIOGRAPHY OF LEFT LEG performed by Roman Mayers MD at [place] [address] Right 2019    femoral artery    TOE AMPUTATION Right 2019    TOE AMPUTATION Right 2019    TOE AMPUTATION RIGHT 3RD DIGIT, PARTIAL HALLUX AMPUTATION RIGHT FOOT performed by Shelbi Banda DPM at [address] [place] [address] VASCULAR SURGERY Left 2018    re-exploration femerol-perioneal vein bypass/intra op angiogram       Social History:  Social History     Tobacco Use    Smoking status: Never Smoker    Smokeless tobacco: Never Used   Substance Use Topics    Alcohol use: Not Currently     Comment: occasionally     Drug use: No       Medications:  Prior to Admission medications    Medication Sig Start Date End Date Taking?  Authorizing Provider   acetaminophen (APAP EXTRA STRENGTH) 500 MG tablet Take 2 tablets by mouth every 6 hours as needed for Pain 3/12/21 4/2/21 Yes Gary Hill DPM   atorvastatin (LIPITOR) 40 MG tablet take 1 tablet by mouth once daily 21  Yes Matt Ochoa MD   lisinopril (PRINIVIL;ZESTRIL) 5 MG tablet take 1 tablet by mouth once daily 2/4/21  Yes Tyrel Benedict MD   amLODIPine (NORVASC) 10 MG tablet take 1 tablet by mouth once daily 2/4/21  Yes Tyrel Benedict MD   apixaban (ELIQUIS) 5 MG TABS tablet Take 1 tablet by mouth 2 times daily 2/4/21  Yes Tyrel Benedict MD       Objective     Vitals:    03/31/21 1457   BP: 113/71   Pulse: 70   Temp: 98 °F (36.7 °C)       Lab Results   Component Value Date    LABA1C 6.1 03/05/2020       Physical Exam:  General:  Alert and oriented x3. In no acute distress. Lower Extremity Physical Exam:    Vascular: DP and PT pulses are waekly palpable, Bilateral. CFT <5 seconds to all digits, Bilateral. Edema consistent with post op course to right foot. Hair growth is absent to the level of the digits, Bilateral.     Neuro: Saph/sural/SP/DP/plantar sensation intact to light touch. Musculoskeletal: EHL/FHL/GS/TA gross motor intact. Tenderness to palpation absent. Gross deformity is s/p partial hallux, partial 2nd and complete 3rd toe amputation right foot. Neg pain on calf squeeze etx. Able to move digits at level of MPJ full aROM. Dermatologic: Skin is well approximated at the surgical incision site right second digit. No dehiscence, necrosis, fluctuance, drainage, erythema, or increased warmth noted. Steri-Strips were removed. Interdigital maceration absent, Bilateral.  Nails 1-10 excluding toes 1-3 R foot are trimmed and intact. Unruly Mckeon is a 39 y.o. female with     Diagnosis Orders   1. Amputated toe of right foot ([facility]     2. PVD (peripheral vascular disease) ([facility]     3. Toe pain, right          Plan   · Patient examined and evaluated  · Diagnosis and treatment options discussed in detail  · Steri-Strips removed to visualize incision site. Surgical incision site healed. Steri-Strips reapplied. · Patient can take showers and get surgical site wet. Instructed patient to let Steri-Strips fall off on their own. · Educated patient on proper shoe gear.   Patient can

## 2021-03-31 NOTE — PROGRESS NOTES
Patient instructed to remove shoes and socks and instructed to sit in exam chair. Current PCP is Luke Glass MD and date of last visit was 02/04/21. Do you have a follow up visit scheduled?   No  If yes, the date is n/a

## 2021-05-07 ENCOUNTER — OFFICE VISIT (OUTPATIENT)
Dept: PODIATRY | Age: 46
End: 2021-05-07
Payer: COMMERCIAL

## 2021-05-07 ENCOUNTER — HOSPITAL ENCOUNTER (OUTPATIENT)
Dept: VASCULAR LAB | Age: 46
Discharge: HOME | End: 2021-05-07
Payer: COMMERCIAL

## 2021-05-07 VITALS
SYSTOLIC BLOOD PRESSURE: 128 MMHG | HEART RATE: 71 BPM | BODY MASS INDEX: 31.65 KG/M2 | HEIGHT: 65 IN | WEIGHT: 190 LBS | DIASTOLIC BLOOD PRESSURE: 82 MMHG

## 2021-05-07 DIAGNOSIS — Z98.890 POST-OPERATIVE STATE: Primary | ICD-10-CM

## 2021-05-07 DIAGNOSIS — S98.131A AMPUTATED TOE OF RIGHT FOOT (HCC): ICD-10-CM

## 2021-05-07 DIAGNOSIS — I73.9 PAD (PERIPHERAL ARTERY DISEASE) (HCC): ICD-10-CM

## 2021-05-07 PROCEDURE — 93923 UPR/LXTR ART STDY 3+ LVLS: CPT

## 2021-05-07 PROCEDURE — 99212 OFFICE O/P EST SF 10 MIN: CPT | Performed by: STUDENT IN AN ORGANIZED HEALTH CARE EDUCATION/TRAINING PROGRAM

## 2021-05-07 PROCEDURE — 99024 POSTOP FOLLOW-UP VISIT: CPT | Performed by: STUDENT IN AN ORGANIZED HEALTH CARE EDUCATION/TRAINING PROGRAM
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
## 2021-05-07 NOTE — PROGRESS NOTES
Patient instructed to remove shoes and socks, instructed to sit in exam chair.  Current PCP name is Sin gomez MD  and date of last visit 2/4/21 Do you have a follow up visit scheduled?  no    If yes the date is

## 2021-05-14 ENCOUNTER — OFFICE VISIT (OUTPATIENT)
Dept: VASCULAR SURGERY | Age: 46
End: 2021-05-14
Payer: COMMERCIAL

## 2021-05-14 VITALS
HEIGHT: 65 IN | HEART RATE: 66 BPM | BODY MASS INDEX: 31.65 KG/M2 | DIASTOLIC BLOOD PRESSURE: 71 MMHG | SYSTOLIC BLOOD PRESSURE: 127 MMHG | WEIGHT: 190 LBS | OXYGEN SATURATION: 99 %

## 2021-05-14 DIAGNOSIS — I74.3 FEMOROPOPLITEAL ARTERIAL THROMBOSIS OF LEFT LOWER EXTREMITY (HCC): ICD-10-CM

## 2021-05-14 DIAGNOSIS — I73.9 PAD (PERIPHERAL ARTERY DISEASE) (HCC): Primary | ICD-10-CM

## 2021-05-14 PROCEDURE — G8427 DOCREV CUR MEDS BY ELIG CLIN: HCPCS | Performed by: SURGERY

## 2021-05-14 PROCEDURE — 99214 OFFICE O/P EST MOD 30 MIN: CPT | Performed by: SURGERY

## 2021-05-14 PROCEDURE — G8417 CALC BMI ABV UP PARAM F/U: HCPCS | Performed by: SURGERY

## 2021-05-14 PROCEDURE — 1036F TOBACCO NON-USER: CPT | Performed by: SURGERY
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
## 2021-05-17 ASSESSMENT — ENCOUNTER SYMPTOMS
SHORTNESS OF BREATH: 0
CHEST TIGHTNESS: 0
ABDOMINAL PAIN: 0
ALLERGIC/IMMUNOLOGIC NEGATIVE: 1
COLOR CHANGE: 0

## 2021-07-06 DIAGNOSIS — I74.3 FEMOROPOPLITEAL ARTERIAL THROMBOSIS OF LEFT LOWER EXTREMITY (HCC): ICD-10-CM

## 2021-07-06 RX ORDER — ATORVASTATIN CALCIUM 40 MG/1
TABLET, FILM COATED ORAL
Qty: 30 TABLET | Refills: 3 | Status: SHIPPED | OUTPATIENT
Start: 2021-07-06 | End: 2021-12-22

## 2021-07-06 NOTE — TELEPHONE ENCOUNTER
E-scribe request for atorvastatin. Please review and e-scribe if applicable.      Last Visit Date: 2/4/2021  Next Visit Date:  Visit date not found    Hemoglobin A1C (%)   Date Value   03/05/2020 6.1   12/18/2017 5.9   07/23/2014 5.7             ( goal A1C is < 7)   No results found for: LABMICR  LDL Cholesterol (mg/dL)   Date Value   10/03/2018 42       (goal LDL is <100)   AST (U/L)   Date Value   06/26/2020 13     ALT (U/L)   Date Value   06/26/2020 11     BUN (mg/dL)   Date Value   02/26/2021 10     BP Readings from Last 3 Encounters:   05/14/21 127/71   05/07/21 128/82   03/31/21 113/71          (goal 120/80)        Patient Active Problem List:     Migraine headache with aura     HTN (hypertension)     Obesity     Menometrorrhagia     Intertriginous candidiasis     Depression     Claudication in peripheral vascular disease (HCC)     Left popliteal artery occlusion (HCC)     Hypercoagulable state ([address]     Hemorrhage of rectum and anus     Anemia     Foot pain     Wound of right leg     Takotsubo cardiomyopathy     Chest pain     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 Patient states her insurance changed and she no longer has coverage through [employer]. She states she will contact a provider who is covered in her insurance plan to have her colonoscopy done.

## 2021-08-19 ENCOUNTER — HOSPITAL ENCOUNTER (EMERGENCY)
Age: 46
Discharge: LEFT BEFORE DISCHARGE | End: 2021-08-19
Payer: COMMERCIAL

## 2021-08-19 VITALS
RESPIRATION RATE: 18 BRPM | DIASTOLIC BLOOD PRESSURE: 87 MMHG | OXYGEN SATURATION: 97 % | TEMPERATURE: 98.9 F | SYSTOLIC BLOOD PRESSURE: 142 MMHG | HEART RATE: 88 BPM

## 2021-08-19 ASSESSMENT — PAIN DESCRIPTION - LOCATION: LOCATION: LEG

## 2021-08-19 ASSESSMENT — PAIN SCALES - GENERAL: PAINLEVEL_OUTOF10: 10

## 2021-08-19 ASSESSMENT — PAIN DESCRIPTION - DESCRIPTORS: DESCRIPTORS: ACHING

## 2021-08-19 ASSESSMENT — PAIN DESCRIPTION - ONSET: ONSET: ON-GOING

## 2021-08-19 ASSESSMENT — PAIN DESCRIPTION - PROGRESSION: CLINICAL_PROGRESSION: NOT CHANGED

## 2021-08-19 ASSESSMENT — PAIN DESCRIPTION - PAIN TYPE: TYPE: ACUTE PAIN

## 2021-08-19 ASSESSMENT — PAIN DESCRIPTION - FREQUENCY: FREQUENCY: CONTINUOUS

## 2021-08-19 ASSESSMENT — PAIN DESCRIPTION - ORIENTATION: ORIENTATION: RIGHT

## 2021-09-15 PROCEDURE — 99285 EMERGENCY DEPT VISIT HI MDM: CPT

## 2021-09-16 ENCOUNTER — APPOINTMENT (OUTPATIENT)
Dept: GENERAL RADIOLOGY | Age: 46
End: 2021-09-16
Payer: COMMERCIAL

## 2021-09-16 ENCOUNTER — HOSPITAL ENCOUNTER (EMERGENCY)
Age: 46
Discharge: HOME | End: 2021-09-16
Attending: EMERGENCY MEDICINE
Payer: COMMERCIAL

## 2021-09-16 VITALS
HEIGHT: 65 IN | RESPIRATION RATE: 17 BRPM | TEMPERATURE: 99.1 F | HEART RATE: 86 BPM | OXYGEN SATURATION: 96 % | WEIGHT: 180 LBS | SYSTOLIC BLOOD PRESSURE: 114 MMHG | BODY MASS INDEX: 29.99 KG/M2 | DIASTOLIC BLOOD PRESSURE: 58 MMHG

## 2021-09-16 DIAGNOSIS — U07.1 COVID-19: Primary | ICD-10-CM

## 2021-09-16 LAB
SARS-COV-2, RAPID: DETECTED
SPECIMEN DESCRIPTION: ABNORMAL

## 2021-09-16 PROCEDURE — 93005 ELECTROCARDIOGRAM TRACING: CPT | Performed by: STUDENT IN AN ORGANIZED HEALTH CARE EDUCATION/TRAINING PROGRAM

## 2021-09-16 PROCEDURE — 6370000000 HC RX 637 (ALT 250 FOR IP): Performed by: STUDENT IN AN ORGANIZED HEALTH CARE EDUCATION/TRAINING PROGRAM

## 2021-09-16 PROCEDURE — 87635 SARS-COV-2 COVID-19 AMP PRB: CPT

## 2021-09-16 PROCEDURE — 71045 X-RAY EXAM CHEST 1 VIEW: CPT

## 2021-09-16 RX ORDER — ACETAMINOPHEN 500 MG
1000 TABLET ORAL 3 TIMES DAILY
Qty: 30 TABLET | Refills: 0 | Status: SHIPPED | OUTPATIENT
Start: 2021-09-16 | End: 2022-04-26

## 2021-09-16 RX ORDER — ACETAMINOPHEN 500 MG
1000 TABLET ORAL ONCE
Status: COMPLETED | OUTPATIENT
Start: 2021-09-16 | End: 2021-09-16

## 2021-09-16 RX ADMIN — ACETAMINOPHEN 1000 MG: 500 TABLET ORAL at 00:47

## 2021-09-16 ASSESSMENT — PAIN DESCRIPTION - LOCATION: LOCATION: GENERALIZED

## 2021-09-16 ASSESSMENT — PAIN SCALES - GENERAL
PAINLEVEL_OUTOF10: 10
PAINLEVEL_OUTOF10: 10
PAINLEVEL_OUTOF10: 6

## 2021-09-16 ASSESSMENT — PAIN DESCRIPTION - PAIN TYPE: TYPE: ACUTE PAIN

## 2021-09-16 NOTE — ED TRIAGE NOTES
Pt presents to ED c/o HA, cp, sob, fatigue and weakness, cough, and diarrhea x1wk. Pt also co worsening s/s since starting 1wk ago. Pt RR even and unlabored, NAD noted, VSS, EKG obtained at this time. Pt denies taking any OTC medication for s/s d/t being concerned about her prescription medications interacting with OTC meds.

## 2021-09-16 NOTE — ED PROVIDER NOTES
[address] [facility]     Emergency Department     Faculty Attestation        I performed a history and physical examination of the patient and discussed management with the resident. I reviewed the residents note and agree with the documented findings and plan of care. Any areas of disagreement are noted on the chart. I was personally present for the key portions of any procedures. I have documented in the chart those procedures where I was not present during the key portions. I have reviewed the emergency nurses triage note. I agree with the chief complaint, past medical history, past surgical history, allergies, medications, social and family history as documented unless otherwise noted below. For mid-level providers such as nurse practitioners as well as physicians assistants:    I have personally seen and evaluated the patient. I find the patient's history and physical exam are consistent with NP/PA documentation. I agree with the care provided, treatment rendered, disposition, & follow-up plan. Additional findings are as noted. Vital Signs: BP (!) 140/86   Pulse 86   Temp 99.1 °F (37.3 °C) (Oral)   Resp 17   Ht 5' 5\" (1.651 m)   Wt 180 lb (81.6 kg)   LMP 08/31/2021   SpO2 100%   BMI 29.95 kg/m²   PCP:  Bereket Cooley MD    Pertinent Comments: The patient's signs and symptoms are consistent with an acute mild URI. At this time there is significant evidence of Covid-19 community spread due to this pandemic, and I feel the patient most likely has mild Covid-19 or other viral illness. The patient is nontoxic and well appearing, no evidence of hypoxia or impending respiratory failure. The patient is tolerating PO. I do not feel the patient has evidence of significant dehydration or end organ failure at this time. I do not feel the patient has an emergent medical condition at this time.        The patient is referred to appropriate

## 2021-09-16 NOTE — ED PROVIDER NOTES
[facility]  ED  Emergency Department Encounter  EmergencyMedicine Resident     Pt Viraj Berrios  MRN: [id number]  [place] 1975  Date of evaluation: 21  PCP:  MD Camille Donato       Chief Complaint   Patient presents with    Chest Pain    Cough    Shortness of Breath    Fatigue    Diarrhea       HISTORY OF PRESENT ILLNESS  (Location/Symptom, Timing/Onset, Context/Setting, Quality, Duration, Modifying Factors, Severity.)      Sabina Hernandez is a 55 y.o. female who presents with 4 days shortness of breath, myalgias nausea fevers fatigue weakness. 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 She is unvaccinated. Has past medical history of peripheral artery disease, history of leg arterial clot on Xarelto. She is denying chest pain. Denies headache dizziness. She is able to tolerate p.o. PAST MEDICAL / SURGICAL / SOCIAL / FAMILY HISTORY      has a past medical history of Abnormal Pap smear of cervix, Anemia, Chronic back pain, Claudication (HCC), Claudication in peripheral vascular disease ([address] Depression, Headache(784.0), Heart murmur, Hemorrhage of rectum and anus, History of blood transfusion, HTN (hypertension), Hx of blood clots, Hypercoagulable state ([address] Hyperlipidemia, Hypertension, Intertriginous candidiasis, Left popliteal artery occlusion ([address] Leg pain, Menometrorrhagia, Migraine headache with aura, Obesity, Osteoarthritis, PAD (peripheral artery disease) ([address] PVD (peripheral vascular disease) ([address] Takotsubo cardiomyopathy, and Wound of right leg.     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 Social History     Socioeconomic History    Marital status: Single     Spouse name: Not on file    Number of children: Not on file    Years of education: Not on file    Highest education level: Not on file   Occupational History    Not on file   Tobacco Use    Smoking status: Never Smoker    Smokeless tobacco: Never Used   Vaping Use    Vaping Use: Never used   Substance and Sexual Activity    Alcohol use: Not Currently     Comment: occasionally     Drug use: No    Sexual activity: Not Currently     Partners: Male   Other Topics Concern    Not on file   Social History Narrative    Not on file     Social Determinants of Health     Financial Resource Strain:     Difficulty of Paying Living Expenses:    Food Insecurity:     Worried About Running Out of Food in the Last Year:     [address] in the Last Year:    Transportation Needs:     Lack of Transportation (Medical):      Lack of Transportation (Non-Medical):    Physical Activity:     Days of Exercise per Week:     Minutes of Exercise per Session:    Stress:     Feeling of Stress :    Social Connections:     Frequency of Communication with Friends and Family:     Frequency of Social Gatherings with Friends and Family:     Attends Mu-ism Services:     Active Member of Clubs or Organizations:     Attends Club or Organization Meetings:     Marital Status:    Intimate Partner Violence:     Fear of Current or Ex-Partner:     Emotionally Abused:     Physically Abused:     Sexually Abused:        Family History   Problem Relation Age of Onset    Diabetes Mother     High Blood Pressure Mother     Heart Attack Mother     Heart Disease Mother     Kidney Disease Mother     High Blood Pressure Father     Heart Disease Father     Heart Attack Father     Diabetes Sister     High Blood Pressure Sister     Diabetes Sister     High Blood Pressure Sister        Allergies:  Asa [aspirin], Lopid [gemfibrozil], Nortriptyline, Pcn [penicillins], Sulfa antibiotics, and Ibuprofen    Home Medications:  Prior to Admission medications    Medication Sig Start Date End Date Taking? Authorizing Provider   acetaminophen (TYLENOL) 500 MG tablet Take 2 tablets by mouth 3 times daily 9/16/21  Yes Annette Vazquez MD   atorvastatin (LIPITOR) 40 MG tablet take 1 tablet by mouth once daily 7/6/21  Yes Calista Ardon MD   apixaban (ELIQUIS) 5 MG TABS tablet Take 1 tablet by mouth 2 times daily 5/14/21  Yes Alissa North MD   lisinopril (PRINIVIL;ZESTRIL) 5 MG tablet take 1 tablet by mouth once daily 2/4/21  Yes Harleen Glover MD   amLODIPine (NORVASC) 10 MG tablet take 1 tablet by mouth once daily 2/4/21  Yes Harleen Glover MD       REVIEW OF SYSTEMS    (2-9 systems for level 4, 10 or more for level 5)      Review of Systems   Constitutional: Positive for fever. HENT: Negative for congestion. Eyes: Negative for photophobia. Respiratory: Positive for shortness of breath. Cardiovascular: Negative for chest pain. Gastrointestinal: Negative for abdominal pain and vomiting. Endocrine: Negative for polyuria. Genitourinary: Negative for dysuria. Musculoskeletal: Negative for arthralgias. Skin: Negative for color change. Allergic/Immunologic: Negative for immunocompromised state. Neurological: Negative for dizziness. Hematological: Does not bruise/bleed easily. Psychiatric/Behavioral: Negative for agitation. PHYSICAL EXAM   (up to 7 for level 4, 8 or more for level 5)      INITIAL VITALS:   BP (!) 114/58   Pulse 86   Temp 99.1 °F (37.3 °C) (Oral)   Resp 17   Ht 5' 5\" (1.651 m)   Wt 180 lb (81.6 kg)   LMP 08/31/2021   SpO2 96%   BMI 29.95 kg/m²     Physical Exam  Constitutional:       General: Not in acute distress. Appearance: Normal appearance. Normal weight.  Not Yuliana Palacios NASOPHARYNGEAL SWAB     SARS-CoV-2, Rapid DETECTED (A) Not Detected         RADIOLOGY:  XR CHEST PORTABLE    Result Date: 9/16/2021  EXAMINATION: ONE XRAY VIEW OF THE CHEST 9/15/2021 7:43 pm COMPARISON: June 26, 2020 HISTORY: ORDERING SYSTEM PROVIDED HISTORY: cough fever likey covid TECHNOLOGIST PROVIDED HISTORY: cough fever likey covid Reason for Exam: upr FINDINGS: Marginal inspiration is present. Faint opacities are present bilaterally. No pneumothorax is noted. Heart size and mediastinal contours appear normal for the level of inspiration. Osseous structures appear normal.     1. Marginal inspiration, with faint opacities noted bilaterally, suggestive of COVID-19 pulmonary disease given the clinical history. EKG  EKG Interpretation    Interpreted by me    Rhythm: normal sinus   Rate: normal  Axis: normal  Ectopy: none  Conduction: normal  ST Segments: no acute change  T Waves: no acute change  Q Waves: none    Clinical Impression: no acute changes and normal EKG    All EKG's are interpreted by the Emergency Department Physician who either signs or Co-signs this chart in the absence of a cardiologist.    EMERGENCY DEPARTMENT COURSE:  Patient breathing quietly and unlabored on room air. Speech is normal and speaking in full sentences without requiring to pause to take a breath. Physical exam largely unremarkable no adventitious heart or lung sounds. Matched patient in place for 2 minutes, she maintain SPO2 99% entire time. Clinically she appears to have a coronavirus infection, will swab for Covid, EKG is negative for any acute ischemia. Will do chest x-ray to rule out pneumonia. Covid test positive. Patient transgressions and coronavirus infection instructions.     PROCEDURES:  None    CONSULTS:  None    CRITICAL CARE:  None    FINAL IMPRESSION      1. COVID-19          DISPOSITION / PLAN     DISPOSITION Decision To Discharge 09/16/2021 01:39:50 AM      PATIENT REFERRED TO:  Lynette Tuttle, MD [address]  [phone]    Schedule an appointment as soon as possible for a visit in 1 week        DISCHARGE MEDICATIONS:  Discharge Medication List as of 9/16/2021  1:40 AM      START taking these medications    Details   acetaminophen (TYLENOL) 500 MG tablet Take 2 tablets by mouth 3 times daily, Disp-30 tablet, R-0Print             Baljinder Patel MD  Emergency Medicine Resident    (Please note that portions of thisnote were completed with a voice recognition program.  Efforts were made to edit the dictations but occasionally words are mis-transcribed.)       Baljinder Patel MD  Resident  09/16/21 [id number]

## 2021-09-16 NOTE — ED TRIAGE NOTES
Feeling tired, body aches and chills, chest heaviness, SOB, cough since Friday, diarrhea since Saturday, today feeling even worse

## 2021-09-17 ENCOUNTER — CARE COORDINATION (OUTPATIENT)
Dept: CARE COORDINATION | Age: 46
End: 2021-09-17

## 2021-09-17 NOTE — CARE COORDINATION
Patient contacted regarding COVID-19 diagnosis. Discussed COVID-19 related testing which was available at this time. Test results were positive. Patient informed of results, if available? Yes. Ambulatory Care Manager contacted the patient by telephone to perform post discharge assessment. Call within 2 business days of discharge: Yes. Verified name and  with patient as identifiers. Provided introduction to self, and explanation of the CTN/ACM role, and reason for call due to risk factors for infection and/or exposure to COVID-19. Symptoms reviewed with patient who verbalized the following symptoms: cough, shortness of breath, chills or shaking, diarrhea and chest pain. Due to no new or worsening symptoms encounter was not routed to provider for escalation. Discussed follow-up appointments. If no appointment was previously scheduled, appointment scheduling offered: No.  [facility] follow up appointment(s):   Future Appointments   Date Time Provider Amira English   11/15/2021  3:15 PM Sherwin Halsted, MD [facility] follow up appointment(s):     Non-face-to-face services provided:  Obtained and reviewed discharge summary and/or continuity of care documents     Advance Care Planning:   Does patient have an Advance Directive:  not on file. Educated patient about risk for severe COVID-19 due to risk factors according to CDC guidelines. ACM reviewed discharge instructions, medical action plan and red flag symptoms with the patient who verbalized understanding. Discussed COVID vaccination status: Yes. Education provided on COVID-19 vaccination as appropriate. Discussed exposure protocols and quarantine with CDC Guidelines. Patient was given an opportunity to verbalize any questions and concerns and agrees to contact ACM or health care provider for questions related to their healthcare.     Reviewed and educated patient on any new and changed medications related to discharge diagnosis     Was patient discharged with a pulse oximeter? No Discussed and confirmed pulse oximeter discharge instructions and when to notify provider or seek emergency care. AC provided contact information. No further follow-up call identified based on severity of symptoms and risk factors. Risk Score: <2    Advance Care Planning  People with COVID-19 may have no symptoms, mild symptoms, such as fever, cough, and shortness of breath or they may have more severe illness, developing severe and fatal pneumonia. As a result, Advance Care Planning with attention to naming a health care decision maker (someone you trust to make healthcare decisions for you if you could not speak for yourself) and sharing other health care preferences is important BEFORE a possible health crisis. Please contact your Primary Care Provider to discuss Advance Care Planning. Preventing the Spread of Coronavirus Disease 2019 in Homes and Residential Communities  For the most recent information go to [email or web address]fi    Prevention steps for People with confirmed or suspected COVID-19 (including persons under investigation) who do not need to be hospitalized  and   People with confirmed COVID-19 who were hospitalized and determined to be medically stable to go home    Your healthcare provider and public health staff will evaluate whether you can be cared for at home. If it is determined that you do not need to be hospitalized and can be isolated at home, you will be monitored by staff from your local or state health department. You should follow the prevention steps below until a healthcare provider or local or state health department says you can return to your normal activities. Stay home except to get medical care  People who are mildly ill with COVID-19 are able to isolate at home during their illness.  You should restrict activities outside your home, except for getting medical care. Do not go to work, school, or public areas. Avoid using public transportation, ride-sharing, or taxis. Separate yourself from other people and animals in your home  People: As much as possible, you should stay in a specific room and away from other people in your home. Also, you should use a separate bathroom, if available. Animals: You should restrict contact with pets and other animals while you are sick with COVID-19, just like you would around other people. Although there have not been reports of pets or other animals becoming sick with COVID-19, it is still recommended that people sick with COVID-19 limit contact with animals until more information is known about the virus. When possible, have another member of your household care for your animals while you are sick. If you are sick with COVID-19, avoid contact with your pet, including petting, snuggling, being kissed or licked, and sharing food. If you must care for your pet or be around animals while you are sick, wash your hands before and after you interact with pets and wear a facemask. Call ahead before visiting your doctor  If you have a medical appointment, call the healthcare provider and tell them that you have or may have COVID-19. This will help the healthcare providers office take steps to keep other people from getting infected or exposed. Wear a facemask  You should wear a facemask when you are around other people (e.g., sharing a room or vehicle) or pets and before you enter a healthcare providers office. If you are not able to wear a facemask (for example, because it causes trouble breathing), then people who live with you should not stay in the same room with you, or they should wear a facemask if they enter your room. Cover your coughs and sneezes  Cover your mouth and nose with a tissue when you cough or sneeze. Throw used tissues in a lined trash can.  Immediately wash your hands with soap and water for at least 20 seconds or, if soap and water are not available, clean your hands with an alcohol-based hand  that contains at least 60% alcohol. Clean your hands often  Wash your hands often with soap and water for at least 20 seconds, especially after blowing your nose, coughing, or sneezing; going to the bathroom; and before eating or preparing food. If soap and water are not readily available, use an alcohol-based hand  with at least 60% alcohol, covering all surfaces of your hands and rubbing them together until they feel dry. Soap and water are the best option if hands are visibly dirty. Avoid touching your eyes, nose, and mouth with unwashed hands. Avoid sharing personal household items  You should not share dishes, drinking glasses, cups, eating utensils, towels, or bedding with other people or pets in your home. After using these items, they should be washed thoroughly with soap and water. Clean all high-touch surfaces everyday  High touch surfaces include counters, tabletops, doorknobs, bathroom fixtures, toilets, phones, keyboards, tablets, and bedside tables. Also, clean any surfaces that may have blood, stool, or body fluids on them. Use a household cleaning spray or wipe, according to the label instructions. Labels contain instructions for safe and effective use of the cleaning product including precautions you should take when applying the product, such as wearing gloves and making sure you have good ventilation during use of the product. Monitor your symptoms  Seek prompt medical attention if your illness is worsening (e.g., difficulty breathing). Before seeking care, call your healthcare provider and tell them that you have, or are being evaluated for, COVID-19. Put on a facemask before you enter the facility. These steps will help the healthcare providers office to keep other people in the office or waiting room from getting infected or exposed.  Ask your healthcare provider to call the local or state health department. Persons who are placed under active monitoring or facilitated self-monitoring should follow instructions provided by their local health department or occupational health professionals, as appropriate. When working with your local health department check their available hours. If you have a medical emergency and need to call 911, notify the dispatch personnel that you have, or are being evaluated for COVID-19. If possible, put on a facemask before emergency medical services arrive. Discontinuing home isolation  Patients with confirmed COVID-19 should remain under home isolation precautions until the risk of secondary transmission to others is thought to be low. The decision to discontinue home isolation precautions should be made on a case-by-case basis, in consultation with healthcare providers and state and local health departments.

## 2021-09-18 LAB
EKG ATRIAL RATE: 79 BPM
EKG P AXIS: 54 DEGREES
EKG P-R INTERVAL: 154 MS
EKG Q-T INTERVAL: 394 MS
EKG QRS DURATION: 86 MS
EKG QTC CALCULATION (BAZETT): 451 MS
EKG R AXIS: -17 DEGREES
EKG T AXIS: 6 DEGREES
EKG VENTRICULAR RATE: 79 BPM

## 2021-09-18 PROCEDURE — 93010 ELECTROCARDIOGRAM REPORT: CPT | Performed by: INTERNAL MEDICINE

## 2021-10-20 NOTE — H&P
Gio Jorge is a 29 y.o. male    Chief Complaint   Patient presents with   [facility] Follow Up     Patient is coming in for a [facility] tissue swelling but no gas     Ct Chest Pulmonary Embolism W Contrast    Result Date: 4/5/2019  EXAMINATION: CTA OF THE CHEST 4/5/2019 6:49 pm TECHNIQUE: CTA of the chest was performed after the administration of intravenous contrast.  Multiplanar reformatted images are provided for review. MIP images are provided for review. Dose modulation, iterative reconstruction, and/or weight based adjustment of the mA/kV was utilized to reduce the radiation dose to as low as reasonably achievable. COMPARISON: None. HISTORY: ORDERING SYSTEM PROVIDED HISTORY: elevated d-dimer, SOB, tachy FINDINGS: Pulmonary Arteries: Pulmonary arteries are adequately opacified for evaluation. No evidence of intraluminal filling defect to suggest pulmonary embolism. Main pulmonary artery is normal in caliber. Mediastinum: No evidence of mediastinal lymphadenopathy. The heart and pericardium demonstrate no acute abnormality. There is no acute abnormality of the thoracic aorta. Lungs/pleura: The lungs are without acute process. No focal consolidation or pulmonary edema. No evidence of pleural effusion or pneumothorax. Upper Abdomen: Limited images of the upper abdomen are unremarkable. Soft Tissues/Bones: No acute bone or soft tissue abnormality. No evidence of pulmonary embolism or acute pulmonary abnormality. LABS:  I have reviewed and interpreted all available lab results.   Labs Reviewed   CBC WITH AUTO DIFFERENTIAL - Abnormal; Notable for the following components:       Result Value    RDW 14.5 (*)     Absolute Lymph # 4.25 (*)     All other components within normal limits   BASIC METABOLIC PANEL W/ REFLEX TO MG FOR LOW K - Abnormal; Notable for the following components:    Glucose 139 (*)     CO2 19 (*)     Anion Gap 18 (*)     All other components within normal limits   TROPONIN   TROPONIN   BRAIN NATRIURETIC PEPTIDE   D-DIMER, QUANTITATIVE   HCG, SERUM, QUALITATIVE   TSH WITH REFLEX   TROPONIN   TROPONIN   TROPONIN SCREENING TOOLS:    HEART Risk Score for Chest Pain Patients   History and Physical Exam Suspicion Level  (Nausea, Vomiting, Diaphoresis, Radiation, Exertion)   Slightly Suspicious (0 pts)   Moderately Suspicious (1 pt)   Highly Suspicious (2 pts)   EKG Interpretation   Normal (0 pts)   Non-Specific Repolarization Disturbance (1 pt)   Significant ST-Depression (2 pts)   Age of Patient (in years)   = 39 (0 pts)   46-64 (1 pt)   = 65 (2 pts)   Risk Factors   No Risk Factors (0 pts)   1-2 Risk Factors (1 pt)   = 3 Risk Factors (2 pts)   Risk Factors Include:   Hypercholesterolemia   Hypertension   Diabetes Mellitus   Cigarette smoking   Positive family history   Obesity   CAD   (SLE, CKDz, HIV, Cocaine abuse)   Troponin Levels   = Normal Limit (0 pts)   1-3 Times Normal Limit (1 pt)   > 3 Times Normal Limit (2 pts)  TOTAL:4    Percent Risk for Major Adverse Cardiac Event (MACE)  0-3 pts indicates low risk for MACE   2.5% (DISCHARGE)   4-7 pts indicates moderate risk for MACE  20.3% (OBS)  8-10 pts indicates high risk for MACE  72.7% (EARLY INVASIVE TX)    CDU IMPRESSION/PLAN      Gale Lopez is a 37 y.o. female who presents with    1.   Acute onset left-sided chest pain, etiology undetermined, relieved on its own     · Cardiac workup unremarkable in the emergency department including CTA of the chest  · Patient is anticoagulated due to past vasculopathy's  · No previous cardiac testing, Stress test ordered    IP CONSULT TO CARDIOLOGY  · Further workup and evaluation   · Follow up recommendations     · Continue home meds, pain control   · Monitor vitals, labs, imaging         CONSULTS:    IP CONSULT TO CARDIOLOGY    PROCEDURES:  Not indicated       PATIENT REFERRED TO:    Guerda Chase MD  [facility] [id number]  [address] Cantor MD  [address]            --  Bar Ramírez DO   Emergency Medicine Resident     This dictation was generated by voice recognition computer software. Although all attempts are made to edit the dictation for accuracy, there may be errors in the transcription that are not intended.

## 2021-12-22 DIAGNOSIS — I74.3 FEMOROPOPLITEAL ARTERIAL THROMBOSIS OF LEFT LOWER EXTREMITY (HCC): ICD-10-CM

## 2021-12-22 RX ORDER — ATORVASTATIN CALCIUM 40 MG/1
TABLET, FILM COATED ORAL
Qty: 30 TABLET | Refills: 3 | Status: SHIPPED | OUTPATIENT
Start: 2021-12-22 | End: 2022-04-25

## 2021-12-22 NOTE — TELEPHONE ENCOUNTER
E-scribe request for med refill. Please review and e-scribe if applicable.      Last Visit Date:  02/04/2021  Next Visit Date:  Visit date not found    Hemoglobin A1C (%)   Date Value   03/05/2020 6.1   12/18/2017 5.9   07/23/2014 5.7             ( goal A1C is < 7)   No results found for: LABMICR  LDL Cholesterol (mg/dL)   Date Value   10/03/2018 42       (goal LDL is <100)   AST (U/L)   Date Value   06/26/2020 13     ALT (U/L)   Date Value   06/26/2020 11     BUN (mg/dL)   Date Value   02/26/2021 10     BP Readings from Last 3 Encounters:   09/16/21 (!) 114/58   05/14/21 127/71   05/07/21 128/82          (goal 120/80)        Patient Active Problem List:     Migraine headache with aura     HTN (hypertension)     Obesity     Menometrorrhagia     Intertriginous candidiasis     Depression     Claudication in peripheral vascular disease (HCC)     Left popliteal artery occlusion (HCC)     Hypercoagulable state ([address]     Hemorrhage of rectum and anus     Anemia     Foot pain     Wound of right leg     Takotsubo cardiomyopathy     Chest pain     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

## 2022-01-11 ENCOUNTER — HOSPITAL ENCOUNTER (OUTPATIENT)
Dept: VASCULAR LAB | Age: 47
Discharge: HOME | End: 2022-01-11
Payer: COMMERCIAL

## 2022-01-11 DIAGNOSIS — I74.3 FEMOROPOPLITEAL ARTERIAL THROMBOSIS OF LEFT LOWER EXTREMITY (HCC): ICD-10-CM

## 2022-01-11 PROCEDURE — 93923 UPR/LXTR ART STDY 3+ LVLS: CPT

## 2022-01-13 ENCOUNTER — OFFICE VISIT (OUTPATIENT)
Dept: VASCULAR SURGERY | Age: 47
End: 2022-01-13
Payer: COMMERCIAL

## 2022-01-13 VITALS
WEIGHT: 185 LBS | DIASTOLIC BLOOD PRESSURE: 77 MMHG | TEMPERATURE: 98.4 F | HEIGHT: 65 IN | SYSTOLIC BLOOD PRESSURE: 117 MMHG | HEART RATE: 71 BPM | BODY MASS INDEX: 30.82 KG/M2 | OXYGEN SATURATION: 100 % | RESPIRATION RATE: 18 BRPM

## 2022-01-13 DIAGNOSIS — I73.9 PAD (PERIPHERAL ARTERY DISEASE) (HCC): Primary | ICD-10-CM

## 2022-01-13 PROCEDURE — G8417 CALC BMI ABV UP PARAM F/U: HCPCS | Performed by: SURGERY

## 2022-01-13 PROCEDURE — 1036F TOBACCO NON-USER: CPT | Performed by: SURGERY

## 2022-01-13 PROCEDURE — G8484 FLU IMMUNIZE NO ADMIN: HCPCS | Performed by: SURGERY

## 2022-01-13 PROCEDURE — G8427 DOCREV CUR MEDS BY ELIG CLIN: HCPCS | Performed by: SURGERY

## 2022-01-13 PROCEDURE — 99214 OFFICE O/P EST MOD 30 MIN: CPT | Performed by: SURGERY

## 2022-01-13 ASSESSMENT — ENCOUNTER SYMPTOMS
CHEST TIGHTNESS: 0
ALLERGIC/IMMUNOLOGIC NEGATIVE: 1
ABDOMINAL PAIN: 0
SHORTNESS OF BREATH: 0
COLOR CHANGE: 0
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
## 2022-01-19 DIAGNOSIS — I10 ESSENTIAL HYPERTENSION: ICD-10-CM

## 2022-01-19 RX ORDER — AMLODIPINE BESYLATE 10 MG/1
TABLET ORAL
Qty: 90 TABLET | Refills: 2 | Status: SHIPPED | OUTPATIENT
Start: 2022-01-19

## 2022-01-19 NOTE — TELEPHONE ENCOUNTER
Last visit: 02/04/21  Last Med refill: 02/04/21  Does patient have enough medication for 72 hours: No:     Next Visit Date:  Future Appointments   Date Time Provider Amira English   3/8/2022  1:45 PM Sal Simpson DO [facility]   7/14/2022  2:00 PM Sampson Ordaz MD [facility] [address] Maintenance   Topic Date Due    Hepatitis C screen  Never done    COVID-19 Vaccine (1) Never done    Pneumococcal 0-64 years Vaccine (1 of 2 - PPSV23) Never done    Depression Monitoring  Never done    HIV screen  Never done    DTaP/Tdap/Td vaccine (1 - Tdap) Never done    Lipid screen  10/03/2019    Colon Cancer Screen FIT/FOBT  05/06/2020    A1C test (Diabetic or Prediabetic)  03/05/2021    Flu vaccine (1) 09/01/2021    Potassium monitoring  02/26/2022    Creatinine monitoring  02/26/2022    Cervical cancer screen  09/04/2025    Hepatitis A vaccine  Aged Out    Hepatitis B vaccine  Aged Out    Hib vaccine  Aged Out    Meningococcal (ACWY) vaccine  Aged Out       Hemoglobin A1C (%)   Date Value   03/05/2020 6.1   12/18/2017 5.9   07/23/2014 5.7             ( goal A1C is < 7)   No results found for: LABMICR  LDL Cholesterol (mg/dL)   Date Value   10/03/2018 42   12/18/2017 136 (H)       (goal LDL is <100)   AST (U/L)   Date Value   06/26/2020 13     ALT (U/L)   Date Value   06/26/2020 11     BUN (mg/dL)   Date Value   02/26/2021 10     BP Readings from Last 3 Encounters:   01/13/22 117/77   09/16/21 (!) 114/58   05/14/21 127/71          (goal 120/80)    All Future Testing planned in [facility]  Lab Frequency Next Occurrence   VL ARTERIAL PVR LOWER WO EXERCISE Once 07/13/2022               Patient Active Problem List:     Migraine headache with aura     HTN (hypertension)     Obesity     Menometrorrhagia     Intertriginous candidiasis     Depression     Claudication in peripheral vascular disease ([facility]     Left popliteal artery occlusion (HCC)     Hypercoagulable state ([facility] Hemorrhage of rectum and anus     Anemia     Foot pain     Wound of right leg     Takotsubo cardiomyopathy     Chest pain     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 If I can be of assistance, please route to the applicable pool. Thank you.

## 2022-02-21 DIAGNOSIS — I10 ESSENTIAL HYPERTENSION: ICD-10-CM

## 2022-02-21 RX ORDER — LISINOPRIL 5 MG/1
TABLET ORAL
Qty: 30 TABLET | Refills: 2 | Status: SHIPPED | OUTPATIENT
Start: 2022-02-21 | End: 2022-06-03

## 2022-02-21 NOTE — TELEPHONE ENCOUNTER
Please address the medication refill and close the encounter. If I can be of assistance, please route to the applicable pool. Thank you.       Last visit: 2-4-2021  Last Med refill: 2-4-2021  Does patient have enough medication for 72 hours: No:     Next Visit Date:  Future Appointments   Date Time Provider Amira English   2[facility]24/2022  1[facility]   3/8/2022  1:45 PM [facility], DO [facility] OB[facility]   7/14/2022  2:00 PM Sampson Richard MD heartvasc [address] Maintenance   Topic Date Due    Hepatitis C screen  Never done    COVID-19 Vaccine (1) Never done    Pneumococcal 0-64 years Vaccine (1 of 2 - PPSV23) Never done    Depression Monitoring  Never done    HIV screen  Never done    DTaP/Tdap/Td vaccine (1 - Tdap) Never done    Lipid screen  10/03/2019    Colorectal Cancer Screen  05/06/2020    A1C test (Diabetic or Prediabetic)  03/05/2021    Flu vaccine (1) 09/01/2021    Potassium monitoring  02/26/2022    Creatinine monitoring  02/26/2022    Cervical cancer screen  09/04/2025    Hepatitis A vaccine  Aged Out    Hepatitis B vaccine  Aged Out    Hib vaccine  Aged Out    Meningococcal (ACWY) vaccine  Aged Out       Hemoglobin A1C (%)   Date Value   03/05/2020 6.1   12/18/2017 5.9   07/23/2014 5.7             ( goal A1C is < 7)   No results found for: LABMICR  LDL Cholesterol (mg/dL)   Date Value   10/03/2018 42   12/18/2017 136 (H)       (goal LDL is <100)   AST (U/L)   Date Value   06/26/2020 13     ALT (U/L)   Date Value   06/26/2020 11     BUN (mg/dL)   Date Value   02/26/2021 10     BP Readings from Last 3 Encounters:   01/13/22 117/77   09/16/21 (!) 114/58   05/14/21 127/71          (goal 120/80)    All Future Testing planned in [facility]  Lab Frequency Next Occurrence   VL ARTERIAL PVR LOWER WO EXERCISE Once 07/13/2022               Patient Active Problem List:     Migraine headache with aura     HTN (hypertension)     Obesity Menometrorrhagia     Intertriginous candidiasis     Depression     Claudication in peripheral vascular disease (HCC)     Left popliteal artery occlusion (HCC)     Hypercoagulable state ([address]     Hemorrhage of rectum and anus     Anemia     Foot pain     Wound of right leg     Takotsubo cardiomyopathy     Chest pain     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

## 2022-04-25 DIAGNOSIS — I74.3 FEMOROPOPLITEAL ARTERIAL THROMBOSIS OF LEFT LOWER EXTREMITY (HCC): ICD-10-CM

## 2022-04-25 RX ORDER — ATORVASTATIN CALCIUM 40 MG/1
TABLET, FILM COATED ORAL
Qty: 30 TABLET | Refills: 3 | Status: SHIPPED | OUTPATIENT
Start: 2022-04-25 | End: 2022-09-19

## 2022-04-25 NOTE — TELEPHONE ENCOUNTER
Last visit: 2/17/22  Last Med refill: 3/26/22  Does patient have enough medication for 72 hours: Yes    Next Visit Date:  Future Appointments   Date Time Provider Amira English   5/24/2022 10:30 AM Luis Felipe Vasquez DO [facility] OB[facility]   7/14/2022  2:00 PM Sampson Ramirez MD [facility] Maintenance   Topic Date Due    COVID-19 Vaccine (1) Never done    Pneumococcal 0-64 years Vaccine (1 - PCV) Never done    Depression Monitoring  Never done    HIV screen  Never done    Hepatitis C screen  Never done    DTaP/Tdap/Td vaccine (1 - Tdap) Never done    Lipids  10/03/2019    Colorectal Cancer Screen  05/06/2020    A1C test (Diabetic or Prediabetic)  03/05/2021    Potassium  02/26/2022    Creatinine  02/26/2022    Flu vaccine (Season Ended) 09/01/2022    Cervical cancer screen  09/04/2025    Hepatitis A vaccine  Aged Out    Hepatitis B vaccine  Aged Out    Hib vaccine  Aged Out    Meningococcal (ACWY) vaccine  Aged Out       Hemoglobin A1C (%)   Date Value   03/05/2020 6.1   12/18/2017 5.9   07/23/2014 5.7             ( goal A1C is < 7)   No results found for: LABMICR  LDL Cholesterol (mg/dL)   Date Value   10/03/2018 42   12/18/2017 136 (H)       (goal LDL is <100)   AST (U/L)   Date Value   06/26/2020 13     ALT (U/L)   Date Value   06/26/2020 11     BUN (mg/dL)   Date Value   02/26/2021 10     BP Readings from Last 3 Encounters:   01/13/22 117/77   09/16/21 (!) 114/58   05/14/21 127/71          (goal 120/80)    All Future Testing planned in [facility]  Lab Frequency Next Occurrence   VL ARTERIAL PVR LOWER WO EXERCISE Once 07/13/2022               Patient Active Problem List:     Migraine headache with aura     HTN (hypertension)     Obesity     Menometrorrhagia     Intertriginous candidiasis     Depression     Claudication in peripheral vascular disease ([facility]     Left popliteal artery occlusion (HCC)     Hypercoagulable state ([facility]     Hemorrhage of rectum and anus Anemia     Foot pain     Wound of right leg     Takotsubo cardiomyopathy     Chest pain     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

## 2022-04-26 ENCOUNTER — HOSPITAL ENCOUNTER (EMERGENCY)
Age: 47
Discharge: HOME | End: 2022-04-26
Attending: EMERGENCY MEDICINE
Payer: COMMERCIAL

## 2022-04-26 ENCOUNTER — APPOINTMENT (OUTPATIENT)
Dept: GENERAL RADIOLOGY | Age: 47
End: 2022-04-26
Payer: COMMERCIAL

## 2022-04-26 VITALS
RESPIRATION RATE: 24 BRPM | BODY MASS INDEX: 30.79 KG/M2 | OXYGEN SATURATION: 97 % | TEMPERATURE: 97 F | SYSTOLIC BLOOD PRESSURE: 124 MMHG | DIASTOLIC BLOOD PRESSURE: 79 MMHG | HEART RATE: 100 BPM | WEIGHT: 185 LBS

## 2022-04-26 DIAGNOSIS — B34.9 VIRAL ILLNESS: Primary | ICD-10-CM

## 2022-04-26 LAB
ABSOLUTE EOS #: 0.04 K/UL (ref 0–0.44)
ABSOLUTE IMMATURE GRANULOCYTE: <0.03 K/UL (ref 0–0.3)
ABSOLUTE LYMPH #: 1.7 K/UL (ref 1.1–3.7)
ABSOLUTE MONO #: 0.64 K/UL (ref 0.1–1.2)
ANION GAP SERPL CALCULATED.3IONS-SCNC: 16 MMOL/L (ref 9–17)
BASOPHILS # BLD: 1 % (ref 0–2)
BASOPHILS ABSOLUTE: 0.04 K/UL (ref 0–0.2)
BUN BLDV-MCNC: 22 MG/DL (ref 6–20)
CALCIUM SERPL-MCNC: 9.1 MG/DL (ref 8.6–10.4)
CHLORIDE BLD-SCNC: 97 MMOL/L (ref 98–107)
CO2: 20 MMOL/L (ref 20–31)
CREAT SERPL-MCNC: 1.1 MG/DL (ref 0.5–0.9)
D-DIMER QUANTITATIVE: 0.37 MG/L FEU
EOSINOPHILS RELATIVE PERCENT: 1 % (ref 1–4)
FLU A ANTIGEN: NEGATIVE
FLU B ANTIGEN: NEGATIVE
GFR AFRICAN AMERICAN: >60 ML/MIN
GFR NON-AFRICAN AMERICAN: 53 ML/MIN
GFR SERPL CREATININE-BSD FRML MDRD: ABNORMAL ML/MIN/{1.73_M2}
GLUCOSE BLD-MCNC: 137 MG/DL (ref 70–99)
HCG QUALITATIVE: NEGATIVE
HCT VFR BLD CALC: 44.2 % (ref 36.3–47.1)
HEMOGLOBIN: 14.7 G/DL (ref 11.9–15.1)
IMMATURE GRANULOCYTES: 0 %
LYMPHOCYTES # BLD: 25 % (ref 24–43)
MAGNESIUM: 2.3 MG/DL (ref 1.6–2.6)
MCH RBC QN AUTO: 29.5 PG (ref 25.2–33.5)
MCHC RBC AUTO-ENTMCNC: 33.3 G/DL (ref 28.4–34.8)
MCV RBC AUTO: 88.6 FL (ref 82.6–102.9)
MONOCYTES # BLD: 9 % (ref 3–12)
NRBC AUTOMATED: 0 PER 100 WBC
PDW BLD-RTO: 13.4 % (ref 11.8–14.4)
PLATELET # BLD: 355 K/UL (ref 138–453)
PMV BLD AUTO: 10.2 FL (ref 8.1–13.5)
POTASSIUM SERPL-SCNC: 3.5 MMOL/L (ref 3.7–5.3)
RBC # BLD: 4.99 M/UL (ref 3.95–5.11)
SARS-COV-2, RAPID: NOT DETECTED
SEG NEUTROPHILS: 64 % (ref 36–65)
SEGMENTED NEUTROPHILS ABSOLUTE COUNT: 4.48 K/UL (ref 1.5–8.1)
SODIUM BLD-SCNC: 133 MMOL/L (ref 135–144)
SPECIMEN DESCRIPTION: NORMAL
TROPONIN, HIGH SENSITIVITY: 8 NG/L (ref 0–14)
WBC # BLD: 6.9 K/UL (ref 3.5–11.3)

## 2022-04-26 PROCEDURE — 87635 SARS-COV-2 COVID-19 AMP PRB: CPT

## 2022-04-26 PROCEDURE — 71045 X-RAY EXAM CHEST 1 VIEW: CPT

## 2022-04-26 PROCEDURE — 6370000000 HC RX 637 (ALT 250 FOR IP): Performed by: STUDENT IN AN ORGANIZED HEALTH CARE EDUCATION/TRAINING PROGRAM

## 2022-04-26 PROCEDURE — 84703 CHORIONIC GONADOTROPIN ASSAY: CPT

## 2022-04-26 PROCEDURE — 93005 ELECTROCARDIOGRAM TRACING: CPT | Performed by: STUDENT IN AN ORGANIZED HEALTH CARE EDUCATION/TRAINING PROGRAM

## 2022-04-26 PROCEDURE — 85379 FIBRIN DEGRADATION QUANT: CPT

## 2022-04-26 PROCEDURE — 6360000002 HC RX W HCPCS: Performed by: STUDENT IN AN ORGANIZED HEALTH CARE EDUCATION/TRAINING PROGRAM

## 2022-04-26 PROCEDURE — 99285 EMERGENCY DEPT VISIT HI MDM: CPT

## 2022-04-26 PROCEDURE — 80048 BASIC METABOLIC PNL TOTAL CA: CPT

## 2022-04-26 PROCEDURE — 87804 INFLUENZA ASSAY W/OPTIC: CPT

## 2022-04-26 PROCEDURE — 2580000003 HC RX 258: Performed by: STUDENT IN AN ORGANIZED HEALTH CARE EDUCATION/TRAINING PROGRAM

## 2022-04-26 PROCEDURE — 96360 HYDRATION IV INFUSION INIT: CPT

## 2022-04-26 PROCEDURE — 85025 COMPLETE CBC W/AUTO DIFF WBC: CPT

## 2022-04-26 PROCEDURE — 93971 EXTREMITY STUDY: CPT

## 2022-04-26 PROCEDURE — 83735 ASSAY OF MAGNESIUM: CPT

## 2022-04-26 PROCEDURE — 96372 THER/PROPH/DIAG INJ SC/IM: CPT

## 2022-04-26 PROCEDURE — 84484 ASSAY OF TROPONIN QUANT: CPT

## 2022-04-26 RX ORDER — ORPHENADRINE CITRATE 30 MG/ML
60 INJECTION INTRAMUSCULAR; INTRAVENOUS ONCE
Status: COMPLETED | OUTPATIENT
Start: 2022-04-26 | End: 2022-04-26

## 2022-04-26 RX ORDER — 0.9 % SODIUM CHLORIDE 0.9 %
1000 INTRAVENOUS SOLUTION INTRAVENOUS ONCE
Status: COMPLETED | OUTPATIENT
Start: 2022-04-26 | End: 2022-04-26

## 2022-04-26 RX ORDER — METHOCARBAMOL 500 MG/1
500 TABLET, FILM COATED ORAL 4 TIMES DAILY
Qty: 40 TABLET | Refills: 0 | Status: SHIPPED | OUTPATIENT
Start: 2022-04-26 | End: 2022-05-06

## 2022-04-26 RX ORDER — ACETAMINOPHEN 325 MG/1
650 TABLET ORAL ONCE
Status: COMPLETED | OUTPATIENT
Start: 2022-04-26 | End: 2022-04-26

## 2022-04-26 RX ORDER — ACETAMINOPHEN 325 MG/1
650 TABLET ORAL EVERY 6 HOURS PRN
Qty: 60 TABLET | Refills: 0 | Status: SHIPPED | OUTPATIENT
Start: 2022-04-26 | End: 2022-09-15

## 2022-04-26 RX ORDER — ONDANSETRON 4 MG/1
4 TABLET, ORALLY DISINTEGRATING ORAL EVERY 8 HOURS PRN
Qty: 10 TABLET | Refills: 0 | Status: SHIPPED | OUTPATIENT
Start: 2022-04-26 | End: 2022-09-15

## 2022-04-26 RX ADMIN — ACETAMINOPHEN 650 MG: 325 TABLET ORAL at 15:25

## 2022-04-26 RX ADMIN — ORPHENADRINE CITRATE 60 MG: 30 INJECTION INTRAMUSCULAR; INTRAVENOUS at 15:26

## 2022-04-26 RX ADMIN — SODIUM CHLORIDE 1000 ML: 9 INJECTION, SOLUTION INTRAVENOUS at 16:12

## 2022-04-26 ASSESSMENT — PAIN DESCRIPTION - LOCATION
LOCATION: ABDOMEN
LOCATION: ABDOMEN

## 2022-04-26 ASSESSMENT — PAIN SCALES - GENERAL
PAINLEVEL_OUTOF10: 8
PAINLEVEL_OUTOF10: 8

## 2022-04-26 NOTE — ED NOTES
Attempted to call report. Nurse unavailable.        Nelson Castaneda, JAVIER  10/02/18 [phone] Patient Needs Assistance to Leave Residence...

## 2022-04-26 NOTE — ED NOTES
Pt states vomiting x 2 days  Pt states diarrhea since Saturday  Pt states no appetitie x 4 days  Pt states fever  Pt states chills  Pt states she was exposed to the flu     Radha Silence, LPN  46/32/81 [id number]

## 2022-04-26 NOTE — ED PROVIDER NOTES
[facility] ED  Emergency Department Encounter  EmergencyMedicine Resident     Pt Marti Laurent  MRN: [id number]  [place] 1975  Date of evaluation: 4/26/22  PCP:  Johanna Al MD    This patient was evaluated in the Emergency Department for symptoms described in the history of present illness. The patient was evaluated in the context of the global COVID-19 pandemic, which necessitated consideration that the patient might be at risk for infection with the SARS-CoV-2 virus that causes COVID-19. Institutional protocols and algorithms that pertain to the evaluation of patients at risk for COVID-19 are in a state of rapid change based on information released by regulatory bodies including the CDC and federal and state organizations. These policies and algorithms were followed during the patient's care in the ED. CHIEF COMPLAINT       Chief Complaint   Patient presents with    Illness     started saturday. states daughter recently dx with flu.  c/o N/V/D, chills. also c/o lower leg cramping with hx blood clots       HISTORY OF PRESENT ILLNESS  (Location/Symptom, Timing/Onset, Context/Setting, Quality, Duration, Modifying Factors, Severity.)      Dwayne Rios is a 55 y.o. female who presents with generalized fatigue. Patient reports a viral-like syndrome for the past 4 days, generalized fatigue, generalized weakness, body aches, nausea, vomiting, diarrhea, cough, congestion, fevers, chills. Patient also reports chest wall pain, bilaterally, nonradiating, moderate severity, worse with coughing. Patient is reporting left lower extremity swelling and pain, history of clots in the leg, on Eliquis for years. Patient denies rhinorrhea, sore throat, shortness of breath, abdominal pain, dysuria, hematuria, frequency, urgency, vaginal discharge, vaginal bleeding.     PAST MEDICAL / SURGICAL / SOCIAL / FAMILY HISTORY      has a past medical history of Abnormal Pap smear of cervix, Anemia, Chronic back pain, Claudication ([facility] [address] Claudication in peripheral vascular disease ([address] Depression, Headache(784.0), Heart murmur, Hemorrhage of rectum and anus, History of blood transfusion, HTN (hypertension), Hx of blood clots, Hypercoagulable state ([address] Hyperlipidemia, Hypertension, Intertriginous candidiasis, Left popliteal artery occlusion ([address] Leg pain, Menometrorrhagia, Migraine headache with aura, Obesity, Osteoarthritis, PAD (peripheral artery disease) ([facility] [address] PVD (peripheral vascular disease) ([facility] [address] Takotsubo cardiomyopathy, and Wound of right leg.       has a past surgical history that includes Tubal ligation ();  section (); other surgical history (2017); other surgical history (Left, 2018); other surgical history (Left, 2018); vascular surgery (Left, 2018); pr reoperation, bypass graft (Left, 2018); pr vein bypass graft,fem-pop (Left, 2018); thrombectomy (Right, 2019); IVC filter insertion; Toe amputation (Right, 2019); Toe amputation (Right, 2019); and Foot Amputation (Right, 3/8/2021).       Social History     Socioeconomic History    Marital status: Single     Spouse name: Not on file    Number of children: Not on file    Years of education: Not on file    Highest education level: Not on file   Occupational History    Not on file   Tobacco Use    Smoking status: Never Smoker    Smokeless tobacco: Never Used   Vaping Use    Vaping Use: Never used   Substance and Sexual Activity    Alcohol use: Not Currently     Comment: occasionally     Drug use: No    Sexual activity: Not Currently     Partners: Male   Other Topics Concern    Not on file   Social History Narrative    Not on file     Social Determinants of Health     Financial Resource Strain:     Difficulty of Paying Living Expenses: Not on file   Food Insecurity:     Worried About Running Out of Food in the Last Year: Not on file    [address] in the Last Year: Not on file   Transportation Needs:     Lack of Transportation (Medical): Not on file    Lack of Transportation (Non-Medical): Not on file   Physical Activity:     Days of Exercise per Week: Not on file    Minutes of Exercise per Session: Not on file   Stress:     Feeling of Stress : Not on file   Social Connections:     Frequency of Communication with Friends and Family: Not on file    Frequency of Social Gatherings with Friends and Family: Not on file    Attends [employer] Services: Not on file    Active Member of Clubs or Organizations: Not on file    Attends Club or Organization Meetings: Not on file    Marital Status: Not on file   Intimate Partner Violence:     Fear of Current or Ex-Partner: Not on file    Emotionally Abused: Not on file    Physically Abused: Not on file    Sexually Abused: Not on file   Housing Stability:     Unable to Pay for Housing in the Last Year: Not on file    Number of [place] in the Last Year: Not on file    Unstable Housing in the Last Year: Not on file       Family History   Problem Relation Age of Onset    Diabetes Mother     High Blood Pressure Mother     Heart Attack Mother     Heart Disease Mother     Kidney Disease Mother     High Blood Pressure Father     Heart Disease Father     Heart Attack Father     Diabetes Sister     High Blood Pressure Sister     Diabetes Sister     High Blood Pressure Sister        Allergies:  Asa [aspirin], Lopid [gemfibrozil], Nortriptyline, Pcn [penicillins], Sulfa antibiotics, and Ibuprofen    Home Medications:  Prior to Admission medications    Medication Sig Start Date End Date Taking?  Authorizing Provider   acetaminophen (TYLENOL) 325 MG tablet Take 2 tablets by mouth every 6 hours as needed for Pain 4/26/22  Yes Jose Sweet MD   ondansetron (ZOFRAN ODT) 4 MG disintegrating tablet Take 1 tablet by mouth every 8 hours as needed for Nausea 4/26/22  Yes Jose Sweet MD   methocarbamol (ROBAXIN) 500 MG tablet Take 1 tablet by mouth 4 times daily for 10 days 4/26/22 5/6/22 Yes Roberta Copeland MD   atorvastatin (LIPITOR) 40 MG tablet take 1 tablet by mouth once daily 4/25/22   Eva Tierney MD   lisinopril (PRINIVIL;ZESTRIL) 5 MG tablet take 1 tablet by mouth once daily 2/21/22   Lashay Salguero MD   amLODIPine (NORVASC) 10 MG tablet take 1 tablet by mouth once daily 1/19/22   Lashay Salguero MD   apixaban (ELIQUIS) 5 MG TABS tablet Take 1 tablet by mouth 2 times daily 5/14/21   Sayra Baxter MD       REVIEW OF SYSTEMS    (2-9 systems for level 4, 10 or more for level 5)      Review of Systems   Constitutional: Positive for fatigue. Negative for chills, diaphoresis and fever. HENT: Positive for congestion. Negative for rhinorrhea and sore throat. Eyes: Negative for visual disturbance. Respiratory: Positive for cough. Negative for chest tightness, shortness of breath and wheezing. Cardiovascular: Positive for chest pain and leg swelling. Negative for palpitations. Gastrointestinal: Positive for diarrhea, nausea and vomiting. Negative for abdominal pain, blood in stool and constipation. Genitourinary: Negative for dysuria, frequency, hematuria, urgency, vaginal bleeding and vaginal discharge. Musculoskeletal: Positive for arthralgias and myalgias. Negative for neck pain and neck stiffness. Skin: Negative for pallor and rash. Neurological: Positive for weakness. Negative for dizziness, syncope, light-headedness and headaches. Psychiatric/Behavioral: Negative for confusion. PHYSICAL EXAM   (up to 7 for level 4, 8 or more for level 5)      INITIAL VITALS:   /79   Pulse 100   Temp 97 °F (36.1 °C) (Oral)   Resp 24   Wt 185 lb (83.9 kg)   SpO2 97%   BMI 30.79 kg/m²     Physical Exam  Constitutional:       General: She is not in acute distress. Appearance: She is well-developed. She is ill-appearing. She is not toxic-appearing or diaphoretic.       Comments: Patient is alert and oriented, openly communicative, appears ill, nontoxic   HENT:      Head: Normocephalic and atraumatic. Right Ear: External ear normal.      Left Ear: External ear normal.      Nose: Congestion and rhinorrhea present. Mouth/Throat:      Mouth: Mucous membranes are moist.      Pharynx: Oropharynx is clear. No oropharyngeal exudate or posterior oropharyngeal erythema. Eyes:      General:         Right eye: No discharge. Left eye: No discharge. Extraocular Movements: Extraocular movements intact. Pupils: Pupils are equal, round, and reactive to light. Neck:      Vascular: No JVD. Trachea: No tracheal deviation. Cardiovascular:      Rate and Rhythm: Normal rate and regular rhythm. Pulses: Normal pulses. Heart sounds: Normal heart sounds. No murmur heard. No friction rub. No gallop. Pulmonary:      Effort: Pulmonary effort is normal. No respiratory distress. Breath sounds: Normal breath sounds. No stridor. No wheezing, rhonchi or rales. Chest:      Chest wall: No tenderness. Abdominal:      General: There is no distension. Palpations: Abdomen is soft. There is no mass. Tenderness: There is no abdominal tenderness. There is no right CVA tenderness, left CVA tenderness or guarding. Musculoskeletal:         General: Normal range of motion. Cervical back: Normal range of motion and neck supple. No rigidity or tenderness. Right lower leg: No edema. Left lower leg: Edema present. Skin:     General: Skin is warm. Capillary Refill: Capillary refill takes less than 2 seconds. Neurological:      Mental Status: She is alert and oriented to person, place, and time. Cranial Nerves: No cranial nerve deficit. Sensory: No sensory deficit. Motor: No weakness.    Psychiatric:         Mood and Affect: Mood normal.         Behavior: Behavior normal.         DIFFERENTIAL  DIAGNOSIS     PLAN (LABS / IMAGING / EKG):  Orders Placed This Encounter   Procedures    COVID-19, Rapid    RAPID INFLUENZA A/B ANTIGENS    XR CHEST PORTABLE    VL DUP LOWER EXTREMITY VENOUS LEFT    CBC with Auto Differential    Basic Metabolic Panel w/ Reflex to MG    D-Dimer, Quantitative    HCG Qualitative, Serum    Magnesium    EKG 12 Lead       MEDICATIONS ORDERED:  Orders Placed This Encounter   Medications    orphenadrine (NORFLEX) injection 60 mg    acetaminophen (TYLENOL) tablet 650 mg    0.9 % sodium chloride bolus    acetaminophen (TYLENOL) 325 MG tablet     Sig: Take 2 tablets by mouth every 6 hours as needed for Pain     Dispense:  60 tablet     Refill:  0    ondansetron (ZOFRAN ODT) 4 MG disintegrating tablet     Sig: Take 1 tablet by mouth every 8 hours as needed for Nausea     Dispense:  10 tablet     Refill:  0    methocarbamol (ROBAXIN) 500 MG tablet     Sig: Take 1 tablet by mouth 4 times daily for 10 days     Dispense:  40 tablet     Refill:  0       DDX: DVT, PE, COVID, influenza, viral illness, ACS, pneumonia    DIAGNOSTIC RESULTS / EMERGENCY DEPARTMENT COURSE / MDM   LAB RESULTS:  Results for orders placed or performed during the hospital encounter of 04/26/22   COVID-19, Rapid    Specimen: Nasopharyngeal Swab   Result Value Ref Range    Specimen Description . NASOPHARYNGEAL SWAB     SARS-CoV-2, Rapid Not Detected Not Detected   RAPID INFLUENZA A/B ANTIGENS    Specimen: Nasopharyngeal   Result Value Ref Range    Flu A Antigen NEGATIVE NEGATIVE    Flu B Antigen NEGATIVE NEGATIVE   CBC with Auto Differential   Result Value Ref Range    WBC 6.9 3.5 - 11.3 k/uL    RBC 4.99 3.95 - 5.11 m/uL    Hemoglobin 14.7 11.9 - 15.1 g/dL    Hematocrit 44.2 36.3 - 47.1 %    MCV 88.6 82.6 - 102.9 fL    MCH 29.5 25.2 - 33.5 pg    MCHC 33.3 28.4 - 34.8 g/dL    RDW 13.4 11.8 - 14.4 %    Platelets 365 249 - 720 k/uL    MPV 10.2 8.1 - 13.5 fL    NRBC Automated 0.0 0.0 per 100 WBC    Seg Neutrophils 64 36 - 65 %    Lymphocytes 25 24 - 43 %    Monocytes 9 3 - 12 %    Eosinophils % 1 1 - 4 %    Basophils 1 0 - 2 %    Immature Granulocytes 0 0 %    Segs Absolute 4.48 1.50 - 8.10 k/uL    Absolute Lymph # 1.70 1.10 - 3.70 k/uL    Absolute Mono # 0.64 0.10 - 1.20 k/uL    Absolute Eos # 0.04 0.00 - 0.44 k/uL    Basophils Absolute 0.04 0.00 - 0.20 k/uL    Absolute Immature Granulocyte <0.03 0.00 - 0.30 k/uL   Basic Metabolic Panel w/ Reflex to MG   Result Value Ref Range    Glucose 137 (H) 70 - 99 mg/dL    BUN 22 (H) 6 - 20 mg/dL    CREATININE 1.10 (H) 0.50 - 0.90 mg/dL    Calcium 9.1 8.6 - 10.4 mg/dL    Sodium 133 (L) 135 - 144 mmol/L    Potassium 3.5 (L) 3.7 - 5.3 mmol/L    Chloride 97 (L) 98 - 107 mmol/L    CO2 20 20 - 31 mmol/L    Anion Gap 16 9 - 17 mmol/L    GFR Non-African American 53 (L) >60 mL/min    GFR African American >60 >60 mL/min    GFR Comment         D-Dimer, Quantitative   Result Value Ref Range    D-Dimer, Quant 0.37 mg/L FEU   Troponin   Result Value Ref Range    Troponin, High Sensitivity 8 0 - 14 ng/L   HCG Qualitative, Serum   Result Value Ref Range    hCG Qual NEGATIVE NEGATIVE   Magnesium   Result Value Ref Range    Magnesium 2.3 1.6 - 2.6 mg/dL   EKG 12 Lead   Result Value Ref Range    Ventricular Rate 82 BPM    Atrial Rate 82 BPM    P-R Interval 152 ms    QRS Duration 86 ms    Q-T Interval 394 ms    QTc Calculation (Bazett) 460 ms    P Axis 59 degrees    R Axis -9 degrees    T Axis 22 degrees       IMPRESSION: 49-year-old female with viral-like syndrome, history of clots, taking Eliquis, but worsening left lower extremity pain and swelling, will obtain Doppler to evaluate for DVT. Will obtain dimer to evaluate for PE. Will obtain EKG, troponins evaluate for ACS. Will obtain basic labs to evaluate for anemia, electrolyte abnormality. Will obtain COVID and influenza swabs per patient preference. RADIOLOGY:  XR CHEST PORTABLE    Result Date: 4/26/2022  EXAMINATION: ONE XRAY VIEW OF THE CHEST 4/26/2022 3:33 pm COMPARISON: None.  HISTORY: ORDERING SYSTEM PROVIDED HISTORY: Chest pain TECHNOLOGIST PROVIDED HISTORY: Chest pain Reason for Exam: Chest pain FINDINGS: The lungs are clear, no effusion. No pneumothorax. Heart is normal size. Mediastinal and hilar contours are within normal limits. Bony thorax no acute abnormality. 1. No acute cardiopulmonary abnormality. VL DUP LOWER EXTREMITY VENOUS LEFT    Result Date: 4/27/2022    [facility]  Vascular Lower Extremities DVT Study Procedure   Patient Name  Hair Castro       Date of Study         04/26/2022                Sharri Riding   Date of Birth 1975  Gender                Female   Age           55 year(s)  Race                  Black   Room Number   06   Corporate ID  [id number]  #   Patient Flaquita  [de-identified]  #   MR #          [id number]     Sonographer           Braden Saha RVT, FIGUEROA   Accession #   [id number]  Interpreting          Roderick Ugarte                            Physician   Referring                 Referring Physician   Cecille Billings MD  Nurse  Practitioner  Procedure Type of Study:   Veins: Lower Extremities DVT Study, Venous Scan Lower Left. Indications for Study:Pain, leg. Patient Status:ER. Conclusions   Summary   Left:  No evidence of deep or superficial venous thrombosis. Signature   ----------------------------------------------------------------  Electronically signed by Braden Saha RVT, RDMS(Sonographer) on 04/26/2022 04:34 PM  ----------------------------------------------------------------   ----------------------------------------------------------------  Electronically signed by Rodreick Ugarte(Interpreting physician)  on 04/27/2022 06:49 AM  ----------------------------------------------------------------    Left Impression:   The common femoral, femoral, popliteal and tibial veins demonstrate   normal compressibility and augmentation. Normal compressibility of the great saphenous vein. Normal compressibility of the small saphenous vein.   Risk Factors History +-----------------------+----------+---------------------------------------+ ! Diagnosis              ! Date      ! Comments                               ! +-----------------------+----------+---------------------------------------+ ! Peripheral vascular    !01/16/2018! left Femoral-Peroneal Bypass Graft     ! !disease                !          !                                       ! +-----------------------+----------+---------------------------------------+ ! Green [employer]     !          !                                       ! +-----------------------+----------+---------------------------------------+ ! Peripheral vascular    !08/22/2019! Right femoral thrombectomy             ! !disease                !          !                                       ! +-----------------------+----------+---------------------------------------+ ! Peripheral vascular    ! !right partial hallux and 3rd toe       ! !disease                !          !amputation                             ! +-----------------------+----------+---------------------------------------+ ! Peripheral vascular    !          !bilateral stents                       ! !disease                !          !                                       ! +-----------------------+----------+---------------------------------------+   - The patient's risk factor(s) include: dyslipidemia and arterial     hypertension. Allergies   - Allergy:*Unlisted(Drug). Comments:asa, ibuprofen, lopid, nortripyline, pcn, sulfa Velocities are measured in cm/s ; Diameters are measured in cm Right Lower Extremities DVT Study Measurements Right Doppler Measurements +----------------------------+------+------+-------------------------------+ ! Location                    ! Signal!Reflux! Reflux (msec)                  ! +----------------------------+------+------+-------------------------------+ ! Common Femoral              !Phasic!      !                               ! +----------------------------+------+------+-------------------------------+ Left Lower Extremities DVT Study Measurements Left 2D Measurements +------------------------------------+----------+---------------+----------+ ! Location                            ! Visualized! Compressibility! Thrombosis! +------------------------------------+----------+---------------+----------+ ! Common Femoral                      !Yes       ! Yes            ! None      ! +------------------------------------+----------+---------------+----------+ ! Prox Femoral                        !Yes       ! Yes            ! None      ! +------------------------------------+----------+---------------+----------+ ! Mid Femoral                         !Yes       ! Yes            ! None      ! +------------------------------------+----------+---------------+----------+ ! Dist Femoral                        !Yes       ! Yes            ! None      ! +------------------------------------+----------+---------------+----------+ ! Popliteal                           !Yes       ! Yes            ! None      ! +------------------------------------+----------+---------------+----------+ ! Sapheno Femoral Junction            ! Yes       ! Yes            ! None      ! +------------------------------------+----------+---------------+----------+ ! PTV                                 ! Yes       ! Yes            ! None      ! +------------------------------------+----------+---------------+----------+ ! Peroneal                            !Partial   !Yes            ! None      ! +------------------------------------+----------+---------------+----------+ ! Gastroc                             ! Yes       ! Yes            ! None      ! +------------------------------------+----------+---------------+----------+ ! GSV Thigh                           ! No        !               !          ! +------------------------------------+----------+---------------+----------+ ! GSV Knee !No        !               !          ! +------------------------------------+----------+---------------+----------+ ! GSV Ankle                           ! No        !               !          ! +------------------------------------+----------+---------------+----------+ ! SSV                                 ! Yes       ! Yes            ! None      ! +------------------------------------+----------+---------------+----------+ Left Doppler Measurements +---------------------------+------+------+--------------------------------+ ! Location                   ! Signal!Reflux! Reflux (msec)                   ! +---------------------------+------+------+--------------------------------+ ! Common Femoral             !Phasic!      !                                ! +---------------------------+------+------+--------------------------------+ ! Prox Femoral               !Phasic!      !                                ! +---------------------------+------+------+--------------------------------+ ! Popliteal                  !Phasic!      !                                ! +---------------------------+------+------+--------------------------------+      EKG  EKG Interpretation    Interpreted by me    Rhythm: normal sinus   Rate: normal  Axis: normal  Ectopy: none  Conduction: normal  ST Segments: no acute change  T Waves: no acute change  Q Waves: none    Clinical Impression: no acute changes and normal EKG, similar to prior EKG    All EKG's are interpreted by the Emergency Department Physician who either signs or Co-signs this chart in the absence of a cardiologist.    EMERGENCY DEPARTMENT COURSE:  Patient came to emergency department, HPI and physical exam were conducted. All nursing notes were reviewed. Influenza and COVID-negative. EKG stable, troponin stable, dimer negative, Dopplers negative, chest x-ray negative, labs largely unremarkable. Likely viral illness. On reevaluation, patient has symptomatic improvement.   Patient remained stable in the emerge department with stable vital signs. Gave strict return precautions to the emergency department and discharge patient home. Recommended patient follow-up with primary care provider in the next few days for reevaluation. No notes of EC Admission Criteria type on file. PROCEDURES:      CONSULTS:  None    CRITICAL CARE:      FINAL IMPRESSION      1.  Viral illness          DISPOSITION / PLAN     DISPOSITION Decision To Discharge 04/26/2022 04:49:49 PM      PATIENT REFERRED TO:  Dejon Mejias MD  [address]  [phone]    Schedule an appointment as soon as possible for a visit in 3 days  For reassessment    [facility] ED  [address] [address]  [phone]  Go to   As needed, If symptoms worsen      DISCHARGE MEDICATIONS:  Discharge Medication List as of 4/26/2022  4:53 PM      START taking these medications    Details   ondansetron (ZOFRAN ODT) 4 MG disintegrating tablet Take 1 tablet by mouth every 8 hours as needed for Nausea, Disp-10 tablet, R-0Print      methocarbamol (ROBAXIN) 500 MG tablet Take 1 tablet by mouth 4 times daily for 10 days, Disp-40 tablet, R-0Print             Anthony Vasquez MD  Emergency Medicine Resident    (Please note that portions of thisnote were completed with a voice recognition program.  Efforts were made to edit the dictations but occasionally words are mis-transcribed.)       Anthony Vasquez MD  Resident  04/27/22 1200

## 2022-04-26 NOTE — ED PROVIDER NOTES
[facility]  FACULTY HANDOFF       Handoff taken on the following patient from prior Attending Physician:  Pt Name: Esequiel Gonzalez  PCP:  Cy Joseph MD    Attestation  I was available and discussed any additional care issues that arose and coordinated the management plans with the resident(s) caring for the patient during my duty period. Any areas of disagreement with resident's documentation of care or procedures are noted on the chart. I was personally present for the key portions of any/all procedures during my duty period. I have documented in the chart those procedures where I was not present during the key portions.              Rivas Jimenez MD  04/26/22 [id number]

## 2022-04-26 NOTE — ED PROVIDER NOTES
[facility]     Emergency Department     Faculty Note/ Attestation      Pt Name: Jean Bahena                                       MRN: [id number]  Armscandacegflev 1975  Date of evaluation: 4/26/2022  Patients PCP:    Brian Fofana MD    Attestation  I performed a history and physical examination of the patient/ or directly observed  and discussed management with the resident. I reviewed the residents note and agree with the documented findings and plan of care. Any areas of disagreement are noted on the chart. I was personally present for the key portions of any procedures. I have documented in the chart those procedures where I was not present during the key portions. I have reviewed the emergency nurses triage note. I agree with the chief complaint, past medical history, past surgical history, allergies, medications, social and family history as documented unless otherwise noted below. For Physician Assistant/ Nurse Practitioner cases/documentation I have personally evaluated this patient and have completed at least one if not all key elements of the E/M (history, physical exam, and MDM). Additional findings are as noted. This patient was evaluated in the Emergency Department for symptoms described in the history of present illness. The patient was evaluated in the context of the global COVID-19 pandemic, which necessitated consideration that the patient might be at risk for infection with the SARS-CoV-2 virus that causes COVID-19. Institutional protocols and algorithms that pertain to the evaluation of patients at risk for COVID-19 are in a state of rapid change based on information released by regulatory bodies including the CDC and federal and state organizations. These policies and algorithms were followed during the patient's care in the ED.      Initial Screens:        Yamilex Coma Scale  Eye Opening: Spontaneous  Best Verbal Response: Oriented  Best Motor Response: Obeys commands  Scuddy Coma Scale Score: 15    Vitals:    Vitals:    04/26/22 1355 04/26/22 1420   BP: 124/79    Pulse: 100    Resp: 24    Temp: 97 °F (36.1 °C)    TempSrc: Oral    SpO2: 97%    Weight:  185 lb (83.9 kg)       Chief Complaint      Chief Complaint   Patient presents with    Illness     started saturday. states daughter recently dx with flu.  c/o N/V/D, chills. also c/o lower leg cramping with hx blood clots          weight is 185 lb (83.9 kg). Her oral temperature is 97 °F (36.1 °C). Her blood pressure is 124/79 and her pulse is 100. Her respiration is 24 and oxygen saturation is 97%. DIAGNOSTIC RESULTS       RADIOLOGY:   XR CHEST PORTABLE   Final Result      1. No acute cardiopulmonary abnormality.          VL DUP LOWER EXTREMITY VENOUS LEFT    (Results Pending)         LABS:  Labs Reviewed   BASIC METABOLIC PANEL W/ REFLEX TO MG FOR LOW K - Abnormal; Notable for the following components:       Result Value    Glucose 137 (*)     BUN 22 (*)     CREATININE 1.10 (*)     Sodium 133 (*)     Potassium 3.5 (*)     Chloride 97 (*)     GFR Non- 53 (*)     All other components within normal limits   COVID-19, RAPID   RAPID INFLUENZA A/B ANTIGENS   CBC WITH AUTO DIFFERENTIAL   D-DIMER, QUANTITATIVE   TROPONIN   HCG, SERUM, QUALITATIVE   MAGNESIUM   TROPONIN         EMERGENCY DEPARTMENT COURSE:     -------------------------      BP: 124/79, Temp: 97 °F (36.1 °C), Pulse: 100, Resp: 24    System Problem List     Patient Active Problem List   Diagnosis    Migraine headache with aura    HTN (hypertension)    Obesity    Menometrorrhagia    Intertriginous candidiasis    Depression    Claudication in peripheral vascular disease ([facility]    Left popliteal artery occlusion (HCC)    Hypercoagulable state ([address]    Hemorrhage of rectum and anus    Anemia    Foot pain    Wound of right leg    Takotsubo cardiomyopathy    Chest pain    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 Shayan Swift MD,, MD, F.A.C.E.P.   Attending Emergency Physician         Shayan Swift MD  04/26/22 1967

## 2022-04-27 LAB
EKG ATRIAL RATE: 82 BPM
EKG P AXIS: 59 DEGREES
EKG P-R INTERVAL: 152 MS
EKG Q-T INTERVAL: 394 MS
EKG QRS DURATION: 86 MS
EKG QTC CALCULATION (BAZETT): 460 MS
EKG R AXIS: -9 DEGREES
EKG T AXIS: 22 DEGREES
EKG VENTRICULAR RATE: 82 BPM

## 2022-04-27 PROCEDURE — 93010 ELECTROCARDIOGRAM REPORT: CPT | Performed by: INTERNAL MEDICINE

## 2022-04-27 ASSESSMENT — ENCOUNTER SYMPTOMS
DIARRHEA: 1
CHEST TIGHTNESS: 0
NAUSEA: 1
RHINORRHEA: 0
BLOOD IN STOOL: 0
VOMITING: 1
SHORTNESS OF BREATH: 0
COUGH: 1
SORE THROAT: 0
WHEEZING: 0
CONSTIPATION: 0
ABDOMINAL PAIN: 0

## 2022-05-24 ENCOUNTER — OFFICE VISIT (OUTPATIENT)
Dept: OBGYN | Age: 47
End: 2022-05-24
Payer: COMMERCIAL

## 2022-05-24 ENCOUNTER — HOSPITAL ENCOUNTER (OUTPATIENT)
Age: 47
Setting detail: SPECIMEN
Discharge: HOME | End: 2022-05-24

## 2022-05-24 VITALS
BODY MASS INDEX: 31.16 KG/M2 | WEIGHT: 187 LBS | DIASTOLIC BLOOD PRESSURE: 74 MMHG | SYSTOLIC BLOOD PRESSURE: 117 MMHG | HEIGHT: 65 IN | HEART RATE: 73 BPM

## 2022-05-24 DIAGNOSIS — Z87.42 HISTORY OF ABNORMAL CERVICAL PAP SMEAR: ICD-10-CM

## 2022-05-24 DIAGNOSIS — Z12.31 OTHER SCREENING MAMMOGRAM: ICD-10-CM

## 2022-05-24 DIAGNOSIS — N93.9 ABNORMAL UTERINE BLEEDING: ICD-10-CM

## 2022-05-24 DIAGNOSIS — Z12.11 SCREENING FOR COLON CANCER: ICD-10-CM

## 2022-05-24 DIAGNOSIS — Z01.419 ENCOUNTER FOR WELL WOMAN EXAM WITH ROUTINE GYNECOLOGICAL EXAM: ICD-10-CM

## 2022-05-24 DIAGNOSIS — B96.89 BACTERIAL VAGINOSIS: ICD-10-CM

## 2022-05-24 DIAGNOSIS — D25.9 UTERINE LEIOMYOMA, UNSPECIFIED LOCATION: ICD-10-CM

## 2022-05-24 DIAGNOSIS — Z01.419 ENCOUNTER FOR WELL WOMAN EXAM WITH ROUTINE GYNECOLOGICAL EXAM: Primary | ICD-10-CM

## 2022-05-24 DIAGNOSIS — N76.0 BACTERIAL VAGINOSIS: ICD-10-CM

## 2022-05-24 DIAGNOSIS — N90.89 LABIAL LESION: ICD-10-CM

## 2022-05-24 PROCEDURE — 99396 PREV VISIT EST AGE 40-64: CPT | Performed by: STUDENT IN AN ORGANIZED HEALTH CARE EDUCATION/TRAINING PROGRAM

## 2022-05-24 RX ORDER — GINSENG 100 MG
CAPSULE ORAL
Qty: 1 EACH | Refills: 0 | Status: SHIPPED | OUTPATIENT
Start: 2022-05-24 | End: 2022-09-12 | Stop reason: SDUPTHER

## 2022-05-24 ASSESSMENT — PATIENT HEALTH QUESTIONNAIRE - PHQ9
9. THOUGHTS THAT YOU WOULD BE BETTER OFF DEAD, OR OF HURTING YOURSELF: 0
2. FEELING DOWN, DEPRESSED OR HOPELESS: 0
SUM OF ALL RESPONSES TO PHQ QUESTIONS 1-9: 3
3. TROUBLE FALLING OR STAYING ASLEEP: 1
SUM OF ALL RESPONSES TO PHQ QUESTIONS 1-9: 3
8. MOVING OR SPEAKING SO SLOWLY THAT OTHER PEOPLE COULD HAVE NOTICED. OR THE OPPOSITE, BEING SO FIGETY OR RESTLESS THAT YOU HAVE BEEN MOVING AROUND A LOT MORE THAN USUAL: 0
SUM OF ALL RESPONSES TO PHQ QUESTIONS 1-9: 3
5. POOR APPETITE OR OVEREATING: 1
10. IF YOU CHECKED OFF ANY PROBLEMS, HOW DIFFICULT HAVE THESE PROBLEMS MADE IT FOR YOU TO DO YOUR WORK, TAKE CARE OF THINGS AT HOME, OR GET ALONG WITH OTHER PEOPLE: 0
SUM OF ALL RESPONSES TO PHQ QUESTIONS 1-9: 3
6. FEELING BAD ABOUT YOURSELF - OR THAT YOU ARE A FAILURE OR HAVE LET YOURSELF OR YOUR FAMILY DOWN: 0
4. FEELING TIRED OR HAVING LITTLE ENERGY: 1
1. LITTLE INTEREST OR PLEASURE IN DOING THINGS: 0
7. TROUBLE CONCENTRATING ON THINGS, SUCH AS READING THE NEWSPAPER OR WATCHING TELEVISION: 0
SUM OF ALL RESPONSES TO PHQ9 QUESTIONS 1 & 2: 0

## 2022-05-24 NOTE — PROGRESS NOTES
Min Woodson  5/24/2022              52 y.o. Chief Complaint   Patient presents with    Annual Exam         Patient's last menstrual period was 05/07/2022 (exact date). Primary Care Physician: Kane Spence MD    HPI : Min Woodson is a 52 y.o. female [id number]    Patient is here today for her well women annual exam. She was seen and examined. Her biggest complaint today is her abnormal uterine bleeding. States her menses are very heavy and painful. She is now passing large blood clots the size of silver dollars. Reports she cannot live with these heavy periods anymore. Most recent Hgb was 14.7 back in April. She denies feeling lightheaded or dizzy. She had an ultrasound in 2019 that showed a fibroid uterus. She is still on Eliquis for anticoagulation for hx of DVT. Thrombophilia testing and antiphospholipid  negative in the past . Discussed medical vs surgical management and the patient does not want surgery at this time. She is not opposed to trying Mirena IUD for medical management. She has not had a mammogram or colonoscopy yet and is willing to schedule these. A few weeks ago she bought a new soap that caused her to break out in a vulvar rash. States the rash has subsided but there in a pinpoint area on her vulva that is still very sore. She is no longer using this soap. Denies any hx of genital herpes. States she was previously scratching a lot in this area. She is sexually active with 1 male partner and desires STI screening. Denies any abnormal vaginal discharge. Patient denies any headache, visual changes, difficulty breathing,  N/V, F/C, and pain/swelling in lower extremities. Denies any dysuria  or changes in her bowels. Patient had an abnormal pap smear in 2019 that was +HPV 16. She did not have a colposcopy completed. She had a normal pap with neg HPV in 2020. Colpo with ECC was performed 9/4/20 due to hx of +HPV and 12oclock biopsy showed CIN1. HISTORY Left 01/16/2018    fibulectomy with intra op angiography of leg    OK REOPERATION, BYPASS GRAFT Left 1/16/2018    RE-EXPLORATION OF LEFT LEG, THROMBO-EMBOLECTOMY OF FEMORAL-PERONEAL BYPASS, WITH INTRA OPERATIVE  ANGIOGRAMS AND INFUSION OF nITRO GLYCERIN performed by Lewis Bautista MD at [id number] [place] Left 1/16/2018    LEFT LEG FEMORAL TO PERONEAL BYPASS USING VEIN, (DONAR SITE LEFT SAPHNOUS) LEFT FIBULECTOMY, WITH INTRA OP ANGIOGRAPHY OF LEFT LEG performed by Lewis Bautista MD at [place] [address] Right 08/03/2019    femoral artery    TOE AMPUTATION Right 08/22/2019    TOE AMPUTATION Right 8/22/2019    TOE AMPUTATION RIGHT 3RD DIGIT, PARTIAL HALLUX AMPUTATION RIGHT FOOT performed by Shea Gilford, DPM at [facility] Left 01/16/2018    re-exploration femerol-perioneal vein bypass/intra op angiogram     Family History   Problem Relation Age of Onset    Diabetes Mother     High Blood Pressure Mother     Heart Attack Mother     Heart Disease Mother     Kidney Disease Mother     High Blood Pressure Father     Heart Disease Father     Heart Attack Father     Diabetes Sister     High Blood Pressure Sister     Diabetes Sister     High Blood Pressure Sister      Social History     Socioeconomic History    Marital status: Single     Spouse name: Not on file    Number of children: Not on file    Years of education: Not on file    Highest education level: Not on file   Occupational History    Not on file   Tobacco Use    Smoking status: Never Smoker    Smokeless tobacco: Never Used   Vaping Use    Vaping Use: Never used   Substance and Sexual Activity    Alcohol use: Yes     Comment: occasionally     Drug use: No    Sexual activity: Not Currently     Partners: Male   Other Topics Concern    Not on file   Social History Narrative    Not on file     Social Determinants of Health     Financial Resource Strain:     Difficulty of Paying Living Expenses: Not on file   Food Insecurity:     Worried About Running Out of Food in the Last Year: Not on file    Paula of Food in the Last Year: Not on file   Transportation Needs:     Lack of Transportation (Medical): Not on file    Lack of Transportation (Non-Medical): Not on file   Physical Activity:     Days of Exercise per Week: Not on file    Minutes of Exercise per Session: Not on file   Stress:     Feeling of Stress : Not on file   Social Connections:     Frequency of Communication with Friends and Family: Not on file    Frequency of Social Gatherings with Friends and Family: Not on file    Attends [employer] Services: Not on file    Active Member of [address] [place] [address] or Organizations: Not on file    Attends Club or Organization Meetings: Not on file    Marital Status: Not on file   Intimate Partner Violence:     Fear of Current or Ex-Partner: Not on file    Emotionally Abused: Not on file    Physically Abused: Not on file    Sexually Abused: Not on file   Housing Stability:     Unable to Pay for Housing in the Last Year: Not on file    Number of [place] in the Last Year: Not on file    Unstable Housing in the Last Year: Not on file       MEDICATIONS:  Current Outpatient Medications   Medication Sig Dispense Refill    bacitracin 500 UNIT/GM ointment Apply topically 2 times daily.  1 each 0    acetaminophen (TYLENOL) 325 MG tablet Take 2 tablets by mouth every 6 hours as needed for Pain 60 tablet 0    atorvastatin (LIPITOR) 40 MG tablet take 1 tablet by mouth once daily 30 tablet 3    lisinopril (PRINIVIL;ZESTRIL) 5 MG tablet take 1 tablet by mouth once daily 30 tablet 2    amLODIPine (NORVASC) 10 MG tablet take 1 tablet by mouth once daily 90 tablet 2    apixaban (ELIQUIS) 5 MG TABS tablet Take 1 tablet by mouth 2 times daily 180 tablet 3    ondansetron (ZOFRAN ODT) 4 MG disintegrating tablet Take 1 tablet by mouth every 8 hours as needed for Nausea (Patient not taking: Reported on 5/24/2022) 10 tablet 0     No current facility-administered medications for this visit. ALLERGIES:  Allergies as of 05/24/2022 - Fully Reviewed 05/24/2022   Allergen Reaction Noted    Asa [aspirin] Swelling 05/13/2013    Lopid [gemfibrozil] Itching and Swelling 11/13/2013    Nortriptyline Itching and Swelling 11/13/2013    Pcn [penicillins] Hives and Swelling 05/13/2013    Sulfa antibiotics Swelling 05/13/2013    Ibuprofen Rash 11/14/2013       Symptoms of decreased mood absent  Symptoms of anhedonia absent     Immunization status: did not get COVID19, flu or gardasil vaccines     Gynecologic History:  Menarche: 15 yo      Patient's last menstrual period was 05/07/2022 (exact date).     Sexually Active: Yes one male partner   STD History: Yes -HPV but denies any other infections      Permanent Sterilization: Yes s/p tubal ligation    Reversible Birth Control: No      Hormone Replacement Exposure: No      Genetic Qualified Family History of Breast, Ovarian , Colon or Uterine Cancer: No        Preventative Health Testing:  Health Maintenance Due:  Health Maintenance Due   Topic Date Due    COVID-19 Vaccine (1) Never done    Pneumococcal 0-64 years Vaccine (1 - PCV) Never done    Depression Monitoring  Never done    HIV screen  Never done    Hepatitis C screen  Never done    DTaP/Tdap/Td vaccine (1 - Tdap) Never done    Lipids  10/03/2019    Colorectal Cancer Screen  05/06/2020    A1C test (Diabetic or Prediabetic)  03/05/2021     Date of Last Pap Smear: Sep 2020 negative HPV  Abnormal Pap Smear History: yes- 2019 negative lesion but +HV16   Colposcopy History:  2020 had colpo completed with CIN1 at [facility] biopsy   Date of Last Mammogram: never had completed   Date of Last Colonoscopy: never had completed     ________________________________________________________________________  REVIEW OF SYSTEMS:   Constitutional: negative fever, negative chills, negative weight changes   HEENT: negative visual disturbances, negative headaches   Breast: negative breast abnormalities, negative breast lumps   Respiratory: negative dyspnea, negative cough, negative SOB  Cardiovascular: negative chest pain, negative palpitations, negative syncope   Gastrointestinal: negative abdominal pain, negative N/V,  negative bowel changes, negative heartburn   Genitourinary: negative dysuria, negative hematuria, negative urinary incontinence, negative vaginal discharge, pos heavy vaginal bleeding   Dermatological: negative rash, negative pruritis, negative mole changes  Hematologic: negative bruising  Immunologic/Lymphatic: negative recent illness, negative recent sick contact  Musculoskeletal: negative back pain, negative myalgias, negative arthralgias  Neurological:  negative dizziness, negative migraines, negative seizures, negative weakness  Behavior/Psych:  negative SI, negative HI                                                                                                                                                                                 PHYSICAL Exam:     Constitutional:  Vitals:    05/24/22 1042   BP: 117/74   Site: Left Upper Arm   Position: Sitting   Cuff Size: Large Adult   Pulse: 73   Weight: 187 lb (84.8 kg)   Height: 5' 5\" (1.651 m)       General appearance:  no apparent distress, alert and cooperative, BMI reviewed   HEENT: normocephalic, atraumatic, neck supple, no JVD, thyroid not enlarged with no palpable masses  Lymphatic: no lymph nodes were palpable in neck, axilla or groin   Neurologic:  normal speech, no focal findings or movement disorder noted  Lungs:  no increased work of breathing, good air exchange, clear to auscultation bilaterally, no crackles or wheezing  Heart:  regular rate and rhythm, no murmurs noted     Abdomen:  soft, non-tender, no guarding, rebound or rigidity on palpation, bowel sounds appreciated in all quadrants   Musculoskeletal: normal gait noted, no gross abnormalities appreciated, range of motion, stability and strength were evaluated and found to be appropriate for patient's age   Extremities:  no calf tenderness bilaterally, non-edematous bilaterally   Skin: normal inspection of skin without rashes or lesions  Psych:  normal orientation to time, place and person, good historian, no mood or affect changes, pleasant    Examination chaperoned by Caryl CARVRE    Pelvic Exam:   Vulva: normal appearing vulva, normal clitoris, small 1 mm vesicular appearing lesion on left labia that was tender to palpation, unable to express anything from site, not actively bleeding    Vagina: normal appearing urethral meatus, normal appearing vaginal mucosa with normal color and physiologic discharge, no lesions, no vaginal bleeding   Cervix: normal appearing cervix without pathologic appearing discharge or lesions, no cervical motion tenderness   Uterus: anteverted, normal size, shape, consistency and non-tender, bladder smooth   Adnexa: non-tender, no palpable masses   Rectal Exam: external exam negative   Breast: no tenderness on palpation, no masses appreciated, no nipple retraction, dimpling, discharge or bleeding, no axillary or supraclavicular adenopathy, self breast exams were reviewed in detail     ASSESSMENT/PLAN:  Stacey Oh is a 52 y.o. female [id number] here for her annual exam      Well Women Gynecological Exam   - VSS   - Vaginitis and GC/C collected and pending. Will call patient with any abnormal results    - Pap smear w/ cotesting collected. Will call patient with abnormal results. If negative cytology, follow up screening per current guidelines   - Birth control and barrier recommendations discussed   - Patient s/p tubal ligation    - STD counseling and prevention reviewed    - Mammogram ordered.  Mammograms every year if the patient is 36years old and her last mammogram was negative   - Calcium and Vitamin D dosing reviewed    - Referral to GI for colonoscopy. Colonoscopy screening reviewed as well as onset for bone density testing   - Routine health maintenance per patient's PCP   - Repeat annual exam every year     Labial Lesion    - Small 1 mm lesion noted on left labia    - Likely due to severe itchy from allergic reaction to soap but does look vesicular and is  to palpation. HSV culture collected    - Patient was agreeable to herpes testing     - Rx bacitracin sent to her pharmacy    - If symptoms do not improve she might benefit from acyclovir treatment or potential vulvar biopsy     Abnormal Uterine Bleeding with Uterine Fibroids    - Pelvic US from 2019 showing uterine fibroids    - Repeat pelvic ultrasound ordered to asses if these fibroids have grown    - Long discussion held with patient about how she wants to manage this bleeding. She states she wants to avoid surgery if she can because she has had several procedures done over the last few yrs (toe amputation, IVC filter, angiogram with grafts, thrombectomy etc etc)   - Due to hx DVT patient is not a candidate for estrogen treatment. She was agreeable to Mirena IUD placement    - Thrombophilia and antiphospholipid testing neg in the past     - Will have patient follow up after US is completed for endometrial biopsy to rule out hyperplasia and will place Mirena IUD at this appointment     Counseling Completed:  Hereditary breast, ovarian, colon and uterine cancer screening done  Tobacco and secondary smoke risks reviewed. Instructed on cessation and avoidance     Orders Placed This Encounter   Procedures    Vaginitis DNA Probe     Standing Status:   Future     Standing Expiration Date:   6/24/2022    C.trachomatis N.gonorrhoeae DNA     Standing Status:   Future     Standing Expiration Date:   6/24/2022    Culture, HSV     Standing Status:   Future     Standing Expiration Date:   5/24/2023     Order Specific Question:   Specimen Source:      Answer:   Genital    REJI DIGITAL SCREEN W OR WO CAD BILATERAL     Standing Status:   Future     Standing Expiration Date:   2023     Order Specific Question:   Reason for exam:     Answer:   Screening for Breast Cancer    US PELVIS COMPLETE     This procedure can be scheduled via [email or web address]. Access your [email or web address] account by visiting [email or web address]. Standing Status:   Future     Standing Expiration Date:   2023     Order Specific Question:   Reason for exam:     Answer:   hx fibroids, AUB    PAP Smear     Patient History:    Patient's last menstrual period was 2022 (exact date). OBGYN Status: Having periods  Past Surgical History:  :  SECTION  3/8/2021: FOOT AMPUTATION; Right      Comment:  RIGHT 2ND DIGIT PARTIAL  AMPUTATION performed by Kim Robbins DPM at [address] [place] [address]  No date: IVC FILTER INSERTION      Comment:  GFF  2017: OTHER SURGICAL HISTORY      Comment:  ANGIOGRAM OF LEFT LEG  2018: OTHER SURGICAL HISTORY; Left      Comment:  femoral to peroneal bypass using vein  2018: OTHER SURGICAL HISTORY; Left      Comment:  fibulectomy with intra op angiography of leg  2018: MD REOPERATION, BYPASS GRAFT; Left      Comment:  RE-EXPLORATION OF LEFT LEG, THROMBO-EMBOLECTOMY OF                FEMORAL-PERONEAL BYPASS, WITH INTRA OPERATIVE  ANGIOGRAMS               AND INFUSION OF nITRO GLYCERIN performed by Sherry Millan MD at [place] [address]  2018: [facility];  Left      Comment:  LEFT LEG FEMORAL TO PERONEAL BYPASS USING VEIN, (DONAR                SITE LEFT SAPHNOUS) LEFT FIBULECTOMY, WITH INTRA OP                ANGIOGRAPHY OF LEFT LEG performed by Narinder Romano MD at [place] [address]  2019: THROMBECTOMY; Right      Comment:  femoral artery  2019: TOE AMPUTATION; Right  2019: TOE AMPUTATION; Right      Comment:  TOE AMPUTATION RIGHT 3RD DIGIT, PARTIAL HALLUX                AMPUTATION RIGHT FOOT performed by Svetlana Heaton DPM                at [facility]: TUBAL LIGATION  01/16/2018: VASCULAR SURGERY; Left      Comment:  re-exploration femerol-perioneal vein bypass/intra op                angiogram    Problem List        Edg Problems Affecting Cytology    Dysplasia of cervix, low grade (DAVID 1)    Overview Signed 9/17/2020  3:37 PM by Reji Galindo DO     Follow up 1 year           Social History    Tobacco Use      Smoking status: Never Smoker      Smokeless tobacco: Never Used       Standing Status:   Future     Standing Expiration Date:   5/24/2023     Order Specific Question:   Collection Type     Answer: Thin Prep     Order Specific Question:   Prior Abnormal Pap Test     Answer:   Yes     Order Specific Question:   If Prior Abnormal, Give Date     Answer:   2019     Order Specific Question:   Prior Treatment     Answer:   None Given     Order Specific Question:   Screening or Diagnostic     Answer:   Screening     Order Specific Question:   HPV Requested?      Answer:   Yes     Order Specific Question:   High Risk Patient     Answer:   Lauren Alanis MD, Gastroenterology, [facility]     Referral Priority:   Routine     Referral Type:   Eval and Treat     Referral Reason:   Specialty Services Required     Referred to Provider:   Liliana Loco MD     Requested Specialty:   Gastroenterology     Number of Visits Requested:   1        Patient Active Problem List    Diagnosis Date Noted    Dysplasia of cervix, low grade (DAVID 1) 09/17/2020     Follow up 1 year      Normal cervical cytology with positive HPV16 09/04/2020     Colposcopy with biopsies and ECC 9/4/2020: **      Pre-syncope 11/16/2019    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 ([facility] [address] 08/02/2019    Pain of left lower extremity due to ischemia     Left leg pain     Chest pain 04/05/2019    Wound of right leg 11/26/2018    Anemia 10/19/2018    Hemorrhage of rectum and anus     Hypercoagulable state ([address]  Takotsubo cardiomyopathy 09/14/2018    Left popliteal artery occlusion ([facility] 01/16/2018    Claudication in peripheral vascular disease ([facility]     Foot pain 02/10/2017    Depression 06/16/2014    Menometrorrhagia 07/23/2013    Intertriginous candidiasis 07/23/2013    Migraine headache with aura 05/13/2013    HTN (hypertension) 05/13/2013    Obesity 05/13/2013        Tony Dean DO  Ob/Gyn Resident  5/24/2022, 11:45 AM

## 2022-05-25 LAB
C TRACH DNA GENITAL QL NAA+PROBE: NEGATIVE
CANDIDA SPECIES, DNA PROBE: NEGATIVE
CULTURE: NORMAL
GARDNERELLA VAGINALIS, DNA PROBE: POSITIVE
N. GONORRHOEAE DNA: NEGATIVE
SOURCE: ABNORMAL
SPECIMEN DESCRIPTION: NORMAL
SPECIMEN DESCRIPTION: NORMAL
TRICHOMONAS VAGINALIS DNA: NEGATIVE

## 2022-05-26 LAB
HPV SAMPLE: NORMAL
HPV, GENOTYPE 16: NOT DETECTED
HPV, GENOTYPE 18: NOT DETECTED
HPV, HIGH RISK OTHER: NOT DETECTED
HPV, INTERPRETATION: NORMAL
SPECIMEN DESCRIPTION: NORMAL

## 2022-05-26 NOTE — PROGRESS NOTES
Attending Physician Statement  I have discussed the care of Clayton Santana, including pertinent history and exam findings,  with the resident. I have reviewed the key elements of all parts of the encounter with the resident. I agree with the assessment, plan and orders as documented by the resident.   (GE Modifier)    Electronically signed by Perla Yancey MD at 8:17 PM 5/25/22

## 2022-05-27 DIAGNOSIS — B96.89 BACTERIAL VAGINOSIS: Primary | ICD-10-CM

## 2022-05-27 DIAGNOSIS — N76.0 BACTERIAL VAGINOSIS: Primary | ICD-10-CM

## 2022-05-27 RX ORDER — METRONIDAZOLE 500 MG/1
500 TABLET ORAL 2 TIMES DAILY
Qty: 14 TABLET | Refills: 0 | Status: SHIPPED | OUTPATIENT
Start: 2022-05-27 | End: 2022-06-03

## 2022-05-27 NOTE — PROGRESS NOTES
Received result in basket that patient tested positive for bacterial vaginosis. Test was performed by OB/GYN. I have not personally seen this patient in the clinic, and patient was last seen in the family medicine office over a year ago in 02/2021. I have prescribed flagyl for the patient. Future prescriptions may be refused without patient being seen in clinic. Forwarding this to clinic manager as well.     Electronically signed by Johanna Al MD on 5/27/2022 at 4:47 PM

## 2022-06-01 LAB — CYTOLOGY REPORT: NORMAL

## 2022-06-03 DIAGNOSIS — I10 ESSENTIAL HYPERTENSION: ICD-10-CM

## 2022-06-03 RX ORDER — LISINOPRIL 5 MG/1
TABLET ORAL
Qty: 30 TABLET | Refills: 2 | Status: SHIPPED | OUTPATIENT
Start: 2022-06-03 | End: 2022-09-19

## 2022-06-03 NOTE — TELEPHONE ENCOUNTER
E-scribe request for med refill. Please review and e-scribe if applicable.      Last Visit Date:  02/04/2021  Next Visit Date:  Visit date not found    Hemoglobin A1C (%)   Date Value   03/05/2020 6.1   12/18/2017 5.9   07/23/2014 5.7             ( goal A1C is < 7)   No results found for: LABMICR  LDL Cholesterol (mg/dL)   Date Value   10/03/2018 42       (goal LDL is <100)   AST (U/L)   Date Value   06/26/2020 13     ALT (U/L)   Date Value   06/26/2020 11     BUN (mg/dL)   Date Value   04/26/2022 22 (H)     BP Readings from Last 3 Encounters:   05/24/22 117/74   04/26/22 124/79   01/13/22 117/77          (goal 120/80)        Patient Active Problem List:     Migraine headache with aura     HTN (hypertension)     Obesity     Menometrorrhagia     Intertriginous candidiasis     Depression     Claudication in peripheral vascular disease (HCC)     Left popliteal artery occlusion (HCC)     Hypercoagulable state ([address]     Hemorrhage of rectum and anus     Anemia     Foot pain     Wound of right leg     Takotsubo cardiomyopathy     Chest pain     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

## 2022-07-11 ENCOUNTER — TELEPHONE (OUTPATIENT)
Dept: VASCULAR SURGERY | Age: 47
End: 2022-07-11

## 2022-07-11 NOTE — TELEPHONE ENCOUNTER
LVM stating appointment has been rescheduled, left day and time of new appointment, stated pt can call office if this does not work.

## 2022-09-02 ENCOUNTER — OFFICE VISIT (OUTPATIENT)
Dept: PODIATRY | Age: 47
End: 2022-09-02
Payer: COMMERCIAL

## 2022-09-02 VITALS
WEIGHT: 187 LBS | HEART RATE: 86 BPM | BODY MASS INDEX: 31.16 KG/M2 | SYSTOLIC BLOOD PRESSURE: 132 MMHG | HEIGHT: 65 IN | DIASTOLIC BLOOD PRESSURE: 74 MMHG

## 2022-09-02 DIAGNOSIS — M79.671 RIGHT FOOT PAIN: Primary | ICD-10-CM

## 2022-09-02 DIAGNOSIS — S93.144D SUBLUXATION OF METATARSOPHALANGEAL JOINT OF LESSER TOE OF RIGHT FOOT, SUBSEQUENT ENCOUNTER: ICD-10-CM

## 2022-09-02 PROCEDURE — 99213 OFFICE O/P EST LOW 20 MIN: CPT

## 2022-09-02 PROCEDURE — 1036F TOBACCO NON-USER: CPT

## 2022-09-02 PROCEDURE — G8427 DOCREV CUR MEDS BY ELIG CLIN: HCPCS

## 2022-09-02 PROCEDURE — G8417 CALC BMI ABV UP PARAM F/U: HCPCS
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
## 2022-09-06 ENCOUNTER — HOSPITAL ENCOUNTER (INPATIENT)
Age: 47
LOS: 4 days | Discharge: HOME | DRG: 182 | End: 2022-09-11
Attending: EMERGENCY MEDICINE | Admitting: STUDENT IN AN ORGANIZED HEALTH CARE EDUCATION/TRAINING PROGRAM
Payer: COMMERCIAL

## 2022-09-06 DIAGNOSIS — K65.4: ICD-10-CM

## 2022-09-06 DIAGNOSIS — M79.604 LEG PAIN, RIGHT: Primary | ICD-10-CM

## 2022-09-06 LAB
ABSOLUTE EOS #: 0.2 K/UL (ref 0–0.44)
ABSOLUTE IMMATURE GRANULOCYTE: 0.04 K/UL (ref 0–0.3)
ABSOLUTE LYMPH #: 2.41 K/UL (ref 1.1–3.7)
ABSOLUTE MONO #: 0.66 K/UL (ref 0.1–1.2)
ANION GAP SERPL CALCULATED.3IONS-SCNC: 15 MMOL/L (ref 9–17)
BASOPHILS # BLD: 1 % (ref 0–2)
BASOPHILS ABSOLUTE: 0.05 K/UL (ref 0–0.2)
BUN BLDV-MCNC: 10 MG/DL (ref 6–20)
CALCIUM SERPL-MCNC: 8.7 MG/DL (ref 8.6–10.4)
CHLORIDE BLD-SCNC: 104 MMOL/L (ref 98–107)
CO2: 20 MMOL/L (ref 20–31)
CREAT SERPL-MCNC: 0.74 MG/DL (ref 0.5–0.9)
EOSINOPHILS RELATIVE PERCENT: 2 % (ref 1–4)
GFR AFRICAN AMERICAN: >60 ML/MIN
GFR NON-AFRICAN AMERICAN: >60 ML/MIN
GFR SERPL CREATININE-BSD FRML MDRD: ABNORMAL ML/MIN/{1.73_M2}
GLUCOSE BLD-MCNC: 109 MG/DL (ref 70–99)
HCT VFR BLD CALC: 38.1 % (ref 36.3–47.1)
HEMOGLOBIN: 13.3 G/DL (ref 11.9–15.1)
IMMATURE GRANULOCYTES: 0 %
LYMPHOCYTES # BLD: 24 % (ref 24–43)
MCH RBC QN AUTO: 30.5 PG (ref 25.2–33.5)
MCHC RBC AUTO-ENTMCNC: 34.9 G/DL (ref 28.4–34.8)
MCV RBC AUTO: 87.4 FL (ref 82.6–102.9)
MONOCYTES # BLD: 7 % (ref 3–12)
NRBC AUTOMATED: 0 PER 100 WBC
PDW BLD-RTO: 13.5 % (ref 11.8–14.4)
PLATELET # BLD: 332 K/UL (ref 138–453)
PMV BLD AUTO: 11.1 FL (ref 8.1–13.5)
POTASSIUM SERPL-SCNC: 3.6 MMOL/L (ref 3.7–5.3)
RBC # BLD: 4.36 M/UL (ref 3.95–5.11)
SEG NEUTROPHILS: 66 % (ref 36–65)
SEGMENTED NEUTROPHILS ABSOLUTE COUNT: 6.83 K/UL (ref 1.5–8.1)
SODIUM BLD-SCNC: 139 MMOL/L (ref 135–144)
WBC # BLD: 10.2 K/UL (ref 3.5–11.3)

## 2022-09-06 PROCEDURE — 99285 EMERGENCY DEPT VISIT HI MDM: CPT

## 2022-09-06 PROCEDURE — 84703 CHORIONIC GONADOTROPIN ASSAY: CPT

## 2022-09-06 PROCEDURE — 6360000002 HC RX W HCPCS

## 2022-09-06 PROCEDURE — 6370000000 HC RX 637 (ALT 250 FOR IP): Performed by: STUDENT IN AN ORGANIZED HEALTH CARE EDUCATION/TRAINING PROGRAM

## 2022-09-06 PROCEDURE — 85025 COMPLETE CBC W/AUTO DIFF WBC: CPT

## 2022-09-06 PROCEDURE — 80048 BASIC METABOLIC PNL TOTAL CA: CPT

## 2022-09-06 RX ORDER — ACETAMINOPHEN 500 MG
1000 TABLET ORAL ONCE
Status: COMPLETED | OUTPATIENT
Start: 2022-09-06 | End: 2022-09-06

## 2022-09-06 RX ADMIN — APIXABAN 10 MG: 5 TABLET, FILM COATED ORAL at 21:31

## 2022-09-06 RX ADMIN — ACETAMINOPHEN 1000 MG: 500 TABLET ORAL at 22:16

## 2022-09-06 ASSESSMENT — ENCOUNTER SYMPTOMS
NAUSEA: 0
CHEST TIGHTNESS: 0
SORE THROAT: 0
DIARRHEA: 0
ANAL BLEEDING: 0
CONSTIPATION: 0
SHORTNESS OF BREATH: 0
VOMITING: 0
RHINORRHEA: 0
PHOTOPHOBIA: 0
ABDOMINAL DISTENTION: 0

## 2022-09-06 ASSESSMENT — PAIN DESCRIPTION - LOCATION: LOCATION: LEG

## 2022-09-06 ASSESSMENT — PAIN DESCRIPTION - ORIENTATION: ORIENTATION: RIGHT

## 2022-09-06 ASSESSMENT — PAIN SCALES - GENERAL
PAINLEVEL_OUTOF10: 10
PAINLEVEL_OUTOF10: 10

## 2022-09-06 ASSESSMENT — PAIN - FUNCTIONAL ASSESSMENT: PAIN_FUNCTIONAL_ASSESSMENT: 0-10

## 2022-09-07 ENCOUNTER — APPOINTMENT (OUTPATIENT)
Dept: CARDIAC CATH/INVASIVE PROCEDURES | Age: 47
DRG: 182 | End: 2022-09-07
Payer: COMMERCIAL

## 2022-09-07 ENCOUNTER — APPOINTMENT (OUTPATIENT)
Dept: CT IMAGING | Age: 47
DRG: 182 | End: 2022-09-07
Payer: COMMERCIAL

## 2022-09-07 PROBLEM — I73.9 PAD (PERIPHERAL ARTERY DISEASE) (HCC): Status: ACTIVE | Noted: 2022-09-07

## 2022-09-07 PROBLEM — M79.604 LEG PAIN, RIGHT: Status: ACTIVE | Noted: 2022-09-07

## 2022-09-07 PROBLEM — R59.0 LYMPHADENOPATHY, MESENTERIC: Status: ACTIVE | Noted: 2022-09-07

## 2022-09-07 PROBLEM — R93.5 ABNORMAL CT OF THE ABDOMEN: Status: ACTIVE | Noted: 2022-09-07

## 2022-09-07 PROBLEM — I74.3: Status: ACTIVE | Noted: 2022-09-07

## 2022-09-07 LAB
FIBRINOGEN: 321 MG/DL (ref 140–420)
HCG QUALITATIVE: NEGATIVE
INR BLD: 0.9
PARTIAL THROMBOPLASTIN TIME: 24.2 SEC (ref 20.5–30.5)
PROTHROMBIN TIME: 9.9 SEC (ref 9.1–12.3)

## 2022-09-07 PROCEDURE — 85384 FIBRINOGEN ACTIVITY: CPT

## 2022-09-07 PROCEDURE — C1894 INTRO/SHEATH, NON-LASER: HCPCS

## 2022-09-07 PROCEDURE — 99153 MOD SED SAME PHYS/QHP EA: CPT

## 2022-09-07 PROCEDURE — 99222 1ST HOSP IP/OBS MODERATE 55: CPT | Performed by: INTERNAL MEDICINE

## 2022-09-07 PROCEDURE — 6370000000 HC RX 637 (ALT 250 FOR IP)

## 2022-09-07 PROCEDURE — C1769 GUIDE WIRE: HCPCS

## 2022-09-07 PROCEDURE — 2000000000 HC ICU R&B

## 2022-09-07 PROCEDURE — 2580000003 HC RX 258: Performed by: NURSE PRACTITIONER

## 2022-09-07 PROCEDURE — C1887 CATHETER, GUIDING: HCPCS

## 2022-09-07 PROCEDURE — 36248 INS CATH ABD/L-EXT ART ADDL: CPT

## 2022-09-07 PROCEDURE — 36247 INS CATH ABD/L-EXT ART 3RD: CPT | Performed by: SURGERY

## 2022-09-07 PROCEDURE — 3E05317 INTRODUCTION OF OTHER THROMBOLYTIC INTO PERIPHERAL ARTERY, PERCUTANEOUS APPROACH: ICD-10-PCS | Performed by: SURGERY

## 2022-09-07 PROCEDURE — 36247 INS CATH ABD/L-EXT ART 3RD: CPT

## 2022-09-07 PROCEDURE — 37211 THROMBOLYTIC ART THERAPY: CPT

## 2022-09-07 PROCEDURE — 6360000002 HC RX W HCPCS

## 2022-09-07 PROCEDURE — 85730 THROMBOPLASTIN TIME PARTIAL: CPT

## 2022-09-07 PROCEDURE — 2709999900 HC NON-CHARGEABLE SUPPLY

## 2022-09-07 PROCEDURE — 99254 IP/OBS CNSLTJ NEW/EST MOD 60: CPT | Performed by: SURGERY

## 2022-09-07 PROCEDURE — 81241 F5 GENE: CPT

## 2022-09-07 PROCEDURE — 2580000003 HC RX 258

## 2022-09-07 PROCEDURE — 36415 COLL VENOUS BLD VENIPUNCTURE: CPT

## 2022-09-07 PROCEDURE — B41D1ZZ FLUOROSCOPY OF AORTA AND BILATERAL LOWER EXTREMITY ARTERIES USING LOW OSMOLAR CONTRAST: ICD-10-PCS | Performed by: SURGERY

## 2022-09-07 PROCEDURE — 10702042 HC CATHETER GUIDING

## 2022-09-07 PROCEDURE — 10702042 HC MISC CENTRAL INFUSION CATHETER

## 2022-09-07 PROCEDURE — 6370000000 HC RX 637 (ALT 250 FOR IP): Performed by: STUDENT IN AN ORGANIZED HEALTH CARE EDUCATION/TRAINING PROGRAM

## 2022-09-07 PROCEDURE — 10702042 HC NON-CHARGEABLE SUPPLY

## 2022-09-07 PROCEDURE — 99254 IP/OBS CNSLTJ NEW/EST MOD 60: CPT | Performed by: INTERNAL MEDICINE

## 2022-09-07 PROCEDURE — 85610 PROTHROMBIN TIME: CPT

## 2022-09-07 PROCEDURE — 10702042 HC MISC INTRODUCER/SHEATH

## 2022-09-07 PROCEDURE — 75716 ARTERY X-RAYS ARMS/LEGS: CPT

## 2022-09-07 PROCEDURE — 99152 MOD SED SAME PHYS/QHP 5/>YRS: CPT

## 2022-09-07 PROCEDURE — 75774 ARTERY X-RAY EACH VESSEL: CPT

## 2022-09-07 PROCEDURE — 51798 US URINE CAPACITY MEASURE: CPT

## 2022-09-07 PROCEDURE — 6360000002 HC RX W HCPCS: Performed by: STUDENT IN AN ORGANIZED HEALTH CARE EDUCATION/TRAINING PROGRAM

## 2022-09-07 PROCEDURE — 75710 ARTERY X-RAYS ARM/LEG: CPT | Performed by: SURGERY

## 2022-09-07 PROCEDURE — 75635 CT ANGIO ABDOMINAL ARTERIES: CPT

## 2022-09-07 PROCEDURE — 81240 F2 GENE: CPT

## 2022-09-07 PROCEDURE — 10702042 HC MISC GUIDEWIRE

## 2022-09-07 PROCEDURE — 6370000000 HC RX 637 (ALT 250 FOR IP): Performed by: NURSE PRACTITIONER

## 2022-09-07 PROCEDURE — B41C1ZZ FLUOROSCOPY OF PELVIC ARTERIES USING LOW OSMOLAR CONTRAST: ICD-10-PCS | Performed by: SURGERY

## 2022-09-07 PROCEDURE — C1751 CATH, INF, PER/CENT/MIDLINE: HCPCS

## 2022-09-07 PROCEDURE — 6360000004 HC RX CONTRAST MEDICATION: Performed by: STUDENT IN AN ORGANIZED HEALTH CARE EDUCATION/TRAINING PROGRAM

## 2022-09-07 PROCEDURE — 37211 THROMBOLYTIC ART THERAPY: CPT | Performed by: SURGERY

## 2022-09-07 RX ORDER — SODIUM CHLORIDE 0.9 % (FLUSH) 0.9 %
10 SYRINGE (ML) INJECTION PRN
Status: DISCONTINUED | OUTPATIENT
Start: 2022-09-07 | End: 2022-09-11 | Stop reason: HOSPADM

## 2022-09-07 RX ORDER — ONDANSETRON 4 MG/1
4 TABLET, ORALLY DISINTEGRATING ORAL EVERY 8 HOURS PRN
Status: DISCONTINUED | OUTPATIENT
Start: 2022-09-07 | End: 2022-09-11 | Stop reason: HOSPADM

## 2022-09-07 RX ORDER — OXYCODONE HYDROCHLORIDE 5 MG/1
5 TABLET ORAL EVERY 6 HOURS PRN
Status: DISCONTINUED | OUTPATIENT
Start: 2022-09-07 | End: 2022-09-11 | Stop reason: HOSPADM

## 2022-09-07 RX ORDER — FENTANYL CITRATE 50 UG/ML
50 INJECTION, SOLUTION INTRAMUSCULAR; INTRAVENOUS
Status: DISCONTINUED | OUTPATIENT
Start: 2022-09-07 | End: 2022-09-10

## 2022-09-07 RX ORDER — ACETAMINOPHEN 325 MG/1
650 TABLET ORAL EVERY 6 HOURS PRN
Status: DISCONTINUED | OUTPATIENT
Start: 2022-09-07 | End: 2022-09-07

## 2022-09-07 RX ORDER — HEPARIN SODIUM AND DEXTROSE 10000; 5 [USP'U]/100ML; G/100ML
500 INJECTION INTRAVENOUS CONTINUOUS
Status: DISCONTINUED | OUTPATIENT
Start: 2022-09-07 | End: 2022-09-09

## 2022-09-07 RX ORDER — POLYETHYLENE GLYCOL 3350 17 G/17G
17 POWDER, FOR SOLUTION ORAL DAILY PRN
Status: DISCONTINUED | OUTPATIENT
Start: 2022-09-07 | End: 2022-09-11 | Stop reason: HOSPADM

## 2022-09-07 RX ORDER — POTASSIUM CHLORIDE 7.45 MG/ML
10 INJECTION INTRAVENOUS PRN
Status: DISCONTINUED | OUTPATIENT
Start: 2022-09-07 | End: 2022-09-11 | Stop reason: HOSPADM

## 2022-09-07 RX ORDER — GABAPENTIN 300 MG/1
300 CAPSULE ORAL 3 TIMES DAILY
Status: DISCONTINUED | OUTPATIENT
Start: 2022-09-07 | End: 2022-09-11 | Stop reason: HOSPADM

## 2022-09-07 RX ORDER — SODIUM CHLORIDE 9 MG/ML
INJECTION, SOLUTION INTRAVENOUS PRN
Status: DISCONTINUED | OUTPATIENT
Start: 2022-09-07 | End: 2022-09-11 | Stop reason: HOSPADM

## 2022-09-07 RX ORDER — IODIXANOL 320 MG/ML
INJECTION, SOLUTION INTRAVASCULAR
Status: DISPENSED
Start: 2022-09-07 | End: 2022-09-07

## 2022-09-07 RX ORDER — SODIUM CHLORIDE 0.9 % (FLUSH) 0.9 %
5-40 SYRINGE (ML) INJECTION EVERY 12 HOURS SCHEDULED
Status: DISCONTINUED | OUTPATIENT
Start: 2022-09-07 | End: 2022-09-11 | Stop reason: HOSPADM

## 2022-09-07 RX ORDER — ONDANSETRON 2 MG/ML
4 INJECTION INTRAMUSCULAR; INTRAVENOUS EVERY 6 HOURS PRN
Status: DISCONTINUED | OUTPATIENT
Start: 2022-09-07 | End: 2022-09-11 | Stop reason: HOSPADM

## 2022-09-07 RX ORDER — LIDOCAINE HYDROCHLORIDE 10 MG/ML
INJECTION, SOLUTION INFILTRATION; PERINEURAL
Status: DISPENSED
Start: 2022-09-07 | End: 2022-09-07

## 2022-09-07 RX ORDER — ACETAMINOPHEN 650 MG/1
650 SUPPOSITORY RECTAL EVERY 6 HOURS PRN
Status: DISCONTINUED | OUTPATIENT
Start: 2022-09-07 | End: 2022-09-07

## 2022-09-07 RX ORDER — MAGNESIUM SULFATE 1 G/100ML
1000 INJECTION INTRAVENOUS PRN
Status: DISCONTINUED | OUTPATIENT
Start: 2022-09-07 | End: 2022-09-11 | Stop reason: HOSPADM

## 2022-09-07 RX ORDER — SODIUM CHLORIDE 9 MG/ML
INJECTION, SOLUTION INTRAVENOUS CONTINUOUS
Status: DISCONTINUED | OUTPATIENT
Start: 2022-09-07 | End: 2022-09-10

## 2022-09-07 RX ORDER — POTASSIUM CHLORIDE 20 MEQ/1
40 TABLET, EXTENDED RELEASE ORAL PRN
Status: DISCONTINUED | OUTPATIENT
Start: 2022-09-07 | End: 2022-09-11 | Stop reason: HOSPADM

## 2022-09-07 RX ORDER — ACETAMINOPHEN 500 MG
1000 TABLET ORAL EVERY 8 HOURS SCHEDULED
Status: DISCONTINUED | OUTPATIENT
Start: 2022-09-07 | End: 2022-09-11 | Stop reason: HOSPADM

## 2022-09-07 RX ADMIN — SODIUM CHLORIDE: 9 INJECTION, SOLUTION INTRAVENOUS at 05:58

## 2022-09-07 RX ADMIN — ONDANSETRON 4 MG: 2 INJECTION INTRAMUSCULAR; INTRAVENOUS at 13:06

## 2022-09-07 RX ADMIN — APIXABAN 10 MG: 5 TABLET, FILM COATED ORAL at 08:41

## 2022-09-07 RX ADMIN — SODIUM CHLORIDE, PRESERVATIVE FREE 10 ML: 5 INJECTION INTRAVENOUS at 21:59

## 2022-09-07 RX ADMIN — ACETAMINOPHEN 1000 MG: 500 TABLET ORAL at 21:54

## 2022-09-07 RX ADMIN — ACETAMINOPHEN 650 MG: 325 TABLET ORAL at 16:13

## 2022-09-07 RX ADMIN — SODIUM CHLORIDE: 9 INJECTION, SOLUTION INTRAVENOUS at 23:24

## 2022-09-07 RX ADMIN — OXYCODONE 5 MG: 5 TABLET ORAL at 20:49

## 2022-09-07 RX ADMIN — GABAPENTIN 300 MG: 300 CAPSULE ORAL at 23:23

## 2022-09-07 RX ADMIN — HEPARIN SODIUM AND DEXTROSE 500 UNITS/HR: 10000; 5 INJECTION INTRAVENOUS at 13:09

## 2022-09-07 RX ADMIN — IOPAMIDOL 125 ML: 755 INJECTION, SOLUTION INTRAVENOUS at 00:39

## 2022-09-07 RX ADMIN — FENTANYL CITRATE 50 MCG: 50 INJECTION, SOLUTION INTRAMUSCULAR; INTRAVENOUS at 21:55

## 2022-09-07 ASSESSMENT — PAIN SCALES - GENERAL
PAINLEVEL_OUTOF10: 9
PAINLEVEL_OUTOF10: 3
PAINLEVEL_OUTOF10: 4
PAINLEVEL_OUTOF10: 9

## 2022-09-07 ASSESSMENT — ENCOUNTER SYMPTOMS
VOMITING: 0
SHORTNESS OF BREATH: 0
TROUBLE SWALLOWING: 0
COLOR CHANGE: 0
EYE PAIN: 0
ABDOMINAL PAIN: 0
CHEST TIGHTNESS: 0
VOICE CHANGE: 0
COUGH: 0
ABDOMINAL DISTENTION: 0

## 2022-09-07 ASSESSMENT — PAIN DESCRIPTION - LOCATION
LOCATION: ABDOMEN;LEG
LOCATION: ABDOMEN;LEG

## 2022-09-07 ASSESSMENT — PAIN DESCRIPTION - DESCRIPTORS
DESCRIPTORS: ACHING
DESCRIPTORS: ACHING

## 2022-09-07 ASSESSMENT — PAIN SCALES - WONG BAKER: WONGBAKER_NUMERICALRESPONSE: 0

## 2022-09-07 ASSESSMENT — PAIN DESCRIPTION - ORIENTATION
ORIENTATION: LEFT
ORIENTATION: LEFT
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
## 2022-09-07 NOTE — ED NOTES
Pt up to the restroom with a steady gait. IV fluids started. No complaints at this time. Call light within reach. Will continue to monitor.      Lucila Adams, IFRAH  04/11/31 [id number]

## 2022-09-07 NOTE — ED PROVIDER NOTES
[facility]     Emergency Department     Faculty Attestation    I performed a history and physical examination of the patient and discussed management with the resident. I reviewed the residents note and agree with the documented findings and plan of care. Any areas of disagreement are noted on the chart. I was personally present for the key portions of any procedures. I have documented in the chart those procedures where I was not present during the key portions. I have reviewed the emergency nurses triage note. I agree with the chief complaint, past medical history, past surgical history, allergies, medications, social and family history as documented unless otherwise noted below. For Physician Assistant/ Nurse Practitioner cases/documentation I have personally evaluated this patient and have completed at least one if not all key elements of the E/M (history, physical exam, and MDM). Additional findings are as noted. I have personally seen and evaluated the patient. I find the patient's history and physical exam are consistent with the NP/PA documentation. I agree with the care provided, treatment rendered, disposition and follow-up plan. Patient is describing pain to the right calf with a known history of DVT the patient patient is supposed to be on anticoagulation therapy at the present time for life that she has been noncompliant for the last month for the use of her anticoagulant given the time and night the lack of easily easy availability and the fact that the patient would not require anticoagulation regardless of her Doppler study we advised that she begin her anticoagulant tonight and have a Doppler study done as soon as possible as an outpatient or in our ER as needed follow with she is being followed by vascular surgery for same      Critical Care     Declan Phelps M.D.   Attending Emergency  Physician            Noemi Mcgrath MD  09/06/22 [id number]

## 2022-09-07 NOTE — ED PROVIDER NOTES
bilateral runoffs. If abnormality on CTA patient to be admitted to medicine service for vascular evaluation in the morning. If patient able to be discharged she is to come back first thing tomorrow for Doppler study. Patient has been given 10 mg of Eliquis here in the department    ED Course as of 09/07/22 0726   Tue Sep 06, 2022   2113 Patient value bedside. Right thigh tenderness for 2 days. History of blood clots. Has not been taking Eliquis for 1 month. [ZE]   2135 Given patient's history of blood clots and not enough blood for about. Will give 10 mg of Eliquis here in the department. Patient to return tomorrow to the emergency department or to Dr. Kadeem Calderon office for for doppler study for dvt [ZE]   2207 Unable to find audible DP pulses on Doppler of right lower extremity. Consult vascular given this. [ZE]   9762 Spoke with vascular team they are going to get CTA to evaluate vasculature. Any abnormality patient will require admission. [ZE]   1127 [facility]   Wed Sep 07, 2022   0304 CTA ABDOMINAL AORTA W BILAT RUNOFF W CONTRAST  IMPRESSION:  1. Severe peripheral vascular disease affecting occluded the mid distal  popliteal arteries bilateral and corresponding popliteal branches to the area  of the right and left legs down to the ankle/foot area with poor circulation  distally. Multiple collateral from geniculate arteries are present but there  is no effective reconstitution down to the level of the ankle/foot  bilaterally. 2.  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 The prominence of lymph nodes present could consider further  evaluation with PET-CT scan to investigate degree of biological activity. 3.  Hyperdense lesion in the central aspect of the right kidney. Could  represent a hemorrhagic cyst.  Further evaluation with renal ultrasound  recommended. 4.  Corpus lutein cyst in the left ovary.   Small free fluid accumulation in  the cul-de-sac. These can be relate with recent rupture follicular cyst.  Could consider correlation with pelvic ultrasound. [JS]   1564 Patient updated on plan to admit for further vascular work-up and treatment [JS]      ED Course User Index  [JS] Linda Lentz DO  [ZE] Glenory Bansal DO         OUTSTANDING TASKS / RECOMMENDATIONS:      Await Bed placement  Follow-up results of CT abdomen  Follow-up vascular surgery recommendation     FINAL IMPRESSION:     1.  Leg pain, right        DISPOSITION:       DISPOSITION:  []  Discharge   []  Transfer -    [x]  Admission -     []  Against Medical Advice   []  Eloped   FOLLOW-UP: [facility]  [address] [place] [address]  [phone]  On 9/7/2022  For Doppler study for evaluation for DVT    Pineda Medina, [address] [facility] [id number]  [place] [address]  [phone]      Call for Doppler study for evaluation of a DVT     DISCHARGE MEDICATIONS: New Prescriptions    No medications on file          Linda Lentz DO  Emergency Medicine Resident  [address] [facility]       Linda Lentz [place]  Resident  09/07/22 0810

## 2022-09-07 NOTE — OP NOTE
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 Left common femoral arterial access under ultrasound guidance. 2.  Placement of catheter into right common iliac artery with right common and external iliac arteriography. 3.  Placement of catheter into right common femoral artery with right common femoral profundofemoral and SFA arteriogram.  4.  Placement of catheter into right popliteal artery with right popliteal arteriography and tibioperoneal arteriogram.  5.  Placement of 10 cm Cragg Hardy infusion catheter into the popliteal and posterior tibial arteries. 6.  Initiation of tPA infusion through the [address] [place] [address] [address]  catheter of the right lower extremity popliteal and tibioperoneal trunk. Surgeon(s):  Nabila Garcia MD    Assistant:   Jose Luis Rios    Anesthesia: Local    Estimated Blood Loss (mL): Minimal    Complications: None    Specimens:   None    Implants:  As above      Drains: None    Findings: The popliteal artery is occluded on the right with reconstitution of the peroneal and anterior tibial arteries. There are no significant collaterals indicating that this is more acute than chronic. The posterior tibial artery is occluded and does not reconstitute. The wire preferred to go down the posterior tibial artery. She had pain as the wire was going into the posterior tibial artery although it passed with ease and did not represent something that I thought was dissected. For this reason I placed a short 10 cm infusion length Hardy catheter into the distal popliteal, tibioperoneal trunk and proximal posterior tibial artery on the right. This was initiated with a tPA drip at 1 mg/h using 50 cc of carrier.   The iliac system did not have an obvious amount of thrombus and I am going to leave that for heparin and natural lytic therapy to work on its own. The right hypogastric artery is diminutive but still open. Detailed Description of Procedure: The patient was brought to the operating room and placed in a supine position with her arms tucked at her sides. She was identified as Jackquline Bail for right lower extremity arteriography and possible placement of lytic catheter and possible intervention. She was given light anesthetic with Versed and fentanyl. She had a total of 2 of Versed and eventually 75 mcg of fentanyl. The right groin was interrogated with duplex ultrasonography and the artery was identified. Lidocaine without epinephrine was infiltrated into the skin and subcutaneous tissue overlying the left common femoral artery and the ultrasound was used for direct guidance to visualize the needle going into the artery and the wire going into the artery. Modified Seldinger technique was used to establish a 4 Western Julienne sheath. A wire was placed into the distal aorta over which a contra catheter was placed and used to direct the wire into the contralateral iliac system. The catheter was placed in the proximal common iliac artery and right common iliac and external iliac arteriography was obtained. Images were saved. Catheter was then advanced over the wire to the right common femoral artery. Right common femoral profundofemoral and proximal SFA arteriography was obtained. Images were saved. The catheter was then advanced to the mid SFA. SFA and popliteal arteriography was obtained. Images were saved. An Hortoru Cam cross catheter was then used to gain access to the tibial system. The wire preferentially went into the posterior tibial artery. Interestingly there was no resistance to this wire and it did not loop however the patient did experience some degree of discomfort during that portion of the procedure.   I do not think that the artery was dissected and I think that this may be occluded for some time with inflammation making it uncomfortable. With this knowledge I decided not to place a long infusion catheter and the 4 Upper sorbian sheath was removed and replaced with a 5 Western Julienne Ansell sheath. The wire was then used to direct the [facility] catheter to the desired level. It was spanning the entirety of the distal popliteal artery down to and including the proximal 3 cm of the posterior tibial artery. The wire was removed and the tPA infusion was initiated at the above-mentioned rate of 1 mg/h using 50 cc of carrier per hour. 500 units of heparin was then initiated through the SideArm of the sheath. We will check fibrinogen and PTT as well as hemoglobin and hematocrit on a every 6 hours basis. Hopefully this allows these arteries to recanalize and give her meaningful flow to the foot so that her rest pain can resolve.     Electronically signed by Jamison Hollis MD on 9/7/2022 at 1:08 PM

## 2022-09-07 NOTE — ED NOTES
Pt comes to ED with c/o pain on right calf. Pt states pain started 2 days ago while standing up while barbecuing, pain and swelling has worsened, has hx of blood clots that occurred 2 years ago, is supposed to be taking Eliquis but has not taken for more than 1 month due to prescription problems. Pt also states rib pain on left side and hx of heart murmurs.  Pt denies SOB, dizziness, recent infections, and taking medications to alleviate problem     Mitzy Somers RN  09/06/22 3617

## 2022-09-07 NOTE — ED PROVIDER NOTES
Faculty Sign-Out Attestation  Handoff taken on the following patient from prior Attending Physician: Sae Samano    I was available and discussed any additional care issues that arose and coordinated the management plans with the resident(s) caring for the patient during my duty period. Any areas of disagreement with residents documentation of care or procedures are noted on the chart. I was personally present for the key portions of any/all procedures during my duty period. I have documented in the chart those procedures where I was not present during the key portions.     Hx blood clot, non compliant with anti coagulant,   Getting cta with runoff, vascular consulted , disposition per vascular    Lucretia Esquivel DO  Attending Physician       Lucretia Esquivel DO  09/07/22 0009    -admit to intermed, Lucretia sheppard,   09/07/22 8943

## 2022-09-07 NOTE — PROGRESS NOTES
Dr. Torrie henry served in regard to Doppler signal for Left DP, Left PT, Right PT, but unable to find Right DP. Clarification asked due to q6 PTT ordered but Heparin not ordered as titration.  Heparin ordered at 500 units

## 2022-09-07 NOTE — ED NOTES
Report given to [facility] LPN; all questions addressed.       Brittni Mccain, RN  09/07/22 7540

## 2022-09-07 NOTE — ED PROVIDER NOTES
He says a history     [address] ED  Emergency Department Encounter  EmergencyMedicine Resident     Pt Amanuel Duenas  MRN: [id number]  Birthdate 1975  Date of evaluation: 22  PCP:  Komal Clemons MD    CHIEF COMPLAINT       No chief complaint on file. HISTORY OF PRESENT ILLNESS  (Location/Symptom, Timing/Onset, Context/Setting, Quality, Duration, Modifying Factors, Severity.)      Mane Delgado is a 52 y.o. female who presents with right posterior thigh tenderness for 2 days. Blood clots has not been taking Eliquis for the last month she had difficulty filling prescription. She denies any injury to this area. She states she was also having pain in her calf however this is no longer there. She denies any fevers chills loss strength sensation    PAST MEDICAL / SURGICAL / SOCIAL / FAMILY HISTORY      has a past medical history of Abnormal Pap smear of cervix, Anemia, Chronic back pain, Claudication (HCC), Claudication in peripheral vascular disease ([address] Depression, Headache(784.0), Heart murmur, Hemorrhage of rectum and anus, History of blood transfusion, HTN (hypertension), Hx of blood clots, Hypercoagulable state ([address] Hyperlipidemia, Hypertension, Intertriginous candidiasis, Left popliteal artery occlusion ([address] Leg pain, Menometrorrhagia, Migraine headache with aura, Obesity, Osteoarthritis, PAD (peripheral artery disease) ([address] PVD (peripheral vascular disease) ([address] Takotsubo cardiomyopathy, and Wound of right leg.       has a past surgical history that includes Tubal ligation ();  section (); other surgical history (2017); other surgical history (Left, 2018); other surgical history (Left, 2018); vascular surgery (Left, 2018); pr reoperation, bypass graft (Left, 2018); pr vein bypass graft,fem-pop (Left, 2018); thrombectomy (Right, 2019); IVC filter insertion; Toe amputation (Right, 2019);  Toe amputation (Right, 8/22/2019); and Foot Amputation (Right, 3/8/2021). Social History     Socioeconomic History    Marital status: Single     Spouse name: Not on file    Number of children: Not on file    Years of education: Not on file    Highest education level: Not on file   Occupational History    Not on file   Tobacco Use    Smoking status: Never    Smokeless tobacco: Never   Vaping Use    Vaping Use: Never used   Substance and Sexual Activity    Alcohol use: Not Currently     Comment: occasionally     Drug use: No    Sexual activity: Not Currently     Partners: Male   Other Topics Concern    Not on file   Social History Narrative    Not on file     Social Determinants of Health     Financial Resource Strain: Not on file   Food Insecurity: Not on file   Transportation Needs: Not on file   Physical Activity: Not on file   Stress: Not on file   Social Connections: Not on file   Intimate Partner Violence: Not on file   Housing Stability: Not on file       Family History   Problem Relation Age of Onset    Diabetes Mother     High Blood Pressure Mother     Heart Attack Mother     Heart Disease Mother     Kidney Disease Mother     High Blood Pressure Father     Heart Disease Father     Heart Attack Father     Diabetes Sister     High Blood Pressure Sister     Diabetes Sister     High Blood Pressure Sister        Allergies:  Asa [aspirin], Lopid [gemfibrozil], Nortriptyline, Pcn [penicillins], Sulfa antibiotics, and Ibuprofen    Home Medications:  Prior to Admission medications    Medication Sig Start Date End Date Taking?  Authorizing Provider   lisinopril (PRINIVIL;ZESTRIL) 5 MG tablet take 1 tablet by mouth once daily 6/3/22   Reji Kumari MD   acetaminophen (TYLENOL) 325 MG tablet Take 2 tablets by mouth every 6 hours as needed for Pain 4/26/22   Malachi Albright MD   ondansetron (ZOFRAN ODT) 4 MG disintegrating tablet Take 1 tablet by mouth every 8 hours as needed for Nausea  Patient not taking: Reported on 5/24/2022 4/26/22 Kayla Ambrosio MD   atorvastatin (LIPITOR) 40 MG tablet take 1 tablet by mouth once daily 4/25/22   Freeman Meza MD   amLODIPine (NORVASC) 10 MG tablet take 1 tablet by mouth once daily 1/19/22   Sathya Levy MD   apixaban (ELIQUIS) 5 MG TABS tablet Take 1 tablet by mouth 2 times daily 5/14/21   Augustine Espinal MD       REVIEW OF SYSTEMS    (2-9 systems for level 4, 10 or more for level 5)      Review of Systems   Constitutional:  Negative for chills, fever and unexpected weight change. HENT:  Negative for rhinorrhea and sore throat. Eyes:  Negative for photophobia. Respiratory:  Negative for chest tightness and shortness of breath. Cardiovascular:  Negative for chest pain. Gastrointestinal:  Negative for abdominal distention, anal bleeding, constipation, diarrhea, nausea and vomiting. Endocrine: Negative for polyuria. Genitourinary:  Negative for difficulty urinating and flank pain. Musculoskeletal:  Negative for arthralgias. Skin:  Negative for rash. Neurological:  Negative for weakness and headaches. PHYSICAL EXAM   (up to 7 for level 4, 8 or more for level 5)      INITIAL VITALS:   /83   Pulse 65   Temp 97.9 °F (36.6 °C) (Oral)   Resp 18   Ht 5' 5\" (1.651 m)   Wt 180 lb (81.6 kg)   SpO2 99%   BMI 29.95 kg/m²     Physical Exam  Constitutional:       General: She is not in acute distress. Appearance: She is not ill-appearing. HENT:      Nose: Nose normal.   Eyes:      Extraocular Movements: Extraocular movements intact. Pupils: Pupils are equal, round, and reactive to light. Cardiovascular:      Rate and Rhythm: Normal rate. Heart sounds: Normal heart sounds. Pulmonary:      Effort: Pulmonary effort is normal.      Breath sounds: Normal breath sounds. Abdominal:      General: Abdomen is flat. Palpations: Abdomen is soft. Tenderness: There is no abdominal tenderness. Musculoskeletal:      Cervical back: No tenderness. Hematocrit 38.1 36.3 - 47.1 %    MCV 87.4 82.6 - 102.9 fL    MCH 30.5 25.2 - 33.5 pg    MCHC 34.9 (H) 28.4 - 34.8 g/dL    RDW 13.5 11.8 - 14.4 %    Platelets 125 055 - 328 k/uL    MPV 11.1 8.1 - 13.5 fL    NRBC Automated 0.0 0.0 per 100 WBC    Seg Neutrophils 66 (H) 36 - 65 %    Lymphocytes 24 24 - 43 %    Monocytes 7 3 - 12 %    Eosinophils % 2 1 - 4 %    Basophils 1 0 - 2 %    Immature Granulocytes 0 0 %    Segs Absolute 6.83 1.50 - 8.10 k/uL    Absolute Lymph # 2.41 1.10 - 3.70 k/uL    Absolute Mono # 0.66 0.10 - 1.20 k/uL    Absolute Eos # 0.20 0.00 - 0.44 k/uL    Basophils Absolute 0.05 0.00 - 0.20 k/uL    Absolute Immature Granulocyte 0.04 0.00 - 0.30 k/uL       RADIOLOGY:  No results found. EKG Interpretation  none    EMERGENCY DEPARTMENT COURSE:  ED Course as of 09/06/22 2349   Tue Sep 06, 2022   2113 Patient value bedside. Right thigh tenderness for 2 days. History of blood clots. Has not been taking Eliquis for 1 month. [ZE]   2135 Given patient's history of blood clots and not enough blood for about. Will give 10 mg of Eliquis here in the department. Patient to return tomorrow to the emergency department or to Dr. Josseline Grove office for for doppler study for dvt [ZE]   2207 Unable to find audible DP pulses on Doppler of right lower extremity. Consult vascular given this. [ZE]   3629 Spoke with vascular team they are going to get CTA to evaluate vasculature. Any abnormality patient will require admission. [ZE]   6347 Signed out to oncoming night resident [ZE]      ED Course User Index  [ZE] Argelia Anna DO       PROCEDURES:  Procedures     CONSULTS:  IP CONSULT TO VASCULAR SURGERY    CRITICAL CARE:  none    MDM  Here for evaluation of right thigh pain in the setting of not taking Eliquis for 30 days concern for DVT. Patient also noted to have not audible or palpable DP pulses therefore vascular surgery was consulted. Given complex vascular history patient to get CTA abdomen runoff.   Patient ultimately signed out to oncoming resident. There is no signs of acute limb ischemia on exam other than nonpalpable pulses    FINAL IMPRESSION      1.  Leg pain, right          DISPOSITION / PLAN     DISPOSITION  09/06/2022 09:57:33 PM      PATIENT REFERRED TO:  [facility]  [address] [facility] [address]  [phone]  On 9/7/2022  For Doppler study for evaluation for DVT    Fatmata Gil, [address] [facility] [id number]  [facility] [address] [phone]      Call for Doppler study for evaluation of a DVT    DISCHARGE MEDICATIONS:  New Prescriptions    No medications on file       Molly Palafox DO  Emergency Medicine Resident    (Please note that portions of thisnote were completed with a voice recognition program.  Efforts were made to edit the dictations but occasionally words are mis-transcribed.)        Yumiko Moe DO  Resident  09/06/22 8534

## 2022-09-07 NOTE — DISCHARGE INSTRUCTIONS
Follow up with PCP in 1 week. Call for appointment. Follow up with Vascular as instructed. Call for appointment. Follow up with Hematology for repeat CT in 4-6 weeks (get CT before visit). Call for appointment. Eliquis as instructed. 10 mg BID x 7 days followed by 5 mg BID. Return immediately for new or worsening concerns.

## 2022-09-07 NOTE — PROGRESS NOTES
Patient from Hybrid Room with Duane Mata RN and Franca Harding RN at noted time. Verbal report from OR/cath lab RN that pulses were not palpable in Right DP or PT and also not palpable in Left DP or PT. Doppler signal were not attempted during case.

## 2022-09-07 NOTE — ED NOTES
Pt resting on stretcher, attached to monitor, RR even and non labored, call light within reach, family at bedside.       Elmira Hill RN  09/06/22 2646

## 2022-09-07 NOTE — PROGRESS NOTES
Patient seen and evaluated. B/l palpable popliteal pulses  B/l pt signals  L dp signal  No signs of acute ischemia. Good cap refill. Chronic decreased sensations to R toes, motor in tact b/l. Continue eliquis.    Will discuss further plans with attending this am.      Adolfo Thompson DO  General Surgery, PGY-2

## 2022-09-07 NOTE — PROGRESS NOTES
Dr. Desai See clarification on Heparin gtt by no titration with PTT's.  Neurovascualr exam unchanged from pre-op assessment per Dr. Desai See

## 2022-09-07 NOTE — CARE COORDINATION
09/07/22 4259   Service Assessment   Patient Orientation Alert and Oriented   Cognition Alert   History Provided By Patient   Primary Caregiver Self   Support Systems Family Members   PCP Verified by CM Yes  (does not currently have one)   Prior Functional Level Independent in ADLs/IADLs   Current Functional Level Independent in ADLs/IADLs   Can patient return to prior living arrangement Yes   Ability to make needs known: Good   Social/Functional History   Lives With Family   Type of [address]; Able to Live on Main level with bedroom/bathroom   ADL Assistance Independent   Homemaking Assistance Independent   Homemaking Responsibilities Yes   Ambulation Assistance Independent   Transfer Assistance Independent   Active  Yes   Discharge Planning   Type of Residence House   Living Arrangements Children   Current Services Prior To Admission None   Potential Assistance Needed N/A   DME Ordered?  No   Potential Assistance Purchasing Medications No   Type of Home Care Services None   Patient expects to be discharged to: House   Condition of Participation: Discharge Planning   The Plan for Transition of Care is related to the following treatment goals: increased comfort level   Home with family, no current PCP

## 2022-09-07 NOTE — CONSULTS
5/13/2013    Hx of blood clots 2018, 2019    Bilateral legs.      Hypercoagulable state ([facility] [address]     Hyperlipidemia     Hypertension     Intertriginous candidiasis 7/23/2013    Left popliteal artery occlusion ([facility] [address] 1/16/2018    Leg pain 10/23/2018    Menometrorrhagia 7/23/2013    Migraine headache with aura 5/13/2013    Obesity 5/13/2013    Osteoarthritis     PAD (peripheral artery disease) ([facility] [address] 11/26/2018    PVD (peripheral vascular disease) ([facility] [address] 01/2018    Takotsubo cardiomyopathy 9/14/2018    Wound of right leg 11/26/2018        Past Surgical History:     Past Surgical History:   Procedure Laterality Date    [id number] [place] Right 3/8/2021    RIGHT 2ND DIGIT PARTIAL  AMPUTATION performed by Dee Garcia DPM at [address] [facility]    OTHER SURGICAL HISTORY  12/19/2017    ANGIOGRAM OF LEFT LEG    OTHER SURGICAL HISTORY Left 01/16/2018    femoral to peroneal bypass using vein    OTHER SURGICAL HISTORY Left 01/16/2018    fibulectomy with intra op angiography of leg    PA REOPERATION, BYPASS GRAFT Left 1/16/2018    RE-EXPLORATION OF LEFT LEG, THROMBO-EMBOLECTOMY OF FEMORAL-PERONEAL BYPASS, WITH INTRA OPERATIVE  ANGIOGRAMS AND INFUSION OF nITRO GLYCERIN performed by Kyree Gurrola MD at [facility] Left 1/16/2018    LEFT LEG FEMORAL TO PERONEAL BYPASS USING VEIN, (DONAR SITE LEFT SAPHNOUS) LEFT FIBULECTOMY, WITH INTRA OP ANGIOGRAPHY OF LEFT LEG performed by Kyree Gurrola MD at [address] [facility] Right 08/03/2019    femoral artery    TOE AMPUTATION Right 08/22/2019    TOE AMPUTATION Right 8/22/2019    TOE AMPUTATION RIGHT 3RD DIGIT, PARTIAL HALLUX AMPUTATION RIGHT FOOT performed by Ilene Pal DPM at [address] [facility] [address] Left 01/16/2018    re-exploration femerol-perioneal vein bypass/intra op angiogram        Medications Prior to Admission:       Prior to Admission medications Medication Sig Start Date End Date Taking? Authorizing Provider   lisinopril (PRINIVIL;ZESTRIL) 5 MG tablet take 1 tablet by mouth once daily 6/3/22   Faisal Ruvalcaba MD   acetaminophen (TYLENOL) 325 MG tablet Take 2 tablets by mouth every 6 hours as needed for Pain 4/26/22   Tiago Cruz MD   ondansetron (ZOFRAN ODT) 4 MG disintegrating tablet Take 1 tablet by mouth every 8 hours as needed for Nausea  Patient not taking: Reported on 5/24/2022 4/26/22   Tiago Cruz MD   atorvastatin (LIPITOR) 40 MG tablet take 1 tablet by mouth once daily 4/25/22   Kinsey Carter MD   amLODIPine (NORVASC) 10 MG tablet take 1 tablet by mouth once daily 1/19/22   Marifer Albarado MD   apixaban Charmian Dynes) 5 MG TABS tablet Take 1 tablet by mouth 2 times daily 5/14/21   Henry Palm MD        Allergies:       Licha Mate [aspirin], Lopid [gemfibrozil], Nortriptyline, Pcn [penicillins], Sulfa antibiotics, and Ibuprofen    Social History:     Tobacco:    reports that she has never smoked. She has never used smokeless tobacco.  Alcohol:      reports that she does not currently use alcohol. Drug Use:  reports no history of drug use.     Family History:     Family History   Problem Relation Age of Onset    Diabetes Mother     High Blood Pressure Mother     Heart Attack Mother     Heart Disease Mother     Kidney Disease Mother     High Blood Pressure Father     Heart Disease Father     Heart Attack Father     Diabetes Sister     High Blood Pressure Sister     Diabetes Sister     High Blood Pressure Sister        Review of Systems:     Positive and Negative as described in HPI    Constitutional:  negative for  fevers, chills, sweats, fatigue, and weight loss  HEENT:  negative for vision or hearing changes,   Respiratory:  negative for shortness of breath, cough, or congestion  Cardiovascular:  negative for  chest pain, palpitations  Gastrointestinal:  negative for nausea, vomiting, diarrhea, constipation, abdominal pain  Genitourinary: negative for frequency, dysuria  Integument:  negative for rash, skin lesions  Chest/Breast:  No painful inspiration or expiration, no rib sternal pain  Musculoskeletal:  negative for muscle aches or joint pain  Neurological:  negative for headaches, dizziness, lightheadedness, numbness, pain and tingling extremities  Lymphatics: no lymphadenopathy or painful masses  Behavior/Psych:  negative for depression and anxiety    Physical Exam:     Vitals:  /83   Pulse 65   Temp 97.9 °F (36.6 °C) (Oral)   Resp 18   Ht 5' 5\" (1.651 m)   Wt 180 lb (81.6 kg)   SpO2 99%   BMI 29.95 kg/m²       General appearance - alert, well appearing and in no acute distress  Mental status - oriented to person, place and time with normal affect  Head - normocephalic and atraumatic  Eyes - pupils equal and reactive, extraocular eye movements intact, conjunctiva clear  Ears - hearing appears to be intact  Nose - no drainage noted  Mouth - mucous membranes moist  Neck - supple, no carotid bruits, thyroid not palpable, no JVD  Chest - clear to auscultation, normal effort  Heart - normal rate, regular rhythm, no murmurs  Abdomen - soft, non-tender, non-distended, bowel sounds present all four quadrants, no masses, hepatomegaly, splenomegaly or aortic enlargement  Neurological - normal speech, no focal findings or movement disorder noted, cranial nerves II through XII grossly intact. Neuro muscular sensation intact in bilateral lower limb. Hyperesthesia in right foot both dorsal and plantar aspect. Extremities - no pedal edema or calf pain with palpation.   Right distal hallux amputation present  Skin - no gross lesions, rashes, or induration noted  Foot Exam -         R brachial 3+ L brachial 3+   R radial 3+ L radial 3+   R femoral 3+ L femoral 3+   R popliteal 3+ L popliteal 3+   R posterior tibial 0+ L posterior tibial 3+   R dorsalis pedis 0+ L dorsalis pedis 0+          Imaging:   CTA Aorta with B/L LE run off -     Right LE - There is interruption of flow in the mid distal right popliteal artery with   some calcifications in the area of the posterior tibial trunk. Reconstitution of flow is seen in the proximal right posterior tibial artery   for a short length. The is are of consideration of flow into the right   peroneal artery to the mid 3rd of the right leg with minimal reconstitution   more distally. The there is partial reconstitution of the proximal right   tibial artery. There is no visualization of contrast enhancement in the dorsal pedal artery   or in the plantar arch of the right foot. Left LE  There is a non calcified plaque in the left common femoral artery. There is   preserved contrast runoff into the left profunda and superficial femoral   artery down to the proximal popliteal artery. Towards the distal left popliteal artery and posterior tibial trunk and the   left the posterior tibial peroneal artery and anterior tibial artery there   are multiple collateral branches trying to reconstitution flow but there is   no direct flow continuation into the level of the ankle. There is no   contrast in dorsal pedal artery in the plantar artery. The minimal   reconstitution is seen in the distal peroneal artery. Assessment:     Stacey Hollis is a 52 y.o.  female who presents with peripheral arterial disease and right limb ischemia without any gangrene or wound      Plan:     Recommend admission to medicine  Start [facility]  Vascular surgery with follow.        ----------------------------------------  Gabriel Bourgeois MD   PGY2  Vascular Surgery
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
## 2022-09-07 NOTE — ED NOTES
The following labs were labeled with appropriate pt sticker and tubed to lab:     [x] Blue     [x] Lavender   [] on ice  [x] Green/yellow  [] Green/black [] on ice  [] Cailin Lanier  [] on ice  [] Yellow  [] Red  [] Pink  [] ABG  [] VBG    [] COVID-19 swab    [] Rapid  [] PCR  [] Flu swab  [] Peds Viral Panel     [] Urine Sample  [] Pelvic Cultures  [] Blood Cultures  [] X 2  [] STREP Cultures         Pedro Leslie RN  09/06/22 0958

## 2022-09-07 NOTE — CONSULTS
Today's Date: 9/7/2022  Patient Name: Mariaelena Bryan  Date of admission: 9/6/2022  8:53 PM  Patient's age: 52 y.o., 1975  Admission Dx: PAD (peripheral artery disease) ([facility] [I73.9]  Leg pain, right [M79.604]    Reason for Consult: management recommendations  Requesting Physician: Sisto Lesch Blood, DO    CHIEF COMPLAINT:  leg pain, numbness    History Obtained From:  patient, electronic medical record    HISTORY OF PRESENT ILLNESS:      The patient is a 52 y.o.  female who is admitted to the hospital for right sided leg pain, extending up to the thigh. Patient has known PMH of peripheral arterial disease, on lifelong Eliquis but has run out of her Eliquis recently, off AC for the past 1 month. She has been treated with several different modalities including lytic therapy with fasciotomy on the right several years ago. On the left she also had pharmacomechanical thrombectomy in 2019. CTA abdominal aorta was done which showed severe PVD affecting bilateral popliteal arteries, mesenteritits with lymphadenopathy of the root of the small bowel mesentery, hyperdense lesion in the right kidney. Patient underwent arteriography of right common iliac, common femoral, and had catheter placement with lysis of right popliteal and tibioperoneal trunk. Currently on tpa gtt. Hem onc consulted for concern of multiple enlarged mesenteric lymph notes. Patient was seen by Hem in 2018 for workup for hypercoagulability. Her protein C, protein S, SPEP, beta 2 glycoprotein, APLA Ab were all negative. Prothrombin gene nad factor V do not appear to have been tested.  Patient denies any h/o recurrent miscarriages, had 1 ectopic pregnancy in the past.      Past Medical History:   has a past medical history of Abnormal Pap smear of cervix, Anemia, Chronic back pain, Claudication ([address] Claudication in peripheral vascular disease ([address] Depression, Headache(784.0), Heart murmur, Hemorrhage of rectum and anus, History of blood transfusion, HTN (hypertension), Hx of blood clots, Hypercoagulable state ([address] Hyperlipidemia, Hypertension, Intertriginous candidiasis, Left popliteal artery occlusion ([address] Leg pain, Menometrorrhagia, Migraine headache with aura, Obesity, Osteoarthritis, PAD (peripheral artery disease) ([address] PVD (peripheral vascular disease) ([address] Takotsubo cardiomyopathy, and Wound of right leg. Past Surgical History:   has a past surgical history that includes Tubal ligation ();  section (); other surgical history (2017); other surgical history (Left, 2018); other surgical history (Left, 2018); vascular surgery (Left, 2018); pr reoperation, bypass graft (Left, 2018); pr vein bypass graft,fem-pop (Left, 2018); thrombectomy (Right, 2019); IVC filter insertion; Toe amputation (Right, 2019); Toe amputation (Right, 2019); and Foot Amputation (Right, 3/8/2021). Medications:    Reviewed in Epic     Allergies:  Asa [aspirin], Lopid [gemfibrozil], Nortriptyline, Pcn [penicillins], Sulfa antibiotics, and Ibuprofen    Social History:   reports that she has never smoked. She has never used smokeless tobacco. She reports that she does not currently use alcohol. She reports that she does not use drugs. Family History: family history includes Diabetes in her mother, sister, and sister; Heart Attack in her father and mother; Heart Disease in her father and mother; High Blood Pressure in her father, mother, sister, and sister; Kidney Disease in her mother. REVIEW OF SYSTEMS:    Constitutional: No fever or chills.  No night sweats, no weight loss   Eyes: No eye discharge, double vision, or eye pain   HEENT: negative for sore mouth, sore throat, hoarseness and voice change   Respiratory: negative for cough , sputum, dyspnea, wheezing, hemoptysis, chest pain   Cardiovascular: negative for chest pain, dyspnea, palpitations, orthopnea, PND Gastrointestinal: negative for nausea, vomiting, diarrhea, constipation, abdominal pain, Dysphagia, hematemesis and hematochezia   Genitourinary: negative for frequency, dysuria, nocturia, urinary incontinence, and hematuria   Integument: negative for rash, skin lesions, bruises.    Hematologic/Lymphatic: negative for easy bruising, bleeding, lymphadenopathy, or petechiae   Endocrine: negative for heat or cold intolerance,weight changes, change in bowel habits and hair loss   Musculoskeletal: negative for myalgias, arthralgias, pain, joint swelling,and bone pain   Neurological: negative for headaches, dizziness, seizures, weakness, numbness    PHYSICAL EXAM:      BP (!) 140/90   Pulse 75   Temp 98.1 °F (36.7 °C) (Oral)   Resp 25   Ht 5' 5\" (1.651 m)   Wt 180 lb (81.6 kg)   SpO2 98%   BMI 29.95 kg/m²    Temp (24hrs), Av °F (36.7 °C), Min:97.9 °F (36.6 °C), Max:98.1 °F (36.7 °C)    General appearance - well appearing, no in pain or distress   Mental status - alert and cooperative   Eyes - pupils equal and reactive, extraocular eye movements intact   Ears - bilateral TM's and external ear canals normal   Mouth - mucous membranes moist, pharynx normal without lesions   Neck - supple, no significant adenopathy   Lymphatics - no palpable lymphadenopathy, no hepatosplenomegaly   Chest - clear to auscultation, no wheezes, rales or rhonchi, symmetric air entry   Heart - normal rate, regular rhythm, normal S1, S2, no murmurs  Abdomen - soft, nontender, nondistended, no masses or organomegaly   Neurological - alert, oriented, normal speech, no focal findings or movement disorder noted   Musculoskeletal - no joint tenderness, deformity or swelling   Extremities - peripheral pulses normal, no pedal edema, no clubbing or cyanosis   Skin - normal coloration and turgor, no rashes, no suspicious skin lesions noted ,    DATA:    Labs:   CBC:   Recent Labs     22  2250   WBC 10.2   HGB 13.3   HCT 38.1    BMP:   Recent Labs     09/06/22  2250      K 3.6*   CO2 20   BUN 10   CREATININE 0.74   LABGLOM >60   GLUCOSE 109*     PT/INR: No results for input(s): PROTIME, INR in the last 72 hours. IMAGING DATA:  CTA ABDOMINAL AORTA W BILAT RUNOFF W CONTRAST   Final Result   1. Severe peripheral vascular disease affecting occluded the mid distal   popliteal arteries bilateral and corresponding popliteal branches to the area   of the right and left legs down to the ankle/foot area with poor circulation   distally. Multiple collateral from geniculate arteries are present but there   is no effective reconstitution down to the level of the ankle/foot   bilaterally. 2.  Findings for mesenteritis with adenopathy of the root of the small bowel   mesentery. The prominence of lymph nodes present could consider further   evaluation with PET-CT scan to investigate degree of biological activity. 3.  Hyperdense lesion in the central aspect of the right kidney. Could   represent a hemorrhagic cyst.  Further evaluation with renal ultrasound   recommended. 4.  Corpus lutein cyst in the left ovary. Small free fluid accumulation in   the cul-de-sac. These can be relate with recent rupture follicular cyst.   Could consider correlation with pelvic ultrasound.              Primary Problem  PAD (peripheral artery disease) [facility])    Active Hospital Problems    Diagnosis Date Noted    PAD (peripheral artery disease) ([facility] [address] [I73.9] 09/07/2022     Priority: Medium    Abnormal CT of the abdomen [R93.5] 09/07/2022     Priority: Medium    Popliteal artery embolism, right ([facility] [address] [I74.3] 09/07/2022     Priority: Medium    Claudication in peripheral vascular disease ([facility] [address] [I73.9]     Primary hypertension [I10] 05/13/2013         IMPRESSION:   Severe PAD with occlusion of right popliteal and tibial arteries  Concern for mesenteritis with adenopathy of the root of the small bowel   mesentery  Prior h/o PAD of b/l lower extremities  Hypercoagulable work up negative thus far  H/o CIN1 in 2019    RECOMMENDATIONS  Continue with anticoagulation; switch to DOAC once cleared by vascular  Mesenteritis could be acute reaction to popliteal artery occlusion; patient is asymptomatic and clinically stable. Will defer further evaluation. Repeat imaging outpatient in 1 month  Follow up on prothrombin gene mutation and factor V leiden, PT-INR  Will discuss with attending. Discussed with patient and Nurse. Thank you for asking us to see this patient. Vanita Christianson  PGY-3  Internal Medicine  [address]  9/7/2022 4:06 PM      Attending Physician Statement   I have discussed the care of this patient, including pertinent history and exam findings, with the resident. I have seen and examined the patient and the key elements of all parts of the encounter have been performed by me. I agree with the assessment, plan and orders as documented by the resident and with changes made to the note.     Patient will need long term anticoagulation  PLan to repeat CT abd in 4 weeks as o/p  Other management as per primary and vascular minimal significantly as    Chastity Trevon Valentine MD  Hematology/Oncology    Cell: [phone]

## 2022-09-07 NOTE — PROGRESS NOTES
Patient reporting pain in bladder region and has been unable to urinate. Bladder scan showing 550ml.  Orders placed

## 2022-09-08 ENCOUNTER — ANESTHESIA (OUTPATIENT)
Dept: OPERATING ROOM | Age: 47
DRG: 182 | End: 2022-09-08
Payer: COMMERCIAL

## 2022-09-08 ENCOUNTER — ANESTHESIA EVENT (OUTPATIENT)
Dept: OPERATING ROOM | Age: 47
DRG: 182 | End: 2022-09-08
Payer: COMMERCIAL

## 2022-09-08 LAB
ABSOLUTE EOS #: 0.04 K/UL (ref 0–0.44)
ABSOLUTE EOS #: 0.13 K/UL (ref 0–0.44)
ABSOLUTE IMMATURE GRANULOCYTE: 0.03 K/UL (ref 0–0.3)
ABSOLUTE IMMATURE GRANULOCYTE: 0.05 K/UL (ref 0–0.3)
ABSOLUTE LYMPH #: 1.33 K/UL (ref 1.1–3.7)
ABSOLUTE LYMPH #: 2.06 K/UL (ref 1.1–3.7)
ABSOLUTE MONO #: 0.19 K/UL (ref 0.1–1.2)
ABSOLUTE MONO #: 0.56 K/UL (ref 0.1–1.2)
ALBUMIN SERPL-MCNC: 3.7 G/DL (ref 3.5–5.2)
ALT SERPL-CCNC: 11 U/L (ref 5–33)
ANION GAP SERPL CALCULATED.3IONS-SCNC: 12 MMOL/L (ref 9–17)
ANION GAP SERPL CALCULATED.3IONS-SCNC: 14 MMOL/L (ref 9–17)
AST SERPL-CCNC: 14 U/L
BASOPHILS # BLD: 0 % (ref 0–2)
BASOPHILS # BLD: 0 % (ref 0–2)
BASOPHILS ABSOLUTE: 0.03 K/UL (ref 0–0.2)
BASOPHILS ABSOLUTE: 0.03 K/UL (ref 0–0.2)
BILIRUB SERPL-MCNC: 0.4 MG/DL (ref 0.3–1.2)
BUN BLDV-MCNC: 5 MG/DL (ref 6–20)
BUN BLDV-MCNC: 5 MG/DL (ref 6–20)
CALCIUM SERPL-MCNC: 7.8 MG/DL (ref 8.6–10.4)
CALCIUM SERPL-MCNC: 8.4 MG/DL (ref 8.6–10.4)
CHLORIDE BLD-SCNC: 104 MMOL/L (ref 98–107)
CHLORIDE BLD-SCNC: 104 MMOL/L (ref 98–107)
CO2: 19 MMOL/L (ref 20–31)
CO2: 21 MMOL/L (ref 20–31)
CREAT SERPL-MCNC: 0.55 MG/DL (ref 0.5–0.9)
CREAT SERPL-MCNC: 0.57 MG/DL (ref 0.5–0.9)
EOSINOPHILS RELATIVE PERCENT: 0 % (ref 1–4)
EOSINOPHILS RELATIVE PERCENT: 2 % (ref 1–4)
FIBRINOGEN: 285 MG/DL (ref 140–420)
FIBRINOGEN: 302 MG/DL (ref 140–420)
FIBRINOGEN: 313 MG/DL (ref 140–420)
GFR AFRICAN AMERICAN: >60 ML/MIN
GFR AFRICAN AMERICAN: >60 ML/MIN
GFR NON-AFRICAN AMERICAN: >60 ML/MIN
GFR NON-AFRICAN AMERICAN: >60 ML/MIN
GFR SERPL CREATININE-BSD FRML MDRD: ABNORMAL ML/MIN/{1.73_M2}
GFR SERPL CREATININE-BSD FRML MDRD: ABNORMAL ML/MIN/{1.73_M2}
GLUCOSE BLD-MCNC: 101 MG/DL (ref 70–99)
GLUCOSE BLD-MCNC: 142 MG/DL (ref 70–99)
HCT VFR BLD CALC: 35.4 % (ref 36.3–47.1)
HCT VFR BLD CALC: 37.4 % (ref 36.3–47.1)
HEMOGLOBIN: 11.8 G/DL (ref 11.9–15.1)
HEMOGLOBIN: 13.2 G/DL (ref 11.9–15.1)
IMMATURE GRANULOCYTES: 0 %
IMMATURE GRANULOCYTES: 0 %
LACTIC ACID, WHOLE BLOOD: 1.3 MMOL/L (ref 0.7–2.1)
LYMPHOCYTES # BLD: 10 % (ref 24–43)
LYMPHOCYTES # BLD: 26 % (ref 24–43)
MAGNESIUM: 1.7 MG/DL (ref 1.6–2.6)
MCH RBC QN AUTO: 29.5 PG (ref 25.2–33.5)
MCH RBC QN AUTO: 30.9 PG (ref 25.2–33.5)
MCHC RBC AUTO-ENTMCNC: 33.3 G/DL (ref 28.4–34.8)
MCHC RBC AUTO-ENTMCNC: 35.3 G/DL (ref 28.4–34.8)
MCV RBC AUTO: 87.6 FL (ref 82.6–102.9)
MCV RBC AUTO: 88.5 FL (ref 82.6–102.9)
MONOCYTES # BLD: 1 % (ref 3–12)
MONOCYTES # BLD: 7 % (ref 3–12)
NRBC AUTOMATED: 0 PER 100 WBC
NRBC AUTOMATED: 0 PER 100 WBC
PARTIAL THROMBOPLASTIN TIME: 23.3 SEC (ref 20.5–30.5)
PARTIAL THROMBOPLASTIN TIME: 23.7 SEC (ref 20.5–30.5)
PARTIAL THROMBOPLASTIN TIME: 24.2 SEC (ref 20.5–30.5)
PDW BLD-RTO: 13.5 % (ref 11.8–14.4)
PDW BLD-RTO: 13.5 % (ref 11.8–14.4)
PLATELET # BLD: 256 K/UL (ref 138–453)
PLATELET # BLD: 267 K/UL (ref 138–453)
PMV BLD AUTO: 10.1 FL (ref 8.1–13.5)
PMV BLD AUTO: 10.1 FL (ref 8.1–13.5)
POTASSIUM SERPL-SCNC: 3.5 MMOL/L (ref 3.7–5.3)
POTASSIUM SERPL-SCNC: 4.3 MMOL/L (ref 3.7–5.3)
RBC # BLD: 4 M/UL (ref 3.95–5.11)
RBC # BLD: 4.27 M/UL (ref 3.95–5.11)
SEG NEUTROPHILS: 65 % (ref 36–65)
SEG NEUTROPHILS: 88 % (ref 36–65)
SEGMENTED NEUTROPHILS ABSOLUTE COUNT: 11.73 K/UL (ref 1.5–8.1)
SEGMENTED NEUTROPHILS ABSOLUTE COUNT: 5.19 K/UL (ref 1.5–8.1)
SODIUM BLD-SCNC: 137 MMOL/L (ref 135–144)
SODIUM BLD-SCNC: 137 MMOL/L (ref 135–144)
WBC # BLD: 13.4 K/UL (ref 3.5–11.3)
WBC # BLD: 8 K/UL (ref 3.5–11.3)

## 2022-09-08 PROCEDURE — 10702042 HC MISC STENT NONCTD/CVD W DEL SYS

## 2022-09-08 PROCEDURE — 84450 TRANSFERASE (AST) (SGOT): CPT

## 2022-09-08 PROCEDURE — 04CT3ZZ EXTIRPATION OF MATTER FROM RIGHT PERONEAL ARTERY, PERCUTANEOUS APPROACH: ICD-10-PCS | Performed by: SURGERY

## 2022-09-08 PROCEDURE — 85025 COMPLETE CBC W/AUTO DIFF WBC: CPT

## 2022-09-08 PROCEDURE — 6360000002 HC RX W HCPCS: Performed by: SURGERY

## 2022-09-08 PROCEDURE — 6360000004 HC RX CONTRAST MEDICATION: Performed by: SURGERY

## 2022-09-08 PROCEDURE — C1769 GUIDE WIRE: HCPCS

## 2022-09-08 PROCEDURE — C1894 INTRO/SHEATH, NON-LASER: HCPCS

## 2022-09-08 PROCEDURE — 10702042 HC MISC GUIDEWIRE

## 2022-09-08 PROCEDURE — 6360000002 HC RX W HCPCS

## 2022-09-08 PROCEDURE — C1887 CATHETER, GUIDING: HCPCS | Performed by: SURGERY

## 2022-09-08 PROCEDURE — 82040 ASSAY OF SERUM ALBUMIN: CPT

## 2022-09-08 PROCEDURE — A4217 STERILE WATER/SALINE, 500 ML: HCPCS | Performed by: SURGERY

## 2022-09-08 PROCEDURE — 99232 SBSQ HOSP IP/OBS MODERATE 35: CPT | Performed by: STUDENT IN AN ORGANIZED HEALTH CARE EDUCATION/TRAINING PROGRAM

## 2022-09-08 PROCEDURE — 3700000000 HC ANESTHESIA ATTENDED CARE: Performed by: SURGERY

## 2022-09-08 PROCEDURE — C1887 CATHETER, GUIDING: HCPCS

## 2022-09-08 PROCEDURE — 3600000017 HC SURGERY HYBRID ADDL 15MIN: Performed by: SURGERY

## 2022-09-08 PROCEDURE — 2580000003 HC RX 258

## 2022-09-08 PROCEDURE — 3600000007 HC SURGERY HYBRID BASE: Performed by: SURGERY

## 2022-09-08 PROCEDURE — 84460 ALANINE AMINO (ALT) (SGPT): CPT

## 2022-09-08 PROCEDURE — 6360000002 HC RX W HCPCS: Performed by: STUDENT IN AN ORGANIZED HEALTH CARE EDUCATION/TRAINING PROGRAM

## 2022-09-08 PROCEDURE — 37225 PR REVSC OPN/PRQ FEM/POP W/ATHRC/ANGIOP SM VSL: CPT | Performed by: SURGERY

## 2022-09-08 PROCEDURE — 10702042 HC CATHETER GUIDING

## 2022-09-08 PROCEDURE — 83605 ASSAY OF LACTIC ACID: CPT

## 2022-09-08 PROCEDURE — C1725 CATH, TRANSLUMIN NON-LASER: HCPCS | Performed by: SURGERY

## 2022-09-08 PROCEDURE — C1769 GUIDE WIRE: HCPCS | Performed by: SURGERY

## 2022-09-08 PROCEDURE — 10702042 HC MISC CATH THROMBECTOMY

## 2022-09-08 PROCEDURE — 10702042 HC MISC INTRODUCER/SHEATH

## 2022-09-08 PROCEDURE — 04CR3ZZ EXTIRPATION OF MATTER FROM RIGHT POSTERIOR TIBIAL ARTERY, PERCUTANEOUS APPROACH: ICD-10-PCS | Performed by: SURGERY

## 2022-09-08 PROCEDURE — C1724 CATH, TRANS ATHEREC,ROTATION: HCPCS | Performed by: SURGERY

## 2022-09-08 PROCEDURE — 2000000000 HC ICU R&B

## 2022-09-08 PROCEDURE — 6370000000 HC RX 637 (ALT 250 FOR IP): Performed by: STUDENT IN AN ORGANIZED HEALTH CARE EDUCATION/TRAINING PROGRAM

## 2022-09-08 PROCEDURE — 85730 THROMBOPLASTIN TIME PARTIAL: CPT

## 2022-09-08 PROCEDURE — C1724 CATH, TRANS ATHEREC,ROTATION: HCPCS

## 2022-09-08 PROCEDURE — 2500000003 HC RX 250 WO HCPCS

## 2022-09-08 PROCEDURE — 85384 FIBRINOGEN ACTIVITY: CPT

## 2022-09-08 PROCEDURE — 3700000001 HC ADD 15 MINUTES (ANESTHESIA): Performed by: SURGERY

## 2022-09-08 PROCEDURE — C1876 STENT, NON-COA/NON-COV W/DEL: HCPCS | Performed by: SURGERY

## 2022-09-08 PROCEDURE — 04CM3ZZ EXTIRPATION OF MATTER FROM RIGHT POPLITEAL ARTERY, PERCUTANEOUS APPROACH: ICD-10-PCS | Performed by: SURGERY

## 2022-09-08 PROCEDURE — C1751 CATH, INF, PER/CENT/MIDLINE: HCPCS | Performed by: SURGERY

## 2022-09-08 PROCEDURE — C1894 INTRO/SHEATH, NON-LASER: HCPCS | Performed by: SURGERY

## 2022-09-08 PROCEDURE — C1876 STENT, NON-COA/NON-COV W/DEL: HCPCS

## 2022-09-08 PROCEDURE — 10702042 HC CATH TRANS ATHEREC ROTATION

## 2022-09-08 PROCEDURE — B41F1ZZ FLUOROSCOPY OF RIGHT LOWER EXTREMITY ARTERIES USING LOW OSMOLAR CONTRAST: ICD-10-PCS | Performed by: SURGERY

## 2022-09-08 PROCEDURE — C1757 CATH, THROMBECTOMY/EMBOLECT: HCPCS

## 2022-09-08 PROCEDURE — 82247 BILIRUBIN TOTAL: CPT

## 2022-09-08 PROCEDURE — 36415 COLL VENOUS BLD VENIPUNCTURE: CPT

## 2022-09-08 PROCEDURE — 2580000003 HC RX 258: Performed by: SURGERY

## 2022-09-08 PROCEDURE — C1725 CATH, TRANSLUMIN NON-LASER: HCPCS

## 2022-09-08 PROCEDURE — C1757 CATH, THROMBECTOMY/EMBOLECT: HCPCS | Performed by: SURGERY

## 2022-09-08 PROCEDURE — 10702042 HC MISC CATH TRANSLUM ANGIO

## 2022-09-08 PROCEDURE — 37229 PR REVSC OPN/PRQ TIB/PERO W/ATHRC/ANGIOP SM VSL: CPT | Performed by: SURGERY

## 2022-09-08 PROCEDURE — 6370000000 HC RX 637 (ALT 250 FOR IP)

## 2022-09-08 PROCEDURE — 83735 ASSAY OF MAGNESIUM: CPT

## 2022-09-08 PROCEDURE — 37221 PR REVSC OPN/PRQ ILIAC ART W/STNT PLMT & ANGIOPLSTY: CPT | Performed by: SURGERY

## 2022-09-08 PROCEDURE — 047M3ZZ DILATION OF RIGHT POPLITEAL ARTERY, PERCUTANEOUS APPROACH: ICD-10-PCS | Performed by: SURGERY

## 2022-09-08 PROCEDURE — 99232 SBSQ HOSP IP/OBS MODERATE 35: CPT | Performed by: INTERNAL MEDICINE

## 2022-09-08 PROCEDURE — 80048 BASIC METABOLIC PNL TOTAL CA: CPT

## 2022-09-08 PROCEDURE — 2709999900 HC NON-CHARGEABLE SUPPLY: Performed by: SURGERY

## 2022-09-08 DEVICE — IMPLANTABLE DEVICE: Type: IMPLANTABLE DEVICE | Site: LEG | Status: FUNCTIONAL

## 2022-09-08 RX ORDER — ACETAMINOPHEN 325 MG/1
650 TABLET ORAL
Status: ACTIVE | OUTPATIENT
Start: 2022-09-08 | End: 2022-09-08

## 2022-09-08 RX ORDER — SODIUM CHLORIDE 0.9 % (FLUSH) 0.9 %
5-40 SYRINGE (ML) INJECTION PRN
Status: DISCONTINUED | OUTPATIENT
Start: 2022-09-08 | End: 2022-09-10

## 2022-09-08 RX ORDER — IODIXANOL 320 MG/ML
INJECTION, SOLUTION INTRAVASCULAR PRN
Status: DISCONTINUED | OUTPATIENT
Start: 2022-09-08 | End: 2022-09-08 | Stop reason: ALTCHOICE

## 2022-09-08 RX ORDER — LIDOCAINE HYDROCHLORIDE 10 MG/ML
INJECTION, SOLUTION EPIDURAL; INFILTRATION; INTRACAUDAL; PERINEURAL PRN
Status: DISCONTINUED | OUTPATIENT
Start: 2022-09-08 | End: 2022-09-08 | Stop reason: SDUPTHER

## 2022-09-08 RX ORDER — SODIUM CHLORIDE, SODIUM LACTATE, POTASSIUM CHLORIDE, CALCIUM CHLORIDE 600; 310; 30; 20 MG/100ML; MG/100ML; MG/100ML; MG/100ML
INJECTION, SOLUTION INTRAVENOUS CONTINUOUS PRN
Status: DISCONTINUED | OUTPATIENT
Start: 2022-09-08 | End: 2022-09-08 | Stop reason: SDUPTHER

## 2022-09-08 RX ORDER — SODIUM CHLORIDE 0.9 % (FLUSH) 0.9 %
5-40 SYRINGE (ML) INJECTION EVERY 12 HOURS SCHEDULED
Status: DISCONTINUED | OUTPATIENT
Start: 2022-09-08 | End: 2022-09-10

## 2022-09-08 RX ORDER — FENTANYL CITRATE 50 UG/ML
INJECTION, SOLUTION INTRAMUSCULAR; INTRAVENOUS PRN
Status: DISCONTINUED | OUTPATIENT
Start: 2022-09-08 | End: 2022-09-08 | Stop reason: SDUPTHER

## 2022-09-08 RX ORDER — IODIXANOL 320 MG/ML
INJECTION, SOLUTION INTRAVASCULAR
Status: DISPENSED
Start: 2022-09-08 | End: 2022-09-09

## 2022-09-08 RX ORDER — SODIUM CHLORIDE 9 MG/ML
INJECTION, SOLUTION INTRAVENOUS PRN
Status: DISCONTINUED | OUTPATIENT
Start: 2022-09-08 | End: 2022-09-10

## 2022-09-08 RX ORDER — HEPARIN SODIUM 1000 [USP'U]/ML
INJECTION, SOLUTION INTRAVENOUS; SUBCUTANEOUS PRN
Status: DISCONTINUED | OUTPATIENT
Start: 2022-09-08 | End: 2022-09-08 | Stop reason: SDUPTHER

## 2022-09-08 RX ORDER — HEPARIN SODIUM 1000 [USP'U]/ML
INJECTION, SOLUTION INTRAVENOUS; SUBCUTANEOUS
Status: DISPENSED
Start: 2022-09-08 | End: 2022-09-09

## 2022-09-08 RX ORDER — MORPHINE SULFATE 2 MG/ML
2 INJECTION, SOLUTION INTRAMUSCULAR; INTRAVENOUS EVERY 5 MIN PRN
Status: DISCONTINUED | OUTPATIENT
Start: 2022-09-08 | End: 2022-09-10

## 2022-09-08 RX ORDER — DEXAMETHASONE SODIUM PHOSPHATE 10 MG/ML
INJECTION, SOLUTION INTRAMUSCULAR; INTRAVENOUS PRN
Status: DISCONTINUED | OUTPATIENT
Start: 2022-09-08 | End: 2022-09-08 | Stop reason: SDUPTHER

## 2022-09-08 RX ORDER — FENTANYL CITRATE 50 UG/ML
25 INJECTION, SOLUTION INTRAMUSCULAR; INTRAVENOUS EVERY 5 MIN PRN
Status: DISCONTINUED | OUTPATIENT
Start: 2022-09-08 | End: 2022-09-10

## 2022-09-08 RX ORDER — PROPOFOL 10 MG/ML
INJECTION, EMULSION INTRAVENOUS PRN
Status: DISCONTINUED | OUTPATIENT
Start: 2022-09-08 | End: 2022-09-08 | Stop reason: SDUPTHER

## 2022-09-08 RX ORDER — LIDOCAINE HYDROCHLORIDE 10 MG/ML
INJECTION, SOLUTION INFILTRATION; PERINEURAL
Status: DISPENSED
Start: 2022-09-08 | End: 2022-09-09

## 2022-09-08 RX ORDER — ONDANSETRON 2 MG/ML
INJECTION INTRAMUSCULAR; INTRAVENOUS PRN
Status: DISCONTINUED | OUTPATIENT
Start: 2022-09-08 | End: 2022-09-08 | Stop reason: SDUPTHER

## 2022-09-08 RX ORDER — HEPARIN SODIUM 1000 [USP'U]/ML
INJECTION, SOLUTION INTRAVENOUS; SUBCUTANEOUS PRN
Status: DISCONTINUED | OUTPATIENT
Start: 2022-09-08 | End: 2022-09-08

## 2022-09-08 RX ADMIN — GABAPENTIN 300 MG: 300 CAPSULE ORAL at 17:30

## 2022-09-08 RX ADMIN — FENTANYL CITRATE 50 MCG: 50 INJECTION, SOLUTION INTRAMUSCULAR; INTRAVENOUS at 03:20

## 2022-09-08 RX ADMIN — FENTANYL CITRATE 50 MCG: 50 INJECTION, SOLUTION INTRAMUSCULAR; INTRAVENOUS at 13:48

## 2022-09-08 RX ADMIN — LIDOCAINE HYDROCHLORIDE 50 MG: 10 INJECTION, SOLUTION EPIDURAL; INFILTRATION; INTRACAUDAL; PERINEURAL at 13:53

## 2022-09-08 RX ADMIN — FENTANYL CITRATE 50 MCG: 50 INJECTION, SOLUTION INTRAMUSCULAR; INTRAVENOUS at 17:02

## 2022-09-08 RX ADMIN — ALTEPLASE 1 MG/HR: 2.2 INJECTION, POWDER, LYOPHILIZED, FOR SOLUTION INTRAVENOUS at 11:28

## 2022-09-08 RX ADMIN — SODIUM CHLORIDE, POTASSIUM CHLORIDE, SODIUM LACTATE AND CALCIUM CHLORIDE: 600; 310; 30; 20 INJECTION, SOLUTION INTRAVENOUS at 15:59

## 2022-09-08 RX ADMIN — HEPARIN SODIUM 2000 UNITS: 1000 INJECTION INTRAVENOUS; SUBCUTANEOUS at 15:18

## 2022-09-08 RX ADMIN — SODIUM CHLORIDE, POTASSIUM CHLORIDE, SODIUM LACTATE AND CALCIUM CHLORIDE: 600; 310; 30; 20 INJECTION, SOLUTION INTRAVENOUS at 13:43

## 2022-09-08 RX ADMIN — HEPARIN SODIUM 6000 UNITS: 1000 INJECTION INTRAVENOUS; SUBCUTANEOUS at 14:22

## 2022-09-08 RX ADMIN — GABAPENTIN 300 MG: 300 CAPSULE ORAL at 21:28

## 2022-09-08 RX ADMIN — FENTANYL CITRATE 25 MCG: 50 INJECTION, SOLUTION INTRAMUSCULAR; INTRAVENOUS at 15:59

## 2022-09-08 RX ADMIN — ACETAMINOPHEN 1000 MG: 500 TABLET ORAL at 05:25

## 2022-09-08 RX ADMIN — ACETAMINOPHEN 1000 MG: 500 TABLET ORAL at 17:30

## 2022-09-08 RX ADMIN — ALTEPLASE 1 MG/HR: 2.2 INJECTION, POWDER, LYOPHILIZED, FOR SOLUTION INTRAVENOUS at 05:27

## 2022-09-08 RX ADMIN — SODIUM CHLORIDE, PRESERVATIVE FREE 10 ML: 5 INJECTION INTRAVENOUS at 09:39

## 2022-09-08 RX ADMIN — POTASSIUM CHLORIDE 40 MEQ: 1500 TABLET, EXTENDED RELEASE ORAL at 05:25

## 2022-09-08 RX ADMIN — PROPOFOL 200 MG: 10 INJECTION, EMULSION INTRAVENOUS at 13:53

## 2022-09-08 RX ADMIN — FENTANYL CITRATE 25 MCG: 50 INJECTION, SOLUTION INTRAMUSCULAR; INTRAVENOUS at 14:04

## 2022-09-08 RX ADMIN — FENTANYL CITRATE 25 MCG: 50 INJECTION, SOLUTION INTRAMUSCULAR; INTRAVENOUS at 14:02

## 2022-09-08 RX ADMIN — FENTANYL CITRATE 25 MCG: 50 INJECTION, SOLUTION INTRAMUSCULAR; INTRAVENOUS at 14:51

## 2022-09-08 RX ADMIN — FENTANYL CITRATE 50 MCG: 50 INJECTION, SOLUTION INTRAMUSCULAR; INTRAVENOUS at 21:50

## 2022-09-08 RX ADMIN — OXYCODONE 5 MG: 5 TABLET ORAL at 18:13

## 2022-09-08 RX ADMIN — FENTANYL CITRATE 25 MCG: 50 INJECTION, SOLUTION INTRAMUSCULAR; INTRAVENOUS at 16:03

## 2022-09-08 RX ADMIN — GABAPENTIN 300 MG: 300 CAPSULE ORAL at 09:39

## 2022-09-08 RX ADMIN — FENTANYL CITRATE 50 MCG: 50 INJECTION, SOLUTION INTRAMUSCULAR; INTRAVENOUS at 06:23

## 2022-09-08 RX ADMIN — DEXAMETHASONE SODIUM PHOSPHATE 8 MG: 10 INJECTION, SOLUTION INTRAMUSCULAR; INTRAVENOUS at 14:09

## 2022-09-08 RX ADMIN — FENTANYL CITRATE 25 MCG: 50 INJECTION, SOLUTION INTRAMUSCULAR; INTRAVENOUS at 15:24

## 2022-09-08 RX ADMIN — MAGNESIUM SULFATE 1000 MG: 1 INJECTION INTRAVENOUS at 06:53

## 2022-09-08 RX ADMIN — ONDANSETRON 4 MG: 2 INJECTION INTRAMUSCULAR; INTRAVENOUS at 16:01

## 2022-09-08 RX ADMIN — OXYCODONE 5 MG: 5 TABLET ORAL at 06:22

## 2022-09-08 RX ADMIN — FENTANYL CITRATE 50 MCG: 50 INJECTION, SOLUTION INTRAMUSCULAR; INTRAVENOUS at 09:37

## 2022-09-08 ASSESSMENT — PAIN DESCRIPTION - ORIENTATION
ORIENTATION: LEFT

## 2022-09-08 ASSESSMENT — PAIN SCALES - GENERAL
PAINLEVEL_OUTOF10: 8
PAINLEVEL_OUTOF10: 10
PAINLEVEL_OUTOF10: 10
PAINLEVEL_OUTOF10: 5
PAINLEVEL_OUTOF10: 10
PAINLEVEL_OUTOF10: 9
PAINLEVEL_OUTOF10: 8
PAINLEVEL_OUTOF10: 0
PAINLEVEL_OUTOF10: 6
PAINLEVEL_OUTOF10: 8
PAINLEVEL_OUTOF10: 10
PAINLEVEL_OUTOF10: 4

## 2022-09-08 ASSESSMENT — PAIN SCALES - WONG BAKER: WONGBAKER_NUMERICALRESPONSE: 0

## 2022-09-08 ASSESSMENT — PAIN DESCRIPTION - LOCATION
LOCATION: ABDOMEN;PELVIS
LOCATION: GROIN
LOCATION: ABDOMEN;LEG
LOCATION: ABDOMEN;LEG

## 2022-09-08 ASSESSMENT — PAIN DESCRIPTION - DESCRIPTORS
DESCRIPTORS: ACHING
DESCRIPTORS: THROBBING

## 2022-09-08 NOTE — ANESTHESIA POSTPROCEDURE EVALUATION
Department of Anesthesiology  Postprocedure Note    Patient: Kaveh Monaco  MRN: [id number]  YOB: 1975  Date of evaluation: 9/8/2022      Procedure Summary     Date: 09/08/22 Room / Location: [address]    Anesthesia Start: 1343 Anesthesia Stop: 1624    Procedure: RIGHT LOWER EXTREMITY ANGIOGRAM WITH ANGIOJET AND JETSTREAM (Right) Diagnosis:       PAD (peripheral artery disease) ([address]      (PAD (peripheral artery disease) ([address] [I73.9])    Surgeons: Aric Childers MD Responsible Provider: Michelle Sparks MD    Anesthesia Type: general ASA Status: 3          Anesthesia Type: No value filed.     Kristy Phase I:      Kristy Phase II:        Anesthesia Post Evaluation    Patient location during evaluation: bedside  Patient participation: complete - patient participated  Level of consciousness: awake  Airway patency: patent  Nausea & Vomiting: no nausea and no vomiting  Complications: no  Cardiovascular status: hemodynamically stable  Respiratory status: acceptable  Hydration status: stable  Comments: BP (!) 152/76   Pulse 85   Temp 98.3 °F (36.8 °C) (Axillary)   Resp (!) 32   Ht 5' 5\" (1.651 m)   Wt 198 lb 6.6 oz (90 kg)   SpO2 96%   BMI 33.02 kg/m²
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
## 2022-09-08 NOTE — PROGRESS NOTES
Physical Therapy        Physical Therapy Cancel Note      DATE: 2022    NAME: Vickii Dance  MRN: [id number]   : 1975      Patient not seen this date for Physical Therapy due to:    Surgery/Procedure: Per vascular, plan for OR for repeat RLE angiogram, poss angioplasty , will check back post-op.       Electronically signed by Norberto Duncan PT on 2022 at 10:52 AM

## 2022-09-08 NOTE — PROGRESS NOTES
[facility]  Occupational Therapy Not Seen Note    DATE: 2022    NAME: Gilberto Alcantar  MRN: [id number]   : 1975      Patient not seen this date for Occupational Therapy due to:    Surgery/Procedure: Per vascular note: OR for repeat RLE angiogram, poss angioplasty    Next Scheduled Treatment: 2022    Electronically signed by CHRISTI Elena on 2022 at 8:56 AM

## 2022-09-08 NOTE — PROGRESS NOTES
Vascular Surgery Progress Note            PATIENT NAME: Gilberto Alcantar     TODAY'S DATE: 2022, 7:27 AM    CC: none    SUBJECTIVE:    Pt seen and examined. No acute event overnight. Deneis chst pain, nausea, vomiting, shortness of breath. R foot pain well controlled. Voiding appropriately.       OBJECTIVE:   VITALS:  /77   Pulse 57   Temp 97.7 °F (36.5 °C) (Oral)   Resp 22   Ht 5' 5\" (1.651 m)   Wt 198 lb 6.6 oz (90 kg)   SpO2 98%   BMI 33.02 kg/m²  I Temperature Max - Temp  Av °F (36.7 °C)  Min: 97.7 °F (36.5 °C)  Max: 98.4 °F (36.9 °C)      LABS:  Lab Results   Component Value Date/Time    WBC 10.2 2022 10:50 PM    HGB 13.3 2022 10:50 PM    HCT 38.1 2022 10:50 PM     2022 10:50 PM       Lab Results   Component Value Date/Time     2022 02:46 AM    K 3.5 2022 02:46 AM     2022 02:46 AM    CO2 21 2022 02:46 AM    BUN 5 2022 02:46 AM    CREATININE 0.55 2022 02:46 AM    GLUCOSE 101 2022 02:46 AM       Lab Results   Component Value Date/Time    ALKPHOS 89 2020 04:28 PM    ALT 11 2020 04:28 PM    AST 13 2020 04:28 PM    PROT 7.5 2020 04:28 PM    BILITOT 0.36 2020 04:28 PM    BILIDIR <0.08 11/15/2015 04:45 PM    IBILI CANNOT BE CALCULATED 11/15/2015 04:45 PM    LABALBU 4.3 2020 04:28 PM       Lab Results   Component Value Date/Time    PROTIME 9.9 2022 04:22 PM    INR 0.9 2022 04:22 PM       Calcium:    Lab Results   Component Value Date/Time    CALCIUM 7.8 2022 02:46 AM     Ionized Calcium:  No results found for: IONCA  Magnesium:    Lab Results   Component Value Date/Time    MG 1.7 2022 02:46 AM     Phosphorus:    Lab Results   Component Value Date/Time    PHOS 2.7 2018 01:52 AM     Albumin:    Lab Results   Component Value Date/Time    LABALBU 4.3 2020 04:28 PM         General: AOx3, NAD  Heart: Regular rate and rhythm   Lungs: Clear to auscultation bilaterally  Abdomen: Soft, nontender, nondistended. Bowel sounds x4. Extremity: PT/Dp signals L foot, PT/AT signals R foot. Neuro: chronic parestehsias R toes, motor in tact, no discoloration  Psych: normal mood and thought        ASSESSMENT   Principal Problem:    PAD (peripheral artery disease) ([facility])  Active Problems:    Abnormal CT of the abdomen    Popliteal artery embolism, right ([facility])    Leg pain, right    Lymphadenopathy, mesenteric    Primary hypertension    Claudication in peripheral vascular disease ([address]  Resolved Problems:    * No resolved hospital problems. *      PLAN  Will proceed to OR for repeat RLE angiogram, poss angioplasty  Risks and benefits discussed, including infection, bleeding need for further procedures. Informed consent obtained.    NPO since mdn  Mag and potassium replaced this am  Tpa and heparin infusing  Will assess post operatively      Lina Saleem DO  General Surgery, PGY-2

## 2022-09-08 NOTE — PROGRESS NOTES
[facility]  Office: [address], DO, Srinivas Magui, DO, Frediharmeet Sommero, DO, Bal Quijano Blood, DO, Cordella Holstein, MD, Wm Mosher MD, Chelly Ames MD, Shar Huston MD,  Cuco Brumfield MD, Betsy Hurley MD, Jennifer Burgos, DO, Lesley Dexter MD,  Joni Edmonds MD, Pao Atkinson MD, Liz Goldman DO, Francisco Zambrano MD, Arjun Patel MD, Jaun Valenzuela MD, Nelly Kaplan MD, Zafar Simmons MD, Mark Sheets MD, Ann Zambrano DO, Baltazar Park MD, Parag Whitaker MD, Melanie De La O, CNP,  Danya Taylor, CNP, Manuela Oliveros, CNP, Gold Colemancher, CNP, Demi Cruz PA-C, Evangelista Nolan, DNP, Ravi Cronin, CNP, Jag Leon, CNP, Angelo Garcia, CNP, Demetrius Zuñiga, CNP, Erika Treadwell, CNP, Mei Tello, CNS, Jammie Estevez, DNP, Mike Owens, CNP, Liyah Centeno, CNP, Sue Salgado, [address] [place] [address]    Progress Note    9/8/2022    8:24 AM    Name:   Mendy Painting  MRN:     [id number]     Acct:      [de-identified]   Room:   [address] [place] [address] Day:  1  Admit Date:  9/6/2022  8:53 PM    PCP:   Neena Pickering MD  Code Status:  Full Code    Subjective:     C/C:   Chief Complaint   Patient presents with    Leg Pain     right     Interval History Status: improved. Vitals reviewed, afebrile and hemodynamically stable. Saturating well on room air. Labs reviewed, hypokalemia replaced. Overnight patient had no significant events. On examination patient has just returned from Vascular lab. Complains of pain at access site. States right leg pain improved. Brief History:     Mrs. Spencer Calix is a 52year old female with a known past medical history of PAD who initially presented for right leg pain. Developed pain two days prior to admission extending up her leg to her thigh. Described her pain as a burning sensation. Was unable to ambulate on her right leg.  States it felt like her disease ([facility] Depression, Headache(784.0), Heart murmur, Hemorrhage of rectum and anus, History of blood transfusion, HTN (hypertension), Hx of blood clots, Hypercoagulable state [facility] Hyperlipidemia, Hypertension, Intertriginous candidiasis, Left popliteal artery occlusion ([facility] Leg pain, Menometrorrhagia, Migraine headache with aura, Obesity, Osteoarthritis, PAD (peripheral artery disease) ([facility] PVD (peripheral vascular disease) ([facility] Takotsubo cardiomyopathy, and Wound of right leg. Social History:   reports that she has never smoked. She has never used smokeless tobacco. She reports that she does not currently use alcohol. She reports that she does not use drugs. Family History:   Family History   Problem Relation Age of Onset    Diabetes Mother     High Blood Pressure Mother     Heart Attack Mother     Heart Disease Mother     Kidney Disease Mother     High Blood Pressure Father     Heart Disease Father     Heart Attack Father     Diabetes Sister     High Blood Pressure Sister     Diabetes Sister     High Blood Pressure Sister        Vitals:  BP (!) 143/78   Pulse 57   Temp 97.7 °F (36.5 °C) (Oral)   Resp 22   Ht 5' 5\" (1.651 m)   Wt 198 lb 6.6 oz (90 kg)   SpO2 98%   BMI 33.02 kg/m²   Temp (24hrs), Av °F (36.7 °C), Min:97.7 °F (36.5 °C), Max:98.4 °F (36.9 °C)    No results for input(s): POCGLU in the last 72 hours. I/O (24Hr):     Intake/Output Summary (Last 24 hours) at 2022 0824  Last data filed at 2022 0736  Gross per 24 hour   Intake 2091.28 ml   Output 2150 ml   Net -58.72 ml       Labs:  Hematology:  Recent Labs     22  2250 22  1622   WBC 10.2  --    RBC 4.36  --    HGB 13.3  --    HCT 38.1  --    MCV 87.4  --    MCH 30.5  --    MCHC 34.9*  --    RDW 13.5  --      --    MPV 11.1  --    INR  --  0.9     Chemistry:  Recent Labs     22  4764 22  0246    137   K 3.6* 3.5*    104   CO2 20 21   GLUCOSE 109* 101*   BUN 10 5* CREATININE 0.74 0.55   MG  --  1.7   ANIONGAP 15 12   LABGLOM >60 >60   GFRAA >60 >60   CALCIUM 8.7 7.8*   No results for input(s): PROT, LABALBU, LABA1C, E4FZAXU, Q8KOVQK, FT4, TSH, AST, ALT, LDH, GGT, ALKPHOS, LABGGT, BILITOT, BILIDIR, AMMONIA, AMYLASE, LIPASE, LACTATE, CHOL, HDL, LDLCHOLESTEROL, CHOLHDLRATIO, TRIG, VLDL, FXG11VD, PHENYTOIN, PHENYF, URICACID, POCGLU in the last 72 hours. ABG:  Lab Results   Component Value Date/Time    POCPH 7.503 01/22/2018 03:17 AM    PHART 7.440 01/16/2018 07:45 PM    POCPCO2 34.4 01/22/2018 03:17 AM    EPJ8DPR 33.8 01/16/2018 07:45 PM    POCPO2 109.9 01/22/2018 03:17 AM    PO2ART 182.0 01/16/2018 07:45 PM    POCHCO3 27.0 01/22/2018 03:17 AM    IAR7MWJ 22.6 01/16/2018 07:45 PM    NBEA NOT REPORTED 01/22/2018 03:17 AM    PBEA 4 01/22/2018 03:17 AM    RZJ2QOL 28 01/22/2018 03:17 AM    JGKS4VSQ 99 01/22/2018 03:17 AM    G9NOXWXZ 99.8 01/16/2018 07:45 PM    FIO2 28.0 01/22/2018 03:17 AM     Lab Results   Component Value Date/Time    SPECIAL NOT REPORTED 08/22/2019 11:25 AM     Lab Results   Component Value Date/Time    CULTURE  05/24/2022 09:53 PM     NEGATIVE: HSV-1 DNA not detected by nucleic acid amplification. CULTURE  05/24/2022 09:53 PM     NEGATIVE: HSV-2 DNA not detected by nucleic acid amplification. CULTURE  05/24/2022 09:53 PM     Due to the specimen source, HSV 1,2 testing was performed by a molecular method. Radiology:  CTA ABDOMINAL AORTA W BILAT RUNOFF W CONTRAST    Result Date: 9/7/2022  1. Severe peripheral vascular disease affecting occluded the mid distal popliteal arteries bilateral and corresponding popliteal branches to the area of the right and left legs down to the ankle/foot area with poor circulation distally. Multiple collateral from geniculate arteries are present but there is no effective reconstitution down to the level of the ankle/foot bilaterally.  2.  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 The prominence of lymph nodes present could consider further evaluation with PET-CT scan to investigate degree of biological activity. 3.  Hyperdense lesion in the central aspect of the right kidney. Could represent a hemorrhagic cyst.  Further evaluation with renal ultrasound recommended. 4.  Corpus lutein cyst in the left ovary. Small free fluid accumulation in the cul-de-sac. These can be relate with recent rupture follicular cyst. Could consider correlation with pelvic ultrasound. Physical Examination:        General appearance:  alert, cooperative and no distress  Mental Status:  oriented to person, place and time and normal affect  Lungs:  clear to auscultation bilaterally, normal effort  Heart:  regular rate and rhythm, no murmur  Abdomen:  soft, nontender, nondistended, normal bowel sounds, no masses, hepatomegaly, splenomegaly  Extremities:  no edema, redness, tenderness in the calves, RLE PT and DP pulses palpated and heard with doppler. Skin:  no gross lesions, rashes, induration    Assessment:        Hospital Problems             Last Modified POA    * (Principal) PAD (peripheral artery disease) ([address] 9/7/2022 Yes    Abnormal CT of the abdomen 9/7/2022 Yes    Popliteal artery embolism, right ([address] 9/7/2022 Yes    Leg pain, right 9/7/2022 Yes    Lymphadenopathy, mesenteric 9/7/2022 Yes    Primary hypertension 9/7/2022 Yes    Claudication in peripheral vascular disease ([address] 9/7/2022 Yes       Plan:        Right Popliteal Artery Embolism. - Heparin gtt. - Vascular surgery consulted. - Status post Arteriography of the right lower extremity with lytic therapy. - Status post RLE arteriogram with thrombectomy of the distal popliteal artery. Hypertension.   - Lisinopril 5 mg and Norvasc 10 mg daily at home. - BP has been stable off meds. - Will monitor following procedure and resume according. PAD  - Heparin gtt. - Eliquis at home. Off > 1 month.    - Heparin gtt currently. Mesenteritis. - CT demonstrated. - Hematology/Oncology consulted. - Believed to be secondary to popliteal artery occlusion.      Shawanda Gracia DO  9/8/2022  8:24 AM

## 2022-09-08 NOTE — PLAN OF CARE
Problem: Discharge Planning  Goal: Discharge to home or other facility with appropriate resources  9/7/2022 2300 by Andrew Becker RN  Outcome: Progressing  9/7/2022 1803 by Michael Patel RN  Outcome: Progressing     Problem: Pain  Goal: Verbalizes/displays adequate comfort level or baseline comfort level  9/7/2022 2300 by Andrew Becker RN  Outcome: Progressing  9/7/2022 1803 by Michael Patel RN  Outcome: Progressing     Problem: Skin/Tissue Integrity  Goal: Absence of new skin breakdown  Description: 1. Monitor for areas of redness and/or skin breakdown  2. Assess vascular access sites hourly  3. Every 4-6 hours minimum:  Change oxygen saturation probe site  4. Every 4-6 hours:  If on nasal continuous positive airway pressure, respiratory therapy assess nares and determine need for appliance change or resting period.   9/7/2022 2300 by Andrew Becker RN  Outcome: Progressing  9/7/2022 1803 by Michael Patel RN  Outcome: Progressing     Problem: ABCDS Injury Assessment  Goal: Absence of physical injury  9/7/2022 2300 by Andrew Becker RN  Outcome: Progressing  9/7/2022 1803 by Michael Patel RN  Outcome: Progressing     Problem: Safety - Adult  Goal: Free from fall injury  9/7/2022 2300 by Andrew Becker RN  Outcome: Progressing  9/7/2022 1803 by Michael Patel RN  Outcome: Progressing

## 2022-09-08 NOTE — OP NOTE
Operative Note      Patient: Valdemar Kramer  YOB: 1975  MRN: [id number]    Date of Procedure: 9/8/2022    Pre-Op Diagnosis: Thrombotic disease of the lower extremity resulting in rest pain    Post-Op Diagnosis: Same       Procedure:  1. Right lower extremity arteriogram including the iliac arteries and all of the lower extremity arterial system including the ankle and foot. 2.  Balloon angioplasty of the popliteal artery and tibioperoneal trunk and peroneal artery with a 2.5 x 200 balloon, 4 x 100 balloon and a 3 x 220 balloon. 3.  Jetstream atherectomy/thrombectomy of the distal popliteal artery, tibioperoneal trunk and peroneal artery. 4.  Completion arteriography of the right lower extremity. 5.  Placement of an 8 x 40 common iliac to external iliac self-expanding stent. 6.  Left lower extremity common femoral profundofemoral and proximal SFA arteriography. 7.  Placement of 6 [place] Angio-Seal device in the left common femoral artery. Surgeon(s):  Marley Winter MD    Assistant:   Luis Beckett    Anesthesia: Local    Estimated Blood Loss (mL): Minimal    Complications: None    Specimens:   None    Implants:  Implant Name Type Inv.  Item Serial No.  Lot No. LRB No. Used Action   STENT PROTEGE [address] [id number] - [id number] Stent:Biliary/Pancreatic/Iliac STENT PROTEGE [address] [id number] [facility] [id number] 1 Implanted         Drains:   Urinary Catheter 09/07/22 (Active)   Catheter Indications Urinary retention (acute or chronic), continuous bladder irrigation or bladder outlet obstruction 09/08/22 1200   Site Assessment No urethral drainage 09/08/22 1200   Urine Color Yellow 09/08/22 1200   Urine Appearance Clear 09/08/22 1200   Urine Odor Malodorous 09/08/22 1200   Collection Container Standard 09/08/22 1200   Securement Method Leg strap 09/08/22 1200   Catheter Care Completed Yes 09/07/22 2000   Catheter Best Practices Drainage tube clipped to bed;Catheter secured to thigh; Tamper seal intact; Bag below bladder 09/08/22 1200   Status Draining 09/08/22 1200   Output (mL) 500 mL 09/08/22 1330       [REMOVED] External Urinary Catheter (Removed)   Site Assessment Clean,dry & intact 09/07/22 1332   Placement Initiated 09/07/22 1332   Perineal Care Yes 09/07/22 1332   Suction 40 mmgHg continuous 09/07/22 1332       Findings: The popliteal and tibial peroneal thrombosis as seen yesterday on arteriography is unchanged after 24 hours of lytic therapy. For this reason the lytic catheter was removed and AngioJet thrombectomy, balloon angioplasty, jetstream thrombectomy and atherectomy was performed as well as stenting what I think was the index lesion in the common to external iliac artery. This retrieved only the proximal to mid peroneal artery. The posterior tibial artery remains occluded and has runoff deep into the foot into the plantar artery only. The posterior tibial proper does not reconstitute. The anterior tibial is occluded and does not reconstitute. The peroneal artery was occluded proximally and is now open but goes to the mid calf where it is significantly spasmed due to operative therapy. It did have runoff to the foot which was intermittent with disease. The foot is warm and adequately perfused at the conclusion of the case. She tolerated all this well with a general anesthetic using laryngeal mask airway and no paralytic. The SFA is patent as is the profunda and the common femoral.  The stent was placed for what I think is thrombus in the common to external iliac artery which overlies immediately the hypogastric orifice. Unfortunately coverage of this area cover the orifice of the hypogastric rendering it without proximal flow. Hypogastric was very diminutive on CTA and on arteriography prior to these maneuvers.   In fact there was retrograde flow to the hypogastric system via the circumflex arteries of the groin prior to stenting. Detailed Description of Procedure: The patient was brought to the operating room and placed in a supine position with her arms tucked at her sides. She was identified as Jackquline Bail for right lower extremity arteriography and possible intervention. She was given general anesthetic with laryngeal mask airway. Her groins were prepped and draped in a sterile fashion. The lytic catheter was disconnected as was the sheath and the lytic catheter was cut through which a wire was placed into the posterior tibial artery to the level of the ankle. The catheter was removed. A [place] cross long catheter was placed down to the level of the distal calf and the posterior tibial artery. There was no blood return after the wire was removed. An 014 wire was placed and the patient was given 6000 units of heparin. Prior to removing the 035 wire however a 7 [place] sheath was placed. The 014 wire was then used to deliver an AngioJet thrombectomy device which was activated throughout the popliteal artery and posterior tibial artery. Several passes were made. This was removed. There was no resolution of the thrombus in the popliteal artery distally was still occluded as was the posterior tibial artery. For this reason the [place] cross was replaced and the wire was then navigated into the peroneal system which was still open. The wire was placed deep into the peroneal artery over which the catheter was removed and a jetstream atherectomy device was used in the distal popliteal artery, tibioperoneal trunk and proximal peroneal artery. This did produce a flow channel which was dilated with a 2.5 and later 3.0 mm balloon both over 200 mm long. This produced an excellent result. The popliteal was dilated with a 4 x 100 balloon which did improve the popliteal to some extent.   Nitroglycerin was then instilled into the tibioperoneal system via the popliteal artery using approximately 350 mcg of nitroglycerin MD on 9/8/2022 at 4:18 PM

## 2022-09-08 NOTE — RESEARCH
Clinical Research Services  September 8, 2022  12:21 PM    Asked by Dr. Altaf Johnson to evaluate the patient for protocol [employer] [id number]: A Multicenter, Prospective, Sugjsaouo-nfebht-vqurgut Observational Study Evaluating Immunoassay Measurements of Pancreatic Stone Protein Performed on Carbonated Content's abioSCOPE Device with the PSP Assay on ICU Patients at Risk of Sepsis as an Aid in Identifying Sepsis. IRB #  [id number]                     NCT # [id number]  [x] Patient met inclusion/exclusion criteria  [x] Patient given consent for review  [x] Patient read study consent. Questions answered. Consent signed by patient  [x] A copy of the signed consents given to the patient  [x] Patient number:   [x] Baseline labs obtained  [x] Patient educated on purpose of the abioSCOPE device and the PSP assay for use as an aid in identifying sepsis in those patients at risk for sepsis. Patient/family verbalizes understanding. Additional Comments: Labs drawn left hand @ 1145_________________________________________________  ________________________________________________________________  ________________________________________________________________  Erwin Miller, JAVIER  Clinical Research Nurse     James Miller MD    Patient provided informed consent for inclusion into study. Blood draw obtained for testing. Patient did not experience any local adverse events secondary to blood draw:  No hematoma or ecchymosis  No phlebitis  No cellulitis  No local bleeding.     James Miller MD

## 2022-09-09 LAB
ABSOLUTE EOS #: <0.03 K/UL (ref 0–0.44)
ABSOLUTE IMMATURE GRANULOCYTE: 0.06 K/UL (ref 0–0.3)
ABSOLUTE LYMPH #: 1.08 K/UL (ref 1.1–3.7)
ABSOLUTE MONO #: 0.5 K/UL (ref 0.1–1.2)
ANION GAP SERPL CALCULATED.3IONS-SCNC: 11 MMOL/L (ref 9–17)
BASOPHILS # BLD: 0 % (ref 0–2)
BASOPHILS ABSOLUTE: <0.03 K/UL (ref 0–0.2)
BUN BLDV-MCNC: 7 MG/DL (ref 6–20)
CALCIUM SERPL-MCNC: 8 MG/DL (ref 8.6–10.4)
CHLORIDE BLD-SCNC: 105 MMOL/L (ref 98–107)
CO2: 20 MMOL/L (ref 20–31)
CREAT SERPL-MCNC: 0.62 MG/DL (ref 0.5–0.9)
EOSINOPHILS RELATIVE PERCENT: 0 % (ref 1–4)
GFR AFRICAN AMERICAN: >60 ML/MIN
GFR NON-AFRICAN AMERICAN: >60 ML/MIN
GFR SERPL CREATININE-BSD FRML MDRD: ABNORMAL ML/MIN/{1.73_M2}
GLUCOSE BLD-MCNC: 132 MG/DL (ref 70–99)
HCT VFR BLD CALC: 34.8 % (ref 36.3–47.1)
HEMOGLOBIN: 12.2 G/DL (ref 11.9–15.1)
IMMATURE GRANULOCYTES: 0 %
LYMPHOCYTES # BLD: 7 % (ref 24–43)
MCH RBC QN AUTO: 30.7 PG (ref 25.2–33.5)
MCHC RBC AUTO-ENTMCNC: 35.1 G/DL (ref 28.4–34.8)
MCV RBC AUTO: 87.7 FL (ref 82.6–102.9)
MONOCYTES # BLD: 3 % (ref 3–12)
NRBC AUTOMATED: 0 PER 100 WBC
PARTIAL THROMBOPLASTIN TIME: 23.3 SEC (ref 20.5–30.5)
PARTIAL THROMBOPLASTIN TIME: 24.3 SEC (ref 20.5–30.5)
PARTIAL THROMBOPLASTIN TIME: 24.7 SEC (ref 20.5–30.5)
PDW BLD-RTO: 13.2 % (ref 11.8–14.4)
PLATELET # BLD: 279 K/UL (ref 138–453)
PMV BLD AUTO: 9.9 FL (ref 8.1–13.5)
POTASSIUM SERPL-SCNC: 4.3 MMOL/L (ref 3.7–5.3)
RBC # BLD: 3.97 M/UL (ref 3.95–5.11)
SEG NEUTROPHILS: 89 % (ref 36–65)
SEGMENTED NEUTROPHILS ABSOLUTE COUNT: 13.6 K/UL (ref 1.5–8.1)
SODIUM BLD-SCNC: 136 MMOL/L (ref 135–144)
WBC # BLD: 15.3 K/UL (ref 3.5–11.3)

## 2022-09-09 PROCEDURE — 97535 SELF CARE MNGMENT TRAINING: CPT

## 2022-09-09 PROCEDURE — 2580000003 HC RX 258: Performed by: STUDENT IN AN ORGANIZED HEALTH CARE EDUCATION/TRAINING PROGRAM

## 2022-09-09 PROCEDURE — 2580000003 HC RX 258

## 2022-09-09 PROCEDURE — 6370000000 HC RX 637 (ALT 250 FOR IP): Performed by: STUDENT IN AN ORGANIZED HEALTH CARE EDUCATION/TRAINING PROGRAM

## 2022-09-09 PROCEDURE — 6370000000 HC RX 637 (ALT 250 FOR IP)

## 2022-09-09 PROCEDURE — 6360000002 HC RX W HCPCS: Performed by: STUDENT IN AN ORGANIZED HEALTH CARE EDUCATION/TRAINING PROGRAM

## 2022-09-09 PROCEDURE — 85730 THROMBOPLASTIN TIME PARTIAL: CPT

## 2022-09-09 PROCEDURE — 85025 COMPLETE CBC W/AUTO DIFF WBC: CPT

## 2022-09-09 PROCEDURE — 80048 BASIC METABOLIC PNL TOTAL CA: CPT

## 2022-09-09 PROCEDURE — 97116 GAIT TRAINING THERAPY: CPT

## 2022-09-09 PROCEDURE — 97166 OT EVAL MOD COMPLEX 45 MIN: CPT

## 2022-09-09 PROCEDURE — 99232 SBSQ HOSP IP/OBS MODERATE 35: CPT | Performed by: STUDENT IN AN ORGANIZED HEALTH CARE EDUCATION/TRAINING PROGRAM

## 2022-09-09 PROCEDURE — 2060000000 HC ICU INTERMEDIATE R&B

## 2022-09-09 PROCEDURE — 6360000002 HC RX W HCPCS

## 2022-09-09 PROCEDURE — 97161 PT EVAL LOW COMPLEX 20 MIN: CPT

## 2022-09-09 PROCEDURE — 36415 COLL VENOUS BLD VENIPUNCTURE: CPT

## 2022-09-09 PROCEDURE — 99232 SBSQ HOSP IP/OBS MODERATE 35: CPT | Performed by: INTERNAL MEDICINE

## 2022-09-09 RX ORDER — HEPARIN SODIUM AND DEXTROSE 10000; 5 [USP'U]/100ML; G/100ML
5-30 INJECTION INTRAVENOUS CONTINUOUS
Status: DISCONTINUED | OUTPATIENT
Start: 2022-09-09 | End: 2022-09-11

## 2022-09-09 RX ORDER — WARFARIN SODIUM 5 MG/1
5 TABLET ORAL
Status: COMPLETED | OUTPATIENT
Start: 2022-09-09 | End: 2022-09-09

## 2022-09-09 RX ADMIN — SODIUM CHLORIDE, PRESERVATIVE FREE 10 ML: 5 INJECTION INTRAVENOUS at 21:58

## 2022-09-09 RX ADMIN — Medication 9.56 UNITS/KG/HR: at 11:42

## 2022-09-09 RX ADMIN — HEPARIN SODIUM AND DEXTROSE 500 UNITS/HR: 10000; 5 INJECTION INTRAVENOUS at 00:43

## 2022-09-09 RX ADMIN — OXYCODONE 5 MG: 5 TABLET ORAL at 19:45

## 2022-09-09 RX ADMIN — SODIUM CHLORIDE, PRESERVATIVE FREE 10 ML: 5 INJECTION INTRAVENOUS at 09:23

## 2022-09-09 RX ADMIN — GABAPENTIN 300 MG: 300 CAPSULE ORAL at 21:56

## 2022-09-09 RX ADMIN — OXYCODONE 5 MG: 5 TABLET ORAL at 05:17

## 2022-09-09 RX ADMIN — ACETAMINOPHEN 1000 MG: 500 TABLET ORAL at 17:47

## 2022-09-09 RX ADMIN — GABAPENTIN 300 MG: 300 CAPSULE ORAL at 15:00

## 2022-09-09 RX ADMIN — GABAPENTIN 300 MG: 300 CAPSULE ORAL at 09:22

## 2022-09-09 RX ADMIN — SODIUM CHLORIDE, PRESERVATIVE FREE 10 ML: 5 INJECTION INTRAVENOUS at 21:59

## 2022-09-09 RX ADMIN — SODIUM CHLORIDE: 9 INJECTION, SOLUTION INTRAVENOUS at 04:08

## 2022-09-09 RX ADMIN — WARFARIN SODIUM 5 MG: 5 TABLET ORAL at 21:56

## 2022-09-09 RX ADMIN — POLYETHYLENE GLYCOL 3350 17 G: 17 POWDER, FOR SOLUTION ORAL at 19:45

## 2022-09-09 RX ADMIN — ACETAMINOPHEN 1000 MG: 500 TABLET ORAL at 09:22

## 2022-09-09 RX ADMIN — ACETAMINOPHEN 1000 MG: 500 TABLET ORAL at 00:44

## 2022-09-09 ASSESSMENT — PAIN SCALES - GENERAL
PAINLEVEL_OUTOF10: 3
PAINLEVEL_OUTOF10: 0
PAINLEVEL_OUTOF10: 10
PAINLEVEL_OUTOF10: 0
PAINLEVEL_OUTOF10: 8
PAINLEVEL_OUTOF10: 0
PAINLEVEL_OUTOF10: 3
PAINLEVEL_OUTOF10: 0

## 2022-09-09 ASSESSMENT — PAIN DESCRIPTION - LOCATION: LOCATION: ABDOMEN

## 2022-09-09 ASSESSMENT — PAIN DESCRIPTION - FREQUENCY: FREQUENCY: INTERMITTENT

## 2022-09-09 ASSESSMENT — PAIN DESCRIPTION - PAIN TYPE: TYPE: ACUTE PAIN

## 2022-09-09 ASSESSMENT — PAIN SCALES - WONG BAKER
WONGBAKER_NUMERICALRESPONSE: 0

## 2022-09-09 ASSESSMENT — PAIN DESCRIPTION - DESCRIPTORS: DESCRIPTORS: SHARP;CRAMPING

## 2022-09-09 ASSESSMENT — PAIN DESCRIPTION - ONSET: ONSET: GRADUAL

## 2022-09-09 ASSESSMENT — PAIN DESCRIPTION - ORIENTATION: ORIENTATION: LOWER

## 2022-09-09 NOTE — PROGRESS NOTES
Physical Therapy  Facility/Department: [facility] CAR 1- SICU  Physical Therapy Initial Assessment    Name: Mane Delgado  : 1975  MRN: [id number]  Date of Service: 2022  Chief Complaint   Patient presents with    Leg Pain     right      Discharge Recommendations:  Patient would benefit from continued therapy after discharge   PT Equipment Recommendations  Equipment Needed: No      Patient Diagnosis(es): The encounter diagnosis was Leg pain, right. Past Medical History:  has a past medical history of Abnormal Pap smear of cervix, Anemia, Chronic back pain, Claudication (HCC), Claudication in peripheral vascular disease ([address] Depression, Headache(784.0), Heart murmur, Hemorrhage of rectum and anus, History of blood transfusion, HTN (hypertension), Hx of blood clots, Hypercoagulable state ([address] Hyperlipidemia, Hypertension, Intertriginous candidiasis, Left popliteal artery occlusion ([address] Leg pain, Menometrorrhagia, Migraine headache with aura, Obesity, Osteoarthritis, PAD (peripheral artery disease) ([address] Patient in clinical research study, PVD (peripheral vascular disease) ([facility] [address] Takotsubo cardiomyopathy, and Wound of right leg. Past Surgical History:  has a past surgical history that includes Tubal ligation ();  section (); other surgical history (2017); other surgical history (Left, 2018); other surgical history (Left, 2018); vascular surgery (Left, 2018); pr reoperation, bypass graft (Left, 2018); pr vein bypass graft,fem-pop (Left, 2018); thrombectomy (Right, 2019); IVC filter insertion; Toe amputation (Right, 2019); Toe amputation (Right, 2019); Foot Amputation (Right, 3/8/2021); and vascular surgery (Right, 2022). Assessment   Body Structures, Functions, Activity Limitations Requiring Skilled Therapeutic Intervention: Decreased functional mobility ; Decreased endurance;Decreased balance  Assessment: Pt performed well during therapy this date. Pt was able to ambulate 300 feet with no device and SBA with fair stability. Pt most limited this date secondary to slightly impaired endurance, slightly impaired standing balance. Pt would benefit from assistance with mobility upon discharge. Pt may benefit from additional therapy upon discharge. Therapy Prognosis: Good  Decision Making: Low Complexity  Requires PT Follow-Up: Yes  Activity Tolerance  Activity Tolerance: Patient tolerated treatment well     Plan   Plan  Plan: 3-5 times per week  Current Treatment Recommendations: Strengthening, Balance training, Functional mobility training, Transfer training, Gait training, Stair training, Equipment evaluation, education, & procurement, Home exercise program, Safety education & training, Patient/Caregiver education & training, Endurance training  Safety Devices  Type of Devices: Call light within reach, Gait belt, Nurse notified, Left in chair  Restraints  Restraints Initially in Place: No     Restrictions  Restrictions/Precautions  Required Braces or Orthoses?: No  Position Activity Restriction  Other position/activity restrictions: 9/8 RIGHT LOWER EXTREMITY ANGIOGRAM WITH ANGIOJET AND [address] [place] [address] . Up with assist     Subjective   General  Patient assessed for rehabilitation services?: Yes  Response To Previous Treatment: Not applicable  Family / Caregiver Present: No  Follows Commands: Within Functional Limits  Subjective  Subjective: Pt supine in bed and agreeable to therapy, RN agreeable to therapy. Pt very pleasant and cooperative throughout today's session.          Social/Functional History  Social/Functional History  Lives With: Daughter (Daughter and 5 y/o grandson)  Type of Home: Apartment  Home Layout: One level  Home Access: Stairs to enter without rails  Entrance Stairs - Number of Steps: 5  Bathroom Shower/Tub: Tub/Shower unit  Bathroom Toilet: Standard  Bathroom Equipment: Grab bars in shower  Bathroom Accessibility: Accessible  Home Equipment:  (no DME)  Receives Help From: Family, Friend(s) (Pt reports having 24/7 assist from family and friends upon discharge)  ADL Assistance: Independent  Homemaking Assistance: Independent  Homemaking Responsibilities: Yes  Ambulation Assistance: Independent  Transfer Assistance: Independent  Active : Yes  Mode of Transportation: SUV  Occupation: Full time employment  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 Vision/Hearing  Vision  Vision: Impaired  Vision Exceptions: Wears glasses at all times  Hearing  Hearing: Within functional limits    Cognition   Cognition  Overall Cognitive Status: WFL     Objective                 AROM RLE (degrees)  RLE AROM: WFL  AROM LLE (degrees)  LLE AROM : WFL  AROM RUE (degrees)  RUE AROM : WFL  AROM LUE (degrees)  LUE AROM : WFL  Strength RLE  Strength RLE: WFL  Comment: Grossly 4+/5  Strength LLE  Strength LLE: WFL  Comment: Grossly 4+/5  Strength RUE  Strength RUE: WFL  Comment: Grossly 4+/5  Strength LUE  Strength LUE: WFL  Comment: Grossly 4+/5      Bed mobility  Supine to Sit: Supervision  Sit to Supine:  (pt retired up to a chair at the conclusion of today's session.)  Scooting: Stand by assistance  Bed Mobility Comments: HOB elevated ~30 degrees without use of handrails. Transfers  Sit to Stand: Stand by assistance  Stand to sit: Stand by assistance  Ambulation  Surface: level tile  Device: No Device  Assistance: Stand by assistance  Quality of Gait: fair stability, decreased gait speed, slight in toeing, no LOB.   Gait Deviations: Slow Matilda;Decreased step length  Distance: 300 feet  More Ambulation?: No  Stairs/Curb  Stairs?: No     Balance  Posture: Good  Sitting - Static: Good  Sitting - Dynamic: Good  Standing - Static: Good;-  Standing - Dynamic: Fair;+  Comments: standing balance assessed while using no device. AM-PAC Score  AM-PAC Inpatient Mobility Raw Score : 21 (09/09/22 1627)  AM-PAC Inpatient T-Scale Score : 50.25 (09/09/22 1627)  Mobility Inpatient CMS 0-100% Score: 28.97 (09/09/22 1627)  Mobility Inpatient CMS G-Code Modifier : CJ (09/09/22 1627)            Goals  Short Term Goals  Time Frame for Short term goals: 6 visits  Short term goal 1: Pt will ambulate 300 feet with no device and supervision. Short term goal 2: Pt will demonstrate good standing balance to decrease fall risk. Short term goal 3: Pt will perform sit<>stand transfer independently. Short term goal 4: Pt will negotiate 5 stairs with no handrails and SBA to allow the pt to enter prior living arrangements. Additional Goals?: No       Education  Patient Education  Education Given To: Patient  Education Provided: Role of Therapy;Transfer Training;Equipment;Plan of Care; Fall Prevention Strategies  Education Method: Verbal  Barriers to Learning: None  Education Outcome: Verbalized understanding      Therapy Time   Individual Concurrent Group Co-treatment   Time In 1417         Time Out 1441         Minutes 24         Timed Code Treatment Minutes: [address], PT

## 2022-09-09 NOTE — PROGRESS NOTES
[facility]  PROGRESS NOTE    Room # 4311/7392-36   Name: Valdemar Kramer              Reason for visit: Routine    I visited the patient. Admit Date & Time: 9/6/2022  8:53 PM    Assessment:  Valdemar Kramer is a 52 y.o. female. PT: had request for a bible. Upon entering the room patient states about their medical condition, states struggles with their medical situation. States about recent deaths in her life. States her major sadness , grief. States worries, fears frustrations. States treated well. Patient states good family support, shares about spiritual life. States about her struggle with ning at this time. Shares Worship beliefs. Patient shares about outside interests. Intervention:   provided a ministry presence, listening and prayer. Outcome:  Patient open to visit. Plan:  Chaplains will remain available to offer spiritual and emotional support as needed. Electronically signed by Denise Olivo. Chaplain Sancho, on 9/8/2022 at 8:47 PM.  Zachary        09/08/22 2027   Encounter Summary   Service Provided For: Patient   Referral/Consult From: Nurse   Support System Children   Last Encounter  09/08/22   Complexity of Encounter High   Begin Time 0735   End Time  0815   Total Time Calculated 40 min   Encounter    Type Initial Screen/Assessment   Crisis   Type Emotional distress   Spiritual/Emotional needs   Type Spiritual Support;Spiritual Distress; Emotional Distress   Grief, Loss, and Adjustments   Type Bereavement Care;Grief and loss   Assessment/Intervention/Outcome   Assessment Anxious; Complicated grieving;Questioning ning;Questioning meaning and purpose;Tearful   Intervention Active listening;Discussed belief system/Worship practices/ning;Discussed illness injury and its impact; Discussed death, afterlife; Discussed relationship with God;Grief Care;Prayer (assurance of)/Bynum;Sustaining

## 2022-09-09 NOTE — CARE COORDINATION
Transitional Planning  Spoke with pt about home care she is declining. States she is not having any pain at all in her leg and plans to return to work at discharge.

## 2022-09-09 NOTE — PROGRESS NOTES
Spoke with Dr. Rita Sandoval concerning pt's heparin gtt change; Would like patient's heparin gtt titrated per aPTT with no bolus dosing. Thank you for your quick response .  Mom states that she can not afford costs associated with BH if she chose to remain as a OOn pt with Dr Goodwin.    I will contact mom and relay notes.    Thank you!

## 2022-09-09 NOTE — PROGRESS NOTES
Today's Date: 9/8/2022  Patient Name: Vickii Dance  Date of admission: 9/6/2022  8:53 PM  Patient's age: 52 y.o., 1975  Admission Dx: PAD (peripheral artery disease) ([facility] [address] [I73.9]  Leg pain, right [M79.604]    Reason for Consult: management recommendations  Requesting Physician: Harley Chase DO    CHIEF COMPLAINT:  leg pain, numbness    History Obtained From:  patient, electronic medical record    OVERNIGHT EVENTS:  No acute events overnight  Plan for OR today for repeat procedure    HISTORY OF PRESENT ILLNESS:      The patient is a 52 y.o.  female who is admitted to the hospital for right sided leg pain, extending up to the thigh. Patient has known PMH of peripheral arterial disease, on lifelong Eliquis but has run out of her Eliquis recently, off AC for the past 1 month. She has been treated with several different modalities including lytic therapy with fasciotomy on the right several years ago. On the left she also had pharmacomechanical thrombectomy in 2019. CTA abdominal aorta was done which showed severe PVD affecting bilateral popliteal arteries, mesenteritits with lymphadenopathy of the root of the small bowel mesentery, hyperdense lesion in the right kidney. Patient underwent arteriography of right common iliac, common femoral, and had catheter placement with lysis of right popliteal and tibioperoneal trunk. Currently on tpa gtt. Hem onc consulted for concern of multiple enlarged mesenteric lymph notes. Patient was seen by Hem in 2018 for workup for hypercoagulability. Her protein C, protein S, SPEP, beta 2 glycoprotein, APLA Ab were all negative. Prothrombin gene nad factor V do not appear to have been tested.  Patient denies any h/o recurrent miscarriages, had 1 ectopic pregnancy in the past.      Past Medical History:   has a past medical history of Abnormal Pap smear of cervix, Anemia, Chronic back pain, Claudication ([address] Claudication in peripheral vascular disease ([facility] [address] Depression, Headache(784.0), Heart murmur, Hemorrhage of rectum and anus, History of blood transfusion, HTN (hypertension), Hx of blood clots, Hypercoagulable state ([address] Hyperlipidemia, Hypertension, Intertriginous candidiasis, Left popliteal artery occlusion ([address] Leg pain, Menometrorrhagia, Migraine headache with aura, Obesity, Osteoarthritis, PAD (peripheral artery disease) ([facility] [address] Patient in clinical research study, PVD (peripheral vascular disease) ([facility] [address] Takotsubo cardiomyopathy, and Wound of right leg. Past Surgical History:   has a past surgical history that includes Tubal ligation ();  section (); other surgical history (2017); other surgical history (Left, 2018); other surgical history (Left, 2018); vascular surgery (Left, 2018); pr reoperation, bypass graft (Left, 2018); pr vein bypass graft,fem-pop (Left, 2018); thrombectomy (Right, 2019); IVC filter insertion; Toe amputation (Right, 2019); Toe amputation (Right, 2019); and Foot Amputation (Right, 3/8/2021). Medications:    Reviewed in Epic     Allergies:  Asa [aspirin], Lopid [gemfibrozil], Nortriptyline, Pcn [penicillins], Sulfa antibiotics, and Ibuprofen    Social History:   reports that she has never smoked. She has never used smokeless tobacco. She reports that she does not currently use alcohol. She reports that she does not use drugs. Family History: family history includes Diabetes in her mother, sister, and sister; Heart Attack in her father and mother; Heart Disease in her father and mother; High Blood Pressure in her father, mother, sister, and sister; Kidney Disease in her mother. REVIEW OF SYSTEMS:    Constitutional: No fever or chills.  No night sweats, no weight loss   Eyes: No eye discharge, double vision, or eye pain   HEENT: negative for sore mouth, sore throat, hoarseness and voice change   Respiratory: negative for cough , sputum, dyspnea, Priority: Medium    Claudication in peripheral vascular disease ([facility] [address] [I73.9]     Primary hypertension [I10] 05/13/2013         IMPRESSION:   Severe PAD with occlusion of right popliteal and tibial arteries  Concern for mesenteritis with adenopathy of the root of the small bowel   mesentery  Prior h/o PAD of b/l lower extremities  Hypercoagulable work up negative thus far  H/o CIN1 in 2019    RECOMMENDATIONS  Continue with anticoagulation; switch to DOAC once cleared by vascular  Mesenteritis could be acute reaction to popliteal artery occlusion; patient is asymptomatic and clinically stable. Will defer further evaluation. Repeat imaging outpatient in 4 weeks. Follow up on prothrombin gene mutation and factor V leiden, PT-INR    Discussed with patient and Nurse. Thank you for asking us to see this patient. Marilee Mejias  PGY-3  Internal Medicine  [address]  9/8/2022 8:58 PM      Attending Physician Statement   I have discussed the care of this patient, including pertinent history and exam findings, with the resident. I have seen and examined the patient and the key elements of all parts of the encounter have been performed by me. I agree with the assessment, plan and orders as documented by the resident and with changes made to the note.     Dustin Valentine MD  Hematology/Oncology    Cell: [phone]

## 2022-09-09 NOTE — PROGRESS NOTES
Soft, nontender, nondistended. Bowel sounds x4. Extremity: PT/Dp signals L foot, PT/AT signals R foot. Neuro: chronic parestehsias R toes, motor in tact, no discoloration  Psych: normal mood and thought        ASSESSMENT   Principal Problem:    PAD (peripheral artery disease) (HCC)  Active Problems:    Abnormal CT of the abdomen    Popliteal artery embolism, right (HCC)    Leg pain, right    Lymphadenopathy, mesenteric    Primary hypertension    Claudication in peripheral vascular disease ([address]  Resolved Problems:    * No resolved hospital problems. *    9/7: Angiogram, initiation of chemical thrombectomy with tPA infusion  9/8: Angiogram, angioplasty right LE with stent placed to right iliac    PLAN  POD#1 angiogram and angioplasy RLE and R iliac stent placement  Patient's coagulopathy appears to be due to hypercoagulopathy rather than an embolic event. Patient has previously had issues with having her Eliquis covered by insurance. We will begin warfarin  today. Advanced heparin to weight-based infusion, as bridge to warfarin. Start warfarin 5 mg daily with goal INR 2.0-3.0. Tolerating general diet  Mild leukocytosis, anticipate reactive, trend tomorrow. Otherwise labs WNL  Pulse exam intact and stable  Avalos may come out from a vascular surgery perspective once patient is up and ambulating  Okay for transfer out of ICU from a vascular perspective  We will continue to monitor  Continue medical management per primary        Murtaza Villegas, DO - PGY3  Surgery Department

## 2022-09-09 NOTE — PROGRESS NOTES
[facility]  Office: [address], DO, Jean Skkamilas, DO, Kenneth Lazar, DO, Carolina Malik Blood, DO, Abraham Lezama MD, Caryl Aleman MD, Bobbi Conn MD, Josie Galindo MD,  Carlos Reynolds MD, Marjorie Montana MD, Logan Reilly, DO, Devonte Aguilar MD,  Norma Anderson MD, Elizabeth Cintron MD, Coye Meigs, DO, Melecio Davila MD, Cosmo Sexton MD, Angel Shaw MD, Cezar Moran MD, Nessa Trujillo MD, August Pena MD, Preston Goldstein DO, Tung Coleman MD, Fransisco Calderon MD, Michelle Washburn, CNP,  Barbara Calrke, CNP, Darleen Simental, CNP, Halina Grady, CNP, Saniya Gandhi, PA-C, Sylvia Quiroz, DNP, Patt Almanza, CNP, Adry Batres, CNP, Rubina Courtney, CNP, Rodriguez Buckley, CNP, Klever Shah, CNP, Sarah Grace, CNS, Luis Rangel, DNP, Cali Estes, CNP, Luis Guan, CNP, Ara Bourgeois, [address] [place] [address]    Progress Note    9/9/2022    11:40 AM    Name:   Maycol Duque  MRN:     [id number]     Acct:      [de-identified]   Room:   [address] [place] [address] Day:  2  Admit Date:  9/6/2022  8:53 PM    PCP:   Gris Jasso MD  Code Status:  Full Code    Subjective:     C/C:   Chief Complaint   Patient presents with    Leg Pain     right     Interval History Status: improved. Vitals reviewed, afebrile and hemodynamically stable. Saturating well on room air. Labs reviewed, hypokalemia replaced. Overnight patient had no significant events. On examination patient resting comfortably in bed. Has been ambulating around room. States right leg pain resolved. Started on Heparin gtt with plan to bridge to warfarin. Brief History:     Mrs. Zehra Patterson is a 52year old female with a known past medical history of PAD who initially presented for right leg pain. Developed pain two days prior to admission extending up her leg to her thigh. Described her pain as a burning sensation.  Was unable to ambulate on her right leg. States it felt like her previous clot requiring thrombectomy. Has been off Eliquis for greater than 1 month due to issues filling medication. CTA was done demonstrating embolism at the level of the iliac bifurcation with popliteal artery occlusion. CT abdomen demonstrated mesenteritis. Hematology/Oncology was consulted and believed to be secondary to popliteal artery occlusion. Vascular was consulted and she was taken to OR. Review of Systems:     Constitutional:  negative for chills, fevers, sweats  Respiratory:  negative for cough, dyspnea on exertion, shortness of breath, wheezing  Cardiovascular:  negative for chest pain, chest pressure/discomfort, lower extremity edema, palpitations  Gastrointestinal:  negative for abdominal pain, constipation, diarrhea, nausea, vomiting  Neurological:  negative for dizziness, headache    Medications: Allergies:     Allergies   Allergen Reactions    Asa [Aspirin] Swelling    Lopid [Gemfibrozil] Itching and Swelling    Nortriptyline Itching and Swelling    Pcn [Penicillins] Hives and Swelling    Sulfa Antibiotics Swelling    Ibuprofen Rash       Current Meds:   Scheduled Meds:    warfarin  5 mg Oral Once    sodium chloride flush  5-40 mL IntraVENous 2 times per day    sodium chloride flush  5-40 mL IntraVENous 2 times per day    acetaminophen  1,000 mg Oral 3 times per day    gabapentin  300 mg Oral TID     Continuous Infusions:    heparin (PORCINE) Infusion      sodium chloride      sodium chloride      sodium chloride 100 mL/hr at 09/09/22 0418     PRN Meds: sodium chloride flush, sodium chloride, fentanNYL, morphine, sodium chloride flush, sodium chloride, potassium chloride **OR** potassium alternative oral replacement **OR** potassium chloride, magnesium sulfate, ondansetron **OR** ondansetron, polyethylene glycol, oxyCODONE, fentanNYL    Data:     Past Medical History:   has a past medical history of Abnormal Pap smear of cervix, Anemia, Chronic back pain, Claudication ([facility] Claudication in peripheral vascular disease ([facility] Depression, Headache(784.0), Heart murmur, Hemorrhage of rectum and anus, History of blood transfusion, HTN (hypertension), Hx of blood clots, Hypercoagulable state ([facility] Hyperlipidemia, Hypertension, Intertriginous candidiasis, Left popliteal artery occlusion ([facility] Leg pain, Menometrorrhagia, Migraine headache with aura, Obesity, Osteoarthritis, PAD (peripheral artery disease) ([facility] Patient in clinical research study, PVD (peripheral vascular disease) [facility] Takotsubo cardiomyopathy, and Wound of right leg. Social History:   reports that she has never smoked. She has never used smokeless tobacco. She reports that she does not currently use alcohol. She reports that she does not use drugs. Family History:   Family History   Problem Relation Age of Onset    Diabetes Mother     High Blood Pressure Mother     Heart Attack Mother     Heart Disease Mother     Kidney Disease Mother     High Blood Pressure Father     Heart Disease Father     Heart Attack Father     Diabetes Sister     High Blood Pressure Sister     Diabetes Sister     High Blood Pressure Sister        Vitals:  BP (!) 145/83   Pulse 86   Temp 98.6 °F (37 °C) (Oral)   Resp 21   Ht 5' 5\" (1.651 m)   Wt 198 lb 6.6 oz (90 kg)   SpO2 99%   BMI 33.02 kg/m²   Temp (24hrs), Av °F (36.7 °C), Min:97.7 °F (36.5 °C), Max:98.6 °F (37 °C)    No results for input(s): POCGLU in the last 72 hours. I/O (24Hr):     Intake/Output Summary (Last 24 hours) at 2022 1140  Last data filed at 2022 0600  Gross per 24 hour   Intake 1359.74 ml   Output 3895 ml   Net -2535.26 ml         Labs:  Hematology:  Recent Labs     22  1622 22  1201 22  1731 22  0411   WBC  --  8.0 13.4* 15.3*   RBC  --  4.00 4.27 3.97   HGB  --  11.8* 13.2 12.2   HCT  --  35.4* 37.4 34.8*   MCV  --  88.5 87.6 87.7   MCH  --  29.5 30.9 30.7   MCHC  --  33.3 35.3* 35.1*   RDW  --  13.5 13.5 13.2   PLT  --  256 267 279   MPV  --  10.1 10.1 9.9   INR 0.9  --   --   --        Chemistry:  Recent Labs     09/08/22  0246 09/08/22  1201 09/08/22  1731 09/09/22  0411     --  137 136   K 3.5*  --  4.3 4.3     --  104 105   CO2 21  --  19* 20   GLUCOSE 101*  --  142* 132*   BUN 5*  --  5* 7   CREATININE 0.55  --  0.57 0.62   MG 1.7  --   --   --    ANIONGAP 12  --  14 11   LABGLOM >60  --  >60 >60   GFRAA >60  --  >60 >60   CALCIUM 7.8*  --  8.4* 8.0*   LACTACIDWB  --  1.3  --   --      Recent Labs     09/08/22  1201   LABALBU 3.7   AST 14   ALT 11   BILITOT 0.4     ABG:  Lab Results   Component Value Date/Time    POCPH 7.503 01/22/2018 03:17 AM    PHART 7.440 01/16/2018 07:45 PM    POCPCO2 34.4 01/22/2018 03:17 AM    TZY3SEP 33.8 01/16/2018 07:45 PM    POCPO2 109.9 01/22/2018 03:17 AM    PO2ART 182.0 01/16/2018 07:45 PM    POCHCO3 27.0 01/22/2018 03:17 AM    PAL6BNE 22.6 01/16/2018 07:45 PM    NBEA NOT REPORTED 01/22/2018 03:17 AM    PBEA 4 01/22/2018 03:17 AM    WZB9NSV 28 01/22/2018 03:17 AM    RZPH7ENI 99 01/22/2018 03:17 AM    M5GMLUPF 99.8 01/16/2018 07:45 PM    FIO2 28.0 01/22/2018 03:17 AM     Lab Results   Component Value Date/Time    SPECIAL NOT REPORTED 08/22/2019 11:25 AM     Lab Results   Component Value Date/Time    CULTURE  05/24/2022 09:53 PM     NEGATIVE: HSV-1 DNA not detected by nucleic acid amplification. CULTURE  05/24/2022 09:53 PM     NEGATIVE: HSV-2 DNA not detected by nucleic acid amplification. CULTURE  05/24/2022 09:53 PM     Due to the specimen source, HSV 1,2 testing was performed by a molecular method. Radiology:  CTA ABDOMINAL AORTA W BILAT RUNOFF W CONTRAST    Result Date: 9/7/2022  1. Severe peripheral vascular disease affecting occluded the mid distal popliteal arteries bilateral and corresponding popliteal branches to the area of the right and left legs down to the ankle/foot area with poor circulation distally.   Multiple collateral from geniculate arteries are present but there is no effective reconstitution down to the level of the ankle/foot bilaterally. 2.  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 The prominence of lymph nodes present could consider further evaluation with PET-CT scan to investigate degree of biological activity. 3.  Hyperdense lesion in the central aspect of the right kidney. Could represent a hemorrhagic cyst.  Further evaluation with renal ultrasound recommended. 4.  Corpus lutein cyst in the left ovary. Small free fluid accumulation in the cul-de-sac. These can be relate with recent rupture follicular cyst. Could consider correlation with pelvic ultrasound. Physical Examination:        General appearance:  alert, cooperative and no distress  Mental Status:  oriented to person, place and time and normal affect  Lungs:  clear to auscultation bilaterally, normal effort  Heart:  regular rate and rhythm, no murmur  Abdomen:  soft, nontender, nondistended, normal bowel sounds, no masses, hepatomegaly, splenomegaly  Extremities:  no edema, redness, tenderness in the calves, RLE PT and DP pulses palpated and heard with doppler. Skin:  no gross lesions, rashes, induration    Assessment:        Hospital Problems             Last Modified POA    * (Principal) PAD (peripheral artery disease) ([address] 9/7/2022 Yes    Abnormal CT of the abdomen 9/7/2022 Yes    Popliteal artery embolism, right ([address] 9/7/2022 Yes    Leg pain, right 9/7/2022 Yes    Lymphadenopathy, mesenteric 9/7/2022 Yes    Primary hypertension 9/7/2022 Yes    Claudication in peripheral vascular disease ([address] 9/7/2022 Yes     Plan:        Right Popliteal Artery Embolism. - Heparin gtt. - Vascular surgery consulted. Status post Arteriography of the right lower extremity with lytic therapy. Status post RLE arteriogram with thrombectomy of the distal popliteal artery. - Bridge to Warfarin with INR goal 2.0 - 3.0. Hypertension. - Lisinopril 5 mg and Norvasc 10 mg daily at home. - BP has been stable off meds. - Will monitor following procedure and resume according. PAD  - Heparin gtt. - Eliquis at home. Off > 1 month. - Heparin gtt currently bridging to Warfarin. Mesenteritis. - CT demonstrated. - Hematology/Oncology consulted. - Believed to be secondary to popliteal artery occlusion. 5. Leukocytosis. - Likely reactive. - Will follow. Discharge planning: Transfer out of ICU. Monitor INR on Warfarin and WBC.      Donna Landry DO  9/9/2022  11:40 AM

## 2022-09-09 NOTE — PLAN OF CARE
Problem: Discharge Planning  Goal: Discharge to home or other facility with appropriate resources  Outcome: Progressing  Flowsheets (Taken 9/8/2022 2000)  Discharge to home or other facility with appropriate resources: Identify barriers to discharge with patient and caregiver     Problem: Pain  Goal: Verbalizes/displays adequate comfort level or baseline comfort level  Outcome: Progressing  Flowsheets (Taken 9/8/2022 2000)  Verbalizes/displays adequate comfort level or baseline comfort level: Encourage patient to monitor pain and request assistance     Problem: Skin/Tissue Integrity  Goal: Absence of new skin breakdown  Description: 1. Monitor for areas of redness and/or skin breakdown  2. Assess vascular access sites hourly  3. Every 4-6 hours minimum:  Change oxygen saturation probe site  4. Every 4-6 hours:  If on nasal continuous positive airway pressure, respiratory therapy assess nares and determine need for appliance change or resting period.   Outcome: Progressing     Problem: ABCDS Injury Assessment  Goal: Absence of physical injury  Outcome: Progressing  Flowsheets (Taken 9/8/2022 2038)  Absence of Physical Injury: Implement safety measures based on patient assessment     Problem: Safety - Adult  Goal: Free from fall injury  Outcome: Progressing  Flowsheets (Taken 9/8/2022 2038)  Free From Fall Injury: Instruct family/caregiver on patient safety

## 2022-09-09 NOTE — PROGRESS NOTES
Occupational Therapy  Facility/Department: [facility]- SICU  Occupational Therapy Initial Assessment    Name: Laurel Reyes  : 1975  MRN: [id number]  Date of Service: 2022    Copied from Emergency Medicine: Laurel Reyes is a 52 y.o. female who presents with right posterior thigh tenderness for 2 days. Blood clots has not been taking Eliquis for the last month she had difficulty filling prescription. She denies any injury to this area. She states she was also having pain in her calf however this is no longer there. She denies any fevers chills loss strength sensation       Discharge Recommendations:  Patient would benefit from continued therapy after discharge  OT Equipment Recommendations  Equipment Needed: Yes  Mobility Devices: ADL Assistive Devices  ADL Assistive Devices: Shower Chair with back       Patient Diagnosis(es): The encounter diagnosis was Leg pain, right. Past Medical History:  has a past medical history of Abnormal Pap smear of cervix, Anemia, Chronic back pain, Claudication (HCC), Claudication in peripheral vascular disease ([address] Depression, Headache(784.0), Heart murmur, Hemorrhage of rectum and anus, History of blood transfusion, HTN (hypertension), Hx of blood clots, Hypercoagulable state ([address] Hyperlipidemia, Hypertension, Intertriginous candidiasis, Left popliteal artery occlusion ([address] Leg pain, Menometrorrhagia, Migraine headache with aura, Obesity, Osteoarthritis, PAD (peripheral artery disease) ([address] Patient in clinical research study, PVD (peripheral vascular disease) ([address] Takotsubo cardiomyopathy, and Wound of right leg. Past Surgical History:  has a past surgical history that includes Tubal ligation ();   section (); other surgical history (2017); other surgical history (Left, 2018); other surgical history (Left, 2018); vascular surgery (Left, 2018); pr reoperation, bypass graft (Left, 2018); pr vein bypass graft,fem-pop (Left, 1/16/2018); thrombectomy (Right, 08/03/2019); IVC filter insertion; Toe amputation (Right, 08/22/2019); Toe amputation (Right, 8/22/2019); and Foot Amputation (Right, 3/8/2021). Assessment   Performance deficits / Impairments: Decreased functional mobility ; Decreased safe awareness;Decreased high-level IADLs;Decreased endurance;Decreased ADL status; Decreased balance    Assessment: RN ok'd OT evaluation. Pt agreeable and reports 8/10 pain in groin and cramping due to menstruation. Pt required clean up on arrival. Pt supine in bed, sat EOB with SBA and cues for technique. Pt completed sit to stand transfer and functional mobility with use of RW and SBA for sequencing and maneuverability. Mobility completed to toilet with toilet transfer requiring SBA to ensure safety with task. Pt able to complete perineal hygiene with SBA while sitting/standing. Pt stood for approx 2-3 mins at toilet during clean up. Pt to stand at sink for approx 5 mins during oral care and hand hygiene with good static standing balance and intermittent unilateral support for stabilization. Pt to return to recliner post session with call light in reach and RN notified. Skilled OT services required to increase independence with functional mobility, ADL's, balance and endurance    Prognosis: Good  Decision Making: Medium Complexity  REQUIRES OT FOLLOW-UP: Yes  Activity Tolerance  Activity Tolerance: Patient limited by pain; Patient Tolerated treatment well        Plan   Plan  Times per Week: 2-3 visits  Current Treatment Recommendations: Balance training, Functional mobility training, Endurance training, Equipment evaluation, education, & procurement, Patient/Caregiver education & training, Safety education & training, Self-Care / ADL, Home management training     Restrictions  Restrictions/Precautions  Required Braces or Orthoses?: No  Position Activity Restriction  Other position/activity restrictions:  Up with assist. 9/8 RIGHT LOWER EXTREMITY ANGIOGRAM WITH ANGIOJET AND JETSTREAM     Subjective   General  Chart Reviewed: Yes  Family / Caregiver Present: No  General Comment  Comments: RN ok'd OT evaluation. Pt agreeable. Pt reports 8/10 pain in groing area with cramping     Social/Functional History  Social/Functional History  Lives With: Daughter (Daughter and 7 y/o grandson)  Type of Home: Apartment  Home Layout: One level  Home Access: Stairs to enter without rails  Entrance Stairs - Number of Steps: 5  Bathroom Shower/Tub: Tub/Shower unit  Bathroom Toilet: Standard  Bathroom Equipment: Grab bars in shower  Bathroom Accessibility: Accessible  Home Equipment:  (No DME at baseline)  Receives Help From: Family, Friend(s) (Pt reports having 24/7 assist from family and friends upon discharge)  ADL Assistance: Independent  Homemaking Assistance: Independent  Homemaking Responsibilities: Yes  Ambulation Assistance: Independent  Transfer Assistance: Independent  Active : Yes  Mode of Transportation: SUV  Occupation: Full time employment  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 Pt in recliner post session)  Interventions: Safety awareness training;Verbal cues  Supine to Sit: Stand-by assistance; Additional time  Balance  Sitting: Intact  Standing: Impaired  Standing - Static: Good (SBA during standing tasks to ensure safety, unilateral UE suppport required when standing for stabilization)  Standing - Dynamic: Fair  Transfer Training  Transfer Training: Yes  Overall Level of Assistance: Stand-by assistance; Additional time (Pt requried SBA for education on pacing and technique.)  Interventions: Verbal cues; Safety awareness training  Sit to Stand: Stand-by assistance; Additional time  Stand to Sit: Stand-by assistance; Additional time  Toilet Transfer: Stand-by assistance; Additional time  Gait  Overall Level of Assistance: Stand-by assistance; Additional time (Functional mobility completed short distance from bed to bathroom with SBA for manueverability)  Interventions: Verbal cues; Safety awareness training  Assistive Device: Gait belt;Walker, rolling     AROM: Within functional limits  Strength: Within functional limits (BUE strength 5/5)  Coordination: Within functional limits  Tone: Normal  Sensation: Intact (Pt denies numbness/tingling)  ADL  Feeding: Modified independent ;Setup  Grooming: Modified independent ;Setup  Grooming Skilled Clinical Factors: Pt stood at sink to wash hands and complete oral care with MOD I and set up assistance  UE Bathing: Modified independent ;Setup  LE Bathing: Stand by assistance;Verbal cueing; Increased time to complete;Setup  UE Dressing: Modified independent ;Setup  LE Dressing: Stand by assistance;Verbal cueing; Increased time to complete;Setup  Toileting: Stand by assistance; Increased time to complete;Setup  Toileting Skilled Clinical Factors: Pt completed toilet hygiene and clothiing management with SBA and cues for safety during standing tasks              Vision  Vision: Impaired  Vision Exceptions: Wears glasses at all times  Hearing  Hearing: [facility]  Cognition  Overall Cognitive Status: WFL                  Education Given To: Patient  Education Provided: Role of Therapy  Education Provided Comments: Pt educated on POC, Role of OT, and safety awareness with transfers  Education Method: Demonstration;Verbal  Barriers to Learning: None  Education Outcome: Verbalized understanding;Continued education needed  LUE AROM (degrees)  LUE AROM : WFL  Left Hand AROM (degrees)  Left Hand AROM: WFL  RUE AROM (degrees)  RUE AROM : WFL  Right Hand AROM (degrees)  Right Hand AROM: WFL        Hand Dominance  Hand Dominance: Right              AM-PAC Score        AM-PAC Inpatient Daily Activity Raw Score: 22 (09/09/22 1113)  AM-PAC Inpatient ADL T-Scale Score : 47.1 (09/09/22 1113)  ADL Inpatient CMS 0-100% Score: 25.8 (09/09/22 1113)  ADL Inpatient CMS G-Code Modifier : CJ (09/09/22 1113)         Goals  Short Term Goals  Time Frame for Short term goals: By discharge, pt will  Short Term Goal 1: demonstrate LB ADL's IND with good safety awareness  Short Term Goal 2: demonstrate all aspects of toileting IND to return to prior level of function  Short Term Goal 3: complete transfers and mobility IND with good safety awareness in prepartion for ADL tasks  Short Term Goal 4: engage in static/dynamic standing IND for 15 mins to increase endurance required for return to work tasks  Short Term Goal 5: demonstrate item retrieval IND from various height levels in preparation for IADL tasks       Therapy Time   Individual Concurrent Group Co-treatment   Time In 0820         Time Out 0856         Minutes 36         Timed Code Treatment Minutes: 30 Minutes       Kirstin Vazquez, S/OT

## 2022-09-10 LAB
ABSOLUTE EOS #: 0.2 K/UL (ref 0–0.44)
ABSOLUTE IMMATURE GRANULOCYTE: 0.06 K/UL (ref 0–0.3)
ABSOLUTE LYMPH #: 2.75 K/UL (ref 1.1–3.7)
ABSOLUTE MONO #: 0.6 K/UL (ref 0.1–1.2)
BASOPHILS # BLD: 1 % (ref 0–2)
BASOPHILS ABSOLUTE: 0.05 K/UL (ref 0–0.2)
EOSINOPHILS RELATIVE PERCENT: 2 % (ref 1–4)
HCT VFR BLD CALC: 32.6 % (ref 36.3–47.1)
HEMOGLOBIN: 11.1 G/DL (ref 11.9–15.1)
IMMATURE GRANULOCYTES: 1 %
INR BLD: 0.9
LYMPHOCYTES # BLD: 27 % (ref 24–43)
MCH RBC QN AUTO: 29.8 PG (ref 25.2–33.5)
MCHC RBC AUTO-ENTMCNC: 34 G/DL (ref 28.4–34.8)
MCV RBC AUTO: 87.6 FL (ref 82.6–102.9)
MONOCYTES # BLD: 6 % (ref 3–12)
NRBC AUTOMATED: 0 PER 100 WBC
PARTIAL THROMBOPLASTIN TIME: 21.8 SEC (ref 20.5–30.5)
PARTIAL THROMBOPLASTIN TIME: 26 SEC (ref 20.5–30.5)
PARTIAL THROMBOPLASTIN TIME: 30.9 SEC (ref 20.5–30.5)
PARTIAL THROMBOPLASTIN TIME: 32.4 SEC (ref 20.5–30.5)
PDW BLD-RTO: 13.4 % (ref 11.8–14.4)
PLATELET # BLD: 273 K/UL (ref 138–453)
PMV BLD AUTO: 10.1 FL (ref 8.1–13.5)
PROTHROMBIN G20210A MUTATION: NEGATIVE
PROTHROMBIN TIME: 10 SEC (ref 9.1–12.3)
PT PCR SPECIMEN: NORMAL
RBC # BLD: 3.72 M/UL (ref 3.95–5.11)
SEG NEUTROPHILS: 64 % (ref 36–65)
SEGMENTED NEUTROPHILS ABSOLUTE COUNT: 6.46 K/UL (ref 1.5–8.1)
WBC # BLD: 10.1 K/UL (ref 3.5–11.3)

## 2022-09-10 PROCEDURE — 6370000000 HC RX 637 (ALT 250 FOR IP)

## 2022-09-10 PROCEDURE — 6360000002 HC RX W HCPCS: Performed by: STUDENT IN AN ORGANIZED HEALTH CARE EDUCATION/TRAINING PROGRAM

## 2022-09-10 PROCEDURE — 99232 SBSQ HOSP IP/OBS MODERATE 35: CPT | Performed by: STUDENT IN AN ORGANIZED HEALTH CARE EDUCATION/TRAINING PROGRAM

## 2022-09-10 PROCEDURE — 36415 COLL VENOUS BLD VENIPUNCTURE: CPT

## 2022-09-10 PROCEDURE — 85610 PROTHROMBIN TIME: CPT

## 2022-09-10 PROCEDURE — 2580000003 HC RX 258: Performed by: STUDENT IN AN ORGANIZED HEALTH CARE EDUCATION/TRAINING PROGRAM

## 2022-09-10 PROCEDURE — 2060000000 HC ICU INTERMEDIATE R&B

## 2022-09-10 PROCEDURE — 2580000003 HC RX 258

## 2022-09-10 PROCEDURE — 85730 THROMBOPLASTIN TIME PARTIAL: CPT

## 2022-09-10 PROCEDURE — 6370000000 HC RX 637 (ALT 250 FOR IP): Performed by: STUDENT IN AN ORGANIZED HEALTH CARE EDUCATION/TRAINING PROGRAM

## 2022-09-10 PROCEDURE — 85025 COMPLETE CBC W/AUTO DIFF WBC: CPT

## 2022-09-10 PROCEDURE — 99232 SBSQ HOSP IP/OBS MODERATE 35: CPT | Performed by: INTERNAL MEDICINE

## 2022-09-10 RX ORDER — ENOXAPARIN SODIUM 100 MG/ML
1 INJECTION SUBCUTANEOUS 2 TIMES DAILY
Qty: 10 ML | Refills: 1 | Status: CANCELLED | OUTPATIENT
Start: 2022-09-10 | End: 2022-09-15

## 2022-09-10 RX ORDER — WARFARIN SODIUM 5 MG/1
5 TABLET ORAL
Status: COMPLETED | OUTPATIENT
Start: 2022-09-10 | End: 2022-09-10

## 2022-09-10 RX ADMIN — GABAPENTIN 300 MG: 300 CAPSULE ORAL at 21:36

## 2022-09-10 RX ADMIN — GABAPENTIN 300 MG: 300 CAPSULE ORAL at 09:32

## 2022-09-10 RX ADMIN — WARFARIN SODIUM 5 MG: 5 TABLET ORAL at 17:53

## 2022-09-10 RX ADMIN — ACETAMINOPHEN 1000 MG: 500 TABLET ORAL at 17:52

## 2022-09-10 RX ADMIN — GABAPENTIN 300 MG: 300 CAPSULE ORAL at 15:40

## 2022-09-10 RX ADMIN — SODIUM CHLORIDE, PRESERVATIVE FREE 10 ML: 5 INJECTION INTRAVENOUS at 09:30

## 2022-09-10 RX ADMIN — Medication 21.56 UNITS/KG/HR: at 15:45

## 2022-09-10 RX ADMIN — SODIUM CHLORIDE, PRESERVATIVE FREE 10 ML: 5 INJECTION INTRAVENOUS at 21:36

## 2022-09-10 NOTE — PLAN OF CARE
Problem: Discharge Planning  Goal: Discharge to home or other facility with appropriate resources  9/9/2022 2028 by Terry Brewster RN  Outcome: Progressing  Note: Home, pt denies hhc at this time  9/9/2022 1736 by Cesilia Coello RN  Outcome: Progressing     Problem: Pain  Goal: Verbalizes/displays adequate comfort level or baseline comfort level  9/9/2022 2028 by Terry Brewster RN  Outcome: Progressing  Note: Will continue current pain regimen  9/9/2022 1736 by Cesilia Coello RN  Outcome: Progressing     Problem: Skin/Tissue Integrity  Goal: Absence of new skin breakdown  Description: 1. Monitor for areas of redness and/or skin breakdown  2. Assess vascular access sites hourly  3. Every 4-6 hours minimum:  Change oxygen saturation probe site  4. Every 4-6 hours:  If on nasal continuous positive airway pressure, respiratory therapy assess nares and determine need for appliance change or resting period.   9/9/2022 1736 by Cesilia Coello RN  Outcome: Progressing     Problem: ABCDS Injury Assessment  Goal: Absence of physical injury  9/9/2022 1736 by Cesilia Coello RN  Outcome: Progressing     Problem: Safety - Adult  Goal: Free from fall injury  9/9/2022 2028 by Terry Brewster RN  Outcome: Progressing  Note: Pt remains free of falls,safe environment to be maintained  9/9/2022 1736 by Cesilia Coello RN  Outcome: Progressing

## 2022-09-10 NOTE — PROGRESS NOTES
I have seen and examined the patient independently. I reviewed all laboratory and imaging studies that are relevant. I agree with the resident note with the addendum. Electronically signed by Chiara Valero MD on 9/10/2022 at 11:29 PM   Today's Date: 9/10/2022  Patient Name: Clyde Tariq  Date of admission: 9/6/2022  8:53 PM  Patient's age: 52 y.o., 1975  Admission Dx: PAD (peripheral artery disease) ([facility] [address] [I73.9]  Leg pain, right [M79.604]    Reason for Consult: management recommendations  Requesting Physician: David Oviedo DO    CHIEF COMPLAINT:  leg pain, numbness    History Obtained From:  patient, electronic medical record    OVERNIGHT EVENTS:  No acute events overnight  POD#2 s/p arteriography, angiography, tpa infusion, stent placement   Hb 11.1  Being bridged to coumadin with lovenox    HISTORY OF PRESENT ILLNESS:      The patient is a 52 y.o.  female who is admitted to the hospital for right sided leg pain, extending up to the thigh. Patient has known PMH of peripheral arterial disease, on lifelong Eliquis but has run out of her Eliquis recently, off AC for the past 1 month. She has been treated with several different modalities including lytic therapy with fasciotomy on the right several years ago. On the left she also had pharmacomechanical thrombectomy in 2019. CTA abdominal aorta was done which showed severe PVD affecting bilateral popliteal arteries, mesenteritits with lymphadenopathy of the root of the small bowel mesentery, hyperdense lesion in the right kidney. Patient underwent arteriography of right common iliac, common femoral, and had catheter placement with lysis of right popliteal and tibioperoneal trunk. Currently on tpa gtt. Hem onc consulted for concern of multiple enlarged mesenteric lymph notes. Patient was seen by Hem in 2018 for workup for hypercoagulability. Her protein C, protein S, SPEP, beta 2 glycoprotein, APLA Ab were all negative. Blood Pressure in her father, mother, sister, and sister; Kidney Disease in her mother. REVIEW OF SYSTEMS:    Constitutional: No fever or chills. No night sweats, no weight loss   Eyes: No eye discharge, double vision, or eye pain   HEENT: negative for sore mouth, sore throat, hoarseness and voice change   Respiratory: negative for cough , sputum, dyspnea, wheezing, hemoptysis, chest pain   Cardiovascular: negative for chest pain, dyspnea, palpitations, orthopnea, PND   Gastrointestinal: negative for nausea, vomiting, diarrhea, constipation, abdominal pain, Dysphagia, hematemesis and hematochezia   Genitourinary: negative for frequency, dysuria, nocturia, urinary incontinence, and hematuria   Integument: negative for rash, skin lesions, bruises.    Hematologic/Lymphatic: negative for easy bruising, bleeding, lymphadenopathy, or petechiae   Endocrine: negative for heat or cold intolerance,weight changes, change in bowel habits and hair loss   Musculoskeletal: negative for myalgias, arthralgias, pain, joint swelling,and bone pain   Neurological: negative for headaches, dizziness, seizures, weakness, numbness    PHYSICAL EXAM:      BP (!) 146/81   Pulse 69   Temp 97.5 °F (36.4 °C) (Oral)   Resp 16   Ht 5' 5\" (1.651 m)   Wt 198 lb 6.6 oz (90 kg)   SpO2 96%   BMI 33.02 kg/m²    Temp (24hrs), Av.2 °F (36.8 °C), Min:97.5 °F (36.4 °C), Max:99.3 °F (37.4 °C)    General appearance - well appearing, no in pain or distress   Mental status - alert and cooperative   Eyes - pupils equal and reactive, extraocular eye movements intact   Ears - bilateral TM's and external ear canals normal   Mouth - mucous membranes moist, pharynx normal without lesions   Neck - supple, no significant adenopathy   Lymphatics - no palpable lymphadenopathy, no hepatosplenomegaly   Chest - clear to auscultation, no wheezes, rales or rhonchi, symmetric air entry   Heart - normal rate, regular rhythm, normal S1, S2, no murmurs  Abdomen - soft, nontender, nondistended, no masses or organomegaly   Neurological - alert, oriented, normal speech, no focal findings or movement disorder noted   Musculoskeletal - no joint tenderness, deformity or swelling   Extremities - peripheral pulses normal, no pedal edema, no clubbing or cyanosis   Skin - normal coloration and turgor, no rashes, no suspicious skin lesions noted ,    DATA:    Labs:   CBC:   Recent Labs     09/09/22  0411 09/10/22  0948   WBC 15.3* 10.1   HGB 12.2 11.1*   HCT 34.8* 32.6*    273       BMP:   Recent Labs     09/08/22  1731 09/09/22  0411    136   K 4.3 4.3   CO2 19* 20   BUN 5* 7   CREATININE 0.57 0.62   LABGLOM >60 >60   GLUCOSE 142* 132*       PT/INR:   Recent Labs     09/07/22  1622 09/10/22  0948   PROTIME 9.9 10.0   INR 0.9 0.9         IMAGING DATA:  CTA ABDOMINAL AORTA W BILAT RUNOFF W CONTRAST   Final Result   1. Severe peripheral vascular disease affecting occluded the mid distal   popliteal arteries bilateral and corresponding popliteal branches to the area   of the right and left legs down to the ankle/foot area with poor circulation   distally. Multiple collateral from geniculate arteries are present but there   is no effective reconstitution down to the level of the ankle/foot   bilaterally. 2.  Findings for mesenteritis with adenopathy of the root of the small bowel   mesentery. The prominence of lymph nodes present could consider further   evaluation with PET-CT scan to investigate degree of biological activity. 3.  Hyperdense lesion in the central aspect of the right kidney. Could   represent a hemorrhagic cyst.  Further evaluation with renal ultrasound   recommended. 4.  Corpus lutein cyst in the left ovary. Small free fluid accumulation in   the cul-de-sac. These can be relate with recent rupture follicular cyst.   Could consider correlation with pelvic ultrasound.              Primary Problem  PAD (peripheral artery disease) [facility])    Active Hospital Problems    Diagnosis Date Noted    PAD (peripheral artery disease) ([facility] [address] [I73.9] 09/07/2022     Priority: Medium    Abnormal CT of the abdomen [R93.5] 09/07/2022     Priority: Medium    Popliteal artery embolism, right ([facility] [address] [I74.3] 09/07/2022     Priority: Medium    Leg pain, right [M79.604] 09/07/2022     Priority: Medium    Lymphadenopathy, mesenteric [R59.0] 09/07/2022     Priority: Medium    Claudication in peripheral vascular disease ([facility] [address] [I73.9]     Primary hypertension [I10] 05/13/2013         IMPRESSION:   Severe PAD with occlusion of right popliteal and tibial arteries  Concern for mesenteritis with adenopathy of the root of the small bowel   mesentery  Prior h/o PAD of b/l lower extremities  Hypercoagulable work up negative thus far  H/o CIN1 in 2019    RECOMMENDATIONS  Continue with anticoagulation as per vascular  Follow up CT abdomen in 4-6 weeks to follow LN. Follow up with Hematology after discharge. Discussed with patient and Nurse. Thank you for asking us to see this patient.     Ketan Jamison  PGY-3  Internal Medicine  [address]  9/10/2022 3:29 PM

## 2022-09-10 NOTE — PROGRESS NOTES
Today's Date: 9/9/2022  Patient Name: Jos Swift  Date of admission: 9/6/2022  8:53 PM  Patient's age: 52 y.o., 1975  Admission Dx: [facility]peripheral [facility] [I73.9]  Leg pain, right [M79.604]    Reason for Consult: management recommendations  Requesting Physician: Amado Leary DO    CHIEF COMPLAINT:  leg pain, numbness    History Obtained From:  patient, electronic medical record    OVERNIGHT EVENTS:  No acute events overnight  POD#1 s/p arteriography, angiography, stent placement and sealing device  Hb stable 12.2    HISTORY OF PRESENT ILLNESS:      The patient is a 52 y.o.  female who is admitted to the hospital for right sided leg pain, extending up to the thigh. Patient has known PMH of peripheral arterial disease, on lifelong Eliquis but has run out of her Eliquis recently, off AC for the past 1 month. She has been treated with several different modalities including lytic therapy with fasciotomy on the right several years ago. On the left she also had pharmacomechanical thrombectomy in 2019. CTA abdominal aorta was done which showed severe PVD affecting bilateral popliteal arteries, mesenteritits with lymphadenopathy of the root of the small bowel mesentery, hyperdense lesion in the right kidney. Patient underwent arteriography of right common iliac, common femoral, and had catheter placement with lysis of right popliteal and tibioperoneal trunk. Currently on tpa gtt. Hem onc consulted for concern of multiple enlarged mesenteric lymph notes. Patient was seen by Hem in 2018 for workup for hypercoagulability. Her protein C, protein S, SPEP, beta 2 glycoprotein, APLA Ab were all negative. Prothrombin gene nad factor V do not appear to have been tested.  Patient denies any h/o recurrent miscarriages, had 1 ectopic pregnancy in the past.      Past Medical History:   has a past medical history of Abnormal Pap smear of cervix, Anemia, Chronic back pain, Claudication [facility]), Claudication in peripheral vascular disease ([facility] [address] Depression, Headache(784.0), Heart murmur, Hemorrhage of rectum and anus, History of blood transfusion, HTN (hypertension), Hx of blood clots, Hypercoagulable state ([facility] [address] Hyperlipidemia, Hypertension, Intertriginous candidiasis, Left popliteal artery occlusion ([facility] [address] Leg pain, Menometrorrhagia, Migraine headache with aura, Obesity, Osteoarthritis, PAD (peripheral artery disease) ([facility] [address] Patient in clinical research study, [facility]peripheral vascular [facility] Takotsubo cardiomyopathy, and Wound of right leg. Past Surgical History:   has a past surgical history that includes Tubal ligation ();  section (); other surgical history (2017); other surgical history (Left, 2018); other surgical history (Left, 2018); vascular surgery (Left, 2018); pr reoperation, bypass graft (Left, 2018); pr vein bypass graft,fem-pop (Left, 2018); thrombectomy (Right, 2019); IVC filter insertion; Toe amputation (Right, 2019); Toe amputation (Right, 2019); Foot Amputation (Right, 3/8/2021); and vascular surgery (Right, 2022). Medications:    Reviewed in Epic     Allergies:  Asa [aspirin], Lopid [gemfibrozil], Nortriptyline, Pcn [penicillins], Sulfa antibiotics, and Ibuprofen    Social History:   reports that she has never smoked. She has never used smokeless tobacco. She reports that she does not currently use alcohol. She reports that she does not use drugs. Family History: family history includes Diabetes in her mother, sister, and sister; Heart Attack in her father and mother; Heart Disease in her father and mother; High Blood Pressure in her father, mother, sister, and sister; Kidney Disease in her mother. REVIEW OF SYSTEMS:    Constitutional: No fever or chills.  No night sweats, no weight loss   Eyes: No eye discharge, double vision, or eye pain   HEENT: negative for sore mouth, sore throat, hoarseness and voice change   Respiratory: negative for cough , sputum, dyspnea, wheezing, hemoptysis, chest pain   Cardiovascular: negative for chest pain, dyspnea, palpitations, orthopnea, PND   Gastrointestinal: negative for nausea, vomiting, diarrhea, constipation, abdominal pain, Dysphagia, hematemesis and hematochezia   Genitourinary: negative for frequency, dysuria, nocturia, urinary incontinence, and hematuria   Integument: negative for rash, skin lesions, bruises.    Hematologic/Lymphatic: negative for easy bruising, bleeding, lymphadenopathy, or petechiae   Endocrine: negative for heat or cold intolerance,weight changes, change in bowel habits and hair loss   Musculoskeletal: negative for myalgias, arthralgias, pain, joint swelling,and bone pain   Neurological: negative for headaches, dizziness, seizures, weakness, numbness    PHYSICAL EXAM:      /66   Pulse 58   Temp 99.3 °F (37.4 °C) (Axillary)   Resp 13   Ht 5' 5\" (1.651 m)   Wt 198 lb 6.6 oz (90 kg)   SpO2 97%   BMI 33.02 kg/m²    Temp (24hrs), Av.2 °F (36.8 °C), Min:97.7 °F (36.5 °C), Max:99.3 °F (37.4 °C)    General appearance - well appearing, no in pain or distress   Mental status - alert and cooperative   Eyes - pupils equal and reactive, extraocular eye movements intact   Ears - bilateral TM's and external ear canals normal   Mouth - mucous membranes moist, pharynx normal without lesions   Neck - supple, no significant adenopathy   Lymphatics - no palpable lymphadenopathy, no hepatosplenomegaly   Chest - clear to auscultation, no wheezes, rales or rhonchi, symmetric air entry   Heart - normal rate, regular rhythm, normal S1, S2, no murmurs  Abdomen - soft, nontender, nondistended, no masses or organomegaly   Neurological - alert, oriented, normal speech, no focal findings or movement disorder noted   Musculoskeletal - no joint tenderness, deformity or swelling   Extremities - peripheral pulses normal, no pedal edema, no clubbing or cyanosis   Skin - normal coloration and turgor, no rashes, no suspicious skin lesions noted ,    DATA:    Labs:   CBC:   Recent Labs     09/08/22  1731 09/09/22  0411   WBC 13.4* 15.3*   HGB 13.2 12.2   HCT 37.4 34.8*    279       BMP:   Recent Labs     09/08/22  1731 09/09/22  0411    136   K 4.3 4.3   CO2 19* 20   BUN 5* 7   CREATININE 0.57 0.62   LABGLOM >60 >60   GLUCOSE 142* 132*       PT/INR:   Recent Labs     09/07/22  1622   PROTIME 9.9   INR 0.9         IMAGING DATA:  CTA ABDOMINAL AORTA W BILAT RUNOFF W CONTRAST   Final Result   1. Severe peripheral vascular disease affecting occluded the mid distal   popliteal arteries bilateral and corresponding popliteal branches to the area   of the right and left legs down to the ankle/foot area with poor circulation   distally. Multiple collateral from geniculate arteries are present but there   is no effective reconstitution down to the level of the ankle/foot   bilaterally. 2.  Findings for mesenteritis with adenopathy of the root of the small bowel   mesentery. The prominence of lymph nodes present could consider further   evaluation with PET-CT scan to investigate degree of biological activity. 3.  Hyperdense lesion in the central aspect of the right kidney. Could   represent a hemorrhagic cyst.  Further evaluation with renal ultrasound   recommended. 4.  Corpus lutein cyst in the left ovary. Small free fluid accumulation in   the cul-de-sac. These can be relate with recent rupture follicular cyst.   Could consider correlation with pelvic ultrasound.              Primary Problem  PAD (peripheral artery disease) [facility])    Active Hospital Problems    Diagnosis Date Noted    PAD (peripheral artery disease) ([facility] [address] [I73.9] 09/07/2022     Priority: Medium    Abnormal CT of the abdomen [R93.5] 09/07/2022     Priority: Medium    Popliteal artery embolism, right ([facility] [address] [I74.3] 09/07/2022     Priority: Medium    Leg pain, right [D12.887] 09/07/2022     Priority: Medium    Lymphadenopathy, mesenteric [R59.0] 09/07/2022     Priority: Medium    Claudication in peripheral vascular disease ([address] [I73.9]     Primary hypertension [I10] 05/13/2013         IMPRESSION:   Severe PAD with occlusion of right popliteal and tibial arteries  Concern for mesenteritis with adenopathy of the root of the small bowel   mesentery  Prior h/o PAD of b/l lower extremities  Hypercoagulable work up negative thus far  H/o CIN1 in 2019    RECOMMENDATIONS  Continue with anticoagulation; switch to DOAC once cleared by vascular  Mesenteritis could be acute reaction to popliteal artery occlusion; patient is asymptomatic and clinically stable. Will defer further evaluation. Repeat imaging outpatient in 4 weeks. Follow up on prothrombin gene mutation and factor V leiden, PT-INR    Discussed with patient and Nurse. Thank you for asking us to see this patient. Katiuska Quinones  PGY-3  Internal Medicine  [address]  9/9/2022 8:04 PM    Attending Physician Statement   I have discussed the care of this patient, including pertinent history and exam findings, with the resident. I have seen and examined the patient and the key elements of all parts of the encounter have been performed by me. I agree with the assessment, plan and orders as documented by the resident and with changes made to the note.     Continue anticoagulation as per vascular  Follow u pCT abd in 4-6 weeks to follow LN  Discharge planning as per primary team      Librado Valentine MD  Hematology/Oncology    Cell: [phone]
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
## 2022-09-10 NOTE — PROGRESS NOTES
prior to admission extending up her leg to her thigh. Described her pain as a burning sensation. Was unable to ambulate on her right leg. States it felt like her previous clot requiring thrombectomy. Has been off Eliquis for greater than 1 month due to issues filling medication. CTA was done demonstrating embolism at the level of the iliac bifurcation with popliteal artery occlusion. CT abdomen demonstrated mesenteritis. Hematology/Oncology was consulted and believed to be secondary to popliteal artery occlusion. Vascular was consulted and she was taken to OR. Review of Systems:     Constitutional:  negative for chills, fevers, sweats  Respiratory:  negative for cough, dyspnea on exertion, shortness of breath, wheezing  Cardiovascular:  negative for chest pain, chest pressure/discomfort, lower extremity edema, palpitations  Gastrointestinal:  negative for abdominal pain, constipation, diarrhea, nausea, vomiting  Neurological:  negative for dizziness, headache    Medications: Allergies:     Allergies   Allergen Reactions    Asa [Aspirin] Swelling    Lopid [Gemfibrozil] Itching and Swelling    Nortriptyline Itching and Swelling    Pcn [Penicillins] Hives and Swelling    Sulfa Antibiotics Swelling    Ibuprofen Rash       Current Meds:   Scheduled Meds:    sodium chloride flush  5-40 mL IntraVENous 2 times per day    sodium chloride flush  5-40 mL IntraVENous 2 times per day    acetaminophen  1,000 mg Oral 3 times per day    gabapentin  300 mg Oral TID     Continuous Infusions:    heparin (PORCINE) Infusion 17.56 Units/kg/hr (09/10/22 0325)    sodium chloride      sodium chloride       PRN Meds: sodium chloride flush, sodium chloride, sodium chloride flush, sodium chloride, potassium chloride **OR** potassium alternative oral replacement **OR** potassium chloride, magnesium sulfate, ondansetron **OR** ondansetron, polyethylene glycol, oxyCODONE    Data:     Past Medical History:   has a past medical history of Abnormal Pap smear of cervix, Anemia, Chronic back pain, Claudication (HCC), Claudication in peripheral vascular disease ([facility] Depression, Headache(784.0), Heart murmur, Hemorrhage of rectum and anus, History of blood transfusion, HTN (hypertension), Hx of blood clots, Hypercoagulable state ([facility] Hyperlipidemia, Hypertension, Intertriginous candidiasis, Left popliteal artery occlusion (HCC), Leg pain, Menometrorrhagia, Migraine headache with aura, Obesity, Osteoarthritis, PAD (peripheral artery disease) ([facility] Patient in clinical research study, PVD (peripheral vascular disease) ([facility] Takotsubo cardiomyopathy, and Wound of right leg. Social History:   reports that she has never smoked. She has never used smokeless tobacco. She reports that she does not currently use alcohol. She reports that she does not use drugs. Family History:   Family History   Problem Relation Age of Onset    Diabetes Mother     High Blood Pressure Mother     Heart Attack Mother     Heart Disease Mother     Kidney Disease Mother     High Blood Pressure Father     Heart Disease Father     Heart Attack Father     Diabetes Sister     High Blood Pressure Sister     Diabetes Sister     High Blood Pressure Sister        Vitals:  /67   Pulse 79   Temp 98.5 °F (36.9 °C) (Oral)   Resp 12   Ht 5' 5\" (1.651 m)   Wt 198 lb 6.6 oz (90 kg)   SpO2 95%   BMI 33.02 kg/m²   Temp (24hrs), Av.4 °F (36.9 °C), Min:98 °F (36.7 °C), Max:99.3 °F (37.4 °C)    No results for input(s): POCGLU in the last 72 hours. I/O (24Hr):     Intake/Output Summary (Last 24 hours) at 9/10/2022 0710  Last data filed at 9/10/2022 0618  Gross per 24 hour   Intake --   Output 2 ml   Net -2 ml         Labs:  Hematology:  Recent Labs     22  1622 22  1201 22  1731 22  0411   WBC  --  8.0 13.4* 15.3*   RBC  --  4.00 4.27 3.97   HGB  --  11.8* 13.2 12.2   HCT  --  35.4* 37.4 34.8*   MCV  --  88.5 87.6 87.7   MCH  --  29.5 30.9 30.7   MCHC --  33.3 35.3* 35.1*   RDW  --  13.5 13.5 13.2   PLT  --  256 267 279   MPV  --  10.1 10.1 9.9   INR 0.9  --   --   --        Chemistry:  Recent Labs     09/08/22  0246 09/08/22  1201 09/08/22  1731 09/09/22  0411     --  137 136   K 3.5*  --  4.3 4.3     --  104 105   CO2 21  --  19* 20   GLUCOSE 101*  --  142* 132*   BUN 5*  --  5* 7   CREATININE 0.55  --  0.57 0.62   MG 1.7  --   --   --    ANIONGAP 12  --  14 11   LABGLOM >60  --  >60 >60   GFRAA >60  --  >60 >60   CALCIUM 7.8*  --  8.4* 8.0*   LACTACIDWB  --  1.3  --   --      Recent Labs     09/08/22  1201   LABALBU 3.7   AST 14   ALT 11   BILITOT 0.4       ABG:  Lab Results   Component Value Date/Time    POCPH 7.503 01/22/2018 03:17 AM    PHART 7.440 01/16/2018 07:45 PM    POCPCO2 34.4 01/22/2018 03:17 AM    UCV3CQQ 33.8 01/16/2018 07:45 PM    POCPO2 109.9 01/22/2018 03:17 AM    PO2ART 182.0 01/16/2018 07:45 PM    POCHCO3 27.0 01/22/2018 03:17 AM    WKK1UVV 22.6 01/16/2018 07:45 PM    NBEA NOT REPORTED 01/22/2018 03:17 AM    PBEA 4 01/22/2018 03:17 AM    EJW7MTS 28 01/22/2018 03:17 AM    KCMP2IPS 99 01/22/2018 03:17 AM    S9XBRKWW 99.8 01/16/2018 07:45 PM    FIO2 28.0 01/22/2018 03:17 AM     Lab Results   Component Value Date/Time    SPECIAL NOT REPORTED 08/22/2019 11:25 AM     Lab Results   Component Value Date/Time    CULTURE  05/24/2022 09:53 PM     NEGATIVE: HSV-1 DNA not detected by nucleic acid amplification. CULTURE  05/24/2022 09:53 PM     NEGATIVE: HSV-2 DNA not detected by nucleic acid amplification. CULTURE  05/24/2022 09:53 PM     Due to the specimen source, HSV 1,2 testing was performed by a molecular method. Radiology:  CTA ABDOMINAL AORTA W BILAT RUNOFF W CONTRAST    Result Date: 9/7/2022  1.   Severe peripheral vascular disease affecting occluded the mid distal popliteal arteries bilateral and corresponding popliteal branches to the area of the right and left legs down to the ankle/foot area with poor circulation distally. Multiple collateral from geniculate arteries are present but there is no effective reconstitution down to the level of the ankle/foot bilaterally. 2.  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 The prominence of lymph nodes present could consider further evaluation with PET-CT scan to investigate degree of biological activity. 3.  Hyperdense lesion in the central aspect of the right kidney. Could represent a hemorrhagic cyst.  Further evaluation with renal ultrasound recommended. 4.  Corpus lutein cyst in the left ovary. Small free fluid accumulation in the cul-de-sac. These can be relate with recent rupture follicular cyst. Could consider correlation with pelvic ultrasound. Physical Examination:        General appearance:  alert, cooperative and no distress  Mental Status:  oriented to person, place and time and normal affect  Lungs:  clear to auscultation bilaterally, normal effort  Heart:  regular rate and rhythm, no murmur  Abdomen:  soft, nontender, nondistended, normal bowel sounds, no masses, hepatomegaly, splenomegaly  Extremities:  no edema, redness, tenderness in the calves, RLE PT and DP pulses palpated and heard with doppler. Skin:  no gross lesions, rashes, induration    Assessment:        Hospital Problems             Last Modified POA    * (Principal) PAD (peripheral artery disease) ([address] 9/7/2022 Yes    Abnormal CT of the abdomen 9/7/2022 Yes    Popliteal artery embolism, right ([address] 9/7/2022 Yes    Leg pain, right 9/7/2022 Yes    Lymphadenopathy, mesenteric 9/7/2022 Yes    Primary hypertension 9/7/2022 Yes    Claudication in peripheral vascular disease ([address] 9/7/2022 Yes     Plan:        Right Popliteal Artery Embolism. - Heparin gtt. - Vascular surgery consulted. Status post Arteriography of the right lower extremity with lytic therapy. Status post RLE arteriogram with thrombectomy of the distal popliteal artery.    - Bridge to

## 2022-09-11 VITALS
RESPIRATION RATE: 25 BRPM | BODY MASS INDEX: 33.06 KG/M2 | DIASTOLIC BLOOD PRESSURE: 87 MMHG | SYSTOLIC BLOOD PRESSURE: 141 MMHG | WEIGHT: 198.41 LBS | OXYGEN SATURATION: 98 % | TEMPERATURE: 98.3 F | HEIGHT: 65 IN | HEART RATE: 69 BPM

## 2022-09-11 LAB
HCT VFR BLD CALC: 34.2 % (ref 36.3–47.1)
HEMOGLOBIN: 11.8 G/DL (ref 11.9–15.1)
INR BLD: 1
PARTIAL THROMBOPLASTIN TIME: 47.6 SEC (ref 20.5–30.5)
PARTIAL THROMBOPLASTIN TIME: 60 SEC (ref 20.5–30.5)
PROTHROMBIN TIME: 10.7 SEC (ref 9.1–12.3)

## 2022-09-11 PROCEDURE — 36415 COLL VENOUS BLD VENIPUNCTURE: CPT

## 2022-09-11 PROCEDURE — 6360000002 HC RX W HCPCS: Performed by: STUDENT IN AN ORGANIZED HEALTH CARE EDUCATION/TRAINING PROGRAM

## 2022-09-11 PROCEDURE — 85610 PROTHROMBIN TIME: CPT

## 2022-09-11 PROCEDURE — 85018 HEMOGLOBIN: CPT

## 2022-09-11 PROCEDURE — 93005 ELECTROCARDIOGRAM TRACING: CPT | Performed by: STUDENT IN AN ORGANIZED HEALTH CARE EDUCATION/TRAINING PROGRAM

## 2022-09-11 PROCEDURE — 6370000000 HC RX 637 (ALT 250 FOR IP): Performed by: STUDENT IN AN ORGANIZED HEALTH CARE EDUCATION/TRAINING PROGRAM

## 2022-09-11 PROCEDURE — 85730 THROMBOPLASTIN TIME PARTIAL: CPT

## 2022-09-11 PROCEDURE — 85014 HEMATOCRIT: CPT

## 2022-09-11 PROCEDURE — 99239 HOSP IP/OBS DSCHRG MGMT >30: CPT | Performed by: STUDENT IN AN ORGANIZED HEALTH CARE EDUCATION/TRAINING PROGRAM

## 2022-09-11 RX ORDER — AMLODIPINE BESYLATE 10 MG/1
10 TABLET ORAL DAILY
Status: DISCONTINUED | OUTPATIENT
Start: 2022-09-11 | End: 2022-09-11 | Stop reason: HOSPADM

## 2022-09-11 RX ORDER — WARFARIN SODIUM 5 MG/1
5 TABLET ORAL
Status: DISCONTINUED | OUTPATIENT
Start: 2022-09-11 | End: 2022-09-11

## 2022-09-11 RX ADMIN — APIXABAN 5 MG: 5 TABLET, FILM COATED ORAL at 12:41

## 2022-09-11 RX ADMIN — Medication 25.56 UNITS/KG/HR: at 02:36

## 2022-09-11 RX ADMIN — AMLODIPINE BESYLATE 10 MG: 10 TABLET ORAL at 10:07

## 2022-09-11 RX ADMIN — Medication 25.56 UNITS/KG/HR: at 03:15

## 2022-09-11 NOTE — PROGRESS NOTES
[facility]  Office: [address], DO, Elizabeth Tommy, DO, Richie Escobedo, DO, Brandy Lynn Franchesca, DO, Bianka Mahmood MD, Veronika Garcia MD, Bryon Manuel MD, Dannie Ruffin MD,  Simran Jacobsen MD, Hosea Hickman MD, Amina Holland, DO, Salena Dunne MD,  Ladarius Luevano MD, Gracy Tolentino MD, Reggie Gutierrez DO, Hema Cannon MD, Fernanda Sims MD, Kierra Latham MD, Gurpreet Celis MD, Octaviano Mcelroy MD, Alli Dixon MD, Sumaya Goldstein DO, Apryl Leslie MD, Rosy Schirmer, MD, Aicha Drew, CNP,  Sherry Kingston, CNP, Shalini Kruse, CNP, Margarita Mack, CNP, Priyank Montes PA-C, Tanya Scott [facility], Lola Maldonado, CNP, Myla Couch, CNP, Nancy Guillaume, CNP, Livan Goodman, CNP, Myah Case, CNP, Jesus Lim, CNS, Lisa Vazquez, [facility], Eric Dee, CNP, Caryl Yanez, CNP, Jesus [facility], [address] [place] [address]    Progress Note    9/11/2022    7:26 AM    Name:   Tucker Saenz  MRN:     [id number]     Acct:      [de-identified]   Room:   [address] [place] [address] Day:  4  Admit Date:  9/6/2022  8:53 PM    PCP:   Moreno Connolly MD  Code Status:  Full Code    Subjective:     C/C:   Chief Complaint   Patient presents with    Leg Pain     right     Interval History Status: improved. Vitals reviewed, afebrile and hemodynamically stable. Saturating well on room air. Labs reviewed, hemoglobin stable. Overnight patient had no significant events. On examination patient resting comfortably in bed. Plan for DC today as hemoglobin remains stable. Spoke with Vascular and Pharmacy. Eliquis covered. Will transition to Eliquis and DC home today. Brief History:     Mrs. Panda Donahue is a 52year old female with a known past medical history of PAD who initially presented for right leg pain. Developed pain two days prior to admission extending up her leg to her thigh. Described her pain as a burning sensation.  Was unable to ambulate on her right leg. States it felt like her previous clot requiring thrombectomy. Has been off Eliquis for greater than 1 month due to issues filling medication. CTA was done demonstrating embolism at the level of the iliac bifurcation with popliteal artery occlusion. CT abdomen demonstrated mesenteritis. Hematology/Oncology was consulted and believed to be secondary to popliteal artery occlusion. Vascular was consulted and she was taken to OR. Review of Systems:     Constitutional:  negative for chills, fevers, sweats  Respiratory:  negative for cough, dyspnea on exertion, shortness of breath, wheezing  Cardiovascular:  negative for chest pain, chest pressure/discomfort, lower extremity edema, palpitations  Gastrointestinal:  negative for abdominal pain, constipation, diarrhea, nausea, vomiting  Neurological:  negative for dizziness, headache    Medications: Allergies:     Allergies   Allergen Reactions    Asa [Aspirin] Swelling    Lopid [Gemfibrozil] Itching and Swelling    Nortriptyline Itching and Swelling    Pcn [Penicillins] Hives and Swelling    Sulfa Antibiotics Swelling    Ibuprofen Rash       Current Meds:   Scheduled Meds:    sodium chloride flush  5-40 mL IntraVENous 2 times per day    acetaminophen  1,000 mg Oral 3 times per day    gabapentin  300 mg Oral TID     Continuous Infusions:    heparin (PORCINE) Infusion 25.56 Units/kg/hr (09/11/22 0315)    sodium chloride       PRN Meds: sodium chloride flush, sodium chloride, potassium chloride **OR** potassium alternative oral replacement **OR** potassium chloride, magnesium sulfate, ondansetron **OR** ondansetron, polyethylene glycol, oxyCODONE    Data:     Past Medical History:   has a past medical history of Abnormal Pap smear of cervix, Anemia, Chronic back pain, Claudication ([address] Claudication in peripheral vascular disease ([address] Depression, Headache(784.0), Heart murmur, Hemorrhage of rectum and anus, History of blood NA  --  137 136   K  --  4.3 4.3   CL  --  104 105   CO2  --  19* 20   GLUCOSE  --  142* 132*   BUN  --  5* 7   CREATININE  --  0.57 0.62   ANIONGAP  --  14 11   LABGLOM  --  >60 >60   GFRAA  --  >60 >60   CALCIUM  --  8.4* 8.0*   LACTACIDWB 1.3  --   --      Recent Labs     09/08/22  1201   LABALBU 3.7   AST 14   ALT 11   BILITOT 0.4       ABG:  Lab Results   Component Value Date/Time    POCPH 7.503 01/22/2018 03:17 AM    PHART 7.440 01/16/2018 07:45 PM    POCPCO2 34.4 01/22/2018 03:17 AM    CKJ0MBZ 33.8 01/16/2018 07:45 PM    POCPO2 109.9 01/22/2018 03:17 AM    PO2ART 182.0 01/16/2018 07:45 PM    POCHCO3 27.0 01/22/2018 03:17 AM    LPK7DTV 22.6 01/16/2018 07:45 PM    NBEA NOT REPORTED 01/22/2018 03:17 AM    PBEA 4 01/22/2018 03:17 AM    RPB9TUB 28 01/22/2018 03:17 AM    WWSJ9BIW 99 01/22/2018 03:17 AM    N0GFLQXC 99.8 01/16/2018 07:45 PM    FIO2 28.0 01/22/2018 03:17 AM     Lab Results   Component Value Date/Time    SPECIAL NOT REPORTED 08/22/2019 11:25 AM     Lab Results   Component Value Date/Time    CULTURE  05/24/2022 09:53 PM     NEGATIVE: HSV-1 DNA not detected by nucleic acid amplification. CULTURE  05/24/2022 09:53 PM     NEGATIVE: HSV-2 DNA not detected by nucleic acid amplification. CULTURE  05/24/2022 09:53 PM     Due to the specimen source, HSV 1,2 testing was performed by a molecular method. Radiology:  CTA ABDOMINAL AORTA W BILAT RUNOFF W CONTRAST    Result Date: 9/7/2022  1. Severe peripheral vascular disease affecting occluded the mid distal popliteal arteries bilateral and corresponding popliteal branches to the area of the right and left legs down to the ankle/foot area with poor circulation distally. Multiple collateral from geniculate arteries are present but there is no effective reconstitution down to the level of the ankle/foot bilaterally. 2.  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   The prominence of lymph nodes present could Leukocytosis. - resolved. Discharge planning: DC today on Eliquis. Discussed with Vascular and Pharmacy. Eliquis is covered. Vascular agrees with plan.      Brianna Elias DO  9/11/2022  7:26 AM

## 2022-09-11 NOTE — PLAN OF CARE
Problem: Discharge Planning  Goal: Discharge to home or other facility with appropriate resources  9/11/2022 0627 by Arjun Leroy RN  Outcome: Progressing  9/10/2022 1728 by Anthony Herron RN  Outcome: Progressing     Problem: Pain  Goal: Verbalizes/displays adequate comfort level or baseline comfort level  9/11/2022 0627 by Arjun Leroy RN  Outcome: Progressing  9/10/2022 1728 by Anthony Herron RN  Outcome: Progressing     Problem: Skin/Tissue Integrity  Goal: Absence of new skin breakdown  Description: 1. Monitor for areas of redness and/or skin breakdown  2. Assess vascular access sites hourly  3. Every 4-6 hours minimum:  Change oxygen saturation probe site  4. Every 4-6 hours:  If on nasal continuous positive airway pressure, respiratory therapy assess nares and determine need for appliance change or resting period.   Outcome: Progressing     Problem: ABCDS Injury Assessment  Goal: Absence of physical injury  Outcome: Progressing     Problem: Safety - Adult  Goal: Free from fall injury  9/11/2022 0627 by Arjun Leroy RN  Outcome: Progressing  9/10/2022 1728 by Anthony Herron RN  Outcome: Progressing  Flowsheets (Taken 9/10/2022 1122 by Amanda Boyce RN)  Free From Fall Injury: Instruct family/caregiver on patient safety

## 2022-09-11 NOTE — PROGRESS NOTES
Report called and given to Missael Gurrola RN. Pt currently in bathroom washing up, IV heparin infusing at current time await therapeutic PTT results. Will transfer to room 421.

## 2022-09-11 NOTE — PROGRESS NOTES
JAVIER Valencia and NA at bedside, assisting pt to transfer to bathroom, NA attempts to move IV pole, NA states the IV tubing is disconnected from patient and IV fluids are infusing onto the floor. Pt states to SIDDHARTHA Shah RN and NA that she got up to use the bathroom at midnight last night, she states that the night shift RN disconnected the IV before she got up to use the bathroom and it was never reconnected after she returned from the bathroom. Chema Pinto RN restarts the heparin gtt, all connections tightened, educates pt to call staff when IV is alarming or is not infusing. Pt verbalizes understanding.

## 2022-09-12 ENCOUNTER — HOSPITAL ENCOUNTER (EMERGENCY)
Age: 47
Discharge: HOME | End: 2022-09-12
Attending: EMERGENCY MEDICINE
Payer: COMMERCIAL

## 2022-09-12 ENCOUNTER — TELEPHONE (OUTPATIENT)
Dept: FAMILY MEDICINE CLINIC | Age: 47
End: 2022-09-12

## 2022-09-12 DIAGNOSIS — N90.89 LABIAL LESION: ICD-10-CM

## 2022-09-12 DIAGNOSIS — L03.113 CELLULITIS OF RIGHT UPPER EXTREMITY: Primary | ICD-10-CM

## 2022-09-12 LAB
ABSOLUTE EOS #: 0.08 K/UL (ref 0–0.44)
ABSOLUTE IMMATURE GRANULOCYTE: 0.1 K/UL (ref 0–0.3)
ABSOLUTE LYMPH #: 2.32 K/UL (ref 1.1–3.7)
ABSOLUTE MONO #: 1.02 K/UL (ref 0.1–1.2)
ANION GAP SERPL CALCULATED.3IONS-SCNC: 14 MMOL/L (ref 9–17)
BASOPHILS # BLD: 0 % (ref 0–2)
BASOPHILS ABSOLUTE: 0.05 K/UL (ref 0–0.2)
BUN BLDV-MCNC: 10 MG/DL (ref 6–20)
CALCIUM SERPL-MCNC: 9.6 MG/DL (ref 8.6–10.4)
CHLORIDE BLD-SCNC: 100 MMOL/L (ref 98–107)
CO2: 22 MMOL/L (ref 20–31)
CREAT SERPL-MCNC: 0.68 MG/DL (ref 0.5–0.9)
EKG ATRIAL RATE: 68 BPM
EKG P AXIS: 36 DEGREES
EKG P-R INTERVAL: 154 MS
EKG Q-T INTERVAL: 420 MS
EKG QRS DURATION: 86 MS
EKG QTC CALCULATION (BAZETT): 446 MS
EKG R AXIS: -20 DEGREES
EKG T AXIS: -4 DEGREES
EKG VENTRICULAR RATE: 68 BPM
EOSINOPHILS RELATIVE PERCENT: 1 % (ref 1–4)
FACTOR V MUTATION: NEGATIVE
GFR AFRICAN AMERICAN: >60 ML/MIN
GFR NON-AFRICAN AMERICAN: >60 ML/MIN
GFR SERPL CREATININE-BSD FRML MDRD: ABNORMAL ML/MIN/{1.73_M2}
GLUCOSE BLD-MCNC: 122 MG/DL (ref 70–99)
HCT VFR BLD CALC: 38.3 % (ref 36.3–47.1)
HEMOGLOBIN: 12.9 G/DL (ref 11.9–15.1)
IMMATURE GRANULOCYTES: 1 %
LYMPHOCYTES # BLD: 18 % (ref 24–43)
MCH RBC QN AUTO: 29.1 PG (ref 25.2–33.5)
MCHC RBC AUTO-ENTMCNC: 33.7 G/DL (ref 28.4–34.8)
MCV RBC AUTO: 86.5 FL (ref 82.6–102.9)
MONOCYTES # BLD: 8 % (ref 3–12)
NRBC AUTOMATED: 0 PER 100 WBC
PDW BLD-RTO: 13.3 % (ref 11.8–14.4)
PLATELET # BLD: 340 K/UL (ref 138–453)
PMV BLD AUTO: 10 FL (ref 8.1–13.5)
POTASSIUM SERPL-SCNC: 4.4 MMOL/L (ref 3.7–5.3)
RBC # BLD: 4.43 M/UL (ref 3.95–5.11)
SEG NEUTROPHILS: 72 % (ref 36–65)
SEGMENTED NEUTROPHILS ABSOLUTE COUNT: 9.27 K/UL (ref 1.5–8.1)
SODIUM BLD-SCNC: 136 MMOL/L (ref 135–144)
SPECIMEN: NORMAL
WBC # BLD: 12.8 K/UL (ref 3.5–11.3)

## 2022-09-12 PROCEDURE — 99284 EMERGENCY DEPT VISIT MOD MDM: CPT

## 2022-09-12 PROCEDURE — 85025 COMPLETE CBC W/AUTO DIFF WBC: CPT

## 2022-09-12 PROCEDURE — 96374 THER/PROPH/DIAG INJ IV PUSH: CPT

## 2022-09-12 PROCEDURE — 93005 ELECTROCARDIOGRAM TRACING: CPT | Performed by: STUDENT IN AN ORGANIZED HEALTH CARE EDUCATION/TRAINING PROGRAM

## 2022-09-12 PROCEDURE — 6360000002 HC RX W HCPCS: Performed by: STUDENT IN AN ORGANIZED HEALTH CARE EDUCATION/TRAINING PROGRAM

## 2022-09-12 PROCEDURE — 80048 BASIC METABOLIC PNL TOTAL CA: CPT

## 2022-09-12 PROCEDURE — 93971 EXTREMITY STUDY: CPT

## 2022-09-12 PROCEDURE — 6360000002 HC RX W HCPCS

## 2022-09-12 PROCEDURE — 93010 ELECTROCARDIOGRAM REPORT: CPT | Performed by: INTERNAL MEDICINE

## 2022-09-12 PROCEDURE — 6370000000 HC RX 637 (ALT 250 FOR IP): Performed by: STUDENT IN AN ORGANIZED HEALTH CARE EDUCATION/TRAINING PROGRAM

## 2022-09-12 PROCEDURE — 96375 TX/PRO/DX INJ NEW DRUG ADDON: CPT

## 2022-09-12 RX ORDER — DIPHENHYDRAMINE HYDROCHLORIDE 50 MG/ML
INJECTION INTRAMUSCULAR; INTRAVENOUS
Status: COMPLETED
Start: 2022-09-12 | End: 2022-09-12

## 2022-09-12 RX ORDER — MORPHINE SULFATE 4 MG/ML
4 INJECTION, SOLUTION INTRAMUSCULAR; INTRAVENOUS ONCE
Status: COMPLETED | OUTPATIENT
Start: 2022-09-12 | End: 2022-09-12

## 2022-09-12 RX ORDER — DOXYCYCLINE HYCLATE 100 MG
100 TABLET ORAL 2 TIMES DAILY
Qty: 14 TABLET | Refills: 0 | Status: SHIPPED | OUTPATIENT
Start: 2022-09-12 | End: 2022-09-19

## 2022-09-12 RX ORDER — GINSENG 100 MG
CAPSULE ORAL
Qty: 1 EACH | Refills: 0 | Status: SHIPPED | OUTPATIENT
Start: 2022-09-12 | End: 2022-09-15

## 2022-09-12 RX ORDER — DIPHENHYDRAMINE HYDROCHLORIDE 50 MG/ML
25 INJECTION INTRAMUSCULAR; INTRAVENOUS ONCE
Status: COMPLETED | OUTPATIENT
Start: 2022-09-12 | End: 2022-09-12

## 2022-09-12 RX ORDER — DOXYCYCLINE HYCLATE 100 MG
100 TABLET ORAL ONCE
Status: COMPLETED | OUTPATIENT
Start: 2022-09-12 | End: 2022-09-12

## 2022-09-12 RX ORDER — GINSENG 100 MG
CAPSULE ORAL
Qty: 14 G | OUTPATIENT
Start: 2022-09-12

## 2022-09-12 RX ADMIN — DIPHENHYDRAMINE HYDROCHLORIDE 25 MG: 50 INJECTION INTRAMUSCULAR; INTRAVENOUS at 14:51

## 2022-09-12 RX ADMIN — DOXYCYCLINE HYCLATE 100 MG: 100 TABLET, COATED ORAL at 17:59

## 2022-09-12 RX ADMIN — MORPHINE SULFATE 4 MG: 4 INJECTION INTRAVENOUS at 14:20

## 2022-09-12 ASSESSMENT — ENCOUNTER SYMPTOMS
EYE PAIN: 0
EYE REDNESS: 0
NAUSEA: 0
COUGH: 0
CHEST TIGHTNESS: 0
DIARRHEA: 0
PHOTOPHOBIA: 0
CONSTIPATION: 0
RHINORRHEA: 0
SORE THROAT: 0
SINUS PRESSURE: 0
SHORTNESS OF BREATH: 0
ABDOMINAL PAIN: 0
COLOR CHANGE: 0
VOMITING: 0

## 2022-09-12 NOTE — PROGRESS NOTES
CLINICAL PHARMACY NOTE: MEDS TO BEDS    Total # of Prescriptions Filled: 1   The following medications were delivered to the patient:  Eliquis 5mg    Additional Documentation: delivered to patient in room 421 9/11 at 3:46pm. No co-pay. Marilyn delivered.

## 2022-09-12 NOTE — ED PROVIDER NOTES
[facility]     Emergency Department     Faculty Attestation    I performed a history and physical examination of the patient and discussed management with the resident. I have reviewed and agree with the residents findings including all diagnostic interpretations, and treatment plans as written at the time of my review. Any areas of disagreement are noted on the chart. I was personally present for the key portions of any procedures. I have documented in the chart those procedures where I was not present during the key portions. For Physician Assistant/ Nurse Practitioner cases/documentation I have personally evaluated this patient and have completed at least one if not all key elements of the E/M (history, physical exam, and MDM). Additional findings are as noted. Primary Care Physician: Rosa Naranjo MD    History: This is a 52 y.o. female who presents to the Emergency Department with complaint of arm pain. The patient complained of right arm pain which she had IV recently placed. She does complain of pain radiating up the arm into the chest.  She denies any shortness of breath. Patient has a history of DVT and is on anticoagulation. Patient states she is compliant with her medications. Physical:   vitals were not taken for this visit. The right antecubital fossa is erythematous tender with some fluctuant just proximal to the antecubital fossa. Patient is exquisitely tender to palpation. Impression: Right arm pain    Plan: CBC, BMP, venous Doppler, analgesia, antibiotics    EKG interpretation  And tripped EKG  Normal sinus rhythm with sinus arrhythmia and a ventricular of 88, normal UT interval, normal QRS duration, normal QT corrected      (Please note that portions of this note were completed with a voice recognition program.  Efforts were made to edit the dictations but occasionally words are mis-transcribed.)    Tien Yung.  MD [place], [facility]  Attending Emergency Medicine Physician         Margot Castano MD  09/12/22 1434       Margot Castano MD  09/12/22 7966

## 2022-09-12 NOTE — ED NOTES
The following labs were labeled with appropriate pt sticker and tubed to lab:     [] Blue     [x] Lavender   [] on ice  [x] Green/yellow  [] Green/black [] on ice  [] Claudeen Porch  [] on ice  [] Yellow  [] Red  [] Pink  [] ABG  [] VBG    [] COVID-19 swab    [] Rapid  [] PCR  [] Flu swab  [] Peds Viral Panel     [] Urine Sample  [] Pelvic Cultures  [] Blood Cultures  [] X 2  [] STREP Cultures         Summer JAVIER Noland  09/12/22 3106

## 2022-09-12 NOTE — ED PROVIDER NOTES
[facility]  ED  Emergency Department Encounter  EmergencyMedicine Resident     Pt Tony Sotelo  MRN: [id number]  Emig[place]ogflev 1975  Date of evaluation: 9/12/22  PCP:  Sivakumar Martinez MD      CHIEF COMPLAINT       Chief Complaint   Patient presents with    Arm Pain     Phlebitis to right arm d/t IV placed last night       HISTORY OF PRESENT ILLNESS  (Location/Symptom, Timing/Onset, Context/Setting, Quality, Duration, Modifying Factors, Severity.)      Michele Mukherjee is a 52 y.o. female who presents with right arm pain and swelling at the right antecubital site. Patient was discharged yesterday from the hospital and she received a right lower extremity thrombectomy. She has been on Eliquis since then has been compliant with her medication regimen. She states that since yesterday when her right AC IV was removed she developed this pain and swelling at the site as well as warmth and erythema. Said the IV was the same IV she received in the emergency department throughout her stay in the hospital.  She is afebrile, denies any chest pain or shortness of breath, no fevers or chills no nausea or vomiting. Denying any lower calf pain or swelling.       PAST MEDICAL / SURGICAL / SOCIAL / FAMILY HISTORY      has a past medical history of Abnormal Pap smear of cervix, Anemia, Chronic back pain, Claudication (HCC), Claudication in peripheral vascular disease ([address] Depression, Headache(784.0), Heart murmur, Hemorrhage of rectum and anus, History of blood transfusion, HTN (hypertension), Hx of blood clots, Hypercoagulable state ([address] Hyperlipidemia, Hypertension, Intertriginous candidiasis, Left popliteal artery occlusion ([address] Leg pain, Menometrorrhagia, Migraine headache with aura, Obesity, Osteoarthritis, PAD (peripheral artery disease) ([address] Patient in clinical research study, PVD (peripheral vascular disease) ([address] Takotsubo cardiomyopathy, and Wound of right leg.       has a past surgical history that includes Tubal ligation ();  section (); other surgical history (2017); other surgical history (Left, 2018); other surgical history (Left, 2018); vascular surgery (Left, 2018); pr reoperation, bypass graft (Left, 2018); pr vein bypass graft,fem-pop (Left, 2018); thrombectomy (Right, 2019); IVC filter insertion; Toe amputation (Right, 2019); Toe amputation (Right, 2019); Foot Amputation (Right, 3/8/2021); and vascular surgery (Right, 2022).       Social History     Socioeconomic History    Marital status: Single     Spouse name: Not on file    Number of children: Not on file    Years of education: Not on file    Highest education level: Not on file   Occupational History    Not on file   Tobacco Use    Smoking status: Never    Smokeless tobacco: Never   Vaping Use    Vaping Use: Never used   Substance and Sexual Activity    Alcohol use: Not Currently     Comment: occasionally     Drug use: No    Sexual activity: Not Currently     Partners: Male   Other Topics Concern    Not on file   Social History Narrative    Not on file     Social Determinants of Health     Financial Resource Strain: Not on file   Food Insecurity: Not on file   Transportation Needs: Not on file   Physical Activity: Not on file   Stress: Not on file   Social Connections: Not on file   Intimate Partner Violence: Not on file   Housing Stability: Not on file       Family History   Problem Relation Age of Onset    Diabetes Mother     High Blood Pressure Mother     Heart Attack Mother     Heart Disease Mother     Kidney Disease Mother     High Blood Pressure Father     Heart Disease Father     Heart Attack Father     Diabetes Sister     High Blood Pressure Sister     Diabetes Sister     High Blood Pressure Sister        Allergies:  Asa [aspirin], Lopid [gemfibrozil], Nortriptyline, Pcn [penicillins], Sulfa antibiotics, Morphine, and Ibuprofen    Home Medications:  Prior to Admission medications    Medication Sig Start Date End Date Taking? Authorizing Provider   doxycycline hyclate (VIBRA-TABS) 100 MG tablet Take 1 tablet by mouth 2 times daily for 7 days 9/12/22 9/19/22 Yes John Quiroz MD   bacitracin 500 UNIT/GM ointment Apply topically 2 times daily. 9/12/22 9/22/22  Diania Nearing, DO   apixaban (ELIQUIS) 5 MG TABS tablet Take 2 tablets by mouth 2 times daily for 7 days 9/11/22 9/18/22  [employer], DO   apixaban (ELIQUIS) 5 MG TABS tablet Take 1 tablet by mouth 2 times daily 9/19/22   [employer], DO   lisinopril (PRINIVIL;ZESTRIL) 5 MG tablet take 1 tablet by mouth once daily 6/3/22   Mitch Mays MD   acetaminophen (TYLENOL) 325 MG tablet Take 2 tablets by mouth every 6 hours as needed for Pain 4/26/22   Rica Troy MD   ondansetron (ZOFRAN ODT) 4 MG disintegrating tablet Take 1 tablet by mouth every 8 hours as needed for Nausea  Patient not taking: No sig reported 4/26/22   Rica Troy MD   atorvastatin (LIPITOR) 40 MG tablet take 1 tablet by mouth once daily 4/25/22   Lorna Clark MD   amLODIPine (NORVASC) 10 MG tablet take 1 tablet by mouth once daily 1/19/22   Gustabo Doe MD       REVIEW OF SYSTEMS    (2-9 systems for level 4, 10 or more for level 5)      Review of Systems   Constitutional:  Negative for activity change, chills, diaphoresis, fatigue and fever. HENT:  Negative for congestion, rhinorrhea, sinus pressure and sore throat. Eyes:  Negative for photophobia, pain and redness. Respiratory:  Negative for cough, chest tightness and shortness of breath. Cardiovascular:  Negative for chest pain and leg swelling. Gastrointestinal:  Negative for abdominal pain, constipation, diarrhea, nausea and vomiting. Genitourinary:  Negative for dysuria, flank pain, frequency and urgency. Musculoskeletal:  Negative for arthralgias, joint swelling, myalgias and neck stiffness. Skin:  Negative for color change, pallor and rash. Erythema, pain on palpation and swelling to right antecubital   Neurological:  Negative for dizziness, syncope, weakness, light-headedness, numbness and headaches. PHYSICAL EXAM   (up to 7 for level 4, 8 or more for level 5)      INITIAL VITALS:   There were no vitals taken for this visit. Physical Exam  Constitutional:  Well developed, Well nourished. In pain  HENT:  Normocephalic, Atraumatic, Bilateral external ears normal,  Nose normal.   Neck: Normal range of motion, No stridor. Eyes:   No discharge. Respiratory:   No respiratory distress, Normal breath sounds without any wheezing, rales or rhonchi. Cardiovascular: Normal S1, S2. No rubs, gallops or murmurs. Gastrointestinal:  No organomegaly, no tenderness, no rebound or guarding. Musculoskeletal:  No extremity deformity. Full range of motion of right elbow  Lymphatic: No lymphadenopathy noted  Skin:  Warm, Dry,  No rash. Erythematous, swollen and pain on palpation to right antecubital no swelling proximal, pulses intact distally as well as sensation and motor  Neurologic:  Alert & oriented x 3, Normal motor function,  No focal deficits noted.    Psychiatric:  Affect normal, Judgment normal, Mood normal.              DIFFERENTIAL  DIAGNOSIS     PLAN (LABS / IMAGING / EKG):  Orders Placed This Encounter   Procedures    VL DUP UPPER EXTREMITY VENOUS RIGHT    CBC with Auto Differential    Basic Metabolic Panel w/ Reflex to MG       MEDICATIONS ORDERED:  Orders Placed This Encounter   Medications    morphine injection 4 mg    diphenhydrAMINE (BENADRYL) 50 MG/ML injection     Kirstin Brandon: cabinet override    diphenhydrAMINE (BENADRYL) injection 25 mg    doxycycline hyclate (VIBRA-TABS) tablet 100 mg     Order Specific Question:   Antimicrobial Indications     Answer:   Skin and Soft Tissue Infection    doxycycline hyclate (VIBRA-TABS) 100 MG tablet     Sig: Take 1 tablet by mouth 2 times daily for 7 days     Dispense:  14 tablet     Refill:  0 DDX: DVT to right upper extremity, cellulitis, thrombophlebitis    DIAGNOSTIC RESULTS / EMERGENCY DEPARTMENT COURSE / MDM   LAB RESULTS:  Results for orders placed or performed during the hospital encounter of 09/12/22   CBC with Auto Differential   Result Value Ref Range    WBC 12.8 (H) 3.5 - 11.3 k/uL    RBC 4.43 3.95 - 5.11 m/uL    Hemoglobin 12.9 11.9 - 15.1 g/dL    Hematocrit 38.3 36.3 - 47.1 %    MCV 86.5 82.6 - 102.9 fL    MCH 29.1 25.2 - 33.5 pg    MCHC 33.7 28.4 - 34.8 g/dL    RDW 13.3 11.8 - 14.4 %    Platelets 509 289 - 631 k/uL    MPV 10.0 8.1 - 13.5 fL    NRBC Automated 0.0 0.0 per 100 WBC    Seg Neutrophils 72 (H) 36 - 65 %    Lymphocytes 18 (L) 24 - 43 %    Monocytes 8 3 - 12 %    Eosinophils % 1 1 - 4 %    Basophils 0 0 - 2 %    Immature Granulocytes 1 (H) 0 %    Segs Absolute 9.27 (H) 1.50 - 8.10 k/uL    Absolute Lymph # 2.32 1.10 - 3.70 k/uL    Absolute Mono # 1.02 0.10 - 1.20 k/uL    Absolute Eos # 0.08 0.00 - 0.44 k/uL    Basophils Absolute 0.05 0.00 - 0.20 k/uL    Absolute Immature Granulocyte 0.10 0.00 - 0.30 k/uL   Basic Metabolic Panel w/ Reflex to MG   Result Value Ref Range    Glucose 122 (H) 70 - 99 mg/dL    BUN 10 6 - 20 mg/dL    Creatinine 0.68 0.50 - 0.90 mg/dL    Calcium 9.6 8.6 - 10.4 mg/dL    Sodium 136 135 - 144 mmol/L    Potassium 4.4 3.7 - 5.3 mmol/L    Chloride 100 98 - 107 mmol/L    CO2 22 20 - 31 mmol/L    Anion Gap 14 9 - 17 mmol/L    GFR Non-African American >60 >60 mL/min    GFR African American >60 >60 mL/min    GFR Comment               RADIOLOGY:  VL DUP UPPER EXTREMITY VENOUS RIGHT   Final Result             IMPRESSION: 71-year-old female with history of DVTs with recent hospital admission senting with right antecubital swelling and pain, at the site of the IV that was pulled yesterday. This is likely an infection induced by the IV that is been in place for several days in the hospital.  She has no systemic symptoms however is in extreme pain.   Will administer IV opioids, obtain bedside ultrasound to evaluate for an abscess, formal ultrasound to evaluate for right upper extremity DVT other DVT is less likely as patient has been compliant with her Eliquis. Anticipate p.o. antibiotics if pain cannot be under control, consider obs admission. Bedside ultrasound shows cobblestoning concerning for cellulitis, no abscess    DVT shows noncompressibility right cephalic AF age indeterminant. Patient is already on Eliquis. Notified patient of finding and will follow up with PCP. Given a dose of doxycycline as patient is allergic to sulfa as well as penicillin. Also wrote a prescription for 7-day course. EMERGENCY DEPARTMENT COURSE:               PROCEDURES:      CONSULTS:  None        FINAL IMPRESSION      1.  Cellulitis of right upper extremity          DISPOSITION / PLAN     DISPOSITION Decision To Discharge 09/12/2022 05:37:14 PM      PATIENT REFERRED TO:  Davey Saleem MD  [address] [place] [address] [facility] ED  [address] [place] [id number]    If symptoms worsen    DISCHARGE MEDICATIONS:  New Prescriptions    DOXYCYCLINE HYCLATE (VIBRA-TABS) 100 MG TABLET    Take 1 tablet by mouth 2 times daily for 7 days       Becki Golden MD  Emergency Medicine Resident PGY2    (Please note that portions of thisnote were completed with a voice recognition program.  Efforts were made to edit the dictations but occasionally words are mis-transcribed.)        Becki Golden MD  Resident  09/12/22 0249

## 2022-09-12 NOTE — DISCHARGE INSTRUCTIONS
You were seen today for a skin infection. You are also found to have a blood clot in the superficial vein in your right upper extremity. You are already on Eliquis as a treatment for this, so continue taking this as prescribed. Please apply warm compresses to the area for 10-15 minutes at a time to help promote drainage. You were started on , which is an antibiotic. You were given the first dose in the ED. Please fill your prescription and take as prescribed for the entire course. Please make sure to drink plenty of water and avoid alcohol while on this medication. You can use Ibuprofen or Tylenol as needed for pain and discomfort    Please follow-up at your primary care provider or return to the ED in 3 days for a wound recheck. Return to the Emergency Department for redness around wound, fever > 101.5, excessive nausea or vomiting, any other care or concern.

## 2022-09-13 RX ORDER — SODIUM CHLORIDE, SODIUM LACTATE, POTASSIUM CHLORIDE, CALCIUM CHLORIDE 600; 310; 30; 20 MG/100ML; MG/100ML; MG/100ML; MG/100ML
1000 INJECTION, SOLUTION INTRAVENOUS CONTINUOUS
Status: CANCELLED | OUTPATIENT
Start: 2022-09-13

## 2022-09-13 NOTE — DISCHARGE INSTRUCTIONS
the hospital throughout your procedure. Chana Hodgkins must remain with you once you have arrived home for the first 24 hours after your surgery if you receive anesthesia or medication. If you do not have someone to stay with you, your procedure may be cancelled. 4) Do not wear any jewelry or body piercings day of surgery. 5) In case of illness - If you have cold or flu like symptoms (high fever, runny nose, sore throat, cough, etc.) rash, nausea, vomiting, loose stools, and/or recent contact with someone who has a contagious disease (Covid-19, chicken pox, measles, etc.) PLEASE notify your surgeon as soon as possible. 9/13/22  2:14 PM      ___________________  _______________________  Signature (Provider)              Signature (Patient)     Day of Surgery/Procedure    As a patient at [facility] you can expect quality medical and nursing care that is centered on your individual needs. Our goal is to make your surgical experience as comfortable as possible  . Directions to the [address] [place] [address] is located at [address] [facility], [address] [facility]. Please pull into the Emergency/Surgery Center parking lot or there is additional parking across the street. You will enter the facility under through the glass doors and proceed to registration check-in which is right inside the door. Thereafter you will be directed to the [address] [place] [address]. Patient Instructions    ·Please shower the night before and the morning of surgery with an antibacterial soap. Please use the cleaning solution (bottle) given to you the night before your surgery after your shower. Unless otherwise told by your physician, please do not shave legs or any part of your body below your neck the night before or day of your surgery. You may shave your face or neck. ·Please wear loose, comfortable clothing.   If you are potentially going to have a cast or brace bring clothing that will fit over them. ·Bring a list of all medications you take, along with the dose of the medications and how often you take it. If more convenient bring the pharmacy bottles in a zip lock bag. ·Brush your teeth but do not swallow water. ·Bring your eyeglasses and case with you. No contacts are to be worn the day of surgery. You also may bring your hearing aids. ·Do not bring any valuables, such as jewelry, cash or credit cards. If you are staying overnight with us, please bring a SMALL bag of personal items. We cannot accommodate large items, like suitcases. ·If your child is having surgery please make arrangements for any other children to be cared for at home on the day of surgery. Other children are not permitted in recovery room and we want you to be able to spend time with the patient. If other arrangements are not available then we suggest that you have a second adult to stay in the waiting room. ·If you are having any type of anesthesia you are to have nothing to eat or drink after midnight the night before your surgery. This includes gum, mints, water or smoking or chewing tobacco.  The only exception to this is a small sip of water to take with any morning dose of heart, blood pressure, or seizure medications. ·Bring your inhaler if you are currently using one. ·Bring your blood band if one has been given to you. Please do not close the clasp. ·If you are on C-PAP or Bi-PAP at home and plan on staying in the hospital overnight for your surgery please bring the machine with you. ·Do not wear any jewelry or body piercings day of surgery. Also, NO lotion, perfume or deodorant to be used the day of surgery. If you have any other questions regarding your procedure/surgery please call  your surgeon's office.      If you have a last minute question(s) the DAY OF your surgery, you may call [phone]

## 2022-09-14 ENCOUNTER — OFFICE VISIT (OUTPATIENT)
Dept: FAMILY MEDICINE CLINIC | Age: 47
End: 2022-09-14
Payer: COMMERCIAL

## 2022-09-14 VITALS
WEIGHT: 182 LBS | HEART RATE: 75 BPM | TEMPERATURE: 97 F | HEIGHT: 65 IN | SYSTOLIC BLOOD PRESSURE: 102 MMHG | BODY MASS INDEX: 30.32 KG/M2 | DIASTOLIC BLOOD PRESSURE: 66 MMHG

## 2022-09-14 DIAGNOSIS — I82.619 CEPHALIC VEIN THROMBOSIS: ICD-10-CM

## 2022-09-14 DIAGNOSIS — M79.601 RIGHT ARM PAIN: Primary | ICD-10-CM

## 2022-09-14 PROCEDURE — 99211 OFF/OP EST MAY X REQ PHY/QHP: CPT

## 2022-09-14 PROCEDURE — 99213 OFFICE O/P EST LOW 20 MIN: CPT

## 2022-09-14 RX ORDER — ACETAMINOPHEN 500 MG
500 TABLET ORAL 4 TIMES DAILY PRN
Qty: 120 TABLET | Refills: 0 | Status: SHIPPED | OUTPATIENT
Start: 2022-09-14

## 2022-09-14 ASSESSMENT — ENCOUNTER SYMPTOMS
DIARRHEA: 0
SHORTNESS OF BREATH: 0
ABDOMINAL PAIN: 0
NAUSEA: 0
VOMITING: 0
CONSTIPATION: 0

## 2022-09-14 NOTE — PROGRESS NOTES
Visit Information    Have you changed or started any medications since your last visit including any over-the-counter medicines, vitamins, or herbal medicines? no   Have you stopped taking any of your medications? Is so, why? -  no  Are you having any side effects from any of your medications? - no    Have you seen any other physician or provider since your last visit?  no   Have you had any other diagnostic tests since your last visit? yes - Labs, VL DUP, EKG   Have you been seen in the emergency room and/or had an admission in a hospital since we last saw you?  yes - Fabienne Alexander   Have you had your routine dental cleaning in the past 6 months?  no     Do you have an active MyChart account? If no, what is the barrier?   Yes    Patient Care Team:  Bigg Chang MD as PCP - General (Family Medicine)  Norberto Severs, MD as Consulting Physician (Gastroenterology)    Medical History Review  Past Medical, Family, and Social History reviewed and does contribute to the patient presenting condition    Health Maintenance   Topic Date Due    COVID-19 Vaccine (1) Never done    Pneumococcal 0-64 years Vaccine (1 - PCV) Never done    HIV screen  Never done    Hepatitis C screen  Never done    DTaP/Tdap/Td vaccine (1 - Tdap) Never done    Lipids  10/03/2019    Colorectal Cancer Screen  05/06/2020    A1C test (Diabetic or Prediabetic)  03/05/2021    Flu vaccine (1) 09/01/2022    Depression Monitoring  05/24/2023    Cervical cancer screen  05/24/2027    Hepatitis A vaccine  Aged Out    Hepatitis B vaccine  Aged Out    Hib vaccine  Aged Out    Meningococcal (ACWY) vaccine  Aged Out

## 2022-09-14 NOTE — PATIENT INSTRUCTIONS
Thank you for letting us take care of you today. We hope all your questions were addressed. If a question was overlooked or something else comes to mind after you return home, please contact a member of your Care Team listed below. Your Care Team at [place] [address] is Team #2  Khoa Barnes DO (Faculty)  Kvng Escobedo (Faculty)  Wei Davenport MD (Resident)  Naila Wing MD (Resident)  Tito Prader, MD (Resident)  Arthur Davidson MD (Resident)  Nadine García., KATHI Mckeon.,  MEHUL Sapp., IFRAH Padilla., Kita Leventhal [facility] office)  Franki Rodriguez, [address] (Clinical Practice Manager)  Sherrill Irwin, [address] [place] [address] (Clinical Pharmacist)     Office phone number: [phone]    If you need to get in right away due to illness, please be advised we have \"Same Day\" appointments available Monday-Friday. Please call us at [phone] option #3 to schedule your \"Same Day\" appointment.

## 2022-09-14 NOTE — PROGRESS NOTES
Attending Physician Statement  I  have discussed the care of Zehra Patterson including pertinent history and exam findings with the resident. I agree with the assessment, plan and orders as documented by the resident. Hospital f/u after DVT  Has hx of PAD  Currently on Eliquis higher dose and has vascular follow up tomorrow   Pain management addressed today    /66 (Site: Left Upper Arm, Position: Sitting, Cuff Size: Medium Adult)   Pulse 75   Temp 97 °F (36.1 °C) (Temporal)   Ht 5' 5\" (1.651 m)   Wt 182 lb (82.6 kg)   LMP 09/12/2022   BMI 30.29 kg/m²    BP Readings from Last 3 Encounters:   09/14/22 102/66   09/11/22 (!) 141/87   09/02/22 132/74     Wt Readings from Last 3 Encounters:   09/14/22 182 lb (82.6 kg)   09/08/22 198 lb 6.6 oz (90 kg)   09/02/22 187 lb (84.8 kg)          Diagnosis Orders   1. Right arm pain  diclofenac sodium (VOLTAREN) 1 % GEL    acetaminophen (TYLENOL) 500 MG tablet      2.  Cephalic vein thrombosis            Abimael Redman MD 9/15/2022 9:44 AM

## 2022-09-14 NOTE — PROGRESS NOTES
Subjective:    Jos Swift is a 52 y.o. female with  has a past medical history of Abnormal Pap smear of cervix, Anemia, Chronic back pain, Claudication (HCC), Claudication in peripheral vascular disease ([address] Depression, Headache(784.0), Heart murmur, Hemorrhage of rectum and anus, History of blood transfusion, HTN (hypertension), Hx of blood clots, Hypercoagulable state ([address] Hyperlipidemia, Hypertension, Intertriginous candidiasis, Left popliteal artery occlusion ([address] Leg pain, Menometrorrhagia, Migraine headache with aura, Obesity, Osteoarthritis, PAD (peripheral artery disease) ([address] Patient in clinical research study, PVD (peripheral vascular disease) ([facility] [address] Takotsubo cardiomyopathy, and Wound of right leg. Presented to the office today for:  Chief Complaint   Patient presents with    Follow-up     Had 4 blood clots        HPI    Patient is a 79-year-old female with severe peripheral artery disease resented to the clinic today for hospital follow-up. Patient initially went to the ED on September 6 with complaints of right leg pain. At that time it was found to patient had extensive DVTs in the right lower extremity. Patient required right lower extremity angiogram with AngioJet and jetstream due to DVTs. Patient was not discharged on Eliquis. Patient then returned to the ED today after with complaints of right upper extremity pain. Dopplers were done of the right upper extremity was found the patient had a clot in the right cephalic vein. Patient has been in pain ever since. Patient has not been taking anything for the pain due to her allergic reaction to both medications. Patient is following up with vascular surgery tomorrow. Review of Systems   Constitutional:  Negative for chills and fever. Respiratory:  Negative for shortness of breath. Cardiovascular:  Negative for chest pain. Gastrointestinal:  Negative for abdominal pain, constipation, diarrhea, nausea and vomiting. Musculoskeletal:  Positive for myalgias (Right upper arm pain secondary to clot in the cephalic vein). Neurological:  Negative for dizziness, light-headedness, numbness and headaches. The patient has a   Family History   Problem Relation Age of Onset    Diabetes Mother     High Blood Pressure Mother     Heart Attack Mother     Heart Disease Mother     Kidney Disease Mother     High Blood Pressure Father     Heart Disease Father     Heart Attack Father     Diabetes Sister     High Blood Pressure Sister     Diabetes Sister     High Blood Pressure Sister        Objective:    /66 (Site: Left Upper Arm, Position: Sitting, Cuff Size: Medium Adult)   Pulse 75   Temp 97 °F (36.1 °C) (Temporal)   Ht 5' 5\" (1.651 m)   Wt 182 lb (82.6 kg)   LMP 09/12/2022   BMI 30.29 kg/m²    BP Readings from Last 3 Encounters:   09/14/22 102/66   09/11/22 (!) 141/87   09/02/22 132/74       Physical Exam  Constitutional:       Appearance: Normal appearance. HENT:      Head: Normocephalic and atraumatic. Cardiovascular:      Rate and Rhythm: Normal rate and regular rhythm. Pulses: Normal pulses. Heart sounds: Normal heart sounds. No murmur heard. No friction rub. No gallop. Pulmonary:      Effort: Pulmonary effort is normal. No respiratory distress. Breath sounds: Normal breath sounds. No wheezing or rales. Abdominal:      General: Abdomen is flat. There is no distension. Palpations: Abdomen is soft. There is no mass. Tenderness: There is no abdominal tenderness. There is no guarding or rebound. Neurological:      Mental Status: She is alert.        Lab Results   Component Value Date    WBC 12.8 (H) 09/12/2022    HGB 12.9 09/12/2022    HCT 38.3 09/12/2022     09/12/2022    CHOL 98 10/03/2018    TRIG 137 10/03/2018    HDL 29 (L) 10/03/2018    LDLDIRECT 108 (H) 05/13/2013    ALT 11 09/08/2022    AST 14 09/08/2022     09/12/2022    K 4.4 09/12/2022     09/12/2022 CREATININE 0.68 09/12/2022    BUN 10 09/12/2022    CO2 22 09/12/2022    TSH 1.69 11/16/2019    INR 1.0 09/11/2022    LABA1C 6.1 03/05/2020     Lab Results   Component Value Date    CALCIUM 9.6 09/12/2022    PHOS 2.7 01/17/2018     Lab Results   Component Value Date    LDLCHOLESTEROL 42 10/03/2018    LDLDIRECT 108 (H) 05/13/2013       Assessment and Plan:        1. Cephalic vein thrombosis    - diclofenac sodium (VOLTAREN) 1 % GEL; Apply 4 g topically 4 times daily  Dispense: 50 g; Refill: 0  - acetaminophen (TYLENOL) 500 MG tablet; Take 1 tablet by mouth 4 times daily as needed for Pain  Dispense: 120 tablet; Refill: 0  -Patient following up with vascular surgeon tomorrow      Requested Prescriptions     Signed Prescriptions Disp Refills    diclofenac sodium (VOLTAREN) 1 % GEL 50 g 0     Sig: Apply 4 g topically 4 times daily    acetaminophen (TYLENOL) 500 MG tablet 120 tablet 0     Sig: Take 1 tablet by mouth 4 times daily as needed for Pain       There are no discontinued medications. Gale received counseling on the following healthy behaviors: nutrition, exercise and medication adherence    Discussed use,benefit, and side effects of prescribed medications. Barriers to medication compliance addressed. All patient questions answered. Pt voiced understanding. Return in about 4 weeks (around 10/12/2022) for right upper extremity blood clot follow up. Disclaimer: Some orall of this note was transcribed using voice-recognition software. This may cause typographical errors occasionally. Although all effort is made to fix these errors, please do not hesitate to contact our office if there Arlene Valentin concern with the understanding of this note.

## 2022-09-15 ENCOUNTER — HOSPITAL ENCOUNTER (OUTPATIENT)
Age: 47
Discharge: HOME | End: 2022-09-17
Payer: COMMERCIAL

## 2022-09-15 ENCOUNTER — HOSPITAL ENCOUNTER (OUTPATIENT)
Dept: GENERAL RADIOLOGY | Age: 47
Discharge: HOME | End: 2022-09-17
Payer: COMMERCIAL

## 2022-09-15 ENCOUNTER — HOSPITAL ENCOUNTER (OUTPATIENT)
Age: 47
End: 2022-09-15
Payer: COMMERCIAL

## 2022-09-15 ENCOUNTER — HOSPITAL ENCOUNTER (OUTPATIENT)
Dept: PREADMISSION TESTING | Age: 47
Discharge: HOME | End: 2022-09-19
Payer: COMMERCIAL

## 2022-09-15 VITALS
DIASTOLIC BLOOD PRESSURE: 75 MMHG | WEIGHT: 183 LBS | SYSTOLIC BLOOD PRESSURE: 115 MMHG | RESPIRATION RATE: 18 BRPM | TEMPERATURE: 96.9 F | HEART RATE: 64 BPM | HEIGHT: 65 IN | BODY MASS INDEX: 30.49 KG/M2 | OXYGEN SATURATION: 100 %

## 2022-09-15 DIAGNOSIS — M79.671 RIGHT FOOT PAIN: ICD-10-CM

## 2022-09-15 LAB
EKG ATRIAL RATE: 88 BPM
EKG P AXIS: 57 DEGREES
EKG P-R INTERVAL: 146 MS
EKG Q-T INTERVAL: 376 MS
EKG QRS DURATION: 76 MS
EKG QTC CALCULATION (BAZETT): 454 MS
EKG R AXIS: -7 DEGREES
EKG T AXIS: -2 DEGREES
EKG VENTRICULAR RATE: 88 BPM

## 2022-09-15 PROCEDURE — 93010 ELECTROCARDIOGRAM REPORT: CPT | Performed by: INTERNAL MEDICINE

## 2022-09-15 PROCEDURE — 73630 X-RAY EXAM OF FOOT: CPT
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
## 2022-09-15 NOTE — H&P
History and Physical    Pt Name: Dinorah Hall  MRN: [id number]  YOB: 1975  Date of evaluation: 9/15/2022  Primary Care Physician: Gricel Lloyd MD    SUBJECTIVE:   History of Chief Complaint:    Dinorah Hall is a 52 y.o. female who presents for PAT appointment. Patient complains of right third toe pain for the last year. She states the pain is worse when she walks, but denies any numbness or tingling. Patient states she does not use an orthotic device or ambulatory aide. Patient has had several previous toe amputations. Patient has been scheduled for 3RD PROXIMAL PHALANX AMPUTATION - Right  Allergies  is allergic to asa [aspirin], lopid [gemfibrozil], nortriptyline, pcn [penicillins], sulfa antibiotics, morphine, and ibuprofen. Medications  Prior to Admission medications    Medication Sig Start Date End Date Taking?  Authorizing Provider   diclofenac sodium (VOLTAREN) 1 % GEL Apply 4 g topically 4 times daily 9/14/22   Deisy Mack MD   acetaminophen (TYLENOL) 500 MG tablet Take 1 tablet by mouth 4 times daily as needed for Pain 9/14/22   Deisy Mack MD   doxycycline hyclate (VIBRA-TABS) 100 MG tablet Take 1 tablet by mouth 2 times daily for 7 days 9/12/22 9/19/22  Kyali Hernandez MD   apixaban (ELIQUIS) 5 MG TABS tablet Take 2 tablets by mouth 2 times daily for 7 days 9/11/22 9/18/22  Deanne Rodrigues DO   apixaban (ELIQUIS) 5 MG TABS tablet Take 1 tablet by mouth 2 times daily 9/19/22   Deanne Rodrigues DO   lisinopril (PRINIVIL;ZESTRIL) 5 MG tablet take 1 tablet by mouth once daily 6/3/22   Patrick Santos MD   atorvastatin (LIPITOR) 40 MG tablet take 1 tablet by mouth once daily 4/25/22   Minnie Sommer MD   amLODIPine (NORVASC) 10 MG tablet take 1 tablet by mouth once daily 1/19/22   Jack Pleitez MD     Past Medical History    has a past medical history of Abnormal Pap smear of cervix, Anemia, Anxiety, Chronic back pain, Claudication ([facility] [address] Claudication in peripheral vascular disease ([facility] [address] Depression, Headache(784.0), Heart murmur, Hemorrhage of rectum and anus, History of blood transfusion, HTN (hypertension), Hx of blood clots, Hypercoagulable state ([facility] [address] Hyperlipidemia, Hypertension, Intertriginous candidiasis, Left popliteal artery occlusion (HCC), Leg pain, Menometrorrhagia, Migraine headache with aura, Obesity, Osteoarthritis, PAD (peripheral artery disease) ([facility] [address] Patient in clinical research study, PVD (peripheral vascular disease) ([facility] [address] Takotsubo cardiomyopathy, Under care of service provider, Under care of service provider, Under care of service provider, and Wound of right leg. Past Surgical History   has a past surgical history that includes Tubal ligation ();  section (); other surgical history (2017); other surgical history (Left, 2018); other surgical history (Left, 2018); vascular surgery (Left, 2018); pr reoperation, bypass graft (Left, 2018); pr vein bypass graft,fem-pop (Left, 2018); thrombectomy (Right, 2019); IVC filter insertion; Toe amputation (Right, 2019); Toe amputation (Right, 2019); Foot Amputation (Right, 3/8/2021); and vascular surgery (Right, 2022). Social History   reports that she has never smoked. She has never used smokeless tobacco.    reports that she does not currently use alcohol. reports no history of drug use. Marital Status single  Children 3  Occupation home health aide  Family History  Family Status   Relation Name Status    Mother [address]     Father [facility]     Sister Bertha Jacobson Alive    MGM      MGF      [address] [facility]      PGF      Sister Caterina Fent    Sister ТАТЬЯНА Alive    Sister [address] [facility]    Brother [address]    Brother [address] [facility]    Brother [address] [facility]      family history includes Diabetes in her mother, sister, and sister;  Heart Attack in her father and mother; Heart Disease in her father and mother; High Blood Pressure in her father, mother, sister, and sister; Kidney Disease in her mother. Review of Systems:  CONSTITUTIONAL:   negative for fevers, chills, fatigue and malaise    EYES:   negative for double vision, blurred vision and photophobia    HEENT:   negative for tinnitus, epistaxis and sore throat     RESPIRATORY:   negative for cough, shortness of breath, wheezing     CARDIOVASCULAR:   negative for chest pain, palpitations, syncope, edema     GASTROINTESTINAL:   negative for nausea, vomiting     GENITOURINARY:   negative for incontinence     MUSCULOSKELETAL:   negative for neck or back pain right third toe pain    NEUROLOGICAL:   Negative for weakness and tingling  negative for headaches and dizziness     PSYCHIATRIC:   negative for anxiety       OBJECTIVE:   VITALS:  height is 5' 5\" (1.651 m) and weight is 183 lb (83 kg). Her temporal temperature is 96.9 °F (36.1 °C). Her blood pressure is 115/75 and her pulse is 64. Her respiration is 18 and oxygen saturation is 100%. CONSTITUTIONAL:alert & oriented x 3, no acute distress. Calm and pleasant. SKIN:  Warm and dry, no rashes to exposed areas of skin. HEAD:  Normocephalic, atraumatic. EYES: PERRL. EOMs intact. EARS:  Intact and equal bilaterally. Hearing grossly WNL. NOSE:  Nares patent. No rhinorrhea   MOUTH/THROAT:  Mucous membranes pink and moist, teeth appear to be intact. NECK:supple, good ROM. LUNGS: Respirations even and non-labored. Clear to auscultation bilaterally, no wheezes, rales, or rhonchi. CARDIOVASCULAR: Regular rate and rhythm, murmur appreciated. ABDOMEN: soft, non-tender, non-distended, bowel sounds active x 4   EXTREMITIES: No edema to bilateral lower extremities. No varicosities to bilateral lower extremities. NEUROLOGIC: CN II-XII are grossly intact. Gait is mildly antalgic.    Testing:   EKG: on file from 9/11/2022  Labs: on file from 9/12/2022  IMPRESSIONS:   Subluxation of 3rd proximal phalanx, right foot.    PLANS:   3RD PROXIMAL PHALANX AMPUTATION - Right    NALLELY Lake - CNP  Electronically signed 9/15/2022 at 2:47 PM

## 2022-09-17 DIAGNOSIS — I10 ESSENTIAL HYPERTENSION: ICD-10-CM

## 2022-09-17 DIAGNOSIS — I74.3 FEMOROPOPLITEAL ARTERIAL THROMBOSIS OF LEFT LOWER EXTREMITY (HCC): ICD-10-CM

## 2022-09-19 RX ORDER — LISINOPRIL 5 MG/1
TABLET ORAL
Qty: 30 TABLET | Refills: 2 | Status: SHIPPED | OUTPATIENT
Start: 2022-09-19

## 2022-09-19 RX ORDER — ATORVASTATIN CALCIUM 40 MG/1
TABLET, FILM COATED ORAL
Qty: 30 TABLET | Refills: 3 | Status: SHIPPED | OUTPATIENT
Start: 2022-09-19

## 2022-09-19 NOTE — TELEPHONE ENCOUNTER
Lisinopril pending for refill     Health Maintenance   Topic Date Due    COVID-19 Vaccine (1) Never done    Pneumococcal 0-64 years Vaccine (1 - PCV) Never done    HIV screen  Never done    Hepatitis C screen  Never done    DTaP/Tdap/Td vaccine (1 - Tdap) Never done    Lipids  10/03/2019    Colorectal Cancer Screen  05/06/2020    A1C test (Diabetic or Prediabetic)  03/05/2021    Flu vaccine (1) 09/01/2022    Depression Monitoring  05/24/2023    Cervical cancer screen  05/24/2027    Hepatitis A vaccine  Aged Out    Hepatitis B vaccine  Aged Out    Hib vaccine  Aged Out    Meningococcal (ACWY) vaccine  Aged Out             (applicable per patient's age: Cancer Screenings, Depression Screening, Fall Risk Screening, Immunizations)    Hemoglobin A1C (%)   Date Value   03/05/2020 6.1   12/18/2017 5.9   07/23/2014 5.7     LDL Cholesterol (mg/dL)   Date Value   10/03/2018 42     AST (U/L)   Date Value   09/08/2022 14     ALT (U/L)   Date Value   09/08/2022 11     BUN (mg/dL)   Date Value   09/12/2022 10      (goal A1C is < 7)   (goal LDL is <100) need 30-50% reduction from baseline     BP Readings from Last 3 Encounters:   09/15/22 115/75   09/14/22 102/66   09/11/22 (!) 141/87    (goal /80)      All Future Testing planned in [facility]:  Lab Frequency Next Occurrence   VL ARTERIAL PVR LOWER WO EXERCISE Once 07/13/2022   REJI DIGITAL SCREEN W OR WO CAD BILATERAL Once 05/24/2022   PAP Smear Once 05/24/2022   US PELVIS COMPLETE Once 05/24/2022   CT ABDOMEN PELVIS WO CONTRAST Additional Contrast? Radiologist Recommendation Once 09/25/2022       Next Visit Date:  Future Appointments   Date Time Provider Amira English   9/29/2022  2:45 PM Sampson Wilder MD heartvasc [facility]   10/7/2022  1:15 PM Tomasa Morris DPM [facility]   10/13/2022  2:15 PM Jennifer Inman MD [address] [place]            Patient Active Problem List:     Migraine headache with aura     Primary hypertension     Obesity Menometrorrhagia     Intertriginous candidiasis     Depression     Claudication in peripheral vascular disease (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

## 2022-09-19 NOTE — TELEPHONE ENCOUNTER
Atorvastatin pending for refill     Health Maintenance   Topic Date Due    COVID-19 Vaccine (1) Never done    Pneumococcal 0-64 years Vaccine (1 - PCV) Never done    HIV screen  Never done    Hepatitis C screen  Never done    DTaP/Tdap/Td vaccine (1 - Tdap) Never done    Lipids  10/03/2019    Colorectal Cancer Screen  05/06/2020    A1C test (Diabetic or Prediabetic)  03/05/2021    Flu vaccine (1) 09/01/2022    Depression Monitoring  05/24/2023    Cervical cancer screen  05/24/2027    Hepatitis A vaccine  Aged Out    Hepatitis B vaccine  Aged Out    Hib vaccine  Aged Out    Meningococcal (ACWY) vaccine  Aged Out             (applicable per patient's age: Cancer Screenings, Depression Screening, Fall Risk Screening, Immunizations)    Hemoglobin A1C (%)   Date Value   03/05/2020 6.1   12/18/2017 5.9   07/23/2014 5.7     LDL Cholesterol (mg/dL)   Date Value   10/03/2018 42     AST (U/L)   Date Value   09/08/2022 14     ALT (U/L)   Date Value   09/08/2022 11     BUN (mg/dL)   Date Value   09/12/2022 10      (goal A1C is < 7)   (goal LDL is <100) need 30-50% reduction from baseline     BP Readings from Last 3 Encounters:   09/15/22 115/75   09/14/22 102/66   09/11/22 (!) 141/87    (goal /80)      All Future Testing planned in [facility]:  Lab Frequency Next Occurrence   VL ARTERIAL PVR LOWER WO EXERCISE Once 07/13/2022   REJI DIGITAL SCREEN W OR WO CAD BILATERAL Once 05/24/2022   PAP Smear Once 05/24/2022   US PELVIS COMPLETE Once 05/24/2022   CT ABDOMEN PELVIS WO CONTRAST Additional Contrast? Radiologist Recommendation Once 09/25/2022       Next Visit Date:  Future Appointments   Date Time Provider Amira Engilsh   9/29/2022  2:45 PM Sampson Menchaca MD heartvasc [facility]   10/7/2022  1:15 PM Jessie Stone DPM [facility]   10/13/2022  2:15 PM Flaco Smith MD [address]            Patient Active Problem List:     Migraine headache with aura     Primary hypertension     Obesity Menometrorrhagia     Intertriginous candidiasis     Depression     Claudication in peripheral vascular disease ([facility] [address]     Left popliteal artery occlusion (HCC)     Hypercoagulable state ([address]     Hemorrhage of rectum and anus     Anemia     Foot pain     Wound of right leg     Takotsubo cardiomyopathy     Chest pain     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

## 2022-09-19 NOTE — DISCHARGE SUMMARY
[facility]  Office: [address], DO, Srinivas Kilgore, DO, Diogenes Kathleen, DO, Bal Nikkidee Sorensen, DO, Cordella Holstein, MD, Wm Mosher MD, Chelly Ames MD, Shar Huston MD,  Margaretmary Osler, MD, Betsy Hurley MD, Jennifer Burgos, DO, Lesley Dexter MD,  Bernard Goldman DO, Nazia Lazar MD, Pao Atkinson MD, Liz Goldman DO, Francisco Zambrano MD, Arjun Patel MD, Jaun Valenzuela MD, Nelly Kaplan MD, Zafar Simmons MD, Mark Sheets MD, Ann Zambrano DO, Baltazar Park MD, Parag Whitaker MD, Melanie De La O, CNP,  Danya Taylor, CNP, Manuela Oliveros, CNP, Gold Rodriguez, CNP,  Evangelista Nolan, DNP, Ravi Cronin, CNP, Jag Leon, CNP, Angelo Garcia, CNP, Demetrius Zuñiga, CNP, Erika Treadwell, CNP, Demi Cruz, PA-C, Mei Tello, CNS, Jammie Estevez, DNP, Mike Owens, CNP, Liyah Centeno, CNP, Sue Salgado, [facility]    Discharge Summary     Patient ID: Mendy Painting  :  1975   MRN: [id number]     ACCOUNT:  [de-identified]   Patient's PCP: Neena Pickering MD  Admit Date: 2022   Discharge Date: 2022     Length of Stay: 4  Code Status:  Prior  Admitting Physician: Nguyen Medina DO  Discharge Physician: Nguyen Medina DO     Active Discharge Diagnoses:     Hospital Problem Lists:  Principal Problem:    PAD (peripheral artery disease) ([facility]  Active Problems:    Abnormal CT of the abdomen    Popliteal artery embolism, right (HCC)    Leg pain, right    Lymphadenopathy, mesenteric    Primary hypertension    Claudication in peripheral vascular disease ([facility] [address]  Resolved Problems:    * No resolved hospital problems. *      Admission Condition:  fair     Discharged Condition: good    Hospital Stay:     Hospital Course:      Mrs. Spencer Calix is a 52year old female with a known past medical history of PAD who initially presented for right leg pain.  Developed pain two days prior to admission extending up her leg to her thigh. Described her pain as a burning sensation. Was unable to ambulate on her right leg. States it felt like her previous clot requiring thrombectomy. Has been off Eliquis for greater than 1 month due to issues filling medication. CTA was done demonstrating embolism at the level of the iliac bifurcation with popliteal artery occlusion. CT abdomen demonstrated mesenteritis. Hematology/Oncology was consulted and believed to be secondary to popliteal artery occlusion. Vascular was consulted and she was taken to OR. Status post Arteriography of the right lower extremity with lytic therapy. Status post RLE arteriogram with thrombectomy of the distal popliteal artery. Continued to improve and was discharged on Eliquis. Significant therapeutic interventions: Status post Arteriography of the right lower extremity with lytic therapy. Status post RLE arteriogram with thrombectomy of the distal popliteal artery.      Significant Diagnostic Studies:   Labs / Micro:  CBC:   Lab Results   Component Value Date/Time    WBC 12.8 09/12/2022 02:15 PM    RBC 4.43 09/12/2022 02:15 PM    HGB 12.9 09/12/2022 02:15 PM    HCT 38.3 09/12/2022 02:15 PM    MCV 86.5 09/12/2022 02:15 PM    MCH 29.1 09/12/2022 02:15 PM    MCHC 33.7 09/12/2022 02:15 PM    RDW 13.3 09/12/2022 02:15 PM     09/12/2022 02:15 PM     BMP:    Lab Results   Component Value Date/Time    GLUCOSE 122 09/12/2022 02:15 PM     09/12/2022 02:15 PM    K 4.4 09/12/2022 02:15 PM     09/12/2022 02:15 PM    CO2 22 09/12/2022 02:15 PM    ANIONGAP 14 09/12/2022 02:15 PM    BUN 10 09/12/2022 02:15 PM    CREATININE 0.68 09/12/2022 02:15 PM    BUNCRER NOT REPORTED 06/26/2020 04:28 PM    CALCIUM 9.6 09/12/2022 02:15 PM    LABGLOM >60 09/12/2022 02:15 PM    GFRAA >60 09/12/2022 02:15 PM    GFR      09/12/2022 02:15 PM     Radiology:  XR FOOT RIGHT (MIN 3 VIEWS)    Result Date: 9/19/2022  Interval 2nd toe amputation, now with possible subtle osteomyelitis at the distal end of the remaining PP, as above, with adjacent STS. No other findings suspicious for osteomyelitis, including at the 3rd MTPJ. Degenerative findings at the latter and elsewhere as described above. Consultations:    Consults:     Final Specialist Recommendations/Findings:   IP CONSULT TO VASCULAR SURGERY  IP CONSULT TO HOSPITALIST  IP CONSULT TO ONCOLOGY      The patient was seen and examined on day of discharge and this discharge summary is in conjunction with any daily progress note from day of discharge. Discharge plan:     Disposition: Home    Physician Follow Up: Albina Jonas, [id number]  [facility] [address] [phone]      Call for Doppler study for evaluation of a DVT    William John MD  . [address]  [phone]    Schedule an appointment as soon as possible for a visit in 1 week(s)      Martha Todd, [address] [facility] [facility] [address]  [phone]    Follow up in 4 week(s)  outpt CT abd       Requiring Further Evaluation/Follow Up POST HOSPITALIZATION/Incidental Findings: None. Diet: regular diet    Activity: As tolerated    Instructions to Patient:   Follow up with PCP in 1 week. Call for appointment. Follow up with Vascular as instructed. Call for appointment. Follow up with Hematology for repeat CT in 4-6 weeks (get CT before visit). Call for appointment. Eliquis as instructed. 10 mg BID x 7 days followed by 5 mg BID. Return immediately for new or worsening concerns. Discharge Medications:      Medication List        CHANGE how you take these medications      * apixaban 5 MG Tabs tablet  Commonly known as: Eliquis  Take 2 tablets by mouth 2 times daily for 7 days  What changed: how much to take     * apixaban 5 MG Tabs tablet  Commonly known as: Eliquis  Take 1 tablet by mouth 2 times daily  What changed:  You were already taking a medication with the same name, and this prescription was added. Make sure you understand how and when to take each. * This list has 2 medication(s) that are the same as other medications prescribed for you. Read the directions carefully, and ask your doctor or other care provider to review them with you. CONTINUE taking these medications      amLODIPine 10 MG tablet  Commonly known as: NORVASC  take 1 tablet by mouth once daily            STOP taking these medications      atorvastatin 40 MG tablet  Commonly known as: LIPITOR     lisinopril 5 MG tablet  Commonly known as: PRINIVIL;ZESTRIL            ASK your doctor about these medications      ondansetron 4 MG disintegrating tablet  Commonly known as: Zofran ODT  Take 1 tablet by mouth every 8 hours as needed for Nausea               Where to Get Your Medications        These medications were sent to [facility] [address] [facility], [address], [address]  [address]      Phone: [phone]   apixaban 5 MG Tabs tablet  apixaban 5 MG Tabs tablet         Discharge Procedure Orders   CT ABDOMEN PELVIS WO CONTRAST Additional Contrast? Radiologist Recommendation   Standing Status: Future Standing Exp. Date: 09/11/23     Order Specific Question Answer Comments   Additional Contrast? Radiologist Recommendation    Reason for exam: mesenteritis        Time Spent on discharge is  31 mins in patient examination, evaluation, counseling as well as medication reconciliation, prescriptions for required medications, discharge plan and follow up. Electronically signed by   Jadon Gill DO  9/19/2022  3:18 PM      Thank you Dr. Charlene Cruz MD for the opportunity to be involved in this patient's care.

## 2022-09-20 ENCOUNTER — TELEPHONE (OUTPATIENT)
Dept: GASTROENTEROLOGY | Age: 47
End: 2022-09-20

## 2022-09-20 NOTE — TELEPHONE ENCOUNTER
Patient is requesting a call regarding her upcoming 10/28/2022 OV and can be reached at [phone]. Okay to leave a message.

## 2022-09-24 ENCOUNTER — HOSPITAL ENCOUNTER (INPATIENT)
Age: 47
LOS: 9 days | Discharge: HOME | DRG: 169 | End: 2022-10-03
Attending: EMERGENCY MEDICINE | Admitting: SURGERY
Payer: COMMERCIAL

## 2022-09-24 ENCOUNTER — APPOINTMENT (OUTPATIENT)
Dept: CT IMAGING | Age: 47
DRG: 169 | End: 2022-09-24
Payer: COMMERCIAL

## 2022-09-24 ENCOUNTER — ANESTHESIA (OUTPATIENT)
Dept: OPERATING ROOM | Age: 47
DRG: 169 | End: 2022-09-24
Payer: COMMERCIAL

## 2022-09-24 ENCOUNTER — ANESTHESIA EVENT (OUTPATIENT)
Dept: OPERATING ROOM | Age: 47
DRG: 169 | End: 2022-09-24
Payer: COMMERCIAL

## 2022-09-24 DIAGNOSIS — I74.5 OCCLUSION OF RIGHT ILIAC ARTERY (HCC): ICD-10-CM

## 2022-09-24 DIAGNOSIS — I74.3 ACUTE OCCLUSION OF ARTERY OF LOWER EXTREMITY DUE TO THROMBOSIS (HCC): ICD-10-CM

## 2022-09-24 DIAGNOSIS — I74.9 ARTERIAL THROMBOSIS (HCC): Primary | ICD-10-CM

## 2022-09-24 PROBLEM — Z98.890 CRITICAL LIMB ISCHEMIA WITH HISTORY OF REVASCULARIZATION OF SAME EXTREMITY (HCC): Status: ACTIVE | Noted: 2022-09-24

## 2022-09-24 PROBLEM — I70.229 CRITICAL LIMB ISCHEMIA WITH HISTORY OF REVASCULARIZATION OF SAME EXTREMITY (HCC): Status: ACTIVE | Noted: 2022-09-24

## 2022-09-24 LAB
ABSOLUTE EOS #: 0.1 K/UL (ref 0–0.44)
ABSOLUTE EOS #: 0.25 K/UL (ref 0–0.44)
ABSOLUTE IMMATURE GRANULOCYTE: 0.06 K/UL (ref 0–0.3)
ABSOLUTE IMMATURE GRANULOCYTE: 0.07 K/UL (ref 0–0.3)
ABSOLUTE LYMPH #: 1.72 K/UL (ref 1.1–3.7)
ABSOLUTE LYMPH #: 2.93 K/UL (ref 1.1–3.7)
ABSOLUTE MONO #: 0.6 K/UL (ref 0.1–1.2)
ABSOLUTE MONO #: 0.81 K/UL (ref 0.1–1.2)
ANION GAP SERPL CALCULATED.3IONS-SCNC: 12 MMOL/L (ref 9–17)
ANION GAP SERPL CALCULATED.3IONS-SCNC: 13 MMOL/L (ref 9–17)
BASOPHILS # BLD: 0 % (ref 0–2)
BASOPHILS # BLD: 1 % (ref 0–2)
BASOPHILS ABSOLUTE: 0.04 K/UL (ref 0–0.2)
BASOPHILS ABSOLUTE: 0.07 K/UL (ref 0–0.2)
BUN BLDV-MCNC: 5 MG/DL (ref 6–20)
BUN BLDV-MCNC: 9 MG/DL (ref 6–20)
CALCIUM SERPL-MCNC: 7.7 MG/DL (ref 8.6–10.4)
CALCIUM SERPL-MCNC: 8.9 MG/DL (ref 8.6–10.4)
CHLORIDE BLD-SCNC: 104 MMOL/L (ref 98–107)
CHLORIDE BLD-SCNC: 108 MMOL/L (ref 98–107)
CO2: 21 MMOL/L (ref 20–31)
CO2: 22 MMOL/L (ref 20–31)
CREAT SERPL-MCNC: 0.46 MG/DL (ref 0.5–0.9)
CREAT SERPL-MCNC: 0.57 MG/DL (ref 0.5–0.9)
EOSINOPHILS RELATIVE PERCENT: 1 % (ref 1–4)
EOSINOPHILS RELATIVE PERCENT: 2 % (ref 1–4)
GFR AFRICAN AMERICAN: >60 ML/MIN
GFR AFRICAN AMERICAN: >60 ML/MIN
GFR NON-AFRICAN AMERICAN: >60 ML/MIN
GFR NON-AFRICAN AMERICAN: >60 ML/MIN
GFR SERPL CREATININE-BSD FRML MDRD: ABNORMAL ML/MIN/{1.73_M2}
GFR SERPL CREATININE-BSD FRML MDRD: ABNORMAL ML/MIN/{1.73_M2}
GLUCOSE BLD-MCNC: 124 MG/DL (ref 70–99)
GLUCOSE BLD-MCNC: 130 MG/DL (ref 70–99)
HCG QUALITATIVE: NEGATIVE
HCT VFR BLD CALC: 30.6 % (ref 36.3–47.1)
HCT VFR BLD CALC: 34.4 % (ref 36.3–47.1)
HEMOGLOBIN: 10.2 G/DL (ref 11.9–15.1)
HEMOGLOBIN: 11.4 G/DL (ref 11.9–15.1)
IMMATURE GRANULOCYTES: 1 %
IMMATURE GRANULOCYTES: 1 %
INR BLD: 0.9
LACTIC ACID, WHOLE BLOOD: 1.9 MMOL/L (ref 0.7–2.1)
LACTIC ACID, WHOLE BLOOD: 2.2 MMOL/L (ref 0.7–2.1)
LYMPHOCYTES # BLD: 16 % (ref 24–43)
LYMPHOCYTES # BLD: 26 % (ref 24–43)
MAGNESIUM: 1.5 MG/DL (ref 1.6–2.6)
MCH RBC QN AUTO: 29.3 PG (ref 25.2–33.5)
MCH RBC QN AUTO: 29.7 PG (ref 25.2–33.5)
MCHC RBC AUTO-ENTMCNC: 33.1 G/DL (ref 28.4–34.8)
MCHC RBC AUTO-ENTMCNC: 33.3 G/DL (ref 28.4–34.8)
MCV RBC AUTO: 87.9 FL (ref 82.6–102.9)
MCV RBC AUTO: 89.6 FL (ref 82.6–102.9)
MONOCYTES # BLD: 6 % (ref 3–12)
MONOCYTES # BLD: 7 % (ref 3–12)
NRBC AUTOMATED: 0 PER 100 WBC
NRBC AUTOMATED: 0 PER 100 WBC
PARTIAL THROMBOPLASTIN TIME: 76 SEC (ref 20.5–30.5)
PDW BLD-RTO: 13.4 % (ref 11.8–14.4)
PDW BLD-RTO: 13.4 % (ref 11.8–14.4)
PHOSPHORUS: 3.1 MG/DL (ref 2.6–4.5)
PLATELET # BLD: 263 K/UL (ref 138–453)
PLATELET # BLD: 368 K/UL (ref 138–453)
PMV BLD AUTO: 10 FL (ref 8.1–13.5)
PMV BLD AUTO: 10.2 FL (ref 8.1–13.5)
POTASSIUM SERPL-SCNC: 3.7 MMOL/L (ref 3.7–5.3)
POTASSIUM SERPL-SCNC: 3.9 MMOL/L (ref 3.7–5.3)
PROTHROMBIN TIME: 9.5 SEC (ref 9.1–12.3)
RBC # BLD: 3.48 M/UL (ref 3.95–5.11)
RBC # BLD: 3.84 M/UL (ref 3.95–5.11)
SEG NEUTROPHILS: 63 % (ref 36–65)
SEG NEUTROPHILS: 76 % (ref 36–65)
SEGMENTED NEUTROPHILS ABSOLUTE COUNT: 7.14 K/UL (ref 1.5–8.1)
SEGMENTED NEUTROPHILS ABSOLUTE COUNT: 8.24 K/UL (ref 1.5–8.1)
SODIUM BLD-SCNC: 139 MMOL/L (ref 135–144)
SODIUM BLD-SCNC: 141 MMOL/L (ref 135–144)
WBC # BLD: 10.8 K/UL (ref 3.5–11.3)
WBC # BLD: 11.3 K/UL (ref 3.5–11.3)

## 2022-09-24 PROCEDURE — 83020 HEMOGLOBIN ELECTROPHORESIS: CPT

## 2022-09-24 PROCEDURE — 047C3DZ DILATION OF RIGHT COMMON ILIAC ARTERY WITH INTRALUMINAL DEVICE, PERCUTANEOUS APPROACH: ICD-10-PCS | Performed by: SURGERY

## 2022-09-24 PROCEDURE — C1769 GUIDE WIRE: HCPCS

## 2022-09-24 PROCEDURE — 84703 CHORIONIC GONADOTROPIN ASSAY: CPT

## 2022-09-24 PROCEDURE — 37228 PR REVSC OPN/PRQ TIB/PERO W/ANGIOPLASTY UNI: CPT | Performed by: SURGERY

## 2022-09-24 PROCEDURE — 04CH3ZZ EXTIRPATION OF MATTER FROM RIGHT EXTERNAL ILIAC ARTERY, PERCUTANEOUS APPROACH: ICD-10-PCS | Performed by: SURGERY

## 2022-09-24 PROCEDURE — C1874 STENT, COATED/COV W/DEL SYS: HCPCS

## 2022-09-24 PROCEDURE — 96376 TX/PRO/DX INJ SAME DRUG ADON: CPT

## 2022-09-24 PROCEDURE — 83605 ASSAY OF LACTIC ACID: CPT

## 2022-09-24 PROCEDURE — 84100 ASSAY OF PHOSPHORUS: CPT

## 2022-09-24 PROCEDURE — C1874 STENT, COATED/COV W/DEL SYS: HCPCS | Performed by: SURGERY

## 2022-09-24 PROCEDURE — 96374 THER/PROPH/DIAG INJ IV PUSH: CPT

## 2022-09-24 PROCEDURE — 75710 ARTERY X-RAYS ARM/LEG: CPT | Performed by: SURGERY

## 2022-09-24 PROCEDURE — B41G1ZZ FLUOROSCOPY OF LEFT LOWER EXTREMITY ARTERIES USING LOW OSMOLAR CONTRAST: ICD-10-PCS | Performed by: SURGERY

## 2022-09-24 PROCEDURE — 37185 PRIM ART M-THRMBC SBSQ VSL: CPT | Performed by: SURGERY

## 2022-09-24 PROCEDURE — 2580000003 HC RX 258: Performed by: SPECIALIST

## 2022-09-24 PROCEDURE — 6360000002 HC RX W HCPCS: Performed by: STUDENT IN AN ORGANIZED HEALTH CARE EDUCATION/TRAINING PROGRAM

## 2022-09-24 PROCEDURE — 6370000000 HC RX 637 (ALT 250 FOR IP): Performed by: SURGERY

## 2022-09-24 PROCEDURE — 85730 THROMBOPLASTIN TIME PARTIAL: CPT

## 2022-09-24 PROCEDURE — 6360000002 HC RX W HCPCS: Performed by: SPECIALIST

## 2022-09-24 PROCEDURE — 04CC3ZZ EXTIRPATION OF MATTER FROM RIGHT COMMON ILIAC ARTERY, PERCUTANEOUS APPROACH: ICD-10-PCS | Performed by: SURGERY

## 2022-09-24 PROCEDURE — 85610 PROTHROMBIN TIME: CPT

## 2022-09-24 PROCEDURE — 2500000003 HC RX 250 WO HCPCS: Performed by: SPECIALIST

## 2022-09-24 PROCEDURE — 3600000007 HC SURGERY HYBRID BASE: Performed by: SURGERY

## 2022-09-24 PROCEDURE — 3600000017 HC SURGERY HYBRID ADDL 15MIN: Performed by: SURGERY

## 2022-09-24 PROCEDURE — 2000000000 HC ICU R&B

## 2022-09-24 PROCEDURE — 3700000001 HC ADD 15 MINUTES (ANESTHESIA): Performed by: SURGERY

## 2022-09-24 PROCEDURE — 6370000000 HC RX 637 (ALT 250 FOR IP): Performed by: STUDENT IN AN ORGANIZED HEALTH CARE EDUCATION/TRAINING PROGRAM

## 2022-09-24 PROCEDURE — 2580000003 HC RX 258: Performed by: SURGERY

## 2022-09-24 PROCEDURE — C1894 INTRO/SHEATH, NON-LASER: HCPCS

## 2022-09-24 PROCEDURE — C1894 INTRO/SHEATH, NON-LASER: HCPCS | Performed by: SURGERY

## 2022-09-24 PROCEDURE — 2709999900 HC NON-CHARGEABLE SUPPLY: Performed by: SURGERY

## 2022-09-24 PROCEDURE — C1887 CATHETER, GUIDING: HCPCS | Performed by: SURGERY

## 2022-09-24 PROCEDURE — 37184 PRIM ART M-THRMBC 1ST VSL: CPT | Performed by: SURGERY

## 2022-09-24 PROCEDURE — 37221 PR REVSC OPN/PRQ ILIAC ART W/STNT PLMT & ANGIOPLSTY: CPT | Performed by: SURGERY

## 2022-09-24 PROCEDURE — C1887 CATHETER, GUIDING: HCPCS

## 2022-09-24 PROCEDURE — 37224 PR REVSC OPN/PRG FEM/POP W/ANGIOPLASTY UNI: CPT | Performed by: SURGERY

## 2022-09-24 PROCEDURE — B41F1ZZ FLUOROSCOPY OF RIGHT LOWER EXTREMITY ARTERIES USING LOW OSMOLAR CONTRAST: ICD-10-PCS | Performed by: SURGERY

## 2022-09-24 PROCEDURE — 75635 CT ANGIO ABDOMINAL ARTERIES: CPT

## 2022-09-24 PROCEDURE — 2709999900 HC NON-CHARGEABLE SUPPLY

## 2022-09-24 PROCEDURE — C1760 CLOSURE DEV, VASC: HCPCS | Performed by: SURGERY

## 2022-09-24 PROCEDURE — 2580000003 HC RX 258: Performed by: STUDENT IN AN ORGANIZED HEALTH CARE EDUCATION/TRAINING PROGRAM

## 2022-09-24 PROCEDURE — 6360000002 HC RX W HCPCS

## 2022-09-24 PROCEDURE — 2720000010 HC SURG SUPPLY STERILE: Performed by: SURGERY

## 2022-09-24 PROCEDURE — 75625 CONTRAST EXAM ABDOMINL AORTA: CPT | Performed by: SURGERY

## 2022-09-24 PROCEDURE — 80048 BASIC METABOLIC PNL TOTAL CA: CPT

## 2022-09-24 PROCEDURE — 3700000000 HC ANESTHESIA ATTENDED CARE: Performed by: SURGERY

## 2022-09-24 PROCEDURE — 83735 ASSAY OF MAGNESIUM: CPT

## 2022-09-24 PROCEDURE — 85025 COMPLETE CBC W/AUTO DIFF WBC: CPT

## 2022-09-24 PROCEDURE — 36415 COLL VENOUS BLD VENIPUNCTURE: CPT

## 2022-09-24 PROCEDURE — 04CT3ZZ EXTIRPATION OF MATTER FROM RIGHT PERONEAL ARTERY, PERCUTANEOUS APPROACH: ICD-10-PCS | Performed by: SURGERY

## 2022-09-24 PROCEDURE — 94761 N-INVAS EAR/PLS OXIMETRY MLT: CPT

## 2022-09-24 PROCEDURE — 99223 1ST HOSP IP/OBS HIGH 75: CPT | Performed by: SURGERY

## 2022-09-24 PROCEDURE — 047M3ZZ DILATION OF RIGHT POPLITEAL ARTERY, PERCUTANEOUS APPROACH: ICD-10-PCS | Performed by: SURGERY

## 2022-09-24 PROCEDURE — 047T3ZZ DILATION OF RIGHT PERONEAL ARTERY, PERCUTANEOUS APPROACH: ICD-10-PCS | Performed by: SURGERY

## 2022-09-24 PROCEDURE — 2700000000 HC OXYGEN THERAPY PER DAY

## 2022-09-24 PROCEDURE — C1725 CATH, TRANSLUMIN NON-LASER: HCPCS

## 2022-09-24 PROCEDURE — 99285 EMERGENCY DEPT VISIT HI MDM: CPT

## 2022-09-24 PROCEDURE — 6360000004 HC RX CONTRAST MEDICATION: Performed by: EMERGENCY MEDICINE

## 2022-09-24 DEVICE — IMPLANTABLE DEVICE: Type: IMPLANTABLE DEVICE | Site: LEG | Status: FUNCTIONAL

## 2022-09-24 RX ORDER — SODIUM CHLORIDE 0.9 % (FLUSH) 0.9 %
5-40 SYRINGE (ML) INJECTION EVERY 12 HOURS SCHEDULED
Status: DISCONTINUED | OUTPATIENT
Start: 2022-09-24 | End: 2022-09-24

## 2022-09-24 RX ORDER — ROCURONIUM BROMIDE 10 MG/ML
INJECTION, SOLUTION INTRAVENOUS PRN
Status: DISCONTINUED | OUTPATIENT
Start: 2022-09-24 | End: 2022-09-24 | Stop reason: SDUPTHER

## 2022-09-24 RX ORDER — SODIUM CHLORIDE, SODIUM LACTATE, POTASSIUM CHLORIDE, CALCIUM CHLORIDE 600; 310; 30; 20 MG/100ML; MG/100ML; MG/100ML; MG/100ML
INJECTION, SOLUTION INTRAVENOUS CONTINUOUS PRN
Status: DISCONTINUED | OUTPATIENT
Start: 2022-09-24 | End: 2022-09-24 | Stop reason: SDUPTHER

## 2022-09-24 RX ORDER — ONDANSETRON 4 MG/1
4 TABLET, ORALLY DISINTEGRATING ORAL EVERY 8 HOURS PRN
Status: DISCONTINUED | OUTPATIENT
Start: 2022-09-24 | End: 2022-10-03 | Stop reason: HOSPADM

## 2022-09-24 RX ORDER — IODIXANOL 320 MG/ML
INJECTION, SOLUTION INTRAVASCULAR
Status: DISCONTINUED
Start: 2022-09-24 | End: 2022-09-24

## 2022-09-24 RX ORDER — HEPARIN SODIUM 10000 [USP'U]/100ML
INJECTION, SOLUTION INTRAVENOUS CONTINUOUS PRN
Status: DISCONTINUED | OUTPATIENT
Start: 2022-09-24 | End: 2022-09-24 | Stop reason: SDUPTHER

## 2022-09-24 RX ORDER — LIDOCAINE HYDROCHLORIDE 10 MG/ML
INJECTION, SOLUTION EPIDURAL; INFILTRATION; INTRACAUDAL; PERINEURAL PRN
Status: DISCONTINUED | OUTPATIENT
Start: 2022-09-24 | End: 2022-09-24 | Stop reason: SDUPTHER

## 2022-09-24 RX ORDER — HEPARIN SODIUM AND DEXTROSE 10000; 5 [USP'U]/100ML; G/100ML
5-30 INJECTION INTRAVENOUS CONTINUOUS
Status: DISCONTINUED | OUTPATIENT
Start: 2022-09-24 | End: 2022-09-24

## 2022-09-24 RX ORDER — SODIUM CHLORIDE 0.9 % (FLUSH) 0.9 %
5-40 SYRINGE (ML) INJECTION PRN
Status: DISCONTINUED | OUTPATIENT
Start: 2022-09-24 | End: 2022-10-03 | Stop reason: HOSPADM

## 2022-09-24 RX ORDER — SODIUM CHLORIDE 9 MG/ML
INJECTION, SOLUTION INTRAVENOUS PRN
Status: DISCONTINUED | OUTPATIENT
Start: 2022-09-24 | End: 2022-09-24

## 2022-09-24 RX ORDER — ONDANSETRON 2 MG/ML
INJECTION INTRAMUSCULAR; INTRAVENOUS PRN
Status: DISCONTINUED | OUTPATIENT
Start: 2022-09-24 | End: 2022-09-24 | Stop reason: SDUPTHER

## 2022-09-24 RX ORDER — CLOPIDOGREL BISULFATE 75 MG/1
75 TABLET ORAL DAILY
Status: DISCONTINUED | OUTPATIENT
Start: 2022-09-24 | End: 2022-09-24

## 2022-09-24 RX ORDER — FENTANYL CITRATE 50 UG/ML
50 INJECTION, SOLUTION INTRAMUSCULAR; INTRAVENOUS ONCE
Status: COMPLETED | OUTPATIENT
Start: 2022-09-24 | End: 2022-09-24

## 2022-09-24 RX ORDER — CLINDAMYCIN PHOSPHATE 900 MG/50ML
INJECTION INTRAVENOUS PRN
Status: DISCONTINUED | OUTPATIENT
Start: 2022-09-24 | End: 2022-09-24 | Stop reason: SDUPTHER

## 2022-09-24 RX ORDER — ONDANSETRON 2 MG/ML
4 INJECTION INTRAMUSCULAR; INTRAVENOUS
Status: DISCONTINUED | OUTPATIENT
Start: 2022-09-24 | End: 2022-09-24

## 2022-09-24 RX ORDER — HEPARIN SODIUM 1000 [USP'U]/ML
INJECTION, SOLUTION INTRAVENOUS; SUBCUTANEOUS PRN
Status: DISCONTINUED | OUTPATIENT
Start: 2022-09-24 | End: 2022-09-24 | Stop reason: SDUPTHER

## 2022-09-24 RX ORDER — ALBUMIN, HUMAN INJ 5% 5 %
SOLUTION INTRAVENOUS
Status: DISCONTINUED
Start: 2022-09-24 | End: 2022-09-24

## 2022-09-24 RX ORDER — HEPARIN SODIUM 1000 [USP'U]/ML
80 INJECTION, SOLUTION INTRAVENOUS; SUBCUTANEOUS PRN
Status: DISCONTINUED | OUTPATIENT
Start: 2022-09-24 | End: 2022-09-24

## 2022-09-24 RX ORDER — OXYCODONE HYDROCHLORIDE 5 MG/1
5 TABLET ORAL EVERY 6 HOURS PRN
Status: DISCONTINUED | OUTPATIENT
Start: 2022-09-24 | End: 2022-09-26

## 2022-09-24 RX ORDER — HEPARIN SODIUM 1000 [USP'U]/ML
80 INJECTION, SOLUTION INTRAVENOUS; SUBCUTANEOUS ONCE
Status: COMPLETED | OUTPATIENT
Start: 2022-09-24 | End: 2022-09-24

## 2022-09-24 RX ORDER — PROTAMINE SULFATE 10 MG/ML
INJECTION, SOLUTION INTRAVENOUS
Status: DISCONTINUED
Start: 2022-09-24 | End: 2022-09-24

## 2022-09-24 RX ORDER — SODIUM CHLORIDE 0.9 % (FLUSH) 0.9 %
5-40 SYRINGE (ML) INJECTION EVERY 12 HOURS SCHEDULED
Status: DISCONTINUED | OUTPATIENT
Start: 2022-09-24 | End: 2022-10-03 | Stop reason: HOSPADM

## 2022-09-24 RX ORDER — MEPERIDINE HYDROCHLORIDE 50 MG/ML
12.5 INJECTION INTRAMUSCULAR; INTRAVENOUS; SUBCUTANEOUS EVERY 5 MIN PRN
Status: DISCONTINUED | OUTPATIENT
Start: 2022-09-24 | End: 2022-09-24

## 2022-09-24 RX ORDER — ATORVASTATIN CALCIUM 40 MG/1
40 TABLET, FILM COATED ORAL DAILY
Status: DISCONTINUED | OUTPATIENT
Start: 2022-09-25 | End: 2022-10-03 | Stop reason: HOSPADM

## 2022-09-24 RX ORDER — SODIUM CHLORIDE 9 MG/ML
INJECTION, SOLUTION INTRAVENOUS CONTINUOUS PRN
Status: DISCONTINUED | OUTPATIENT
Start: 2022-09-24 | End: 2022-09-24 | Stop reason: SDUPTHER

## 2022-09-24 RX ORDER — MIDAZOLAM HYDROCHLORIDE 1 MG/ML
INJECTION INTRAMUSCULAR; INTRAVENOUS PRN
Status: DISCONTINUED | OUTPATIENT
Start: 2022-09-24 | End: 2022-09-24 | Stop reason: SDUPTHER

## 2022-09-24 RX ORDER — FENTANYL CITRATE 50 UG/ML
INJECTION, SOLUTION INTRAMUSCULAR; INTRAVENOUS PRN
Status: DISCONTINUED | OUTPATIENT
Start: 2022-09-24 | End: 2022-09-24 | Stop reason: SDUPTHER

## 2022-09-24 RX ORDER — OXYCODONE HYDROCHLORIDE 5 MG/1
10 TABLET ORAL EVERY 6 HOURS PRN
Status: DISCONTINUED | OUTPATIENT
Start: 2022-09-24 | End: 2022-09-26

## 2022-09-24 RX ORDER — ONDANSETRON 2 MG/ML
4 INJECTION INTRAMUSCULAR; INTRAVENOUS EVERY 6 HOURS PRN
Status: DISCONTINUED | OUTPATIENT
Start: 2022-09-24 | End: 2022-10-03 | Stop reason: HOSPADM

## 2022-09-24 RX ORDER — NIFEDIPINE 10 MG/1
10 CAPSULE ORAL EVERY 8 HOURS SCHEDULED
Status: DISCONTINUED | OUTPATIENT
Start: 2022-09-24 | End: 2022-10-03 | Stop reason: HOSPADM

## 2022-09-24 RX ORDER — GABAPENTIN 300 MG/1
300 CAPSULE ORAL 3 TIMES DAILY
Status: DISCONTINUED | OUTPATIENT
Start: 2022-09-24 | End: 2022-10-03 | Stop reason: HOSPADM

## 2022-09-24 RX ORDER — DIPHENHYDRAMINE HYDROCHLORIDE 50 MG/ML
25 INJECTION INTRAMUSCULAR; INTRAVENOUS EVERY 6 HOURS PRN
Status: DISCONTINUED | OUTPATIENT
Start: 2022-09-24 | End: 2022-09-28

## 2022-09-24 RX ORDER — FENTANYL CITRATE 50 UG/ML
25 INJECTION, SOLUTION INTRAMUSCULAR; INTRAVENOUS EVERY 5 MIN PRN
Status: DISCONTINUED | OUTPATIENT
Start: 2022-09-24 | End: 2022-09-24

## 2022-09-24 RX ORDER — HEPARIN SODIUM AND DEXTROSE 10000; 5 [USP'U]/100ML; G/100ML
1800 INJECTION INTRAVENOUS CONTINUOUS
Status: DISCONTINUED | OUTPATIENT
Start: 2022-09-24 | End: 2022-09-28

## 2022-09-24 RX ORDER — LIDOCAINE HYDROCHLORIDE 10 MG/ML
INJECTION, SOLUTION INFILTRATION; PERINEURAL
Status: DISCONTINUED
Start: 2022-09-24 | End: 2022-09-24

## 2022-09-24 RX ORDER — ACETAMINOPHEN 500 MG
1000 TABLET ORAL EVERY 8 HOURS SCHEDULED
Status: DISCONTINUED | OUTPATIENT
Start: 2022-09-24 | End: 2022-10-03 | Stop reason: HOSPADM

## 2022-09-24 RX ORDER — FENTANYL CITRATE 50 UG/ML
50 INJECTION, SOLUTION INTRAMUSCULAR; INTRAVENOUS
Status: DISCONTINUED | OUTPATIENT
Start: 2022-09-24 | End: 2022-09-25

## 2022-09-24 RX ORDER — CILOSTAZOL 100 MG/1
50 TABLET ORAL 2 TIMES DAILY
Status: DISCONTINUED | OUTPATIENT
Start: 2022-09-24 | End: 2022-10-03 | Stop reason: HOSPADM

## 2022-09-24 RX ORDER — PROPOFOL 10 MG/ML
INJECTION, EMULSION INTRAVENOUS PRN
Status: DISCONTINUED | OUTPATIENT
Start: 2022-09-24 | End: 2022-09-24 | Stop reason: SDUPTHER

## 2022-09-24 RX ORDER — MAGNESIUM HYDROXIDE 1200 MG/15ML
LIQUID ORAL CONTINUOUS PRN
Status: COMPLETED | OUTPATIENT
Start: 2022-09-24 | End: 2022-09-24

## 2022-09-24 RX ORDER — METHOCARBAMOL 750 MG/1
750 TABLET, FILM COATED ORAL 4 TIMES DAILY
Status: DISCONTINUED | OUTPATIENT
Start: 2022-09-24 | End: 2022-10-03 | Stop reason: HOSPADM

## 2022-09-24 RX ORDER — SODIUM CHLORIDE 9 MG/ML
INJECTION, SOLUTION INTRAVENOUS PRN
Status: DISCONTINUED | OUTPATIENT
Start: 2022-09-24 | End: 2022-10-03 | Stop reason: HOSPADM

## 2022-09-24 RX ORDER — MAGNESIUM SULFATE IN WATER 40 MG/ML
2000 INJECTION, SOLUTION INTRAVENOUS ONCE
Status: COMPLETED | OUTPATIENT
Start: 2022-09-24 | End: 2022-09-24

## 2022-09-24 RX ORDER — FENTANYL CITRATE 50 UG/ML
50 INJECTION, SOLUTION INTRAMUSCULAR; INTRAVENOUS EVERY 5 MIN PRN
Status: DISCONTINUED | OUTPATIENT
Start: 2022-09-24 | End: 2022-09-24

## 2022-09-24 RX ORDER — CLINDAMYCIN PHOSPHATE 900 MG/50ML
INJECTION INTRAVENOUS
Status: COMPLETED
Start: 2022-09-24 | End: 2022-09-24

## 2022-09-24 RX ORDER — SODIUM CHLORIDE 0.9 % (FLUSH) 0.9 %
5-40 SYRINGE (ML) INJECTION PRN
Status: DISCONTINUED | OUTPATIENT
Start: 2022-09-24 | End: 2022-09-24

## 2022-09-24 RX ORDER — HEPARIN SODIUM 1000 [USP'U]/ML
INJECTION, SOLUTION INTRAVENOUS; SUBCUTANEOUS
Status: COMPLETED
Start: 2022-09-24 | End: 2022-09-24

## 2022-09-24 RX ORDER — SODIUM CHLORIDE, SODIUM LACTATE, POTASSIUM CHLORIDE, CALCIUM CHLORIDE 600; 310; 30; 20 MG/100ML; MG/100ML; MG/100ML; MG/100ML
INJECTION, SOLUTION INTRAVENOUS CONTINUOUS
Status: DISCONTINUED | OUTPATIENT
Start: 2022-09-24 | End: 2022-09-25

## 2022-09-24 RX ORDER — HEPARIN SODIUM 1000 [USP'U]/ML
40 INJECTION, SOLUTION INTRAVENOUS; SUBCUTANEOUS PRN
Status: DISCONTINUED | OUTPATIENT
Start: 2022-09-24 | End: 2022-09-24

## 2022-09-24 RX ADMIN — SODIUM CHLORIDE, POTASSIUM CHLORIDE, SODIUM LACTATE AND CALCIUM CHLORIDE: 600; 310; 30; 20 INJECTION, SOLUTION INTRAVENOUS at 10:24

## 2022-09-24 RX ADMIN — FENTANYL CITRATE 100 MCG: 0.05 INJECTION, SOLUTION INTRAMUSCULAR; INTRAVENOUS at 10:43

## 2022-09-24 RX ADMIN — ACETAMINOPHEN 1000 MG: 500 TABLET ORAL at 20:45

## 2022-09-24 RX ADMIN — SODIUM CHLORIDE: 9 INJECTION, SOLUTION INTRAVENOUS at 10:41

## 2022-09-24 RX ADMIN — HEPARIN SODIUM 1800 UNITS/HR: 10000 INJECTION, SOLUTION INTRAVENOUS at 13:00

## 2022-09-24 RX ADMIN — TICAGRELOR 90 MG: 90 TABLET ORAL at 20:46

## 2022-09-24 RX ADMIN — HEPARIN SODIUM 2000 UNITS: 1000 INJECTION INTRAVENOUS; SUBCUTANEOUS at 11:59

## 2022-09-24 RX ADMIN — GABAPENTIN 300 MG: 300 CAPSULE ORAL at 20:46

## 2022-09-24 RX ADMIN — CILOSTAZOL 50 MG: 100 TABLET ORAL at 20:46

## 2022-09-24 RX ADMIN — FENTANYL CITRATE 50 MCG: 50 INJECTION, SOLUTION INTRAMUSCULAR; INTRAVENOUS at 06:08

## 2022-09-24 RX ADMIN — CLINDAMYCIN PHOSPHATE 900 MG: 900 INJECTION, SOLUTION INTRAVENOUS at 10:44

## 2022-09-24 RX ADMIN — FENTANYL CITRATE 50 MCG: 0.05 INJECTION, SOLUTION INTRAMUSCULAR; INTRAVENOUS at 12:54

## 2022-09-24 RX ADMIN — LIDOCAINE HYDROCHLORIDE 50 MG: 10 INJECTION, SOLUTION EPIDURAL; INFILTRATION; INTRACAUDAL; PERINEURAL at 10:34

## 2022-09-24 RX ADMIN — OXYCODONE HYDROCHLORIDE 10 MG: 5 TABLET ORAL at 15:25

## 2022-09-24 RX ADMIN — HEPARIN SODIUM AND DEXTROSE 18 UNITS/KG/HR: 10000; 5 INJECTION INTRAVENOUS at 08:44

## 2022-09-24 RX ADMIN — PHENYLEPHRINE HYDROCHLORIDE 100 MCG: 10 INJECTION INTRAVENOUS at 11:15

## 2022-09-24 RX ADMIN — HEPARIN SODIUM 18 UNITS/KG/HR: 10000 INJECTION, SOLUTION INTRAVENOUS at 10:24

## 2022-09-24 RX ADMIN — MAGNESIUM SULFATE HEPTAHYDRATE 2000 MG: 40 INJECTION, SOLUTION INTRAVENOUS at 18:56

## 2022-09-24 RX ADMIN — FENTANYL CITRATE 50 MCG: 50 INJECTION, SOLUTION INTRAMUSCULAR; INTRAVENOUS at 08:46

## 2022-09-24 RX ADMIN — HEPARIN SODIUM 6710 UNITS: 1000 INJECTION INTRAVENOUS; SUBCUTANEOUS at 08:32

## 2022-09-24 RX ADMIN — PHENYLEPHRINE HYDROCHLORIDE 25 MCG/MIN: 10 INJECTION INTRAVENOUS at 11:09

## 2022-09-24 RX ADMIN — NIFEDIPINE 10 MG: 10 CAPSULE ORAL at 16:03

## 2022-09-24 RX ADMIN — PHENYLEPHRINE HYDROCHLORIDE 100 MCG: 10 INJECTION INTRAVENOUS at 11:05

## 2022-09-24 RX ADMIN — SODIUM CHLORIDE, PRESERVATIVE FREE 10 ML: 5 INJECTION INTRAVENOUS at 20:48

## 2022-09-24 RX ADMIN — HEPARIN SODIUM 1800 UNITS/HR: 10000 INJECTION, SOLUTION INTRAVENOUS at 15:00

## 2022-09-24 RX ADMIN — PROPOFOL 200 MG: 10 INJECTION, EMULSION INTRAVENOUS at 10:34

## 2022-09-24 RX ADMIN — METHOCARBAMOL TABLETS 750 MG: 750 TABLET, COATED ORAL at 16:04

## 2022-09-24 RX ADMIN — PHENYLEPHRINE HYDROCHLORIDE 100 MCG: 10 INJECTION INTRAVENOUS at 11:53

## 2022-09-24 RX ADMIN — HEPARIN SODIUM 6000 UNITS: 1000 INJECTION INTRAVENOUS; SUBCUTANEOUS at 11:04

## 2022-09-24 RX ADMIN — IOPAMIDOL 125 ML: 755 INJECTION, SOLUTION INTRAVENOUS at 07:24

## 2022-09-24 RX ADMIN — GABAPENTIN 300 MG: 300 CAPSULE ORAL at 16:03

## 2022-09-24 RX ADMIN — FENTANYL CITRATE 100 MCG: 0.05 INJECTION, SOLUTION INTRAMUSCULAR; INTRAVENOUS at 10:34

## 2022-09-24 RX ADMIN — SODIUM CHLORIDE, POTASSIUM CHLORIDE, SODIUM LACTATE AND CALCIUM CHLORIDE: 600; 310; 30; 20 INJECTION, SOLUTION INTRAVENOUS at 16:07

## 2022-09-24 RX ADMIN — NIFEDIPINE 10 MG: 10 CAPSULE ORAL at 20:46

## 2022-09-24 RX ADMIN — FENTANYL CITRATE 50 MCG: 50 INJECTION, SOLUTION INTRAMUSCULAR; INTRAVENOUS at 14:10

## 2022-09-24 RX ADMIN — METHOCARBAMOL TABLETS 750 MG: 750 TABLET, COATED ORAL at 20:46

## 2022-09-24 RX ADMIN — MIDAZOLAM 2 MG: 1 INJECTION INTRAMUSCULAR; INTRAVENOUS at 10:33

## 2022-09-24 RX ADMIN — ACETAMINOPHEN 1000 MG: 500 TABLET ORAL at 16:42

## 2022-09-24 RX ADMIN — PHENYLEPHRINE HYDROCHLORIDE 60 MCG/MIN: 10 INJECTION INTRAVENOUS at 10:21

## 2022-09-24 RX ADMIN — CILOSTAZOL 50 MG: 100 TABLET ORAL at 16:04

## 2022-09-24 RX ADMIN — ROCURONIUM BROMIDE 50 MG: 10 INJECTION, SOLUTION INTRAVENOUS at 10:34

## 2022-09-24 RX ADMIN — ONDANSETRON 4 MG: 2 INJECTION INTRAMUSCULAR; INTRAVENOUS at 13:03

## 2022-09-24 RX ADMIN — PHENYLEPHRINE HYDROCHLORIDE 100 MCG: 10 INJECTION INTRAVENOUS at 10:56

## 2022-09-24 ASSESSMENT — ENCOUNTER SYMPTOMS
SORE THROAT: 0
DIARRHEA: 0
CHEST TIGHTNESS: 0
CONSTIPATION: 0
VOMITING: 0
NAUSEA: 0
SHORTNESS OF BREATH: 0
ABDOMINAL PAIN: 0
COUGH: 0
WHEEZING: 0
RHINORRHEA: 0
COLOR CHANGE: 1

## 2022-09-24 ASSESSMENT — PAIN SCALES - GENERAL
PAINLEVEL_OUTOF10: 8
PAINLEVEL_OUTOF10: 0
PAINLEVEL_OUTOF10: 10
PAINLEVEL_OUTOF10: 9
PAINLEVEL_OUTOF10: 10
PAINLEVEL_OUTOF10: 5

## 2022-09-24 ASSESSMENT — PAIN DESCRIPTION - LOCATION
LOCATION: LEG;FOOT
LOCATION: FOOT
LOCATION: GROIN;LEG
LOCATION: FOOT
LOCATION: LEG
LOCATION: FOOT

## 2022-09-24 ASSESSMENT — PAIN DESCRIPTION - DESCRIPTORS
DESCRIPTORS: ACHING;NUMBNESS
DESCRIPTORS: ACHING;DISCOMFORT;BURNING

## 2022-09-24 ASSESSMENT — PAIN DESCRIPTION - ORIENTATION
ORIENTATION: RIGHT
ORIENTATION: RIGHT;LEFT

## 2022-09-24 ASSESSMENT — PAIN DESCRIPTION - PAIN TYPE
TYPE: ACUTE PAIN
TYPE: ACUTE PAIN

## 2022-09-24 ASSESSMENT — PAIN - FUNCTIONAL ASSESSMENT
PAIN_FUNCTIONAL_ASSESSMENT: 0-10
PAIN_FUNCTIONAL_ASSESSMENT: 0-10
PAIN_FUNCTIONAL_ASSESSMENT: ACTIVITIES ARE NOT PREVENTED

## 2022-09-24 ASSESSMENT — PAIN DESCRIPTION - FREQUENCY: FREQUENCY: CONTINUOUS

## 2022-09-24 NOTE — ANESTHESIA PROCEDURE NOTES
Arterial Line:    An arterial line was placed using surface landmarks, in the pre-op for the following indication(s): continuous blood pressure monitoring. A 20 gauge (size), 1 and 3/4 inch (length), Arrow (type) catheter was placed, Seldinger technique used, into the left radial artery, secured by Tegaderm and tape. Anesthesia type: General    Events:  patient tolerated procedure well with no complications. 9/24/2022 10:45 AM9/24/2022 10:45 AM  Anesthesiologist: Mata Liz MD  Resident/CRNA: NALLELY Solares CRNA  Performed: Resident/CRNA and Anesthesiologist   Preanesthetic Checklist  Completed: patient identified, IV checked, risks and benefits discussed, equipment checked, pre-op evaluation, timeout performed, anesthesia consent given, oxygen available and monitors applied/VS acknowledged

## 2022-09-24 NOTE — PROGRESS NOTES
SPIRITUAL CARE PROGRESS NOTE    Spiritual Assessment:  responded to patient's room per nurse request.  Ariel Guardian observed the patient sitting up in bed and a visitor, her godson Chichi, standing at bedside. Patient appeared to be tearful, anxious, and fearful. She and visitor expressed feeling overwhelmed and afraid about patient's potential amputation. Intervention:  provided a ministry of presence and made space for exploring and sharing fears, feelings, and hopes.  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  offered words of encouragement, comfort, and prayer. Outcome: Patient and visitor expressed gratitude for the visit and appeared to be coping better and feel comforted after prayer and conversation. 09/24/22 0935   Encounter Summary   Service Provided For: Patient; Family   Referral/Consult From: Nurse   Support System Children;Family members   Last Encounter  09/24/22   Complexity of Encounter High   Begin Time 0900   End Time  0920   Total Time Calculated 20 min   Spiritual/Emotional needs   Type Spiritual Support;Emotional Distress; Difficult news received   Assessment/Intervention/Outcome   Assessment Anxious; Fearful;Tearful   Intervention Active listening;Discussed illness injury and its impact; Explored/Affirmed feelings, thoughts, concerns;Explored Coping Skills/Resources;Sustaining Presence/Ministry of presence;Prayer (assurance of)/[place]   Outcome Comfort;Coping;Expressed Gratitude   Plan and Referrals   Plan/Referrals Continue Support (comment)

## 2022-09-24 NOTE — H&P
VASCULAR SURGERY H&P  [facility]      Patient's Name/ Date of Birth/ Gender: Anahi Romero / 1975 (52 y.o.) / female     Surgeon: Dr. Dionisio Mejia    History of present Illness: Pt is a 52 y.o. female, seen in consultation for acute limb ischemia. Patient is well-known to the vascular surgery service. Patient recently presented with right lower extremity acute limb ischemia on 2022 and underwent a angiogram with right iliac stent placement and popliteal artery angioplasty. She was discharged on Eliquis which she reports she has been taking as directed. She reports she began having toe pain a couple days ago, but this morning at 2 AM she had sudden onset pain associated with numbness of her right lower extremity below the knee. She also noted that it was cool to the touch. This prompted her to come to the ED. CTA with bilateral runoff showed an occluded right iliac stent and that her right proximal popliteal artery was once again occluded. She continues to have numbness and pain. Last meal was yesterday. Past Medical History:  has a past medical history of Abnormal Pap smear of cervix, Anemia, Anxiety, Chronic back pain, Claudication (HCC), Claudication in peripheral vascular disease ([address] Depression, Headache(784.0), Heart murmur, Hemorrhage of rectum and anus, History of blood transfusion, HTN (hypertension), Hx of blood clots, Hypercoagulable state ([address] Hyperlipidemia, Hypertension, Intertriginous candidiasis, Left popliteal artery occlusion (HCC), Leg pain, Menometrorrhagia, Migraine headache with aura, Obesity, Osteoarthritis, PAD (peripheral artery disease) ([address] Patient in clinical research study, PVD (peripheral vascular disease) ([address] Takotsubo cardiomyopathy, Under care of service provider, Under care of service provider, Under care of service provider, and Wound of right leg.     Past Surgical History:   Past Surgical History:   Procedure Laterality Date     [facility] Right 3/8/2021    RIGHT 2ND DIGIT PARTIAL  AMPUTATION performed by Lety Mendoza DPM at [facility]    OTHER SURGICAL HISTORY  12/19/2017    ANGIOGRAM OF LEFT LEG    OTHER SURGICAL HISTORY Left 01/16/2018    femoral to peroneal bypass using vein    OTHER SURGICAL HISTORY Left 01/16/2018    fibulectomy with intra op angiography of leg    [place] REOPERATION, BYPASS GRAFT Left 1/16/2018    RE-EXPLORATION OF LEFT LEG, THROMBO-EMBOLECTOMY OF FEMORAL-PERONEAL BYPASS, WITH INTRA OPERATIVE  ANGIOGRAMS AND INFUSION OF nITRO GLYCERIN performed by Leeann Monreal MD at [facility] Left 1/16/2018    LEFT LEG FEMORAL TO PERONEAL BYPASS USING VEIN, (DONAR SITE LEFT SAPHNOUS) LEFT FIBULECTOMY, WITH INTRA OP ANGIOGRAPHY OF LEFT LEG performed by Leeann Monreal MD at [facility] Right 08/03/2019    femoral artery    TOE AMPUTATION Right 08/22/2019    TOE AMPUTATION Right 8/22/2019    TOE AMPUTATION RIGHT 3RD DIGIT, PARTIAL HALLUX AMPUTATION RIGHT FOOT performed by Delfina Arteaga DPM at [address] [facility] [address] Left 01/16/2018    re-exploration femerol-perioneal vein bypass/intra op angiogram    VASCULAR SURGERY Right 9/8/2022    RIGHT LOWER EXTREMITY ANGIOGRAM WITH ANGIOJET AND JETSTREAM performed by Jessica Sarah MD at [address] [place] [address] History:  reports that she has never smoked. She has never used smokeless tobacco. She reports that she does not currently use alcohol. She reports that she does not use drugs. Family History: family history includes Diabetes in her mother, sister, and sister; Heart Attack in her father and mother; Heart Disease in her father and mother; High Blood Pressure in her father, mother, sister, and sister; Kidney Disease in her mother.     Review of Systems:   General: Denies fever, chills, night sweats, weight loss, malaise, fatigue  HEENT: Denies sore throat, sinus problems, allergic rhinosinusitis  Card: Denies chest pain, palpitations, orthopnea/PND  Pulm: Denies cough, shortness of breath  GI: denies history of constipation, diarrhea, hematochezia or melena  : Denies polyuria, dysuria, hematuria  Endo: Denies diabetes, thyroid problems. Heme: Denies anemia, h/o bleeding or clotting problems. Neuro: Denies h/o CVA, TIA  Skin: Denies rashes, ulcers  Musculoskeletal: Denies muscle, joint, back pain.     Allergies: Asa [aspirin], Lopid [gemfibrozil], Nortriptyline, Pcn [penicillins], Sulfa antibiotics, Morphine, and Ibuprofen    Current Meds:  Current Facility-Administered Medications:     heparin (porcine) injection 6,710 Units, 80 Units/kg, IntraVENous, PRN, Julisa Barnwell, DO    heparin (porcine) injection 3,360 Units, 40 Units/kg, IntraVENous, PRN, Julisa Barnwell, DO    heparin 25,000 units in dextrose 5 % 250 mL infusion (rate based), 5-30 Units/kg/hr, IntraVENous, Continuous, Julisa Barnwell, DO, Last Rate: 15.1 mL/hr at 09/24/22 0844, 18 Units/kg/hr at 09/24/22 0844    Current Outpatient Medications:     atorvastatin (LIPITOR) 40 MG tablet, take 1 tablet by mouth once daily, Disp: 30 tablet, Rfl: 3    lisinopril (PRINIVIL;ZESTRIL) 5 MG tablet, take 1 tablet by mouth once daily, Disp: 30 tablet, Rfl: 2    diclofenac sodium (VOLTAREN) 1 % GEL, Apply 4 g topically 4 times daily, Disp: 50 g, Rfl: 0    acetaminophen (TYLENOL) 500 MG tablet, Take 1 tablet by mouth 4 times daily as needed for Pain, Disp: 120 tablet, Rfl: 0    apixaban (ELIQUIS) 5 MG TABS tablet, Take 2 tablets by mouth 2 times daily for 7 days, Disp: 28 tablet, Rfl: 0    apixaban (ELIQUIS) 5 MG TABS tablet, Take 1 tablet by mouth 2 times daily, Disp: 180 tablet, Rfl: 1    amLODIPine (NORVASC) 10 MG tablet, take 1 tablet by mouth once daily, Disp: 90 tablet, Rfl: 2    Vital Signs:  Vitals:    09/24/22 0748   BP: 120/72   Pulse:    Resp:    Temp:    SpO2: 98%       Physical Exam:  Vital signs and Nurse's note reviewed  Gen:  A&Ox3, NAD  HEENT: PERRLA, EOMI, no scleral icterus, oral mucosa moist  Neck: Supple  Chest: Symmetric rise with inhalation, no evidence of trauma  CVS: Regular rate and rhythm  Resp: Unlabored on RA  Abd: soft, non-tender, non-distended, no guarding or rebound. Ext: Right lower extremity cool with reduced sensation to light touch and pain up to the knee, with reduced mobility and only gross motor movements with plantar flexion, unable to wiggle the right toes. No Doppler signals present at PT/DP/AT/popliteal on the right. Palpable right femoral pulse. Distal aspect of the 1-3rd right toes with amputation. Left lower extremity exam without gross motor or sensory deficits, warm to the touch, Doppler signals DP/PT/popliteal intact, palpable left femoral pulse.   CNS: Moves all extremities, no gross focal motor deficits      Labs:   Lab Results   Component Value Date/Time    WBC 11.3 09/24/2022 06:19 AM    HGB 11.4 09/24/2022 06:19 AM    HCT 34.4 09/24/2022 06:19 AM    MCV 89.6 09/24/2022 06:19 AM     09/24/2022 06:19 AM     Lab Results   Component Value Date/Time     09/24/2022 06:19 AM    K 3.9 09/24/2022 06:19 AM     09/24/2022 06:19 AM    CO2 22 09/24/2022 06:19 AM    BUN 9 09/24/2022 06:19 AM    CREATININE 0.57 09/24/2022 06:19 AM    GLUCOSE 124 09/24/2022 06:19 AM    CALCIUM 8.9 09/24/2022 06:19 AM     Lab Results   Component Value Date/Time    ALKPHOS 89 06/26/2020 04:28 PM    ALT 11 09/08/2022 12:01 PM    AST 14 09/08/2022 12:01 PM    PROT 7.5 06/26/2020 04:28 PM    BILITOT 0.4 09/08/2022 12:01 PM    BILIDIR <0.08 11/15/2015 04:45 PM    LABALBU 3.7 09/08/2022 12:01 PM     No results found for: LACTA  No results found for: AMYLASE  Lab Results   Component Value Date/Time    LIPASE 27 11/15/2015 04:45 PM     Lab Results   Component Value Date/Time    INR 0.9 09/24/2022 06:19 AM       Imaging:  CTA ABDOMINAL AORTA W BILAT RUNOFF W CONTRAST    Result Date: 9/24/2022  Since the prior exam there has been placement of a right common/external iliac artery stent with occlusion of the right common and external iliac arteries. Progression of occlusive disease of the right lower extremity with long segment occlusion of the distal SFA/popliteal artery and very poor segmental filling of runoff vessels. Similar occlusion of the left popliteal artery with poor segmental filling of below knee runoff vessels. Other stable findings including 4 cm hyperdense right renal lesion, likely hemorrhagic cyst which should be confirmed on follow-up ultrasound and/or MRI. 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 Findings were discussed with Dr. Luis Clemons At 9:11 am on 9/24/2022. RECOMMENDATIONS: 2.6 cm right ovarian simple-appearing cyst. No follow-up imaging is recommended. Reference: JACR 2020 Feb;17(2):248-254        Impression:  47F with acute limb ischemia with occlusion of prior iliac stent and occlusion of the distal popliteal artery with minimal runoff   -9/8/22: S/p RLE angiogram, right iliac stent placement, popliteal artery angioplasty    Recommendation:  Patient seen and examined with Dr. Brisa Martinez  Patient with acute limb ischemia, Mario class IIb. CTA with bilateral runoff shows her right iliac stent has occluded and her proximal popliteal artery is also occluded again. Patient requires emergent surgical intervention for best chance at limb salvage. Discussed with patient that she has high risk of eventual right lower extremity amputation. It is currently unknown why the patient has a hypercoagulable state and why she continues to follow-up with thromboses of her lower extremities. She reports she has been compliant with her Eliquis and has not missed any doses. Consent reviewed and signed by the patient, witnessed, left and patient room, copy uploaded to media in her chart.   The procedure, alternatives, risks were discussed with the patient and all questions were answered. Heparin GTT started in ED  N.p.o., IVF  To OR emergently as soon as team assembled hybrid room  Vascular to admit, will need ICU bed      Quan Arciniega, DO - PGY3  Surgery Department

## 2022-09-24 NOTE — ED PROVIDER NOTES
[facility]  Emergency Department  Emergency Medicine Resident Sign-out     Care of Golden Kaminski was assumed from Dr. Susan Ivory and is being seen for Leg Pain and Foot Pain  . The patient's initial evaluation and plan have been discussed with the prior provider who initially evaluated the patient.      EMERGENCY DEPARTMENT COURSE / MEDICAL DECISION MAKING:       MEDICATIONS GIVEN:  Orders Placed This Encounter   Medications    fentaNYL (SUBLIMAZE) injection 50 mcg    iopamidol (ISOVUE-370) 76 % injection 125 mL    heparin (porcine) injection 6,710 Units    heparin (porcine) injection 6,710 Units    heparin (porcine) injection 3,360 Units    heparin 25,000 units in dextrose 5 % 250 mL infusion (rate based)    fentaNYL (SUBLIMAZE) injection 50 mcg    lidocaine 1 % injection     Ta Nunez: cabinet override    iodixanol (VISIPAQUE) 320 MG/ML injection     Ta Nunez: cabinet override    gelatin adsorbable (GELFOAM) powder     Richardson Bob: cabinet override    thrombin 5000 units kit     Ta Nunez: cabinet override    gelatin adsorbable (GELFOAM) 100 sponge     Richardson Bob: cabinet override    heparin (porcine) 1000 UNIT/ML injection     Richardson Bob: cabinet override    albumin human 5 % IV solution     BHAVYA Donis: cabinet override    protamine 10 MG/ML injection     BHAVYA Donis: cabinet override    clindamycin (CLEOCIN) 900 MG/50ML IVPB     Ta Nunez: cabinet override       LABS / RADIOLOGY:     Labs Reviewed   CBC WITH AUTO DIFFERENTIAL - Abnormal; Notable for the following components:       Result Value    RBC 3.84 (*)     Hemoglobin 11.4 (*)     Hematocrit 34.4 (*)     Immature Granulocytes 1 (*)     All other components within normal limits   LACTIC ACID - Abnormal; Notable for the following components:    Lactic Acid, Whole Blood 2.2 (*)     All other components within normal limits   BASIC METABOLIC PANEL - Abnormal; Notable for the following components:    Glucose 124 (*)     All other components within normal limits   HCG, SERUM, QUALITATIVE   PROTIME-INR   APTT   APTT   HEMOGLOBINOPATHY EVALUATION       XR FOOT RIGHT (MIN 3 VIEWS)    Result Date: 9/19/2022  EXAMINATION: THREE XRAY VIEWS OF THE RIGHT FOOT 9/15/2022 2:31 pm COMPARISON: Three-view study of the right foot from 03/04/2021 HISTORY: ORDERING SYSTEM PROVIDED HISTORY: Right foot pain TECHNOLOGIST PROVIDED HISTORY: Attention to 3rd mtpj Right foot pain History of osteoarthritis, vascular surgery, and multiple right toe amputations. FINDINGS: Interval amputation 2nd MP/DP; again noted postsurgical changes status post 1st and 3rd toe amputations (1st DP, and across proximal 3rd PP). Subtle focal rarefaction with possible cortical destruction suspected distal end remaining 2nd PP at the amputation site. Adjacent soft tissue swelling suspected. No periosteal new bone. Remainder amputation sites, including the 3rd metatarsal phalangeal joint, appear unremarkable, without destructive change or periosteal new bone. Moderate-severe degenerative changes re-identified metatarsal-phalangeal (especially 1, 3rd and 4th), and interphalangeal joints. Similar rarefaction/erosive change medial aspect 4th metatarsal head. Additional degenerative intertarsal changes and osteopenia. Interval 2nd toe amputation, now with possible subtle osteomyelitis at the distal end of the remaining PP, as above, with adjacent STS. No other findings suspicious for osteomyelitis, including at the 3rd MTPJ. Degenerative findings at the latter and elsewhere as described above.      CTA ABDOMINAL AORTA W BILAT RUNOFF W CONTRAST    Result Date: 9/7/2022  EXAMINATION: CTA OF THE AORTA WITH LOWER EXTREMITY RUNOFF 9/7/2022 12:53 am TECHNIQUE: CTA of the pelvis and bilateral lower extremities was performed after the administration of intravenous contrast.   Multiplanar reformatted images are provided for review. MIP images are provided for review. Automated exposure control, iterative reconstruction, and/or weight based adjustment of the mA/kV was utilized to reduce the radiation dose to as low as reasonably achievable. COMPARISON: None. HISTORY: ORDERING SYSTEM PROVIDED HISTORY: Hx PAD, unable to doppler distal signals TECHNOLOGIST PROVIDED HISTORY: Hx PAD, unable to doppler distal signals Decision Support Exception - unselect if not a suspected or confirmed emergency medical condition->Emergency Medical Condition (MA) Reason for Exam: Hx PAD, unable to doppler distal signals FINDINGS: Nonvascular There is normal size and more late arterial/portal venous enhancement for the liver. No focal lesions are seen. The gallbladder is contracted. There are fluid and fluid contents in the stomach at the time the present study. The biliary tree and pancreatic systems are not dilated. There is normal size enhancement for the pancreas. There is normal size enhancement for the spleen. Adrenals not enlarged. Kidneys have preserved size and cortical thickness. There is a hyperdense mass like formation hyperdense cyst measuring 4.5 x 2.8 x 3.8 cm in the mid 3rd of the right kidney crossing the hilum of the right kidney. The these correspond to a cyst measuring up to 2.8 cm on the study of November 9, 2008. Consider further evaluation with ultrasound to exclude a hemorrhagic cyst. There is no obstructive uropathy in the kidneys. No conspicuous renal calculi identified the. Bladder is mild distended the. The uterus measures 10.9 x 4.8 x 7 cm being in upper borderline position. There is a corpus luteum in the left ovary. The right ovary has unremarkable appearance. There is a small amount of free fluid accumulation in the cul-de-sac. Presence of stranding of the fat planes in the mesenteric root associated the with multiple enlarged mesenteric lymph nodes.   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 This was not seen on the previous study of November 2008. Due the prominence of the lymph nodes could consider further evaluation with a PET-CT scan. The other omental/mesentery fat planes have preserved fat density. There is no indication for bowel obstruction. There is no free intraperitoneal air. The appendix seen and appear unremarkable. There are uncomplicated diverticulosis of the left-sided colon. No conspicuous signs for diverticulitis is observed. Lower lung bases demonstrate no significant findings. Visualized bone structures demonstrate no significant findings. Bones of the lower extremities also appear unremarkable including the knee joint ankle joints. VASCULAR ABDOMINAL AORTA/ILIAC ARTERIES: There atherosclerotic changes in mild degree. There is no aneurysm formation or dissection of the abdominal aorta. Calcified plaques and noncalcified plaques are seen in the origin of the right common iliac artery more discrete in the left common iliac artery. There is no indication aneurysm formation or dissection in the iliac arteries with preserved the contrast flow runoff into visualized proximal right and left superficial profunda femoral arteries. CELIAC AXIS: There is good contrast enhancement. There is no stenosis. SMA: There is no obstruction. There is good contrast enhancement of the main trunk and branches. RIGHT RENAL ARTERY: There is good contrast enhancement. There is no stenosis. LEFT RENAL ARTERY: There is good contrast enhancement. There is no stenosis. DELETE LORI: There is preserved contrast enhancement. There is some stenosis in the origin of the vessel by some atherosclerotic changes in the anterior wall of the infrarenal abdominal aorta. RIGHT LOWER EXTREMITY: There is good contrast runoff into the right common femoral, profunda and superficial femoral arteries, down to the proximal mid right popliteal artery.  There is interruption of flow in the mid distal right popliteal artery with some calcifications in the area of the posterior tibial trunk. Reconstitution of flow is seen in the proximal right posterior tibial artery for a short length. The is are of consideration of flow into the right peroneal artery to the mid 3rd of the right leg with minimal reconstitution more distally. The there is partial reconstitution of the proximal right tibial artery. There is no visualization of contrast enhancement in the dorsal pedal artery or in the plantar arch of the right foot. LEFT LOWER EXTREMITY: There is a non calcified plaque in the left common femoral artery. There is preserved contrast runoff into the left profunda and superficial femoral artery down to the proximal popliteal artery. Towards the distal left popliteal artery and posterior tibial trunk and the left the posterior tibial peroneal artery and anterior tibial artery there are multiple collateral branches trying to reconstitution flow but there is no direct flow continuation into the level of the ankle. There is no contrast in dorsal pedal artery in the plantar artery. The minimal reconstitution is seen in the distal peroneal artery. 1.  Severe peripheral vascular disease affecting occluded the mid distal popliteal arteries bilateral and corresponding popliteal branches to the area of the right and left legs down to the ankle/foot area with poor circulation distally. Multiple collateral from geniculate arteries are present but there is no effective reconstitution down to the level of the ankle/foot bilaterally. 2.  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 The prominence of lymph nodes present could consider further evaluation with PET-CT scan to investigate degree of biological activity. 3.  Hyperdense lesion in the central aspect of the right kidney. Could represent a hemorrhagic cyst.  Further evaluation with renal ultrasound recommended.  4. Corpus lutein cyst in the left ovary. Small free fluid accumulation in the cul-de-sac. These can be relate with recent rupture follicular cyst. Could consider correlation with pelvic ultrasound. VL DUP UPPER EXTREMITY VENOUS RIGHT    Result Date: 9/12/2022    [facility]  Vascular Upper Extremities Veins Procedure   Patient Name    Sheila Yeung        Date of Study             09/12/2022                  Lulu Loges   Date of Birth   1975   Gender                    Female   Age             52 year(s)   Race                      Black   Room Number     04   Corporate ID #  [id number]   Patient Acct #  [de-identified]   MR #            [id number]      Sonographer               Godwin Schmidt RVT   Accession #     [id number]   Interpreting Physician    Britany Hancock   Referring Nurse              Referring Physician       ER MD *  Practitioner  Procedure Type of Study:   Veins: Upper Extremities Veins, Venous Scan Upper Right. Indications for Study:Pain, edema, discoloration. Patient Status:ER. Technical Quality:Limited visualization. Limitation reason:due to pain. Conclusions   Summary   Simultaneous real time imaging utilizing B-Mode, color doppler and  spectral waveform analysis was performed on the right upper extremity for  venous examination of the deep and superficial systems. Findings are:   Right:  No evidence of deep venous thrombosis. Superficial venous thrombosis identified in the cephalic vein.    Signature   ----------------------------------------------------------------  Electronically signed by Godwin Schmidt RVT(Sonographer) on  09/12/2022 05:02 PM  ----------------------------------------------------------------   ----------------------------------------------------------------  Electronically signed by Sampson Johnston(Interpreting  physician) on 09/12/2022 05:44 PM  ----------------------------------------------------------------  Findings:   Right Impression:  Internal jugular, subclavian, axillary, brachial, ulnar, radial and  basilic veins are compressible with normal doppler responses. The cephalic vein mid portion is distended and non-compressible. Risk Factors History +-----------------------+----------+---------------------------------------+ ! Diagnosis              ! Date      ! Comments                               ! +-----------------------+----------+---------------------------------------+ ! Peripheral vascular    !01/16/2018! left Femoral-Peroneal Bypass Graft     ! !disease                !          !                                       ! +-----------------------+----------+---------------------------------------+ ! Green [employer]     !          !                                       ! +-----------------------+----------+---------------------------------------+ ! Peripheral vascular    !08/22/2019! Right femoral thrombectomy             ! !disease                !          !                                       ! +-----------------------+----------+---------------------------------------+ ! Peripheral vascular    ! !right partial hallux and 3rd toe       ! !disease                !          !amputation                             ! +-----------------------+----------+---------------------------------------+ ! Peripheral vascular    !          !bilateral stents                       ! !disease                !          !                                       ! +-----------------------+----------+---------------------------------------+   - The patient's risk factor(s) include: obesity and arterial hypertension. Allergies   - Allergy:*Unlisted(Drug). Comments:asa, ibuprofen, lopid, nortripyline, pcn, sulfa Velocities are measured in cm/s ; Diameters are measured in cm Right UE Vein Measurements 2D Measurements +------------------------------------+----------+---------------+----------+ ! Location                            ! Visualized! Compressibility! Thrombosis! +------------------------------------+----------+---------------+----------+ ! Prox IJV                            ! Yes       ! Yes            ! None      ! +------------------------------------+----------+---------------+----------+ ! Dist IJV                            ! Yes       ! Yes            ! None      ! +------------------------------------+----------+---------------+----------+ ! Prox SCV                            ! Yes       ! Yes            ! None      ! +------------------------------------+----------+---------------+----------+ ! Dist SCV                            ! Yes       ! Yes            ! None      ! +------------------------------------+----------+---------------+----------+ ! Prox Axillary                       ! Yes       ! Yes            ! None      ! +------------------------------------+----------+---------------+----------+ ! Dist Axillary                       ! Yes       ! Yes            ! None      ! +------------------------------------+----------+---------------+----------+ ! Prox Brachial                       !Yes       ! Yes            ! None      ! +------------------------------------+----------+---------------+----------+ ! Dist Brachial                       !Yes       ! Yes            ! None      ! +------------------------------------+----------+---------------+----------+ ! Prox Radial                         !Yes       ! Yes            ! None      ! +------------------------------------+----------+---------------+----------+ ! Dist Radial                         !Yes       ! Yes            ! None      ! +------------------------------------+----------+---------------+----------+ ! Prox Ulnar                          ! Yes       ! Yes            ! None      ! +------------------------------------+----------+---------------+----------+ ! Dist Ulnar                          ! Yes       ! Yes            ! None      ! +------------------------------------+----------+---------------+----------+ ! Arlyn at UA !Yes       !Yes            ! None      ! +------------------------------------+----------+---------------+----------+ ! Basilic at AF                       ! Yes       ! Yes            ! None      ! +------------------------------------+----------+---------------+----------+ ! Basilic at [address]                       ! Yes       ! Yes            ! None      ! +------------------------------------+----------+---------------+----------+ ! Cephalic at UA                      ! Yes       ! Yes            ! None      ! +------------------------------------+----------+---------------+----------+ ! Cephalic at AF                      ! Yes       ! No             !AI        ! +------------------------------------+----------+---------------+----------+ ! Cephalic at [address]                      ! Yes       ! Yes            ! None      ! +------------------------------------+----------+---------------+----------+ Doppler Measurements +-------------------------+-----------------------+------------------------+ ! Location                 ! Signal                 !Reflux                  ! +-------------------------+-----------------------+------------------------+ ! IJV                      ! Phasic                 !                        ! +-------------------------+-----------------------+------------------------+ ! SCV                      ! Phasic                 !                        ! +-------------------------+-----------------------+------------------------+ ! Axillary                 ! Phasic                 !                        ! +-------------------------+-----------------------+------------------------+ ! Brachial                 !Phasic                 !                        ! +-------------------------+-----------------------+------------------------+      RECENT VITALS:     Temp: 98.5 °F (36.9 °C),  Heart Rate: 97, Resp: 24, BP: 133/84, SpO2: 99 %    This patient is a 52 y.o. Female with cold pulseless right lower extremity. Patient has history of peripheral artery disease, had a stent placed in the right iliac artery. CTA concerning for clotted off stent as well as no distal flow to right lower extremity. Vascular surgery aware. Patient being started on heparin drip. Admission. Admitted to vascular. Directly to OR.      OUTSTANDING TASKS / RECOMMENDATIONS:    Admission     FINAL IMPRESSION:     1. Arterial thrombosis ([facility] [address]        DISPOSITION:         DISPOSITION:  []  Discharge   []  Transfer -    [x]  Admission -  Vascular   []  Against Medical Advice   []  Eloped   FOLLOW-UP: No follow-up provider specified.    DISCHARGE MEDICATIONS: New Prescriptions    No medications on file           Altagracia aSenz DO  Emergency Medicine Resident  [place], [place]  Resident  09/24/22 [id number]

## 2022-09-24 NOTE — ED NOTES
Tried to doppler on her both foot, however the right foot does not have pulsation, and numb. Hence left foot had pulsation and sensation.      La Dyson RN  09/24/22 [id number]

## 2022-09-24 NOTE — ANESTHESIA POSTPROCEDURE EVALUATION
Department of Anesthesiology  Postprocedure Note    Patient: Aimee Hernandez  MRN: [id number]  YOB: 1975  Date of evaluation: 9/24/2022      Procedure Summary     Date: 09/24/22 Room / Location: [address]    Anesthesia Start: 1024 Anesthesia Stop: 7165    Procedure: ARTERIAL MECHANICAL THROMBECTOMY COMMON FEMORAL AND ILIAC (Bilateral) Diagnosis: Thrombosis      (Thrombosis [I82.90])    Surgeons: Alka Santos MD Responsible Provider: Mata Liz MD    Anesthesia Type: general ASA Status: 3 - Emergent          Anesthesia Type: No value filed.     Kristy Phase I:      Kristy Phase II:        Anesthesia Post Evaluation    Patient location during evaluation: ICU  Patient participation: complete - patient participated  Level of consciousness: awake and alert  Pain score: 2  Airway patency: patent  Nausea & Vomiting: no nausea and no vomiting  Complications: no  Cardiovascular status: hemodynamically stable  Respiratory status: acceptable  Hydration status: stable

## 2022-09-24 NOTE — ED NOTES
Pt came due to right Leg pain that started 3 days ago. Took tylenol at 0200 for pain. Pt had history of vascular disease. Had also surgery on that affected leg for evacuation of blood clots.       Edwin Robb RN  09/24/22 [id number]

## 2022-09-24 NOTE — ED NOTES
Pt and family were anxious about the procedure, though the Doctor have discussed the point of care regarding the procedure and Pt consented on it.  Informed the receiving staff regarding the concern and told them that they can address their concern at the OR waiting room        Nelly Baker RN  09/24/22 [id number]

## 2022-09-24 NOTE — ED NOTES
Started Pt on heparin, no bleeding noted prior to Heparin infusion, Line is patent no sign of infiltration     Nelly Baker RN  09/24/22 [id number]

## 2022-09-24 NOTE — OP NOTE
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 Post-Op Diagnosis: Same       Procedure:  1. Left common femoral arterial access under ultrasound guidance. 2.  Distal aortography with pelvic runoff. 3.  Placement of catheter into right common femoral artery with right common femoral profundofemoral and proximal SFA arteriography. 4.  Placement of catheter into right popliteal artery with popliteal and tibial peroneal arteriography. 5.  Extirpation of arterial thrombus in the iliac system with the penumbra 7 Western Julienne device. 6.  Extirpation of thrombus from the popliteal and tibioperoneal system with the 7 Western Julienne and 5 Western Julienne penumbra devices. 7.  Placement of an 8 x 59 Viabahn VBX covered stent in the right common to external iliac artery. 8.  Balloon angioplasty of the popliteal artery with a 4 x 100 balloon. 9.  Balloon angioplasty of the tibioperoneal trunk and proximal peroneal artery and distal peroneal artery with a 3 x 200 balloon. 10.  Completion arteriography of the right lower extremity and right iliac system. 11.  Left distal external iliac, common femoral, profundofemoral and proximal SFA arteriography. 12.  Placement of 8 Slovenian Angio-Seal device in the left common femoral artery.     Surgeon(s):  Bettina John MD    Assistant:   Willard Loredo    Anesthesia: General    Estimated Blood Loss (mL): 790    Complications: None    Specimens:   None    Implants:  Viabahn VBX 8 x 59 covered stent      Drains:   Urinary Catheter 09/24/22 Avalos-Temperature (Active)       [REMOVED] Urinary Catheter 09/07/22 (Removed)   Catheter Indications Urinary retention (acute or chronic), continuous bladder irrigation or bladder outlet obstruction 09/09/22 0400   Site Assessment No urethral drainage 09/09/22 0800   Urine Color Yellow 09/09/22 0800   Urine Appearance Clear 09/09/22 0800   Urine Odor Malodorous 09/09/22 0400   Collection Container Standard 09/09/22 0800   Securement Method Leg strap 09/09/22 0400   Catheter Care Completed Yes 09/09/22 0800   Catheter Best Practices  Catheter secured to thigh 09/09/22 0800   Status Draining 09/09/22 0800   Output (mL) 50 mL 09/09/22 0600   Discontinuation Reason Per provider order 09/09/22 1824       [REMOVED] External Urinary Catheter (Removed)   Site Assessment Clean,dry & intact 09/07/22 1332   Placement Initiated 09/07/22 1332   Perineal Care Yes 09/07/22 1332   Suction 40 mmgHg continuous 09/07/22 1332       Findings: The common and external iliac arteries were all occluded. There was reconstitution of the common femoral artery on the right and the common femoral profundofemoral and SFA were all patent without significant disease. The distal popliteal artery and mid popliteal were occluded on the right. There were no named tibial peroneal vessels that were open on initial arteriography. Extirpation of thrombus was achieved quite well with the 7 Wolof penumbra suction embolectomy thrombectomy catheter in the common and external iliac arteries. There was residual thrombus at the level of the stent which was treated by covered stent using an 8 x 59 Viabahn VBX stent. Arteriography of the tibioperoneal system at that point with a better pressure had revealed no named vessels in the tibioperoneal system. I was able to gain access into the peroneal artery with a wire all the way to the level of the ankle. I used the 5 Western Julienne penumbra device then to extirpate thrombus with minimal success in the popliteal and tibioperoneal trunk and peroneal artery. I used a 7 Western Julienne device then to retrieve thrombus from the popliteal partially successful.   Completion balloon angioplasty was then performed and there was some flow into the peroneal system. I did gain back some several arteries that were not there prior to revascularization. She was on a heparin drip throughout the entirety of the case and did receive heparin boluses. She will remain on a heparin drip. We will also institute Brilinta    Detailed Description of Procedure: The patient was brought to the operating room and placed in the supine position with her arms tucked at her sides. She was identified as Brenna Arrow for right lower extremity revascularization and thrombectomy of the iliac and tibioperoneal systems. She was given monitored anesthesia care and eventually general anesthetic with endotracheal airway establishment. Her lower abdomen groins and lower extremities were prepped and draped in sterile fashion. Timeout was held before an 18-gauge was used to access the left common femoral artery under ultrasound guidance through which modified Seldinger technique was used to establish a 4 Western Julienne sheath. A wire was placed into the distal aorta. Omni Flush catheter was placed into the distal aorta and distal aortography was obtained. Images were saved. The wire was then passed to the level of the common femoral artery on the right using the assistance of the Omni Flush catheter. The catheter was then passed to the common femoral artery on the right. Right common femoral profundofemoral and proximal SFA arteriography was obtained. Images were saved. A trailblazer catheter was then used to navigate the SFA and SFA popliteal and tibial peroneal arteriography was obtained. Images were saved. The wire was placed in the SFA and the catheter was removed. The 4 Ivorian sheath was replaced with a 7 Western Julienne Ansell sheath. A 7 Ivorian penumbra suction embolectomy device was then used to remove thrombus from the common iliac and external iliac with several passes. This was only after a bolus of 6000 units of heparin was given. 2000 additional units were given later in the case. The residual thrombus in the common to external iliac lie in the region where the original thrombus was several weeks ago and I performed placement of a stent in the common to external iliac artery. This was treated with a covered stent using a Viabahn VBX 8 x 59. This was deployed with ease and there was an excellent result at the conclusion. There was good flow through the repair. This sheath was then placed through the stent and into the distal external iliac artery. It should be noted that I had to change a 7 Citizen of [place] sheath to the 8 Citizen of [place] sheath to fit the VBX stent. Once the sheath was in the distal external iliac artery on the right care was taken to perform arteriography of the tibioperoneal system again on the right. There was no reconstitution of any the tibioperoneal vessels. This included infusion of contrast into the profundofemoral and SFA. I was able to establish a wire into the distal peroneal artery over which a catheter was placed to exchange the wire to an 014 wire over which we used the 5 Citizen of [place] penumbra suction embolectomy catheter in the popliteal and tibial peroneal trunk as well as peroneal artery. This was passed several times and we did retrieve some thrombus. Completion arteriogram revealed residual old type thrombus within the tibioperoneal trunk and popliteal artery but I was able to regain some cerebral arteries after passing a 7 Citizen of [place] device to retrieve popliteal thrombus. Once this was completed I took the liberty of placing a 3 x 200 balloon into the peroneal artery, tibioperoneal trunk. This was inflated to 4 to 5 melquiades for several minutes in each place. In addition I used a 4 x 100 balloon on several different inflations in the popliteal and tibial peroneal trunk. Following this there was patency of the popliteal and tibioperoneal trunk and proximal peroneal artery but the patency was quite poor and tenuous.   Inspection of some of the thrombus that was retrieved from the tibioperoneal system and from the tibioperoneal trunk and popliteal artery revealed that it was quite chronic. With this information I decided to remove the sheath after placing the wire into the thoracic aorta. The sheath was pulled back to the level of the distal external iliac artery on the left at which time left distal external iliac, common femoral, profundofemoral and proximal SFA arteriography was obtained. Images were saved. The sheath was removed and an 8 Western Julienne Angio-Seal device was successfully placed into the left common femoral artery with no bleeding or hematoma. There was a palpable popliteal pulse at the conclusion of those maneuvers. The right calf was still soft at the conclusion of the case. I do not think that she will get much flow to that level of her arterial system on the right to create a compartment syndrome. She has had 4 compartment fasciotomy in the past and refasciotomy may be difficult but certainly possible if needed. I would wait at this point and examine her calf serially over the next 24 hours. She will be on heparin, Brilinta, Pletal and nifedipine. I explained to the family that I am still not convinced that she will save her lower extremity. She has very minimal runoff and that I think is the major problem. I do not think that bypass is possible given the absence of runoff. They understand and we will continue to monitor her progress.     Electronically signed by Jo Salmon MD on 9/24/2022 at 2:00 PM

## 2022-09-24 NOTE — PROGRESS NOTES
SPIRITUAL CARE DEPARTMENT - [facility] [address]  PROGRESS NOTE    Shift date: 9/23/2022  Shift day: Friday   Shift # 3    Room # 07/07   Name: Shiloh Gilmore                Restoration: Unknown   Place of Anglican: Unknown    Referral: Routine Visit - Nurse Referral    Admit Date & Time: 9/24/2022  5:22 AM    Assessment:  Shiloh Gilmore is a 52 y.o. female in the hospital because of \"Foot Pain. \" Upon entering the room writer observes patient sitting up in hospital bed, with son present in room supporting her. Intervention:  Writer introduced self and title as .  inquired about patient's well-being. Per patient, she is feeling \"scared,\" as she is experiencing pain and her \"foot feels cold. \"   informed bedside nurse of patient's complaints of pain.  provided comfort, support, and shared words of encouragement with patient during visit. Patient and family were receptive of care and hospitality. Outcome:  Patient and family thanked  for visit and care. Plan:  Chaplains will remain available to offer spiritual and emotional support as needed. 09/24/22 0657   Encounter Summary   Service Provided For: Patient and family together   Referral/Consult From: Nurse   Support System Children;Family members   Last Encounter  09/23/22   Complexity of Encounter Moderate   Begin Time 0657   End Time  0712   Total Time Calculated 15 min   Encounter    Type Initial Screen/Assessment   Spiritual/Emotional needs   Type Spiritual Support   Assessment/Intervention/Outcome   Assessment Anxious; Fearful   Intervention Active listening;Discussed illness injury and its impact; Explored/Affirmed feelings, thoughts, concerns;Explored Coping Skills/Resources;Sustaining Presence/Ministry of presence   Outcome Comfort;Coping;Receptive   Plan and Referrals   Plan/Referrals Continue to visit, (comment)  (as needed)     Electronically signed by Royer Jorgensen on 9/24/2022 at 7:13 AM.  [facility] [address] [facility]  [phone]

## 2022-09-24 NOTE — ED PROVIDER NOTES
[facility]  ED  Emergency Department Encounter  Emergency Medicine Resident     Pt Latrice Roberson  MRN: [id number]  Emigdiogflev 1975  Date of evaluation: 9/24/22  PCP:  Jessica Venegas MD      [address] [place] [address]       Chief Complaint   Patient presents with    Leg Pain    Foot Pain       HISTORY OF PRESENT ILLNESS  (Location/Symptom, Timing/Onset, Context/Setting, Quality, Duration, Modifying Factors, Severity.)      Clare Wayne is a 52 y.o. female who presents with right foot and leg pain. She states that she has a history of DVT and vascular disease in her right leg with multiple vascular surgeries, fasciotomy. She states she is on Eliquis which she takes every day. She states she began developing pain of the right foot and leg 3 days ago after driving to [facility]. States pain is worsened and she now has numbness in her right foot and leg particularly in the third toe. She states she has been unable to walk due to the pain and numbness. She denies pain to the calf, states her pain is worse in the knee and right foot and third toe. Patient has past medical history of hypertension, hyperlipidemia, DVT. She states she takes her medications as prescribed.     PAST MEDICAL / SURGICAL / SOCIAL / FAMILY HISTORY      has a past medical history of Abnormal Pap smear of cervix, Anemia, Anxiety, Chronic back pain, Claudication (HCC), Claudication in peripheral vascular disease ([address] Depression, Headache(784.0), Heart murmur, Hemorrhage of rectum and anus, History of blood transfusion, HTN (hypertension), Hx of blood clots, Hypercoagulable state ([address] Hyperlipidemia, Hypertension, Intertriginous candidiasis, Left popliteal artery occlusion (HCC), Leg pain, Menometrorrhagia, Migraine headache with aura, Obesity, Osteoarthritis, PAD (peripheral artery disease) ([address] Patient in clinical research study, PVD (peripheral vascular disease) ([address] Takotsubo cardiomyopathy, Under care of service provider, Under care of service provider, Under care of service provider, and Wound of right leg.       has a past surgical history that includes Tubal ligation ();  section (); other surgical history (2017); other surgical history (Left, 2018); other surgical history (Left, 2018); vascular surgery (Left, 2018); pr reoperation, bypass graft (Left, 2018); pr vein bypass graft,fem-pop (Left, 2018); thrombectomy (Right, 2019); IVC filter insertion; Toe amputation (Right, 2019); Toe amputation (Right, 2019); Foot Amputation (Right, 3/8/2021); and vascular surgery (Right, 2022).       Social History     Socioeconomic History    Marital status: Single     Spouse name: Not on file    Number of children: Not on file    Years of education: Not on file    Highest education level: Not on file   Occupational History    Not on file   Tobacco Use    Smoking status: Never    Smokeless tobacco: Never   Vaping Use    Vaping Use: Never used   Substance and Sexual Activity    Alcohol use: Not Currently     Comment: occasionally     Drug use: No    Sexual activity: Not Currently     Partners: Male   Other Topics Concern    Not on file   Social History Narrative    Not on file     Social Determinants of Health     Financial Resource Strain: Not on file   Food Insecurity: Not on file   Transportation Needs: Not on file   Physical Activity: Not on file   Stress: Not on file   Social Connections: Not on file   Intimate Partner Violence: Not on file   Housing Stability: Not on file       Family History   Problem Relation Age of Onset    Diabetes Mother     High Blood Pressure Mother     Heart Attack Mother     Heart Disease Mother     Kidney Disease Mother     High Blood Pressure Father     Heart Disease Father     Heart Attack Father     Diabetes Sister     High Blood Pressure Sister     Diabetes Sister     High Blood Pressure Sister        Allergies:  Audrea Spine [aspirin], Lopid [gemfibrozil], Nortriptyline, Pcn [penicillins], Sulfa antibiotics, Morphine, and Ibuprofen    Home Medications:  Prior to Admission medications    Medication Sig Start Date End Date Taking? Authorizing Provider   atorvastatin (LIPITOR) 40 MG tablet take 1 tablet by mouth once daily 9/19/22   Neal Cm MD   lisinopril (PRINIVIL;ZESTRIL) 5 MG tablet take 1 tablet by mouth once daily 9/19/22   Neal Cm MD   diclofenac sodium (VOLTAREN) 1 % GEL Apply 4 g topically 4 times daily 9/14/22   Clarke Price MD   acetaminophen (TYLENOL) 500 MG tablet Take 1 tablet by mouth 4 times daily as needed for Pain 9/14/22   Clarke Price MD   apixaban (ELIQUIS) 5 MG TABS tablet Take 2 tablets by mouth 2 times daily for 7 days 9/11/22 9/18/22  Sharyn Vo DO   apixaban (ELIQUIS) 5 MG TABS tablet Take 1 tablet by mouth 2 times daily 9/19/22   Sharyn Vo DO   amLODIPine (NORVASC) 10 MG tablet take 1 tablet by mouth once daily 1/19/22   Wesley Nix MD       REVIEW OF SYSTEMS    (2-9 systems for level 4, 10 or more for level 5)      Review of Systems   Constitutional:  Negative for chills and fever. HENT:  Negative for congestion, rhinorrhea and sore throat. Respiratory:  Negative for cough, shortness of breath and wheezing. Cardiovascular:  Negative for chest pain and palpitations. Gastrointestinal:  Negative for abdominal pain, constipation, diarrhea, nausea and vomiting. Genitourinary:  Negative for difficulty urinating, frequency and urgency. Musculoskeletal:  Positive for arthralgias and myalgias. Severe pain numbness of right lower extremity   Neurological:  Positive for numbness. Negative for syncope and weakness.      PHYSICAL EXAM   (up to 7 for level 4, 8 or more for level 5)      INITIAL VITALS:   /72   Pulse 88   Temp 98.5 °F (36.9 °C)   Resp 14   Wt 185 lb (83.9 kg)   LMP 09/12/2022   SpO2 98%   BMI 30.79 kg/m²     Physical Exam  Constitutional: Appearance: Normal appearance. HENT:      Head: Normocephalic and atraumatic. Mouth/Throat:      Mouth: Mucous membranes are moist.      Pharynx: Oropharynx is clear. Eyes:      Extraocular Movements: Extraocular movements intact. Pupils: Pupils are equal, round, and reactive to light. Cardiovascular:      Rate and Rhythm: Normal rate and regular rhythm. Pulses: Normal pulses. Heart sounds: Normal heart sounds. Pulmonary:      Effort: Pulmonary effort is normal.      Breath sounds: Normal breath sounds. Abdominal:      General: Abdomen is flat. Palpations: Abdomen is soft. Tenderness: There is no abdominal tenderness. Musculoskeletal:         General: Tenderness present. Cervical back: Normal range of motion. Comments: Patient has extreme tenderness to right foot and knee, DP pulses are absent, popliteal 2+. Right extremity is cool to the touch and exquisitely tender   Skin:     Capillary Refill: Capillary refill takes less than 2 seconds. Neurological:      General: No focal deficit present. Mental Status: She is alert and oriented to person, place, and time. Mental status is at baseline.    Psychiatric:         Mood and Affect: Mood normal.         Behavior: Behavior normal.       DIFFERENTIAL  DIAGNOSIS     PLAN (LABS / IMAGING / EKG):  Orders Placed This Encounter   Procedures    CTA ABDOMINAL AORTA W BILAT RUNOFF W CONTRAST    CBC with Auto Differential    HCG, SERUM, QUALITATIVE    Lactic Acid    Basic Metabolic Panel    Protime-INR    APTT    Inpatient consult to Vascular Surgery       MEDICATIONS ORDERED:  Orders Placed This Encounter   Medications    fentaNYL (SUBLIMAZE) injection 50 mcg    iopamidol (ISOVUE-370) 76 % injection 125 mL    heparin (porcine) injection 6,710 Units    heparin (porcine) injection 6,710 Units    heparin (porcine) injection 3,360 Units    heparin 25,000 units in dextrose 5 % 250 mL infusion (rate based)       DDX: DVT, neuropathy    MDM: Patient is a 51-year-old female with past medical history of DVT on Eliquis presents with severe pain of the right foot and leg associated with numbness. States it began 3 days ago after a drive to [facility]. She states the pain worsened and woke her up this morning due to its severity. Patient is GCS 15, nontoxic-appearing, speaking full sentences, alert and oriented. Significant pain on exam.  DP pulses in right foot absent on Doppler. Vascular surgery consulted due to pulseless limb, CTA aorta with runoff shows clotting of the iliac and femoral with possible clotting of the stent.   Vascular surgery started patient on heparin drip, possible surgery today    LAB RESULTS:  Results for orders placed or performed during the hospital encounter of 09/24/22   CBC with Auto Differential   Result Value Ref Range    WBC 11.3 3.5 - 11.3 k/uL    RBC 3.84 (L) 3.95 - 5.11 m/uL    Hemoglobin 11.4 (L) 11.9 - 15.1 g/dL    Hematocrit 34.4 (L) 36.3 - 47.1 %    MCV 89.6 82.6 - 102.9 fL    MCH 29.7 25.2 - 33.5 pg    MCHC 33.1 28.4 - 34.8 g/dL    RDW 13.4 11.8 - 14.4 %    Platelets 696 846 - 052 k/uL    MPV 10.2 8.1 - 13.5 fL    NRBC Automated 0.0 0.0 per 100 WBC    Seg Neutrophils 63 36 - 65 %    Lymphocytes 26 24 - 43 %    Monocytes 7 3 - 12 %    Eosinophils % 2 1 - 4 %    Basophils 1 0 - 2 %    Immature Granulocytes 1 (H) 0 %    Segs Absolute 7.14 1.50 - 8.10 k/uL    Absolute Lymph # 2.93 1.10 - 3.70 k/uL    Absolute Mono # 0.81 0.10 - 1.20 k/uL    Absolute Eos # 0.25 0.00 - 0.44 k/uL    Basophils Absolute 0.07 0.00 - 0.20 k/uL    Absolute Immature Granulocyte 0.07 0.00 - 0.30 k/uL   HCG, SERUM, QUALITATIVE   Result Value Ref Range    hCG Qual NEGATIVE NEGATIVE   Lactic Acid   Result Value Ref Range    Lactic Acid, Whole Blood 2.2 (H) 0.7 - 2.1 mmol/L   Basic Metabolic Panel   Result Value Ref Range    Glucose 124 (H) 70 - 99 mg/dL    BUN 9 6 - 20 mg/dL    Creatinine 0.57 0.50 - 0.90 mg/dL Calcium 8.9 8.6 - 10.4 mg/dL    Sodium 139 135 - 144 mmol/L    Potassium 3.9 3.7 - 5.3 mmol/L    Chloride 104 98 - 107 mmol/L    CO2 22 20 - 31 mmol/L    Anion Gap 13 9 - 17 mmol/L    GFR Non-African American >60 >60 mL/min    GFR African American >60 >60 mL/min    GFR Comment         Protime-INR   Result Value Ref Range    Protime 9.5 9.1 - 12.3 sec    INR 0.9          RADIOLOGY:  CTA ABDOMINAL AORTA W BILAT RUNOFF W CONTRAST    (Results Pending)         EKG  None    All EKG's are interpreted by the Emergency Department Physician who either signs or Co-signs this chart in the absence of a cardiologist.    EMERGENCY DEPARTMENT COURSE:      ED Course as of 09/24/22 0822   Sat Sep 24, 2022   0715 Page vascular surgery due to clotting of iliac and femoral arteries apparent on CTA [AK]   0735 Delay of care due to [facility] forgetting to put page for vascular surgery [AK]   [phone] Spoke to vascular surgery resident. They state that they have started patient on heparin drip, likely going to OR today. Speaking with her attending now to determine who patient will be admitted under. Conrad Taylor      ED Course User Index  [AK] Lexie Patel MD       No notes of [facility] Admission Criteria type on file. PROCEDURES:  None    CONSULTS:  IP CONSULT TO VASCULAR SURGERY    CRITICAL CARE:  None      FINAL IMPRESSION      1. Arterial thrombosis ([facility]          DISPOSITION / PLAN     DISPOSITION Decision To Admit 09/24/2022 08:18:16 AM      PATIENT REFERRED TO:  No follow-up provider specified.     DISCHARGE MEDICATIONS:  New Prescriptions    No medications on file       Lexie Patel MD  Emergency Medicine Resident    (Please note that portions of thisnote were completed with a voice recognition program.  Efforts were made to edit the dictations but occasionally words are mis-transcribed.)       Lexie Patel MD  Resident  09/24/22 4313
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
## 2022-09-24 NOTE — ED PROVIDER NOTES
[facility]     Emergency Department     Faculty Note/ Attestation      Pt Name: Daylin Cardenas                                       MRN: [id number]  Emigdiogflev 1975  Date of evaluation: 9/24/2022    Patients PCP:    Jackie Patterson MD      Attestation  I performed a history and physical examination of the patient and discussed management with the resident. I reviewed the residents note and agree with the documented findings and plan of care. Any areas of disagreement are noted on the chart. I was personally present for the key portions of any procedures. I have documented in the chart those procedures where I was not present during the key portions. I have reviewed the emergency nurses triage note. I agree with the chief complaint, past medical history, past surgical history, allergies, medications, social and family history as documented unless otherwise noted below. For Physician Assistant/ Nurse Practitioner cases/documentation I have personally evaluated this patient and have completed at least one if not all key elements of the E/M (history, physical exam, and MDM). Additional findings are as noted.       Initial Screens:             Vitals:    Vitals:    09/24/22 0505 09/24/22 0532   BP: 130/83    Pulse: 95 88   Resp: 20 14   Temp: 97.2 °F (36.2 °C) 98.5 °F (36.9 °C)   TempSrc: Oral    SpO2: 96% 99%       CHIEF COMPLAINT       Chief Complaint   Patient presents with    Leg Pain    Foot Pain             DIAGNOSTIC RESULTS             RADIOLOGY:   No orders to display         LABS:  Labs Reviewed - No data to display      EMERGENCY DEPARTMENT COURSE:     -------------------------  BP: 130/83, Temp: 98.5 °F (36.9 °C), Heart Rate: 88, Resp: 14      Comments    Hx of R DVT s/p multiple surgeries, fasciotomy, toe amputations  Also prior R UE clot  On eloquis  Recent long trip  Pain to R foot and knee  Pain now unbearable    Unable to obtain Doppler pulses, vascular surgery consulted, CT a aorta with runoff to the toes ordered as well. Patient presented at a time when the electronic medical record system was not operable, consultations and scans were obtained to soon as possible.     Patient was signed out to Dr. Elaine Sanford    (Please note that portions of this note were completed with a voice recognition program.  Efforts were made to edit the dictations but occasionally words are mis-transcribed.)      Sweta Shipman MD,, MD  Attending Emergency Physician          Sweta Shipman MD  09/26/22 [id number]
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
## 2022-09-25 LAB
ABSOLUTE EOS #: 0.16 K/UL (ref 0–0.44)
ABSOLUTE IMMATURE GRANULOCYTE: 0.07 K/UL (ref 0–0.3)
ABSOLUTE LYMPH #: 2.16 K/UL (ref 1.1–3.7)
ABSOLUTE MONO #: 0.65 K/UL (ref 0.1–1.2)
ANION GAP SERPL CALCULATED.3IONS-SCNC: 11 MMOL/L (ref 9–17)
BASOPHILS # BLD: 1 % (ref 0–2)
BASOPHILS ABSOLUTE: 0.07 K/UL (ref 0–0.2)
BUN BLDV-MCNC: 5 MG/DL (ref 6–20)
CALCIUM IONIZED: 1.11 MMOL/L (ref 1.13–1.33)
CALCIUM SERPL-MCNC: 8.1 MG/DL (ref 8.6–10.4)
CHLORIDE BLD-SCNC: 102 MMOL/L (ref 98–107)
CO2: 24 MMOL/L (ref 20–31)
CREAT SERPL-MCNC: 0.6 MG/DL (ref 0.5–0.9)
EOSINOPHILS RELATIVE PERCENT: 1 % (ref 1–4)
GFR AFRICAN AMERICAN: >60 ML/MIN
GFR NON-AFRICAN AMERICAN: >60 ML/MIN
GFR SERPL CREATININE-BSD FRML MDRD: ABNORMAL ML/MIN/{1.73_M2}
GLUCOSE BLD-MCNC: 117 MG/DL (ref 70–99)
HCT VFR BLD CALC: 29.5 % (ref 36.3–47.1)
HEMOGLOBIN: 9.8 G/DL (ref 11.9–15.1)
HOMOCYSTEINE: 10.2 UMOL/L
IMMATURE GRANULOCYTES: 1 %
LACTIC ACID, WHOLE BLOOD: 2.2 MMOL/L (ref 0.7–2.1)
LACTIC ACID, WHOLE BLOOD: 2.3 MMOL/L (ref 0.7–2.1)
LYMPHOCYTES # BLD: 18 % (ref 24–43)
MAGNESIUM: 2.1 MG/DL (ref 1.6–2.6)
MCH RBC QN AUTO: 30.3 PG (ref 25.2–33.5)
MCHC RBC AUTO-ENTMCNC: 33.2 G/DL (ref 28.4–34.8)
MCV RBC AUTO: 91.3 FL (ref 82.6–102.9)
MONOCYTES # BLD: 5 % (ref 3–12)
NRBC AUTOMATED: 0 PER 100 WBC
PARTIAL THROMBOPLASTIN TIME: 45.6 SEC (ref 20.5–30.5)
PARTIAL THROMBOPLASTIN TIME: 46 SEC (ref 20.5–30.5)
PARTIAL THROMBOPLASTIN TIME: 52.3 SEC (ref 20.5–30.5)
PARTIAL THROMBOPLASTIN TIME: 65.7 SEC (ref 20.5–30.5)
PDW BLD-RTO: 13.4 % (ref 11.8–14.4)
PHOSPHORUS: 2.6 MG/DL (ref 2.6–4.5)
PLATELET # BLD: 283 K/UL (ref 138–453)
PMV BLD AUTO: 9.8 FL (ref 8.1–13.5)
POTASSIUM SERPL-SCNC: 3.6 MMOL/L (ref 3.7–5.3)
RBC # BLD: 3.23 M/UL (ref 3.95–5.11)
SEG NEUTROPHILS: 74 % (ref 36–65)
SEGMENTED NEUTROPHILS ABSOLUTE COUNT: 8.93 K/UL (ref 1.5–8.1)
SODIUM BLD-SCNC: 137 MMOL/L (ref 135–144)
WBC # BLD: 12 K/UL (ref 3.5–11.3)

## 2022-09-25 PROCEDURE — 6360000002 HC RX W HCPCS: Performed by: STUDENT IN AN ORGANIZED HEALTH CARE EDUCATION/TRAINING PROGRAM

## 2022-09-25 PROCEDURE — 2000000000 HC ICU R&B

## 2022-09-25 PROCEDURE — 85025 COMPLETE CBC W/AUTO DIFF WBC: CPT

## 2022-09-25 PROCEDURE — 80048 BASIC METABOLIC PNL TOTAL CA: CPT

## 2022-09-25 PROCEDURE — 82330 ASSAY OF CALCIUM: CPT

## 2022-09-25 PROCEDURE — 6370000000 HC RX 637 (ALT 250 FOR IP): Performed by: STUDENT IN AN ORGANIZED HEALTH CARE EDUCATION/TRAINING PROGRAM

## 2022-09-25 PROCEDURE — 85730 THROMBOPLASTIN TIME PARTIAL: CPT

## 2022-09-25 PROCEDURE — 99255 IP/OBS CONSLTJ NEW/EST HI 80: CPT | Performed by: INTERNAL MEDICINE

## 2022-09-25 PROCEDURE — 84100 ASSAY OF PHOSPHORUS: CPT

## 2022-09-25 PROCEDURE — 83090 ASSAY OF HOMOCYSTEINE: CPT

## 2022-09-25 PROCEDURE — 36415 COLL VENOUS BLD VENIPUNCTURE: CPT

## 2022-09-25 PROCEDURE — 83735 ASSAY OF MAGNESIUM: CPT

## 2022-09-25 PROCEDURE — 83605 ASSAY OF LACTIC ACID: CPT

## 2022-09-25 PROCEDURE — 2580000003 HC RX 258: Performed by: STUDENT IN AN ORGANIZED HEALTH CARE EDUCATION/TRAINING PROGRAM

## 2022-09-25 RX ADMIN — SODIUM CHLORIDE, PRESERVATIVE FREE 10 ML: 5 INJECTION INTRAVENOUS at 21:37

## 2022-09-25 RX ADMIN — DESMOPRESSIN ACETATE 40 MG: 0.2 TABLET ORAL at 08:04

## 2022-09-25 RX ADMIN — ACETAMINOPHEN 1000 MG: 500 TABLET ORAL at 13:45

## 2022-09-25 RX ADMIN — TICAGRELOR 90 MG: 90 TABLET ORAL at 21:36

## 2022-09-25 RX ADMIN — METHOCARBAMOL TABLETS 750 MG: 750 TABLET, COATED ORAL at 08:04

## 2022-09-25 RX ADMIN — CILOSTAZOL 50 MG: 100 TABLET ORAL at 08:04

## 2022-09-25 RX ADMIN — SODIUM CHLORIDE, PRESERVATIVE FREE 10 ML: 5 INJECTION INTRAVENOUS at 08:05

## 2022-09-25 RX ADMIN — OXYCODONE HYDROCHLORIDE 10 MG: 5 TABLET ORAL at 21:43

## 2022-09-25 RX ADMIN — METHOCARBAMOL TABLETS 750 MG: 750 TABLET, COATED ORAL at 18:04

## 2022-09-25 RX ADMIN — HEPARIN SODIUM 1800 UNITS/HR: 10000 INJECTION, SOLUTION INTRAVENOUS at 15:10

## 2022-09-25 RX ADMIN — NIFEDIPINE 10 MG: 10 CAPSULE ORAL at 21:36

## 2022-09-25 RX ADMIN — TICAGRELOR 90 MG: 90 TABLET ORAL at 08:04

## 2022-09-25 RX ADMIN — GABAPENTIN 300 MG: 300 CAPSULE ORAL at 21:36

## 2022-09-25 RX ADMIN — NIFEDIPINE 10 MG: 10 CAPSULE ORAL at 05:49

## 2022-09-25 RX ADMIN — CILOSTAZOL 50 MG: 100 TABLET ORAL at 21:38

## 2022-09-25 RX ADMIN — ACETAMINOPHEN 1000 MG: 500 TABLET ORAL at 05:49

## 2022-09-25 RX ADMIN — HEPARIN SODIUM 1800 UNITS/HR: 10000 INJECTION, SOLUTION INTRAVENOUS at 00:10

## 2022-09-25 RX ADMIN — METHOCARBAMOL TABLETS 750 MG: 750 TABLET, COATED ORAL at 13:45

## 2022-09-25 RX ADMIN — NIFEDIPINE 10 MG: 10 CAPSULE ORAL at 13:45

## 2022-09-25 RX ADMIN — OXYCODONE HYDROCHLORIDE 10 MG: 5 TABLET ORAL at 15:11

## 2022-09-25 RX ADMIN — METHOCARBAMOL TABLETS 750 MG: 750 TABLET, COATED ORAL at 21:36

## 2022-09-25 RX ADMIN — ACETAMINOPHEN 1000 MG: 500 TABLET ORAL at 21:35

## 2022-09-25 RX ADMIN — GABAPENTIN 300 MG: 300 CAPSULE ORAL at 13:45

## 2022-09-25 RX ADMIN — SODIUM CHLORIDE, POTASSIUM CHLORIDE, SODIUM LACTATE AND CALCIUM CHLORIDE: 600; 310; 30; 20 INJECTION, SOLUTION INTRAVENOUS at 02:57

## 2022-09-25 RX ADMIN — GABAPENTIN 300 MG: 300 CAPSULE ORAL at 08:04

## 2022-09-25 ASSESSMENT — PAIN SCALES - GENERAL
PAINLEVEL_OUTOF10: 0
PAINLEVEL_OUTOF10: 6
PAINLEVEL_OUTOF10: 0
PAINLEVEL_OUTOF10: 5
PAINLEVEL_OUTOF10: 10
PAINLEVEL_OUTOF10: 3
PAINLEVEL_OUTOF10: 7

## 2022-09-25 ASSESSMENT — PAIN - FUNCTIONAL ASSESSMENT
PAIN_FUNCTIONAL_ASSESSMENT: PREVENTS OR INTERFERES WITH MANY ACTIVE NOT PASSIVE ACTIVITIES
PAIN_FUNCTIONAL_ASSESSMENT: ACTIVITIES ARE NOT PREVENTED

## 2022-09-25 ASSESSMENT — PAIN DESCRIPTION - LOCATION
LOCATION: LEG;GROIN
LOCATION: LEG
LOCATION: GROIN

## 2022-09-25 ASSESSMENT — PAIN DESCRIPTION - ORIENTATION
ORIENTATION: RIGHT

## 2022-09-25 ASSESSMENT — PAIN DESCRIPTION - DESCRIPTORS
DESCRIPTORS: BURNING
DESCRIPTORS: ACHING

## 2022-09-25 NOTE — CONSULTS
Today's Date: 9/25/2022  Patient Name: Sushil Wilder  Date of admission: 9/24/2022  5:22 AM  Patient's age: 52 y.o., 1975  Admission Dx: Arterial thrombosis ([facility] [I74.9]  Critical limb ischemia with history of revascularization of same extremity ([facility] [I70.229, Z98.890]    Reason for Consult: management recommendations  Requesting Physician: Daya Robert MD    CHIEF COMPLAINT: Acute limb ischemia    History Obtained From:  patient    HISTORY OF PRESENT ILLNESS:      The patient is a 52 y.o.  female who is admitted to the hospital for acute limb ischemia. The patient has known peripheral vascular disease and unfortunately needed multiple surgery including amputation of the toes in the right foot previously because of arterial disease. Hypercoagulable work-up was done and was essentially negative. She was seen a few weeks ago because of right-sided leg pain and acute limb ischemia and she was started on Eliquis. Unfortunately that did not help and she came back again with acute limb ischemia and worsening peripheral artery disease. Vascular took her to surgery and she is definitely improving. The plan is to send her home on warfarin and Brilinta  The patient is seen and evaluated. She is feeling much better. The pain in the right lower extremity improved significantly. She is able to move her foot and her leg with minimal difficulty.     Past Medical History:   has a past medical history of Abnormal Pap smear of cervix, Anemia, Anxiety, Chronic back pain, Claudication (HCC), Claudication in peripheral vascular disease ([address] Depression, Headache(784.0), Heart murmur, Hemorrhage of rectum and anus, History of blood transfusion, HTN (hypertension), Hx of blood clots, Hypercoagulable state ([address] Hyperlipidemia, Hypertension, Intertriginous candidiasis, Left popliteal artery occlusion ([address] Leg pain, Menometrorrhagia, Migraine headache with aura, Obesity, Osteoarthritis, PAD (peripheral artery disease) ([facility] Patient in clinical research study, PVD (peripheral vascular disease) ([facility] Takotsubo cardiomyopathy, Under care of service provider, Under care of service provider, Under care of service provider, and Wound of right leg. Past Surgical History:   has a past surgical history that includes Tubal ligation ();  section (); other surgical history (2017); other surgical history (Left, 2018); other surgical history (Left, 2018); vascular surgery (Left, 2018); pr reoperation, bypass graft (Left, 2018); pr vein bypass graft,fem-pop (Left, 2018); thrombectomy (Right, 2019); IVC filter insertion; Toe amputation (Right, 2019); Toe amputation (Right, 2019); Foot Amputation (Right, 3/8/2021); and vascular surgery (Right, 2022). Medications:    Reviewed in Epic     Allergies:  Asa [aspirin], Lopid [gemfibrozil], Nortriptyline, Pcn [penicillins], Sulfa antibiotics, Morphine, and Ibuprofen    Social History:   reports that she has never smoked. She has never used smokeless tobacco. She reports that she does not currently use alcohol. She reports that she does not use drugs. Family History: family history includes Diabetes in her mother, sister, and sister; Heart Attack in her father and mother; Heart Disease in her father and mother; High Blood Pressure in her father, mother, sister, and sister; Kidney Disease in her mother. REVIEW OF SYSTEMS:    Constitutional: No fever or chills.  No night sweats, no weight loss   Eyes: No eye discharge, double vision, or eye pain   HEENT: negative for sore mouth, sore throat, hoarseness and voice change   Respiratory: negative for cough , sputum, dyspnea, wheezing, hemoptysis, chest pain   Cardiovascular: negative for chest pain, dyspnea, palpitations, orthopnea, PND   Gastrointestinal: negative for nausea, vomiting, diarrhea, constipation, abdominal pain, Dysphagia, hematemesis and hematochezia   Genitourinary: negative for frequency, dysuria, nocturia, urinary incontinence, and hematuria   Integument: negative for rash, skin lesions, bruises. Hematologic/Lymphatic: negative for easy bruising, bleeding, lymphadenopathy, or petechiae   Endocrine: negative for heat or cold intolerance,weight changes, change in bowel habits and hair loss   Musculoskeletal: Pain and weakness of the right lower extremity has subsided after surgery  Neurological: negative for headaches, dizziness, seizures, weakness, numbness    PHYSICAL EXAM:      /71   Pulse 87   Temp 98.6 °F (37 °C) (Oral)   Resp 20   Wt 185 lb (83.9 kg)   LMP 2022   SpO2 97%   BMI 30.79 kg/m²    Temp (24hrs), Av.1 °F (36.7 °C), Min:97.3 °F (36.3 °C), Max:98.6 °F (37 °C)    General appearance - well appearing, no in pain or distress   Mental status - alert and cooperative   Eyes - pupils equal and reactive, extraocular eye movements intact   Ears - bilateral TM's and external ear canals normal   Mouth - mucous membranes moist, pharynx normal without lesions   Neck - supple, no significant adenopathy   Lymphatics - no palpable lymphadenopathy, no hepatosplenomegaly   Chest - clear to auscultation, no wheezes, rales or rhonchi, symmetric air entry   Heart - normal rate, regular rhythm, normal S1, S2, no murmurs  Abdomen - soft, nontender, nondistended, no masses or organomegaly   Neurological - alert, oriented, normal speech, no focal findings or movement disorder noted   Musculoskeletal - no joint tenderness, deformity or swelling   Extremities -both lower extremity have scars from previous vascular surgeries. The right foot had previous amputation of the toes.   No signs of acute ischemia appreciated  Skin - normal coloration and turgor, no rashes, no suspicious skin lesions noted ,    DATA:    Labs:   CBC:   Recent Labs     22  1459 22  0302   WBC 10.8 12.0*   HGB 10.2* 9.8*   HCT 30.6* 29.5*    283 BMP:   Recent Labs     09/24/22  1459 09/25/22  0302    137   K 3.7 3.6*   CO2 21 24   BUN 5* 5*   CREATININE 0.46* 0.60   LABGLOM >60 >60   GLUCOSE 130* 117*     PT/INR:   Recent Labs     09/24/22  0619   PROTIME 9.5   INR 0.9       IMAGING DATA:      Primary Problem  Critical limb ischemia with history of revascularization of same extremity [facility])    Active Hospital Problems    Diagnosis Date Noted    Critical limb ischemia with history of revascularization of same extremity ([facility] [I70.229, Z98.890] 09/24/2022     Priority: Medium    Occlusion of right iliac artery ([address] [I74.5] 09/24/2022     Priority: Medium    Acute occlusion of artery of lower extremity due to thrombosis ([facility] [I74.3] 09/24/2022     Priority: Medium         IMPRESSION:   Severe and progressive peripheral vascular disease  Failed Eliquis  No clear risk factors, the patient is not a smoker and hypercoagulable work-up was essentially negative    RECOMMENDATIONS:  Agree with the current plans to use heparin Pletal and nifedipine and Brilinta while in-house and discharged on warfarin and Brilinta  We will check the patient homocystine levels, that will suggest that homocysteine can increase smooth muscle proliferation and might contribute to peripheral artery disease, however, no clear data that lowering homocysteine levels will alter peripheral arterial disease course and prognosis. But I think the risk-benefit of folic EGXL/E9/Y80 combination in case of hyper homocystinemia will favor treatment  The patient will need close follow-up of her anticoagulation level with INR as an outpatient and I would recommend referral to Coumadin clinic. Discussed with patient and Nurse. Thank you for asking us to see this patient.     BUCK BURTON [facility] MD [place]  Hematologist/Medical Oncologist  Cell: [phone]

## 2022-09-25 NOTE — PROGRESS NOTES
VASCULAR SURGERY H&P  [facility]      Patient's Name/ Date of Birth/ Gender: Cyndi Fish / 1975 (52 y.o.) / female     Surgeon: Dr. Andre Brown    Subjective:  Patient seen and examined at bedside, no overnight events. Yesterday, patient underwent extensive revascularization of her right lower extremity. Overnight, the patient's vascular exam continued to improve, her leg became more warm, and she recovered more of her motor skills and sensation. Patient feels very well this morning. Denies any pain. Voiding. Tolerating a diet. VSS, afebrile. Vital Signs:  Vitals:    09/25/22 0815   BP:    Pulse: 86   Resp:    Temp:    SpO2: 96%       Physical Exam:  Vital signs and Nurse's note reviewed  Gen:  A&Ox3, NAD  HEENT: PERRLA, EOMI, no scleral icterus, oral mucosa moist  Neck: Supple  Chest: Symmetric rise with inhalation, no evidence of trauma  CVS: Regular rate and rhythm  Resp: Unlabored on RA  Abd: soft, non-tender, non-distended, no guarding or rebound. Ext: Right lower extremity now warm to the touch, without TTP. She has some collateral flow on Doppler exam and a strong right popliteal signal.  Bilateral palpable femoral pulses. She is able to wiggle her toes, plantar and dorsiflex the right foot. Compartments are soft without pain.   CNS: Moves all extremities, no gross focal motor deficits      Labs:   Lab Results   Component Value Date/Time    WBC 12.0 09/25/2022 03:02 AM    HGB 9.8 09/25/2022 03:02 AM    HCT 29.5 09/25/2022 03:02 AM    MCV 91.3 09/25/2022 03:02 AM     09/25/2022 03:02 AM     Lab Results   Component Value Date/Time     09/25/2022 03:02 AM    K 3.6 09/25/2022 03:02 AM     09/25/2022 03:02 AM    CO2 24 09/25/2022 03:02 AM    BUN 5 09/25/2022 03:02 AM    CREATININE 0.60 09/25/2022 03:02 AM    GLUCOSE 117 09/25/2022 03:02 AM    CALCIUM 8.1 09/25/2022 03:02 AM     Lab Results   Component Value Date/Time    ALKPHOS 89 06/26/2020 04:28 PM    ALT 11 09/08/2022 12:01 PM    AST 14 09/08/2022 12:01 PM    PROT 7.5 06/26/2020 04:28 PM    BILITOT 0.4 09/08/2022 12:01 PM    BILIDIR <0.08 11/15/2015 04:45 PM    LABALBU 3.7 09/08/2022 12:01 PM     No results found for: LACTA  No results found for: AMYLASE  Lab Results   Component Value Date/Time    LIPASE 27 11/15/2015 04:45 PM     Lab Results   Component Value Date/Time    INR 0.9 09/24/2022 06:19 AM       Imaging:  CTA ABDOMINAL AORTA W BILAT RUNOFF W CONTRAST    Result Date: 9/24/2022  Since the prior exam there has been placement of a right common/external iliac artery stent with occlusion of the right common and external iliac arteries. Progression of occlusive disease of the right lower extremity with long segment occlusion of the distal SFA/popliteal artery and very poor segmental filling of runoff vessels. Similar occlusion of the left popliteal artery with poor segmental filling of below knee runoff vessels. Other stable findings including 4 cm hyperdense right renal lesion, likely hemorrhagic cyst which should be confirmed on follow-up ultrasound and/or MRI. 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 Findings were discussed with Dr. Bill Kennedy At 9:11 am on 9/24/2022. RECOMMENDATIONS: 2.6 cm right ovarian simple-appearing cyst. No follow-up imaging is recommended. Reference: JACR 2020 Feb;17(2):248-254        Impression:  47F with acute limb ischemia with occlusion of prior iliac stent and occlusion of the distal popliteal artery with minimal runoff   -9/8/22: S/p RLE angiogram, right iliac stent placement, popliteal artery angioplasty   -9/24/22: Arterial mechanical thrombectomy common femoral artery and iliac stent, balloon angioplasty popliteal and tibial peroneal arteries    Recommendation:  Patient seen and examined this morning with Dr. Caryl Armstrong  Patient doing surprisingly very well with much recovery of her motor function and gross sensation. Pain has resolved. Patient's vascular exam has improved and she now has a right popliteal signal and some collateral flow detected on Doppler in the distal leg. Patient must remain on lifelong anticoagulation in order to keep her leg and avoid amputation. She will remain on heparin, Pletal, nifedipine, Brilinta while in house. She will be discharged on warfarin and Brilinta. Heme-onc consulted, pending recs  Patient may get out of bed, encourage ambulation. DC IVF  Diet: Regular  DC Avalos  Continue to monitor vascular exam per unit protocol. Jenny Huerta, DO - PGY3  Surgery Department

## 2022-09-25 NOTE — CARE COORDINATION
09/25/22 1112   Service Assessment   Patient Orientation Alert and Oriented   Cognition Alert   History Provided By Patient   Primary [address]  Family Members   Patient's Healthcare Decision Maker is: Legal Next of Trevon Lancaster   PCP Verified by CM Yes   Last Visit to PCP Within last 3 months   Prior Functional Level Independent in ADLs/IADLs   Current Functional Level Independent in ADLs/IADLs   Can patient return to prior living arrangement Yes   Ability to make needs known: Good   Family able to assist with home care needs: Yes   Financial Resources Medicaid   Social/Functional History   Lives With Daughter   Type of [address] [facility] One level   Home Access Stairs to enter without rails   Entrance Stairs - Number of Steps 5   Receives Help From Family;Friend(s)   ADL Assistance Independent   Homemaking Assistance Independent   Ambulation Assistance Independent   Transfer Assistance Independent   Active  Yes   Mode of Transportation SUV   Occupation Full time employment   Discharge Planning   Type of Residence Apartment   Living Arrangements Family Members   Current Services Prior To Admission None   Potential Assistance Needed N/A   DME Ordered? No   Potential Assistance Purchasing Medications No   Type of Home Care Services None   Patient expects to be discharged to: [facility] Discharge   Mode of Transport at Discharge Other (see comment)  (dtr can give her a ride home)       Plan is to return home. She lives with her dtr. Her dtr can provide transportation home.

## 2022-09-26 LAB
ANION GAP SERPL CALCULATED.3IONS-SCNC: 11 MMOL/L (ref 9–17)
BUN BLDV-MCNC: 8 MG/DL (ref 6–20)
CALCIUM SERPL-MCNC: 8.5 MG/DL (ref 8.6–10.4)
CHLORIDE BLD-SCNC: 103 MMOL/L (ref 98–107)
CO2: 21 MMOL/L (ref 20–31)
CREAT SERPL-MCNC: 0.65 MG/DL (ref 0.5–0.9)
EKG ATRIAL RATE: 102 BPM
EKG P AXIS: 41 DEGREES
EKG P-R INTERVAL: 148 MS
EKG Q-T INTERVAL: 364 MS
EKG QRS DURATION: 88 MS
EKG QTC CALCULATION (BAZETT): 474 MS
EKG R AXIS: -17 DEGREES
EKG T AXIS: -2 DEGREES
EKG VENTRICULAR RATE: 102 BPM
GFR AFRICAN AMERICAN: >60 ML/MIN
GFR NON-AFRICAN AMERICAN: >60 ML/MIN
GFR SERPL CREATININE-BSD FRML MDRD: ABNORMAL ML/MIN/{1.73_M2}
GLUCOSE BLD-MCNC: 126 MG/DL (ref 70–99)
INR BLD: 0.9
LACTIC ACID, WHOLE BLOOD: 1.2 MMOL/L (ref 0.7–2.1)
LACTIC ACID, WHOLE BLOOD: 1.6 MMOL/L (ref 0.7–2.1)
PARTIAL THROMBOPLASTIN TIME: 48.6 SEC (ref 20.5–30.5)
PARTIAL THROMBOPLASTIN TIME: 50.9 SEC (ref 20.5–30.5)
POTASSIUM SERPL-SCNC: 3.6 MMOL/L (ref 3.7–5.3)
PROTHROMBIN TIME: 9.5 SEC (ref 9.1–12.3)
SODIUM BLD-SCNC: 135 MMOL/L (ref 135–144)
TROPONIN, HIGH SENSITIVITY: <6 NG/L (ref 0–14)

## 2022-09-26 PROCEDURE — 85610 PROTHROMBIN TIME: CPT

## 2022-09-26 PROCEDURE — 6360000002 HC RX W HCPCS: Performed by: STUDENT IN AN ORGANIZED HEALTH CARE EDUCATION/TRAINING PROGRAM

## 2022-09-26 PROCEDURE — 80048 BASIC METABOLIC PNL TOTAL CA: CPT

## 2022-09-26 PROCEDURE — 84484 ASSAY OF TROPONIN QUANT: CPT

## 2022-09-26 PROCEDURE — 83605 ASSAY OF LACTIC ACID: CPT

## 2022-09-26 PROCEDURE — 93010 ELECTROCARDIOGRAM REPORT: CPT | Performed by: INTERNAL MEDICINE

## 2022-09-26 PROCEDURE — 99232 SBSQ HOSP IP/OBS MODERATE 35: CPT | Performed by: INTERNAL MEDICINE

## 2022-09-26 PROCEDURE — 85730 THROMBOPLASTIN TIME PARTIAL: CPT

## 2022-09-26 PROCEDURE — 97530 THERAPEUTIC ACTIVITIES: CPT

## 2022-09-26 PROCEDURE — 36415 COLL VENOUS BLD VENIPUNCTURE: CPT

## 2022-09-26 PROCEDURE — 97162 PT EVAL MOD COMPLEX 30 MIN: CPT

## 2022-09-26 PROCEDURE — 93005 ELECTROCARDIOGRAM TRACING: CPT

## 2022-09-26 PROCEDURE — 94761 N-INVAS EAR/PLS OXIMETRY MLT: CPT

## 2022-09-26 PROCEDURE — 2060000000 HC ICU INTERMEDIATE R&B

## 2022-09-26 PROCEDURE — 6370000000 HC RX 637 (ALT 250 FOR IP): Performed by: STUDENT IN AN ORGANIZED HEALTH CARE EDUCATION/TRAINING PROGRAM

## 2022-09-26 RX ORDER — WARFARIN SODIUM 5 MG/1
5 TABLET ORAL
Status: COMPLETED | OUTPATIENT
Start: 2022-09-26 | End: 2022-09-26

## 2022-09-26 RX ORDER — OXYCODONE HYDROCHLORIDE 5 MG/1
5 TABLET ORAL EVERY 12 HOURS PRN
Status: DISCONTINUED | OUTPATIENT
Start: 2022-09-26 | End: 2022-09-27

## 2022-09-26 RX ADMIN — METHOCARBAMOL TABLETS 750 MG: 750 TABLET, COATED ORAL at 19:44

## 2022-09-26 RX ADMIN — WARFARIN SODIUM 5 MG: 5 TABLET ORAL at 17:25

## 2022-09-26 RX ADMIN — TICAGRELOR 90 MG: 90 TABLET ORAL at 21:56

## 2022-09-26 RX ADMIN — NIFEDIPINE 10 MG: 10 CAPSULE ORAL at 14:50

## 2022-09-26 RX ADMIN — POTASSIUM BICARBONATE 40 MEQ: 782 TABLET, EFFERVESCENT ORAL at 08:09

## 2022-09-26 RX ADMIN — GABAPENTIN 300 MG: 300 CAPSULE ORAL at 14:30

## 2022-09-26 RX ADMIN — ACETAMINOPHEN 1000 MG: 500 TABLET ORAL at 19:43

## 2022-09-26 RX ADMIN — METHOCARBAMOL TABLETS 750 MG: 750 TABLET, COATED ORAL at 12:39

## 2022-09-26 RX ADMIN — ACETAMINOPHEN 1000 MG: 500 TABLET ORAL at 14:50

## 2022-09-26 RX ADMIN — TICAGRELOR 90 MG: 90 TABLET ORAL at 08:09

## 2022-09-26 RX ADMIN — NIFEDIPINE 10 MG: 10 CAPSULE ORAL at 06:02

## 2022-09-26 RX ADMIN — HEPARIN SODIUM 1800 UNITS/HR: 10000 INJECTION, SOLUTION INTRAVENOUS at 06:14

## 2022-09-26 RX ADMIN — OXYCODONE HYDROCHLORIDE 5 MG: 5 TABLET ORAL at 12:39

## 2022-09-26 RX ADMIN — GABAPENTIN 300 MG: 300 CAPSULE ORAL at 19:44

## 2022-09-26 RX ADMIN — NIFEDIPINE 10 MG: 10 CAPSULE ORAL at 21:57

## 2022-09-26 RX ADMIN — POTASSIUM BICARBONATE 40 MEQ: 782 TABLET, EFFERVESCENT ORAL at 19:43

## 2022-09-26 RX ADMIN — CILOSTAZOL 50 MG: 100 TABLET ORAL at 08:09

## 2022-09-26 RX ADMIN — CILOSTAZOL 50 MG: 100 TABLET ORAL at 21:56

## 2022-09-26 RX ADMIN — METHOCARBAMOL TABLETS 750 MG: 750 TABLET, COATED ORAL at 08:09

## 2022-09-26 RX ADMIN — ACETAMINOPHEN 1000 MG: 500 TABLET ORAL at 06:02

## 2022-09-26 RX ADMIN — GABAPENTIN 300 MG: 300 CAPSULE ORAL at 08:40

## 2022-09-26 RX ADMIN — METHOCARBAMOL TABLETS 750 MG: 750 TABLET, COATED ORAL at 17:25

## 2022-09-26 RX ADMIN — HEPARIN SODIUM 1800 UNITS/HR: 10000 INJECTION, SOLUTION INTRAVENOUS at 20:40

## 2022-09-26 RX ADMIN — DESMOPRESSIN ACETATE 40 MG: 0.2 TABLET ORAL at 08:09

## 2022-09-26 ASSESSMENT — PAIN DESCRIPTION - LOCATION: LOCATION: FOOT

## 2022-09-26 ASSESSMENT — PAIN SCALES - GENERAL
PAINLEVEL_OUTOF10: 5
PAINLEVEL_OUTOF10: 3
PAINLEVEL_OUTOF10: 8
PAINLEVEL_OUTOF10: 0

## 2022-09-26 ASSESSMENT — PAIN DESCRIPTION - DESCRIPTORS: DESCRIPTORS: ACHING;SORE

## 2022-09-26 ASSESSMENT — PAIN DESCRIPTION - ORIENTATION: ORIENTATION: RIGHT

## 2022-09-26 NOTE — PLAN OF CARE
Problem: Discharge Planning  Goal: Discharge to home or other facility with appropriate resources  Outcome: Progressing  Flowsheets (Taken 9/25/2022 2000)  Discharge to home or other facility with appropriate resources: Identify barriers to discharge with patient and caregiver     Problem: Pain  Goal: Verbalizes/displays adequate comfort level or baseline comfort level  Outcome: Progressing     Problem: Skin/Tissue Integrity  Goal: Absence of new skin breakdown  Description: 1. Monitor for areas of redness and/or skin breakdown  2. Assess vascular access sites hourly  3. Every 4-6 hours minimum:  Change oxygen saturation probe site  4. Every 4-6 hours:  If on nasal continuous positive airway pressure, respiratory therapy assess nares and determine need for appliance change or resting period.   Outcome: Progressing     Problem: Safety - Adult  Goal: Free from fall injury  Outcome: Progressing  Flowsheets (Taken 9/26/2022 0038)  Free From Fall Injury: Instruct family/caregiver on patient safety     Problem: ABCDS Injury Assessment  Goal: Absence of physical injury  Outcome: Progressing  Flowsheets (Taken 9/26/2022 0038)  Absence of Physical Injury: Implement safety measures based on patient assessment

## 2022-09-26 NOTE — CARE COORDINATION
Transitional planning. Home indepedently, lives with daughter- has transportation.  on Heparin- bridge to coumadin

## 2022-09-26 NOTE — PLAN OF CARE
Problem: Discharge Planning  Goal: Discharge to home or other facility with appropriate resources  9/26/2022 1028 by Elan Monique RN  Outcome: Progressing  9/26/2022 0040 by Remigio Osorio RN  Outcome: Progressing  Flowsheets (Taken 9/25/2022 2000)  Discharge to home or other facility with appropriate resources: Identify barriers to discharge with patient and caregiver     Problem: Pain  Goal: Verbalizes/displays adequate comfort level or baseline comfort level  9/26/2022 1028 by Elan Monique RN  Outcome: Progressing  9/26/2022 0040 by Remigio Osorio RN  Outcome: Progressing     Problem: Safety - Adult  Goal: Free from fall injury  9/26/2022 1028 by Elan Monique RN  Outcome: Progressing  9/26/2022 0040 by Remigio Osorio RN  Outcome: Progressing  Flowsheets (Taken 9/26/2022 0038)  Free From Fall Injury: Instruct family/caregiver on patient safety     Problem: ABCDS Injury Assessment  Goal: Absence of physical injury  9/26/2022 1028 by Elan Monique RN  Outcome: Progressing  9/26/2022 0040 by Remigio Osorio RN  Outcome: Progressing  Flowsheets (Taken 9/26/2022 0038)  Absence of Physical Injury: Implement safety measures based on patient assessment     Problem: Skin/Tissue Integrity  Goal: Absence of new skin breakdown  Description: 1. Monitor for areas of redness and/or skin breakdown  2. Assess vascular access sites hourly  3. Every 4-6 hours minimum:  Change oxygen saturation probe site  4. Every 4-6 hours:  If on nasal continuous positive airway pressure, respiratory therapy assess nares and determine need for appliance change or resting period.   9/26/2022 1028 by Elan Monique RN  Outcome: Progressing  9/26/2022 0040 by Remigio Osorio RN  Outcome: Progressing

## 2022-09-26 NOTE — PROGRESS NOTES
VASCULAR SURGERY PROGRESS NOTE  [address]      Patient's Name/ Date of Birth/ Gender: Tip Sethi / 1975 (52 y.o.) / female     Surgeon: Dr. Jarocho Headley:  Patient seen and examined at bedside, no overnight events. Patient POD#2  extensive revascularization of her right lower extremity. Since then, the patient's vascular exam continues to improve, her leg became more warm, and her motor function and sensation are essentially at baseline. Patient continues to feel well denies any pain. Voiding. Tolerating a diet. Was up out of bed yesterday. VSS, afebrile. Vital Signs:  Vitals:    09/26/22 0600   BP:    Pulse: 90   Resp:    Temp: 98.8 °F (37.1 °C)   SpO2: 95%       Physical Exam:  Vital signs and Nurse's note reviewed  Gen:  A&Ox3, NAD  HEENT: PERRLA, EOMI, no scleral icterus, oral mucosa moist  Neck: Supple  Chest: Symmetric rise with inhalation, no evidence of trauma  CVS: Regular rate and rhythm  Resp: Unlabored on RA  Abd: soft, non-tender, non-distended, no guarding or rebound. Ext: Right lower extremity now warm to the touch, without TTP. She has some collateral flow on Doppler exam and a strong right popliteal signal.  Bilateral palpable femoral pulses. She is able to wiggle her toes, plantar and dorsiflex the right foot. Compartments are soft without pain.   CNS: Moves all extremities, no gross focal motor deficits      Labs:   Lab Results   Component Value Date/Time    WBC 12.0 09/25/2022 03:02 AM    HGB 9.8 09/25/2022 03:02 AM    HCT 29.5 09/25/2022 03:02 AM    MCV 91.3 09/25/2022 03:02 AM     09/25/2022 03:02 AM     Lab Results   Component Value Date/Time     09/26/2022 04:49 AM    K 3.6 09/26/2022 04:49 AM     09/26/2022 04:49 AM    CO2 21 09/26/2022 04:49 AM    BUN 8 09/26/2022 04:49 AM    CREATININE 0.65 09/26/2022 04:49 AM    GLUCOSE 126 09/26/2022 04:49 AM    CALCIUM 8.5 09/26/2022 04:49 AM     Lab Results   Component Value Date/Time ALKPHOS 89 06/26/2020 04:28 PM    ALT 11 09/08/2022 12:01 PM    AST 14 09/08/2022 12:01 PM    PROT 7.5 06/26/2020 04:28 PM    BILITOT 0.4 09/08/2022 12:01 PM    BILIDIR <0.08 11/15/2015 04:45 PM    LABALBU 3.7 09/08/2022 12:01 PM     No results found for: LACTA  No results found for: AMYLASE  Lab Results   Component Value Date/Time    LIPASE 27 11/15/2015 04:45 PM     Lab Results   Component Value Date/Time    INR 0.9 09/24/2022 06:19 AM       Imaging:  CTA ABDOMINAL AORTA W BILAT RUNOFF W CONTRAST    Result Date: 9/24/2022  Since the prior exam there has been placement of a right common/external iliac artery stent with occlusion of the right common and external iliac arteries. Progression of occlusive disease of the right lower extremity with long segment occlusion of the distal SFA/popliteal artery and very poor segmental filling of runoff vessels. Similar occlusion of the left popliteal artery with poor segmental filling of below knee runoff vessels. Other stable findings including 4 cm hyperdense right renal lesion, likely hemorrhagic cyst which should be confirmed on follow-up ultrasound and/or MRI. 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 Findings were discussed with Dr. Richard Singer At 9:11 am on 9/24/2022. RECOMMENDATIONS: 2.6 cm right ovarian simple-appearing cyst. No follow-up imaging is recommended.  Reference: JACR 2020 Feb;17(2):248-254        Impression:  47F with acute limb ischemia with occlusion of prior iliac stent and occlusion of the distal popliteal artery with minimal runoff   -9/8/22: S/p RLE angiogram, right iliac stent placement, popliteal artery angioplasty   -9/24/22: Arterial mechanical thrombectomy common femoral artery and iliac stent, balloon angioplasty popliteal and tibial peroneal arteries    Recommendation:  Patient seen and examined this morning with Dr. Phuc Ko  Patient continues to do well with her motor function and sensation back to baseline. Pain has resolved. Patient's vascular exam has improved and she now has a right popliteal signal and some collateral flow detected on Doppler in the distal leg. Patient must remain on lifelong anticoagulation in order to keep her leg and avoid amputation. She will remain on heparin gtt., Pletal, nifedipine, Brilinta while in house. She will be discharged on warfarin and Brilinta. Start warfarin today with goal INR 2.5-3.5  Heme-onc consulted: Work-up pending  Patient may get out of bed, encourage ambulation. Diet: Regular  Continue to monitor vascular exam per unit protocol. Patient reports she has a pre-scheduled surgery with podiatry on Wednesday in house. Discussed with patient the risks of undergoing any procedure on her RLE given her chronic PAD and acute limb ischemia. She will very likely not heal from any procedure done to her RLE given the very poor inflow and outflow and would ultimately devolop chronic wounds putting her limb at further risk. Additionally, she is not able to stop her anticoagulation for any reason. Will reach out to podiatry to discuss. It is in the patient's best interest to not proceed with any elective procedures at this time, and especially none involving her lower extremities. Patient can go to stepdown today      Jenny Arciniega, DO - PGY3  Surgery Department

## 2022-09-26 NOTE — PLAN OF CARE
Pt aware of what is necessary to keep self safe, ambulating, etc. Able to vocalize if needs pain meds
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
## 2022-09-26 NOTE — PROGRESS NOTES
Physical Therapy  Facility/Department: [facility]- [facility]  Physical Therapy Initial Assessment    Name: Daylin Cardenas  : 1975  MRN: [id number]  Date of Service: 2022    Chief Complaint   Patient presents with    Leg Pain    Foot Pain       Discharge Recommendations:    Further therapy recommended at discharge. PT Equipment Recommendations  Other: CTA, possible short term SPC use, CTA      Patient Diagnosis(es): The encounter diagnosis was Arterial thrombosis ([address] Past Medical History:  has a past medical history of Abnormal Pap smear of cervix, Anemia, Anxiety, Chronic back pain, Claudication (HCC), Claudication in peripheral vascular disease ([address] Depression, Headache(784.0), Heart murmur, Hemorrhage of rectum and anus, History of blood transfusion, HTN (hypertension), Hx of blood clots, Hypercoagulable state ([address] Hyperlipidemia, Hypertension, Intertriginous candidiasis, Left popliteal artery occlusion (HCC), Leg pain, Menometrorrhagia, Migraine headache with aura, Obesity, Osteoarthritis, PAD (peripheral artery disease) ([address] Patient in clinical research study, PVD (peripheral vascular disease) ([address] Takotsubo cardiomyopathy, Under care of service provider, Under care of service provider, Under care of service provider, and Wound of right leg. Past Surgical History:  has a past surgical history that includes Tubal ligation ();  section (); other surgical history (2017); other surgical history (Left, 2018); other surgical history (Left, 2018); vascular surgery (Left, 2018); pr reoperation, bypass graft (Left, 2018); pr vein bypass graft,fem-pop (Left, 2018); thrombectomy (Right, 2019); IVC filter insertion; Toe amputation (Right, 2019); Toe amputation (Right, 2019); Foot Amputation (Right, 3/8/2021); and vascular surgery (Right, 2022).     Assessment   Body Structures, Functions, Activity Limitations Requiring Skilled Therapeutic Intervention: Decreased functional mobility ; Decreased balance;Decreased endurance;Decreased strength  Assessment: Pt grossly CGA for mobility, amb 100' no AD. Pt would benefit from continued acute PT to address deficits. Therapy Prognosis: Good  Decision Making: Medium Complexity  Requires PT Follow-Up: Yes  Activity Tolerance  Activity Tolerance: Patient tolerated treatment well;Patient limited by fatigue     Plan   Plan  Plan:  (5-6x/wk)  Current Treatment Recommendations: Strengthening, Balance training, Gait training, Stair training, Functional mobility training, Transfer training, Endurance training, Home exercise program, Safety education & training, Patient/Caregiver education & training, Equipment evaluation, education, & procurement, Therapeutic activities  Safety Devices  Type of Devices: Call light within reach, Nurse notified, Gait belt, Patient at risk for falls, All fall risk precautions in place, Left in chair  Restraints  Restraints Initially in Place: No     Restrictions  Restrictions/Precautions  Restrictions/Precautions: General Precautions, Fall Risk  Required Braces or Orthoses?: No  Position Activity Restriction  Other position/activity restrictions: act. as tolerated. s/p thrombectomy common fem. and iliac stents 9/24     Subjective   General  Chart Reviewed: Yes  Patient assessed for rehabilitation services?: Yes  Response To Previous Treatment: Not applicable  Family / Caregiver Present: No  Follows Commands: Within Functional Limits  General Comment  Comments: RN and pt agreeable to PT. Pt alert in bed upon arrival.  Subjective  Subjective: Pt denies any pain, except at IV site (unrated), and reports minor numbness/ tingling in R toes.          Social/Functional History  Social/Functional History  Lives With: Daughter  Type of Home: Apartment  Home Layout: One level  Home Access: Stairs to enter without rails  Entrance Stairs - Number of Steps: 5  Bathroom Shower/Tub: Tub/Shower unit  Bathroom Toilet: Standard  Bathroom Equipment: Grab bars in shower  Bathroom Accessibility: Accessible  Has the patient had two or more falls in the past year or any fall with injury in the past year?: No  Receives Help From: Family, Friend(s), Neighbor  ADL Assistance: Independent  Homemaking Assistance: Independent  Homemaking Responsibilities: Yes  Ambulation Assistance: Independent  Transfer Assistance: Independent  Active : Yes  Mode of Transportation: [facility]  Occupation: Full time employment  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  Leisure & Hobbies: Read books, taking grandson's to the [employer]  [address]: Impaired  Vision Exceptions: Wears glasses for reading  Hearing  Hearing: Within functional limits    Cognition   Orientation  Overall Orientation Status: Within Functional Limits  Orientation Level: Oriented X4;Oriented to place;Oriented to time;Oriented to situation;Oriented to person  Cognition  Overall Cognitive Status: WFL     Objective     AROM RLE (degrees)  RLE AROM: WFL  AROM LLE (degrees)  LLE AROM : WFL  AROM RUE (degrees)  RUE AROM : WFL  AROM LUE (degrees)  LUE AROM : WFL  Strength RLE  Strength RLE: WFL  Comment: 5/5 grossly but fatigues with mobility  Strength LLE  Strength LLE: WFL  Comment: 5/5 grossly but fatigues with mobility  Strength RUE  Strength RUE: WFL  Strength LUE  Strength LUE: WFL           Bed mobility  Supine to Sit: Stand by assistance  Sit to Supine: Stand by assistance  Scooting: Stand by assistance  Transfers  Sit to Stand: Contact guard assistance  Stand to sit: Contact guard assistance  Ambulation  Surface: level tile  Device: No Device  Assistance: Contact guard assistance  Quality of Gait: slowed meng, decresed step length, no LOB or buckling, fatigues.   Distance: 100'  More Ambulation?: No  Stairs/Curb  Stairs?: No     Balance  Posture: Fair  Sitting - Static: Good  Sitting - Dynamic: Good;-  Standing - Static: Fair;+  Standing - Dynamic: Fair  Comments: no AD used         AM-PAC Score  AM-PAC Inpatient Mobility Raw Score : 21 (09/26/22 1220)  AM-PAC Inpatient T-Scale Score : 50.25 (09/26/22 1220)  Mobility Inpatient CMS 0-100% Score: 28.97 (09/26/22 1220)  Mobility Inpatient CMS G-Code Modifier : CJ (09/26/22 1220)        Goals  Short Term Goals  Time Frame for Short term goals: 14 visits  Short term goal 1: Pt will be Maya bed mobility  Short term goal 2: Pt will be Maya transfers  Short term goal 3: Pt will be Maya amb 250'  Short term goal 4: Pt will navigate 6 steps Maya       Education  Patient Education  Education Given To: Patient  Education Provided: Role of Therapy;Plan of Care  Education Method: Demonstration;Verbal  Barriers to Learning: None  Education Outcome: Verbalized understanding;Demonstrated understanding      Therapy Time   Individual Concurrent Group Co-treatment   Time In 1118         Time Out 1135         Minutes 17         Timed Code Treatment Minutes: 69 [facility], PT

## 2022-09-26 NOTE — PROGRESS NOTES
Today's Date: 9/26/2022  Patient Name: Perla George  Date of admission: 9/24/2022  5:22 AM  Patient's age: 52 y.o., 1975  Admission Dx: Arterial thrombosis ([facility] [address] [I74.9]  Critical limb ischemia with history of revascularization of same extremity ([facility] [address] [I70.229, Z98.890]    Reason for Consult: management recommendations  Requesting Physician: Blanquita Bowie MD    CHIEF COMPLAINT: Acute limb ischemia    INTERIM HISTORY  Seen and evaluated. States that her pain in the lower extremity is better. She is on heparin. We will discuss with vascular starting Coumadin tonight. Heart there is some conversation about having a podiatry surgery so we are awaiting the final verdict    HISTORY OF PRESENT ILLNESS:      The patient is a 52 y.o.  female who is admitted to the hospital for acute limb ischemia. The patient has known peripheral vascular disease and unfortunately needed multiple surgery including amputation of the toes in the right foot previously because of arterial disease. Hypercoagulable work-up was done and was essentially negative. She was seen a few weeks ago because of right-sided leg pain and acute limb ischemia and she was started on Eliquis. Unfortunately that did not help and she came back again with acute limb ischemia and worsening peripheral artery disease. Vascular took her to surgery and she is definitely improving. The plan is to send her home on warfarin and Brilinta  The patient is seen and evaluated. She is feeling much better. The pain in the right lower extremity improved significantly. She is able to move her foot and her leg with minimal difficulty.     Past Medical History:   has a past medical history of Abnormal Pap smear of cervix, Anemia, Anxiety, Chronic back pain, Claudication (HCC), Claudication in peripheral vascular disease ([facility] [address] Depression, Headache(784.0), Heart murmur, Hemorrhage of rectum and anus, History of blood transfusion, HTN (hypertension), Hx of blood clots, Hypercoagulable state ([facility] [address] Hyperlipidemia, Hypertension, Intertriginous candidiasis, Left popliteal artery occlusion (HCC), Leg pain, Menometrorrhagia, Migraine headache with aura, Obesity, Osteoarthritis, PAD (peripheral artery disease) ([facility] [address] Patient in clinical research study, PVD (peripheral vascular disease) ([facility] [address] Takotsubo cardiomyopathy, Under care of service provider, Under care of service provider, Under care of service provider, and Wound of right leg. Past Surgical History:   has a past surgical history that includes Tubal ligation ();  section (); other surgical history (2017); other surgical history (Left, 2018); other surgical history (Left, 2018); vascular surgery (Left, 2018); pr reoperation, bypass graft (Left, 2018); pr vein bypass graft,fem-pop (Left, 2018); thrombectomy (Right, 2019); IVC filter insertion; Toe amputation (Right, 2019); Toe amputation (Right, 2019); Foot Amputation (Right, 3/8/2021); vascular surgery (Right, 2022); and vascular surgery (Bilateral, 2022). Medications:    Reviewed in Epic     Allergies:  Asa [aspirin], Lopid [gemfibrozil], Nortriptyline, Pcn [penicillins], Sulfa antibiotics, Morphine, and Ibuprofen    Social History:   reports that she has never smoked. She has never used smokeless tobacco. She reports that she does not currently use alcohol. She reports that she does not use drugs. Family History: family history includes Diabetes in her mother, sister, and sister; Heart Attack in her father and mother; Heart Disease in her father and mother; High Blood Pressure in her father, mother, sister, and sister; Kidney Disease in her mother. REVIEW OF SYSTEMS:    Constitutional: No fever or chills.  No night sweats, no weight loss   Eyes: No eye discharge, double vision, or eye pain   HEENT: negative for sore mouth, sore throat, hoarseness and voice change Respiratory: negative for cough , sputum, dyspnea, wheezing, hemoptysis, chest pain   Cardiovascular: negative for chest pain, dyspnea, palpitations, orthopnea, PND   Gastrointestinal: negative for nausea, vomiting, diarrhea, constipation, abdominal pain, Dysphagia, hematemesis and hematochezia   Genitourinary: negative for frequency, dysuria, nocturia, urinary incontinence, and hematuria   Integument: negative for rash, skin lesions, bruises. Hematologic/Lymphatic: negative for easy bruising, bleeding, lymphadenopathy, or petechiae   Endocrine: negative for heat or cold intolerance,weight changes, change in bowel habits and hair loss   Musculoskeletal: Pain and weakness of the right lower extremity has subsided after surgery  Neurological: negative for headaches, dizziness, seizures, weakness, numbness    PHYSICAL EXAM:      /60   Pulse (!) 105   Temp 98.8 °F (37.1 °C) (Oral)   Resp 20   Wt 183 lb 4.8 oz (83.1 kg)   LMP 2022   SpO2 96%   BMI 30.50 kg/m²    Temp (24hrs), Av.7 °F (37.1 °C), Min:98 °F (36.7 °C), Max:99 °F (37.2 °C)    General appearance - well appearing, no in pain or distress   Mental status - alert and cooperative   Eyes - pupils equal and reactive, extraocular eye movements intact   Ears - bilateral TM's and external ear canals normal   Mouth - mucous membranes moist, pharynx normal without lesions   Neck - supple, no significant adenopathy   Lymphatics - no palpable lymphadenopathy, no hepatosplenomegaly   Chest - clear to auscultation, no wheezes, rales or rhonchi, symmetric air entry   Heart - normal rate, regular rhythm, normal S1, S2, no murmurs  Abdomen - soft, nontender, nondistended, no masses or organomegaly   Neurological - alert, oriented, normal speech, no focal findings or movement disorder noted   Musculoskeletal - no joint tenderness, deformity or swelling   Extremities -both lower extremity have scars from previous vascular surgeries.   The right foot had previous amputation of the toes. No signs of acute ischemia appreciated  Skin - normal coloration and turgor, no rashes, no suspicious skin lesions noted ,    DATA:    Labs:   CBC:   Recent Labs     09/24/22  1459 09/25/22  0302   WBC 10.8 12.0*   HGB 10.2* 9.8*   HCT 30.6* 29.5*    283     BMP:   Recent Labs     09/25/22  0302 09/26/22  0449    135   K 3.6* 3.6*   CO2 24 21   BUN 5* 8   CREATININE 0.60 0.65   LABGLOM >60 >60   GLUCOSE 117* 126*     PT/INR:   Recent Labs     09/24/22  0619 09/26/22  1207   PROTIME 9.5 9.5   INR 0.9 0.9       IMAGING DATA:      Primary Problem  Critical limb ischemia with history of revascularization of same extremity [facility])    Active Hospital Problems    Diagnosis Date Noted    Critical limb ischemia with history of revascularization of same extremity ([facility] [address] [I70.229, Z98.890] 09/24/2022     Priority: Medium    Occlusion of right iliac artery ([facility] [address] [I74.5] 09/24/2022     Priority: Medium    Acute occlusion of artery of lower extremity due to thrombosis ([address] [I74.3] 09/24/2022     Priority: Medium         IMPRESSION:   Severe and progressive peripheral vascular disease  Failed Eliquis  No clear risk factors, the patient is not a smoker and hypercoagulable work-up was essentially negative    RECOMMENDATIONS:  Agree with the current plans to use heparin Pletal and nifedipine and Brilinta while in-house and discharged on warfarin and Brilinta  Homocystine is 10.2. Awaiting final verdict about surgical plans before starting warfarin  Will be available for any question or concern      Discussed with patient and Nurse. Thank you for asking us to see this patient.     Casey Levine MD  Hematologist/Medical Oncologist  Cell: [phone]

## 2022-09-27 LAB
HGB ELECTROPHORESIS INTERP: NORMAL
INR BLD: 0.9
LACTIC ACID, WHOLE BLOOD: 1.6 MMOL/L (ref 0.7–2.1)
PARTIAL THROMBOPLASTIN TIME: 37 SEC (ref 20.5–30.5)
PARTIAL THROMBOPLASTIN TIME: 37.3 SEC (ref 20.5–30.5)
PATHOLOGIST: NORMAL
PROTHROMBIN TIME: 9.4 SEC (ref 9.1–12.3)

## 2022-09-27 PROCEDURE — 83605 ASSAY OF LACTIC ACID: CPT

## 2022-09-27 PROCEDURE — 6370000000 HC RX 637 (ALT 250 FOR IP): Performed by: STUDENT IN AN ORGANIZED HEALTH CARE EDUCATION/TRAINING PROGRAM

## 2022-09-27 PROCEDURE — 99231 SBSQ HOSP IP/OBS SF/LOW 25: CPT | Performed by: INTERNAL MEDICINE

## 2022-09-27 PROCEDURE — 85730 THROMBOPLASTIN TIME PARTIAL: CPT

## 2022-09-27 PROCEDURE — 6360000002 HC RX W HCPCS: Performed by: STUDENT IN AN ORGANIZED HEALTH CARE EDUCATION/TRAINING PROGRAM

## 2022-09-27 PROCEDURE — 36415 COLL VENOUS BLD VENIPUNCTURE: CPT

## 2022-09-27 PROCEDURE — 85610 PROTHROMBIN TIME: CPT

## 2022-09-27 PROCEDURE — 2060000000 HC ICU INTERMEDIATE R&B

## 2022-09-27 RX ORDER — WARFARIN SODIUM 5 MG/1
5 TABLET ORAL
Status: COMPLETED | OUTPATIENT
Start: 2022-09-27 | End: 2022-09-27

## 2022-09-27 RX ADMIN — METHOCARBAMOL TABLETS 750 MG: 750 TABLET, COATED ORAL at 21:10

## 2022-09-27 RX ADMIN — WARFARIN SODIUM 5 MG: 5 TABLET ORAL at 17:30

## 2022-09-27 RX ADMIN — ACETAMINOPHEN 1000 MG: 500 TABLET ORAL at 13:23

## 2022-09-27 RX ADMIN — NIFEDIPINE 10 MG: 10 CAPSULE ORAL at 13:23

## 2022-09-27 RX ADMIN — GABAPENTIN 300 MG: 300 CAPSULE ORAL at 21:10

## 2022-09-27 RX ADMIN — CILOSTAZOL 50 MG: 100 TABLET ORAL at 08:22

## 2022-09-27 RX ADMIN — METHOCARBAMOL TABLETS 750 MG: 750 TABLET, COATED ORAL at 13:23

## 2022-09-27 RX ADMIN — CILOSTAZOL 50 MG: 100 TABLET ORAL at 21:10

## 2022-09-27 RX ADMIN — ONDANSETRON 4 MG: 2 INJECTION INTRAMUSCULAR; INTRAVENOUS at 21:36

## 2022-09-27 RX ADMIN — ACETAMINOPHEN 1000 MG: 500 TABLET ORAL at 21:10

## 2022-09-27 RX ADMIN — GABAPENTIN 300 MG: 300 CAPSULE ORAL at 08:36

## 2022-09-27 RX ADMIN — GABAPENTIN 300 MG: 300 CAPSULE ORAL at 13:24

## 2022-09-27 RX ADMIN — DESMOPRESSIN ACETATE 40 MG: 0.2 TABLET ORAL at 08:22

## 2022-09-27 RX ADMIN — TICAGRELOR 90 MG: 90 TABLET ORAL at 21:10

## 2022-09-27 RX ADMIN — ACETAMINOPHEN 1000 MG: 500 TABLET ORAL at 05:49

## 2022-09-27 RX ADMIN — METHOCARBAMOL TABLETS 750 MG: 750 TABLET, COATED ORAL at 08:22

## 2022-09-27 RX ADMIN — NIFEDIPINE 10 MG: 10 CAPSULE ORAL at 05:49

## 2022-09-27 RX ADMIN — NIFEDIPINE 10 MG: 10 CAPSULE ORAL at 21:10

## 2022-09-27 RX ADMIN — METHOCARBAMOL TABLETS 750 MG: 750 TABLET, COATED ORAL at 17:30

## 2022-09-27 RX ADMIN — OXYCODONE HYDROCHLORIDE 5 MG: 5 TABLET ORAL at 00:20

## 2022-09-27 RX ADMIN — TICAGRELOR 90 MG: 90 TABLET ORAL at 08:22

## 2022-09-27 RX ADMIN — DIPHENHYDRAMINE HYDROCHLORIDE 25 MG: 50 INJECTION, SOLUTION INTRAMUSCULAR; INTRAVENOUS at 03:16

## 2022-09-27 ASSESSMENT — PAIN SCALES - GENERAL
PAINLEVEL_OUTOF10: 7
PAINLEVEL_OUTOF10: 0

## 2022-09-27 NOTE — PROGRESS NOTES
Pharmacy Note  Warfarin Consult follow-up    Recent Labs     09/27/22  0615   INR 0.9     Recent Labs     09/24/22  1459 09/25/22  0302   HGB 10.2* 9.8*   HCT 30.6* 29.5*    283     Significant Drug-Drug Considerations:  Acetaminophen, cilostazol, heparin, ticagrelor    Date                INR        Dose   09.26                0.9         5 mg                09.27                0.9         5 mg    Daily PT/INR while inpatient.      Guero Birmingham, PharmD BCPS [facility]  9/27/2022 10:33 AM

## 2022-09-27 NOTE — PROGRESS NOTES
VASCULAR SURGERY PROGRESS NOTE  [address]      Patient's Name/ Date of Birth/ Gender: Sushil Wilder / 1975 (52 y.o.) / female     Surgeon: Dr. Reina York:  Patient seen and examined at bedside, no overnight events. Patient POD#3  extensive revascularization of her right lower extremity. Since then, the patient's vascular exam continues to improve, her leg became more warm, and her motor function and sensation are essentially at baseline. Patient continues to feel well denies any pain. Voiding. Tolerating a diet. Has been up and out of bed. VSS, afebrile. Vital Signs:  Vitals:    09/27/22 1200   BP:    Pulse:    Resp:    Temp: 98.5 °F (36.9 °C)   SpO2:        Physical Exam:  Vital signs and Nurse's note reviewed  Gen:  A&Ox3, NAD  HEENT: PERRLA, EOMI, no scleral icterus, oral mucosa moist  Neck: Supple  Chest: Symmetric rise with inhalation, no evidence of trauma  CVS: Regular rate and rhythm  Resp: Unlabored on RA  Abd: soft, non-tender, non-distended, no guarding or rebound. Ext: Right lower extremity warm to the touch, without TTP. She has some collateral flow on Doppler exam and a strong right popliteal signal.  Bilateral palpable femoral pulses. She is able to wiggle her toes, plantar and dorsiflex the right foot. Compartments are soft without pain.   CNS: Moves all extremities, no gross focal motor deficits      Labs:   Lab Results   Component Value Date/Time    WBC 12.0 09/25/2022 03:02 AM    HGB 9.8 09/25/2022 03:02 AM    HCT 29.5 09/25/2022 03:02 AM    MCV 91.3 09/25/2022 03:02 AM     09/25/2022 03:02 AM     Lab Results   Component Value Date/Time     09/26/2022 04:49 AM    K 3.6 09/26/2022 04:49 AM     09/26/2022 04:49 AM    CO2 21 09/26/2022 04:49 AM    BUN 8 09/26/2022 04:49 AM    CREATININE 0.65 09/26/2022 04:49 AM    GLUCOSE 126 09/26/2022 04:49 AM    CALCIUM 8.5 09/26/2022 04:49 AM     Lab Results   Component Value Date/Time    ALKPHOS 89 06/26/2020 04:28 PM    ALT 11 09/08/2022 12:01 PM    AST 14 09/08/2022 12:01 PM    PROT 7.5 06/26/2020 04:28 PM    BILITOT 0.4 09/08/2022 12:01 PM    BILIDIR <0.08 11/15/2015 04:45 PM    LABALBU 3.7 09/08/2022 12:01 PM     No results found for: LACTA  No results found for: AMYLASE  Lab Results   Component Value Date/Time    LIPASE 27 11/15/2015 04:45 PM     Lab Results   Component Value Date/Time    INR 0.9 09/27/2022 06:15 AM       Imaging:  CTA ABDOMINAL AORTA W BILAT RUNOFF W CONTRAST    Result Date: 9/24/2022  Since the prior exam there has been placement of a right common/external iliac artery stent with occlusion of the right common and external iliac arteries. Progression of occlusive disease of the right lower extremity with long segment occlusion of the distal SFA/popliteal artery and very poor segmental filling of runoff vessels. Similar occlusion of the left popliteal artery with poor segmental filling of below knee runoff vessels. Other stable findings including 4 cm hyperdense right renal lesion, likely hemorrhagic cyst which should be confirmed on follow-up ultrasound and/or MRI. 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 Findings were discussed with Dr. Yesika Smith At 9:11 am on 9/24/2022. RECOMMENDATIONS: 2.6 cm right ovarian simple-appearing cyst. No follow-up imaging is recommended. Reference: JACR 2020 Feb;17(2):248-254        Impression:  47F with acute limb ischemia with occlusion of prior iliac stent and occlusion of the distal popliteal artery with minimal runoff   -9/8/22: S/p RLE angiogram, right iliac stent placement, popliteal artery angioplasty   -9/24/22: Arterial mechanical thrombectomy common femoral artery and iliac stent, balloon angioplasty popliteal and tibial peroneal arteries    Recommendation:  Patient continues to do well with her motor function and sensation back to baseline. Pain has resolved.   She now has a right popliteal signal and some collateral flow detected on Doppler in the distal leg. Patient must remain on lifelong anticoagulation in order to keep her leg and avoid amputation. She will remain on heparin gtt., Pletal, nifedipine, Brilinta while in house. She will be discharged on warfarin and Brilinta. Continue warfarin with goal INR 2.5-3.5  Heme-onc following  Patient may get out of bed, encourage ambulation. Diet: Regular  Continue to monitor vascular exam per unit protocol.

## 2022-09-27 NOTE — PLAN OF CARE
Problem: Discharge Planning  Goal: Discharge to home or other facility with appropriate resources  9/27/2022 0809 by Candace Gore RN  Outcome: Progressing  9/26/2022 1954 by Kaylan Teran RN  Outcome: Progressing  Flowsheets (Taken 9/26/2022 1951)  Discharge to home or other facility with appropriate resources: Identify barriers to discharge with patient and caregiver     Problem: Pain  Goal: Verbalizes/displays adequate comfort level or baseline comfort level  9/27/2022 0809 by Candace Gore RN  Outcome: Progressing  9/26/2022 1954 by Kaylan Teran RN  Outcome: Progressing     Problem: Skin/Tissue Integrity  Goal: Absence of new skin breakdown  Description: 1. Monitor for areas of redness and/or skin breakdown  2. Assess vascular access sites hourly  3. Every 4-6 hours minimum:  Change oxygen saturation probe site  4. Every 4-6 hours:  If on nasal continuous positive airway pressure, respiratory therapy assess nares and determine need for appliance change or resting period.   Outcome: Progressing     Problem: Safety - Adult  Goal: Free from fall injury  9/27/2022 0809 by Candace Gore RN  Outcome: Progressing  9/26/2022 1954 by Kaylan Teran RN  Outcome: Progressing     Problem: ABCDS Injury Assessment  Goal: Absence of physical injury  Outcome: Progressing

## 2022-09-28 ENCOUNTER — TELEPHONE (OUTPATIENT)
Dept: VASCULAR SURGERY | Age: 47
End: 2022-09-28

## 2022-09-28 LAB
INR BLD: 0.9
PARTIAL THROMBOPLASTIN TIME: 37.5 SEC (ref 20.5–30.5)
PARTIAL THROMBOPLASTIN TIME: 42.6 SEC (ref 20.5–30.5)
PARTIAL THROMBOPLASTIN TIME: 58.2 SEC (ref 20.5–30.5)
PROTHROMBIN TIME: 9.6 SEC (ref 9.1–12.3)

## 2022-09-28 PROCEDURE — 97110 THERAPEUTIC EXERCISES: CPT

## 2022-09-28 PROCEDURE — 85610 PROTHROMBIN TIME: CPT

## 2022-09-28 PROCEDURE — 94761 N-INVAS EAR/PLS OXIMETRY MLT: CPT

## 2022-09-28 PROCEDURE — 6370000000 HC RX 637 (ALT 250 FOR IP): Performed by: STUDENT IN AN ORGANIZED HEALTH CARE EDUCATION/TRAINING PROGRAM

## 2022-09-28 PROCEDURE — 1200000000 HC SEMI PRIVATE

## 2022-09-28 PROCEDURE — 6360000002 HC RX W HCPCS: Performed by: STUDENT IN AN ORGANIZED HEALTH CARE EDUCATION/TRAINING PROGRAM

## 2022-09-28 PROCEDURE — 99231 SBSQ HOSP IP/OBS SF/LOW 25: CPT | Performed by: INTERNAL MEDICINE

## 2022-09-28 PROCEDURE — 85730 THROMBOPLASTIN TIME PARTIAL: CPT

## 2022-09-28 PROCEDURE — 2580000003 HC RX 258: Performed by: STUDENT IN AN ORGANIZED HEALTH CARE EDUCATION/TRAINING PROGRAM

## 2022-09-28 PROCEDURE — 36415 COLL VENOUS BLD VENIPUNCTURE: CPT

## 2022-09-28 PROCEDURE — 97116 GAIT TRAINING THERAPY: CPT

## 2022-09-28 RX ORDER — HEPARIN SODIUM 1000 [USP'U]/ML
40 INJECTION, SOLUTION INTRAVENOUS; SUBCUTANEOUS PRN
Status: DISCONTINUED | OUTPATIENT
Start: 2022-09-28 | End: 2022-10-02

## 2022-09-28 RX ORDER — WARFARIN SODIUM 5 MG/1
5 TABLET ORAL
Status: COMPLETED | OUTPATIENT
Start: 2022-09-28 | End: 2022-09-28

## 2022-09-28 RX ORDER — HEPARIN SODIUM 1000 [USP'U]/ML
80 INJECTION, SOLUTION INTRAVENOUS; SUBCUTANEOUS PRN
Status: DISCONTINUED | OUTPATIENT
Start: 2022-09-28 | End: 2022-10-02

## 2022-09-28 RX ORDER — HEPARIN SODIUM AND DEXTROSE 10000; 5 [USP'U]/100ML; G/100ML
5-30 INJECTION INTRAVENOUS CONTINUOUS
Status: DISCONTINUED | OUTPATIENT
Start: 2022-09-28 | End: 2022-09-28

## 2022-09-28 RX ORDER — CHOLECALCIFEROL (VITAMIN D3) 125 MCG
5 CAPSULE ORAL NIGHTLY PRN
Status: DISCONTINUED | OUTPATIENT
Start: 2022-09-28 | End: 2022-10-03 | Stop reason: HOSPADM

## 2022-09-28 RX ORDER — HEPARIN SODIUM AND DEXTROSE 10000; 5 [USP'U]/100ML; G/100ML
5-30 INJECTION INTRAVENOUS CONTINUOUS
Status: DISCONTINUED | OUTPATIENT
Start: 2022-09-28 | End: 2022-10-02

## 2022-09-28 RX ADMIN — NIFEDIPINE 10 MG: 10 CAPSULE ORAL at 06:15

## 2022-09-28 RX ADMIN — SODIUM CHLORIDE, PRESERVATIVE FREE 10 ML: 5 INJECTION INTRAVENOUS at 09:09

## 2022-09-28 RX ADMIN — NIFEDIPINE 10 MG: 10 CAPSULE ORAL at 13:13

## 2022-09-28 RX ADMIN — TICAGRELOR 90 MG: 90 TABLET ORAL at 20:23

## 2022-09-28 RX ADMIN — Medication 5 MG: at 22:44

## 2022-09-28 RX ADMIN — GABAPENTIN 300 MG: 300 CAPSULE ORAL at 20:24

## 2022-09-28 RX ADMIN — METHOCARBAMOL TABLETS 750 MG: 750 TABLET, COATED ORAL at 13:09

## 2022-09-28 RX ADMIN — Medication 20 UNITS/KG/HR: at 15:28

## 2022-09-28 RX ADMIN — HEPARIN SODIUM 3230 UNITS: 1000 INJECTION INTRAVENOUS; SUBCUTANEOUS at 20:33

## 2022-09-28 RX ADMIN — METHOCARBAMOL TABLETS 750 MG: 750 TABLET, COATED ORAL at 20:24

## 2022-09-28 RX ADMIN — SODIUM CHLORIDE, PRESERVATIVE FREE 10 ML: 5 INJECTION INTRAVENOUS at 22:21

## 2022-09-28 RX ADMIN — NIFEDIPINE 10 MG: 10 CAPSULE ORAL at 22:21

## 2022-09-28 RX ADMIN — WARFARIN SODIUM 5 MG: 5 TABLET ORAL at 18:24

## 2022-09-28 RX ADMIN — CILOSTAZOL 50 MG: 100 TABLET ORAL at 20:23

## 2022-09-28 RX ADMIN — METHOCARBAMOL TABLETS 750 MG: 750 TABLET, COATED ORAL at 18:24

## 2022-09-28 RX ADMIN — GABAPENTIN 300 MG: 300 CAPSULE ORAL at 09:09

## 2022-09-28 RX ADMIN — Medication 22 UNITS/KG/HR: at 20:36

## 2022-09-28 RX ADMIN — ACETAMINOPHEN 1000 MG: 500 TABLET ORAL at 13:08

## 2022-09-28 RX ADMIN — GABAPENTIN 300 MG: 300 CAPSULE ORAL at 13:30

## 2022-09-28 RX ADMIN — DIPHENHYDRAMINE HYDROCHLORIDE 25 MG: 50 INJECTION, SOLUTION INTRAMUSCULAR; INTRAVENOUS at 00:15

## 2022-09-28 RX ADMIN — ACETAMINOPHEN 1000 MG: 500 TABLET ORAL at 06:15

## 2022-09-28 RX ADMIN — DESMOPRESSIN ACETATE 40 MG: 0.2 TABLET ORAL at 09:09

## 2022-09-28 RX ADMIN — HEPARIN SODIUM 3230 UNITS: 1000 INJECTION INTRAVENOUS; SUBCUTANEOUS at 15:28

## 2022-09-28 RX ADMIN — HEPARIN SODIUM 1800 UNITS/HR: 10000 INJECTION, SOLUTION INTRAVENOUS at 04:13

## 2022-09-28 RX ADMIN — METHOCARBAMOL TABLETS 750 MG: 750 TABLET, COATED ORAL at 09:09

## 2022-09-28 RX ADMIN — ACETAMINOPHEN 1000 MG: 500 TABLET ORAL at 22:21

## 2022-09-28 RX ADMIN — CILOSTAZOL 50 MG: 100 TABLET ORAL at 09:09

## 2022-09-28 RX ADMIN — TICAGRELOR 90 MG: 90 TABLET ORAL at 09:09

## 2022-09-28 ASSESSMENT — PAIN SCALES - GENERAL
PAINLEVEL_OUTOF10: 0
PAINLEVEL_OUTOF10: 0

## 2022-09-28 NOTE — PLAN OF CARE
Problem: Discharge Planning  Goal: Discharge to home or other facility with appropriate resources  Outcome: Progressing  Flowsheets (Taken 9/27/2022 2000)  Discharge to home or other facility with appropriate resources: Identify barriers to discharge with patient and caregiver     Problem: Pain  Goal: Verbalizes/displays adequate comfort level or baseline comfort level  Outcome: Progressing     Problem: Skin/Tissue Integrity  Goal: Absence of new skin breakdown  Description: 1. Monitor for areas of redness and/or skin breakdown  2. Assess vascular access sites hourly  3. Every 4-6 hours minimum:  Change oxygen saturation probe site  4. Every 4-6 hours:  If on nasal continuous positive airway pressure, respiratory therapy assess nares and determine need for appliance change or resting period.   Outcome: Progressing     Problem: Safety - Adult  Goal: Free from fall injury  Outcome: Progressing     Problem: ABCDS Injury Assessment  Goal: Absence of physical injury  Outcome: Progressing

## 2022-09-28 NOTE — PROGRESS NOTES
VASCULAR SURGERY PROGRESS NOTE  [facility]      Patient's Name/ Date of Birth/ Gender: Neida Storey / 1975 (52 y.o.) / female     Surgeon: Dr. Martha Gaines:  Patient seen and examined at bedside, no overnight events. Patient is doing well and reports her right lower extremity pain and paresthesias is resolved and she is back to baseline. Tolerating a diet but reports little appetite. Walked the halls yesterday. Reports her sleep is affected by being in the hospital.  Otherwise VSS, afebrile. Vital Signs:  Vitals:    09/28/22 0400   BP: (!) 112/91   Pulse: 98   Resp: (!) 0   Temp: 98.2 °F (36.8 °C)   SpO2: 96%       Physical Exam:  Vital signs and Nurse's note reviewed  Gen:  A&Ox3, NAD  HEENT: PERRLA, EOMI, no scleral icterus, oral mucosa moist  Neck: Supple  Chest: Symmetric rise with inhalation, no evidence of trauma  CVS: Regular rate and rhythm  Resp: Unlabored on RA  Abd: soft, non-tender, non-distended, no guarding or rebound. Ext: Right lower extremity warm to the touch, without TTP. She has some collateral flow on Doppler exam and a strong right popliteal signal.  Bilateral palpable femoral pulses. She is able to wiggle her toes, plantar and dorsiflex the right foot. Compartments are soft without pain.   CNS: Moves all extremities, no gross focal motor deficits      Labs:   Lab Results   Component Value Date/Time    WBC 12.0 09/25/2022 03:02 AM    HGB 9.8 09/25/2022 03:02 AM    HCT 29.5 09/25/2022 03:02 AM    MCV 91.3 09/25/2022 03:02 AM     09/25/2022 03:02 AM     Lab Results   Component Value Date/Time     09/26/2022 04:49 AM    K 3.6 09/26/2022 04:49 AM     09/26/2022 04:49 AM    CO2 21 09/26/2022 04:49 AM    BUN 8 09/26/2022 04:49 AM    CREATININE 0.65 09/26/2022 04:49 AM    GLUCOSE 126 09/26/2022 04:49 AM    CALCIUM 8.5 09/26/2022 04:49 AM     Lab Results   Component Value Date/Time    ALKPHOS 89 06/26/2020 04:28 PM    ALT 11 09/08/2022 12:01 PM    AST 14 09/08/2022 12:01 PM    PROT 7.5 06/26/2020 04:28 PM    BILITOT 0.4 09/08/2022 12:01 PM    BILIDIR <0.08 11/15/2015 04:45 PM    LABALBU 3.7 09/08/2022 12:01 PM     No results found for: LACTA  No results found for: AMYLASE  Lab Results   Component Value Date/Time    LIPASE 27 11/15/2015 04:45 PM     Lab Results   Component Value Date/Time    INR 0.9 09/28/2022 06:08 AM       Imaging:  CTA ABDOMINAL AORTA W BILAT RUNOFF W CONTRAST    Result Date: 9/24/2022  Since the prior exam there has been placement of a right common/external iliac artery stent with occlusion of the right common and external iliac arteries. Progression of occlusive disease of the right lower extremity with long segment occlusion of the distal SFA/popliteal artery and very poor segmental filling of runoff vessels. Similar occlusion of the left popliteal artery with poor segmental filling of below knee runoff vessels. Other stable findings including 4 cm hyperdense right renal lesion, likely hemorrhagic cyst which should be confirmed on follow-up ultrasound and/or MRI. 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 Findings were discussed with Dr. Marky Case At 9:11 am on 9/24/2022. RECOMMENDATIONS: 2.6 cm right ovarian simple-appearing cyst. No follow-up imaging is recommended. Reference: JACR 2020 Feb;17(2):248-254        Impression:  47F with acute limb ischemia with occlusion of prior iliac stent and occlusion of the distal popliteal artery with minimal runoff   -9/8/22: S/p RLE angiogram, right iliac stent placement, popliteal artery angioplasty   -9/24/22: Arterial mechanical thrombectomy common femoral artery and iliac stent, balloon angioplasty popliteal and tibial peroneal arteries    Recommendation:  Patient continues to do well with her motor function and sensation back to baseline. Pain has resolved.   She now has a right popliteal signal and some collateral flow detected on Doppler in the distal leg. Patient must remain on lifelong anticoagulation in order to keep her leg and avoid amputation. She will remain on Pletal, nifedipine, Brilinta while in house, and currently she is being bridged to warfarin. She will be discharged on warfarin and Brilinta. Continue warfarin with goal INR 2.5-3.5, dosed per pharmacy  Heme-onc following, who agree with plans of AC/AP  Patient may get out of bed, encourage ambulation. Diet: Regular  Continue to monitor vascular exam per unit protocol. Transfer to stepdown still pending bed availability  Dispo: Once the patient is therapeutic, she may be discharged. Will discuss potentially discharging on Lovenox since patient is otherwise medically stable for discharge. eJnny Dow, DO - PGY3  Surgery Department

## 2022-09-28 NOTE — PROGRESS NOTES
Today's Date: 9/27/2022  Patient Name: Tip Sethi  Date of admission: 9/24/2022  5:22 AM  Patient's age: 52 y.o., 1975  Admission Dx: Arterial thrombosis ([address] [I74.9]  Critical limb ischemia with history of revascularization of same extremity ([address] [I70.229, Z98.890]    Reason for Consult: management recommendations  Requesting Physician: Emely Lucia MD    CHIEF COMPLAINT: Acute limb ischemia    INTERIM HISTORY  Seen and evaluated. States that her pain in the lower extremity is better. She is on heparin. HISTORY OF PRESENT ILLNESS:      The patient is a 52 y.o.  female who is admitted to the hospital for acute limb ischemia. The patient has known peripheral vascular disease and unfortunately needed multiple surgery including amputation of the toes in the right foot previously because of arterial disease. Hypercoagulable work-up was done and was essentially negative. She was seen a few weeks ago because of right-sided leg pain and acute limb ischemia and she was started on Eliquis. Unfortunately that did not help and she came back again with acute limb ischemia and worsening peripheral artery disease. Vascular took her to surgery and she is definitely improving. The plan is to send her home on warfarin and Brilinta  The patient is seen and evaluated. She is feeling much better. The pain in the right lower extremity improved significantly. She is able to move her foot and her leg with minimal difficulty.     Past Medical History:   has a past medical history of Abnormal Pap smear of cervix, Anemia, Anxiety, Chronic back pain, Claudication (HCC), Claudication in peripheral vascular disease ([address] Depression, Headache(784.0), Heart murmur, Hemorrhage of rectum and anus, History of blood transfusion, HTN (hypertension), Hx of blood clots, Hypercoagulable state ([address] Hyperlipidemia, Hypertension, Intertriginous candidiasis, Left popliteal artery occlusion ([address] Leg pain, Menometrorrhagia, Migraine headache with aura, Obesity, Osteoarthritis, PAD (peripheral artery disease) ([facility] Patient in clinical research study, PVD (peripheral vascular disease) ([facility] Takotsubo cardiomyopathy, Under care of service provider, Under care of service provider, Under care of service provider, and Wound of right leg. Past Surgical History:   has a past surgical history that includes Tubal ligation ();  section (); other surgical history (2017); other surgical history (Left, 2018); other surgical history (Left, 2018); vascular surgery (Left, 2018); pr reoperation, bypass graft (Left, 2018); pr vein bypass graft,fem-pop (Left, 2018); thrombectomy (Right, 2019); IVC filter insertion; Toe amputation (Right, 2019); Toe amputation (Right, 2019); Foot Amputation (Right, 3/8/2021); vascular surgery (Right, 2022); and vascular surgery (Bilateral, 2022). Medications:    Reviewed in Epic     Allergies:  Asa [aspirin], Lopid [gemfibrozil], Nortriptyline, Pcn [penicillins], Sulfa antibiotics, Morphine, and Ibuprofen    Social History:   reports that she has never smoked. She has never used smokeless tobacco. She reports that she does not currently use alcohol. She reports that she does not use drugs. Family History: family history includes Diabetes in her mother, sister, and sister; Heart Attack in her father and mother; Heart Disease in her father and mother; High Blood Pressure in her father, mother, sister, and sister; Kidney Disease in her mother. REVIEW OF SYSTEMS:    Constitutional: No fever or chills.  No night sweats, no weight loss   Eyes: No eye discharge, double vision, or eye pain   HEENT: negative for sore mouth, sore throat, hoarseness and voice change   Respiratory: negative for cough , sputum, dyspnea, wheezing, hemoptysis, chest pain   Cardiovascular: negative for chest pain, dyspnea, palpitations, orthopnea, PND   Gastrointestinal: negative for nausea, vomiting, diarrhea, constipation, abdominal pain, Dysphagia, hematemesis and hematochezia   Genitourinary: negative for frequency, dysuria, nocturia, urinary incontinence, and hematuria   Integument: negative for rash, skin lesions, bruises. Hematologic/Lymphatic: negative for easy bruising, bleeding, lymphadenopathy, or petechiae   Endocrine: negative for heat or cold intolerance,weight changes, change in bowel habits and hair loss   Musculoskeletal: Pain and weakness of the right lower extremity has subsided after surgery  Neurological: negative for headaches, dizziness, seizures, weakness, numbness    PHYSICAL EXAM:      BP (!) 150/97   Pulse (!) 104   Temp 97.9 °F (36.6 °C) (Oral)   Resp 16   Wt 183 lb 4.8 oz (83.1 kg)   LMP 2022   SpO2 97%   BMI 30.50 kg/m²    Temp (24hrs), Av.4 °F (36.9 °C), Min:97.9 °F (36.6 °C), Max:98.8 °F (37.1 °C)    General appearance - well appearing, no in pain or distress   Mental status - alert and cooperative   Eyes - pupils equal and reactive, extraocular eye movements intact   Ears - bilateral TM's and external ear canals normal   Mouth - mucous membranes moist, pharynx normal without lesions   Neck - supple, no significant adenopathy   Lymphatics - no palpable lymphadenopathy, no hepatosplenomegaly   Chest - clear to auscultation, no wheezes, rales or rhonchi, symmetric air entry   Heart - normal rate, regular rhythm, normal S1, S2, no murmurs  Abdomen - soft, nontender, nondistended, no masses or organomegaly   Neurological - alert, oriented, normal speech, no focal findings or movement disorder noted   Musculoskeletal - no joint tenderness, deformity or swelling   Extremities -both lower extremity have scars from previous vascular surgeries. The right foot had previous amputation of the toes.   No signs of acute ischemia appreciated  Skin - normal coloration and turgor, no rashes, no suspicious skin lesions noted ,    DATA:    Labs:   CBC:   Recent Labs     09/25/22  0302   WBC 12.0*   HGB 9.8*   HCT 29.5*        BMP:   Recent Labs     09/25/22  0302 09/26/22  0449    135   K 3.6* 3.6*   CO2 24 21   BUN 5* 8   CREATININE 0.60 0.65   LABGLOM >60 >60   GLUCOSE 117* 126*     PT/INR:   Recent Labs     09/26/22  1207 09/27/22  0615   PROTIME 9.5 9.4   INR 0.9 0.9       IMAGING DATA:      Primary Problem  Critical limb ischemia with history of revascularization of same extremity [facility])    Active Hospital Problems    Diagnosis Date Noted    Critical limb ischemia with history of revascularization of same extremity ([facility] [I70.229, Z98.890] 09/24/2022     Priority: Medium    Occlusion of right iliac artery ([facility] [I74.5] 09/24/2022     Priority: Medium    Acute occlusion of artery of lower extremity due to thrombosis ([address] [I74.3] 09/24/2022     Priority: Medium         IMPRESSION:   Severe and progressive peripheral vascular disease  Failed Eliquis  No clear risk factors, the patient is not a smoker and hypercoagulable work-up was essentially negative    RECOMMENDATIONS:  Agree with the current plans to use heparin Pletal and nifedipine and Brilinta while in-house and discharged on warfarin and Brilinta  On warfarin, well tolerated       Discussed with patient and Nurse. Thank you for asking us to see this patient.     BUCK BURTON [facility] MD [place]  Hematologist/Medical Oncologist  Cell: [phone]

## 2022-09-28 NOTE — PLAN OF CARE
Problem: Discharge Planning  Goal: Discharge to home or other facility with appropriate resources  9/28/2022 1837 by Yara Mcbride RN  Outcome: Progressing  9/28/2022 0438 by Alex Loco RN  Outcome: Progressing  Flowsheets (Taken 9/27/2022 2000)  Discharge to home or other facility with appropriate resources: Identify barriers to discharge with patient and caregiver     Problem: Pain  Goal: Verbalizes/displays adequate comfort level or baseline comfort level  9/28/2022 1837 by Yara Mcbride RN  Outcome: Progressing  Flowsheets (Taken 9/28/2022 1023)  Verbalizes/displays adequate comfort level or baseline comfort level: Assess pain using appropriate pain scale  9/28/2022 0438 by Alex Loco RN  Outcome: Progressing     Problem: Skin/Tissue Integrity  Goal: Absence of new skin breakdown  Description: 1. Monitor for areas of redness and/or skin breakdown  2. Assess vascular access sites hourly  3. Every 4-6 hours minimum:  Change oxygen saturation probe site  4. Every 4-6 hours:  If on nasal continuous positive airway pressure, respiratory therapy assess nares and determine need for appliance change or resting period.   9/28/2022 1837 by Yara Mcbride RN  Outcome: Progressing  9/28/2022 0438 by Alex Loco RN  Outcome: Progressing     Problem: Safety - Adult  Goal: Free from fall injury  9/28/2022 1837 by Yara Mcbride RN  Outcome: Progressing  Flowsheets  Taken 9/28/2022 0800 by Yara Mcbride RN  Free From Fall Injury: Based on caregiver fall risk screen, instruct family/caregiver to ask for assistance with transferring infant if caregiver noted to have fall risk factors  Taken 9/28/2022 0439 by Alex Loco RN  Free From Fall Injury: Instruct family/caregiver on patient safety  9/28/2022 0438 by Alex Loco RN  Outcome: Progressing     Problem: ABCDS Injury Assessment  Goal: Absence of physical injury  9/28/2022 1837 by Yara Mcbride RN  Outcome: Progressing  Flowsheets  Taken 9/28/2022 0800 by Marcus Lane RN  Absence of Physical Injury: Implement safety measures based on patient assessment  Taken 9/28/2022 0439 by Candelario Flynn RN  Absence of Physical Injury: Implement safety measures based on patient assessment  9/28/2022 0438 by Candelario Flynn RN  Outcome: Progressing

## 2022-09-28 NOTE — TELEPHONE ENCOUNTER
LVM for the patient letting her know that her appt with us is being rescheduled due to not having proper testing

## 2022-09-28 NOTE — PROGRESS NOTES
Today's Date: 9/28/2022  Patient Name: David Cordero  Date of admission: 9/24/2022  5:22 AM  Patient's age: 52 y.o., 1975  Admission Dx: Arterial thrombosis ([facility] [I74.9]  Critical limb ischemia with history of revascularization of same extremity ([facility] [I70.229, Z98.890]    Reason for Consult: management recommendations  Requesting Physician: Etienne Brady MD    CHIEF COMPLAINT: Acute limb ischemia    INTERIM HISTORY  Seen and evaluated. States that her pain in the lower extremity is better. She is on heparin. Warfarin was started. INR is still 1    HISTORY OF PRESENT ILLNESS:      The patient is a 52 y.o.  female who is admitted to the hospital for acute limb ischemia. The patient has known peripheral vascular disease and unfortunately needed multiple surgery including amputation of the toes in the right foot previously because of arterial disease. Hypercoagulable work-up was done and was essentially negative. She was seen a few weeks ago because of right-sided leg pain and acute limb ischemia and she was started on Eliquis. Unfortunately that did not help and she came back again with acute limb ischemia and worsening peripheral artery disease. Vascular took her to surgery and she is definitely improving. The plan is to send her home on warfarin and Brilinta  The patient is seen and evaluated. She is feeling much better. The pain in the right lower extremity improved significantly. She is able to move her foot and her leg with minimal difficulty.     Past Medical History:   has a past medical history of Abnormal Pap smear of cervix, Anemia, Anxiety, Chronic back pain, Claudication (HCC), Claudication in peripheral vascular disease ([facility] Depression, Headache(784.0), Heart murmur, Hemorrhage of rectum and anus, History of blood transfusion, HTN (hypertension), Hx of blood clots, Hypercoagulable state ([facility] Hyperlipidemia, Hypertension, Intertriginous candidiasis, Left popliteal artery occlusion (HCC), Leg pain, Menometrorrhagia, Migraine headache with aura, Obesity, Osteoarthritis, PAD (peripheral artery disease) ([facility] Patient in clinical research study, PVD (peripheral vascular disease) ([facility] Takotsubo cardiomyopathy, Under care of service provider, Under care of service provider, Under care of service provider, and Wound of right leg. Past Surgical History:   has a past surgical history that includes Tubal ligation ();  section (); other surgical history (2017); other surgical history (Left, 2018); other surgical history (Left, 2018); vascular surgery (Left, 2018); pr reoperation, bypass graft (Left, 2018); pr vein bypass graft,fem-pop (Left, 2018); thrombectomy (Right, 2019); IVC filter insertion; Toe amputation (Right, 2019); Toe amputation (Right, 2019); Foot Amputation (Right, 3/8/2021); vascular surgery (Right, 2022); and vascular surgery (Bilateral, 2022). Medications:    Reviewed in Epic     Allergies:  Asa [aspirin], Lopid [gemfibrozil], Nortriptyline, Pcn [penicillins], Sulfa antibiotics, Morphine, and Ibuprofen    Social History:   reports that she has never smoked. She has never used smokeless tobacco. She reports that she does not currently use alcohol. She reports that she does not use drugs. Family History: family history includes Diabetes in her mother, sister, and sister; Heart Attack in her father and mother; Heart Disease in her father and mother; High Blood Pressure in her father, mother, sister, and sister; Kidney Disease in her mother. REVIEW OF SYSTEMS:    Constitutional: No fever or chills.  No night sweats, no weight loss   Eyes: No eye discharge, double vision, or eye pain   HEENT: negative for sore mouth, sore throat, hoarseness and voice change   Respiratory: negative for cough , sputum, dyspnea, wheezing, hemoptysis, chest pain   Cardiovascular: negative for chest pain, dyspnea, palpitations, orthopnea, PND   Gastrointestinal: negative for nausea, vomiting, diarrhea, constipation, abdominal pain, Dysphagia, hematemesis and hematochezia   Genitourinary: negative for frequency, dysuria, nocturia, urinary incontinence, and hematuria   Integument: negative for rash, skin lesions, bruises. Hematologic/Lymphatic: negative for easy bruising, bleeding, lymphadenopathy, or petechiae   Endocrine: negative for heat or cold intolerance,weight changes, change in bowel habits and hair loss   Musculoskeletal: Pain and weakness of the right lower extremity has subsided after surgery  Neurological: negative for headaches, dizziness, seizures, weakness, numbness    PHYSICAL EXAM:      BP (!) 144/96   Pulse (!) 104   Temp 98.4 °F (36.9 °C) (Oral)   Resp 21   Wt 177 lb 14.6 oz (80.7 kg)   LMP 2022   SpO2 97%   BMI 29.61 kg/m²    Temp (24hrs), Av °F (36.7 °C), Min:97.8 °F (36.6 °C), Max:98.4 °F (36.9 °C)    General appearance - well appearing, no in pain or distress   Mental status - alert and cooperative   Eyes - pupils equal and reactive, extraocular eye movements intact   Ears - bilateral TM's and external ear canals normal   Mouth - mucous membranes moist, pharynx normal without lesions   Neck - supple, no significant adenopathy   Lymphatics - no palpable lymphadenopathy, no hepatosplenomegaly   Chest - clear to auscultation, no wheezes, rales or rhonchi, symmetric air entry   Heart - normal rate, regular rhythm, normal S1, S2, no murmurs  Abdomen - soft, nontender, nondistended, no masses or organomegaly   Neurological - alert, oriented, normal speech, no focal findings or movement disorder noted   Musculoskeletal - no joint tenderness, deformity or swelling   Extremities -both lower extremity have scars from previous vascular surgeries. The right foot had previous amputation of the toes.   No signs of acute ischemia appreciated  Skin - normal coloration and turgor, no rashes, no suspicious skin lesions noted ,    DATA:    Labs:   CBC:   No results for input(s): WBC, HGB, HCT, PLT in the last 72 hours. BMP:   Recent Labs     09/26/22  0449      K 3.6*   CO2 21   BUN 8   CREATININE 0.65   LABGLOM >60   GLUCOSE 126*     PT/INR:   Recent Labs     09/27/22  0615 09/28/22  0608   PROTIME 9.4 9.6   INR 0.9 0.9       IMAGING DATA:      Primary Problem  Critical limb ischemia with history of revascularization of same extremity [facility])    Active Hospital Problems    Diagnosis Date Noted    Critical limb ischemia with history of revascularization of same extremity ([facility] [I70.229, Z98.890] 09/24/2022     Priority: Medium    Occlusion of right iliac artery ([facility] [I74.5] 09/24/2022     Priority: Medium    Acute occlusion of artery of lower extremity due to thrombosis ([facility] [I74.3] 09/24/2022     Priority: Medium         IMPRESSION:   Severe and progressive peripheral vascular disease  Failed Eliquis  No clear risk factors, the patient is not a smoker and hypercoagulable work-up was essentially negative    RECOMMENDATIONS:  Agree with the current plans to use heparin Pletal and nifedipine and Brilinta while in-house and discharged on warfarin and Brilinta  On warfarin, well tolerated, hoping INR will start rising. Discussed with patient and Nurse. Thank you for asking us to see this patient.     BUCK BURTON [facility] MD [place]  Hematologist/Medical Oncologist  Cell: [phone]

## 2022-09-28 NOTE — PROGRESS NOTES
Physical Therapy  Facility/Department: [facility] 3- MICU  Daily Treatment Note   Name: Doug Haq  : 1975  MRN: [id number]  Date of Service: 2022    Discharge Recommendations:  Patient would benefit from continued therapy after discharge   PT Equipment Recommendations  Equipment Needed: No  Other: CTA, possible short term SPC use, CTA      Patient Diagnosis(es): The encounter diagnosis was Arterial thrombosis ([facility] [address] Past Medical History:  has a past medical history of Abnormal Pap smear of cervix, Anemia, Anxiety, Chronic back pain, Claudication (HCC), Claudication in peripheral vascular disease ([address] Depression, Headache(784.0), Heart murmur, Hemorrhage of rectum and anus, History of blood transfusion, HTN (hypertension), Hx of blood clots, Hypercoagulable state ([address] Hyperlipidemia, Hypertension, Intertriginous candidiasis, Left popliteal artery occlusion (HCC), Leg pain, Menometrorrhagia, Migraine headache with aura, Obesity, Osteoarthritis, PAD (peripheral artery disease) ([address] Patient in clinical research study, PVD (peripheral vascular disease) ([facility] [address] [facility] Takotsubo cardiomyopathy, Under care of service provider, Under care of service provider, Under care of service provider, and Wound of right leg. Past Surgical History:  has a past surgical history that includes Tubal ligation ();  section (); other surgical history (2017); other surgical history (Left, 2018); other surgical history (Left, 2018); vascular surgery (Left, 2018); pr reoperation, bypass graft (Left, 2018); pr vein bypass graft,fem-pop (Left, 2018); thrombectomy (Right, 2019); IVC filter insertion; Toe amputation (Right, 2019); Toe amputation (Right, 2019); Foot Amputation (Right, 3/8/2021); vascular surgery (Right, 2022); and vascular surgery (Bilateral, 2022).     Assessment   Body Structures, Functions, Activity Limitations Requiring Skilled Therapeutic Intervention: Decreased functional mobility ; Decreased balance;Decreased endurance;Decreased strength  Assessment: Pt grossly CGA for mobility, amb 250' CGA no AD. Pt would benefit from continued PT following discharge to address deficits. Therapy Prognosis: Good  Decision Making: Medium Complexity  Requires PT Follow-Up: Yes  Activity Tolerance  Activity Tolerance: Patient tolerated treatment well;Patient limited by fatigue     Plan   Plan  Plan:  (5-6x/wk)  Current Treatment Recommendations: Strengthening, Balance training, Gait training, Stair training, Functional mobility training, Transfer training, Endurance training, Home exercise program, Safety education & training, Patient/Caregiver education & training, Equipment evaluation, education, & procurement, Therapeutic activities  Safety Devices  Type of Devices: Call light within reach, Nurse notified, Gait belt, Patient at risk for falls, All fall risk precautions in place, Left in chair  Restraints  Restraints Initially in Place: No     Restrictions  Restrictions/Precautions  Restrictions/Precautions: General Precautions, Fall Risk  Required Braces or Orthoses?: No  Position Activity Restriction  Other position/activity restrictions: act. as tolerated. s/p thrombectomy common fem. and iliac stents 9/24     Subjective   General  Chart Reviewed: Yes  Response To Previous Treatment: Patient with no complaints from previous session. Family / Caregiver Present: No  Follows Commands: Within Functional Limits  General Comment  Comments: RN and pt agreeable to PT. Pt alert in bed upon arrival.  Subjective  Subjective: Pt denies any pain, except at IV site (unrated).         Cognition   Orientation  Overall Orientation Status: Within Functional Limits  Orientation Level: Oriented X4;Oriented to place;Oriented to time;Oriented to situation;Oriented to person  Cognition  Overall Cognitive Status: WFL     Objective   Bed mobility  Supine to Sit: Stand by assistance  Sit to Supine: Stand by assistance  Scooting: Stand by assistance  Transfers  Sit to Stand: Contact guard assistance  Stand to sit: Contact guard assistance  Ambulation  Surface: level tile  Device: No Device  Assistance: Contact guard assistance  Quality of Gait: slowed meng, decresed step length, no LOB or buckling, fatigues. Distance: 250 ft. More Ambulation?: No  Stairs/Curb  Stairs?: No     Balance  Posture: Fair  Sitting - Static: Good  Sitting - Dynamic: Good;-  Standing - Static: Fair;+  Standing - Dynamic: Fair  Comments: no AD used  A/AROM Exercises: Ankle pumps x20 BLE, LAQs x20 BLE, KTC x10 BLE.                                 AM-PAC Score  AM-PAC Inpatient Mobility Raw Score : 21 (09/28/22 1601)  AM-PAC Inpatient T-Scale Score : 50.25 (09/28/22 1601)  Mobility Inpatient CMS 0-100% Score: 28.97 (09/28/22 1601)  Mobility Inpatient CMS G-Code Modifier : CJ (09/28/22 1601)               Goals  Short Term Goals  Time Frame for Short term goals: 14 visits  Short term goal 1: Pt will be Maya bed mobility  Short term goal 2: Pt will be Maya transfers  Short term goal 3: Pt will be Maya amb 250'  Short term goal 4: Pt will navigate 6 steps Maya         Therapy Time   Individual Concurrent Group Co-treatment   Time In 1534         Time Out 1557         Minutes 23         Timed Code Treatment Minutes: [address], hospitals

## 2022-09-28 NOTE — PROGRESS NOTES
Pharmacy Note  Warfarin Consult follow-up    Recent Labs     09/28/22  0608   INR 0.9     No results for input(s): HGB, HCT, PLT in the last 72 hours. Significant Drug-Drug Considerations:  Acetaminophen, cilostazol, heparin, ticagrelor     Date                INR        Dose   09.26                0.9         5 mg                09.27                0.9         5 mg          09.28                0.9         5 mg    If INR does not respond tomorrow, will increase dosing  Daily PT/INR while inpatient.       Francia YusufD [facility]  9/28/2022 12:17 PM

## 2022-09-29 ENCOUNTER — APPOINTMENT (OUTPATIENT)
Dept: ULTRASOUND IMAGING | Age: 47
DRG: 169 | End: 2022-09-29
Payer: COMMERCIAL

## 2022-09-29 LAB
ABSOLUTE EOS #: 0.27 K/UL (ref 0–0.44)
ABSOLUTE IMMATURE GRANULOCYTE: 0.05 K/UL (ref 0–0.3)
ABSOLUTE LYMPH #: 2.42 K/UL (ref 1.1–3.7)
ABSOLUTE MONO #: 0.66 K/UL (ref 0.1–1.2)
ANION GAP SERPL CALCULATED.3IONS-SCNC: 18 MMOL/L (ref 9–17)
BASOPHILS # BLD: 1 % (ref 0–2)
BASOPHILS ABSOLUTE: 0.05 K/UL (ref 0–0.2)
BUN BLDV-MCNC: 9 MG/DL (ref 6–20)
CALCIUM SERPL-MCNC: 9.2 MG/DL (ref 8.6–10.4)
CHLORIDE BLD-SCNC: 100 MMOL/L (ref 98–107)
CO2: 16 MMOL/L (ref 20–31)
CREAT SERPL-MCNC: 0.7 MG/DL (ref 0.5–0.9)
EOSINOPHILS RELATIVE PERCENT: 3 % (ref 1–4)
GFR AFRICAN AMERICAN: >60 ML/MIN
GFR NON-AFRICAN AMERICAN: >60 ML/MIN
GFR SERPL CREATININE-BSD FRML MDRD: ABNORMAL ML/MIN/{1.73_M2}
GLUCOSE BLD-MCNC: 124 MG/DL (ref 70–99)
HCT VFR BLD CALC: 32.6 % (ref 36.3–47.1)
HEMOGLOBIN: 11.3 G/DL (ref 11.9–15.1)
IMMATURE GRANULOCYTES: 1 %
INR BLD: 1
LYMPHOCYTES # BLD: 24 % (ref 24–43)
MCH RBC QN AUTO: 29.7 PG (ref 25.2–33.5)
MCHC RBC AUTO-ENTMCNC: 34.7 G/DL (ref 28.4–34.8)
MCV RBC AUTO: 85.6 FL (ref 82.6–102.9)
MONOCYTES # BLD: 7 % (ref 3–12)
NRBC AUTOMATED: 0 PER 100 WBC
PARTIAL THROMBOPLASTIN TIME: 50 SEC (ref 20.5–30.5)
PARTIAL THROMBOPLASTIN TIME: 50.4 SEC (ref 20.5–30.5)
PARTIAL THROMBOPLASTIN TIME: 69.4 SEC (ref 20.5–30.5)
PARTIAL THROMBOPLASTIN TIME: 94.7 SEC (ref 20.5–30.5)
PDW BLD-RTO: 13.9 % (ref 11.8–14.4)
PLATELET # BLD: 357 K/UL (ref 138–453)
PMV BLD AUTO: 11.3 FL (ref 8.1–13.5)
POTASSIUM SERPL-SCNC: 4.3 MMOL/L (ref 3.7–5.3)
PROTHROMBIN TIME: 9.8 SEC (ref 9.1–12.3)
RBC # BLD: 3.81 M/UL (ref 3.95–5.11)
SEG NEUTROPHILS: 65 % (ref 36–65)
SEGMENTED NEUTROPHILS ABSOLUTE COUNT: 6.48 K/UL (ref 1.5–8.1)
SODIUM BLD-SCNC: 134 MMOL/L (ref 135–144)
TROPONIN, HIGH SENSITIVITY: 6 NG/L (ref 0–14)
WBC # BLD: 9.9 K/UL (ref 3.5–11.3)

## 2022-09-29 PROCEDURE — 36415 COLL VENOUS BLD VENIPUNCTURE: CPT

## 2022-09-29 PROCEDURE — 84484 ASSAY OF TROPONIN QUANT: CPT

## 2022-09-29 PROCEDURE — 6360000002 HC RX W HCPCS: Performed by: STUDENT IN AN ORGANIZED HEALTH CARE EDUCATION/TRAINING PROGRAM

## 2022-09-29 PROCEDURE — 99232 SBSQ HOSP IP/OBS MODERATE 35: CPT | Performed by: INTERNAL MEDICINE

## 2022-09-29 PROCEDURE — 2580000003 HC RX 258: Performed by: STUDENT IN AN ORGANIZED HEALTH CARE EDUCATION/TRAINING PROGRAM

## 2022-09-29 PROCEDURE — 80048 BASIC METABOLIC PNL TOTAL CA: CPT

## 2022-09-29 PROCEDURE — 6370000000 HC RX 637 (ALT 250 FOR IP): Performed by: STUDENT IN AN ORGANIZED HEALTH CARE EDUCATION/TRAINING PROGRAM

## 2022-09-29 PROCEDURE — 97116 GAIT TRAINING THERAPY: CPT

## 2022-09-29 PROCEDURE — 97110 THERAPEUTIC EXERCISES: CPT

## 2022-09-29 PROCEDURE — 85025 COMPLETE CBC W/AUTO DIFF WBC: CPT

## 2022-09-29 PROCEDURE — 85730 THROMBOPLASTIN TIME PARTIAL: CPT

## 2022-09-29 PROCEDURE — 1200000000 HC SEMI PRIVATE

## 2022-09-29 PROCEDURE — 76830 TRANSVAGINAL US NON-OB: CPT

## 2022-09-29 PROCEDURE — 85610 PROTHROMBIN TIME: CPT

## 2022-09-29 RX ORDER — MECLIZINE HCL 12.5 MG/1
12.5 TABLET ORAL 3 TIMES DAILY PRN
Status: DISCONTINUED | OUTPATIENT
Start: 2022-09-29 | End: 2022-10-03 | Stop reason: HOSPADM

## 2022-09-29 RX ORDER — OXYCODONE HYDROCHLORIDE 5 MG/1
5 TABLET ORAL EVERY 6 HOURS PRN
Status: DISCONTINUED | OUTPATIENT
Start: 2022-09-29 | End: 2022-10-03 | Stop reason: HOSPADM

## 2022-09-29 RX ORDER — WARFARIN SODIUM 10 MG/1
10 TABLET ORAL
Status: COMPLETED | OUTPATIENT
Start: 2022-09-29 | End: 2022-09-29

## 2022-09-29 RX ORDER — FENTANYL CITRATE 50 UG/ML
50 INJECTION, SOLUTION INTRAMUSCULAR; INTRAVENOUS ONCE
Status: COMPLETED | OUTPATIENT
Start: 2022-09-29 | End: 2022-09-29

## 2022-09-29 RX ADMIN — METHOCARBAMOL TABLETS 750 MG: 750 TABLET, COATED ORAL at 16:24

## 2022-09-29 RX ADMIN — Medication 24 UNITS/KG/HR: at 09:54

## 2022-09-29 RX ADMIN — FENTANYL CITRATE 50 MCG: 50 INJECTION, SOLUTION INTRAMUSCULAR; INTRAVENOUS at 10:18

## 2022-09-29 RX ADMIN — TICAGRELOR 90 MG: 90 TABLET ORAL at 09:22

## 2022-09-29 RX ADMIN — TICAGRELOR 90 MG: 90 TABLET ORAL at 22:15

## 2022-09-29 RX ADMIN — ACETAMINOPHEN 1000 MG: 500 TABLET ORAL at 13:33

## 2022-09-29 RX ADMIN — ACETAMINOPHEN 1000 MG: 500 TABLET ORAL at 22:16

## 2022-09-29 RX ADMIN — CILOSTAZOL 50 MG: 100 TABLET ORAL at 22:16

## 2022-09-29 RX ADMIN — METHOCARBAMOL TABLETS 750 MG: 750 TABLET, COATED ORAL at 09:23

## 2022-09-29 RX ADMIN — NIFEDIPINE 10 MG: 10 CAPSULE ORAL at 22:15

## 2022-09-29 RX ADMIN — Medication 5 MG: at 22:20

## 2022-09-29 RX ADMIN — SODIUM CHLORIDE, PRESERVATIVE FREE 10 ML: 5 INJECTION INTRAVENOUS at 09:24

## 2022-09-29 RX ADMIN — ONDANSETRON 4 MG: 2 INJECTION INTRAMUSCULAR; INTRAVENOUS at 09:33

## 2022-09-29 RX ADMIN — GABAPENTIN 300 MG: 300 CAPSULE ORAL at 09:23

## 2022-09-29 RX ADMIN — GABAPENTIN 300 MG: 300 CAPSULE ORAL at 13:34

## 2022-09-29 RX ADMIN — CILOSTAZOL 50 MG: 100 TABLET ORAL at 09:23

## 2022-09-29 RX ADMIN — OXYCODONE 5 MG: 5 TABLET ORAL at 16:25

## 2022-09-29 RX ADMIN — MECLIZINE HCL 12.5 MG 12.5 MG: 12.5 TABLET ORAL at 11:02

## 2022-09-29 RX ADMIN — DESMOPRESSIN ACETATE 40 MG: 0.2 TABLET ORAL at 09:23

## 2022-09-29 RX ADMIN — NIFEDIPINE 10 MG: 10 CAPSULE ORAL at 13:34

## 2022-09-29 RX ADMIN — HEPARIN SODIUM 3230 UNITS: 1000 INJECTION INTRAVENOUS; SUBCUTANEOUS at 04:29

## 2022-09-29 RX ADMIN — HEPARIN SODIUM 3230 UNITS: 1000 INJECTION INTRAVENOUS; SUBCUTANEOUS at 17:21

## 2022-09-29 RX ADMIN — GABAPENTIN 300 MG: 300 CAPSULE ORAL at 22:16

## 2022-09-29 RX ADMIN — METHOCARBAMOL TABLETS 750 MG: 750 TABLET, COATED ORAL at 22:15

## 2022-09-29 RX ADMIN — WARFARIN SODIUM 10 MG: 10 TABLET ORAL at 18:10

## 2022-09-29 RX ADMIN — NIFEDIPINE 10 MG: 10 CAPSULE ORAL at 06:00

## 2022-09-29 RX ADMIN — METHOCARBAMOL TABLETS 750 MG: 750 TABLET, COATED ORAL at 13:33

## 2022-09-29 ASSESSMENT — PAIN SCALES - GENERAL
PAINLEVEL_OUTOF10: 9
PAINLEVEL_OUTOF10: 10
PAINLEVEL_OUTOF10: 7
PAINLEVEL_OUTOF10: 10
PAINLEVEL_OUTOF10: 7
PAINLEVEL_OUTOF10: 9

## 2022-09-29 ASSESSMENT — PAIN DESCRIPTION - DESCRIPTORS: DESCRIPTORS: CRAMPING

## 2022-09-29 ASSESSMENT — PAIN DESCRIPTION - LOCATION
LOCATION: ABDOMEN

## 2022-09-29 ASSESSMENT — PAIN DESCRIPTION - PAIN TYPE: TYPE: ACUTE PAIN

## 2022-09-29 NOTE — PROGRESS NOTES
Division of Vascular Surgery         Progress Note      Name: Robert Candelaria  MRN: [id number]         Overnight Events:      No acute events    Hospital Summary      POD#5 s/p extensive revascularization of RLE. Pt doing well. Plan to discharge once warfarin therapeutic INR of 2.2-3.5 is achieved. Subjective:     Pt seen and examined at bedside. Transferred from ICU to med surg, reports better sleep and overall doing well. Vascular exam is stable, with good motor function, sensation and pulses appreciable on RLE. Denies any new symptoms. Voiding. Tolerating regular diet. VSS and afebrile. APTT 50.4 from 58.2. INR 1.0. Physical Exam:     Vitals:  /83   Pulse 89   Temp 98.5 °F (36.9 °C) (Temporal)   Resp 16   Wt 177 lb 14.6 oz (80.7 kg)   LMP 09/12/2022   SpO2 98%   BMI 29.61 kg/m²     Physical Exam:  Vital signs and Nurse's note reviewed  Gen:  A&Ox3, NAD  HEENT: PERRLA, EOMI, no scleral icterus, oral mucosa moist  Neck: Supple  Chest: Symmetric rise with inhalation, no evidence of trauma  CVS: Regular rate and rhythm  Resp: Unlabored on RA  Abd: soft, non-tender, non-distended, no guarding or rebound. Ext: RLE warm to the touch, without TTP. Pulses palpable on b/l LEs. Motor function and sensation intact on b/l Les. Compartments are soft without pain. CNS: Moves all extremities, no gross focal motor deficits    Imaging/Labs:     LABS:  9/29: INR 1.0. APTT 50.4  9/28: INR 0.9. APTT 58.2    CTA ABDOMINAL AORTA W BILAT RUNOFF W CONTRAST     Result Date: 9/24/2022  Since the prior exam there has been placement of a right common/external iliac artery stent with occlusion of the right common and external iliac arteries. Progression of occlusive disease of the right lower extremity with long segment occlusion of the distal SFA/popliteal artery and very poor segmental filling of runoff vessels. Similar occlusion of the left popliteal artery with poor segmental filling of below knee runoff vessels. Other stable findings including 4 cm hyperdense right renal lesion, likely hemorrhagic cyst which should be confirmed on follow-up ultrasound and/or MRI. 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 Findings were discussed with Dr. Austin At 9:11 am on 9/24/2022. RECOMMENDATIONS: 2.6 cm right ovarian simple-appearing cyst. No follow-up imaging is recommended. Reference: JACR 2020 Feb;17(2):248-254      Assessment/Plan:     - Plan to d/c on warfarin and brilinta once therapeutic range achieved, 2.3-3.5  - Plan for pt to be on lifelong AC in consideration of avoiding amputation. Will be discharge on Brilinta and Warfarin  - Continue to encourage pt to ambulate   - Diet regular  - Heme/onc following  - Will continue to monitor    Electronically signed by DAIANA Lovelace on 9/29/22 at 7:46 AM EDT      General Surgery Resident Statement/Note:  I have discussed the case, including pertinent history and exam findings with the above medical student have personally seen the patient. Pt seen and examined at bedside. I performed both history and physical exam.     Note was edited and changes made by me. Assessment and plan reviewed and changes made by me. I agree with the assessment and plan as stated above.     Electronically signed by Darren Guillermo DO on 9/29/2022 at 1:11 PM

## 2022-09-29 NOTE — PROGRESS NOTES
Pharmacy Note  Warfarin Consult follow-up      Recent Labs     09/29/22  0206   INR 1.0     Recent Labs     09/29/22  1024   HGB 11.3*   HCT 32.6*          Significant Drug-Drug Interactions:  New warfarin drug-drug interactions: none  Discontinued drug-drug interactions: none      Notes:                   INR not changing. Warfarin 10 mg today. Daily PT/INR while inpatient.     Matt Reid PharmD, BCPS  9/29/2022  10:47 AM

## 2022-09-29 NOTE — PLAN OF CARE
Problem: Discharge Planning  Goal: Discharge to home or other facility with appropriate resources  9/29/2022 0454 by Reg Chao RN  Outcome: Progressing  9/28/2022 1837 by Ирина Proctor RN  Outcome: Progressing     Problem: Pain  Goal: Verbalizes/displays adequate comfort level or baseline comfort level  9/29/2022 0454 by Reg Chao RN  Outcome: Progressing  9/28/2022 1837 by Ирина Proctor RN  Outcome: Progressing  Flowsheets (Taken 9/28/2022 1023)  Verbalizes/displays adequate comfort level or baseline comfort level: Assess pain using appropriate pain scale     Problem: Skin/Tissue Integrity  Goal: Absence of new skin breakdown  Description: 1. Monitor for areas of redness and/or skin breakdown  2. Assess vascular access sites hourly  3. Every 4-6 hours minimum:  Change oxygen saturation probe site  4. Every 4-6 hours:  If on nasal continuous positive airway pressure, respiratory therapy assess nares and determine need for appliance change or resting period.   9/29/2022 0454 by Reg Chao RN  Outcome: Progressing  9/28/2022 1837 by Ирина Proctor RN  Outcome: Progressing     Problem: Safety - Adult  Goal: Free from fall injury  9/29/2022 0454 by Reg Chao RN  Outcome: Progressing  9/28/2022 1837 by Ирина Proctor RN  Outcome: Progressing  Flowsheets  Taken 9/28/2022 0800 by Ирина Proctor RN  Free From Fall Injury: Based on caregiver fall risk screen, instruct family/caregiver to ask for assistance with transferring infant if caregiver noted to have fall risk factors  Taken 9/28/2022 0439 by Yu Mcguire RN  Free From Fall Injury: Instruct family/caregiver on patient safety     Problem: ABCDS Injury Assessment  Goal: Absence of physical injury  9/29/2022 0454 by Reg Chao RN  Outcome: Progressing  9/28/2022 1837 by Ирина Proctor RN  Outcome: Progressing  Flowsheets  Taken 9/28/2022 0800 by Ирина Proctor RN  Absence of Physical Injury: Implement safety measures based on patient assessment  Taken 9/28/2022 0439 by Rick Petty RN  Absence of Physical Injury: Implement safety measures based on patient assessment

## 2022-09-29 NOTE — PROGRESS NOTES
OB/GYN Resident Interval Note    Transvaginal ultrasound unremarkable. Patient to follow-up outpatient for possible Mirena IUD placement. Patient voices understanding and is agreeable. OB/GYN will be signing off. Please contact OB/GYN on call with any GYN concerns or questions.     Jay Tariq, DO  OB/GYN Resident

## 2022-09-29 NOTE — CONSULTS
[facility]    Patient Name: Cyndi Fish     Patient : 1975  Room/Bed: 7009/1770-33  Admission Date/Time: 2022  5:22 AM  Primary Care Physician: Latasha Boyce MD    Consulting Provider: Dr. Marion Inman  Reason for Consult: Vaginal bleeding/severe cramping pain    CC:   Chief Complaint   Patient presents with    Leg Pain    Foot Pain                HPI: Cyndi Fish is a 52 y.o. female [id number] presented to the ED with right leg numbness and pallor. She was found to have acute limb ischemia and had revascularization surgery on . Patient has significant history of PVD requiring multiple revascularization surgeries and amputation of some of her right toes due to a blood clot. Patient states that her right leg is back to baseline and is amble to ambulate. The patient is on heparin and was recently started on warfarin. OB/GYN was consulted as the patient began having vaginal bleeding this morning. The patient states that she had a normal menstrual period on 9/15, stated that she passed clots but states this is normal for her. Today, she states that this feels like a normal period for her, denies abnormally heavy bleeding or discharge. The patient is not currently requiring any pads for her vaginal bleeding. The patient does endorse associated abdominal pain and cramping, rates these symptoms as 10/10, denies any radiation. Endorses headache, nausea and mild cough. Patient denies any fever, chills, congestion, vomiting, change in vision or chest pain. Patient's last menstrual period was 2022. REVIEW OF SYSTEMS:   A minimum of an eleven point review of systems was completed.     Constitutional: negative fever, negative chills  HEENT: negative visual disturbances, positive headaches  Respiratory: negative dyspnea, positive cough  Cardiovascular: negative chest pain,  negative palpitations  Gastrointestinal: positive abdominal pain, negative RUQ pain, positive N, negative diarrhea, negative constipation  Genitourinary: negative dysuria, negative vaginal discharge, positive vaginal bleeding  Dermatological: negative rash, negative wounds  Hematologic: positive bleeding/clotting disorder  Immunologic: negative recent illness, negative recent sick contact, negative allergic reactions  Lymphatic: negative lymph nodes  Musculoskeletal: negative back pain, negative myalgias, negative arthralgias  Neurological:  negative dizziness, negative weakness  Behavior/Psych: negative depression, negative anxiety  _______________________________________________________________________  OBSTETRICAL HISTORY:   OB History    Para Term  AB Living   4 3 3 0 1 3   SAB IAB Ectopic Molar Multiple Live Births   0 0 1 0 0 0      # Outcome Date GA Lbr Ryan/2nd Weight Sex Delivery Anes PTL Lv   4 Ectopic 1997           3 Term  38w0d   M       2 Term  37w0d  6 lb 5 oz (2.863 kg) M       1 Term  37w0d  5 lb 14 oz (2.665 kg) F CS-LTranv         Birth Comments: chorioamnionitis       PAST MEDICAL HISTORY:   has a past medical history of Abnormal Pap smear of cervix, Anemia, Anxiety, Chronic back pain, Claudication ([address] Claudication in peripheral vascular disease ([address] Depression, Headache(784.0), Heart murmur, Hemorrhage of rectum and anus, History of blood transfusion, HTN (hypertension), Hx of blood clots, Hypercoagulable state ([address] Hyperlipidemia, Hypertension, Intertriginous candidiasis, Left popliteal artery occlusion ([address] Leg pain, Menometrorrhagia, Migraine headache with aura, Obesity, Osteoarthritis, PAD (peripheral artery disease) ([address] Patient in clinical research study, PVD (peripheral vascular disease) ([facility] [address] [facility] Takotsubo cardiomyopathy, Under care of service provider, Under care of service provider, Under care of service provider, and Wound of right leg. PAST SURGICAL HISTORY:   has a past surgical history that includes Tubal ligation ();   section (1993); other surgical history (12/19/2017); other surgical history (Left, 01/16/2018); other surgical history (Left, 01/16/2018); vascular surgery (Left, 01/16/2018); pr reoperation, bypass graft (Left, 1/16/2018); pr vein bypass graft,fem-pop (Left, 1/16/2018); thrombectomy (Right, 08/03/2019); IVC filter insertion; Toe amputation (Right, 08/22/2019); Toe amputation (Right, 8/22/2019); Foot Amputation (Right, 3/8/2021); vascular surgery (Right, 9/8/2022); and vascular surgery (Bilateral, 9/24/2022).     ALLERGIES:  Allergies as of 09/24/2022 - Fully Reviewed 09/24/2022   Allergen Reaction Noted    Asa [aspirin] Swelling 05/13/2013    Lopid [gemfibrozil] Itching and Swelling 11/13/2013    Nortriptyline Itching and Swelling 11/13/2013    Pcn [penicillins] Hives and Swelling 05/13/2013    Sulfa antibiotics Swelling 05/13/2013    Morphine Hives 09/12/2022    Ibuprofen Rash 11/14/2013       MEDICATIONS:  Current Facility-Administered Medications   Medication Dose Route Frequency Provider Last Rate Last Admin    meclizine (ANTIVERT) tablet 12.5 mg  12.5 mg Oral TID PRN Deisy Lal MD   12.5 mg at 09/29/22 1102    warfarin (COUMADIN) tablet 10 mg  10 mg Oral Once Erlene Lente, DO        oxyCODONE (ROXICODONE) immediate release tablet 5 mg  5 mg Oral Q6H PRN Deisy Lal MD        melatonin tablet 5 mg  5 mg Oral Nightly PRN Erlene Lente, DO   5 mg at 09/28/22 2244    heparin (porcine) injection 3,230 Units  40 Units/kg IntraVENous PRN Erlene Lente, DO   3,230 Units at 09/29/22 0429    heparin (porcine) injection 6,460 Units  80 Units/kg IntraVENous PRN Jenny Sanchez DO        heparin 25,000 units in dextrose 5 % 250 mL infusion (rate based)  5-30 Units/kg/hr IntraVENous Continuous Erlene Lente, DO 19.4 mL/hr at 09/29/22 1030 24 Units/kg/hr at 09/29/22 1030    warfarin placeholder: dosing by pharmacy   Other RX Placeholder Dinh Kelley DO        atorvastatin (LIPITOR) tablet 40 mg  40 mg Oral Daily Emanuel Back, DO   40 mg at 09/29/22 3012    sodium chloride flush 0.9 % injection 5-40 mL  5-40 mL IntraVENous 2 times per day Emanuel Back, DO   10 mL at 09/29/22 1051    sodium chloride flush 0.9 % injection 5-40 mL  5-40 mL IntraVENous PRN Kelso Back, DO        0.9 % sodium chloride infusion   IntraVENous PRN Emanuel Back, DO        ondansetron (ZOFRAN-ODT) disintegrating tablet 4 mg  4 mg Oral Q8H PRN Kelso Back, DO        Or    [facility]) injection 4 mg  4 mg IntraVENous Q6H PRN Kelso Back, DO   4 mg at 09/29/22 0751    acetaminophen (TYLENOL) tablet 1,000 mg  1,000 mg Oral 3 times per day Emanuel Back, DO   1,000 mg at 09/29/22 1333    gabapentin (NEURONTIN) capsule 300 mg  300 mg Oral TID Emanuel Back, DO   300 mg at 09/29/22 1334    methocarbamol (ROBAXIN) tablet 750 mg  750 mg Oral 4x Daily Kelso Back, DO   750 mg at 09/29/22 1333    NIFEdipine (PROCARDIA) capsule 10 mg  10 mg Oral 3 times per day Emanuel Back, DO   10 mg at 09/29/22 1334    cilostazol (PLETAL) tablet 50 mg  50 mg Oral BID Kelso Back, DO   50 mg at 09/29/22 7225    ticagrelor (BRILINTA) tablet 90 mg  90 mg Oral BID Emanuel Back, DO   90 mg at 09/29/22 0885       FAMILY HISTORY:  Family History of Breast, Ovarian, Colon or Uterine Cancer: No   family history includes Diabetes in her mother, sister, and sister; Heart Attack in her father and mother; Heart Disease in her father and mother; High Blood Pressure in her father, mother, sister, and sister; Kidney Disease in her mother. SOCIAL HISTORY:   reports that she has never smoked. She has never used smokeless tobacco. She reports that she does not currently use alcohol. She reports that she does not use drugs.   ________________________________________________________________________                                    [place]:  Vitals:    09/28/22 2308 09/29/22 0714 09/29/22 1048 09/29/22 1334   BP: 134/89 134/83  (!) 143/85   Pulse: (!) 101 89     Resp: 18 16 18    Temp: 98 °F (36.7 °C) 98.5 °F (36.9 °C)     TempSrc: Oral Temporal     SpO2: 96% 98%     Weight:                                                        INPUT/OUTPUT:  No intake/output data recorded. In: 129.7 [I.V.:129.7]  Out: -                                                                                                                                PHYSICAL EXAM:     General Appearance: Appears healthy. Alert; in no acute distress. Pleasant. Skin: Skin color, texture, turgor normal. No rashes or lesions. Lymphatic: No abnormally enlarged lymph nodes. Neck and EENT: normal atraumatic, no neck masses, no jvd  Respiratory: Normal expansion. Clear to auscultation. No rales, rhonchi, or wheezing. Cardiovascular: regular rate and rhythm, no murmurs rubs or gallops  Breast: (Chest) breasts appear normal, symmetrical, no suspicious masses, no skin or nipple changes. No axillary nodes, no nipple discharge.    Abdomen: soft, tender to palpation in left lower quadrant, nondistended, no abnormal masses, no epigastric pain, rebound, guarding, or rigidity  Pelvic Exam: deferred as patient recently had vascular surgery and patient is not experiencing heavy vaginal bleeding   Musculoskeletal: no gross abnormalities  Extremities: non-tender BLE and non-edematous, patient has scars on her right leg from multiple surgeries, distal pulses appreciated   Psych:  oriented to time, place and person       LAB RESULTS:  Results for orders placed or performed during the hospital encounter of 09/24/22   CBC with Auto Differential   Result Value Ref Range    WBC 11.3 3.5 - 11.3 k/uL    RBC 3.84 (L) 3.95 - 5.11 m/uL    Hemoglobin 11.4 (L) 11.9 - 15.1 g/dL    Hematocrit 34.4 (L) 36.3 - 47.1 %    MCV 89.6 82.6 - 102.9 fL    MCH 29.7 25.2 - 33.5 pg    MCHC 33.1 28.4 - 34.8 g/dL    RDW 13.4 11.8 - 14.4 %    Platelets 363 975 - 341 k/uL    MPV 10.2 8.1 - 13.5 fL    NRBC Automated 0.0 0.0 per 100 WBC    Seg Neutrophils 63 36 - 65 %    Lymphocytes 26 24 - 43 %    Monocytes 7 3 - 12 %    Eosinophils % 2 1 - 4 %    Basophils 1 0 - 2 %    Immature Granulocytes 1 (H) 0 %    Segs Absolute 7.14 1.50 - 8.10 k/uL    Absolute Lymph # 2.93 1.10 - 3.70 k/uL    Absolute Mono # 0.81 0.10 - 1.20 k/uL    Absolute Eos # 0.25 0.00 - 0.44 k/uL    Basophils Absolute 0.07 0.00 - 0.20 k/uL    Absolute Immature Granulocyte 0.07 0.00 - 0.30 k/uL   HCG, SERUM, QUALITATIVE   Result Value Ref Range    hCG Qual NEGATIVE NEGATIVE   Lactic Acid   Result Value Ref Range    Lactic Acid, Whole Blood 2.2 (H) 0.7 - 2.1 mmol/L   Basic Metabolic Panel   Result Value Ref Range    Glucose 124 (H) 70 - 99 mg/dL    BUN 9 6 - 20 mg/dL    Creatinine 0.57 0.50 - 0.90 mg/dL    Calcium 8.9 8.6 - 10.4 mg/dL    Sodium 139 135 - 144 mmol/L    Potassium 3.9 3.7 - 5.3 mmol/L    Chloride 104 98 - 107 mmol/L    CO2 22 20 - 31 mmol/L    Anion Gap 13 9 - 17 mmol/L    GFR Non-African American >60 >60 mL/min    GFR African American >60 >60 mL/min    GFR Comment         Protime-INR   Result Value Ref Range    Protime 9.5 9.1 - 12.3 sec    INR 0.9    Hemoglobinopathy Evaluation   Result Value Ref Range    Hgb Electrophoresis Interp NORMAL HB ELECTROPHORESIS PATTERN. Pathologist ELECTRONICALLY SIGNED.  Sandrita Tracey MD    Lactic Acid   Result Value Ref Range    Lactic Acid, Whole Blood 1.9 0.7 - 2.1 mmol/L   CBC with Auto Differential   Result Value Ref Range    WBC 10.8 3.5 - 11.3 k/uL    RBC 3.48 (L) 3.95 - 5.11 m/uL    Hemoglobin 10.2 (L) 11.9 - 15.1 g/dL    Hematocrit 30.6 (L) 36.3 - 47.1 %    MCV 87.9 82.6 - 102.9 fL    MCH 29.3 25.2 - 33.5 pg    MCHC 33.3 28.4 - 34.8 g/dL    RDW 13.4 11.8 - 14.4 %    Platelets 642 623 - 305 k/uL    MPV 10.0 8.1 - 13.5 fL    NRBC Automated 0.0 0.0 per 100 WBC    Seg Neutrophils 76 (H) 36 - 65 %    Lymphocytes 16 (L) 24 - 43 %    Monocytes 6 3 - 12 %    Eosinophils % 1 1 - 4 %    Basophils 0 0 - 2 % Immature Granulocytes 1 (H) 0 %    Segs Absolute 8.24 (H) 1.50 - 8.10 k/uL    Absolute Lymph # 1.72 1.10 - 3.70 k/uL    Absolute Mono # 0.60 0.10 - 1.20 k/uL    Absolute Eos # 0.10 0.00 - 0.44 k/uL    Basophils Absolute 0.04 0.00 - 0.20 k/uL    Absolute Immature Granulocyte 0.06 0.00 - 0.30 k/uL   Basic Metabolic Panel w/ Reflex to MG   Result Value Ref Range    Glucose 130 (H) 70 - 99 mg/dL    BUN 5 (L) 6 - 20 mg/dL    Creatinine 0.46 (L) 0.50 - 0.90 mg/dL    Calcium 7.7 (L) 8.6 - 10.4 mg/dL    Sodium 141 135 - 144 mmol/L    Potassium 3.7 3.7 - 5.3 mmol/L    Chloride 108 (H) 98 - 107 mmol/L    CO2 21 20 - 31 mmol/L    Anion Gap 12 9 - 17 mmol/L    GFR Non-African American >60 >60 mL/min    GFR African American >60 >60 mL/min    GFR Comment         Phosphorus   Result Value Ref Range    Phosphorus 3.1 2.6 - 4.5 mg/dL   Magnesium   Result Value Ref Range    Magnesium 1.5 (L) 1.6 - 2.6 mg/dL   APTT   Result Value Ref Range    PTT 76.0 (H) 20.5 - 30.5 sec   APTT   Result Value Ref Range    PTT 65.7 (H) 20.5 - 30.5 sec   Basic Metabolic Panel w/ Reflex to MG   Result Value Ref Range    Glucose 117 (H) 70 - 99 mg/dL    BUN 5 (L) 6 - 20 mg/dL    Creatinine 0.60 0.50 - 0.90 mg/dL    Calcium 8.1 (L) 8.6 - 10.4 mg/dL    Sodium 137 135 - 144 mmol/L    Potassium 3.6 (L) 3.7 - 5.3 mmol/L    Chloride 102 98 - 107 mmol/L    CO2 24 20 - 31 mmol/L    Anion Gap 11 9 - 17 mmol/L    GFR Non-African American >60 >60 mL/min    GFR African American >60 >60 mL/min    GFR Comment         Calcium, Ionized   Result Value Ref Range    Calcium, Ionized 1.11 (L) 1.13 - 1.33 mmol/L   CBC with Auto Differential   Result Value Ref Range    WBC 12.0 (H) 3.5 - 11.3 k/uL    RBC 3.23 (L) 3.95 - 5.11 m/uL    Hemoglobin 9.8 (L) 11.9 - 15.1 g/dL    Hematocrit 29.5 (L) 36.3 - 47.1 %    MCV 91.3 82.6 - 102.9 fL    MCH 30.3 25.2 - 33.5 pg    MCHC 33.2 28.4 - 34.8 g/dL    RDW 13.4 11.8 - 14.4 %    Platelets 623 962 - 944 k/uL    MPV 9.8 8.1 - 13.5 fL NRBC Automated 0.0 0.0 per 100 WBC    Seg Neutrophils 74 (H) 36 - 65 %    Lymphocytes 18 (L) 24 - 43 %    Monocytes 5 3 - 12 %    Eosinophils % 1 1 - 4 %    Basophils 1 0 - 2 %    Immature Granulocytes 1 (H) 0 %    Segs Absolute 8.93 (H) 1.50 - 8.10 k/uL    Absolute Lymph # 2.16 1.10 - 3.70 k/uL    Absolute Mono # 0.65 0.10 - 1.20 k/uL    Absolute Eos # 0.16 0.00 - 0.44 k/uL    Basophils Absolute 0.07 0.00 - 0.20 k/uL    Absolute Immature Granulocyte 0.07 0.00 - 0.30 k/uL   Lactic Acid   Result Value Ref Range    Lactic Acid, Whole Blood 2.3 (H) 0.7 - 2.1 mmol/L   Magnesium   Result Value Ref Range    Magnesium 2.1 1.6 - 2.6 mg/dL   Phosphorus   Result Value Ref Range    Phosphorus 2.6 2.6 - 4.5 mg/dL   APTT   Result Value Ref Range    PTT 52.3 (H) 20.5 - 30.5 sec   APTT   Result Value Ref Range    PTT 46.0 (H) 20.5 - 30.5 sec   Lactic Acid   Result Value Ref Range    Lactic Acid, Whole Blood 2.2 (H) 0.7 - 2.1 mmol/L   APTT   Result Value Ref Range    PTT 45.6 (H) 20.5 - 30.5 sec   APTT   Result Value Ref Range    PTT 48.6 (H) 20.5 - 30.5 sec   Basic Metabolic Panel w/ Reflex to MG   Result Value Ref Range    Glucose 126 (H) 70 - 99 mg/dL    BUN 8 6 - 20 mg/dL    Creatinine 0.65 0.50 - 0.90 mg/dL    Calcium 8.5 (L) 8.6 - 10.4 mg/dL    Sodium 135 135 - 144 mmol/L    Potassium 3.6 (L) 3.7 - 5.3 mmol/L    Chloride 103 98 - 107 mmol/L    CO2 21 20 - 31 mmol/L    Anion Gap 11 9 - 17 mmol/L    GFR Non-African American >60 >60 mL/min    GFR African American >60 >60 mL/min    GFR Comment         Lactic Acid   Result Value Ref Range    Lactic Acid, Whole Blood 1.2 0.7 - 2.1 mmol/L   Homocysteine   Result Value Ref Range    Homocysteine 10.2 <15.0 umol/L   APTT   Result Value Ref Range    PTT 50.9 (H) 20.5 - 30.5 sec   Troponin   Result Value Ref Range    Troponin, High Sensitivity <6 0 - 14 ng/L   Lactic Acid   Result Value Ref Range    Lactic Acid, Whole Blood 1.6 0.7 - 2.1 mmol/L   Protime-INR   Result Value Ref Range Protime 9.5 9.1 - 12.3 sec    INR 0.9    APTT   Result Value Ref Range    PTT 37.3 (H) 20.5 - 30.5 sec   Lactic Acid   Result Value Ref Range    Lactic Acid, Whole Blood 1.6 0.7 - 2.1 mmol/L   Protime-INR   Result Value Ref Range    Protime 9.4 9.1 - 12.3 sec    INR 0.9    APTT   Result Value Ref Range    PTT 37.0 (H) 20.5 - 30.5 sec   APTT   Result Value Ref Range    PTT 37.5 (H) 20.5 - 30.5 sec   Protime-INR   Result Value Ref Range    Protime 9.6 9.1 - 12.3 sec    INR 0.9    APTT   Result Value Ref Range    PTT 42.6 (H) 20.5 - 30.5 sec   APTT   Result Value Ref Range    PTT 58.2 (H) 20.5 - 30.5 sec   APTT   Result Value Ref Range    PTT 50.4 (H) 20.5 - 30.5 sec   Protime-INR   Result Value Ref Range    Protime 9.8 9.1 - 12.3 sec    INR 1.0    APTT   Result Value Ref Range    PTT 69.4 (H) 20.5 - 30.5 sec   CBC with Auto Differential   Result Value Ref Range    WBC 9.9 3.5 - 11.3 k/uL    RBC 3.81 (L) 3.95 - 5.11 m/uL    Hemoglobin 11.3 (L) 11.9 - 15.1 g/dL    Hematocrit 32.6 (L) 36.3 - 47.1 %    MCV 85.6 82.6 - 102.9 fL    MCH 29.7 25.2 - 33.5 pg    MCHC 34.7 28.4 - 34.8 g/dL    RDW 13.9 11.8 - 14.4 %    Platelets 806 649 - 437 k/uL    MPV 11.3 8.1 - 13.5 fL    NRBC Automated 0.0 0.0 per 100 WBC    Seg Neutrophils 65 36 - 65 %    Lymphocytes 24 24 - 43 %    Monocytes 7 3 - 12 %    Eosinophils % 3 1 - 4 %    Basophils 1 0 - 2 %    Immature Granulocytes 1 (H) 0 %    Segs Absolute 6.48 1.50 - 8.10 k/uL    Absolute Lymph # 2.42 1.10 - 3.70 k/uL    Absolute Mono # 0.66 0.10 - 1.20 k/uL    Absolute Eos # 0.27 0.00 - 0.44 k/uL    Basophils Absolute 0.05 0.00 - 0.20 k/uL    Absolute Immature Granulocyte 0.05 0.00 - 0.30 k/uL   Basic Metabolic Panel   Result Value Ref Range    Glucose 124 (H) 70 - 99 mg/dL    BUN 9 6 - 20 mg/dL    Creatinine 0.70 0.50 - 0.90 mg/dL    Calcium 9.2 8.6 - 10.4 mg/dL    Sodium 134 (L) 135 - 144 mmol/L    Potassium 4.3 3.7 - 5.3 mmol/L    Chloride 100 98 - 107 mmol/L    CO2 16 (L) 20 - 31 mmol/L Anion Gap 18 (H) 9 - 17 mmol/L    GFR Non-African American >60 >60 mL/min    GFR African American >60 >60 mL/min    GFR Comment         Troponin   Result Value Ref Range    Troponin, High Sensitivity 6 0 - 14 ng/L   EKG 12 Lead   Result Value Ref Range    Ventricular Rate 102 BPM    Atrial Rate 102 BPM    P-R Interval 148 ms    QRS Duration 88 ms    Q-T Interval 364 ms    QTc Calculation (Bazett) 474 ms    P Axis 41 degrees    R Axis -17 degrees    T Axis -2 degrees         DIAGNOSTICS:    XR FOOT RIGHT (MIN 3 VIEWS)    Result Date: 9/19/2022  EXAMINATION: THREE XRAY VIEWS OF THE RIGHT FOOT 9/15/2022 2:31 pm COMPARISON: Three-view study of the right foot from 03/04/2021 HISTORY: ORDERING SYSTEM PROVIDED HISTORY: Right foot pain TECHNOLOGIST PROVIDED HISTORY: Attention to 3rd mtpj Right foot pain History of osteoarthritis, vascular surgery, and multiple right toe amputations. FINDINGS: Interval amputation 2nd MP/DP; again noted postsurgical changes status post 1st and 3rd toe amputations (1st DP, and across proximal 3rd PP). Subtle focal rarefaction with possible cortical destruction suspected distal end remaining 2nd PP at the amputation site. Adjacent soft tissue swelling suspected. No periosteal new bone. Remainder amputation sites, including the 3rd metatarsal phalangeal joint, appear unremarkable, without destructive change or periosteal new bone. Moderate-severe degenerative changes re-identified metatarsal-phalangeal (especially 1, 3rd and 4th), and interphalangeal joints. Similar rarefaction/erosive change medial aspect 4th metatarsal head. Additional degenerative intertarsal changes and osteopenia. Interval 2nd toe amputation, now with possible subtle osteomyelitis at the distal end of the remaining PP, as above, with adjacent STS. No other findings suspicious for osteomyelitis, including at the 3rd MTPJ. Degenerative findings at the latter and elsewhere as described above.      CTA ABDOMINAL AORTA W BILAT RUNOFF W CONTRAST    Result Date: 9/25/2022  EXAMINATION: CTA OF THE AORTA WITH LOWER EXTREMITY RUNOFF 9/24/2022 7:17 am TECHNIQUE: CTA of the pelvis and bilateral lower extremities was performed after the administration of intravenous contrast.   Multiplanar reformatted images are provided for review. MIP images are provided for review. Additional reconstructions on a separate workstation including volume rendered images and curved planar reformats. Automated exposure control, iterative reconstruction, and/or weight based adjustment of the mA/kV was utilized to reduce the radiation dose to as low as reasonably achievable. COMPARISON: 09/07/2022, 11/09/2008 HISTORY: ORDERING SYSTEM PROVIDED HISTORY: pain, pulselessness, numbness TECHNOLOGIST PROVIDED HISTORY: pain, pulselessness, numbness Decision Support Exception - unselect if not a suspected or confirmed emergency medical condition->Emergency Medical Condition (MA) FINDINGS: Nonvascular Lower Chest: No acute findings. Organs: Early arterial phase images of the solid abdominal organs remain stable. Similar hyperdense right renal lesion measuring up to 4 cm, likely a hemorrhagic cyst.  Solid mass is in the differential but is less likely. No hydronephrosis of either kidney. GI/Bowel: Evaluation of bowel is limited by lack of oral contrast.  No CT evidence of appendicitis. Mild fecal stasis and diverticulosis of the colon without signs of bowel obstruction. Pelvis: No free fluid in the pelvis. 2.6 cm right ovarian follicle/cyst. Somewhat prominent right ovarian artery. 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 Similar but slightly less prominent findings on the prior study of 2008 would favor a benign etiology. Bones/Soft Tissues: No acute osseous abnormality.   Amputation of right toes as noted on recent right foot radiograph. Partial resection of distal left fibula. VASCULAR Abdominal aorta, mesenteric, and renal arteries are essentially patent. Irregularity of the right common iliac artery origin with placement of a right common/external iliac artery stent which is now occluded. The stent covers the origin of the internal iliac artery. The right external iliac artery is occluded with reconstitution of the distal external iliac artery/common femoral artery. Possible closure devices in each groin adjacent to the vessels. Right internal iliac artery is occluded at its origin with branches filled via collaterals. The left internal iliac artery is occluded. Left common and external iliac arteries show no significant stenosis. Left SFA and profunda femoral artery are essentially patent. Postoperative changes in the left medial leg/thigh with multiple clips. Left below-knee popliteal artery/proximal trifurcation vessels effectively  occluded with segmental reconstitution of runoff vessels, primarily the peroneal artery, similar in appearance to the prior study. Right SFA and profunda femoral artery are opacified with mild stenoses in the mid SFA. The right distal SFA/popliteal is occluded. There is segmental filling of the anterior tibial and peroneal arteries although opacification is worse than on the prior exam.     Since the prior exam, there has been placement of a right common/external iliac artery stent with occlusion of the right common and external iliac arteries. Progression of occlusive disease of the right lower extremity with long segment occlusion of the distal SFA/popliteal artery and very poor segmental filling of runoff vessels. Similar occlusion of the left popliteal artery with poor segmental filling of below-knee runoff vessels. Other stable findings including a 4 cm hyperdense right renal lesion, likely hemorrhagic cyst which should be confirmed on follow-up ultrasound and/or MRI. 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 Findings were discussed with Dr. Julieta Pritchard At 9:11 am on 9/24/2022. CTA ABDOMINAL AORTA W BILAT RUNOFF W CONTRAST    Result Date: 9/7/2022  EXAMINATION: CTA OF THE AORTA WITH LOWER EXTREMITY RUNOFF 9/7/2022 12:53 am TECHNIQUE: CTA of the pelvis and bilateral lower extremities was performed after the administration of intravenous contrast.   Multiplanar reformatted images are provided for review. MIP images are provided for review. Automated exposure control, iterative reconstruction, and/or weight based adjustment of the mA/kV was utilized to reduce the radiation dose to as low as reasonably achievable. COMPARISON: None. HISTORY: ORDERING SYSTEM PROVIDED HISTORY: Hx PAD, unable to doppler distal signals TECHNOLOGIST PROVIDED HISTORY: Hx PAD, unable to doppler distal signals Decision Support Exception - unselect if not a suspected or confirmed emergency medical condition->Emergency Medical Condition (MA) Reason for Exam: Hx PAD, unable to doppler distal signals FINDINGS: Nonvascular There is normal size and more late arterial/portal venous enhancement for the liver. No focal lesions are seen. The gallbladder is contracted. There are fluid and fluid contents in the stomach at the time the present study. The biliary tree and pancreatic systems are not dilated. There is normal size enhancement for the pancreas. There is normal size enhancement for the spleen. Adrenals not enlarged. Kidneys have preserved size and cortical thickness. There is a hyperdense mass like formation hyperdense cyst measuring 4.5 x 2.8 x 3.8 cm in the mid 3rd of the right kidney crossing the hilum of the right kidney. The these correspond to a cyst measuring up to 2.8 cm on the study of November 9, 2008. Consider further evaluation with ultrasound to exclude a hemorrhagic cyst. There is no obstructive uropathy in the kidneys.   No conspicuous renal calculi identified the. Bladder is mild distended the. The uterus measures 10.9 x 4.8 x 7 cm being in upper borderline position. There is a corpus luteum in the left ovary. The right ovary has unremarkable appearance. There is a small amount of free fluid accumulation in the cul-de-sac. Presence of stranding of the fat planes in the mesenteric root associated the with multiple enlarged mesenteric lymph nodes. 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 This was not seen on the previous study of November 2008. Due the prominence of the lymph nodes could consider further evaluation with a PET-CT scan. The other omental/mesentery fat planes have preserved fat density. There is no indication for bowel obstruction. There is no free intraperitoneal air. The appendix seen and appear unremarkable. There are uncomplicated diverticulosis of the left-sided colon. No conspicuous signs for diverticulitis is observed. Lower lung bases demonstrate no significant findings. Visualized bone structures demonstrate no significant findings. Bones of the lower extremities also appear unremarkable including the knee joint ankle joints. VASCULAR ABDOMINAL AORTA/ILIAC ARTERIES: There atherosclerotic changes in mild degree. There is no aneurysm formation or dissection of the abdominal aorta. Calcified plaques and noncalcified plaques are seen in the origin of the right common iliac artery more discrete in the left common iliac artery. There is no indication aneurysm formation or dissection in the iliac arteries with preserved the contrast flow runoff into visualized proximal right and left superficial profunda femoral arteries. CELIAC AXIS: There is good contrast enhancement. There is no stenosis. SMA: There is no obstruction. There is good contrast enhancement of the main trunk and branches. RIGHT RENAL ARTERY: There is good contrast enhancement.   There is no stenosis. LEFT RENAL ARTERY: There is good contrast enhancement. There is no stenosis. DELETE LORI: There is preserved contrast enhancement. There is some stenosis in the origin of the vessel by some atherosclerotic changes in the anterior wall of the infrarenal abdominal aorta. RIGHT LOWER EXTREMITY: There is good contrast runoff into the right common femoral, profunda and superficial femoral arteries, down to the proximal mid right popliteal artery. There is interruption of flow in the mid distal right popliteal artery with some calcifications in the area of the posterior tibial trunk. Reconstitution of flow is seen in the proximal right posterior tibial artery for a short length. The is are of consideration of flow into the right peroneal artery to the mid 3rd of the right leg with minimal reconstitution more distally. The there is partial reconstitution of the proximal right tibial artery. There is no visualization of contrast enhancement in the dorsal pedal artery or in the plantar arch of the right foot. LEFT LOWER EXTREMITY: There is a non calcified plaque in the left common femoral artery. There is preserved contrast runoff into the left profunda and superficial femoral artery down to the proximal popliteal artery. Towards the distal left popliteal artery and posterior tibial trunk and the left the posterior tibial peroneal artery and anterior tibial artery there are multiple collateral branches trying to reconstitution flow but there is no direct flow continuation into the level of the ankle. There is no contrast in dorsal pedal artery in the plantar artery. The minimal reconstitution is seen in the distal peroneal artery. 1.  Severe peripheral vascular disease affecting occluded the mid distal popliteal arteries bilateral and corresponding popliteal branches to the area of the right and left legs down to the ankle/foot area with poor circulation distally.   Multiple collateral from geniculate arteries are present but there is no effective reconstitution down to the level of the ankle/foot bilaterally. 2.  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 The prominence of lymph nodes present could consider further evaluation with PET-CT scan to investigate degree of biological activity. 3.  Hyperdense lesion in the central aspect of the right kidney. Could represent a hemorrhagic cyst.  Further evaluation with renal ultrasound recommended. 4.  Corpus lutein cyst in the left ovary. Small free fluid accumulation in the cul-de-sac. These can be relate with recent rupture follicular cyst. Could consider correlation with pelvic ultrasound. VL DUP UPPER EXTREMITY VENOUS RIGHT    Result Date: 9/12/2022    [facility]  Vascular Upper Extremities Veins Procedure   Patient Name    Ulysses Mead        Date of Study             09/12/2022                  Otto Zapata   Date of Birth   1975   Gender                    Female   Age             52 year(s)   Race                      Black   Room Number     04   Corporate ID #  [id number]   Patient Acct #  [de-identified]   MR #            [id number]      Sonographer               Yeyo Boothe, T   Accession #     [id number]   Interpreting Physician    Thea Oh   Referring Nurse              Referring Physician       ER MD *  Practitioner  Procedure Type of Study:   Veins: Upper Extremities Veins, Venous Scan Upper Right. Indications for Study:Pain, edema, discoloration. Patient Status:ER. Technical Quality:Limited visualization. Limitation reason:due to pain. Conclusions   Summary   Simultaneous real time imaging utilizing B-Mode, color doppler and  spectral waveform analysis was performed on the right upper extremity for  venous examination of the deep and superficial systems. Findings are:   Right:  No evidence of deep venous thrombosis. Superficial venous thrombosis identified in the cephalic vein.    Signature ----------------------------------------------------------------  Electronically signed by Emmanuelle Shahid RVT(Sonographer) on  09/12/2022 05:02 PM  ----------------------------------------------------------------   ----------------------------------------------------------------  Electronically signed by Sampson Gannon(Interpreting  physician) on 09/12/2022 05:44 PM  ----------------------------------------------------------------  Findings:   Right Impression:  Internal jugular, subclavian, axillary, brachial, ulnar, radial and  basilic veins are compressible with normal doppler responses. The cephalic vein mid portion is distended and non-compressible. Risk Factors History +-----------------------+----------+---------------------------------------+ ! Diagnosis              ! Date      ! Comments                               ! +-----------------------+----------+---------------------------------------+ ! Peripheral vascular    !01/16/2018! left Femoral-Peroneal Bypass Graft     ! !disease                !          !                                       ! +-----------------------+----------+---------------------------------------+ ! Green [employer]     !          !                                       ! +-----------------------+----------+---------------------------------------+ ! Peripheral vascular    !08/22/2019! Right femoral thrombectomy             ! !disease                !          !                                       ! +-----------------------+----------+---------------------------------------+ ! Peripheral vascular    ! !right partial hallux and 3rd toe       ! !disease                !          !amputation                             ! +-----------------------+----------+---------------------------------------+ ! Peripheral vascular    !          !bilateral stents                       ! !disease                !          !                                       ! +-----------------------+----------+---------------------------------------+   - The patient's risk factor(s) include: obesity and arterial hypertension. Allergies   - Allergy:*Unlisted(Drug). Comments:asa, ibuprofen, lopid, nortripyline, pcn, sulfa Velocities are measured in cm/s ; Diameters are measured in cm Right UE Vein Measurements 2D Measurements +------------------------------------+----------+---------------+----------+ ! Location                            ! Visualized! Compressibility! Thrombosis! +------------------------------------+----------+---------------+----------+ ! Prox IJV                            ! Yes       ! Yes            ! None      ! +------------------------------------+----------+---------------+----------+ ! Dist IJV                            ! Yes       ! Yes            ! None      ! +------------------------------------+----------+---------------+----------+ ! Prox SCV                            ! Yes       ! Yes            ! None      ! +------------------------------------+----------+---------------+----------+ ! Dist SCV                            ! Yes       ! Yes            ! None      ! +------------------------------------+----------+---------------+----------+ ! Prox Axillary                       ! Yes       ! Yes            ! None      ! +------------------------------------+----------+---------------+----------+ ! Dist Axillary                       ! Yes       ! Yes            ! None      ! +------------------------------------+----------+---------------+----------+ ! Prox Brachial                       !Yes       ! Yes            ! None      ! +------------------------------------+----------+---------------+----------+ ! Dist Brachial                       !Yes       ! Yes            ! None      ! +------------------------------------+----------+---------------+----------+ ! Prox Radial                         !Yes       ! Yes            ! None      ! +------------------------------------+----------+---------------+----------+ ! Dist Radial                         !Yes       ! Yes            ! None      ! +------------------------------------+----------+---------------+----------+ ! Prox Ulnar                          ! Yes       ! Yes            ! None      ! +------------------------------------+----------+---------------+----------+ ! Dist Ulnar                          ! Yes       ! Yes            ! None      ! +------------------------------------+----------+---------------+----------+ ! Basilic at UA                       ! Yes       ! Yes            ! None      ! +------------------------------------+----------+---------------+----------+ ! Basilic at AF                       ! Yes       ! Yes            ! None      ! +------------------------------------+----------+---------------+----------+ ! Basilic at [address]                       ! Yes       ! Yes            ! None      ! +------------------------------------+----------+---------------+----------+ ! Cephalic at UA                      ! Yes       ! Yes            ! None      ! +------------------------------------+----------+---------------+----------+ ! Cephalic at AF                      ! Yes       ! No             !AI        ! +------------------------------------+----------+---------------+----------+ ! Cephalic at [address]                      ! Yes       ! Yes            ! None      ! +------------------------------------+----------+---------------+----------+ Doppler Measurements +-------------------------+-----------------------+------------------------+ ! Location                 ! Signal                 !Reflux                  ! +-------------------------+-----------------------+------------------------+ ! IJV                      ! Phasic                 !                        ! +-------------------------+-----------------------+------------------------+ ! SCV                      ! Phasic                 !                        ! +-------------------------+-----------------------+------------------------+ ! Axillary                 ! Phasic                 !                        ! +-------------------------+-----------------------+------------------------+ ! Brachial                 !Phasic                 !                        ! +-------------------------+-----------------------+------------------------+      ASSESSMENT & PLAN:    Lulu Teran is a 52 y.o. female [id number] presented with right leg numbness, found to have acute limb ischemia s/p revascularization. OB/GYN consulted for vaginal bleeding. Vaginal bleeding   -Patient states she began having vaginal bleeding this morning, states the bleeding is not abnormally heavy, not requiring any pads at this time.  Does state she had a normal menstrual period on 9/15  -Patient on anticoagulation medications for her acute limb ischemia, on heparin and warfarin  -VSS, hgb stable at 11.3 today, increased from 9.8 on 9/25  -Will order transvaginal U/S  -Discussed outpatient management with the patient regarding placement of an IUD, she is agreeable     Acute limb ischemia    -Patient is anticoagulated with heparin and warfarin    - Patient underwent revascularization surgery on 9/24   -Further management per primary team         Patient Active Problem List    Diagnosis Date Noted    Critical limb ischemia with history of revascularization of same extremity ([facility] 09/24/2022     Priority: Medium    Occlusion of right iliac artery ([facility] 09/24/2022     Priority: Medium    Acute occlusion of artery of lower extremity due to thrombosis ([facility] 09/24/2022     Priority: Medium    PAD (peripheral artery disease) ([facility] 09/07/2022     Priority: Medium    Abnormal CT of the abdomen 09/07/2022     Priority: Medium    Popliteal artery embolism, right ([facility] 09/07/2022     Priority: Medium    Leg pain, right 09/07/2022     Priority: Medium    Lymphadenopathy, mesenteric 09/07/2022     Priority: Medium Dysplasia of cervix, low grade ([facility]) 2020     Follow up 1 year      Normal cervical cytology with positive HPV16 2020     Colposcopy with biopsies and ECC 2020: **      Pre-syncope 2019    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 2019    Pain of left lower extremity due to ischemia     Left leg pain     Chest pain 2019    Wound of right leg 2018    Anemia 10/19/2018    Hemorrhage of rectum and anus     Hypercoagulable state ([facility]     Takotsubo cardiomyopathy 2018    Left popliteal artery occlusion ([facility] 2018    Claudication in peripheral vascular disease ([facility]     Foot pain 02/10/2017    Depression 2014    Menometrorrhagia 2013    Intertriginous candidiasis 2013    Migraine headache with aura 2013    Primary hypertension 2013    Obesity 2013       Plan discussed with Dr. Henrry Waters, who is agreeable. Attending's Name: Dr. Cara Avelar MD  Ob/Gyn Resident    [place] [address]  2022, 3:36 PM    Date: 10/6/2022  Time: 9:03 AM      Patient Name: Daylin Cardenas  Patient : 1975  Room/Bed: 1765/8562-76  Admission Date/Time: 2022  5:22 AM        Attending Physician Statement  I have discussed the care of Daylin Cardenas, including pertinent history and exam findings with the resident. I have reviewed and edited their note in the electronic medical record. The key elements of all parts of the encounter have been performed/reviewed by me . I agree with the assessment, plan and orders as documented by the resident. The level of care submitted represents to the best of my ability the care documented in the medical record today. GC Modifier. This service has been performed in part by a resident under the direction of a teaching physician.         Attending's Name:  Renea Sandifer, DO

## 2022-09-29 NOTE — PROGRESS NOTES
Patient was complaining of cramping and sharp lower abdominal pain. States she has never had this pain before. Was also having dizziness and feeling lightheaded. Also has vaginal bleeding despite her last period being on 9/15. /106, , RR 20. Notified Dr. Cj Hankins via perfect serve. 56- Dr Ricardo Miller to bedside, stat labs ordered, consult placed to OBGYN. Pain meds given. Will continue to monitor.

## 2022-09-29 NOTE — PROGRESS NOTES
Physical Therapy  Facility/Department: [address] ORTHO/MED SURG  Physical Therapy daily treatment note    Name: Cinthya Gilbert  : 1975  MRN: [id number]  Date of Service: 2022    Discharge Recommendations:  No further therapy required at discharge. PT Equipment Recommendations  Equipment Needed: No      Patient Diagnosis(es): The encounter diagnosis was Arterial thrombosis ([facility] Past Medical History:  has a past medical history of Abnormal Pap smear of cervix, Anemia, Anxiety, Chronic back pain, Claudication (HCC), Claudication in peripheral vascular disease ([address] Depression, Headache(784.0), Heart murmur, Hemorrhage of rectum and anus, History of blood transfusion, HTN (hypertension), Hx of blood clots, Hypercoagulable state ([address] Hyperlipidemia, Hypertension, Intertriginous candidiasis, Left popliteal artery occlusion (HCC), Leg pain, Menometrorrhagia, Migraine headache with aura, Obesity, Osteoarthritis, PAD (peripheral artery disease) ([facility] Patient in clinical research study, PVD (peripheral vascular disease) ([facility] Takotsubo cardiomyopathy, Under care of service provider, Under care of service provider, Under care of service provider, and Wound of right leg. Past Surgical History:  has a past surgical history that includes Tubal ligation ();  section (); other surgical history (2017); other surgical history (Left, 2018); other surgical history (Left, 2018); vascular surgery (Left, 2018); pr reoperation, bypass graft (Left, 2018); pr vein bypass graft,fem-pop (Left, 2018); thrombectomy (Right, 2019); IVC filter insertion; Toe amputation (Right, 2019); Toe amputation (Right, 2019); Foot Amputation (Right, 3/8/2021); vascular surgery (Right, 2022); and vascular surgery (Bilateral, 2022).     Assessment   Body Structures, Functions, Activity Limitations Requiring Skilled Therapeutic Intervention: Decreased functional mobility ; Decreased balance;Decreased endurance;Decreased strength  Therapy Prognosis: Good  Activity Tolerance  Activity Tolerance: Patient tolerated treatment well;Patient limited by fatigue     Plan   Plan  Plan:  (5-6x/wk)  Current Treatment Recommendations: Strengthening, Balance training, Gait training, Stair training, Functional mobility training, Transfer training, Endurance training, Home exercise program, Safety education & training, Patient/Caregiver education & training, Equipment evaluation, education, & procurement, Therapeutic activities  Safety Devices  Type of Devices: Call light within reach, Nurse notified, Gait belt, Patient at risk for falls, All fall risk precautions in place, Left in bed  Restraints  Restraints Initially in Place: No     Restrictions  Restrictions/Precautions  Restrictions/Precautions: General Precautions, Fall Risk  Required Braces or Orthoses?: No  Position Activity Restriction  Other position/activity restrictions: act. as tolerated. s/p thrombectomy common fem. and iliac stents 9/24     Subjective   General  Patient assessed for rehabilitation services?: Yes  Response To Previous Treatment: Patient with no complaints from previous session. Family / Caregiver Present: No  Follows Commands: Within Functional Limits  Subjective  Subjective: Pt resting in bed upon arrival, agreeable to PT, c/o fatigue, otherwise feeling well          Cognition   Orientation  Overall Orientation Status: Within Functional Limits     Objective   Bed mobility  Rolling to Left: Independent  Rolling to Right: Independent  Supine to Sit: Independent  Sit to Supine: Independent  Scooting: Independent  Transfers  Sit to Stand: Independent  Stand to sit:  Independent  Ambulation  Surface: level tile  Device: No Device  Assistance: Independent  Quality of Gait: normalized gait pattern, no LOB  Distance: 300 ft  More Ambulation?: No  Stairs/Curb  Stairs?: Yes  Stairs  # Steps : 5  Rails: None  Device: No Device  Assistance: Independent     Balance  Posture: Good  Sitting - Static: Good  Sitting - Dynamic: Good  Standing - Static: Good  Standing - Dynamic: Good  Comments: no AD used   Exercise  Issued and reviewed standing and supine HEP, demo understanding      AM-PAC Score  AM-PAC Inpatient Mobility Raw Score : 24 (09/29/22 1237)  AM-PAC Inpatient T-Scale Score : 61.14 (09/29/22 1237)  Mobility Inpatient CMS 0-100% Score: 0 (09/29/22 1237)  Mobility Inpatient CMS G-Code Modifier : [address] (09/29/22 [facility])       Goals  Short Term Goals  Time Frame for Short term goals: 14 visits  Short term goal 1: Pt will be Maya bed mobility  Short term goal 2: Pt will be Maya transfers  Short term goal 3: Pt will be Maya amb 250'  Short term goal 4: Pt will navigate 6 steps Maya     Therapy Time   Individual Concurrent Group Co-treatment   Time In 0818         Time Out 0843         Minutes 25         Timed Code Treatment Minutes: [address] Maria M Griffin, PTA

## 2022-09-29 NOTE — CARE COORDINATION
[facility] Flow/Interdisciplinary Rounds Progress Note    Quality Flow Rounds held on September 29, 2022 at [address] [facility] Attending:  Bedside Nurse and     Barriers to Discharge:     Anticipated Discharge Date:       Anticipated Discharge Disposition:    Readmission Risk              Risk of Unplanned Readmission:  14           Discussed patient goal for the day, patient clinical progression, and barriers to discharge.   The following Goal(s) of the Day/Commitment(s) have been identified:  Activity Progression  pt remains on heparin gtt,plan is to bridge to oral anticoag, pt is sp thrombectomy per report, goal is home independent      Emily Yanez RN  September 29, 2022

## 2022-09-29 NOTE — PLAN OF CARE
Problem: Discharge Planning  Goal: Discharge to home or other facility with appropriate resources  9/29/2022 1816 by Haleigh Gu RN  Outcome: Progressing  9/29/2022 0454 by Peter Patel RN  Outcome: Progressing     Problem: Pain  Goal: Verbalizes/displays adequate comfort level or baseline comfort level  9/29/2022 1816 by Haleigh Gu RN  Outcome: Progressing  9/29/2022 0454 by Peter Patel RN  Outcome: Progressing     Problem: Skin/Tissue Integrity  Goal: Absence of new skin breakdown  Description: 1. Monitor for areas of redness and/or skin breakdown  2. Assess vascular access sites hourly  3. Every 4-6 hours minimum:  Change oxygen saturation probe site  4. Every 4-6 hours:  If on nasal continuous positive airway pressure, respiratory therapy assess nares and determine need for appliance change or resting period.   9/29/2022 1816 by Haleigh Gu RN  Outcome: Progressing  9/29/2022 0454 by Peter Patel RN  Outcome: Progressing     Problem: Safety - Adult  Goal: Free from fall injury  9/29/2022 1816 by Haleigh Gu RN  Outcome: Progressing  9/29/2022 0454 by Peter Patel RN  Outcome: Progressing     Problem: ABCDS Injury Assessment  Goal: Absence of physical injury  9/29/2022 1816 by Haleigh Gu RN  Outcome: Progressing  9/29/2022 0454 by Peter Patel RN  Outcome: Progressing

## 2022-09-30 LAB
INR BLD: 1.1
PARTIAL THROMBOPLASTIN TIME: 58.5 SEC (ref 20.5–30.5)
PARTIAL THROMBOPLASTIN TIME: 69.1 SEC (ref 20.5–30.5)
PARTIAL THROMBOPLASTIN TIME: 76 SEC (ref 20.5–30.5)
PROTHROMBIN TIME: 11 SEC (ref 9.1–12.3)

## 2022-09-30 PROCEDURE — 36415 COLL VENOUS BLD VENIPUNCTURE: CPT

## 2022-09-30 PROCEDURE — 85610 PROTHROMBIN TIME: CPT

## 2022-09-30 PROCEDURE — 2580000003 HC RX 258: Performed by: STUDENT IN AN ORGANIZED HEALTH CARE EDUCATION/TRAINING PROGRAM

## 2022-09-30 PROCEDURE — 85730 THROMBOPLASTIN TIME PARTIAL: CPT

## 2022-09-30 PROCEDURE — 1200000000 HC SEMI PRIVATE

## 2022-09-30 PROCEDURE — 6370000000 HC RX 637 (ALT 250 FOR IP): Performed by: STUDENT IN AN ORGANIZED HEALTH CARE EDUCATION/TRAINING PROGRAM

## 2022-09-30 PROCEDURE — 6360000002 HC RX W HCPCS: Performed by: STUDENT IN AN ORGANIZED HEALTH CARE EDUCATION/TRAINING PROGRAM

## 2022-09-30 PROCEDURE — 99231 SBSQ HOSP IP/OBS SF/LOW 25: CPT | Performed by: INTERNAL MEDICINE

## 2022-09-30 RX ORDER — WARFARIN SODIUM 10 MG/1
10 TABLET ORAL
Status: COMPLETED | OUTPATIENT
Start: 2022-09-30 | End: 2022-09-30

## 2022-09-30 RX ADMIN — TICAGRELOR 90 MG: 90 TABLET ORAL at 08:35

## 2022-09-30 RX ADMIN — METHOCARBAMOL TABLETS 750 MG: 750 TABLET, COATED ORAL at 17:12

## 2022-09-30 RX ADMIN — DESMOPRESSIN ACETATE 40 MG: 0.2 TABLET ORAL at 08:35

## 2022-09-30 RX ADMIN — GABAPENTIN 300 MG: 300 CAPSULE ORAL at 13:19

## 2022-09-30 RX ADMIN — METHOCARBAMOL TABLETS 750 MG: 750 TABLET, COATED ORAL at 13:19

## 2022-09-30 RX ADMIN — CILOSTAZOL 50 MG: 100 TABLET ORAL at 08:35

## 2022-09-30 RX ADMIN — Medication 5 MG: at 21:45

## 2022-09-30 RX ADMIN — CILOSTAZOL 50 MG: 100 TABLET ORAL at 21:41

## 2022-09-30 RX ADMIN — METHOCARBAMOL TABLETS 750 MG: 750 TABLET, COATED ORAL at 21:40

## 2022-09-30 RX ADMIN — WARFARIN SODIUM 10 MG: 10 TABLET ORAL at 17:12

## 2022-09-30 RX ADMIN — ACETAMINOPHEN 1000 MG: 500 TABLET ORAL at 21:40

## 2022-09-30 RX ADMIN — NIFEDIPINE 10 MG: 10 CAPSULE ORAL at 13:19

## 2022-09-30 RX ADMIN — OXYCODONE 5 MG: 5 TABLET ORAL at 14:57

## 2022-09-30 RX ADMIN — METHOCARBAMOL TABLETS 750 MG: 750 TABLET, COATED ORAL at 08:35

## 2022-09-30 RX ADMIN — ACETAMINOPHEN 1000 MG: 500 TABLET ORAL at 13:19

## 2022-09-30 RX ADMIN — TICAGRELOR 90 MG: 90 TABLET ORAL at 21:40

## 2022-09-30 RX ADMIN — GABAPENTIN 300 MG: 300 CAPSULE ORAL at 21:40

## 2022-09-30 RX ADMIN — HEPARIN SODIUM 3230 UNITS: 1000 INJECTION INTRAVENOUS; SUBCUTANEOUS at 15:35

## 2022-09-30 RX ADMIN — OXYCODONE 5 MG: 5 TABLET ORAL at 21:45

## 2022-09-30 RX ADMIN — NIFEDIPINE 10 MG: 10 CAPSULE ORAL at 08:35

## 2022-09-30 RX ADMIN — NIFEDIPINE 10 MG: 10 CAPSULE ORAL at 21:40

## 2022-09-30 RX ADMIN — Medication 24 UNITS/KG/HR: at 00:11

## 2022-09-30 RX ADMIN — GABAPENTIN 300 MG: 300 CAPSULE ORAL at 08:35

## 2022-09-30 RX ADMIN — Medication 24 UNITS/KG/HR: at 13:08

## 2022-09-30 ASSESSMENT — PAIN SCALES - GENERAL
PAINLEVEL_OUTOF10: 0
PAINLEVEL_OUTOF10: 7
PAINLEVEL_OUTOF10: 8

## 2022-09-30 NOTE — PROGRESS NOTES
Today's Date: 9/30/2022  Patient Name: Hola Ayers  Date of admission: 9/24/2022  5:22 AM  Patient's age: 52 y.o., 1975  Admission Dx: Arterial thrombosis ([facility] [I74.9]  Critical limb ischemia with history of revascularization of same extremity ([facility] [I70.229, Z98.890]    Reason for Consult: management recommendations  Requesting Physician: Patricia Núñez MD    CHIEF COMPLAINT: Acute limb ischemia    INTERIM HISTORY  Seen and evaluated. She is on warfarin and heparin. INR still 1.1. She is receiving 10 mg tonight as well. Bleeding or bruising. HISTORY OF PRESENT ILLNESS:      The patient is a 52 y.o.  female who is admitted to the hospital for acute limb ischemia. The patient has known peripheral vascular disease and unfortunately needed multiple surgery including amputation of the toes in the right foot previously because of arterial disease. Hypercoagulable work-up was done and was essentially negative. She was seen a few weeks ago because of right-sided leg pain and acute limb ischemia and she was started on Eliquis. Unfortunately that did not help and she came back again with acute limb ischemia and worsening peripheral artery disease. Vascular took her to surgery and she is definitely improving. The plan is to send her home on warfarin and Brilinta  The patient is seen and evaluated. She is feeling much better. The pain in the right lower extremity improved significantly. She is able to move her foot and her leg with minimal difficulty.     Past Medical History:   has a past medical history of Abnormal Pap smear of cervix, Anemia, Anxiety, Chronic back pain, Claudication (HCC), Claudication in peripheral vascular disease ([facility] Depression, Headache(784.0), Heart murmur, Hemorrhage of rectum and anus, History of blood transfusion, HTN (hypertension), Hx of blood clots, Hypercoagulable state ([facility] Hyperlipidemia, Hypertension, Intertriginous candidiasis, Left popliteal artery occlusion (HCC), Leg pain, Menometrorrhagia, Migraine headache with aura, Obesity, Osteoarthritis, PAD (peripheral artery disease) ([facility] Patient in clinical research study, PVD (peripheral vascular disease) ([facility] Takotsubo cardiomyopathy, Under care of service provider, Under care of service provider, Under care of service provider, and Wound of right leg. Past Surgical History:   has a past surgical history that includes Tubal ligation ();  section (); other surgical history (2017); other surgical history (Left, 2018); other surgical history (Left, 2018); vascular surgery (Left, 2018); pr reoperation, bypass graft (Left, 2018); pr vein bypass graft,fem-pop (Left, 2018); thrombectomy (Right, 2019); IVC filter insertion; Toe amputation (Right, 2019); Toe amputation (Right, 2019); Foot Amputation (Right, 3/8/2021); vascular surgery (Right, 2022); and vascular surgery (Bilateral, 2022). Medications:    Reviewed in Epic     Allergies:  Asa [aspirin], Lopid [gemfibrozil], Nortriptyline, Pcn [penicillins], Sulfa antibiotics, Morphine, and Ibuprofen    Social History:   reports that she has never smoked. She has never used smokeless tobacco. She reports that she does not currently use alcohol. She reports that she does not use drugs. Family History: family history includes Diabetes in her mother, sister, and sister; Heart Attack in her father and mother; Heart Disease in her father and mother; High Blood Pressure in her father, mother, sister, and sister; Kidney Disease in her mother. REVIEW OF SYSTEMS:    Constitutional: No fever or chills.  No night sweats, no weight loss   Eyes: No eye discharge, double vision, or eye pain   HEENT: negative for sore mouth, sore throat, hoarseness and voice change   Respiratory: negative for cough , sputum, dyspnea, wheezing, hemoptysis, chest pain   Cardiovascular: negative for chest pain, dyspnea, palpitations, orthopnea, PND   Gastrointestinal: negative for nausea, vomiting, diarrhea, constipation, abdominal pain, Dysphagia, hematemesis and hematochezia   Genitourinary: negative for frequency, dysuria, nocturia, urinary incontinence, and hematuria   Integument: negative for rash, skin lesions, bruises. Hematologic/Lymphatic: negative for easy bruising, bleeding, lymphadenopathy, or petechiae   Endocrine: negative for heat or cold intolerance,weight changes, change in bowel habits and hair loss   Musculoskeletal: Pain and weakness of the right lower extremity has subsided after surgery  Neurological: negative for headaches, dizziness, seizures, weakness, numbness    PHYSICAL EXAM:      /82   Pulse 90   Temp 98 °F (36.7 °C) (Temporal)   Resp 18   Ht 5' 5\" (1.651 m)   Wt 177 lb 14.6 oz (80.7 kg)   LMP 2022   SpO2 99%   BMI 29.61 kg/m²    Temp (24hrs), Av °F (36.7 °C), Min:97.9 °F (36.6 °C), Max:98 °F (36.7 °C)    General appearance - well appearing, no in pain or distress   Mental status - alert and cooperative   Eyes - pupils equal and reactive, extraocular eye movements intact   Ears - bilateral TM's and external ear canals normal   Mouth - mucous membranes moist, pharynx normal without lesions   Neck - supple, no significant adenopathy   Lymphatics - no palpable lymphadenopathy, no hepatosplenomegaly   Chest - clear to auscultation, no wheezes, rales or rhonchi, symmetric air entry   Heart - normal rate, regular rhythm, normal S1, S2, no murmurs  Abdomen - soft, nontender, nondistended, no masses or organomegaly   Neurological - alert, oriented, normal speech, no focal findings or movement disorder noted   Musculoskeletal - no joint tenderness, deformity or swelling   Extremities -both lower extremity have scars from previous vascular surgeries. The right foot had previous amputation of the toes.   No signs of acute ischemia appreciated  Skin - normal coloration and turgor, no rashes, no suspicious skin lesions noted ,    DATA:    Labs:   CBC:   Recent Labs     09/29/22  1024   WBC 9.9   HGB 11.3*   HCT 32.6*          BMP:   Recent Labs     09/29/22  1024   *   K 4.3   CO2 16*   BUN 9   CREATININE 0.70   LABGLOM >60   GLUCOSE 124*     PT/INR:   Recent Labs     09/29/22  0206 09/30/22  0633   PROTIME 9.8 11.0   INR 1.0 1.1       IMAGING DATA:      Primary Problem  Critical limb ischemia with history of revascularization of same extremity [facility])    Active Hospital Problems    Diagnosis Date Noted    Critical limb ischemia with history of revascularization of same extremity ([facility] [I70.229, Z98.890] 09/24/2022     Priority: Medium    Occlusion of right iliac artery ([facility] [I74.5] 09/24/2022     Priority: Medium    Acute occlusion of artery of lower extremity due to thrombosis ([facility] [I74.3] 09/24/2022     Priority: Medium         IMPRESSION:   Severe and progressive peripheral vascular disease  Failed Eliquis  No clear risk factors, the patient is not a smoker and hypercoagulable work-up was essentially negative    RECOMMENDATIONS:  Agree with the current plans to use heparin Pletal and nifedipine and Brilinta while in-house and discharged on warfarin and Brilinta  On warfarin, well tolerated, hoping INR will start rising, agree with dose adjustment to 10 mg , will check INR in AM       Discussed with patient and Nurse. Thank you for asking us to see this patient.     BUCK BURTON [facility] MD [place]  Hematologist/Medical Oncologist  Cell: [phone]

## 2022-09-30 NOTE — PLAN OF CARE
Problem: Discharge Planning  Goal: Discharge to home or other facility with appropriate resources  9/30/2022 0520 by Apryl Lovell RN  Outcome: Progressing  9/29/2022 1816 by Chuckie John RN  Outcome: Progressing     Problem: Pain  Goal: Verbalizes/displays adequate comfort level or baseline comfort level  9/30/2022 0520 by Apryl Lovell RN  Outcome: Progressing  9/29/2022 1816 by Chuckie John RN  Outcome: Progressing     Problem: Skin/Tissue Integrity  Goal: Absence of new skin breakdown  Description: 1. Monitor for areas of redness and/or skin breakdown  2. Assess vascular access sites hourly  3. Every 4-6 hours minimum:  Change oxygen saturation probe site  4. Every 4-6 hours:  If on nasal continuous positive airway pressure, respiratory therapy assess nares and determine need for appliance change or resting period.   9/30/2022 0520 by Apryl Lovell RN  Outcome: Progressing  9/29/2022 1816 by Chuckie John RN  Outcome: Progressing     Problem: Safety - Adult  Goal: Free from fall injury  9/30/2022 0520 by Apryl Lovell RN  Outcome: Progressing  9/29/2022 1816 by Chuckie John RN  Outcome: Progressing     Problem: ABCDS Injury Assessment  Goal: Absence of physical injury  9/30/2022 0520 by Apryl Lovell RN  Outcome: Progressing  9/29/2022 1816 by Chuckie John RN  Outcome: Progressing

## 2022-09-30 NOTE — PROGRESS NOTES
Today's Date: 9/29/2022  Patient Name: Ellis Huber  Date of admission: 9/24/2022  5:22 AM  Patient's age: 52 y.o., 1975  Admission Dx: Arterial thrombosis ([facility] [I74.9]  Critical limb ischemia with [facility] of revascularization of [facility] [I70.229, Z98.890]    Reason for Consult: management recommendations  Requesting Physician: Manuel Garrett MD    CHIEF COMPLAINT: Acute limb ischemia    INTERIM HISTORY  Seen and evaluated. States that her pain in the lower extremity is better. She is on heparin. Warfarin will be increased to 10 mg. INR still at 1     HISTORY OF PRESENT ILLNESS:      The patient is a 52 y.o.  female who is admitted to the hospital for acute limb ischemia. The patient has known peripheral vascular disease and unfortunately needed multiple surgery including amputation of the toes in the right foot previously because of arterial disease. Hypercoagulable work-up was done and was essentially negative. She was seen a few weeks ago because of right-sided leg pain and acute limb ischemia and she was started on Eliquis. Unfortunately that did not help and she came back again with acute limb ischemia and worsening peripheral artery disease. Vascular took her to surgery and she is definitely improving. The plan is to send her home on warfarin and Brilinta  The patient is seen and evaluated. She is feeling much better. The pain in the right lower extremity improved significantly. She is able to move her foot and her leg with minimal difficulty.     Past Medical History:   has a past medical history of Abnormal Pap smear of cervix, Anemia, Anxiety, Chronic back pain, Claudication (HCC), Claudication in peripheral vascular disease ([facility] Depression, Headache(784.0), Heart murmur, Hemorrhage of rectum and anus, History of blood transfusion, HTN (hypertension), Hx of blood clots, Hypercoagulable state ([facility] Hyperlipidemia, Hypertension, Intertriginous candidiasis, Left popliteal artery occlusion (HCC), Leg pain, Menometrorrhagia, Migraine headache with aura, Obesity, Osteoarthritis, PAD (peripheral artery disease) ([facility] Patient in clinical research study, PVD (peripheral vascular disease) ([facility] Takotsubo cardiomyopathy, Under care of service provider, Under care of service provider, Under care of service provider, and Wound of right leg. Past Surgical History:   has a past surgical history that includes Tubal ligation ();  section (); other surgical history (2017); other surgical history (Left, 2018); other surgical history (Left, 2018); vascular surgery (Left, 2018); pr reoperation, bypass graft (Left, 2018); pr vein bypass graft,fem-pop (Left, 2018); thrombectomy (Right, 2019); IVC filter insertion; Toe amputation (Right, 2019); Toe amputation (Right, 2019); Foot Amputation (Right, 3/8/2021); vascular surgery (Right, 2022); and vascular surgery (Bilateral, 2022). Medications:    Reviewed in Epic     Allergies:  Asa [aspirin], Lopid [gemfibrozil], Nortriptyline, Pcn [penicillins], Sulfa antibiotics, Morphine, and Ibuprofen    Social History:   reports that she has never smoked. She has never used smokeless tobacco. She reports that she does not currently use alcohol. She reports that she does not use drugs. Family History: family history includes Diabetes in her mother, sister, and sister; Heart Attack in her father and mother; Heart Disease in her father and mother; High Blood Pressure in her father, mother, sister, and sister; Kidney Disease in her mother. REVIEW OF SYSTEMS:    Constitutional: No fever or chills.  No night sweats, no weight loss   Eyes: No eye discharge, double vision, or eye pain   HEENT: negative for sore mouth, sore throat, hoarseness and voice change   Respiratory: negative for cough , sputum, dyspnea, wheezing, hemoptysis, chest pain   Cardiovascular: negative for chest pain, dyspnea, palpitations, orthopnea, PND   Gastrointestinal: negative for nausea, vomiting, diarrhea, constipation, abdominal pain, Dysphagia, hematemesis and hematochezia   Genitourinary: negative for frequency, dysuria, nocturia, urinary incontinence, and hematuria   Integument: negative for rash, skin lesions, bruises. Hematologic/Lymphatic: negative for easy bruising, bleeding, lymphadenopathy, or petechiae   Endocrine: negative for heat or cold intolerance,weight changes, change in bowel habits and hair loss   Musculoskeletal: Pain and weakness of the right lower extremity has subsided after surgery  Neurological: negative for headaches, dizziness, seizures, weakness, numbness    PHYSICAL EXAM:      /81   Pulse 95   Temp 97.9 °F (36.6 °C) (Oral)   Resp 20   Wt 177 lb 14.6 oz (80.7 kg)   LMP 2022   SpO2 100%   BMI 29.61 kg/m²    Temp (24hrs), Av.1 °F (36.7 °C), Min:97.9 °F (36.6 °C), Max:98.5 °F (36.9 °C)    General appearance - well appearing, no in pain or distress   Mental status - alert and cooperative   Eyes - pupils equal and reactive, extraocular eye movements intact   Ears - bilateral TM's and external ear canals normal   Mouth - mucous membranes moist, pharynx normal without lesions   Neck - supple, no significant adenopathy   Lymphatics - no palpable lymphadenopathy, no hepatosplenomegaly   Chest - clear to auscultation, no wheezes, rales or rhonchi, symmetric air entry   Heart - normal rate, regular rhythm, normal S1, S2, no murmurs  Abdomen - soft, nontender, nondistended, no masses or organomegaly   Neurological - alert, oriented, normal speech, no focal findings or movement disorder noted   Musculoskeletal - no joint tenderness, deformity or swelling   Extremities -both lower extremity have scars from previous vascular surgeries. The right foot had previous amputation of the toes.   No signs of acute ischemia appreciated  Skin - normal coloration and turgor, no rashes, no suspicious skin lesions noted ,    DATA:    Labs:   CBC:   Recent Labs     09/29/22  1024   WBC 9.9   HGB 11.3*   HCT 32.6*          BMP:   Recent Labs     09/29/22  1024   *   K 4.3   CO2 16*   BUN 9   CREATININE 0.70   LABGLOM >60   GLUCOSE 124*     PT/INR:   Recent Labs     09/28/22  0608 09/29/22  0206   PROTIME 9.6 9.8   INR 0.9 1.0       IMAGING DATA:      Primary Problem  Critical limb ischemia with history of revascularization of same extremity [facility])    Active Hospital Problems    Diagnosis Date Noted    Critical limb ischemia with history of revascularization of same extremity ([facility] [I70.229, Z98.890] 09/24/2022     Priority: Medium    Occlusion of right iliac artery ([facility] [I74.5] 09/24/2022     Priority: Medium    Acute occlusion of artery of lower extremity due to thrombosis ([facility] [I74.3] 09/24/2022     Priority: Medium         IMPRESSION:   Severe and progressive peripheral vascular disease  Failed Eliquis  No clear risk factors, the patient is not a smoker and hypercoagulable work-up was essentially negative    RECOMMENDATIONS:  Agree with the current plans to use heparin Pletal and nifedipine and Brilinta while in-house and discharged on warfarin and Brilinta  On warfarin, well tolerated, hoping INR will start rising, agree with dose adjustment to 10 mg       Discussed with patient and Nurse. Thank you for asking us to see this patient.     Gilberto Levine MD  Hematologist/Medical Oncologist  Cell: [phone]

## 2022-09-30 NOTE — PROGRESS NOTES
Nutrition Assessment     Type and Reason for Visit: RD Nutrition Re-Screen/LOS    Nutrition Recommendations/Plan:   Continue with current diet, monitor/encourage adequate intake of meals/fluids       Nutrition Assessment:  Chart reviewed for length of stay. Admitted s/p revascularization RLE. RN reports pt has been tolerating diet, eating good. No nutrition concerns reported-deemed at low nutrition risk. Current Nutrition Therapies:    ADULT DIET;  Regular    Anthropometric Measures:  Height: 5' 5\" (165.1 cm)  Current Body Wt: 177 lb 14.6 oz (80.7 kg)   BMI: 29.6    Nutrition Diagnosis:   No nutrition diagnosis at this time     Nutrition Interventions:   Food and/or Nutrient Delivery: Continue Current Diet  Nutrition Education/Counseling: No recommendation at this time  Coordination of Nutrition Care: Continue to monitor while inpatient       Goals:  Previous Goal Met:  (goal set)  Goals: Meet at least 75% of estimated needs, prior to discharge       Nutrition Monitoring and Evaluation:   Behavioral-Environmental Outcomes: None Identified  Food/Nutrient Intake Outcomes: Food and Nutrient Intake  Physical Signs/Symptoms Outcomes: Biochemical Data, Weight, Meal Time Behavior    Discharge Planning:    Continue current diet     Tawnya Goldstein RD, LD  Contact: [phone]

## 2022-09-30 NOTE — PROGRESS NOTES
PTT 58.5 pt given 40 units per kg bolus of heparin 3230units IV then increased to 26 units /kg/hr or 21.0 ml/hr.  Per heparin IV protocol Double checked with Berry Maddox RN next PTT at 930pm

## 2022-09-30 NOTE — PROGRESS NOTES
Division of Vascular Surgery         Progress Note      Name: Linda Grant  MRN: [id number]         Overnight Events:      No acute events    Hospital Summary      POD#6 s/p extensive revascularization of RLE. Pt doing well. Plan to discharge once warfarin therapeutic INR of 2.2-3.5 is achieved. Warfarin dose to be increased today in hopes of reaching therapeutic range sooner. 9/29: C/o vaginal bleeding and abdominal pain. Consulted OB/GYN. US performed with no acute concerns. Suggested pt follow-up in clinic for contraceptive consult if still experiencing similar symptoms. Subjective:     Pt seen and examined at bedside. Reports sleeping well and overall doing well. Vascular exam is stable, with good motor function, sensation and pulses appreciable on RLE. Denies any new symptoms. Voiding. Tolerating regular diet. VSS and afebrile. APTT 76.0 from 94.7. INR 1.1. Physical Exam:     Vitals:  /82   Pulse 90   Temp 98 °F (36.7 °C) (Temporal)   Resp 14   Wt 177 lb 14.6 oz (80.7 kg)   LMP 09/12/2022   SpO2 99%   BMI 29.61 kg/m²     Physical Exam:  Vital signs and Nurse's note reviewed  Gen:  A&Ox3, NAD  HEENT: PERRLA, EOMI, no scleral icterus, oral mucosa moist  Neck: Supple  Chest: Symmetric rise with inhalation, no evidence of trauma  CVS: Regular rate and rhythm  Resp: Unlabored on RA  Abd: soft, non-tender, non-distended, no guarding or rebound. Ext: RLE warm to the touch, without TTP. Pulses palpable on b/l LEs. Motor function and sensation intact on b/l Les. Compartments are soft without pain. CNS: Moves all extremities, no gross focal motor deficits    Imaging/Labs:     LABS:  9/30: INR 1.1. APTT 76.0  9/29: INR 1.0. APTT 50.4  9/28: INR 0.9. APTT 58.2     CTA ABDOMINAL AORTA W BILAT RUNOFF W CONTRAST     Result Date: 9/24/2022  Since the prior exam there has been placement of a right common/external iliac artery stent with occlusion of the right common and external iliac arteries. Progression of occlusive disease of the right lower extremity with long segment occlusion of the distal SFA/popliteal artery and very poor segmental filling of runoff vessels. Similar occlusion of the left popliteal artery with poor segmental filling of below knee runoff vessels. Other stable findings including 4 cm hyperdense right renal lesion, likely hemorrhagic cyst which should be confirmed on follow-up ultrasound and/or MRI. 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 Findings were discussed with Dr. Zack Garcia At 9:11 am on 9/24/2022. RECOMMENDATIONS: 2.6 cm right ovarian simple-appearing cyst. No follow-up imaging is recommended. Reference: JACR 2020 Feb;17(2):248-254    TRANSVAGINAL US  Result Date: 9/29/2022  R ovarian cyst (2.8 cm). Otherwise unremarkable with no need for follow-up. Assessment/Plan:     Warfarin dosing per pharmacy. Increased yesterday to 10 mg.  NR today at 1.1. Continue heparin bridge until patient is therapeutic. Plan to d/c on warfarin and brilinta once therapeutic range achieved, 2.5-3.5  Plan for pt to be on lifelong AC in consideration of avoiding amputation. Will be discharge on Brilinta and Warfarin  Continue to encourage pt to ambulate   Diet regular  Heme/onc following  Will discharge when patient is at therapeutic range 2.5-3.5. Electronically signed by Bairon Hickey on 9/30/22 at 8:09 AM [address]: [phone]  C: [phone]  Email: [email or web address]      General Surgery Resident Statement/Note:  I have discussed the case, including pertinent history and exam findings with the above medical student have personally seen the patient. Pt seen and examined at bedside. I performed both history and physical exam.     Note was edited and changes made by me. Assessment and plan reviewed and changes made by me.    I agree with the assessment and plan as stated above.     Electronically signed by Luis Carlos Lowe DO on 9/30/2022 at 10:56 AM

## 2022-09-30 NOTE — PROGRESS NOTES
Pharmacy Note  Warfarin Consult follow-up      Recent Labs     09/30/22  0633   INR 1.1     Recent Labs     09/29/22  1024   HGB 11.3*   HCT 32.6*          Significant Drug-Drug Interactions:  New warfarin drug-drug interactions: None  Discontinued drug-drug interactions: None      Notes:                   Warfarin 10 mg po once  Daily PT/INR while inpatient.

## 2022-10-01 PROBLEM — I74.9 ARTERIAL THROMBOSIS (HCC): Status: ACTIVE | Noted: 2022-10-01

## 2022-10-01 LAB
INR BLD: 1.5
PARTIAL THROMBOPLASTIN TIME: 43.3 SEC (ref 20.5–30.5)
PARTIAL THROMBOPLASTIN TIME: 87.2 SEC (ref 20.5–30.5)
PARTIAL THROMBOPLASTIN TIME: 93.9 SEC (ref 20.5–30.5)
PROTHROMBIN TIME: 14.3 SEC (ref 9.1–12.3)

## 2022-10-01 PROCEDURE — 1200000000 HC SEMI PRIVATE

## 2022-10-01 PROCEDURE — 36415 COLL VENOUS BLD VENIPUNCTURE: CPT

## 2022-10-01 PROCEDURE — 2580000003 HC RX 258: Performed by: STUDENT IN AN ORGANIZED HEALTH CARE EDUCATION/TRAINING PROGRAM

## 2022-10-01 PROCEDURE — 99232 SBSQ HOSP IP/OBS MODERATE 35: CPT | Performed by: INTERNAL MEDICINE

## 2022-10-01 PROCEDURE — 6360000002 HC RX W HCPCS: Performed by: STUDENT IN AN ORGANIZED HEALTH CARE EDUCATION/TRAINING PROGRAM

## 2022-10-01 PROCEDURE — 85730 THROMBOPLASTIN TIME PARTIAL: CPT

## 2022-10-01 PROCEDURE — 99232 SBSQ HOSP IP/OBS MODERATE 35: CPT | Performed by: SURGERY

## 2022-10-01 PROCEDURE — 85610 PROTHROMBIN TIME: CPT

## 2022-10-01 PROCEDURE — 6370000000 HC RX 637 (ALT 250 FOR IP): Performed by: STUDENT IN AN ORGANIZED HEALTH CARE EDUCATION/TRAINING PROGRAM

## 2022-10-01 RX ORDER — WARFARIN SODIUM 10 MG/1
10 TABLET ORAL
Status: COMPLETED | OUTPATIENT
Start: 2022-10-01 | End: 2022-10-01

## 2022-10-01 RX ADMIN — METHOCARBAMOL TABLETS 750 MG: 750 TABLET, COATED ORAL at 22:43

## 2022-10-01 RX ADMIN — CILOSTAZOL 50 MG: 100 TABLET ORAL at 08:25

## 2022-10-01 RX ADMIN — WARFARIN SODIUM 10 MG: 10 TABLET ORAL at 17:24

## 2022-10-01 RX ADMIN — Medication 24 UNITS/KG/HR: at 10:59

## 2022-10-01 RX ADMIN — NIFEDIPINE 10 MG: 10 CAPSULE ORAL at 22:43

## 2022-10-01 RX ADMIN — METHOCARBAMOL TABLETS 750 MG: 750 TABLET, COATED ORAL at 13:17

## 2022-10-01 RX ADMIN — NIFEDIPINE 10 MG: 10 CAPSULE ORAL at 07:08

## 2022-10-01 RX ADMIN — GABAPENTIN 300 MG: 300 CAPSULE ORAL at 08:25

## 2022-10-01 RX ADMIN — ACETAMINOPHEN 1000 MG: 500 TABLET ORAL at 07:08

## 2022-10-01 RX ADMIN — GABAPENTIN 300 MG: 300 CAPSULE ORAL at 22:43

## 2022-10-01 RX ADMIN — METHOCARBAMOL TABLETS 750 MG: 750 TABLET, COATED ORAL at 08:25

## 2022-10-01 RX ADMIN — TICAGRELOR 90 MG: 90 TABLET ORAL at 22:43

## 2022-10-01 RX ADMIN — ACETAMINOPHEN 1000 MG: 500 TABLET ORAL at 13:17

## 2022-10-01 RX ADMIN — NIFEDIPINE 10 MG: 10 CAPSULE ORAL at 13:17

## 2022-10-01 RX ADMIN — CILOSTAZOL 50 MG: 100 TABLET ORAL at 22:43

## 2022-10-01 RX ADMIN — GABAPENTIN 300 MG: 300 CAPSULE ORAL at 13:17

## 2022-10-01 RX ADMIN — ACETAMINOPHEN 1000 MG: 500 TABLET ORAL at 22:45

## 2022-10-01 RX ADMIN — DESMOPRESSIN ACETATE 40 MG: 0.2 TABLET ORAL at 08:26

## 2022-10-01 RX ADMIN — METHOCARBAMOL TABLETS 750 MG: 750 TABLET, COATED ORAL at 17:23

## 2022-10-01 RX ADMIN — HEPARIN SODIUM 3230 UNITS: 1000 INJECTION INTRAVENOUS; SUBCUTANEOUS at 19:00

## 2022-10-01 RX ADMIN — TICAGRELOR 90 MG: 90 TABLET ORAL at 08:25

## 2022-10-01 RX ADMIN — Medication 26 UNITS/KG/HR: at 00:36

## 2022-10-01 ASSESSMENT — PAIN SCALES - GENERAL: PAINLEVEL_OUTOF10: 0

## 2022-10-01 NOTE — PROGRESS NOTES
Lab called Ptt 87.2 critical  per protocol decreased by 2units/kg/hr or 22units per kg per hr  or 17.8 ml verified with Josefa RN  next PTT ordered for 615 pm

## 2022-10-01 NOTE — PROGRESS NOTES
Pharmacy Note  Warfarin Consult follow-up      Recent Labs     10/01/22  0246   INR 1.5     Recent Labs     09/29/22  1024   HGB 11.3*   HCT 32.6*          Significant Drug-Drug Interactions:  New warfarin drug-drug interactions: None  Discontinued drug-drug interactions: None    Date INR Dose   9/26 0.9 5 mg   9/27 0.9 5 mg   9/28 0.9 5 mg   9/29 1.0 10 mg   9/30 1.1 10 mg   10/1 1.5      Plan:   Give 10 mg x 1 dose     Notes:                   Daily PT/INR while inpatient.       Letitia Bailon, PharmD  10/1/2022 12:41 PM

## 2022-10-01 NOTE — PROGRESS NOTES
Today's Date: 10/1/2022  Patient Name: Debra Dietrich  Date of admission: 9/24/2022  5:22 AM  Patient's age: 52 y.o., 1975  Admission Dx: Arterial thrombosis ([facility] [I74.9]  Critical limb ischemia with history of revascularization of same extremity ([facility] [I70.229, Z98.890]    Reason for Consult: management recommendations  Requesting Physician: Crow Mcgarry MD    CHIEF COMPLAINT: Acute limb ischemia    INTERIM HISTORY    Patient seen and evaluated bedside. Patient received 10 mg of Coumadin yesterday. INR up to 1.5 today. No bleeding or bruising. HISTORY OF PRESENT ILLNESS:      The patient is a 52 y.o.  female who is admitted to the hospital for acute limb ischemia. The patient has known peripheral vascular disease and unfortunately needed multiple surgery including amputation of the toes in the right foot previously because of arterial disease. Hypercoagulable work-up was done and was essentially negative. She was seen a few weeks ago because of right-sided leg pain and acute limb ischemia and she was started on Eliquis. Unfortunately that did not help and she came back again with acute limb ischemia and worsening peripheral artery disease. Vascular took her to surgery and she is definitely improving. The plan is to send her home on warfarin and Brilinta  The patient is seen and evaluated. She is feeling much better. The pain in the right lower extremity improved significantly. She is able to move her foot and her leg with minimal difficulty.     Past Medical History:   has a past medical history of Abnormal Pap smear of cervix, Anemia, Anxiety, Chronic back pain, Claudication (HCC), Claudication in peripheral vascular disease ([facility] Depression, Headache(784.0), Heart murmur, Hemorrhage of rectum and anus, History of blood transfusion, HTN (hypertension), Hx of blood clots, Hypercoagulable state ([facility] Hyperlipidemia, Hypertension, Intertriginous candidiasis, Left popliteal artery occlusion (HCC), Leg pain, Menometrorrhagia, Migraine headache with aura, Obesity, Osteoarthritis, PAD (peripheral artery disease) ([facility] Patient in clinical research study, PVD (peripheral vascular disease) ([facility] Takotsubo cardiomyopathy, Under care of service provider, Under care of service provider, Under care of service provider, and Wound of right leg. Past Surgical History:   has a past surgical history that includes Tubal ligation ();  section (); other surgical history (2017); other surgical history (Left, 2018); other surgical history (Left, 2018); vascular surgery (Left, 2018); pr reoperation, bypass graft (Left, 2018); pr vein bypass graft,fem-pop (Left, 2018); thrombectomy (Right, 2019); IVC filter insertion; Toe amputation (Right, 2019); Toe amputation (Right, 2019); Foot Amputation (Right, 3/8/2021); vascular surgery (Right, 2022); and vascular surgery (Bilateral, 2022). Medications:    Reviewed in Epic     Allergies:  Asa [aspirin], Lopid [gemfibrozil], Nortriptyline, Pcn [penicillins], Sulfa antibiotics, Morphine, and Ibuprofen    Social History:   reports that she has never smoked. She has never used smokeless tobacco. She reports that she does not currently use alcohol. She reports that she does not use drugs. Family History: family history includes Diabetes in her mother, sister, and sister; Heart Attack in her father and mother; Heart Disease in her father and mother; High Blood Pressure in her father, mother, sister, and sister; Kidney Disease in her mother. REVIEW OF SYSTEMS:    Constitutional: No fever or chills.  No night sweats, no weight loss   Eyes: No eye discharge, double vision, or eye pain   HEENT: negative for sore mouth, sore throat, hoarseness and voice change   Respiratory: negative for cough , sputum, dyspnea, wheezing, hemoptysis, chest pain   Cardiovascular: negative for chest pain, dyspnea, palpitations, orthopnea, PND   Gastrointestinal: negative for nausea, vomiting, diarrhea, constipation, abdominal pain, Dysphagia, hematemesis and hematochezia   Genitourinary: negative for frequency, dysuria, nocturia, urinary incontinence, and hematuria   Integument: negative for rash, skin lesions, bruises. Hematologic/Lymphatic: negative for easy bruising, bleeding, lymphadenopathy, or petechiae   Endocrine: negative for heat or cold intolerance,weight changes, change in bowel habits and hair loss   Musculoskeletal: Pain and weakness of the right lower extremity has subsided after surgery  Neurological: negative for headaches, dizziness, seizures, weakness, numbness    PHYSICAL EXAM:      /75   Pulse 89   Temp 97.9 °F (36.6 °C) (Temporal)   Resp 16   Ht 5' 5\" (1.651 m)   Wt 177 lb 14.6 oz (80.7 kg)   LMP 2022   SpO2 98%   BMI 29.61 kg/m²    Temp (24hrs), Av °F (36.7 °C), Min:97.9 °F (36.6 °C), Max:98.1 °F (36.7 °C)    General appearance - well appearing, no in pain or distress   Mental status - alert and cooperative   Eyes - pupils equal and reactive, extraocular eye movements intact   Ears - bilateral TM's and external ear canals normal   Mouth - mucous membranes moist, pharynx normal without lesions   Neck - supple, no significant adenopathy   Lymphatics - no palpable lymphadenopathy, no hepatosplenomegaly   Chest - clear to auscultation, no wheezes, rales or rhonchi, symmetric air entry   Heart - normal rate, regular rhythm, normal S1, S2, no murmurs  Abdomen - soft, nontender, nondistended, no masses or organomegaly   Neurological - alert, oriented, normal speech, no focal findings or movement disorder noted   Musculoskeletal - no joint tenderness, deformity or swelling   Extremities -both lower extremity have scars from previous vascular surgeries. The right foot had previous amputation of the toes.   No signs of acute ischemia appreciated  Skin - normal coloration and turgor, no rashes, no suspicious skin lesions noted ,    DATA:    Labs:   CBC:   Recent Labs     09/29/22  1024   WBC 9.9   HGB 11.3*   HCT 32.6*            BMP:   Recent Labs     09/29/22  1024   *   K 4.3   CO2 16*   BUN 9   CREATININE 0.70   LABGLOM >60   GLUCOSE 124*       PT/INR:   Recent Labs     09/30/22  0633 10/01/22  0246   PROTIME 11.0 14.3*   INR 1.1 1.5         IMAGING DATA:      Primary Problem  Critical limb ischemia with history of revascularization of same extremity [facility])    Active Hospital Problems    Diagnosis Date Noted    Critical limb ischemia with history of revascularization of same extremity ([facility] [I70.229, Z98.890] 09/24/2022     Priority: Medium    Occlusion of right iliac artery ([facility] [I74.5] 09/24/2022     Priority: Medium    Acute occlusion of artery of lower extremity due to thrombosis ([facility] [I74.3] 09/24/2022     Priority: Medium         IMPRESSION:   Severe and progressive peripheral vascular disease  Failed Eliquis  No clear risk factors, the patient is not a smoker and hypercoagulable work-up was essentially negative    RECOMMENDATIONS:  Agree with the current plans to use heparin Pletal and nifedipine and Brilinta while in-house and discharged on warfarin and Brilinta  On warfarin, well tolerated, INR 1.5 this morning. Target INR between 2 and 3. Discussed with patient and Nurse. Thank you for asking us to see this patient. Sandra Hope MD  Internal Medicine Resident, PGY-3  [address] [place], [place]  10/1/2022,3:10 PM    Attending Physician Statement   I have discussed the care of Gale Mckeon, including pertinent history and exam findings with the resident. I have reviewed the key elements of all parts of the encounter with the resident. I have seen and examined the patient with the resident. I agree with the assessment and plan and status of the problem list as documented.                                Caryn Carroll MD [facility] Hem/Onc [employer]

## 2022-10-01 NOTE — PROGRESS NOTES
PTT 43.3   gave 2320 units heparin IV  per heparin protocol 40units/kg/hr  also increase heparin drip by 2 units /kg/hr to 24 units per kg per hr next PTT ordered for 1 am

## 2022-10-01 NOTE — PLAN OF CARE
Problem: Discharge Planning  Goal: Discharge to home or other facility with appropriate resources  10/1/2022 0515 by Sreedhar Carlisle RN  Outcome: Progressing  9/30/2022 1945 by Lazaro Jones RN  Outcome: Progressing     Problem: Pain  Goal: Verbalizes/displays adequate comfort level or baseline comfort level  10/1/2022 0515 by Sreedhar Carlisle RN  Outcome: Progressing  9/30/2022 1945 by Lazaro Jones RN  Outcome: Progressing     Problem: Skin/Tissue Integrity  Goal: Absence of new skin breakdown  Description: 1. Monitor for areas of redness and/or skin breakdown  2. Assess vascular access sites hourly  3. Every 4-6 hours minimum:  Change oxygen saturation probe site  4. Every 4-6 hours:  If on nasal continuous positive airway pressure, respiratory therapy assess nares and determine need for appliance change or resting period.   10/1/2022 0515 by Sreedhar Carlisle RN  Outcome: Progressing  9/30/2022 1945 by Lazaro Jones RN  Outcome: Progressing     Problem: Safety - Adult  Goal: Free from fall injury  10/1/2022 0515 by Sreedhar Carlisle RN  Outcome: Progressing  9/30/2022 1945 by Lazaro Jones RN  Outcome: Progressing     Problem: ABCDS Injury Assessment  Goal: Absence of physical injury  10/1/2022 0515 by Sreedhar Carlisle RN  Outcome: Progressing  9/30/2022 1945 by Lazaro Jones RN  Outcome: Progressing

## 2022-10-01 NOTE — PROGRESS NOTES
Division of Vascular Surgery         Progress Note      Name: Ammon Castillo  MRN: [id number]         Overnight Events:     No acute overnight events    Hospital Summary      s/p extensive revascularization of RLE. Pt doing well. Plan to discharge once warfarin therapeutic INR of 2.2-3.5 is achieved. Warfarin dose to be increased today in hopes of reaching therapeutic range sooner. 9/29: C/o vaginal bleeding and abdominal pain. Consulted OB/GYN. US performed with no acute concerns. Suggested pt follow-up in clinic for contraceptive consult if still experiencing similar symptoms. Subjective:     Gale was seen and evaluated at bedside. No acute events overnight. Afebrile vital signs within normal limits. She continues to be on heparin drip. INR 1.5 this morning. Patient still continues to have vaginal bleeding however cramping abdominal pain has resolved. Physical Exam:     Vitals:  /75   Pulse 89   Temp 97.9 °F (36.6 °C) (Temporal)   Resp 16   Ht 5' 5\" (1.651 m)   Wt 177 lb 14.6 oz (80.7 kg)   LMP 09/12/2022   SpO2 98%   BMI 29.61 kg/m²     Physical Exam:  Vital signs and Nurse's note reviewed  Gen:  A&Ox3, NAD  HEENT: PERRLA, no scleral icterus, oral mucosa moist  Neck: Supple  Chest: Equal rise and fall of chest, normal respiratory effort  CVS: Regular rate and rhythm  Abd: soft, non-tender, non-distended, no guarding or rebound. Ext: Lateral lower extremities warm to the touch. Right lower extremity with dopplerable PT signal this morning. Left lower extremity dopplerable PT and DP signals compartments soft. CNS: Moves all extremities, no gross focal motor deficits    Imaging/Labs:     LABS:  9/30: INR 1.1. APTT 76.0  9/29: INR 1.0. APTT 50.4  9/28: INR 0.9.  APTT 58.2  10/1: INR 1.5 APTT 93.9     CTA ABDOMINAL AORTA W BILAT RUNOFF W CONTRAST     Result Date: 9/24/2022  Since the prior exam there has been placement of a right common/external iliac artery stent with occlusion of the right common and external iliac arteries. Progression of occlusive disease of the right lower extremity with long segment occlusion of the distal SFA/popliteal artery and very poor segmental filling of runoff vessels. Similar occlusion of the left popliteal artery with poor segmental filling of below knee runoff vessels. Other stable findings including 4 cm hyperdense right renal lesion, likely hemorrhagic cyst which should be confirmed on follow-up ultrasound and/or MRI. 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 Findings were discussed with Dr. Marky Case At 9:11 am on 9/24/2022. RECOMMENDATIONS: 2.6 cm right ovarian simple-appearing cyst. No follow-up imaging is recommended. Reference: JACR 2020 Feb;17(2):248-254    TRANSVAGINAL US  Result Date: 9/29/2022  R ovarian cyst (2.8 cm). Otherwise unremarkable with no need for follow-up. Assessment/Plan:     Warfarin dosing per pharmacy. Continue heparin bridge until patient is therapeutic  plan to d/c on warfarin and brilinta once therapeutic range achieved, 2.5-3.5  Plan for pt to be on lifelong AC in consideration of avoiding amputation. Will be discharge on Brilinta and Warfarin  Continue to encourage pt to ambulate   Diet regular  Heme/onc following      Electronically signed by Gilbert Gutierrez MD on 10/1/2022 at 11:41 AM        [address] [place] [address],[address] [facility]: [phone]  C: [phone]  Email: [email or web address] - - -

## 2022-10-02 LAB
INR BLD: 2.2
PARTIAL THROMBOPLASTIN TIME: 114.1 SEC (ref 20.5–30.5)
PARTIAL THROMBOPLASTIN TIME: 89.5 SEC (ref 20.5–30.5)
PROTHROMBIN TIME: 20.6 SEC (ref 9.1–12.3)

## 2022-10-02 PROCEDURE — 6370000000 HC RX 637 (ALT 250 FOR IP): Performed by: STUDENT IN AN ORGANIZED HEALTH CARE EDUCATION/TRAINING PROGRAM

## 2022-10-02 PROCEDURE — 99232 SBSQ HOSP IP/OBS MODERATE 35: CPT | Performed by: SURGERY

## 2022-10-02 PROCEDURE — 85610 PROTHROMBIN TIME: CPT

## 2022-10-02 PROCEDURE — 2580000003 HC RX 258: Performed by: STUDENT IN AN ORGANIZED HEALTH CARE EDUCATION/TRAINING PROGRAM

## 2022-10-02 PROCEDURE — 6360000002 HC RX W HCPCS: Performed by: STUDENT IN AN ORGANIZED HEALTH CARE EDUCATION/TRAINING PROGRAM

## 2022-10-02 PROCEDURE — 85730 THROMBOPLASTIN TIME PARTIAL: CPT

## 2022-10-02 PROCEDURE — 99232 SBSQ HOSP IP/OBS MODERATE 35: CPT | Performed by: INTERNAL MEDICINE

## 2022-10-02 PROCEDURE — 1200000000 HC SEMI PRIVATE

## 2022-10-02 PROCEDURE — 36415 COLL VENOUS BLD VENIPUNCTURE: CPT

## 2022-10-02 RX ORDER — WARFARIN SODIUM 10 MG/1
10 TABLET ORAL
Status: COMPLETED | OUTPATIENT
Start: 2022-10-02 | End: 2022-10-02

## 2022-10-02 RX ADMIN — METHOCARBAMOL TABLETS 750 MG: 750 TABLET, COATED ORAL at 21:11

## 2022-10-02 RX ADMIN — Medication 22 UNITS/KG/HR: at 03:29

## 2022-10-02 RX ADMIN — GABAPENTIN 300 MG: 300 CAPSULE ORAL at 08:05

## 2022-10-02 RX ADMIN — METHOCARBAMOL TABLETS 750 MG: 750 TABLET, COATED ORAL at 08:05

## 2022-10-02 RX ADMIN — TICAGRELOR 90 MG: 90 TABLET ORAL at 21:11

## 2022-10-02 RX ADMIN — NIFEDIPINE 10 MG: 10 CAPSULE ORAL at 13:06

## 2022-10-02 RX ADMIN — ACETAMINOPHEN 1000 MG: 500 TABLET ORAL at 13:06

## 2022-10-02 RX ADMIN — GABAPENTIN 300 MG: 300 CAPSULE ORAL at 21:11

## 2022-10-02 RX ADMIN — ACETAMINOPHEN 1000 MG: 500 TABLET ORAL at 21:10

## 2022-10-02 RX ADMIN — METHOCARBAMOL TABLETS 750 MG: 750 TABLET, COATED ORAL at 17:31

## 2022-10-02 RX ADMIN — TICAGRELOR 90 MG: 90 TABLET ORAL at 08:05

## 2022-10-02 RX ADMIN — METHOCARBAMOL TABLETS 750 MG: 750 TABLET, COATED ORAL at 13:06

## 2022-10-02 RX ADMIN — GABAPENTIN 300 MG: 300 CAPSULE ORAL at 13:06

## 2022-10-02 RX ADMIN — CILOSTAZOL 50 MG: 100 TABLET ORAL at 08:05

## 2022-10-02 RX ADMIN — CILOSTAZOL 50 MG: 100 TABLET ORAL at 21:11

## 2022-10-02 RX ADMIN — WARFARIN SODIUM 10 MG: 10 TABLET ORAL at 17:31

## 2022-10-02 RX ADMIN — NIFEDIPINE 10 MG: 10 CAPSULE ORAL at 21:11

## 2022-10-02 RX ADMIN — NIFEDIPINE 10 MG: 10 CAPSULE ORAL at 07:29

## 2022-10-02 RX ADMIN — DESMOPRESSIN ACETATE 40 MG: 0.2 TABLET ORAL at 08:05

## 2022-10-02 RX ADMIN — ACETAMINOPHEN 1000 MG: 500 TABLET ORAL at 07:28

## 2022-10-02 ASSESSMENT — PAIN SCALES - GENERAL: PAINLEVEL_OUTOF10: 0

## 2022-10-02 NOTE — PROGRESS NOTES
Division of Vascular Surgery         Progress Note      Name: Debra Dietrich  MRN: [id number]         Overnight Events:     No acute overnight events    Hospital Summary      s/p extensive revascularization of RLE. Pt doing well. Plan to discharge once warfarin therapeutic INR of 2.2-3.5 is achieved. Warfarin dose to be increased today in hopes of reaching therapeutic range sooner. 9/29: C/o vaginal bleeding and abdominal pain. Consulted OB/GYN. US performed with no acute concerns. Suggested pt follow-up in clinic for contraceptive consult if still experiencing similar symptoms. Subjective:     Gale was seen and evaluated at bedside. No acute events overnight. She continues to be on heparin drip. INR 2.2 this morning. Patient reports she feels well, denies CP, SOB, N/V, C/F. Ambulating the halls. Reports her right lower extremity feels well, pain resolved, sensation intact. VSS, afebrile. Physical Exam:     Vitals:  /74   Pulse 87   Temp 98 °F (36.7 °C) (Temporal)   Resp 16   Ht 5' 5\" (1.651 m)   Wt 177 lb 14.6 oz (80.7 kg)   LMP 09/12/2022   SpO2 99%   BMI 29.61 kg/m²     Physical Exam:  Vital signs and Nurse's note reviewed  Gen:  A&Ox3, NAD  HEENT: PERRLA, no scleral icterus, oral mucosa moist  Neck: Supple  Chest: Equal rise and fall of chest, normal respiratory effort  CVS: Regular rate and rhythm  Abd: soft, non-tender, non-distended, no guarding or rebound. Ext: Lateral lower extremities warm to the touch. Right lower extremity with dopplerable PT signal this morning. Left lower extremity dopplerable PT and DP signals compartments soft. CNS: Moves all extremities, no gross focal motor deficits    Imaging/Labs:     LABS:  9/30: INR 1.1. APTT 76.0  9/29: INR 1.0. APTT 50.4  9/28: INR 0.9.  APTT 58.2  10/1: INR 1.5 APTT 93.9     CTA ABDOMINAL AORTA W BILAT RUNOFF W CONTRAST     Result Date: 9/24/2022  Since the prior exam there has been placement of a right common/external iliac artery stent with occlusion of the right common and external iliac arteries. Progression of occlusive disease of the right lower extremity with long segment occlusion of the distal SFA/popliteal artery and very poor segmental filling of runoff vessels. Similar occlusion of the left popliteal artery with poor segmental filling of below knee runoff vessels. Other stable findings including 4 cm hyperdense right renal lesion, likely hemorrhagic cyst which should be confirmed on follow-up ultrasound and/or MRI. 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 Findings were discussed with Dr. Bijan Arshad At 9:11 am on 9/24/2022. RECOMMENDATIONS: 2.6 cm right ovarian simple-appearing cyst. No follow-up imaging is recommended. Reference: JACR 2020 Feb;17(7):248-254    TRANSVAGINAL US  Result Date: 9/29/2022  R ovarian cyst (2.8 cm). Otherwise unremarkable with no need for follow-up. Assessment/Plan:     Warfarin dosing per pharmacy. Continue heparin bridge until patient is therapeutic  plan to d/c on warfarin and brilinta once therapeutic range achieved, 2.5-3.5. INR is 2.2 this morning. Would like to see INR stable for 2 days prior to discharge. Plan for pt to be on lifelong AC in consideration of avoiding amputation. Will be discharge on Brilinta and Warfarin  Continue to encourage pt to ambulate. Patient may go outside if she likes. Diet regular  Heme/onc following      Electronically signed by Aron Floyd DO on 10/2/2022 at 7:20 AM        [address] [place] [address],[address]: [phone]  C: [phone]  Email: [email or web address]

## 2022-10-02 NOTE — PROGRESS NOTES
Lab called critical .1 per protocol stopped  heparin drip will restart at 946am 3 units less per kg per hour 19 units per kg per hour  restarted heparin at  949am next PTT 4pm

## 2022-10-02 NOTE — PLAN OF CARE
Problem: Discharge Planning  Goal: Discharge to home or other facility with appropriate resources  10/2/2022 1630 by Ken Ring RN  Outcome: Progressing  10/2/2022 0332 by Lio Montelongo RN  Outcome: Progressing     Problem: Pain  Goal: Verbalizes/displays adequate comfort level or baseline comfort level  10/2/2022 1630 by Ken Ring RN  Outcome: Progressing  10/2/2022 0332 by Lio Montelongo RN  Outcome: Progressing     Problem: Skin/Tissue Integrity  Goal: Absence of new skin breakdown  Description: 1. Monitor for areas of redness and/or skin breakdown  2. Assess vascular access sites hourly  3. Every 4-6 hours minimum:  Change oxygen saturation probe site  4. Every 4-6 hours:  If on nasal continuous positive airway pressure, respiratory therapy assess nares and determine need for appliance change or resting period.   10/2/2022 1630 by Ken Ring RN  Outcome: Progressing  10/2/2022 0332 by Lio Montelongo RN  Outcome: Progressing     Problem: Safety - Adult  Goal: Free from fall injury  10/2/2022 1630 by Ken Ring RN  Outcome: Progressing  10/2/2022 0332 by Lio Montelongo RN  Outcome: Progressing     Problem: ABCDS Injury Assessment  Goal: Absence of physical injury  10/2/2022 1630 by Ken Ring RN  Outcome: Progressing  10/2/2022 0332 by Lio Montelongo RN  Outcome: Progressing

## 2022-10-02 NOTE — PROGRESS NOTES
Today's Date: 10/2/2022  Patient Name: Melida Marin  Date of admission: 9/24/2022  5:22 AM  Patient's age: 52 y.o., 1975  Admission Dx: Arterial thrombosis ([facility] [I74.9]  Critical limb ischemia with history of revascularization of same extremity ([facility] [I70.229, Z98.890]    Reason for Consult: management recommendations  Requesting Physician: Jef Cm MD    CHIEF COMPLAINT: Acute limb ischemia    INTERIM HISTORY    Patient seen and evaluated bedside. Patient received 10 mg of Coumadin yesterday. INR up to 2.2 today. No bleeding or bruising. No new events. HISTORY OF PRESENT ILLNESS:      The patient is a 52 y.o.  female who is admitted to the hospital for acute limb ischemia. The patient has known peripheral vascular disease and unfortunately needed multiple surgery including amputation of the toes in the right foot previously because of arterial disease. Hypercoagulable work-up was done and was essentially negative. She was seen a few weeks ago because of right-sided leg pain and acute limb ischemia and she was started on Eliquis. Unfortunately that did not help and she came back again with acute limb ischemia and worsening peripheral artery disease. Vascular took her to surgery and she is definitely improving. The plan is to send her home on warfarin and Brilinta  The patient is seen and evaluated. She is feeling much better. The pain in the right lower extremity improved significantly. She is able to move her foot and her leg with minimal difficulty.     Past Medical History:   has a past medical history of Abnormal Pap smear of cervix, Anemia, Anxiety, Chronic back pain, Claudication (HCC), Claudication in peripheral vascular disease ([facility] Depression, Headache(784.0), Heart murmur, Hemorrhage of rectum and anus, History of blood transfusion, HTN (hypertension), Hx of blood clots, Hypercoagulable state ([facility] Hyperlipidemia, Hypertension, Intertriginous candidiasis, Left popliteal artery occlusion (HCC), Leg pain, Menometrorrhagia, Migraine headache with aura, Obesity, Osteoarthritis, PAD (peripheral artery disease) ([facility] Patient in clinical research study, PVD (peripheral vascular disease) ([facility] Takotsubo cardiomyopathy, Under care of service provider, Under care of service provider, Under care of service provider, and Wound of right leg. Past Surgical History:   has a past surgical history that includes Tubal ligation ();  section (); other surgical history (2017); other surgical history (Left, 2018); other surgical history (Left, 2018); vascular surgery (Left, 2018); pr reoperation, bypass graft (Left, 2018); pr vein bypass graft,fem-pop (Left, 2018); thrombectomy (Right, 2019); IVC filter insertion; Toe amputation (Right, 2019); Toe amputation (Right, 2019); Foot Amputation (Right, 3/8/2021); vascular surgery (Right, 2022); and vascular surgery (Bilateral, 2022). Medications:    Reviewed in Epic     Allergies:  Asa [aspirin], Lopid [gemfibrozil], Nortriptyline, Pcn [penicillins], Sulfa antibiotics, Morphine, and Ibuprofen    Social History:   reports that she has never smoked. She has never used smokeless tobacco. She reports that she does not currently use alcohol. She reports that she does not use drugs. Family History: family history includes Diabetes in her mother, sister, and sister; Heart Attack in her father and mother; Heart Disease in her father and mother; High Blood Pressure in her father, mother, sister, and sister; Kidney Disease in her mother. REVIEW OF SYSTEMS:    Constitutional: No fever or chills.  No night sweats, no weight loss   Eyes: No eye discharge, double vision, or eye pain   HEENT: negative for sore mouth, sore throat, hoarseness and voice change   Respiratory: negative for cough , sputum, dyspnea, wheezing, hemoptysis, chest pain   Cardiovascular: negative for chest pain, dyspnea, palpitations, orthopnea, PND   Gastrointestinal: negative for nausea, vomiting, diarrhea, constipation, abdominal pain, Dysphagia, hematemesis and hematochezia   Genitourinary: negative for frequency, dysuria, nocturia, urinary incontinence, and hematuria   Integument: negative for rash, skin lesions, bruises. Hematologic/Lymphatic: negative for easy bruising, bleeding, lymphadenopathy, or petechiae   Endocrine: negative for heat or cold intolerance,weight changes, change in bowel habits and hair loss   Musculoskeletal: Pain and weakness of the right lower extremity has subsided after surgery  Neurological: negative for headaches, dizziness, seizures, weakness, numbness    PHYSICAL EXAM:      /82   Pulse 87   Temp 98 °F (36.7 °C) (Temporal)   Resp 14   Ht 5' 5\" (1.651 m)   Wt 177 lb 14.6 oz (80.7 kg)   LMP 2022   SpO2 98%   BMI 29.61 kg/m²    Temp (24hrs), Av °F (36.7 °C), Min:98 °F (36.7 °C), Max:98 °F (36.7 °C)    General appearance - well appearing, no in pain or distress   Mental status - alert and cooperative   Eyes - pupils equal and reactive, extraocular eye movements intact   Ears - bilateral TM's and external ear canals normal   Mouth - mucous membranes moist, pharynx normal without lesions   Neck - supple, no significant adenopathy   Lymphatics - no palpable lymphadenopathy, no hepatosplenomegaly   Chest - clear to auscultation, no wheezes, rales or rhonchi, symmetric air entry   Heart - normal rate, regular rhythm, normal S1, S2, no murmurs  Abdomen - soft, nontender, nondistended, no masses or organomegaly   Neurological - alert, oriented, normal speech, no focal findings or movement disorder noted   Musculoskeletal - no joint tenderness, deformity or swelling   Extremities -both lower extremity have scars from previous vascular surgeries. The right foot had previous amputation of the toes.   No signs of acute ischemia appreciated  Skin - normal coloration and turgor, no rashes, no suspicious skin lesions noted ,    DATA:    Labs:   CBC:   No results for input(s): WBC, HGB, HCT, PLT in the last 72 hours. BMP:   No results for input(s): NA, K, CO2, BUN, CREATININE, LABGLOM, GLUCOSE in the last 72 hours. PT/INR:   Recent Labs     10/01/22  0246 10/02/22  0613   PROTIME 14.3* 20.6*   INR 1.5 2.2       IMAGING DATA:      Primary Problem  Critical limb ischemia with history of revascularization of same extremity [facility])    Active Hospital Problems    Diagnosis Date Noted    Arterial thrombosis ([facility] [I74.9] 10/01/2022     Priority: Medium    Critical limb ischemia with history of revascularization of same extremity ([facility] [G31.997, Z98.890] 09/24/2022     Priority: Medium    Occlusion of right iliac artery ([facility] [I74.5] 09/24/2022     Priority: Medium    Acute occlusion of artery of lower extremity due to thrombosis ([facility] [I74.3] 09/24/2022     Priority: Medium         IMPRESSION:   Severe and progressive peripheral vascular disease  Failed Eliquis  No clear risk factors, the patient is not a smoker and hypercoagulable work-up was essentially negative    RECOMMENDATIONS:  Agree with the current plans to use heparin Pletal and nifedipine and Brilinta while in-house and discharged on warfarin and Brilinta  On warfarin, well tolerated, INR 2.2 this morning. Target INR between 2 and 3. Possible discharge tomorrow as per vascular                                [address] [place] [address] [facility]                            This note is created with the assistance of a speech recognition program.  While intending to generate a document that actually reflects the content of the visit, the document can still have some errors including those of syntax and sound a like substitutions which may escape proof reading. It such instances, actual meaning can be extrapolated by contextual diversion.

## 2022-10-02 NOTE — PROGRESS NOTES
Pharmacy Note  Warfarin Consult follow-up      Recent Labs     10/02/22  0613   INR 2.2     No results for input(s): HGB, HCT, PLT in the last 72 hours. Significant Drug-Drug Interactions:  New warfarin drug-drug interactions: None  Discontinued drug-drug interactions: heparin    Date INR Dose   9/26 0.9 5 mg   9/27 0.9 5 mg   9/28 0.9 5 mg   9/29 1.0 10 mg   9/30 1.1 10 mg   10/1 1.5 10 mg    10/2 2.2      Plan: Will give another 10 mg x 1 dose as another small boost with hopes to reach INR goal tomorrow    Notes:                     Daily PT/INR while inpatient.       Francia JordanD  10/2/2022 12:36 PM

## 2022-10-03 VITALS
WEIGHT: 177.47 LBS | HEIGHT: 65 IN | HEART RATE: 88 BPM | OXYGEN SATURATION: 98 % | RESPIRATION RATE: 17 BRPM | SYSTOLIC BLOOD PRESSURE: 122 MMHG | TEMPERATURE: 98.4 F | DIASTOLIC BLOOD PRESSURE: 79 MMHG | BODY MASS INDEX: 29.57 KG/M2

## 2022-10-03 LAB
INR BLD: 2.5
PROTHROMBIN TIME: 23.5 SEC (ref 9.1–12.3)

## 2022-10-03 PROCEDURE — 99232 SBSQ HOSP IP/OBS MODERATE 35: CPT | Performed by: SURGERY

## 2022-10-03 PROCEDURE — 99232 SBSQ HOSP IP/OBS MODERATE 35: CPT | Performed by: INTERNAL MEDICINE

## 2022-10-03 PROCEDURE — 2580000003 HC RX 258: Performed by: STUDENT IN AN ORGANIZED HEALTH CARE EDUCATION/TRAINING PROGRAM

## 2022-10-03 PROCEDURE — 85610 PROTHROMBIN TIME: CPT

## 2022-10-03 PROCEDURE — 6370000000 HC RX 637 (ALT 250 FOR IP): Performed by: STUDENT IN AN ORGANIZED HEALTH CARE EDUCATION/TRAINING PROGRAM

## 2022-10-03 PROCEDURE — 36415 COLL VENOUS BLD VENIPUNCTURE: CPT

## 2022-10-03 RX ORDER — WARFARIN SODIUM 10 MG/1
10 TABLET ORAL DAILY
Qty: 30 TABLET | Refills: 0 | Status: SHIPPED | OUTPATIENT
Start: 2022-10-03

## 2022-10-03 RX ORDER — WARFARIN SODIUM 7.5 MG/1
7.5 TABLET ORAL DAILY
Qty: 30 TABLET | Refills: 3 | Status: SHIPPED | OUTPATIENT
Start: 2022-10-03

## 2022-10-03 RX ADMIN — DESMOPRESSIN ACETATE 40 MG: 0.2 TABLET ORAL at 08:12

## 2022-10-03 RX ADMIN — CILOSTAZOL 50 MG: 100 TABLET ORAL at 08:11

## 2022-10-03 RX ADMIN — NIFEDIPINE 10 MG: 10 CAPSULE ORAL at 12:59

## 2022-10-03 RX ADMIN — SODIUM CHLORIDE, PRESERVATIVE FREE 10 ML: 5 INJECTION INTRAVENOUS at 08:12

## 2022-10-03 RX ADMIN — GABAPENTIN 300 MG: 300 CAPSULE ORAL at 12:59

## 2022-10-03 RX ADMIN — ACETAMINOPHEN 1000 MG: 500 TABLET ORAL at 07:00

## 2022-10-03 RX ADMIN — TICAGRELOR 90 MG: 90 TABLET ORAL at 08:11

## 2022-10-03 RX ADMIN — NIFEDIPINE 10 MG: 10 CAPSULE ORAL at 07:01

## 2022-10-03 RX ADMIN — ACETAMINOPHEN 1000 MG: 500 TABLET ORAL at 12:59

## 2022-10-03 RX ADMIN — METHOCARBAMOL TABLETS 750 MG: 750 TABLET, COATED ORAL at 12:59

## 2022-10-03 RX ADMIN — GABAPENTIN 300 MG: 300 CAPSULE ORAL at 08:12

## 2022-10-03 RX ADMIN — METHOCARBAMOL TABLETS 750 MG: 750 TABLET, COATED ORAL at 08:11

## 2022-10-03 ASSESSMENT — PAIN SCALES - GENERAL
PAINLEVEL_OUTOF10: 1
PAINLEVEL_OUTOF10: 0
PAINLEVEL_OUTOF10: 8

## 2022-10-03 NOTE — PROGRESS NOTES
Today's Date: 10/3/2022  Patient Name: Hola Ayers  Date of admission: 9/24/2022  5:22 AM  Patient's age: 52 y.o., 1975  Admission Dx: Arterial thrombosis ([facility] [I74.9]  Critical limb ischemia with history of revascularization of same extremity ([facility] [I70.229, Z98.890]    Reason for Consult: management recommendations  Requesting Physician: Patricia Núñez MD    CHIEF COMPLAINT: Acute limb ischemia    INTERIM HISTORY    Patient seen and evaluated bedside. INR 2.5 today. No bleeding or bruising. No new events. HISTORY OF PRESENT ILLNESS:      The patient is a 52 y.o.  female who is admitted to the hospital for acute limb ischemia. The patient has known peripheral vascular disease and unfortunately needed multiple surgery including amputation of the toes in the right foot previously because of arterial disease. Hypercoagulable work-up was done and was essentially negative. She was seen a few weeks ago because of right-sided leg pain and acute limb ischemia and she was started on Eliquis. Unfortunately that did not help and she came back again with acute limb ischemia and worsening peripheral artery disease. Vascular took her to surgery and she is definitely improving. The plan is to send her home on warfarin and Brilinta  The patient is seen and evaluated. She is feeling much better. The pain in the right lower extremity improved significantly. She is able to move her foot and her leg with minimal difficulty.     Past Medical History:   has a past medical history of Abnormal Pap smear of cervix, Anemia, Anxiety, Chronic back pain, Claudication (HCC), Claudication in peripheral vascular disease ([facility] Depression, Headache(784.0), Heart murmur, Hemorrhage of rectum and anus, History of blood transfusion, HTN (hypertension), Hx of blood clots, Hypercoagulable state ([address] Hyperlipidemia, Hypertension, Intertriginous candidiasis, Left popliteal artery occlusion ([facility] Leg pain, Menometrorrhagia, Migraine headache with aura, Obesity, Osteoarthritis, PAD (peripheral artery disease) ([facility] Patient in clinical research study, PVD (peripheral vascular disease) ([facility] Takotsubo cardiomyopathy, Under care of service provider, Under care of service provider, Under care of service provider, and Wound of right leg. Past Surgical History:   has a past surgical history that includes Tubal ligation ();  section (); other surgical history (2017); other surgical history (Left, 2018); other surgical history (Left, 2018); vascular surgery (Left, 2018); pr reoperation, bypass graft (Left, 2018); pr vein bypass graft,fem-pop (Left, 2018); thrombectomy (Right, 2019); IVC filter insertion; Toe amputation (Right, 2019); Toe amputation (Right, 2019); Foot Amputation (Right, 3/8/2021); vascular surgery (Right, 2022); and vascular surgery (Bilateral, 2022). Medications:    Reviewed in Epic     Allergies:  Asa [aspirin], Lopid [gemfibrozil], Nortriptyline, Pcn [penicillins], Sulfa antibiotics, Morphine, and Ibuprofen    Social History:   reports that she has never smoked. She has never used smokeless tobacco. She reports that she does not currently use alcohol. She reports that she does not use drugs. Family History: family history includes Diabetes in her mother, sister, and sister; Heart Attack in her father and mother; Heart Disease in her father and mother; High Blood Pressure in her father, mother, sister, and sister; Kidney Disease in her mother. REVIEW OF SYSTEMS:    Constitutional: No fever or chills.  No night sweats, no weight loss   Eyes: No eye discharge, double vision, or eye pain   HEENT: negative for sore mouth, sore throat, hoarseness and voice change   Respiratory: negative for cough , sputum, dyspnea, wheezing, hemoptysis, chest pain   Cardiovascular: negative for chest pain, dyspnea, palpitations, orthopnea, PND   Gastrointestinal: negative for nausea, vomiting, diarrhea, constipation, abdominal pain, Dysphagia, hematemesis and hematochezia   Genitourinary: negative for frequency, dysuria, nocturia, urinary incontinence, and hematuria   Integument: negative for rash, skin lesions, bruises. Hematologic/Lymphatic: negative for easy bruising, bleeding, lymphadenopathy, or petechiae   Endocrine: negative for heat or cold intolerance,weight changes, change in bowel habits and hair loss   Musculoskeletal: Pain and weakness of the right lower extremity has subsided after surgery  Neurological: negative for headaches, dizziness, seizures, weakness, numbness    PHYSICAL EXAM:      /79   Pulse 88   Temp 98.4 °F (36.9 °C) (Oral)   Resp 17   Ht 5' 5\" (1.651 m)   Wt 177 lb 7.5 oz (80.5 kg)   LMP 2022   SpO2 98%   BMI 29.53 kg/m²    Temp (24hrs), Av.2 °F (36.8 °C), Min:97.9 °F (36.6 °C), Max:98.4 °F (36.9 °C)    General appearance - well appearing, no in pain or distress   Mental status - alert and cooperative   Eyes - pupils equal and reactive, extraocular eye movements intact   Ears - bilateral TM's and external ear canals normal   Mouth - mucous membranes moist, pharynx normal without lesions   Neck - supple, no significant adenopathy   Lymphatics - no palpable lymphadenopathy, no hepatosplenomegaly   Chest - clear to auscultation, no wheezes, rales or rhonchi, symmetric air entry   Heart - normal rate, regular rhythm, normal S1, S2, no murmurs  Abdomen - soft, nontender, nondistended, no masses or organomegaly   Neurological - alert, oriented, normal speech, no focal findings or movement disorder noted   Musculoskeletal - no joint tenderness, deformity or swelling   Extremities -both lower extremity have scars from previous vascular surgeries. The right foot had previous amputation of the toes.   No signs of acute ischemia appreciated  Skin - normal coloration and turgor, no rashes, no suspicious skin lesions noted ,    DATA:    Labs:   CBC:   No results for input(s): WBC, HGB, HCT, PLT in the last 72 hours. BMP:   No results for input(s): NA, K, CO2, BUN, CREATININE, LABGLOM, GLUCOSE in the last 72 hours. PT/INR:   Recent Labs     10/02/22  0613 10/03/22  0514   PROTIME 20.6* 23.5*   INR 2.2 2.5         IMAGING DATA:      Primary Problem  Critical limb ischemia with history of revascularization of same extremity [facility])    Active Hospital Problems    Diagnosis Date Noted    Arterial thrombosis ([facility] [I74.9] 10/01/2022     Priority: Medium    Critical limb ischemia with history of revascularization of same extremity ([facility] [G76.927, Z98.890] 09/24/2022     Priority: Medium    Occlusion of right iliac artery ([facility] [I74.5] 09/24/2022     Priority: Medium    Acute occlusion of artery of lower extremity due to thrombosis ([facility] [I74.3] 09/24/2022     Priority: Medium         IMPRESSION:   Severe and progressive peripheral vascular disease  Failed Eliquis  No clear risk factors, the patient is not a smoker and hypercoagulable work-up was essentially negative    RECOMMENDATIONS:  Agree with the current plans to use heparin Pletal and nifedipine and Brilinta while in-house and discharged on warfarin and Brilinta  On warfarin, well tolerated, INR 2.5 this morning. Target INR between 2 and 3. Possible discharge today as per vascular        Rich MD Francine  Internal Medicine Resident, PGY-3  [address] [place] [address]  10/3/2022,3:46 PM    Attending Physician Statement   I have discussed the care of Deloris Valentino Braun, including pertinent history and exam findings with the resident. I have reviewed the key elements of all parts of the encounter with the resident. I have seen and examined the patient with the resident. I agree with the assessment and plan and status of the problem list as documented.                                [address] [facility], [place], operated by [facility]

## 2022-10-03 NOTE — DISCHARGE INSTRUCTIONS
Take all medications as prescribed. Follow up with your PCP within the next 7-10 days. Follow up with Dr. Anahy Escobedo in the Vascular Clinic in 2 weeks, call [phone] for appointment. No driving while using narcotic pain medications. Call the clinic or return to the ED if any questions or concerns arise. Coumadin Instructions:   Take 10 mg Coumadin daily   Have INR drawn 10/6/2022  If supra-therapeutic will plan to have patient start 7.5 mg daily

## 2022-10-03 NOTE — PROGRESS NOTES
Vascular Surgery Progress Note         PATIENT NAME: Heath Jasso     TODAY'S DATE: 10/3/2022, 6:09 AM    CC:    SUBJECTIVE:    Pt seen and examined at bedside. No acute events overnight. Hemodynamically stable, and afebrile. No complaints this morning, denies any pain. Tolerating diet, voiding without difficulty. OBJECTIVE:   Vitals:  /82   Pulse 83   Temp 97.9 °F (36.6 °C) (Temporal)   Resp 16   Ht 5' 5\" (1.651 m)   Wt 177 lb 14.6 oz (80.7 kg)   LMP 09/12/2022   SpO2 97%   BMI 29.61 kg/m²      INTAKE/OUTPUT:    No intake or output data in the 24 hours ending 10/03/22 0609              GENERAL: AOx3, NAD  HEENT: EOMI bilaterally  CARDIAC: Regular rate and rhythm. ABDOMEN: Soft, non-distended   EXTREMITY: moves all extremities, no edema. Doppler signals present bilateral femoral, L DP/PT, R AT/PT   NEURO: CN II-XII intact. Gross motor intact. INCISION: Dressing clean, dry, intact. Data:  PT/INR:    Lab Results   Component Value Date/Time    PROTIME 23.5 10/03/2022 05:14 AM    INR 2.5 10/03/2022 05:14 AM         ASSESSMENT   52year old female s/p right iliac stent thrombectomy, right popliteal thrombectomy and angioplasty 9/24/2022    PLAN  Warfarin dosing per pharmacy. Received 10 mg yesterday. INR therapeutic today,  2.5 from 2.2 previously. Plan to discharge patient on Warfarin 10 mg daily, with repeat INR  10/6/2022, with plan to reduce to 7.5 mg daily if supra-therapeutic   Continue Brilinta   Encourage ambulation, OOB and up to chair.    Plan for discharge home later today         Electronically signed by Fish Yin DO  on 10/3/2022 at 6:09 AM

## 2022-10-03 NOTE — CARE COORDINATION
Transitional planning    Spoke to patient about plan for discharge. She plans to go home. Lives with her dtr, has transportation. If d/c on coumadin plans to f/u at [facility] Coumadin clinic.

## 2022-10-04 ENCOUNTER — TELEPHONE (OUTPATIENT)
Dept: PHARMACY | Age: 47
End: 2022-10-04

## 2022-10-04 NOTE — TELEPHONE ENCOUNTER
Clinic received new referral for warfarin management, called to schedule but do not have working number. I called alternate number, spoke with sister Henri Vallecillo, she will try to reach patient and have her call us. I stressed importance.       Mana Lynn, [facility], CACP  Clinical Pharmacist Medication Management  10/4/2022  10:12 AM

## 2022-10-06 ENCOUNTER — HOSPITAL ENCOUNTER (OUTPATIENT)
Dept: PHARMACY | Age: 47
Setting detail: THERAPIES SERIES
Discharge: HOME | End: 2022-10-06
Payer: COMMERCIAL

## 2022-10-06 DIAGNOSIS — I74.3 ACUTE OCCLUSION OF ARTERY OF LOWER EXTREMITY DUE TO THROMBOSIS (HCC): Primary | ICD-10-CM

## 2022-10-06 LAB
INR BLD: 3.5
PROTIME: 42.3 SECONDS

## 2022-10-06 PROCEDURE — 99213 OFFICE O/P EST LOW 20 MIN: CPT

## 2022-10-06 PROCEDURE — 85610 PROTHROMBIN TIME: CPT

## 2022-10-06 NOTE — PROGRESS NOTES
Medication Management Service, Warfarin Management  CHELSEA FRANCO [facility], [phone]  First visit to ACS Office. Date: 10/6/2022     Education provided on indication and mechanism of warfarin, compliance, appropriate follow-up & monitoring, dietary and medication interactions, potential thromboembolic & bleeding complications, when to seek medical care, and office policy. Patient has been on warfarin in the past, regimen: none, previous Eliquis but was unable to get refill so missed one month plus resulting in new clot. Patient states compliant with regimen. No bleeding or thromboembolic side effects noted. No significant med or dietary changes. No significant recent illness or disease state changes. Subjective:   Linda Grant is a 52 y.o. female who presents to clinic today for anticoagulation monitoring and adjustment. Patient seen in clinic for warfarin management due to  Indication:  Acute occlusion of artery of lower extremity due to thrombosis (HCC)  INR goal: of 2.5-3.5. Duration of therapy: indefinite. Assessment and PLAN   PT/INR done in office per protocol. INR today is 3.5, therapeutic. Patient has not been eating any green vegetables but reports that prior to warfarin start she had been eating daily. Plan: Will continue current regimen of warfarin 10mg orally once daily. Using warfarin 10 mg tablets. Also has 7.5mg tablets at home, encouraged her to put those away out of sight to prevent accidental use. Educated on tablet strength and color. Recheck INR in 1 week(s). Patient seen in room # 3. ED. OP. = Patient will refrain from having any greens except for the occasional holiday. She will implement 2 small servings of green vegetables each week, will gradually increase with each follow up appointment.      Electronically signed by Rosalinda Alexander RPh, IKE  Clinical Pharmacist Medication Management  10/6/2022  1:59 PM      For Pharmacy Admin Tracking Only    Intervention Detail: Adherence Monitorin  Total # of Interventions Recommended: 1  Total # of Interventions Accepted: 1  Time Spent (min): 20

## 2022-10-07 NOTE — DISCHARGE SUMMARY
SURGERY DISCHARGE NOTE    Disposition: DISCHARGE TO home    PATIENT NAME: Ammon Castillo    YOB: 1975  MEDICAL RECORD NO. [id number]  DATE: 10/7/2022  ADMITTING PHYSICIAN: Dr. Noel Lira: Redd Coe MD  DISCHARGE DATE:  10/3/2022  5:48 PM  DISPOSITION: to Home  ADMITTING DIAGNOSIS:   1. Arterial thrombosis ([facility]    2. Acute occlusion of artery of lower extremity due to thrombosis ([facility]    3. Occlusion of right iliac artery (HCC)      DISCHARGE DIAGNOSIS:   Patient Active Problem List   Diagnosis Code    Migraine headache with aura G43.109    Primary hypertension I10    Obesity E66.9    Menometrorrhagia N92.1    Intertriginous candidiasis B37.2    Depression F32. A    Claudication in peripheral vascular disease ([facility]) I73.9    Left popliteal artery occlusion ([facility]) I70.202    Hypercoagulable state ([facility]) D68.59    Hemorrhage of rectum and anus K62.5    Anemia D64.9    Foot pain M79.673    Wound of right leg S81.801A    Takotsubo cardiomyopathy I51.81    Chest pain R07.9    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 ([facility]) I74.3    Pain of left lower extremity due to ischemia M79.605, I99.8    Left leg pain M79.605    Pre-syncope R55    Normal cervical cytology with positive HPV16 R87.618    Dysplasia of cervix, low grade ([facility]) N87.0    PAD (peripheral artery disease) ([facility]) I73.9    Abnormal CT of the abdomen R93.5    Popliteal artery embolism, right ([facility]) I74.3    Leg pain, right M79.604    Lymphadenopathy, mesenteric R59.0    Critical limb ischemia with history of revascularization of same extremity ([facility]) I70.229, Z98.890    Occlusion of right iliac artery ([facility]) I74.5    Acute occlusion of artery of lower extremity due to thrombosis ([facility]) I74.3    Arterial thrombosis ([facility]) I74.9     CONSULTANTS:  hematology/oncology    PROCEDURES / DIAGNOSTIC TESTS:    1. Left common femoral arterial access under ultrasound guidance. 2.  Distal aortography with pelvic runoff.   3. Placement of catheter into right common femoral artery with right common femoral profundofemoral and proximal SFA arteriography. 4.  Placement of catheter into right popliteal artery with popliteal and tibial peroneal arteriography. 5.  Extirpation of arterial thrombus in the iliac system with the penumbra 7 Western Julienne device. 6.  Extirpation of thrombus from the popliteal and tibioperoneal system with the 7 Western Julienne and 5 Western Julienne penumbra devices. 7.  Placement of an 8 x 59 Viabahn VBX covered stent in the right common to external iliac artery. 8.  Balloon angioplasty of the popliteal artery with a 4 x 100 balloon. 9.  Balloon angioplasty of the tibioperoneal trunk and proximal peroneal artery and distal peroneal artery with a 3 x 200 balloon. 10.  Completion arteriography of the right lower extremity and right iliac system. 11.  Left distal external iliac, common femoral, profundofemoral and proximal SFA arteriography. 12.  Placement of 8 Yemeni Angio-Seal device in the left common femoral artery. Latia Tang originally presented to the hospital on 9/24/2022  5:22 AM who presented with right lower extremity pain. She had had several previous talar interventions and bypasses in that extremity. During this admission it was found that her right iliac stent had occluded as well as her proximal popliteal artery. She emergently was taken to surgery for intervention, as listed above. She is admitted postoperatively. Her vascular exam postoperatively improved to having Doppler signals present in bilateral femoral vessels, as well as left DP/PT, and right AT/PT. Due to her occlusion despite being on anticoagulation, heme-onc was consulted to evaluate the patient. She was started on Brilinta and warfarin. Patient was monitored postoperatively, and lab work was checked frequently. She remained in the hospital until she was therapeutic on her Coumadin.   Arrangements were made for patient to have repeat INR performed outpatient, as well as follow-up with the Coumadin clinic. DISCHARGE INSTRUCTIONS     Discharge Medications:        Medication List        START taking these medications      ticagrelor 90 MG Tabs tablet  Commonly known as: BRILINTA  Take 1 tablet by mouth 2 times daily     * warfarin 10 MG tablet  Commonly known as: Coumadin  Take as directed. If you are unsure how to take this medication, talk to your nurse or doctor. Original instructions: Take 1 tablet by mouth daily     * warfarin 7.5 MG tablet  Commonly known as: Coumadin  Take as directed. If you are unsure how to take this medication, talk to your nurse or doctor. Original instructions: Take 1 tablet by mouth daily To be taken if INR on 10/6/2022 is supratherapeutic (>3)           * This list has 2 medication(s) that are the same as other medications prescribed for you. Read the directions carefully, and ask your doctor or other care provider to review them with you.                 CONTINUE taking these medications      acetaminophen 500 MG tablet  Commonly known as: TYLENOL  Take 1 tablet by mouth 4 times daily as needed for Pain     amLODIPine 10 MG tablet  Commonly known as: NORVASC  take 1 tablet by mouth once daily     atorvastatin 40 MG tablet  Commonly known as: LIPITOR  take 1 tablet by mouth once daily     diclofenac sodium 1 % Gel  Commonly known as: VOLTAREN  Apply 4 g topically 4 times daily     lisinopril 5 MG tablet  Commonly known as: PRINIVIL;ZESTRIL  take 1 tablet by mouth once daily            STOP taking these medications      apixaban 5 MG Tabs tablet  Commonly known as: Eliquis               Where to Get Your Medications        These medications were sent to [facility] [address], [address] [facility]  [address], [address] [place] [address]      Phone: [phone]   ticagrelor 90 MG Tabs tablet  warfarin 10 MG tablet  warfarin 7.5 MG tablet       Diet: Regular diet as tolerated  Activity: Ambulate as much as possible, and as tolerated  Wound Care: Daily and as needed  Follow-up:   1. Follow-up with vascular surgery in 2 weeks  2. Follow up in  the next few weeks with PCP: Jackie Patterson MD,    3.   Follow-up in Coumadin clinic this week, as arranged    Aamir Goldberg DO  10/7/2022, 7:11 PM

## 2022-10-08 ENCOUNTER — HOSPITAL ENCOUNTER (OUTPATIENT)
Dept: MAMMOGRAPHY | Age: 47
Discharge: HOME | End: 2022-10-10
Payer: COMMERCIAL

## 2022-10-08 ENCOUNTER — ANCILLARY ORDERS (OUTPATIENT)
Dept: OBGYN | Age: 47
End: 2022-10-08

## 2022-10-08 DIAGNOSIS — Z01.419 ENCOUNTER FOR WELL WOMAN EXAM WITH ROUTINE GYNECOLOGICAL EXAM: ICD-10-CM

## 2022-10-08 DIAGNOSIS — R92.8 ABNORMAL MAMMOGRAM: Primary | ICD-10-CM

## 2022-10-08 DIAGNOSIS — Z12.31 OTHER SCREENING MAMMOGRAM: ICD-10-CM

## 2022-10-08 PROCEDURE — 77063 BREAST TOMOSYNTHESIS BI: CPT

## 2022-10-12 ENCOUNTER — HOSPITAL ENCOUNTER (OUTPATIENT)
Dept: PHARMACY | Age: 47
Setting detail: THERAPIES SERIES
Discharge: HOME | End: 2022-10-12
Payer: COMMERCIAL

## 2022-10-12 DIAGNOSIS — I74.3 ACUTE OCCLUSION OF ARTERY OF LOWER EXTREMITY DUE TO THROMBOSIS (HCC): Primary | ICD-10-CM

## 2022-10-12 LAB
INR BLD: 7.7
PROTIME: 92.3 SECONDS

## 2022-10-12 PROCEDURE — 85610 PROTHROMBIN TIME: CPT

## 2022-10-12 PROCEDURE — 99212 OFFICE O/P EST SF 10 MIN: CPT

## 2022-10-12 NOTE — PROGRESS NOTES
Medication Management Service, Warfarin Management  CHELSEA FRANCO [facility], [phone]  Visit Date: 10/12/2022   Subjective:   Golden Kaminski is a 52 y.o. female who presents to clinic today for anticoagulation monitoring and adjustment. Patient seen in clinic for warfarin management due to  Indication:  Acute occlusion of artery of lower extremity due to thrombosis (HCC)  INR goal: of 2.5-3.5. Duration of therapy: indefinite. Assessment and PLAN   PT/INR done in office per protocol. INR today is 7.7, supratherapeutic. Elevated INR likely due to regimen as patient is new start to warfarin, ideal regimen TBD. Patient reports that she did have her two servings of green vegetables this past week, reports very little alcohol use about 5 days ago. Plan: Will hold dose x 2 days, then reduce dose on Thursday to 7.5mg followed by two days with 10mg, this is a 32% reduction from previous 70mg/week. INR on 10/2 = 2.2 after 45mg over the previous 6 days. Using warfarin 7.5 mg and 10 mg tablets. Will not continue to use both strengths but used for this week for optimal regimen. Recheck INR in 5 days after 47.5mg over the previous week. Patient seen in room # 3. ED. OP. = Counseled patient on safety precautions associated with elevated INR. Discussed salads; ok to enjoy at will if not spinach, kale or dark green leafy vegetables include, otherwise if so then it counts as a green vegetable. Patient verbalized understanding of dosing directions and information discussed. Dosing schedule given to patient. Progress note sent to referring office. Patient acknowledges working in consult agreement with pharmacist as referred by his/her physician.       Electronically signed by Giles Essex, RPh, IKE  Clinical Pharmacist Medication Management  10/12/2022  9:38 AM      For Pharmacy Admin Tracking Only    Intervention Detail: Adherence Monitorin and Dose Adjustment: 1, reason: Improve Adherence, Therapy De-escalation  Total # of Interventions Recommended: 2  Total # of Interventions Accepted: 2  Time Spent (min): 30

## 2022-10-13 ENCOUNTER — OFFICE VISIT (OUTPATIENT)
Dept: FAMILY MEDICINE CLINIC | Age: 47
End: 2022-10-13
Payer: COMMERCIAL

## 2022-10-13 VITALS
OXYGEN SATURATION: 96 % | HEIGHT: 65 IN | SYSTOLIC BLOOD PRESSURE: 134 MMHG | DIASTOLIC BLOOD PRESSURE: 88 MMHG | BODY MASS INDEX: 31.39 KG/M2 | HEART RATE: 80 BPM | WEIGHT: 188.4 LBS

## 2022-10-13 DIAGNOSIS — R73.03 PREDIABETES: ICD-10-CM

## 2022-10-13 DIAGNOSIS — N92.1 MENORRHAGIA WITH IRREGULAR CYCLE: Primary | ICD-10-CM

## 2022-10-13 DIAGNOSIS — D64.9 ANEMIA, UNSPECIFIED TYPE: ICD-10-CM

## 2022-10-13 DIAGNOSIS — Z12.11 COLON CANCER SCREENING: ICD-10-CM

## 2022-10-13 DIAGNOSIS — Z00.00 HEALTHCARE MAINTENANCE: ICD-10-CM

## 2022-10-13 DIAGNOSIS — I10 ESSENTIAL HYPERTENSION: ICD-10-CM

## 2022-10-13 PROCEDURE — 1111F DSCHRG MED/CURRENT MED MERGE: CPT

## 2022-10-13 PROCEDURE — 1036F TOBACCO NON-USER: CPT

## 2022-10-13 PROCEDURE — G8417 CALC BMI ABV UP PARAM F/U: HCPCS

## 2022-10-13 PROCEDURE — G8427 DOCREV CUR MEDS BY ELIG CLIN: HCPCS

## 2022-10-13 PROCEDURE — 99213 OFFICE O/P EST LOW 20 MIN: CPT

## 2022-10-13 PROCEDURE — G8484 FLU IMMUNIZE NO ADMIN: HCPCS

## 2022-10-13 PROCEDURE — 83036 HEMOGLOBIN GLYCOSYLATED A1C: CPT

## 2022-10-13 RX ORDER — FERROUS SULFATE 325(65) MG
325 TABLET ORAL
Qty: 90 TABLET | Refills: 1 | Status: SHIPPED | OUTPATIENT
Start: 2022-10-13

## 2022-10-13 ASSESSMENT — ENCOUNTER SYMPTOMS
SORE THROAT: 0
DIARRHEA: 0
CONSTIPATION: 0
COUGH: 0
VOMITING: 0
SHORTNESS OF BREATH: 0
ABDOMINAL PAIN: 0
NAUSEA: 0

## 2022-10-13 NOTE — PROGRESS NOTES
Attending Physician Statement  I  have discussed the care of Oni Portillo including pertinent history and exam findings with the resident. I agree with the assessment, plan and orders as documented by the resident. S/p arterial thrombosis  Got stents placed in  Will follow up with vascular    Anemia likely 2/2 menorrhagia  F/u OBGYN  GI follow up     /88 (Site: Right Upper Arm, Position: Sitting, Cuff Size: Medium Adult) Comment: manual  Pulse 80   Ht 5' 5\" (1.651 m)   Wt 188 lb 6.4 oz (85.5 kg)   LMP 09/25/2022   SpO2 96%   BMI 31.35 kg/m²    BP Readings from Last 3 Encounters:   10/13/22 134/88   10/03/22 122/79   09/15/22 115/75     Wt Readings from Last 3 Encounters:   10/13/22 188 lb 6.4 oz (85.5 kg)   10/03/22 177 lb 7.5 oz (80.5 kg)   09/15/22 183 lb (83 kg)          Diagnosis Orders   1. Menorrhagia with irregular cycle  Kiley Fournier MD, OB/GYN, [facility]      2. Essential hypertension        3. Anemia, unspecified type  Kiley Fournier MD, OB/GYN, [facility]    Iron and TIBC    Ferritin    Vitamin B12 & Folate    ferrous sulfate (IRON 325) 325 (65 Fe) MG tablet      4. Prediabetes  metFORMIN (GLUCOPHAGE) 500 MG tablet    POCT glycosylated hemoglobin (Hb A1C)      5. Colon cancer screening  Fecal DNA Colorectal cancer screening (Cologuard)      6.  Healthcare maintenance  HIV Screen    Hepatitis C Antibody    Lipid Panel          Lucrecia Schafer MD 10/17/2022 10:32 AM

## 2022-10-13 NOTE — PATIENT INSTRUCTIONS
Thank you for letting us take care of you today. We hope all your questions were addressed. If a question was overlooked or something else comes to mind after you return home, please contact a member of your Care Team listed below. Your Care Team at [place] [address] is Team #2  Ronald Messina DO (Faculty)  Carmita Wolff (Faculty)  Jay Macdonald MD (Resident)  Kirill Garcia MD (Resident)  Daksha Goins MD (Resident)  Shane Martin MD (Resident)  Jane Quan., [facility]  Vane Morrell.,  MA  Burke Green., LPN  Asya Pelletier., [facility] office)  Lily Ridley, [address] (Clinical Practice Manager)  Martir Frost [facility] (Clinical Pharmacist)     Office phone number: [phone]    If you need to get in right away due to illness, please be advised we have \"Same Day\" appointments available Monday-Friday. Please call us at [phone] option #3 to schedule your \"Same Day\" appointment.

## 2022-10-13 NOTE — PROGRESS NOTES
Visit Information    Have you changed or started any medications since your last visit including any over-the-counter medicines, vitamins, or herbal medicines? no   Have you stopped taking any of your medications? Is so, why? -  no  Are you having any side effects from any of your medications? - no    Have you seen any other physician or provider since your last visit?  no   Have you had any other diagnostic tests since your last visit? yes - EGD, US, CTA, Labs   Have you been seen in the emergency room and/or had an admission in a hospital since we last saw you?  yes - [facility]   Have you had your routine dental cleaning in the past 6 months?  no     Do you have an active MyChart account? If no, what is the barrier?   Yes    Patient Care Team:  Wesley Nix MD as PCP - General (Family Medicine)  Yakelin Herring MD as Consulting Physician (Gastroenterology)    Medical History Review  Past Medical, Family, and Social History reviewed and does not contribute to the patient presenting condition    Health Maintenance   Topic Date Due    COVID-19 Vaccine (1) Never done    Pneumococcal 0-64 years Vaccine (1 - PCV) Never done    HIV screen  Never done    Hepatitis C screen  Never done    DTaP/Tdap/Td vaccine (1 - Tdap) Never done    Lipids  10/03/2019    Colorectal Cancer Screen  05/06/2020    A1C test (Diabetic or Prediabetic)  03/05/2021    Flu vaccine (1) 08/01/2022    Depression Monitoring  05/24/2023    Cervical cancer screen  05/24/2027    Hepatitis A vaccine  Aged Out    Hib vaccine  Aged Out    Meningococcal (ACWY) vaccine  Aged Out

## 2022-10-13 NOTE — PROGRESS NOTES
Sushil Wilder (:  1975) is a 52 y.o. female,Established patient, here for evaluation of the following chief complaint(s): Other (DVT)    ASSESSMENT/PLAN:    1. Menorrhagia with irregular cycle  -     Kiley - Amanda Quesada MD, OB/GYN, [place]  2. Essential hypertension  3. Anemia, unspecified type  -     Reggie Castle MD, OB/GYN, [place]  -     Iron and TIBC; Future  -     Ferritin; Future  -     Vitamin B12 & Folate; Future  -     ferrous sulfate (IRON 325) 325 (65 Fe) MG tablet; Take 1 tablet by mouth daily (with breakfast), Disp-90 tablet, R-1Normal  4. Prediabetes  -     metFORMIN (GLUCOPHAGE) 500 MG tablet; Take 1 tablet by mouth daily (with breakfast), Disp-30 tablet, R-0Normal  -     POCT glycosylated hemoglobin (Hb A1C)  5. Colon cancer screening  -     Fecal DNA Colorectal cancer screening (Cologuard)  6. Healthcare maintenance  -     HIV Screen; Future  -     Hepatitis C Antibody; Future  -     Lipid Panel; Future    Patient denies any blood in stool family history of colon cancer. We will proceed with Cologuard. For anemia the patient was provided a OB/GYN referral in addition to her GI appointment scheduled for 10/28/2022 already. We will also work-up with iron studies. For pre-DM, patient is not currently on any metformin. We will start with metformin 500 mg oral daily. A1c today in low 6 range. Return in about 2 months (around 2022), or if symptoms worsen or fail to improve, for anemia and vaginal bleed. .       Subjective     SUBJECTIVE/OBJECTIVE:    HPI    Ms. Tiki Madison, 51 yo AA F, with previous medical history of chronic anemia possibly due to menorrhagia, bilateral lower extremity PAD with extensive surgeries and workup including thrombectomies and stent placements, and HTN. Presents to the [facility] medicine clinic today following admission at [facility] for right iliac artery thrombosis.   Patient was admitted from 2020 to 10/3/2022 s/p stent in the right common to external iliac artery. Due to episodes of recurrent thrombosis, the patient is now on Brilinta and warfarin. Patient is allergic to aspirin. Patient has follow-up with vascular surgery on 10/27/2022. Denies any lower extremity pain. Is mobile. No edema noted. Patient did discuss having sutures on left lower extremity removed however discussed with patient that she should see the vascular surgeon first.  Patient agreed. HTN  Blood pressure today 134/88. Take amlodipine 10 mg oral daily, and lisinopril 5 mg orally  Denies chest pain or shortness of breath at this time. Pre-DM  Lab Results   Component Value Date/Time    LABA1C 6.1 03/05/2020 09:23 AM    LABA1C 5.9 12/18/2017 02:53 PM    LABA1C 5.7 07/23/2014 01:46 PM   Most recent HbA1c in low 6 performed in clinic. Results not uploaded to system at time of note  Patient not currently on any metformin. Anemia  Has had anemia for several years  No significant recent anemia work-up noted in chart  Patient does have follow-up with GI scheduled for 10/28/2022 at this time  Patient mentions also having menorrhagia. Was seen by OB/GYN recently with recommendation to place IUD. Patient does not have any follow-up with OB/GYN at this time. We will provide referral.    No other acute concerns at this time. Review of Systems   Constitutional:  Negative for activity change and fever. HENT:  Negative for congestion and sore throat. Respiratory:  Negative for cough and shortness of breath. Cardiovascular:  Negative for chest pain and leg swelling. Gastrointestinal:  Negative for abdominal pain, constipation, diarrhea, nausea and vomiting. Genitourinary:  Positive for frequency. Negative for difficulty urinating and dysuria. Neurological:  Negative for syncope and headaches. Psychiatric/Behavioral:  Negative for agitation, behavioral problems and confusion.          Objective   Physical Exam  Vitals and nursing note reviewed. Constitutional:       General: She is not in acute distress. Appearance: Normal appearance. Cardiovascular:      Rate and Rhythm: Normal rate and regular rhythm. Heart sounds: No murmur heard. Pulmonary:      Effort: No respiratory distress. Breath sounds: Normal breath sounds. No wheezing. Abdominal:      Palpations: Abdomen is soft. Tenderness: There is no abdominal tenderness. There is no guarding or rebound. Musculoskeletal:         General: No tenderness. Right lower leg: No edema. Left lower leg: No edema. Neurological:      General: No focal deficit present. Mental Status: She is alert and oriented to person, place, and time. Motor: No weakness. An electronic signature was used to authenticate this note.     --Bobbe Runner, MD

## 2022-10-14 LAB — HBA1C MFR BLD: 6.1 %

## 2022-10-17 ENCOUNTER — HOSPITAL ENCOUNTER (OUTPATIENT)
Dept: PHARMACY | Age: 47
Setting detail: THERAPIES SERIES
Discharge: HOME | End: 2022-10-17
Payer: COMMERCIAL

## 2022-10-17 DIAGNOSIS — I74.3 ACUTE OCCLUSION OF ARTERY OF LOWER EXTREMITY DUE TO THROMBOSIS (HCC): Primary | ICD-10-CM

## 2022-10-17 LAB
INR BLD: 5.4
PROTIME: 64.5 SECONDS

## 2022-10-17 PROCEDURE — 99212 OFFICE O/P EST SF 10 MIN: CPT

## 2022-10-17 PROCEDURE — 85610 PROTHROMBIN TIME: CPT

## 2022-10-17 NOTE — PROGRESS NOTES
Medication Management Service, Warfarin Management  CHELSEA FRANCO [facility], [phone]  Visit Date: 10/17/2022   Subjective:   Igor Ramos is a 52 y.o. female who presents to clinic today for anticoagulation monitoring and adjustment. Patient seen in clinic for warfarin management due to  Indication:  Acute occlusion of artery of lower extremity due to thrombosis (HCC)  INR goal: of 2.5-3.5. Duration of therapy: indefinite. Assessment and PLAN   PT/INR done in office per protocol. INR today is 5.4, supratherapeutic but down from 7.7 at last check. Patient reports no alcohol over the weekend but did have a good portion of coleslaw. Plan: Will hold dose today, and reduce dose for tomorrow to 5mg, then take 10mg x 2 days with INR check in 4 days. Using warfarin 10mg tablets, also has 7.5mg tablets in the home. Confirmed she took dose as instructed, used 7.5mg tab for one dose only. Will not continue to use both strength tablets. Recheck INR in 4 days after 52.5mg over the previous week. If in goal range could use the 7.5mg tablets for one orally once daily. Patient seen in room # 3. Patient verbalized understanding of dosing directions and information discussed. Dosing schedule given to patient. Progress note sent to referring office. Patient acknowledges working in consult agreement with pharmacist as referred by his/her physician.       Electronically signed by Eleanor Cline RPh, IKE  Clinical Pharmacist Medication Management  10/17/2022  2:56 PM      For Pharmacy Admin Tracking Only    Intervention Detail: Adherence Monitorin and Dose Adjustment: 1, reason: Improve Adherence, Therapy De-escalation  Total # of Interventions Recommended: 2  Total # of Interventions Accepted: 2  Time Spent (min): 15

## 2022-10-21 ENCOUNTER — HOSPITAL ENCOUNTER (OUTPATIENT)
Dept: PHARMACY | Age: 47
Setting detail: THERAPIES SERIES
Discharge: HOME | End: 2022-10-21
Payer: COMMERCIAL

## 2022-10-21 DIAGNOSIS — I74.3 ACUTE OCCLUSION OF ARTERY OF LOWER EXTREMITY DUE TO THROMBOSIS (HCC): Primary | ICD-10-CM

## 2022-10-21 LAB
INR BLD: 3.9
PROTIME: 46.3 SECONDS

## 2022-10-21 PROCEDURE — 85610 PROTHROMBIN TIME: CPT

## 2022-10-21 PROCEDURE — 99212 OFFICE O/P EST SF 10 MIN: CPT

## 2022-10-21 NOTE — PROGRESS NOTES
Medication Management Service, Warfarin Management  CHELSEA FRANCO [facility], [phone]  Visit Date: 10/21/2022   Subjective:   Ellis Huber is a 52 y.o. female who presents to clinic today for anticoagulation monitoring and adjustment. Patient seen in clinic for warfarin management due to  Indication:  Acute occlusion of artery of lower extremity due to thrombosis. INR goal: of 2.5-3.5. Duration of therapy: indefinite. Assessment and PLAN   PT/INR done in office per protocol. INR today is 3.9, supratherapeutic. Potentially due to decrease in green vegetable servings. This week one small serving. Normally, 2-3 bowls of a green vegetable in a week. Plan: Will stop 10mg tablets and switch to 7.5mg tablets. Will reduce dose to 3.75mg today only then switch current regimen to warfarin 7.5mg by mouth daily. Using warfarin 7.5 mg tablets. Recheck INR in 1.5 week(s). Patient seen in room # 1. ED. OP. = Educated patient to get rid of 10mg tablets and only use the 7.5mg tablets. Counseled to keep green vegetables consistent each week. Patient has gastroenterology appointment coming up. Counseled that if colonoscopy needed to give us a call to explain holding warfarin. Patient verbalized understanding of dosing directions and information discussed. Dosing schedule given to patient. Progress note sent to referring office. Patient acknowledges working in consult agreement with pharmacist as referred by his/her physician.       Electronically signed by Catrachito Ivory on 10/21/22 at 9:50 AM EDT       For Pharmacy Admin Tracking Only    Intervention Detail: Adherence Monitorin and Dose Adjustment: 1, reason: Therapy De-escalation  Total # of Interventions Recommended: 2  Total # of Interventions Accepted: 2  Time Spent (min): 20

## 2022-10-25 ENCOUNTER — HOSPITAL ENCOUNTER (OUTPATIENT)
Age: 47
Discharge: HOME | End: 2022-10-25
Payer: COMMERCIAL

## 2022-10-25 ENCOUNTER — HOSPITAL ENCOUNTER (OUTPATIENT)
Dept: MAMMOGRAPHY | Age: 47
Discharge: HOME | End: 2022-10-27
Payer: COMMERCIAL

## 2022-10-25 ENCOUNTER — HOSPITAL ENCOUNTER (OUTPATIENT)
Dept: ULTRASOUND IMAGING | Age: 47
Discharge: HOME | End: 2022-10-27
Payer: COMMERCIAL

## 2022-10-25 DIAGNOSIS — D64.9 ANEMIA, UNSPECIFIED TYPE: ICD-10-CM

## 2022-10-25 DIAGNOSIS — R92.8 ABNORMAL MAMMOGRAM: ICD-10-CM

## 2022-10-25 DIAGNOSIS — Z00.00 HEALTHCARE MAINTENANCE: ICD-10-CM

## 2022-10-25 LAB
CHOLESTEROL/HDL RATIO: 3.6
CHOLESTEROL: 145 MG/DL
FERRITIN: 58 NG/ML (ref 13–150)
FOLATE: 12 NG/ML
HDLC SERPL-MCNC: 40 MG/DL
HEPATITIS C ANTIBODY: NONREACTIVE
HIV AG/AB: NONREACTIVE
IRON SATURATION: 10 % (ref 20–55)
IRON: 29 UG/DL (ref 37–145)
LDL CHOLESTEROL: 68 MG/DL (ref 0–130)
TOTAL IRON BINDING CAPACITY: 301 UG/DL (ref 250–450)
TRIGL SERPL-MCNC: 186 MG/DL
UNSATURATED IRON BINDING CAPACITY: 272 UG/DL (ref 112–347)
VITAMIN B-12: 875 PG/ML (ref 232–1245)

## 2022-10-25 PROCEDURE — G0279 TOMOSYNTHESIS, MAMMO: HCPCS

## 2022-10-25 PROCEDURE — 36415 COLL VENOUS BLD VENIPUNCTURE: CPT

## 2022-10-25 PROCEDURE — 83540 ASSAY OF IRON: CPT

## 2022-10-25 PROCEDURE — 82728 ASSAY OF FERRITIN: CPT

## 2022-10-25 PROCEDURE — 83516 IMMUNOASSAY NONANTIBODY: CPT

## 2022-10-25 PROCEDURE — 87389 HIV-1 AG W/HIV-1&-2 AB AG IA: CPT

## 2022-10-25 PROCEDURE — 86803 HEPATITIS C AB TEST: CPT

## 2022-10-25 PROCEDURE — 82746 ASSAY OF FOLIC ACID SERUM: CPT

## 2022-10-25 PROCEDURE — 83550 IRON BINDING TEST: CPT

## 2022-10-25 PROCEDURE — 80061 LIPID PANEL: CPT

## 2022-10-25 PROCEDURE — 82784 ASSAY IGA/IGD/IGG/IGM EACH: CPT

## 2022-10-25 PROCEDURE — 82607 VITAMIN B-12: CPT

## 2022-10-26 LAB
GLIADIN DEAMINIDATED PEPTIDE AB IGA: 1 U/ML
GLIADIN DEAMINIDATED PEPTIDE AB IGG: <0.4 U/ML
IGA: 186 MG/DL (ref 70–400)
TISSUE TRANSGLUTAMINASE IGA: 0.5 U/ML

## 2022-10-31 ENCOUNTER — HOSPITAL ENCOUNTER (OUTPATIENT)
Dept: PHARMACY | Age: 47
Setting detail: THERAPIES SERIES
Discharge: HOME | End: 2022-10-31
Payer: COMMERCIAL

## 2022-10-31 DIAGNOSIS — I74.3 ACUTE OCCLUSION OF ARTERY OF LOWER EXTREMITY DUE TO THROMBOSIS (HCC): Primary | ICD-10-CM

## 2022-10-31 LAB
INR BLD: 3.7
PROTIME: 43.9 SECONDS

## 2022-10-31 PROCEDURE — 99212 OFFICE O/P EST SF 10 MIN: CPT

## 2022-10-31 PROCEDURE — 99211 OFF/OP EST MAY X REQ PHY/QHP: CPT

## 2022-10-31 PROCEDURE — 85610 PROTHROMBIN TIME: CPT

## 2022-10-31 NOTE — PROGRESS NOTES
Medication Management Service, Warfarin Management  CHELSEA FRANCO [facility], [phone]  Visit Date: 10/31/2022   Subjective:   Melida Marin is a 52 y.o. female who presents to clinic today for anticoagulation monitoring and adjustment. Patient seen in clinic for warfarin management due to  Indication:  Acute occlusion of artery of lower extremity due to thrombosis. INR goal: of 2.5-3.5. Duration of therapy: indefinite. Assessment and PLAN   PT/INR done in office per protocol. INR today is 3.7, just above goal.  INR is elevated but down from 3.9 last visit. She did not have green vegetables and usually has 2-3 servings each week. Plan:  Continue current regimen to warfarin 7.5mg by mouth daily. Using warfarin 7.5 mg tablets. Recheck INR in 2 week(s). Patient seen in room # 1. ED. OP. = Counseled to keep green vegetables consistent each week. Patient verbalized understanding of dosing directions and information discussed. Dosing schedule given to patient. Progress note sent to referring office. Patient acknowledges working in consult agreement with pharmacist as referred by his/her physician. [address] [place] [address]  Ph., CACP, Clinical Pharmacist  Anticoagulation Services, [facility]  2022  8:16 AM      For Pharmacy [id number] Only    Intervention Detail: Adherence Monitorin  Total # of Interventions Recommended: 1  Total # of Interventions Accepted: 1  Time Spent (min): 30

## 2022-11-03 ENCOUNTER — TELEPHONE (OUTPATIENT)
Dept: VASCULAR SURGERY | Age: 47
End: 2022-11-03

## 2022-11-09 DIAGNOSIS — I10 ESSENTIAL HYPERTENSION: ICD-10-CM

## 2022-11-09 DIAGNOSIS — R73.03 PREDIABETES: ICD-10-CM

## 2022-11-09 RX ORDER — AMLODIPINE BESYLATE 10 MG/1
TABLET ORAL
Qty: 90 TABLET | Refills: 2 | Status: SHIPPED | OUTPATIENT
Start: 2022-11-09

## 2022-11-09 NOTE — TELEPHONE ENCOUNTER
E-scribe request for med refills. Please review and e-scribe if applicable.      Last Visit Date:  10/13/2022  Next Visit Date:  12/13/2022    Hemoglobin A1C (%)   Date Value   10/13/2022 6.1   03/05/2020 6.1   12/18/2017 5.9             ( goal A1C is < 7)   No results found for: LABMICR  LDL Cholesterol (mg/dL)   Date Value   10/25/2022 68       (goal LDL is <100)   AST (U/L)   Date Value   09/08/2022 14     ALT (U/L)   Date Value   09/08/2022 11     BUN (mg/dL)   Date Value   09/29/2022 9     BP Readings from Last 3 Encounters:   10/13/22 134/88   10/03/22 122/79   09/15/22 115/75          (goal 120/80)        Patient Active Problem List:     Migraine headache with aura     Primary hypertension     Obesity     Menometrorrhagia     Intertriginous candidiasis     Depression     Claudication in peripheral vascular disease ([address]     Left popliteal artery occlusion (HCC)     Hypercoagulable state ([address]     Hemorrhage of rectum and anus     Anemia     Foot pain     Wound of right leg     Takotsubo cardiomyopathy     Chest pain     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     Critical limb ischemia with history of revascularization of same extremity ([address]     Occlusion of right iliac artery ([address]     Acute occlusion of artery of lower extremity due to thrombosis ([address]     Arterial thrombosis ([address]      ----Miguel Chambers

## 2022-11-14 ENCOUNTER — HOSPITAL ENCOUNTER (OUTPATIENT)
Dept: PHARMACY | Age: 47
Setting detail: THERAPIES SERIES
Discharge: HOME | End: 2022-11-14
Payer: COMMERCIAL

## 2022-11-14 DIAGNOSIS — I74.3 ACUTE OCCLUSION OF ARTERY OF LOWER EXTREMITY DUE TO THROMBOSIS (HCC): Primary | ICD-10-CM

## 2022-11-14 LAB
INR BLD: 3.8
PROTIME: 45.3 SECONDS

## 2022-11-14 PROCEDURE — 85610 PROTHROMBIN TIME: CPT

## 2022-11-14 PROCEDURE — 99212 OFFICE O/P EST SF 10 MIN: CPT

## 2022-11-14 NOTE — PROGRESS NOTES
Medication Management Service, Warfarin Management  [facility], [phone]  Visit Date: 11/14/2022   Subjective:   Linda Grant is a 52 y.o. female who presents to clinic today for anticoagulation monitoring and adjustment. Patient seen in clinic for warfarin management due to  Indication:  Acute occlusion of artery of lower extremity due to thrombosis. INR goal: of 2.5-3.5. Duration of therapy: indefinite. Assessment and PLAN   PT/INR done in office per protocol. INR today is 3.8, supratherapeutic. INR has been just above goal the past two visits. Elevated INR may be due to alcohol intake and decrease in vitamin K consumption. She reports consuming 2 glasses of wine over the weekend. She reported consuming 1 serving of green vegetables this week and usually has 2-3 servings each week. Plan: Will reduce weekly dose by 7.1%. New regimen is warfarin 3.75 mg every Monday; 7.5 mg all other days. Using warfarin 7.5 mg tablets. Recheck INR in 2 week(s). Patient seen in room # 1. ED. OP. = Counseled to keep green vegetables consistent each week. Patient verbalized understanding of dosing directions and information discussed. Dosing schedule given to patient. Progress note sent to referring office. Patient acknowledges working in consult agreement with pharmacist as referred by his/her physician.       Electronically signed by Keisha Nunez on 11/14/22 at 1:59 PM EST     For Pharmacy Admin Tracking Only    Intervention Detail: Dose Adjustment: 1, reason: Therapy De-escalation  Total # of Interventions Recommended: 1  Total # of Interventions Accepted: 1  Time Spent (min): 20

## 2022-11-16 ENCOUNTER — HOSPITAL ENCOUNTER (OUTPATIENT)
Dept: VASCULAR LAB | Age: 47
Discharge: HOME | End: 2022-11-16
Payer: COMMERCIAL

## 2022-11-16 DIAGNOSIS — I73.9 PAD (PERIPHERAL ARTERY DISEASE) (HCC): ICD-10-CM

## 2022-11-16 PROCEDURE — 93923 UPR/LXTR ART STDY 3+ LVLS: CPT

## 2022-11-17 ENCOUNTER — OFFICE VISIT (OUTPATIENT)
Dept: VASCULAR SURGERY | Age: 47
End: 2022-11-17

## 2022-11-17 VITALS
OXYGEN SATURATION: 99 % | SYSTOLIC BLOOD PRESSURE: 126 MMHG | HEIGHT: 65 IN | WEIGHT: 191 LBS | BODY MASS INDEX: 31.82 KG/M2 | HEART RATE: 74 BPM | DIASTOLIC BLOOD PRESSURE: 79 MMHG | TEMPERATURE: 97.7 F

## 2022-11-17 DIAGNOSIS — I74.9 ARTERIAL THROMBOSIS (HCC): Primary | ICD-10-CM

## 2022-11-17 DIAGNOSIS — I74.3 POPLITEAL ARTERY EMBOLISM, RIGHT (HCC): ICD-10-CM

## 2022-11-17 DIAGNOSIS — I73.9 PAD (PERIPHERAL ARTERY DISEASE) (HCC): ICD-10-CM

## 2022-11-17 DIAGNOSIS — I74.5 OCCLUSION OF RIGHT ILIAC ARTERY (HCC): ICD-10-CM

## 2022-11-17 PROCEDURE — 99214 OFFICE O/P EST MOD 30 MIN: CPT | Performed by: SURGERY

## 2022-11-17 PROCEDURE — 3074F SYST BP LT 130 MM HG: CPT | Performed by: SURGERY

## 2022-11-17 PROCEDURE — 3078F DIAST BP <80 MM HG: CPT | Performed by: SURGERY
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
## 2022-11-28 ENCOUNTER — HOSPITAL ENCOUNTER (OUTPATIENT)
Dept: PHARMACY | Age: 47
Setting detail: THERAPIES SERIES
Discharge: HOME | End: 2022-11-28
Payer: COMMERCIAL

## 2022-11-28 DIAGNOSIS — I74.3 ACUTE OCCLUSION OF ARTERY OF LOWER EXTREMITY DUE TO THROMBOSIS (HCC): Primary | ICD-10-CM

## 2022-11-28 LAB
INR BLD: 4
PROTIME: 47.8 SECONDS

## 2022-11-28 PROCEDURE — 85610 PROTHROMBIN TIME: CPT

## 2022-11-28 PROCEDURE — 99212 OFFICE O/P EST SF 10 MIN: CPT

## 2022-11-28 NOTE — PROGRESS NOTES
Medication Management Service, Warfarin Management  [facility], [phone]  Visit Date: 2022   Subjective:   Marvin Bose is a 52 y.o. female who presents to clinic today for anticoagulation monitoring and adjustment. Patient seen in clinic for warfarin management due to  Indication:  Acute occlusion of artery of lower extremity due to thrombosis. INR goal: of 2.5-3.5. Duration of therapy: indefinite. Assessment and PLAN   PT/INR done in office per protocol. INR today is 4.0, supratherapeutic;  likely due to regimen. Patient denies alcohol use, does not drink routinely, she does report only one serving of green vegetables over this past week. I  Plan: Will reduce dose for tomorrow to 3.75mg then reduce regimen by 7.7% to warfarin 3.75mg on ,  only; 7.5mg all other days of the week. Using warfarin 7.5 mg tablets. Recheck INR in 1 week(s). Patient seen in room # 3. ED. OP. = Patient will reduce green vegetable intake to 2 servings max each week. Counseled patient on safety precautions associated with elevated INR. Patient verbalized understanding of dosing directions and information discussed. Dosing schedule given to patient. Progress note sent to referring office. Patient acknowledges working in consult agreement with pharmacist as referred by his/her physician.       Norma Kumari RPh, RODP  Clinical Pharmacist Medication Management  2022  3:19 PM      For Pharmacy Admin Tracking Only    Intervention Detail: Adherence Monitorin and Dose Adjustment: 1, reason: Improve Adherence, Therapy De-escalation  Total # of Interventions Recommended: 2  Total # of Interventions Accepted: 2  Time Spent (min): 20

## 2022-11-30 DIAGNOSIS — I99.8 ISCHEMIA OF BOTH LOWER EXTREMITIES: Primary | ICD-10-CM

## 2022-12-08 ENCOUNTER — HOSPITAL ENCOUNTER (OUTPATIENT)
Dept: PHARMACY | Age: 47
Setting detail: THERAPIES SERIES
Discharge: HOME | End: 2022-12-08
Payer: COMMERCIAL

## 2022-12-08 DIAGNOSIS — I74.3 ACUTE OCCLUSION OF ARTERY OF LOWER EXTREMITY DUE TO THROMBOSIS (HCC): Primary | ICD-10-CM

## 2022-12-08 LAB
INR BLD: 2.4
PROTIME: 28.4 SECONDS

## 2022-12-08 PROCEDURE — 99211 OFF/OP EST MAY X REQ PHY/QHP: CPT

## 2022-12-08 PROCEDURE — 85610 PROTHROMBIN TIME: CPT

## 2022-12-08 ASSESSMENT — ENCOUNTER SYMPTOMS
ABDOMINAL PAIN: 0
COLOR CHANGE: 0
CHEST TIGHTNESS: 0
SHORTNESS OF BREATH: 0
ALLERGIC/IMMUNOLOGIC NEGATIVE: 1

## 2022-12-08 NOTE — PROGRESS NOTES
Medication Management Service, Warfarin Management  [facility], [phone]  Visit Date: 2022   Subjective:   Matthew Reeder is a 52 y.o. female who presents to clinic today for anticoagulation monitoring and adjustment. Patient seen in clinic for warfarin management due to  Indication:  Acute occlusion of artery of lower extremity due to thrombosis. INR goal: of 2.5-3.5. Duration of therapy: indefinite. Assessment and PLAN   PT/INR done in office per protocol. INR today is 2.4, patient reports extra gv this week which included \"a lot of greens\" plus kaylynn slaw. Plan: Will continue current regimen of warfarin 3.75mg on ,  only; 7.5mg all other days of the week. Using warfarin 7.5 mg tablets. Recheck INR in 2 week(s). Patient seen in room # 3. ED. OP. = Discussed consistency with green vegetables and importance of always being forth coming with accurate dietary intake. Patient verbalized understanding of dosing directions and information discussed. Dosing schedule given to patient. Progress note sent to referring office. Patient acknowledges working in consult agreement with pharmacist as referred by his/her physician.       Candance Rear, RPh, RODP  Clinical Pharmacist Medication Management  2022  9:24 AM      For Pharmacy Admin Tracking Only    Intervention Detail: Adherence Monitorin  Total # of Interventions Recommended: 0  Total # of Interventions Accepted: 1  Time Spent (min): 15

## 2022-12-13 ENCOUNTER — OFFICE VISIT (OUTPATIENT)
Dept: FAMILY MEDICINE CLINIC | Age: 47
End: 2022-12-13
Payer: COMMERCIAL

## 2022-12-13 VITALS
SYSTOLIC BLOOD PRESSURE: 137 MMHG | WEIGHT: 189 LBS | BODY MASS INDEX: 31.45 KG/M2 | DIASTOLIC BLOOD PRESSURE: 87 MMHG | HEART RATE: 72 BPM | TEMPERATURE: 97.3 F

## 2022-12-13 DIAGNOSIS — R73.03 PREDIABETES: ICD-10-CM

## 2022-12-13 DIAGNOSIS — N92.1 MENORRHAGIA WITH IRREGULAR CYCLE: Primary | ICD-10-CM

## 2022-12-13 DIAGNOSIS — D64.9 ANEMIA, UNSPECIFIED TYPE: ICD-10-CM

## 2022-12-13 PROCEDURE — 3078F DIAST BP <80 MM HG: CPT

## 2022-12-13 PROCEDURE — G8417 CALC BMI ABV UP PARAM F/U: HCPCS

## 2022-12-13 PROCEDURE — G8427 DOCREV CUR MEDS BY ELIG CLIN: HCPCS

## 2022-12-13 PROCEDURE — 99213 OFFICE O/P EST LOW 20 MIN: CPT

## 2022-12-13 PROCEDURE — 1036F TOBACCO NON-USER: CPT

## 2022-12-13 PROCEDURE — G8484 FLU IMMUNIZE NO ADMIN: HCPCS

## 2022-12-13 PROCEDURE — 3074F SYST BP LT 130 MM HG: CPT

## 2022-12-13 ASSESSMENT — ENCOUNTER SYMPTOMS
SORE THROAT: 0
NAUSEA: 0
VOMITING: 0
SHORTNESS OF BREATH: 0
CONSTIPATION: 0
ABDOMINAL PAIN: 0
DIARRHEA: 0
COUGH: 0

## 2022-12-13 NOTE — PROGRESS NOTES
Patient instructed to remove shoes and socks and instructed to sit in exam chair. Current PCP is Derrell Vega MD and date of last visit was 12/13/2022. Do you have a follow up visit scheduled?   No  If yes, the date is unknown

## 2022-12-13 NOTE — PROGRESS NOTES
Melida Marin (:  1975) is a 52 y.o. female,Established patient, here for evaluation of the following chief complaint(s):  Follow-up (Patient states she is doing better), Health Maintenance (Patient refused flu shot), and Anemia    ASSESSMENT/PLAN:    1. Menorrhagia with irregular cycle  2. Anemia, unspecified type    Follow up with GI and OB/GYN. Already on iron supplements. Return in about 4 months (around 2023), or if symptoms worsen or fail to improve, for PreDM. PVD. Subjective     SUBJECTIVE/OBJECTIVE:    HPI    Ms. Lo Oro, 51 yo AA F, with previous medical history of chronic anemia possibly due to menorrhagia, bilateral lower extremity PAD with extensive surgeries and workup including thrombectomies and stent placements, and HTN. Presents to the [facility] family medicine clinic today following admission at [facility] for right iliac artery thrombosis. Patient was admitted from 2020 to 10/3/2022 s/p stent in the right common to external iliac artery. Due to episodes of recurrent thrombosis, the patient is now on Brilinta and warfarin. Patient is allergic to aspirin. Patient was unable to follow up with OB/GYN for her menorrhagia. Discussed with patient to follow up with them as soon as possible. At previous visit, patient stated that there was no blood in her stool and cologuard was ordered. Today the patient states that she is having some bleeding now and the bleeding occurs intermittently. The patient does already have a follow up with GI booked for 2022. Patient also complained of right digit nail pain. Not new for patient. Was present prior to her vascular surgeries as well. Patient does already have a follow up with podiatry on 2022. Patient does not have any other acute concerns at today's visit. Review of Systems   Constitutional:  Negative for activity change and fever. HENT:  Negative for congestion and sore throat.     Respiratory: Negative for cough and shortness of breath. Cardiovascular:  Negative for chest pain and leg swelling. Gastrointestinal:  Negative for abdominal pain, constipation, diarrhea, nausea and vomiting. Genitourinary:  Negative for difficulty urinating and dysuria. Neurological:  Negative for syncope and headaches. Psychiatric/Behavioral:  Negative for agitation, behavioral problems and confusion. Objective   Physical Exam  Vitals and nursing note reviewed. Constitutional:       General: She is not in acute distress. Appearance: Normal appearance. Cardiovascular:      Rate and Rhythm: Normal rate and regular rhythm. Heart sounds: No murmur heard. Pulmonary:      Effort: No respiratory distress. Breath sounds: Normal breath sounds. No wheezing. Abdominal:      Palpations: Abdomen is soft. Tenderness: There is no abdominal tenderness. There is no guarding or rebound. Musculoskeletal:         General: No tenderness. Right lower leg: No edema. Left lower leg: No edema. Neurological:      General: No focal deficit present. Mental Status: She is alert and oriented to person, place, and time. Motor: No weakness. An electronic signature was used to authenticate this note.     --Elva Tyler MD

## 2022-12-13 NOTE — PROGRESS NOTES
Attending Physician Statement  I have discussed the care of Hunter Fajardo, 52 y.o. female,including pertinent history and exam findings,  with the resident Dr. Leotha Brittle, MD.  History:  Chief Complaint   Patient presents with    Follow-up     Patient states she is doing better    Health Maintenance     Patient refused flu shot    Anemia     I have reviewed the key elements of the encounter with the resident. Examination was done by resident as documented in residents note. BP Readings from Last 3 Encounters:   12/14/22 121/77   12/13/22 137/87   11/17/22 126/79     /87 (Site: Left Upper Arm, Position: Sitting, Cuff Size: Medium Adult)   Pulse 72   Temp 97.3 °F (36.3 °C) (Oral)   Wt 189 lb (85.7 kg)   LMP 12/01/2022   BMI 31.45 kg/m²   Lab Results   Component Value Date    WBC 9.9 09/29/2022    HGB 11.3 (L) 09/29/2022    HCT 32.6 (L) 09/29/2022     09/29/2022    CHOL 145 10/25/2022    TRIG 186 (H) 10/25/2022    HDL 40 (L) 10/25/2022    LDLDIRECT 108 (H) 05/13/2013    ALT 11 09/08/2022    AST 14 09/08/2022     (L) 09/29/2022    K 4.3 09/29/2022     09/29/2022    CREATININE 0.70 09/29/2022    BUN 9 09/29/2022    CO2 16 (L) 09/29/2022    TSH 1.69 11/16/2019    INR 2.4 12/08/2022    LABA1C 6.1 10/13/2022     Lab Results   Component Value Date    CALCIUM 9.2 09/29/2022    PHOS 2.6 09/25/2022     Lab Results   Component Value Date    LDLCHOLESTEROL 68 10/25/2022    LDLDIRECT 108 (H) 05/13/2013     I agree with the assessment, plan and diagnosis of    Diagnosis Orders   1. Menorrhagia with irregular cycle        2. Anemia, unspecified type          I agree with  orders as documented by the resident. Recommendations: Patient likely to benefit from complete physical exam and review of smoking status. Will discuss with patient her positive screening for depression at next visit. Return in about 4 months (around 4/13/2023), or if symptoms worsen or fail to improve, for PreDM. PVD. ([facility] ) Dr. Ron Brunner, MD

## 2022-12-13 NOTE — TELEPHONE ENCOUNTER
Metformin pending for refill     Health Maintenance   Topic Date Due    COVID-19 Vaccine (1) Never done    Pneumococcal 0-64 years Vaccine (1 - PCV) Never done    DTaP/Tdap/Td vaccine (1 - Tdap) Never done    Colorectal Cancer Screen  05/06/2020    Flu vaccine (1) 08/01/2022    Depression Monitoring  05/24/2023    A1C test (Diabetic or Prediabetic)  10/13/2023    Lipids  10/25/2023    Cervical cancer screen  05/24/2027    Hepatitis C screen  Completed    HIV screen  Completed    Hepatitis A vaccine  Aged Out    Hib vaccine  Aged Out    Meningococcal (ACWY) vaccine  Aged Out             (applicable per patient's age: Cancer Screenings, Depression Screening, Fall Risk Screening, Immunizations)    Hemoglobin A1C (%)   Date Value   10/13/2022 6.1   03/05/2020 6.1   12/18/2017 5.9     LDL Cholesterol (mg/dL)   Date Value   10/25/2022 68     AST (U/L)   Date Value   09/08/2022 14     ALT (U/L)   Date Value   09/08/2022 11     BUN (mg/dL)   Date Value   09/29/2022 9      (goal A1C is < 7)   (goal LDL is <100) need 30-50% reduction from baseline     BP Readings from Last 3 Encounters:   11/17/22 126/79   10/13/22 134/88   10/03/22 122/79    (goal /80)      All Future Testing planned in [facility]:  Lab Frequency Next Occurrence   PAP Smear Once 05/24/2022   US PELVIS COMPLETE Once 05/24/2022   CT ABDOMEN PELVIS WO CONTRAST Additional Contrast? Radiologist Recommendation Once 09/25/2022   Protime-INR Once 10/06/2022   VL ARTERIAL PVR LOWER WO EXERCISE Once 05/17/2023       Next Visit Date:  Future Appointments   Date Time Provider Amira English   12/13/2022  2:00 PM Latonia Kline MD [facility] FP [facility]   12/22/2022  9:00 AM [facility]   12/23/2022  1:15 PM Jean Bernabe MD [facility]   5/18/2023  1:00 PM Lulu Martinez MD [facility]            Patient Active Problem List:     Migraine headache with aura     Primary hypertension     Obesity     Menometrorrhagia Intertriginous candidiasis     Depression     Claudication in peripheral vascular disease (HCC)     Left popliteal artery occlusion (HCC)     Hypercoagulable state ([address]     Hemorrhage of rectum and anus     Anemia     Foot pain     Wound of right leg     Takotsubo cardiomyopathy     Chest pain     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     Critical limb ischemia with history of revascularization of same extremity ([address]     Occlusion of right iliac artery ([address]     Acute occlusion of artery of lower extremity due to thrombosis ([address]     Arterial thrombosis ([address]

## 2022-12-13 NOTE — PROGRESS NOTES
Visit Information    Have you changed or started any medications since your last visit including any over-the-counter medicines, vitamins, or herbal medicines? no   Have you stopped taking any of your medications? Is so, why? -  no  Are you having any side effects from any of your medications? - no    Have you seen any other physician or provider since your last visit?  no   Have you had any other diagnostic tests since your last visit?  no   Have you been seen in the emergency room and/or had an admission in a hospital since we last saw you?  no   Have you had your routine dental cleaning in the past 6 months?  no     Do you have an active MyChart account? If no, what is the barrier? No:     Patient Care Team:  Sadaf Bearden MD as PCP - General (Family Medicine)  Fidelia Xiong MD as Consulting Physician (Gastroenterology)    Medical History Review  Past Medical, Family, and Social History reviewed and does not contribute to the patient presenting condition    Health Maintenance   Topic Date Due    COVID-19 Vaccine (1) Never done    Pneumococcal 0-64 years Vaccine (1 - PCV) Never done    DTaP/Tdap/Td vaccine (1 - Tdap) Never done    Colorectal Cancer Screen  05/06/2020    Flu vaccine (1) 08/01/2022    Depression Monitoring  05/24/2023    A1C test (Diabetic or Prediabetic)  10/13/2023    Lipids  10/25/2023    Cervical cancer screen  05/24/2027    Hepatitis C screen  Completed    HIV screen  Completed    Hepatitis A vaccine  Aged Out    Hib vaccine  Aged Out    Meningococcal (ACWY) vaccine  Aged Out             Visit Information    Have you changed or started any medications since your last visit including any over-the-counter medicines, vitamins, or herbal medicines? no   Have you stopped taking any of your medications?  Is so, why? -  no  Are you having any side effects from any of your medications? - no    Have you seen any other physician or provider since your last visit?  no   Have you had any other diagnostic tests since your last visit?  no   Have you been seen in the emergency room and/or had an admission in a hospital since we last saw you?  no   Have you had your routine dental cleaning in the past 6 months?  no     Do you have an active MyChart account? If no, what is the barrier?   No:     Patient Care Team:  Pedrito Escobedo MD as PCP - General (Family Medicine)  Halie Navas MD as Consulting Physician (Gastroenterology)    Medical History Review  Past Medical, Family, and Social History reviewed and does not contribute to the patient presenting condition    Health Maintenance   Topic Date Due    COVID-19 Vaccine (1) Never done    Pneumococcal 0-64 years Vaccine (1 - PCV) Never done    DTaP/Tdap/Td vaccine (1 - Tdap) Never done    Colorectal Cancer Screen  05/06/2020    Flu vaccine (1) 08/01/2022    Depression Monitoring  05/24/2023    A1C test (Diabetic or Prediabetic)  10/13/2023    Lipids  10/25/2023    Cervical cancer screen  05/24/2027    Hepatitis C screen  Completed    HIV screen  Completed    Hepatitis A vaccine  Aged Out    Hib vaccine  Aged Out    Meningococcal (ACWY) vaccine  Aged Out

## 2022-12-14 ENCOUNTER — OFFICE VISIT (OUTPATIENT)
Dept: PODIATRY | Age: 47
End: 2022-12-14
Payer: COMMERCIAL

## 2022-12-14 VITALS
WEIGHT: 189 LBS | DIASTOLIC BLOOD PRESSURE: 77 MMHG | SYSTOLIC BLOOD PRESSURE: 121 MMHG | HEIGHT: 65 IN | HEART RATE: 73 BPM | BODY MASS INDEX: 31.49 KG/M2

## 2022-12-14 DIAGNOSIS — M79.671 RIGHT FOOT PAIN: Primary | ICD-10-CM

## 2022-12-14 DIAGNOSIS — I73.9 PVD (PERIPHERAL VASCULAR DISEASE) (HCC): ICD-10-CM

## 2022-12-14 DIAGNOSIS — B35.1 ONYCHOMYCOSIS: ICD-10-CM

## 2022-12-14 DIAGNOSIS — M79.674 TOE PAIN, RIGHT: ICD-10-CM

## 2022-12-14 PROCEDURE — 11720 DEBRIDE NAIL 1-5: CPT
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
## 2022-12-22 ENCOUNTER — HOSPITAL ENCOUNTER (OUTPATIENT)
Dept: PHARMACY | Age: 47
Setting detail: THERAPIES SERIES
Discharge: HOME | End: 2022-12-22
Payer: COMMERCIAL

## 2022-12-22 DIAGNOSIS — I74.3 ACUTE OCCLUSION OF ARTERY OF LOWER EXTREMITY DUE TO THROMBOSIS (HCC): Primary | ICD-10-CM

## 2022-12-22 LAB
INR BLD: 1.3
PROTIME: 15.8 SECONDS

## 2022-12-22 PROCEDURE — 85610 PROTHROMBIN TIME: CPT

## 2022-12-22 PROCEDURE — 99212 OFFICE O/P EST SF 10 MIN: CPT

## 2022-12-22 NOTE — PROGRESS NOTES
Medication Management Service, Warfarin Management  CHELSEA FRANCO [facility], [phone]  Visit Date: 2022   Subjective:   Aubrie Duncan is a 52 y.o. female who presents to clinic today for anticoagulation monitoring and adjustment. Patient seen in clinic for warfarin management due to  Indication:  Acute occlusion of artery of lower extremity due to thrombosis. INR goal: of 2.5-3.5. Duration of therapy: indefinite. Assessment and PLAN   PT/INR done in office per protocol. INR today is 1.3, SUBtherapeutic. Patient originally reported only 1 serving of gv vs planned 2 servings but then remembered that she had two large containers of coleslaw from [facility] on Tuesday which is likely cause for very low INR today. Patients reports complete adherence with warfarin regimen. Plan: Will boost dose today to 11.25mg and increase regimen by 8.3% to warfarin 3.75mg on  only; 7.5mg all other days of the week. Using warfarin 7.5 mg tablets. Recheck INR in 6 days. Patient seen in room # 3. ED. OP. = Advised no green vegetables for a few days. Discussed consistency with green vegetables and importance of always being forth coming with accurate dietary intake. She plans to have greens on [place] so I stressed moderation. Encouraged more walking as well. Patient verbalized understanding of dosing directions and information discussed. Dosing schedule given to patient. Progress note sent to referring office. Patient acknowledges working in consult agreement with pharmacist as referred by his/her physician.       Molly Cantrell RP, CACP  Clinical Pharmacist Medication Management  2022  9:18 AM      For Pharmacy Admin Tracking Only    Intervention Detail: Adherence Monitorin and Dose Adjustment: 1, reason: Improve Adherence, Therapy Optimization  Total # of Interventions Recommended: 2  Total # of Interventions Accepted: 2  Time Spent (min): 20

## 2022-12-28 ENCOUNTER — HOSPITAL ENCOUNTER (OUTPATIENT)
Dept: PHARMACY | Age: 47
Setting detail: THERAPIES SERIES
Discharge: HOME | End: 2022-12-28
Payer: COMMERCIAL

## 2022-12-28 DIAGNOSIS — I74.3 ACUTE OCCLUSION OF ARTERY OF LOWER EXTREMITY DUE TO THROMBOSIS (HCC): Primary | ICD-10-CM

## 2022-12-28 LAB
INR BLD: 2
PROTIME: 23.6 SECONDS

## 2022-12-28 PROCEDURE — 85610 PROTHROMBIN TIME: CPT

## 2022-12-28 PROCEDURE — 99212 OFFICE O/P EST SF 10 MIN: CPT

## 2022-12-28 NOTE — PROGRESS NOTES
Medication Management Service, Warfarin Management  [facility], [phone]  Visit Date: 2022   Subjective:   Golden Kaminski is a 52 y.o. female who presents to clinic today for anticoagulation monitoring and adjustment. Patient seen in clinic for warfarin management due to  Indication:  Acute occlusion of artery of lower extremity due to thrombosis. INR goal: of 2.5-3.5. Duration of therapy: indefinite. Assessment and PLAN   PT/INR done in office per protocol. INR today is 2.0, SUBtherapeutic but up from 1.3 at last check 6 days ago. Patient reports a small serving of greens on [place] only. Wants to continue with 2 servings of gv each week, will not usually include greens. Did not have any coleslaw. Plan: Will boost dose for tomorrow to 11.25mg then continue current regimen of warfarin 3.75mg on  only; 7.5mg all other days of the week. Using warfarin 7.5 mg tablets. Recheck INR in 2 weeks. Patient seen in room # 3. Patient verbalized understanding of dosing directions and information discussed. Dosing schedule given to patient. Progress note sent to referring office. Patient acknowledges working in consult agreement with pharmacist as referred by his/her physician.       Giles Essex, RPh, RODP  Clinical Pharmacist Medication Management  2022  3:46 PM      For Pharmacy Admin Tracking Only    Intervention Detail: Adherence Monitorin and Dose Adjustment: 1, reason: Improve Adherence, Therapy Optimization  Total # of Interventions Recommended: 2  Total # of Interventions Accepted: 2  Time Spent (min): 20

## 2023-01-16 ENCOUNTER — HOSPITAL ENCOUNTER (OUTPATIENT)
Dept: PHARMACY | Age: 48
Setting detail: THERAPIES SERIES
Discharge: HOME | End: 2023-01-16
Payer: COMMERCIAL

## 2023-01-16 DIAGNOSIS — I74.3 ACUTE OCCLUSION OF ARTERY OF LOWER EXTREMITY DUE TO THROMBOSIS (HCC): Primary | ICD-10-CM

## 2023-01-16 LAB
INR BLD: 3.8
PROTIME: 4.5 SECONDS

## 2023-01-16 PROCEDURE — 99212 OFFICE O/P EST SF 10 MIN: CPT

## 2023-01-16 PROCEDURE — 85610 PROTHROMBIN TIME: CPT

## 2023-01-16 NOTE — PROGRESS NOTES
Medication Management Service, Warfarin Management  CHELSEA FRANCO [facility] [phone]  Visit Date: 2023   Subjective:   Jean Bahena is a 52 y.o. female who presents to clinic today for anticoagulation monitoring and adjustment. Patient seen in clinic for warfarin management due to  Indication:    Acute occlusion of artery of lower extremity due to thrombosis. .   INR goal: of 2.5-3.5. Duration of therapy: indefinite. Assessment and PLAN   PT/INR done in office per protocol. INR today is 3.8, supratherapeutic. Likely due to alcohol intake over the weekend. Plan:  Patient already took dose for today. Will decrease tomorrow's warfarin dose to 3.75 mg then continue current regimen of warfarin 3.75 mg on ; 7.5 mg all other days of the week. Using warfarin 7.5 mg tablets. Recheck INR in 2 week(s). Patient seen in room # 1. ED. OP. = Counseled patient on how alcohol effects the INR. Patient states that she only drinks 1-2 beers every other weekend. Patient verbalized understanding of dosing directions and information discussed. Dosing schedule given to patient. Progress note sent to referring office. Patient acknowledges working in consult agreement with pharmacist as referred by his/her physician.       Electronically signed by Main Garcia on 23 at 2:38 PM EST    For Pharmacy Admin Tracking Only    Intervention Detail: Adherence Monitorin and Dose Adjustment: 1, reason: Therapy De-escalation  Total # of Interventions Recommended: 2  Total # of Interventions Accepted: 2  Time Spent (min): 20

## 2023-01-23 DIAGNOSIS — I10 ESSENTIAL HYPERTENSION: ICD-10-CM

## 2023-01-23 RX ORDER — LISINOPRIL 5 MG/1
TABLET ORAL
Qty: 30 TABLET | Refills: 2 | Status: SHIPPED | OUTPATIENT
Start: 2023-01-23

## 2023-01-23 NOTE — TELEPHONE ENCOUNTER
E-scribe request for med refills. Please review and e-scribe if applicable.      Last Visit Date:  12/13/22  Next Visit Date:  Visit date not found    Hemoglobin A1C (%)   Date Value   10/13/2022 6.1   03/05/2020 6.1   12/18/2017 5.9             ( goal A1C is < 7)   No results found for: LABMICR  LDL Cholesterol (mg/dL)   Date Value   10/25/2022 68       (goal LDL is <100)   AST (U/L)   Date Value   09/08/2022 14     ALT (U/L)   Date Value   09/08/2022 11     BUN (mg/dL)   Date Value   09/29/2022 9     BP Readings from Last 3 Encounters:   12/14/22 121/77   12/13/22 137/87   11/17/22 126/79          (goal 120/80)        Patient Active Problem List:     Migraine headache with aura     Primary hypertension     Obesity     Menometrorrhagia     Intertriginous candidiasis     Depression     Claudication in peripheral vascular disease ([address]     Left popliteal artery occlusion (HCC)     Hypercoagulable state ([address]     Hemorrhage of rectum and anus     Anemia     Foot pain     Wound of right leg     Takotsubo cardiomyopathy     Chest pain     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     Critical limb ischemia with history of revascularization of same extremity ([address]     Occlusion of right iliac artery ([address]     Acute occlusion of artery of lower extremity due to thrombosis ([address]     Arterial thrombosis ([address]      ----Castro Stearns

## 2023-01-30 ENCOUNTER — HOSPITAL ENCOUNTER (OUTPATIENT)
Dept: PHARMACY | Age: 48
Setting detail: THERAPIES SERIES
Discharge: HOME | End: 2023-01-30
Payer: COMMERCIAL

## 2023-01-30 DIAGNOSIS — I74.3 ACUTE OCCLUSION OF ARTERY OF LOWER EXTREMITY DUE TO THROMBOSIS (HCC): Primary | ICD-10-CM

## 2023-01-30 LAB
INR BLD: 3.2
PROTIME: 38.8 SECONDS

## 2023-01-30 PROCEDURE — 99212 OFFICE O/P EST SF 10 MIN: CPT

## 2023-01-30 PROCEDURE — 85610 PROTHROMBIN TIME: CPT

## 2023-01-30 RX ORDER — WARFARIN SODIUM 7.5 MG/1
TABLET ORAL
Qty: 90 TABLET | Refills: 1 | Status: SHIPPED | OUTPATIENT
Start: 2023-01-30

## 2023-01-30 NOTE — PROGRESS NOTES
Medication Management Service, Warfarin Management  CHELSEA FRANCO [facility], [phone]  Visit Date: 2023   Subjective:   David Cordero is a 52 y.o. female who presents to clinic today for anticoagulation monitoring and adjustment. Patient seen in clinic for warfarin management due to  Indication:    Acute occlusion of artery of lower extremity due to thrombosis. .   INR goal: of 2.5-3.5. Duration of therapy: indefinite. Assessment and PLAN   PT/INR done in office per protocol. INR today is 3.2, therapeutic. Plan: Will continue current regimen of warfarin 3.75 mg on  only; 7.5 mg all other days of the week. Using warfarin 7.5 mg tablets. Recheck INR in 4 week(s). Patient seen in room # 3. New prescription sent electronically to patient's preferred pharmacy. Patient verbalized understanding of dosing directions and information discussed. Dosing schedule given to patient. Progress note sent to referring office. Patient acknowledges working in consult agreement with pharmacist as referred by his/her physician.       Lilibeth Mccann, [facility], CACP  Clinical Pharmacist Medication Management  2023  2:12 PM      For Pharmacy Admin Tracking Only    Intervention Detail: Adherence Monitorin and Refill(s) Provided  Total # of Interventions Recommended: 1  Total # of Interventions Accepted: 2  Time Spent (min): 20

## 2023-02-17 RX ORDER — WARFARIN SODIUM 7.5 MG/1
TABLET ORAL
Qty: 90 TABLET | Refills: 1 | Status: SHIPPED | OUTPATIENT
Start: 2023-02-17

## 2023-02-17 NOTE — TELEPHONE ENCOUNTER
Last visit: 12/13/22  Last Med refill: 1/30/23  Does patient have enough medication for 72 hours: Yes    Next Visit Date:  Future Appointments   Date Time Provider Amira Tameka   2/27/2023  1:40 PM [facility]   5/18/2023  1:00 PM Sampson Tucker MD [facility] [address] Maintenance   Topic Date Due    COVID-19 Vaccine (1) Never done    Pneumococcal 0-64 years Vaccine (1 - PCV) Never done    DTaP/Tdap/Td vaccine (1 - Tdap) Never done    Colorectal Cancer Screen  05/06/2020    Flu vaccine (1) 08/01/2022    Depression Monitoring  05/24/2023    A1C test (Diabetic or Prediabetic)  10/13/2023    Lipids  10/25/2023    Cervical cancer screen  05/24/2027    Hepatitis C screen  Completed    HIV screen  Completed    Hepatitis A vaccine  Aged Out    Hib vaccine  Aged Out    Meningococcal (ACWY) vaccine  Aged Out       Hemoglobin A1C (%)   Date Value   10/13/2022 6.1   03/05/2020 6.1   12/18/2017 5.9             ( goal A1C is < 7)   No results found for: LABMICR  LDL Cholesterol (mg/dL)   Date Value   10/25/2022 68   10/03/2018 42       (goal LDL is <100)   AST (U/L)   Date Value   09/08/2022 14     ALT (U/L)   Date Value   09/08/2022 11     BUN (mg/dL)   Date Value   09/29/2022 9     BP Readings from Last 3 Encounters:   12/14/22 121/77   12/13/22 137/87   11/17/22 126/79          (goal 120/80)    All Future Testing planned in [facility]  Lab Frequency Next Occurrence   PAP Smear Once 05/24/2022   US PELVIS COMPLETE Once 05/24/2022   CT ABDOMEN PELVIS WO CONTRAST Additional Contrast? 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     Critical limb ischemia with history of revascularization of same extremity ([address]     Occlusion of right iliac artery ([address]     Acute occlusion of artery of lower extremity due to thrombosis ([address]     Arterial thrombosis ([address]

## 2023-02-20 RX ORDER — WARFARIN SODIUM 7.5 MG/1
TABLET ORAL
Qty: 90 TABLET | Refills: 1 | OUTPATIENT
Start: 2023-02-20

## 2023-02-20 NOTE — TELEPHONE ENCOUNTER
E-scribe request for med refills. Please review and e-scribe if applicable.      Last Visit Date:  12/13/22  Next Visit Date:  Visit date not found    Hemoglobin A1C (%)   Date Value   10/13/2022 6.1   03/05/2020 6.1   12/18/2017 5.9             ( goal A1C is < 7)   No results found for: LABMICR  LDL Cholesterol (mg/dL)   Date Value   10/25/2022 68       (goal LDL is <100)   AST (U/L)   Date Value   09/08/2022 14     ALT (U/L)   Date Value   09/08/2022 11     BUN (mg/dL)   Date Value   09/29/2022 9     BP Readings from Last 3 Encounters:   12/14/22 121/77   12/13/22 137/87   11/17/22 126/79          (goal 120/80)        Patient Active Problem List:     Migraine headache with aura     Primary hypertension     Obesity     Menometrorrhagia     Intertriginous candidiasis     Depression     Claudication in peripheral vascular disease ([address]     Left popliteal artery occlusion (HCC)     Hypercoagulable state ([address]     Hemorrhage of rectum and anus     Anemia     Foot pain     Wound of right leg     Takotsubo cardiomyopathy     Chest pain     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     Critical limb ischemia with history of revascularization of same extremity ([address]     Occlusion of right iliac artery ([address]     Acute occlusion of artery of lower extremity due to thrombosis ([address]     Arterial thrombosis ([address]      ----Luis Ortega

## 2023-02-21 DIAGNOSIS — I74.3 FEMOROPOPLITEAL ARTERIAL THROMBOSIS OF LEFT LOWER EXTREMITY (HCC): ICD-10-CM

## 2023-02-21 RX ORDER — ATORVASTATIN CALCIUM 40 MG/1
TABLET, FILM COATED ORAL
Qty: 30 TABLET | Refills: 3 | Status: SHIPPED | OUTPATIENT
Start: 2023-02-21

## 2023-02-21 NOTE — TELEPHONE ENCOUNTER
E-scribe request for atorvastatin (LIPITOR) 40 MG tablet. Please review and e-scribe if applicable.      Last Visit Date:  12/13/2022  Next Visit Date:  Visit date not found    Hemoglobin A1C (%)   Date Value   10/13/2022 6.1   03/05/2020 6.1   12/18/2017 5.9             ( goal A1C is < 7)   No results found for: LABMICR  LDL Cholesterol (mg/dL)   Date Value   10/25/2022 68       (goal LDL is <100)   AST (U/L)   Date Value   09/08/2022 14     ALT (U/L)   Date Value   09/08/2022 11     BUN (mg/dL)   Date Value   09/29/2022 9     BP Readings from Last 3 Encounters:   12/14/22 121/77   12/13/22 137/87   11/17/22 126/79          (goal 120/80)        Patient Active Problem List:     Migraine headache with aura     Primary hypertension     Obesity     Menometrorrhagia     Intertriginous candidiasis     Depression     Claudication in peripheral vascular disease (HCC)     Left popliteal artery occlusion (HCC)     Hypercoagulable state ([address]     Hemorrhage of rectum and anus     Anemia     Foot pain     Wound of right leg     Takotsubo cardiomyopathy     Chest pain     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     Critical limb ischemia with history of revascularization of same extremity ([address]     Occlusion of right iliac artery ([address]     Acute occlusion of artery of lower extremity due to thrombosis ([address]     Arterial thrombosis ([address]      ----JF

## 2023-02-27 ENCOUNTER — HOSPITAL ENCOUNTER (EMERGENCY)
Age: 48
Discharge: HOME | End: 2023-02-27
Attending: EMERGENCY MEDICINE
Payer: COMMERCIAL

## 2023-02-27 ENCOUNTER — APPOINTMENT (OUTPATIENT)
Dept: CT IMAGING | Age: 48
End: 2023-02-27
Payer: COMMERCIAL

## 2023-02-27 VITALS
OXYGEN SATURATION: 97 % | SYSTOLIC BLOOD PRESSURE: 119 MMHG | RESPIRATION RATE: 17 BRPM | DIASTOLIC BLOOD PRESSURE: 79 MMHG | HEART RATE: 66 BPM | TEMPERATURE: 97.9 F | BODY MASS INDEX: 29.95 KG/M2 | WEIGHT: 180 LBS

## 2023-02-27 DIAGNOSIS — N30.01 ACUTE CYSTITIS WITH HEMATURIA: Primary | ICD-10-CM

## 2023-02-27 DIAGNOSIS — M79.601 RIGHT ARM PAIN: ICD-10-CM

## 2023-02-27 DIAGNOSIS — R11.2 NAUSEA AND VOMITING, UNSPECIFIED VOMITING TYPE: ICD-10-CM

## 2023-02-27 DIAGNOSIS — E86.0 DEHYDRATION: ICD-10-CM

## 2023-02-27 LAB
ABSOLUTE EOS #: 0.03 K/UL (ref 0–0.44)
ABSOLUTE IMMATURE GRANULOCYTE: 0.04 K/UL (ref 0–0.3)
ABSOLUTE LYMPH #: 1.19 K/UL (ref 1.1–3.7)
ABSOLUTE MONO #: 0.69 K/UL (ref 0.1–1.2)
ALBUMIN SERPL-MCNC: 4.3 G/DL (ref 3.5–5.2)
ALBUMIN/GLOBULIN RATIO: 1.1 (ref 1–2.5)
ALP SERPL-CCNC: 117 U/L (ref 35–104)
ALT SERPL-CCNC: 15 U/L (ref 5–33)
ANION GAP SERPL CALCULATED.3IONS-SCNC: 14 MMOL/L (ref 9–17)
AST SERPL-CCNC: 27 U/L
BASOPHILS # BLD: 0 % (ref 0–2)
BASOPHILS ABSOLUTE: 0.04 K/UL (ref 0–0.2)
BILIRUB SERPL-MCNC: 0.8 MG/DL (ref 0.3–1.2)
BILIRUBIN URINE: NEGATIVE
BUN SERPL-MCNC: 11 MG/DL (ref 6–20)
CALCIUM SERPL-MCNC: 9.2 MG/DL (ref 8.6–10.4)
CASTS UA: ABNORMAL /LPF (ref 0–8)
CHLORIDE SERPL-SCNC: 100 MMOL/L (ref 98–107)
CO2 SERPL-SCNC: 20 MMOL/L (ref 20–31)
COLOR: ABNORMAL
CREAT SERPL-MCNC: 0.56 MG/DL (ref 0.5–0.9)
EOSINOPHILS RELATIVE PERCENT: 0 % (ref 1–4)
EPITHELIAL CELLS UA: ABNORMAL /HPF (ref 0–5)
GFR SERPL CREATININE-BSD FRML MDRD: >60 ML/MIN/1.73M2
GLUCOSE SERPL-MCNC: 109 MG/DL (ref 70–99)
GLUCOSE UR STRIP.AUTO-MCNC: NEGATIVE MG/DL
HCG QUALITATIVE: NEGATIVE
HCT VFR BLD AUTO: 40.4 % (ref 36.3–47.1)
HGB BLD-MCNC: 13.2 G/DL (ref 11.9–15.1)
IMMATURE GRANULOCYTES: 0 %
INR PPP: 1.7
KETONES UR STRIP.AUTO-MCNC: NEGATIVE MG/DL
LACTIC ACID, SEPSIS WHOLE BLOOD: 1.5 MMOL/L (ref 0.5–1.9)
LACTIC ACID, SEPSIS WHOLE BLOOD: 3.3 MMOL/L (ref 0.5–1.9)
LEUKOCYTE ESTERASE UR QL STRIP.AUTO: ABNORMAL
LIPASE SERPL-CCNC: 18 U/L (ref 13–60)
LYMPHOCYTES # BLD: 11 % (ref 24–43)
MCH RBC QN AUTO: 29 PG (ref 25.2–33.5)
MCHC RBC AUTO-ENTMCNC: 32.7 G/DL (ref 28.4–34.8)
MCV RBC AUTO: 88.8 FL (ref 82.6–102.9)
MONOCYTES # BLD: 6 % (ref 3–12)
NITRITE UR QL STRIP.AUTO: NEGATIVE
NRBC AUTOMATED: 0 PER 100 WBC
PDW BLD-RTO: 14.2 % (ref 11.8–14.4)
PLATELET # BLD AUTO: 385 K/UL (ref 138–453)
PMV BLD AUTO: 10.8 FL (ref 8.1–13.5)
POTASSIUM SERPL-SCNC: 4.4 MMOL/L (ref 3.7–5.3)
PROT SERPL-MCNC: 8.3 G/DL (ref 6.4–8.3)
PROT UR STRIP.AUTO-MCNC: 5.5 MG/DL (ref 5–8)
PROT UR STRIP.AUTO-MCNC: ABNORMAL MG/DL
PROTHROMBIN TIME: 17.4 SEC (ref 9.1–12.3)
RBC # BLD: 4.55 M/UL (ref 3.95–5.11)
RBC CLUMPS #/AREA URNS AUTO: ABNORMAL /HPF (ref 0–4)
SEG NEUTROPHILS: 83 % (ref 36–65)
SEGMENTED NEUTROPHILS ABSOLUTE COUNT: 9.32 K/UL (ref 1.5–8.1)
SODIUM SERPL-SCNC: 134 MMOL/L (ref 135–144)
SPECIFIC GRAVITY UA: 1.02 (ref 1–1.03)
TURBIDITY: ABNORMAL
URINE HGB: ABNORMAL
UROBILINOGEN, URINE: NORMAL
WBC # BLD AUTO: 11.3 K/UL (ref 3.5–11.3)
WBC UA: ABNORMAL /HPF (ref 0–5)

## 2023-02-27 PROCEDURE — 85025 COMPLETE CBC W/AUTO DIFF WBC: CPT

## 2023-02-27 PROCEDURE — 93005 ELECTROCARDIOGRAM TRACING: CPT | Performed by: STUDENT IN AN ORGANIZED HEALTH CARE EDUCATION/TRAINING PROGRAM

## 2023-02-27 PROCEDURE — 83605 ASSAY OF LACTIC ACID: CPT

## 2023-02-27 PROCEDURE — 80053 COMPREHEN METABOLIC PANEL: CPT

## 2023-02-27 PROCEDURE — 2580000003 HC RX 258: Performed by: STUDENT IN AN ORGANIZED HEALTH CARE EDUCATION/TRAINING PROGRAM

## 2023-02-27 PROCEDURE — 85610 PROTHROMBIN TIME: CPT

## 2023-02-27 PROCEDURE — 96376 TX/PRO/DX INJ SAME DRUG ADON: CPT

## 2023-02-27 PROCEDURE — 99285 EMERGENCY DEPT VISIT HI MDM: CPT

## 2023-02-27 PROCEDURE — 74174 CTA ABD&PLVS W/CONTRAST: CPT

## 2023-02-27 PROCEDURE — 6360000004 HC RX CONTRAST MEDICATION: Performed by: STUDENT IN AN ORGANIZED HEALTH CARE EDUCATION/TRAINING PROGRAM

## 2023-02-27 PROCEDURE — 96361 HYDRATE IV INFUSION ADD-ON: CPT

## 2023-02-27 PROCEDURE — 81001 URINALYSIS AUTO W/SCOPE: CPT

## 2023-02-27 PROCEDURE — 6370000000 HC RX 637 (ALT 250 FOR IP): Performed by: STUDENT IN AN ORGANIZED HEALTH CARE EDUCATION/TRAINING PROGRAM

## 2023-02-27 PROCEDURE — 83690 ASSAY OF LIPASE: CPT

## 2023-02-27 PROCEDURE — 96375 TX/PRO/DX INJ NEW DRUG ADDON: CPT

## 2023-02-27 PROCEDURE — 6360000002 HC RX W HCPCS: Performed by: STUDENT IN AN ORGANIZED HEALTH CARE EDUCATION/TRAINING PROGRAM

## 2023-02-27 PROCEDURE — 84703 CHORIONIC GONADOTROPIN ASSAY: CPT

## 2023-02-27 PROCEDURE — 96365 THER/PROPH/DIAG IV INF INIT: CPT

## 2023-02-27 RX ORDER — ACETAMINOPHEN 500 MG
1000 TABLET ORAL EVERY 8 HOURS PRN
Qty: 120 TABLET | Refills: 0 | Status: SHIPPED | OUTPATIENT
Start: 2023-02-27

## 2023-02-27 RX ORDER — ACETAMINOPHEN 500 MG
1000 TABLET ORAL ONCE
Status: COMPLETED | OUTPATIENT
Start: 2023-02-27 | End: 2023-02-27

## 2023-02-27 RX ORDER — CEPHALEXIN 500 MG/1
500 CAPSULE ORAL 2 TIMES DAILY
Qty: 14 CAPSULE | Refills: 0 | Status: SHIPPED | OUTPATIENT
Start: 2023-02-27 | End: 2023-03-06

## 2023-02-27 RX ORDER — ONDANSETRON 2 MG/ML
4 INJECTION INTRAMUSCULAR; INTRAVENOUS ONCE
Status: COMPLETED | OUTPATIENT
Start: 2023-02-27 | End: 2023-02-27

## 2023-02-27 RX ORDER — FENTANYL CITRATE 50 UG/ML
50 INJECTION, SOLUTION INTRAMUSCULAR; INTRAVENOUS ONCE
Status: COMPLETED | OUTPATIENT
Start: 2023-02-27 | End: 2023-02-27

## 2023-02-27 RX ORDER — ONDANSETRON 4 MG/1
4 TABLET, ORALLY DISINTEGRATING ORAL 3 TIMES DAILY PRN
Qty: 21 TABLET | Refills: 0 | Status: SHIPPED | OUTPATIENT
Start: 2023-02-27

## 2023-02-27 RX ORDER — 0.9 % SODIUM CHLORIDE 0.9 %
1000 INTRAVENOUS SOLUTION INTRAVENOUS ONCE
Status: COMPLETED | OUTPATIENT
Start: 2023-02-27 | End: 2023-02-27

## 2023-02-27 RX ADMIN — FENTANYL CITRATE 50 MCG: 50 INJECTION, SOLUTION INTRAMUSCULAR; INTRAVENOUS at 16:18

## 2023-02-27 RX ADMIN — ACETAMINOPHEN 1000 MG: 500 TABLET ORAL at 18:57

## 2023-02-27 RX ADMIN — CEFTRIAXONE SODIUM 1000 MG: 1 INJECTION, POWDER, FOR SOLUTION INTRAMUSCULAR; INTRAVENOUS at 18:56

## 2023-02-27 RX ADMIN — IOPAMIDOL 75 ML: 755 INJECTION, SOLUTION INTRAVENOUS at 17:02

## 2023-02-27 RX ADMIN — FENTANYL CITRATE 50 MCG: 50 INJECTION, SOLUTION INTRAMUSCULAR; INTRAVENOUS at 15:10

## 2023-02-27 RX ADMIN — SODIUM CHLORIDE 1000 ML: 9 INJECTION, SOLUTION INTRAVENOUS at 15:11

## 2023-02-27 RX ADMIN — ONDANSETRON 4 MG: 2 INJECTION INTRAMUSCULAR; INTRAVENOUS at 15:10

## 2023-02-27 ASSESSMENT — ENCOUNTER SYMPTOMS
SORE THROAT: 0
ABDOMINAL DISTENTION: 0
RHINORRHEA: 0
CHEST TIGHTNESS: 0
VOMITING: 1
SHORTNESS OF BREATH: 0
NAUSEA: 1
CONSTIPATION: 0
DIARRHEA: 0
ABDOMINAL PAIN: 1

## 2023-02-27 NOTE — ED TRIAGE NOTES
Patient presented to the ED today with complaints of dizziness, vomiting, chills, sweats and abdominal pain. Patient states symptoms presented today at 1250. Pt was seen by EMS and was referred to ED by personal car.

## 2023-02-27 NOTE — ED PROVIDER NOTES
[facility]     Emergency Department     Faculty Attestation    I performed a history and physical examination of the patient and discussed management with the resident. I reviewed the resident´s note and agree with the documented findings and plan of care. Any areas of disagreement are noted on the chart. I was personally present for the key portions of any procedures. I have documented in the chart those procedures where I was not present during the key portions. I have reviewed the emergency nurses triage note. I agree with the chief complaint, past medical history, past surgical history, allergies, medications, social and family history as documented unless otherwise noted below. For Physician Assistant/ Nurse Practitioner cases/documentation I have personally evaluated this patient and have completed at least one if not all key elements of the E/M (history, physical exam, and MDM). Additional findings are as noted. Voluntary guarding left lower quadrant, patient appears comfortable.      Princess Harvinder MD  02/27/23 5786 Jonatan Ruiz MD  02/27/23 2471

## 2023-02-27 NOTE — ED PROVIDER NOTES
[facility]  ED  Emergency Department Encounter  Emergency Medicine Resident     Pt Anurag Rivers  MRN: [id number]  [place] 1975  Date of evaluation: 2/27/23  PCP:  Denis Schirmer, MD  Note Started: 3:17 PM EST      CHIEF COMPLAINT       Chief Complaint   Patient presents with    Abdominal Pain    Emesis    Sweats    Chills    Dizziness       HISTORY OF PRESENT ILLNESS  (Location/Symptom, Timing/Onset, Context/Setting, Quality, Duration, Modifying Factors, Severity.)      Ambar Dietrich is a 52 y.o. female who presents with acute onset left lower quadrant tenderness this morning. Patient reports the pain has been continuous since approximately 1 hour after awakening. Patient reports she has had roughly 7 episodes of emesis secondary to pain. She also reports she has been having perceptive sweats and chills in combination with sensation of lightheadedness. She reports she is never had any pain like this before. She reports a history of a tubal ligation but reports she is sexually active and uses protection. She does not believe she could be pregnant as she is currently on her period. Patient reports she has not tried any medication at home for this problem yet. Patient is on warfarin due to coagulopathy.     PAST MEDICAL / SURGICAL / SOCIAL / FAMILY HISTORY      has a past medical history of Abnormal Pap smear of cervix, Anemia, Anxiety, Chronic back pain, Claudication (HCC), Claudication in peripheral vascular disease ([address] Depression, Headache(784.0), Heart murmur, Hemorrhage of rectum and anus, History of blood transfusion, HTN (hypertension), Hx of blood clots, Hypercoagulable state ([address] Hyperlipidemia, Hypertension, Intertriginous candidiasis, Left popliteal artery occlusion (HCC), Leg pain, Menometrorrhagia, Migraine headache with aura, Obesity, Osteoarthritis, PAD (peripheral artery disease) ([address] Patient in clinical research study, PVD (peripheral vascular disease) ([address] Takotsubo cardiomyopathy, Under care of service provider, Under care of service provider, Under care of service provider, and Wound of right leg.     has a past surgical history that includes Tubal ligation ();  section (); other surgical history (2017); other surgical history (Left, 2018); other surgical history (Left, 2018); vascular surgery (Left, 2018); pr roprtj > 1 mo after original opration (Left, 2018); pr bypass w/vein femoral-popliteal (Left, 2018); thrombectomy (Right, 2019); IVC filter insertion; Toe amputation (Right, 2019); Toe amputation (Right, 2019); Foot Amputation (Right, 3/8/2021); vascular surgery (Right, 2022); and vascular surgery (Bilateral, 2022).     Social History     Socioeconomic History    Marital status: Single     Spouse name: Not on file    Number of children: Not on file    Years of education: Not on file    Highest education level: Not on file   Occupational History    Not on file   Tobacco Use    Smoking status: Never    Smokeless tobacco: Never   Vaping Use    Vaping Use: Never used   Substance and Sexual Activity    Alcohol use: Not Currently     Comment: occasionally     Drug use: No    Sexual activity: Not Currently     Partners: Male   Other Topics Concern    Not on file   Social History Narrative    Not on file     Social Determinants of Health     Financial Resource Strain: Not on file   Food Insecurity: Not on file   Transportation Needs: Not on file   Physical Activity: Not on file   Stress: Not on file   Social Connections: Not on file   Intimate Partner Violence: Not on file   Housing Stability: Not on file       Family History   Problem Relation Age of Onset    Diabetes Mother     High Blood Pressure Mother     Heart Attack Mother     Heart Disease Mother     Kidney Disease Mother     High Blood Pressure Father     Heart Disease Father     Heart Attack Father     Diabetes Sister     High Blood Pressure Sister     Diabetes Sister     High Blood Pressure Sister        Allergies:  Asa [aspirin], Lopid [gemfibrozil], Nortriptyline, Pcn [penicillins], Sulfa antibiotics, Morphine, and Ibuprofen    Home Medications:  Prior to Admission medications    Medication Sig Start Date End Date Taking? Authorizing Provider   cephALEXin (KEFLEX) 500 MG capsule Take 1 capsule by mouth 2 times daily for 7 days 2/27/23 3/6/23 Yes Cyndi Aleman MD   acetaminophen (TYLENOL) 500 MG tablet Take 2 tablets by mouth every 8 hours as needed for Pain 2/27/23  Yes Cyndi Aleman MD   ondansetron (ZOFRAN-ODT) 4 MG disintegrating tablet Take 1 tablet by mouth 3 times daily as needed for Nausea or Vomiting 2/27/23  Yes Cyndi Aleman MD   atorvastatin (LIPITOR) 40 MG tablet take 1 tablet by mouth once daily 2/21/23   aHylee Stuart MD   [facility]) 90 MG TABS tablet Take 1 tablet by mouth 2 times daily 2/17/23 4/18/23  Demetrio Mejia MD   warfarin (COUMADIN) 7.5 MG tablet Take 1/2 or 1 whole tablet (or as directed by Medication Management) by mouth once daily. 90DS 2/17/23   Demetrio Mejia MD   lisinopril (PRINIVIL;ZESTRIL) 5 MG tablet take 1 tablet by mouth once daily 1/23/23   Tammy Cat MD   metFORMIN (GLUCOPHAGE) 500 MG tablet take 1 tablet by mouth once daily WITH BREAKFAST 12/13/22   Haylee Stuart MD   amLODIPine (NORVASC) 10 MG tablet take 1 tablet by mouth once daily 11/9/22   Francisco J Garcia DO   ferrous sulfate (IRON 325) 325 (65 Fe) MG tablet Take 1 tablet by mouth daily (with breakfast) 10/13/22   Haylee Stuart MD   diclofenac sodium (VOLTAREN) 1 % GEL Apply 4 g topically 4 times daily 9/14/22   Jeovany Escobedo MD       REVIEW OF SYSTEMS       Review of Systems   Constitutional:  Positive for appetite change, chills and diaphoresis. Negative for activity change and fever. HENT:  Negative for rhinorrhea and sore throat.     Respiratory:  Negative for chest tightness and shortness of breath. Cardiovascular:  Negative for chest pain. Gastrointestinal:  Positive for abdominal pain, nausea and vomiting. Negative for abdominal distention, constipation and diarrhea. Genitourinary:  Positive for menstrual problem (Currently on her period) and vaginal bleeding. Negative for decreased urine volume, difficulty urinating, dysuria, flank pain, frequency, hematuria, pelvic pain, urgency, vaginal discharge and vaginal pain. Skin:  Negative for rash. Neurological:  Positive for light-headedness. Negative for dizziness. PHYSICAL EXAM      INITIAL VITALS:   /79   Pulse 66   Temp 97.9 °F (36.6 °C) (Oral)   Resp 17   Wt 180 lb (81.6 kg)   SpO2 97%   BMI 29.95 kg/m²     Physical Exam  Vitals reviewed. Constitutional:       General: She is in acute distress. Appearance: She is not ill-appearing. Comments: Patient appears uncomfortable   HENT:      Head: Normocephalic. Nose: Nose normal.      Mouth/Throat:      Mouth: Mucous membranes are dry. Eyes:      Conjunctiva/sclera: Conjunctivae normal.   Cardiovascular:      Rate and Rhythm: Normal rate and regular rhythm. Heart sounds: Normal heart sounds. Pulmonary:      Effort: Pulmonary effort is normal.      Breath sounds: Normal breath sounds. Abdominal:      Palpations: Abdomen is soft. There is no mass. Tenderness: There is abdominal tenderness in the left lower quadrant. There is no right CVA tenderness or left CVA tenderness. Musculoskeletal:         General: No swelling or tenderness. Normal range of motion. Skin:     General: Skin is warm and dry. Neurological:      General: No focal deficit present. Mental Status: She is alert and oriented to person, place, and time.          DDX/DIAGNOSTIC RESULTS / EMERGENCY DEPARTMENT COURSE / MDM     Medical Decision Making  Patient is a 27-year-old female who is presenting due to left lower quadrant pain, nausea, vomiting and lightheadedness. Upon evaluation patient is uncomfortable with acute tenderness in the left lower quadrant. Differential includes mesenteric ischemia, pyelonephritis, nephrolithiasis, diverticulitis, ectopic pregnancy, or ovarian torsion. Pregnancy test was negative and ectopic was unlikely at this point. Patient is hemodynamically stable. Due to patient's low INR mesenteric ischemia is higher on differential and after CTA of the abdomen it was ruled out. CTA also ruled out any pyelonephritis, nephrolithiasis or diverticulitis. UA was positive for urinary tract infection which is most likely diagnosis at this time. Patient will be started on antibiotics and instructed to take anti-inflammatory medication at home and be discharged with Zofran as needed. Amount and/or Complexity of Data Reviewed  Labs: ordered. ECG/medicine tests: ordered. Risk  Prescription drug management. Critical Care  Total time providing critical care: < 30 minutes          EMERGENCY DEPARTMENT COURSE:    ED Course as of 02/27/23 1944 Mon Feb 27, 2023   1514 Patient presenting due to acute onset left lower quadrant pain this morning. Upon evaluation patient is very uncomfortable with significant left lower quadrant tenderness to palpation. Patient reporting significant emesis as well with this. On bedside ultrasound no hydronephrosis is appreciated on the left side. [HO]   1550 Lactic Acid, Sepsis, Whole Blood(!): 3.3 [HO]   1551 INR: 1.7 [HO]   1923 Lactic Acid, Sepsis, Whole Blood: 1.5  Lactic acid improved after fluids likely related to dehydration. [HO]   1924 Upon reevaluation of patient she is reporting improvement in her pain, nausea and lightheadedness. Instructed patient that she likely has UTI and will give 1 dose of Rocephin before leaving and that she should continue taking Keflex for the next week. Patient understands and will follow-up with her primary care physician regarding her low INR.  [address] abdomen unremarkable for possible ischemia. [HO]      ED Course User Index  Tonio Farley MD       PROCEDURES:  none    CONSULTS:  None    CRITICAL CARE:  There was significant risk of life threatening deterioration of patient's condition requiring my direct management. Critical care time less than 30 minutes, excluding any documented procedures. FINAL IMPRESSION      1. Acute cystitis with hematuria    2. Dehydration    3. Nausea and vomiting, unspecified vomiting type    4.  Right arm pain          DISPOSITION / PLAN     DISPOSITION Decision To Discharge 02/27/2023 07:06:33 PM      PATIENT REFERRED TO:  Blaise Farah MD  [address] [place] [address]  [phone]    Schedule an appointment as soon as possible for a visit   Regarding your subtherapeutic INR    DISCHARGE MEDICATIONS:  Discharge Medication List as of 2/27/2023  7:19 PM        START taking these medications    Details   cephALEXin (KEFLEX) 500 MG capsule Take 1 capsule by mouth 2 times daily for 7 days, Disp-14 capsule, R-0Print      ondansetron (ZOFRAN-ODT) 4 MG disintegrating tablet Take 1 tablet by mouth 3 times daily as needed for Nausea or Vomiting, Disp-21 tablet, R-0Print             Tania Montelongo MD  Emergency Medicine Resident    (Please note that portions of thisnote were completed with a voice recognition program.  Efforts were made to edit the dictations but occasionally words are mis-transcribed.)       Lisa Berger MD  Resident  02/27/23 1944

## 2023-02-27 NOTE — Clinical Note
Jemima Palmer was seen and treated in our emergency department on 2/27/2023. She may return to work on 03/01/2023. If you have any questions or concerns, please don't hesitate to call.       Kip Christy MD

## 2023-02-27 NOTE — ED NOTES
Pt resting on cart with respirations even and unlabored. Pt appears comfortable and in NAD. Pt denies needs at this time, will continue to monitor.      Ayanna Whiting RN  02/27/23 1947

## 2023-02-28 NOTE — ED NOTES
Pt given instructions for follow-up and discharge. Pt verbalizes understanding. Pt is A&O x4, PWD, eupneic, and ambulatory with steady, even gait upon discharge.         Shena Varela RN  02/27/23 1928

## 2023-02-28 NOTE — DISCHARGE INSTRUCTIONS
You were evaluated due to abdominal pain. Your labs showed that you were dehydrated. We did a CT of your abdomen which did not show any ischemia to your mesentery or bowel. We did an analysis of your urine which showed a urinary tract infection. We gave you 1 dose of antibiotics via IV here. We gave you medication for pain and nausea. We also gave you fluids. Please see your primary care physician in the next week concerning your subtherapeutic INR. Please continue the antibiotics for the next week. Please return to the ED if your pain worsens, if you develop fevers, chills, vomiting that does not resolve, dehydration, worsening vaginal discharge, difficulty with urination, constipation, shortness of breath or chest pain.

## 2023-03-01 ENCOUNTER — TELEPHONE (OUTPATIENT)
Dept: PHARMACY | Age: 48
End: 2023-03-01

## 2023-03-01 LAB
EKG ATRIAL RATE: 68 BPM
EKG P AXIS: 42 DEGREES
EKG P-R INTERVAL: 146 MS
EKG Q-T INTERVAL: 412 MS
EKG QRS DURATION: 86 MS
EKG QTC CALCULATION (BAZETT): 438 MS
EKG R AXIS: -19 DEGREES
EKG T AXIS: -4 DEGREES
EKG VENTRICULAR RATE: 68 BPM

## 2023-03-01 PROCEDURE — 93010 ELECTROCARDIOGRAM REPORT: CPT | Performed by: INTERNAL MEDICINE

## 2023-03-01 NOTE — TELEPHONE ENCOUNTER
Gale called to reschedule her recently missed appt and reports that she is starting cephalexin 500mg BID x 7 days, slight potential for increase in INR. INR in ED yesterday was 1.7, patient reports having had significant amount of greens. Will not make any adjustments to warfarin at this time, potential increase in INR due to antibiotic would be beneficial.  Of note:  patient does not yet have antibiotic in her possession. She reports that she is eating well, but advised should this change she should call us.       Radha Zaragoza, [facility], CACP  Clinical Pharmacist Medication Management  3/1/2023  2:19 PM      For Pharmacy Admin Tracking Only    Intervention Detail: Adherence Monitorin  Total # of Interventions Recommended: 1  Total # of Interventions Accepted: 1  Time Spent (min): 10

## 2023-03-08 ENCOUNTER — HOSPITAL ENCOUNTER (OUTPATIENT)
Dept: PHARMACY | Age: 48
Setting detail: THERAPIES SERIES
Discharge: HOME | End: 2023-03-08
Payer: COMMERCIAL

## 2023-03-08 DIAGNOSIS — I74.3 ACUTE OCCLUSION OF ARTERY OF LOWER EXTREMITY DUE TO THROMBOSIS (HCC): Primary | ICD-10-CM

## 2023-03-08 LAB
INR BLD: 2.4
PROTIME: 28.6 SECONDS

## 2023-03-08 PROCEDURE — 85610 PROTHROMBIN TIME: CPT

## 2023-03-08 PROCEDURE — 99211 OFF/OP EST MAY X REQ PHY/QHP: CPT

## 2023-03-08 NOTE — PROGRESS NOTES
Medication Management Service, Warfarin Management  CHELSEA FRANCO [facility], [phone]  Visit Date: 3/8/2023   Subjective:   Carleen Alvarado is a 52 y.o. female who presents to clinic today for anticoagulation monitoring and adjustment. Patient seen in clinic for warfarin management due to  Indication:    Acute occlusion of artery of lower extremity due to thrombosis . INR goal: of 2.5-3.5. Duration of therapy: indefinite. Assessment and PLAN   PT/INR done in office per protocol. INR today is 2.4, slightly subtherapeutic. Plan: Will continue current regimen of warfarin 3.75 mg every Monday and 7.5 mg all other days. Using warfarin 7.5 mg tablets. Recheck INR in 4 week(s). Patient seen in room # 2. Of note: Patient set a goal to be consistent with 2 servings of greens per week. Of note: ED visit on 23 for acute cystitis with hematuria and menstrual cramps. Patient's INR was subtherapeutic in the ED at 1.7. Patient states that prior to her ED visit she had a significant amount of greens. Patient Encounter also reports the patient had roughly 7 episodes of emesis due to pain the day of her ED visit. Increase in greens and vomiting (potentially missing a dose) may explain the low INR. Patient received ceftriaxone 1,000 mg IV once in the ED and was sent home with cephalexin 500 mg BID x 7 days to start on 23. Patient called Med Management [facility]) on 3/1/23 and was advised to keep warfarin dosing regimen the same. Patient verbalized understanding of dosing directions and information discussed. Dosing schedule given to patient. Progress note sent to referring office. Patient acknowledges working in consult agreement with pharmacist as referred by his/her physician.       Electronically signed by Aron Pereira on 3/8/23 at 2:53 PM EST     For Pharmacy Admin Tracking Only    Intervention Detail: Adherence Monitorin  Total # of Interventions Recommended: 1  Total # of Interventions Accepted: 1  Time Spent (min): 20

## 2023-04-05 ENCOUNTER — HOSPITAL ENCOUNTER (OUTPATIENT)
Dept: PHARMACY | Age: 48
Setting detail: THERAPIES SERIES
Discharge: HOME | End: 2023-04-05
Payer: COMMERCIAL

## 2023-04-05 DIAGNOSIS — I74.3 ACUTE OCCLUSION OF ARTERY OF LOWER EXTREMITY DUE TO THROMBOSIS (HCC): Primary | ICD-10-CM

## 2023-04-05 LAB
INR BLD: 2.8
PROTIME: 33.8 SECONDS

## 2023-04-05 PROCEDURE — 99211 OFF/OP EST MAY X REQ PHY/QHP: CPT

## 2023-04-05 PROCEDURE — 85610 PROTHROMBIN TIME: CPT

## 2023-04-20 DIAGNOSIS — D64.9 ANEMIA, UNSPECIFIED TYPE: ICD-10-CM

## 2023-04-20 DIAGNOSIS — I10 ESSENTIAL HYPERTENSION: ICD-10-CM

## 2023-04-20 RX ORDER — LISINOPRIL 5 MG/1
TABLET ORAL
Qty: 30 TABLET | Refills: 2 | Status: SHIPPED | OUTPATIENT
Start: 2023-04-20

## 2023-04-20 RX ORDER — FERROUS SULFATE 325(65) MG
TABLET ORAL
Qty: 90 TABLET | Refills: 1 | Status: SHIPPED | OUTPATIENT
Start: 2023-04-20

## 2023-04-20 NOTE — TELEPHONE ENCOUNTER
Last visit: [id number]  Last Med refill: [id number]  Does patient have enough medication for 72 hours: No:     Next Visit Date:  Future Appointments   Date Time Provider Amira English   5/3/2023  1:40 PM [facility] MG[facility]   5/4/2023  3:00 PM MD Kiley Carvalho FP [facility]   5/18/2023  1:00 PM Sampson Oliver MD [facility] [address] Maintenance   Topic Date Due    COVID-19 Vaccine (1) Never done    Pneumococcal 0-64 years Vaccine (1 - PCV) Never done    DTaP/Tdap/Td vaccine (1 - Tdap) Never done    Colorectal Cancer Screen  05/06/2020    Depression Monitoring  05/24/2023    Flu vaccine (Season Ended) 08/01/2023    A1C test (Diabetic or Prediabetic)  10/13/2023    Lipids  10/25/2023    Cervical cancer screen  05/24/2027    Hepatitis C screen  Completed    HIV screen  Completed    Hepatitis A vaccine  Aged Out    Hib vaccine  Aged Out    Meningococcal (ACWY) vaccine  Aged Out       Hemoglobin A1C (%)   Date Value   10/13/2022 6.1   03/05/2020 6.1   12/18/2017 5.9             ( goal A1C is < 7)   No results found for: LABMICR  LDL Cholesterol (mg/dL)   Date Value   10/25/2022 68   10/03/2018 42       (goal LDL is <100)   AST (U/L)   Date Value   02/27/2023 27     ALT (U/L)   Date Value   02/27/2023 15     BUN (mg/dL)   Date Value   02/27/2023 11     BP Readings from Last 3 Encounters:   02/27/23 119/79   12/14/22 121/77   12/13/22 137/87          (goal 120/80)    All Future Testing planned in [facility]  Lab Frequency Next Occurrence   PAP Smear Once 05/24/2022   US PELVIS COMPLETE Once 05/24/2022   CT ABDOMEN PELVIS WO CONTRAST Additional Contrast? Radiologist Recommendation Once 09/25/2022   Protime-INR Once 10/06/2022   VL ARTERIAL PVR LOWER WO EXERCISE Once 05/17/2023               Patient Active Problem List:     Migraine headache with aura     Primary hypertension     Obesity     Menometrorrhagia     Intertriginous candidiasis     Depression     Claudication in

## 2023-04-20 NOTE — TELEPHONE ENCOUNTER
Last visit: [id number]  Last Med refill: [id number]  Does patient have enough medication for 72 hours: No:     Next Visit Date:  Future Appointments   Date Time Provider Amira English   5/3/2023  1:40 PM [facility]ICATION MGMT [facility]   5/4/2023  3:00 PM MD Kiley Thomas [facility]   5/18/2023  1:00 PM Sampson Rodriguez MD [facility] [address] Maintenance   Topic Date Due    COVID-19 Vaccine (1) Never done    Pneumococcal 0-64 years Vaccine (1 - PCV) Never done    DTaP/Tdap/Td vaccine (1 - Tdap) Never done    Colorectal Cancer Screen  05/06/2020    Depression Monitoring  05/24/2023    Flu vaccine (Season Ended) 08/01/2023    A1C test (Diabetic or Prediabetic)  10/13/2023    Lipids  10/25/2023    Cervical cancer screen  05/24/2027    Hepatitis C screen  Completed    HIV screen  Completed    Hepatitis A vaccine  Aged Out    Hib vaccine  Aged Out    Meningococcal (ACWY) vaccine  Aged Out       Hemoglobin A1C (%)   Date Value   10/13/2022 6.1   03/05/2020 6.1   12/18/2017 5.9             ( goal A1C is < 7)   No results found for: LABMICR  LDL Cholesterol (mg/dL)   Date Value   10/25/2022 68   10/03/2018 42       (goal LDL is <100)   AST (U/L)   Date Value   02/27/2023 27     ALT (U/L)   Date Value   02/27/2023 15     BUN (mg/dL)   Date Value   02/27/2023 11     BP Readings from Last 3 Encounters:   02/27/23 119/79   12/14/22 121/77   12/13/22 137/87          (goal 120/80)    All Future Testing planned in [facility]  Lab Frequency Next Occurrence   PAP Smear Once 05/24/2022   US PELVIS COMPLETE Once 05/24/2022   CT ABDOMEN PELVIS WO CONTRAST Additional Contrast? Radiologist Recommendation Once 09/25/2022   Protime-INR Once 10/06/2022   VL ARTERIAL PVR LOWER WO EXERCISE Once 05/17/2023               Patient Active Problem List:     Migraine headache with aura     Primary hypertension     Obesity     Menometrorrhagia     Intertriginous candidiasis     Depression     Claudication in

## 2023-05-03 ENCOUNTER — HOSPITAL ENCOUNTER (OUTPATIENT)
Dept: PHARMACY | Age: 48
Setting detail: THERAPIES SERIES
Discharge: HOME | End: 2023-05-03
Payer: COMMERCIAL

## 2023-05-03 DIAGNOSIS — I74.3 ACUTE OCCLUSION OF ARTERY OF LOWER EXTREMITY DUE TO THROMBOSIS (HCC): Primary | ICD-10-CM

## 2023-05-03 LAB
INR BLD: 2.2
PROTIME: 26 SECONDS

## 2023-05-03 PROCEDURE — 85610 PROTHROMBIN TIME: CPT

## 2023-05-03 PROCEDURE — 99212 OFFICE O/P EST SF 10 MIN: CPT

## 2023-05-03 NOTE — PROGRESS NOTES
Medication Management Service, Warfarin Management  CHELSEA FRANCO [facility], [phone]  Visit Date: 5/3/2023     Subjective:   Mj Acuna is a 52 y.o. female who presents to clinic today for anticoagulation monitoring and adjustment. Patient seen in clinic for warfarin management due to  Indication:    Acute occlusion of artery of lower extremity due to thrombosis. INR goal: of 2.5-3.5. Duration of therapy: indefinite. Assessment and PLAN   PT/INR done in office per protocol. INR today is 2.2, subtherapeutic. Patient denied usual causes of decreased INR such as missed doses or increased intake of green vegetables. Plan:  Patient will boost only today's dose to 11.25 mg. Otherwise, she will continue current regimen of warfarin 3.75 mg on Mondays; 7.5 mg all other days of the week. Using warfarin 7.5 mg tablets. Recheck INR in 3 week(s). Patient seen in room # 1. ED. OP. = Patient reported she was cleaning her ears with Q-Tips and noticed there was some blood on the cotton. She stated that she has had ear pain for ~1 week and believes that she may have an ear infection. Encouraged patient to speak to her PCP about this at tomorrow's appointment. Patient also reported that she has not taken Cynthia Rayo in ~ 1 week and states that her most recent refill request to her PCP was denied but she has an appointment scheduled for tomorrow and plans to speak about this at the appointment. Patient verbalized understanding of dosing directions and information discussed. Dosing schedule given to patient. Progress note sent to referring office.     Electronically signed by LIMA Ocampo ENEDINA [facility] on 5/3/23 at 2:48 PM EDT    Gabriela Clemens PharmD, [address] Medication Management Service  [phone]  5/3/2023  3:41 PM    =======================================================    For Pharmacy Admin Tracking Only    Intervention Detail: Dose

## 2023-05-04 ENCOUNTER — OFFICE VISIT (OUTPATIENT)
Dept: FAMILY MEDICINE CLINIC | Age: 48
End: 2023-05-04
Payer: COMMERCIAL

## 2023-05-04 VITALS
SYSTOLIC BLOOD PRESSURE: 99 MMHG | DIASTOLIC BLOOD PRESSURE: 63 MMHG | HEART RATE: 64 BPM | WEIGHT: 191 LBS | TEMPERATURE: 97.9 F | BODY MASS INDEX: 31.78 KG/M2

## 2023-05-04 DIAGNOSIS — I74.3 FEMOROPOPLITEAL ARTERIAL THROMBOSIS OF LEFT LOWER EXTREMITY (HCC): Primary | ICD-10-CM

## 2023-05-04 DIAGNOSIS — I10 ESSENTIAL HYPERTENSION: ICD-10-CM

## 2023-05-04 DIAGNOSIS — K92.1 BLOOD IN STOOL: ICD-10-CM

## 2023-05-04 PROCEDURE — 3078F DIAST BP <80 MM HG: CPT

## 2023-05-04 PROCEDURE — 99203 OFFICE O/P NEW LOW 30 MIN: CPT

## 2023-05-04 PROCEDURE — 3074F SYST BP LT 130 MM HG: CPT

## 2023-05-04 RX ORDER — BLOOD PRESSURE TEST KIT
KIT MISCELLANEOUS
Qty: 1 KIT | Refills: 0 | Status: SHIPPED | OUTPATIENT
Start: 2023-05-04

## 2023-05-04 ASSESSMENT — ENCOUNTER SYMPTOMS
DIARRHEA: 0
VOMITING: 0
NAUSEA: 0
SHORTNESS OF BREATH: 0
ABDOMINAL PAIN: 0
BLOOD IN STOOL: 1
CONSTIPATION: 0

## 2023-05-15 ENCOUNTER — TELEPHONE (OUTPATIENT)
Dept: VASCULAR SURGERY | Age: 48
End: 2023-05-15

## 2023-05-17 ENCOUNTER — HOSPITAL ENCOUNTER (OUTPATIENT)
Age: 48
Setting detail: OBSERVATION
Discharge: HOME | End: 2023-05-19
Attending: EMERGENCY MEDICINE | Admitting: EMERGENCY MEDICINE
Payer: COMMERCIAL

## 2023-05-17 ENCOUNTER — APPOINTMENT (OUTPATIENT)
Dept: GENERAL RADIOLOGY | Age: 48
End: 2023-05-17
Payer: COMMERCIAL

## 2023-05-17 ENCOUNTER — APPOINTMENT (OUTPATIENT)
Dept: CT IMAGING | Age: 48
End: 2023-05-17
Payer: COMMERCIAL

## 2023-05-17 DIAGNOSIS — R42 DIZZINESS: Primary | ICD-10-CM

## 2023-05-17 DIAGNOSIS — R07.9 CHEST PAIN, UNSPECIFIED TYPE: ICD-10-CM

## 2023-05-17 LAB
ALBUMIN SERPL-MCNC: 4.1 G/DL (ref 3.5–5.2)
ALBUMIN/GLOB SERPL: 1.3 {RATIO} (ref 1–2.5)
ALP SERPL-CCNC: 106 U/L (ref 35–104)
ALT SERPL-CCNC: 23 U/L (ref 5–33)
ANION GAP SERPL CALCULATED.3IONS-SCNC: 14 MMOL/L (ref 9–17)
AST SERPL-CCNC: 15 U/L
BASOPHILS # BLD: 0.05 K/UL (ref 0–0.2)
BASOPHILS NFR BLD: 1 % (ref 0–2)
BILIRUB SERPL-MCNC: 0.2 MG/DL (ref 0.3–1.2)
BNP SERPL-MCNC: 42 PG/ML
BUN SERPL-MCNC: 11 MG/DL (ref 6–20)
CALCIUM SERPL-MCNC: 9.3 MG/DL (ref 8.6–10.4)
CHLORIDE SERPL-SCNC: 103 MMOL/L (ref 98–107)
CO2 SERPL-SCNC: 19 MMOL/L (ref 20–31)
CREAT SERPL-MCNC: 0.67 MG/DL (ref 0.5–0.9)
EOSINOPHIL # BLD: 0.15 K/UL (ref 0–0.44)
EOSINOPHILS RELATIVE PERCENT: 2 % (ref 1–4)
ERYTHROCYTE [DISTWIDTH] IN BLOOD BY AUTOMATED COUNT: 13.8 % (ref 11.8–14.4)
GFR SERPL CREATININE-BSD FRML MDRD: >60 ML/MIN/1.73M2
GLUCOSE SERPL-MCNC: 106 MG/DL (ref 70–99)
HCT VFR BLD AUTO: 35.8 % (ref 36.3–47.1)
HGB BLD-MCNC: 11.8 G/DL (ref 11.9–15.1)
IMM GRANULOCYTES # BLD AUTO: 0.03 K/UL (ref 0–0.3)
IMM GRANULOCYTES NFR BLD: 0 %
LIPASE SERPL-CCNC: 33 U/L (ref 13–60)
LYMPHOCYTES # BLD: 27 % (ref 24–43)
LYMPHOCYTES NFR BLD: 2.74 K/UL (ref 1.1–3.7)
MAGNESIUM SERPL-MCNC: 2.2 MG/DL (ref 1.6–2.6)
MCH RBC QN AUTO: 29 PG (ref 25.2–33.5)
MCHC RBC AUTO-ENTMCNC: 33 G/DL (ref 28.4–34.8)
MCV RBC AUTO: 88 FL (ref 82.6–102.9)
MONOCYTES NFR BLD: 0.65 K/UL (ref 0.1–1.2)
MONOCYTES NFR BLD: 6 % (ref 3–12)
NEUTROPHILS NFR BLD: 64 % (ref 36–65)
NEUTS SEG NFR BLD: 6.64 K/UL (ref 1.5–8.1)
NRBC AUTOMATED: 0 PER 100 WBC
PLATELET # BLD AUTO: 381 K/UL (ref 138–453)
PMV BLD AUTO: 10 FL (ref 8.1–13.5)
POTASSIUM SERPL-SCNC: 4 MMOL/L (ref 3.7–5.3)
PROT SERPL-MCNC: 7.3 G/DL (ref 6.4–8.3)
RBC # BLD AUTO: 4.07 M/UL (ref 3.95–5.11)
SODIUM SERPL-SCNC: 136 MMOL/L (ref 135–144)
TROPONIN I SERPL HS-MCNC: 6 NG/L (ref 0–14)
TROPONIN I SERPL HS-MCNC: 7 NG/L (ref 0–14)
WBC OTHER # BLD: 10.3 K/UL (ref 3.5–11.3)

## 2023-05-17 PROCEDURE — 85025 COMPLETE CBC W/AUTO DIFF WBC: CPT

## 2023-05-17 PROCEDURE — 84484 ASSAY OF TROPONIN QUANT: CPT

## 2023-05-17 PROCEDURE — 83690 ASSAY OF LIPASE: CPT

## 2023-05-17 PROCEDURE — 70450 CT HEAD/BRAIN W/O DYE: CPT

## 2023-05-17 PROCEDURE — 83880 ASSAY OF NATRIURETIC PEPTIDE: CPT

## 2023-05-17 PROCEDURE — 83735 ASSAY OF MAGNESIUM: CPT

## 2023-05-17 PROCEDURE — 99285 EMERGENCY DEPT VISIT HI MDM: CPT

## 2023-05-17 PROCEDURE — 71045 X-RAY EXAM CHEST 1 VIEW: CPT

## 2023-05-17 PROCEDURE — 80053 COMPREHEN METABOLIC PANEL: CPT

## 2023-05-17 PROCEDURE — 93005 ELECTROCARDIOGRAM TRACING: CPT | Performed by: STUDENT IN AN ORGANIZED HEALTH CARE EDUCATION/TRAINING PROGRAM

## 2023-05-17 RX ORDER — LISINOPRIL 10 MG/1
10 TABLET ORAL ONCE
Status: DISCONTINUED | OUTPATIENT
Start: 2023-05-17 | End: 2023-05-17

## 2023-05-17 ASSESSMENT — HEART SCORE: ECG: 1

## 2023-05-17 ASSESSMENT — PAIN SCALES - GENERAL: PAINLEVEL_OUTOF10: 8

## 2023-05-17 ASSESSMENT — PAIN - FUNCTIONAL ASSESSMENT: PAIN_FUNCTIONAL_ASSESSMENT: 0-10

## 2023-05-18 LAB
EKG ATRIAL RATE: 70 BPM
EKG ATRIAL RATE: 76 BPM
EKG P AXIS: 50 DEGREES
EKG P AXIS: 56 DEGREES
EKG P-R INTERVAL: 148 MS
EKG P-R INTERVAL: 158 MS
EKG Q-T INTERVAL: 396 MS
EKG Q-T INTERVAL: 428 MS
EKG QRS DURATION: 84 MS
EKG QRS DURATION: 84 MS
EKG QTC CALCULATION (BAZETT): 427 MS
EKG QTC CALCULATION (BAZETT): 481 MS
EKG R AXIS: -11 DEGREES
EKG R AXIS: -20 DEGREES
EKG T AXIS: -5 DEGREES
EKG T AXIS: -8 DEGREES
EKG VENTRICULAR RATE: 70 BPM
EKG VENTRICULAR RATE: 76 BPM
SARS-COV-2 RDRP RESP QL NAA+PROBE: NOT DETECTED
SPECIMEN DESCRIPTION: NORMAL
TROPONIN I SERPL HS-MCNC: 6 NG/L (ref 0–14)

## 2023-05-18 PROCEDURE — 6370000000 HC RX 637 (ALT 250 FOR IP): Performed by: STUDENT IN AN ORGANIZED HEALTH CARE EDUCATION/TRAINING PROGRAM

## 2023-05-18 PROCEDURE — 93005 ELECTROCARDIOGRAM TRACING: CPT | Performed by: STUDENT IN AN ORGANIZED HEALTH CARE EDUCATION/TRAINING PROGRAM

## 2023-05-18 PROCEDURE — G0378 HOSPITAL OBSERVATION PER HR: HCPCS

## 2023-05-18 PROCEDURE — 84484 ASSAY OF TROPONIN QUANT: CPT

## 2023-05-18 PROCEDURE — 93010 ELECTROCARDIOGRAM REPORT: CPT | Performed by: INTERNAL MEDICINE

## 2023-05-18 PROCEDURE — 93923 UPR/LXTR ART STDY 3+ LVLS: CPT

## 2023-05-18 PROCEDURE — 87635 SARS-COV-2 COVID-19 AMP PRB: CPT

## 2023-05-18 PROCEDURE — 2580000003 HC RX 258: Performed by: STUDENT IN AN ORGANIZED HEALTH CARE EDUCATION/TRAINING PROGRAM

## 2023-05-18 RX ORDER — SODIUM CHLORIDE 9 MG/ML
INJECTION, SOLUTION INTRAVENOUS PRN
Status: DISCONTINUED | OUTPATIENT
Start: 2023-05-18 | End: 2023-05-19 | Stop reason: HOSPADM

## 2023-05-18 RX ORDER — LANOLIN ALCOHOL/MO/W.PET/CERES
6 CREAM (GRAM) TOPICAL NIGHTLY PRN
Status: DISCONTINUED | OUTPATIENT
Start: 2023-05-18 | End: 2023-05-19 | Stop reason: HOSPADM

## 2023-05-18 RX ORDER — SODIUM CHLORIDE 0.9 % (FLUSH) 0.9 %
5-40 SYRINGE (ML) INJECTION EVERY 12 HOURS SCHEDULED
Status: DISCONTINUED | OUTPATIENT
Start: 2023-05-18 | End: 2023-05-19 | Stop reason: HOSPADM

## 2023-05-18 RX ORDER — OXYCODONE HYDROCHLORIDE 5 MG/1
5 TABLET ORAL EVERY 4 HOURS PRN
Status: DISCONTINUED | OUTPATIENT
Start: 2023-05-18 | End: 2023-05-19 | Stop reason: HOSPADM

## 2023-05-18 RX ORDER — ONDANSETRON 2 MG/ML
4 INJECTION INTRAMUSCULAR; INTRAVENOUS EVERY 6 HOURS PRN
Status: DISCONTINUED | OUTPATIENT
Start: 2023-05-18 | End: 2023-05-19 | Stop reason: HOSPADM

## 2023-05-18 RX ORDER — ACETAMINOPHEN 325 MG/1
650 TABLET ORAL EVERY 4 HOURS PRN
Status: DISCONTINUED | OUTPATIENT
Start: 2023-05-18 | End: 2023-05-19 | Stop reason: HOSPADM

## 2023-05-18 RX ORDER — SODIUM CHLORIDE 0.9 % (FLUSH) 0.9 %
5-40 SYRINGE (ML) INJECTION PRN
Status: DISCONTINUED | OUTPATIENT
Start: 2023-05-18 | End: 2023-05-19 | Stop reason: HOSPADM

## 2023-05-18 RX ORDER — ATORVASTATIN CALCIUM 40 MG/1
40 TABLET, FILM COATED ORAL DAILY
Status: DISCONTINUED | OUTPATIENT
Start: 2023-05-18 | End: 2023-05-19 | Stop reason: HOSPADM

## 2023-05-18 RX ORDER — ONDANSETRON 4 MG/1
4 TABLET, ORALLY DISINTEGRATING ORAL EVERY 8 HOURS PRN
Status: DISCONTINUED | OUTPATIENT
Start: 2023-05-18 | End: 2023-05-19 | Stop reason: HOSPADM

## 2023-05-18 RX ORDER — LISINOPRIL 5 MG/1
5 TABLET ORAL DAILY
Status: DISCONTINUED | OUTPATIENT
Start: 2023-05-18 | End: 2023-05-19 | Stop reason: HOSPADM

## 2023-05-18 RX ADMIN — TICAGRELOR 90 MG: 90 TABLET ORAL at 02:15

## 2023-05-18 RX ADMIN — ACETAMINOPHEN 650 MG: 325 TABLET ORAL at 19:40

## 2023-05-18 RX ADMIN — TICAGRELOR 90 MG: 90 TABLET ORAL at 21:17

## 2023-05-18 RX ADMIN — ACETAMINOPHEN 650 MG: 325 TABLET ORAL at 08:07

## 2023-05-18 RX ADMIN — TICAGRELOR 90 MG: 90 TABLET ORAL at 08:03

## 2023-05-18 RX ADMIN — DESMOPRESSIN ACETATE 40 MG: 0.2 TABLET ORAL at 08:02

## 2023-05-18 RX ADMIN — LISINOPRIL 5 MG: 5 TABLET ORAL at 08:01

## 2023-05-18 RX ADMIN — SODIUM CHLORIDE, PRESERVATIVE FREE 10 ML: 5 INJECTION INTRAVENOUS at 21:17

## 2023-05-18 ASSESSMENT — ENCOUNTER SYMPTOMS
SHORTNESS OF BREATH: 0
NAUSEA: 0
VOMITING: 0
COUGH: 0

## 2023-05-18 ASSESSMENT — PAIN SCALES - GENERAL
PAINLEVEL_OUTOF10: 8
PAINLEVEL_OUTOF10: 9
PAINLEVEL_OUTOF10: 8

## 2023-05-18 ASSESSMENT — PAIN - FUNCTIONAL ASSESSMENT
PAIN_FUNCTIONAL_ASSESSMENT: 0-10
PAIN_FUNCTIONAL_ASSESSMENT: ACTIVITIES ARE NOT PREVENTED
PAIN_FUNCTIONAL_ASSESSMENT: NONE - DENIES PAIN

## 2023-05-18 ASSESSMENT — PAIN DESCRIPTION - DESCRIPTORS
DESCRIPTORS: ACHING

## 2023-05-18 ASSESSMENT — PAIN DESCRIPTION - LOCATION
LOCATION: HEAD

## 2023-05-18 ASSESSMENT — PAIN DESCRIPTION - PAIN TYPE: TYPE: ACUTE PAIN

## 2023-05-18 NOTE — PROGRESS NOTES
[email or web address]  CDU / OBSERVATION ENCOUNTER  ATTENDING NOTE       I performed a history and physical examination of the patient and discussed management with the resident or midlevel provider. I reviewed the resident or midlevel provider's note and agree with the documented findings and plan of care. Any areas of disagreement are noted on the chart. I was personally present for the key portions of any procedures. I have documented in the chart those procedures where I was not present during the key portions. I have reviewed the nurses notes. I agree with the chief complaint, past medical history, past surgical history, allergies, medications, social and family history as documented unless otherwise noted below. The Family history, social history, and ROS are effectively unchanged since admission unless noted elsewhere in the chart. This patient was placed in the observation unit for reevaluation for possible admission to the hospital    Patient comfortable in the ED after spending the night for evaluation. Patient seen by cardiology. Patient presents with lightheadedness and dizziness. Patient with right-sided discomfort. Patient has not had symptoms like this previously. Patient denies fall. Patient does take warfarin for peripheral vascular disease. Patient denies any history of MI. Patient is admitted for cardiology work-up. Plan for further testing per cardiology. Plan also for vascular studies of lower extremities per vascular service. We will reevaluate once all studies are complete.   Patient currently comfortable    Katey Kay MD  Attending Emergency  Physician

## 2023-05-18 NOTE — ED NOTES
Report to Reynaldo Guerrero RN. Waiting on room to be cleaned. Pt is resting quietly. Lunch has been given to pt. Vitals stable.       Tiffany Epperson RN  05/18/23 2988

## 2023-05-18 NOTE — ED TRIAGE NOTES
49 yo female arrived to Ed with c/o headache, dizziness and hypertension. Pt states this has been ongoing today. Pt has hx of blood clots and takes Warfarin and Brillianta. Pt A&O x 4, does not appear in acute distress, RR even and unlabored, resting comfortably on stretcher with eyes open and call light in reach. Vital signs obtained, medical hx and allergies reviewed with pt. Initial assessment performed by physician, Shaylee Hopper will carry out initial orders/tasks and reassess pt.

## 2023-05-18 NOTE — H&P
regular rate and rhythm, no murmurs, rubs or gallops   ABDOMEN: soft, non-tender, non-distended with normal active bowel sounds   NEUROLOGIC:  MAEx4, no focal sensory or motor deficits   MUSCULOSKELETAL: no clubbing, cyanosis or edema   SKIN: no rash or wounds       DIFFERENTIAL DIAGNOSIS/MDM:     FROM ED MEDICAL DECISION MAKING NOTE:   44-year-old female who is on anticoagulation who presents with headache, concern for elevated blood pressure, chest pressure on the left side. Patient walking in the room, no focal neurodeficits, NIH of 0. Her lungs are clear to auscultate bilateral.  Her blood pressure here is 089 systolic. On chart review, patient's blood pressure is usually around 62O systolic. There is concern for hypertensive emergency with endorgan damage of intracranial bleed versus STEMI versus NSTEMI. I did explain to patient that we will get cardiac work-up, get a CT scan of her head, check her electrolytes. Given her history and presentation, I did have a conversation with patient that she likely will be admitted to the hospital.  We will monitor her blood pressure, if it continues to rise, will start patient on Cardene drip. Patient was in agreement with the plan    Wed May 17, 2023   2236 CXR negative for PTX of PNA [AN]   2257 Initial trop is 7 [AN]   2348 Patient CT head is normal [AN]   2355 Patient blood pressure did normalize without antihypertensive medication. CT scan does not show any intracranial bleeding. Her heart Trope 1 from 7 to 6. I did reassess patient, she is reports that she does feel better however given her heart score of 4, her presentation of symptoms, we will admit patient to observation unit for cardiac restratification. Patient updated on the plan.   Patient has an allergy, anaphylaxis to aspirin, we will not give aspirin at this moment [AN]       DIAGNOSTIC RESULTS     EKG: All EKG's are interpreted by the Observation Physician who either signs or Co-signs this chart

## 2023-05-18 NOTE — ED PROVIDER NOTES
[facility]     Emergency Department     Faculty Attestation    I performed a history and physical examination of the patient and discussed management with the resident. I reviewed the residents note and agree with the documented findings including all diagnostic interpretations and plan of care. Any areas of disagreement are noted on the chart. I was personally present for the key portions of any procedures. I have documented in the chart those procedures where I was not present during the key portions. I have reviewed the emergency nurses triage note. I agree with the chief complaint, past medical history, past surgical history, allergies, medications, social and family history as documented unless otherwise noted below. Documentation of the HPI, Physical Exam and Medical Decision Making performed by scribyue is based on my personal performance of the HPI, PE and MDM. For Physician Assistant/ Nurse Practitioner cases/documentation I have personally evaluated this patient and have completed at least one if not all key elements of the E/M (history, physical exam, and MDM). Additional findings are as noted. Primary Care Physician: Ruth Russell MD    Note Started: 2:01 AM EDT     VITAL SIGNS:   height is 5' 5\" (1.651 m) and weight is 195 lb (88.5 kg). Her oral temperature is 97.9 °F (36.6 °C). Her blood pressure is 139/99 (abnormal) and her pulse is 80. Her respiration is 25 and oxygen saturation is 94%. Medical Decision Making  Headache, high blood pressure, chest discomfort. She is on Coumadin due to DVT history. No shortness of breath. On examination hypertensive, pupils equal and reactive extraocular motion is intact. No dysarthria no aphasia no facial droop. Strength is 5/5 in all 4 extremities. Cardiac exam regular rate and rhythm no murmurs rubs gallops, pulmonary clear bilaterally abdomen is soft nontender nondistended.   Impression is chest

## 2023-05-18 NOTE — ED NOTES
ED to inpatient nurses report      Chief Complaint:  Chief Complaint   Patient presents with    Dizziness    Hypertension     Present to ED from: from home     MOA:     LOC: alert and orientated to name, place, date  Mobility: Independent  Oxygen Baseline: room air     Current needs required: none    Pending ED orders: none   Present condition: stable     Pertinent event(s): hypertensive in triage but blood stable now        Mental Status:  Level of Consciousness: Alert (0)    Psych Assessment:   Psychosocial  Psychosocial (WDL): Within Defined Limits  Vital signs   Vitals:    05/17/23 2041 05/17/23 2200 05/17/23 2240   BP: (!) 185/101 (!) 138/90 131/87   Pulse: 92 95 81   Resp: 16 25 28   Temp: 97.9 °F (36.6 °C)     TempSrc: Oral     SpO2: 99% 96% 93%   Weight: 195 lb (88.5 kg)     Height: 5' 5\" (1.651 m)          Vitals:  Patient Vitals for the past 24 hrs:   BP Temp Temp src Pulse Resp SpO2 Height Weight   05/17/23 2240 131/87 -- -- 81 28 93 % -- --   05/17/23 2200 (!) 138/90 -- -- 95 25 96 % -- --   05/17/23 2041 (!) 185/101 97.9 °F (36.6 °C) Oral 92 16 99 % 5' 5\" (1.651 m) 195 lb (88.5 kg)      Visit Vitals  /87   Pulse 81   Temp 97.9 °F (36.6 °C) (Oral)   Resp 28   Ht 5' 5\" (1.651 m)   Wt 195 lb (88.5 kg)   SpO2 93%   BMI 32.45 kg/m²        LDAs:      Ambulatory Status:  Presents to emergency department  because of falls (Syncope, seizure, or loss of consciousness): No, Age > 79: No, Altered Mental Status, Intoxication with alcohol or substance confusion (Disorientation, impaired judgment, poor safety awaremess, or inability to follow instructions): No, Impaired Mobility: Ambulates or transfers with assistive devices or assistance;  Unable to ambulate or transer.: No, Nursing Judgement: No    Diagnosis:  DISPOSITION Admitted 05/17/2023 11:57:21 PM   Final diagnoses:   Dizziness   Chest pain, unspecified type        Code Status: [unfilled]     Consults:  []  Hospitalist  Completed  [] yes [] no  []

## 2023-05-18 NOTE — ED NOTES
Pt resting on cot with eyes open. Pt denies any new chest pain and/or dizziness. Pt speaking full sentences and answering questions appropriately. Respirations even and unlabored. Call light within reach and safety precautions maintained.       Rosalinda Rojo LPN  61/13/39 7583

## 2023-05-18 NOTE — ED NOTES
The following labs were labeled with appropriate pt sticker and tubed to lab:     [x] Blue     [x] Lavender   [] on ice  [x] Green/yellow  [] Green/black [] on ice  [] Valeen [place]  [] on ice  [] Yellow  [] Red  [] Type/ Screen  [] ABG  [] VBG    [] COVID-19 swab    [] Rapid  [] PCR  [] Flu swab  [] Peds Viral Panel     [] Urine Sample  [] Fecal Sample  [] Pelvic Cultures  [] Blood Cultures  [] X 2  [] STREP Cultures      Liz Brito LPN  78/11/78 6102

## 2023-05-18 NOTE — ED PROVIDER NOTES
[facility] ED  Emergency Department Encounter  Emergency Medicine Resident     Pt Name: Debra Dietrich  MRN: [id number]  [place] 1975  Date of evaluation: 5/17/23  PCP:  Elva Tyler MD    CHIEF COMPLAINT       Chief Complaint   Patient presents with    Dizziness    Hypertension       HISTORY OFPRESENT ILLNESS  (Location/Symptom, Timing/Onset, Context/Setting, Quality, Duration, Modifying Factors,Severity.)      Debra Dietrich is a 50 y. o.yo female who is on warfarin a due to peripheral vascular disease, occlusion of the lower extremity. Patient is here due to feeling dizzy, lightheaded, with right-sided pain that she describes as a new, \"weird\". The patient states that she has not experienced anything like this before. She is also complaining of left-sided chest pressure with radiation to her arm. Patient states the pressure does not go to her back. Patient states that she did feel her gait was abnormal whenever started feeling dizzy and lightheaded. She did not fall. Patient states that she has not anything for the 8 out of 10 chest pressure pain. Of note patient reports family history of heart attacks in her brother, sister. She reports that she is unsure how many stents they have. Patient states she has not had any heart attacks in the past.  She denies any fever, chills, difficulty in walking.      PAST MEDICAL / SURGICAL / SOCIAL / FAMILY HISTORY      has a past medical history of Abnormal Pap smear of cervix, Anemia, Anxiety, Chronic back pain, Claudication (HCC), Claudication in peripheral vascular disease ([address] Depression, Headache(784.0), Heart murmur, Hemorrhage of rectum and anus, History of blood transfusion, HTN (hypertension), Hx of blood clots, Hypercoagulable state ([address] Hyperlipidemia, Hypertension, Intertriginous candidiasis, Left popliteal artery occlusion ([address] Leg pain, Menometrorrhagia, Migraine headache with aura, Obesity, Osteoarthritis, PAD (peripheral

## 2023-05-18 NOTE — ED NOTES
The following labs were labeled with appropriate pt sticker and tubed to lab:     [] Blue     [] Lavender   [] on ice  [] Green/yellow  [] Green/black [] on ice  [] Larence Gosselin  [] on ice  [] Yellow  [] Red  [] Type/ Screen  [] ABG  [] VBG    [x] COVID-19 swab    [x] Rapid  [] PCR  [] Flu swab  [] Peds Viral Panel     [] Urine Sample  [] Fecal Sample  [] Pelvic Cultures  [] Blood Cultures  [] X 2  [] STREP Cultures      Sarita Petty LPN  67/96/21 1114

## 2023-05-18 NOTE — CONSULTS
present  Extremities:   No Cyanosis or Clubbing   Lower extremity edema: No   Skin: Warm and dry  Neurological:  Not done       DATA:    Diagnostics:      EKG  Sinus rhythm, ST elevation in lead I aVL likely early repolarization, old compared to prior EKGs    ECHO  2018  Summary  Left ventricle is mildly enlarged. Hyperdynamic left ventricular function. Evidence of moderate diastolic dysfunction. Thickened mitral valve leaflets. Trivial mitral regurgitation. Trivial pulmonic insufficiency. HOLTER  2019  IMPRESSION:  1. Sinus rhythm with sinus bradycardia and sinus tachycardia. 2.  Rare PAC's. 3.  Rare PVC's. STRESS  2013  IMPRESSION:  Electrocardiographically negative Persantine stress study. 1. Negative exam for stress induced reversible ischemic changes. 2. Normal myocardial wall motion and the estimated left reticular   ejection fraction is 52 %. 3. Questionable of small old myocardial infarction in the inferior apex   versus apical thinning. Labs:     CBC:   Recent Labs     05/17/23 2147   WBC 10.3   HGB 11.8*   HCT 35.8*        BMP:   Recent Labs     05/17/23 2147      K 4.0   CO2 19*   BUN 11   CREATININE 0.67   LABGLOM >60   GLUCOSE 106*     BNP: No results for input(s): BNP in the last 72 hours. PT/INR: No results for input(s): PROTIME, INR in the last 72 hours. APTT:No results for input(s): APTT in the last 72 hours. CARDIAC ENZYMES:No results for input(s): CKTOTAL, CKMB, CKMBINDEX, TROPONINI in the last 72 hours.   FASTING LIPID PANEL:  Lab Results   Component Value Date/Time    HDL 40 10/25/2022 10:33 AM    LDLDIRECT 108 05/13/2013 10:51 AM    TRIG 186 10/25/2022 10:33 AM     LIVER PROFILE:  Recent Labs     05/17/23 2147   AST 15   ALT 23   LABALBU 4.1         IMPRESSION:    Dizziness  Chest pain  Hypertension  Hyperlipidemia  Femoral peroneal bypass graft '18  Aspirin allergy      RECOMMENDATIONS:  Continue on home Brilinta 90 mg p.o. twice daily  Continue on

## 2023-05-18 NOTE — ED NOTES
Writer to bedside. Pt resting quietly on cart, RR even and unlabored. She denies chest pain, but states she has a headache. Pt given breakfast tray. Pt notified that obs and cardiology will round on her this morning, but there is no testing ordered at this time. She voices understanding. Vitals stable.       Zhanna Mesa RN  05/18/23 1304

## 2023-05-18 NOTE — ED NOTES
The following labs were labeled with appropriate pt sticker and tubed to lab:     [] Blue     [] Lavender   [] on ice  [x] Green/yellow  [] Green/black [] on ice  [] Gloria Dejah  [] on ice  [] Yellow  [] Red  [] Type/ Screen  [] ABG  [] VBG    [] COVID-19 swab    [] Rapid  [] PCR  [] Flu swab  [] Peds Viral Panel     [] Urine Sample  [] Fecal Sample  [] Pelvic Cultures  [] Blood Cultures  [] X 2  [] STREP Cultures      Iggy Balderrama LPN  84/99/56 6050

## 2023-05-18 NOTE — ED NOTES
Pt resting on cot with eyes closed. Pt showing no s/s of distress. Respirations even and unlabored.  Call light within reach     Cleveland Ceron LPN  78/30/37 7233

## 2023-05-19 ENCOUNTER — APPOINTMENT (OUTPATIENT)
Dept: NUCLEAR MEDICINE | Age: 48
End: 2023-05-19
Payer: COMMERCIAL

## 2023-05-19 VITALS
WEIGHT: 194.45 LBS | DIASTOLIC BLOOD PRESSURE: 76 MMHG | HEART RATE: 62 BPM | HEIGHT: 65 IN | SYSTOLIC BLOOD PRESSURE: 139 MMHG | OXYGEN SATURATION: 99 % | TEMPERATURE: 97 F | RESPIRATION RATE: 14 BRPM | BODY MASS INDEX: 32.4 KG/M2

## 2023-05-19 LAB
HCG SERPL QL: NEGATIVE
INR PPP: 1.7
LV EF: 59 %
LV EF: 65 %
LVEF MODALITY: NORMAL
LVEF MODALITY: NORMAL
PROTHROMBIN TIME: 19.7 SEC (ref 11.7–14.9)

## 2023-05-19 PROCEDURE — 93017 CV STRESS TEST TRACING ONLY: CPT

## 2023-05-19 PROCEDURE — A9500 TC99M SESTAMIBI: HCPCS | Performed by: STUDENT IN AN ORGANIZED HEALTH CARE EDUCATION/TRAINING PROGRAM

## 2023-05-19 PROCEDURE — 85610 PROTHROMBIN TIME: CPT

## 2023-05-19 PROCEDURE — 6370000000 HC RX 637 (ALT 250 FOR IP): Performed by: STUDENT IN AN ORGANIZED HEALTH CARE EDUCATION/TRAINING PROGRAM

## 2023-05-19 PROCEDURE — 93306 TTE W/DOPPLER COMPLETE: CPT

## 2023-05-19 PROCEDURE — 6360000002 HC RX W HCPCS: Performed by: STUDENT IN AN ORGANIZED HEALTH CARE EDUCATION/TRAINING PROGRAM

## 2023-05-19 PROCEDURE — G0378 HOSPITAL OBSERVATION PER HR: HCPCS

## 2023-05-19 PROCEDURE — 3430000000 HC RX DIAGNOSTIC RADIOPHARMACEUTICAL: Performed by: STUDENT IN AN ORGANIZED HEALTH CARE EDUCATION/TRAINING PROGRAM

## 2023-05-19 PROCEDURE — 2580000003 HC RX 258: Performed by: STUDENT IN AN ORGANIZED HEALTH CARE EDUCATION/TRAINING PROGRAM

## 2023-05-19 PROCEDURE — 36415 COLL VENOUS BLD VENIPUNCTURE: CPT

## 2023-05-19 PROCEDURE — 78452 HT MUSCLE IMAGE SPECT MULT: CPT

## 2023-05-19 PROCEDURE — 84703 CHORIONIC GONADOTROPIN ASSAY: CPT

## 2023-05-19 PROCEDURE — 6370000000 HC RX 637 (ALT 250 FOR IP)

## 2023-05-19 RX ORDER — ATROPINE SULFATE 0.1 MG/ML
0.5 INJECTION INTRAVENOUS EVERY 5 MIN PRN
Status: DISCONTINUED | OUTPATIENT
Start: 2023-05-19 | End: 2023-05-19 | Stop reason: ALTCHOICE

## 2023-05-19 RX ORDER — SODIUM CHLORIDE 0.9 % (FLUSH) 0.9 %
10 SYRINGE (ML) INJECTION PRN
Status: DISCONTINUED | OUTPATIENT
Start: 2023-05-19 | End: 2023-05-19 | Stop reason: HOSPADM

## 2023-05-19 RX ORDER — TETRAKIS(2-METHOXYISOBUTYLISOCYANIDE)COPPER(I) TETRAFLUOROBORATE 1 MG/ML
39 INJECTION, POWDER, LYOPHILIZED, FOR SOLUTION INTRAVENOUS
Status: COMPLETED | OUTPATIENT
Start: 2023-05-19 | End: 2023-05-19

## 2023-05-19 RX ORDER — AMINOPHYLLINE DIHYDRATE 25 MG/ML
50 INJECTION, SOLUTION INTRAVENOUS PRN
Status: DISCONTINUED | OUTPATIENT
Start: 2023-05-19 | End: 2023-05-19 | Stop reason: ALTCHOICE

## 2023-05-19 RX ORDER — NITROGLYCERIN 0.4 MG/1
0.4 TABLET SUBLINGUAL EVERY 5 MIN PRN
Status: DISCONTINUED | OUTPATIENT
Start: 2023-05-19 | End: 2023-05-19 | Stop reason: ALTCHOICE

## 2023-05-19 RX ORDER — TETRAKIS(2-METHOXYISOBUTYLISOCYANIDE)COPPER(I) TETRAFLUOROBORATE 1 MG/ML
14.5 INJECTION, POWDER, LYOPHILIZED, FOR SOLUTION INTRAVENOUS
Status: COMPLETED | OUTPATIENT
Start: 2023-05-19 | End: 2023-05-19

## 2023-05-19 RX ORDER — SODIUM CHLORIDE 0.9 % (FLUSH) 0.9 %
5-40 SYRINGE (ML) INJECTION PRN
Status: DISCONTINUED | OUTPATIENT
Start: 2023-05-19 | End: 2023-05-19 | Stop reason: ALTCHOICE

## 2023-05-19 RX ORDER — METHOCARBAMOL 750 MG/1
750 TABLET, FILM COATED ORAL 3 TIMES DAILY
Status: DISCONTINUED | OUTPATIENT
Start: 2023-05-19 | End: 2023-05-19 | Stop reason: HOSPADM

## 2023-05-19 RX ORDER — SODIUM CHLORIDE 9 MG/ML
500 INJECTION, SOLUTION INTRAVENOUS CONTINUOUS PRN
Status: DISCONTINUED | OUTPATIENT
Start: 2023-05-19 | End: 2023-05-19 | Stop reason: ALTCHOICE

## 2023-05-19 RX ORDER — METHOCARBAMOL 750 MG/1
750 TABLET, FILM COATED ORAL 3 TIMES DAILY
Qty: 30 TABLET | Refills: 0 | Status: SHIPPED | OUTPATIENT
Start: 2023-05-19 | End: 2023-05-29

## 2023-05-19 RX ORDER — ALBUTEROL SULFATE 90 UG/1
2 AEROSOL, METERED RESPIRATORY (INHALATION) PRN
Status: DISCONTINUED | OUTPATIENT
Start: 2023-05-19 | End: 2023-05-19 | Stop reason: ALTCHOICE

## 2023-05-19 RX ORDER — METOPROLOL TARTRATE 5 MG/5ML
5 INJECTION INTRAVENOUS EVERY 5 MIN PRN
Status: DISCONTINUED | OUTPATIENT
Start: 2023-05-19 | End: 2023-05-19 | Stop reason: ALTCHOICE

## 2023-05-19 RX ADMIN — TETRAKIS(2-METHOXYISOBUTYLISOCYANIDE)COPPER(I) TETRAFLUOROBORATE 14.5 MILLICURIE: 1 INJECTION, POWDER, LYOPHILIZED, FOR SOLUTION INTRAVENOUS at 09:00

## 2023-05-19 RX ADMIN — TICAGRELOR 90 MG: 90 TABLET ORAL at 09:02

## 2023-05-19 RX ADMIN — SODIUM CHLORIDE, PRESERVATIVE FREE 10 ML: 5 INJECTION INTRAVENOUS at 11:17

## 2023-05-19 RX ADMIN — SODIUM CHLORIDE, PRESERVATIVE FREE 10 ML: 5 INJECTION INTRAVENOUS at 09:00

## 2023-05-19 RX ADMIN — DESMOPRESSIN ACETATE 40 MG: 0.2 TABLET ORAL at 09:02

## 2023-05-19 RX ADMIN — TETRAKIS(2-METHOXYISOBUTYLISOCYANIDE)COPPER(I) TETRAFLUOROBORATE 39 MILLICURIE: 1 INJECTION, POWDER, LYOPHILIZED, FOR SOLUTION INTRAVENOUS at 11:22

## 2023-05-19 RX ADMIN — OXYCODONE 5 MG: 5 TABLET ORAL at 00:08

## 2023-05-19 RX ADMIN — LISINOPRIL 5 MG: 5 TABLET ORAL at 09:02

## 2023-05-19 RX ADMIN — SODIUM CHLORIDE, PRESERVATIVE FREE 10 ML: 5 INJECTION INTRAVENOUS at 09:03

## 2023-05-19 RX ADMIN — METHOCARBAMOL TABLETS 750 MG: 750 TABLET, COATED ORAL at 14:07

## 2023-05-19 RX ADMIN — REGADENOSON 0.4 MG: 0.08 INJECTION, SOLUTION INTRAVENOUS at 11:18

## 2023-05-19 RX ADMIN — SODIUM CHLORIDE, PRESERVATIVE FREE 10 ML: 5 INJECTION INTRAVENOUS at 11:22

## 2023-05-19 RX ADMIN — SODIUM CHLORIDE, PRESERVATIVE FREE 10 ML: 5 INJECTION INTRAVENOUS at 10:59

## 2023-05-19 ASSESSMENT — PAIN DESCRIPTION - DESCRIPTORS
DESCRIPTORS: SORE;ACHING
DESCRIPTORS: ACHING;NUMBNESS
DESCRIPTORS: ACHING
DESCRIPTORS: ACHING
DESCRIPTORS: SORE;ACHING

## 2023-05-19 ASSESSMENT — PAIN DESCRIPTION - FREQUENCY
FREQUENCY: CONTINUOUS
FREQUENCY: CONTINUOUS

## 2023-05-19 ASSESSMENT — PAIN SCALES - GENERAL
PAINLEVEL_OUTOF10: 6
PAINLEVEL_OUTOF10: 9
PAINLEVEL_OUTOF10: 10
PAINLEVEL_OUTOF10: 8
PAINLEVEL_OUTOF10: 5

## 2023-05-19 ASSESSMENT — PAIN DESCRIPTION - ORIENTATION
ORIENTATION: RIGHT
ORIENTATION: LEFT
ORIENTATION: LEFT
ORIENTATION: RIGHT
ORIENTATION: LEFT

## 2023-05-19 ASSESSMENT — PAIN - FUNCTIONAL ASSESSMENT
PAIN_FUNCTIONAL_ASSESSMENT: PREVENTS OR INTERFERES SOME ACTIVE ACTIVITIES AND ADLS
PAIN_FUNCTIONAL_ASSESSMENT: ACTIVITIES ARE NOT PREVENTED
PAIN_FUNCTIONAL_ASSESSMENT: ACTIVITIES ARE NOT PREVENTED

## 2023-05-19 ASSESSMENT — PAIN DESCRIPTION - PAIN TYPE
TYPE: ACUTE PAIN
TYPE: ACUTE PAIN

## 2023-05-19 ASSESSMENT — PAIN DESCRIPTION - ONSET
ONSET: ON-GOING
ONSET: ON-GOING

## 2023-05-19 ASSESSMENT — PAIN DESCRIPTION - LOCATION
LOCATION: SHOULDER;ARM
LOCATION: SHOULDER
LOCATION: HEAD
LOCATION: SHOULDER;HEAD
LOCATION: SHOULDER

## 2023-05-19 NOTE — PROCEDURES
[facility]                              CARDIAC STRESS TEST    PATIENT NAME: Parth White                    :        1975  MED REC NO:   [id number]                             ROOM:       4584  ACCOUNT NO:   [de-identified]                           ADMIT DATE: 2023  PROVIDER:     Sarah Hutchinson    DATE OF STUDY:  2023    ORDERING PROVIDER: Dr. Qiana Smith PROVIDER: Dr. Rodo Rodriguez: Dr. Nancye Castleman:    Indication: Chest pain    Procedure explained and consent signed    Medications:  Lexiscan, 0.4 mg  Resting heart rate: 68 bpm  Resting blood pressure: 183/95 mmHg  Infusion heart rate:  127 bpm  Infusion blood pressure: 183/95  mmHg  Resting EKG: Normal sinus rhythm, septal infarct  Stress heart response:  Normal response  Stress BP response: Appropriate  Stress EKG's: No changes seen  Chest discomfort:  None  Ischemic EKG changes: None    IMPRESSION:  Electrocardiographically Negative Lexiscan stress study. Radio-isotope  results to follow from the department of Nuclear Medicine.         [facility]    D: 2023 16:58:54       T: 2023 17:01:56     /[id number]  Job#: [id number]     Doc#: Unknown    CC:

## 2023-05-19 NOTE — PROGRESS NOTES
[address] [employer]  CDU / OBSERVATION ENCOUNTER  ATTENDING NOTE       I performed a history and physical examination of the patient and discussed management with the resident or midlevel provider. I reviewed the resident or midlevel provider's note and agree with the documented findings and plan of care. Any areas of disagreement are noted on the chart. I was personally present for the key portions of any procedures. I have documented in the chart those procedures where I was not present during the key portions. I have reviewed the nurses notes. I agree with the chief complaint, past medical history, past surgical history, allergies, medications, social and family history as documented unless otherwise noted below. The Family history, social history, and ROS are effectively unchanged since admission unless noted elsewhere in the chart. This patient was placed in the observation unit for reevaluation for possible admission to the hospital    Symptomatically better. Patient is well in appearance. Patient's studies are negative. Patient has good follow-up. Patient states she is ready to go. Patient for ongoing outpatient evaluation. No need for further inpatient stay. Echocardiogram shows preserved cardiac function.   Stress testing negative for ischemia Doppler studies done of lower extremity per vascular request.    Shayan Batres MD  Attending Emergency  Physician

## 2023-05-19 NOTE — PLAN OF CARE
Problem: Pain  Goal: Verbalizes/displays adequate comfort level or baseline comfort level  Description: progressing  5/19/2023 1604 by Edi Ngo RN  Outcome: Adequate for Discharge  5/19/2023 1028 by Edi Ngo RN  Outcome: Progressing  Flowsheets  Taken 5/19/2023 1015  Verbalizes/displays adequate comfort level or baseline comfort level: Implement non-pharmacological measures as appropriate and evaluate response  Taken 5/19/2023 0900  Verbalizes/displays adequate comfort level or baseline comfort level:   Encourage patient to monitor pain and request assistance   Assess pain using appropriate pain scale   Implement non-pharmacological measures as appropriate and evaluate response  5/19/2023 0451 by Hue Penny RN  Outcome: Progressing     Problem: Discharge Planning  Goal: Discharge to home or other facility with appropriate resources  5/19/2023 1604 by Edi Ngo RN  Outcome: Adequate for Discharge  5/19/2023 1028 by Edi Ngo RN  Outcome: Progressing  Flowsheets (Taken 5/19/2023 0900)  Discharge to home or other facility with appropriate resources:   Identify barriers to discharge with patient and caregiver   Arrange for needed discharge resources and transportation as appropriate   Identify discharge learning needs (meds, wound care, etc)   Refer to discharge planning if patient needs post-hospital services based on physician order or complex needs related to functional status, cognitive ability or social support system     Problem: Safety - Adult  Goal: Free from fall injury  5/19/2023 1604 by Edi Ngo RN  Outcome: Adequate for Discharge  5/19/2023 1028 by Edi Ngo RN  Outcome: Progressing  Flowsheets (Taken 5/19/2023 0900)  Free From Fall Injury: Instruct family/caregiver on patient safety

## 2023-05-19 NOTE — PLAN OF CARE
Problem: Pain  Goal: Verbalizes/displays adequate comfort level or baseline comfort level  Description: progressing  Outcome: Progressing

## 2023-05-19 NOTE — PROGRESS NOTES
CLINICAL PHARMACY NOTE: MEDS TO BEDS    Total # of Prescriptions Filled: 1   The following medications were delivered to the patient:  Methocarbamol 750mg    Additional Documentation:  Delivered to patient and RN at 3:55pm. No copay.  HR

## 2023-05-19 NOTE — PLAN OF CARE
Problem: Pain  Goal: Verbalizes/displays adequate comfort level or baseline comfort level  Description: progressing  5/19/2023 1028 by Waleska De RN  Outcome: Progressing  5/19/2023 0451 by Octavio Noe RN  Outcome: Progressing     Problem: Discharge Planning  Goal: Discharge to home or other facility with appropriate resources  Outcome: Progressing     Problem: Safety - Adult  Goal: Free from fall injury  Outcome: Progressing

## 2023-05-19 NOTE — DISCHARGE INSTRUCTIONS
Admitted to the hospital for chest pain. Cardiology saw you, did a stress test and an echocardiogram which were unremarkable. Please follow-up with your primary care provider within a few days discharge. PLEASE RETURN TO THE EMERGENCY DEPARTMENT IMMEDIATELY for worsening symptoms of increasing pain, shortness of breath, feeling of your heart fluttering or racing, swelling to your feet, unable to lay flat, or if you develop any concerning symptoms such as: high fever not relieved by acetaminophen (Tylenol) and/or ibuprofen (Motrin / Advil), chills, persistent nausea and/or vomiting, loss of consciousness, numbness, weakness or tingling in the arms or legs or change in color of the extremities, changes in mental status, persistent headache, blurry vision, loss of bladder / bowel control, unable to follow up with your physician, or other any other care or concern.

## 2023-05-19 NOTE — DISCHARGE SUMMARY
CDU Discharge Summary        Patient:  Sushil Wilder  YOB: 1975    MRN: [id number]   Acct: [de-identified]    Primary Care Physician: Charan Montes MD    Admit date:  5/17/2023  9:09 PM  Discharge date: 5/19/2023  4:03 PM     Discharge Diagnoses:     Acute CP due to unknown cause  Improved with symptomatic management    Follow-up:  Call today/tomorrow for a follow up appointment with Charan Montes MD , or return to the Emergency Room with worsening symptoms    Stressed to patient the importance of following up with primary care doctor for further workup/management of symptoms. Pt verbalizes understanding and agrees with plan. Discharge Medications:  Changes to medications            Medication List        START taking these medications      methocarbamol 750 MG tablet  Commonly known as: ROBAXIN  Take 1 tablet by mouth 3 times daily for 10 days            CHANGE how you take these medications      warfarin 7.5 MG tablet  Commonly known as: Coumadin  Take as directed. If you are unsure how to take this medication, talk to your nurse or doctor. Original instructions: Take 1/2 or 1 whole tablet (or as directed by Medication Management) by mouth once daily.   90DS  What changed: additional instructions            CONTINUE taking these medications      acetaminophen 500 MG tablet  Commonly known as: TYLENOL  Take 2 tablets by mouth every 8 hours as needed for Pain     atorvastatin 40 MG tablet  Commonly known as: LIPITOR  take 1 tablet by mouth once daily     Blood Pressure Kit  Measure blood pressure daily     FeroSul 325 (65 Fe) MG tablet  Generic drug: ferrous sulfate  take 1 tablet by mouth every morning with breakfast     lisinopril 5 MG tablet  Commonly known as: PRINIVIL;ZESTRIL  take 1 tablet by mouth once daily     metFORMIN 500 MG tablet  Commonly known as: GLUCOPHAGE  take 1 tablet by mouth once daily WITH BREAKFAST     ticagrelor 90 MG Tabs tablet  Commonly known as: BRILINTA  Take 1 tablet by

## 2023-05-19 NOTE — PROGRESS NOTES
[facility]  Discharge Phone Call       After-Care Discharge Phone Call Questions:    Were you able to get your prescriptions filled? Comment:      [x] Yes  []No    Comment if answer is \"No\"   Are you taking your medication(s) as your doctor ordered? Do you understand the purpose of your medications? Comment:    [x] Yes  []No    Comment if answer is \"No\"   Are you taking any other medications that are not on the list?  Comment:      [x] Yes  []No    Comment if answer is \"Yes\"   Do you have any questions about your medications? No  Are you aware of potential side effects? Yes   Comment:    [x] Yes  [x]No    Comment if answer is \"Yes\"   Did you make your follow-up appointments (if the hospital did not do this before  discharge)? Comment:    [x] Yes  []No    Comment if answer is \"No\"   Is there any reason you might not be able to keep your follow-up appointments? Comment:     [] Yes  [x]No    Comment if answer is \"Yes\"   Do you have any questions about your care plan? No  Are you aware of what health problems to be alert for? Yes Comment:    [x] Yes  [x]No    Comment if answer is \"Yes\"   Do you have a good understanding of how you should manage your health? Comment:    [x] Yes  []No    Comment if answer is \"Yes\"   Do you know which symptoms to watch for that would mean you would need to call your doctor right away? Comment:      [x] Yes  []No    Comment if answer is \"No\"   Do you have any questions about the follow up process or any instructions that we have provided? Comment:    [] Yes  [x]No    Comment if answer is \"Yes\"   Did staff take your preferences into account?         [x] Yes  []No    Comment if answer is \"Yes\" OBS/CDU   RESIDENT NOTE      Patients PCP is: Pia Salazar MD        SUBJECTIVE      Patient seen evaluated bedside. No acute overnight events. Patient has tolerated full diet without nausea or vomiting. Denies fever, chills, chest pain, shortness of breath. Voiding and defecating without difficulty. Ambulating without difficulty. PHYSICAL EXAM      General: NAD, AO X 3  Heent: EMOI, PERRL  Neck: SUPPLE, NO JVD  Cardiovascular: RRR, S1S2  Pulmonary: CTAB, NO SOB  Abdomen: SOFT, NTTP, ND, +BS  Extremities: +2/4 PULSES DISTAL, NO SWELLING  Neuro / Psych: NO NUMBNESS OR TINGLING, MENTATION AT BASELINE    PERTINENT TEST /EXAMS      I have reviewed all available laboratory results. MEDICATIONS CURRENT   regadenoson (LEXISCAN) injection 0.4 mg, ONCE PRN  sodium chloride flush 0.9 % injection 5-40 mL, PRN  0.9 % sodium chloride infusion, Continuous PRN  albuterol sulfate HFA (PROVENTIL;VENTOLIN;PROAIR) 108 (90 Base) MCG/ACT inhaler 2 puff, PRN  atropine injection 0.5 mg, Q5 Min PRN  nitroGLYCERIN (NITROSTAT) SL tablet 0.4 mg, Q5 Min PRN  metoprolol (LOPRESSOR) injection 5 mg, Q5 Min PRN  aminophylline injection 50 mg, PRN  atorvastatin (LIPITOR) tablet 40 mg, Daily  lisinopril (PRINIVIL;ZESTRIL) tablet 5 mg, Daily  ticagrelor (BRILINTA) tablet 90 mg, BID  sodium chloride flush 0.9 % injection 5-40 mL, 2 times per day  sodium chloride flush 0.9 % injection 5-40 mL, PRN  0.9 % sodium chloride infusion, PRN  acetaminophen (TYLENOL) tablet 650 mg, Q4H PRN  ondansetron (ZOFRAN-ODT) disintegrating tablet 4 mg, Q8H PRN   Or  ondansetron (ZOFRAN) injection 4 mg, Q6H PRN  melatonin tablet 6 mg, Nightly PRN  oxyCODONE (ROXICODONE) immediate release tablet 5 mg, Q4H PRN        All medication charted and reviewed.     CONSULTS      IP CONSULT TO CARDIOLOGY    ASSESSMENT/PLAN       Anahi Romero is a 50 y.o. female who presents with hypotension, dizziness and chest pain    Patient admitted for cardiac evaluation and

## 2023-05-19 NOTE — DISCHARGE SUMMARY
Discharge teaching and instructions completed with patient using teachback method. AVS reviewed; new medication education added to AVS. Meds to beds delivered to patient. Patient voiced understanding regarding prescriptions, follow up appointments, and care of self at home. All questions answered. Patient discharged home with family.

## 2023-05-22 ENCOUNTER — TELEPHONE (OUTPATIENT)
Dept: FAMILY MEDICINE CLINIC | Age: 48
End: 2023-05-22

## 2023-05-22 NOTE — TELEPHONE ENCOUNTER
Care Transitions Initial Follow Up Call    Outreach made within 2 business days of discharge: Yes    Patient: Dorothyann Cowden Patient : 1975   MRN: [id number]  Reason for Admission: There are no discharge diagnoses documented for the most recent discharge. Discharge Date: 23       Spoke with: Patient     Discharge department/facility: [facility] Interactive Patient Contact:  Was patient able to fill all prescriptions: No: no new medication   Was patient instructed to bring all medications to the follow-up visit: Yes  Is patient taking all medications as directed in the discharge summary?  Yes  Does patient understand their discharge instructions: Yes  Does patient have questions or concerns that need addressed prior to 7-14 day follow up office visit: no    Scheduled appointment with PCP within 7-14 days    Follow Up  Future Appointments   Date Time Provider Amira English   2023  1:40 PM [facility] MEDICATION [facility]MT [facility]   2023  9:45 AM Sampson Shea MD heartvasc [facility]   2023  3:30 PM Kasey Levine MD [facility]   2023  3:15 PM Rivka Alvarez MD [facility]   [facility]45 [facility]       Chacho Gandhi

## 2023-05-24 ENCOUNTER — HOSPITAL ENCOUNTER (OUTPATIENT)
Dept: PHARMACY | Age: 48
Setting detail: THERAPIES SERIES
Discharge: HOME | End: 2023-05-24
Payer: COMMERCIAL

## 2023-05-24 DIAGNOSIS — I74.3 ACUTE OCCLUSION OF ARTERY OF LOWER EXTREMITY DUE TO THROMBOSIS (HCC): Primary | ICD-10-CM

## 2023-05-24 LAB
INR BLD: 2.8
PROTIME: 33.9 SECONDS

## 2023-05-24 PROCEDURE — 99212 OFFICE O/P EST SF 10 MIN: CPT

## 2023-05-24 PROCEDURE — 85610 PROTHROMBIN TIME: CPT

## 2023-05-24 NOTE — PROGRESS NOTES
Medication Management Service, Warfarin Management  [facility]AN[facility], [phone]  Visit Date: 2023     Subjective:   Gustavo Painter is a 50 y.o. female who presents to clinic today for anticoagulation monitoring and adjustment. Patient seen in clinic for warfarin management due to  Indication:    Acute occlusion of artery of lower extremity due to thrombosis. INR goal: of 2.5-3.5. Duration of therapy: indefinite. Assessment and PLAN   PT/INR done in office per protocol. INR today is 2.8, therapeutic after only 26.25mg as she had a short hospital admit and warfarin was held x 3 days. Patient has been dealing with significant pain issues and decreased appetite, both which could be impacting today's INR. Both situations are improving. She has been fairly stable on current regimen. Plan:  Patient already took dose for today so will reduce dose for tomorrow to 3.75mg the resume current regimen of warfarin 3.75 mg on  only;  7.5 mg all other days of the week. Using warfarin 7.5 mg tablets. Recheck INR in 1 week(s). Patient seen in room # 3. Patient verbalized understanding of dosing directions and information discussed. Dosing schedule given to patient. Progress note sent to referring office.     Homa Berrios RPh, RODP  Clinical Pharmacist Medication Management  2023  3:45 PM    For Pharmacy Admin Tracking Only    Intervention Detail: Adherence Monitorin and Dose Adjustment: 1, reason: Improve Adherence, Therapy Optimization  Total # of Interventions Recommended: 2  Total # of Interventions Accepted: 2  Time Spent (min):  25

## 2023-05-25 ENCOUNTER — OFFICE VISIT (OUTPATIENT)
Dept: VASCULAR SURGERY | Age: 48
End: 2023-05-25
Payer: COMMERCIAL

## 2023-05-25 VITALS
HEART RATE: 64 BPM | SYSTOLIC BLOOD PRESSURE: 139 MMHG | HEIGHT: 65 IN | RESPIRATION RATE: 18 BRPM | OXYGEN SATURATION: 95 % | WEIGHT: 189 LBS | BODY MASS INDEX: 31.49 KG/M2 | TEMPERATURE: 97.5 F | DIASTOLIC BLOOD PRESSURE: 84 MMHG

## 2023-05-25 DIAGNOSIS — I73.9 PAD (PERIPHERAL ARTERY DISEASE) (HCC): Primary | ICD-10-CM

## 2023-05-25 DIAGNOSIS — I73.9 CLAUDICATION IN PERIPHERAL VASCULAR DISEASE (HCC): ICD-10-CM

## 2023-05-25 DIAGNOSIS — I74.9 ARTERIAL THROMBOSIS (HCC): ICD-10-CM

## 2023-05-25 PROCEDURE — 99214 OFFICE O/P EST MOD 30 MIN: CPT | Performed by: SURGERY

## 2023-05-25 PROCEDURE — G8427 DOCREV CUR MEDS BY ELIG CLIN: HCPCS | Performed by: SURGERY

## 2023-05-25 PROCEDURE — 3078F DIAST BP <80 MM HG: CPT | Performed by: SURGERY

## 2023-05-25 PROCEDURE — G8417 CALC BMI ABV UP PARAM F/U: HCPCS | Performed by: SURGERY

## 2023-05-25 PROCEDURE — 1036F TOBACCO NON-USER: CPT | Performed by: SURGERY

## 2023-05-25 PROCEDURE — 3074F SYST BP LT 130 MM HG: CPT | Performed by: SURGERY

## 2023-05-30 ASSESSMENT — ENCOUNTER SYMPTOMS
ABDOMINAL PAIN: 0
CHEST TIGHTNESS: 0
COLOR CHANGE: 0
ALLERGIC/IMMUNOLOGIC NEGATIVE: 1
SHORTNESS OF BREATH: 0

## 2023-06-01 ENCOUNTER — HOSPITAL ENCOUNTER (OUTPATIENT)
Dept: PHARMACY | Age: 48
Setting detail: THERAPIES SERIES
Discharge: HOME | End: 2023-06-01
Payer: COMMERCIAL

## 2023-06-01 DIAGNOSIS — I74.3 ACUTE OCCLUSION OF ARTERY OF LOWER EXTREMITY DUE TO THROMBOSIS (HCC): Primary | ICD-10-CM

## 2023-06-01 LAB
INR BLD: 3.1
PROTIME: 36.8 SECONDS

## 2023-06-01 PROCEDURE — 99211 OFF/OP EST MAY X REQ PHY/QHP: CPT

## 2023-06-01 PROCEDURE — 85610 PROTHROMBIN TIME: CPT

## 2023-06-01 NOTE — PROGRESS NOTES
Medication Management Service, Warfarin Management  [facility], [phone]  Visit Date: 6/1/2023   Subjective:   Mundo Mendoza is a 50 y.o. female who presents to clinic today for anticoagulation monitoring and adjustment. Patient seen in clinic for warfarin management due to  Indication:    Acute occlusion of artery of lower extremity due to thrombosis. .   INR goal: of 2.5-3.5. Duration of therapy: indefinite. Assessment and PLAN   PT/INR done in office per protocol. INR today is 2.8, therapeutic. Jarocho Bourgeois Plan:   Continue current regimen of warfarin 3.75 mg on Mondays; 7.5 mg all other days of the week. Using warfarin 7.5 mg tablets. Recheck INR in 4 week(s). Patient seen in room # 3. Medication Management Service, Warfarin Management  [facility], [phone]  Visit Date: 6/1/2023   Subjective:   Mundo Mendoza is a 50 y.o. female who presents to clinic today for anticoagulation monitoring and adjustment. Patient seen in clinic for warfarin management due to  Indication:    Acute occlusion of artery of lower extremity due to thrombosis. .   INR goal: of 2.5-3.5. Duration of therapy: indefinite. Assessment and PLAN   PT/INR done in office per protocol. INR today is 3.1, therapeutic. Plan:  Continue current regimen of warfarin 3.75 mg on Mondays; 7.5 mg all other days of the week. Using warfarin 7.5 mg tablets. Recheck INR in 4 week(s). Patient seen in room # 2. Patient verbalized understanding of dosing directions and information discussed. Dosing schedule given to patient. Progress note sent to referring office. [address] [place] [address]  Ph., CACP, Clinical Pharmacist  Anticoagulation Services, [facility] Coumadin [facility]  6/1/2023  10:12 AM  ====================================================================    For Pharmacy Admin Tracking Only    Intervention Detail:   Total # of Interventions Recommended: 0  Total # of Interventions Accepted:

## 2023-06-05 ENCOUNTER — TELEPHONE (OUTPATIENT)
Dept: ONCOLOGY | Age: 48
End: 2023-06-05

## 2023-06-05 NOTE — TELEPHONE ENCOUNTER
[facility] HERE FOR CONSULTATION  Need labs today cbc, ferritin, anti cardiolipin ab and beta 2 glycoprotein  ab   Rv in 6 months with cbc and iron studies   LAB ORDERS GIVEN TO PT TO HAVE LABS DONE ON EXIT  LABS CDP FERRITIN FE TIBC ORDERS GIVEN TO PT ON EXIT TO BE DONE 12/11/23  MD VISIT 12/18/23 @ 3:30PM  AVS PRINTED W/ INSTRUCTIONS AND GIVEN TO PT ON EXIT

## 2023-06-06 ENCOUNTER — HOSPITAL ENCOUNTER (OUTPATIENT)
Age: 48
Discharge: HOME | End: 2023-06-06
Payer: COMMERCIAL

## 2023-06-06 DIAGNOSIS — I70.229 CRITICAL LIMB ISCHEMIA WITH HISTORY OF REVASCULARIZATION OF SAME EXTREMITY (HCC): ICD-10-CM

## 2023-06-06 DIAGNOSIS — I74.9 ARTERIAL THROMBOSIS (HCC): ICD-10-CM

## 2023-06-06 DIAGNOSIS — Z98.890 CRITICAL LIMB ISCHEMIA WITH HISTORY OF REVASCULARIZATION OF SAME EXTREMITY (HCC): ICD-10-CM

## 2023-06-06 DIAGNOSIS — K92.1 BLOOD IN STOOL: ICD-10-CM

## 2023-06-06 LAB
BASOPHILS # BLD: 0.03 K/UL (ref 0–0.2)
BASOPHILS # BLD: 0.03 K/UL (ref 0–0.2)
BASOPHILS NFR BLD: 0 % (ref 0–2)
BASOPHILS NFR BLD: 0 % (ref 0–2)
EOSINOPHIL # BLD: 0.08 K/UL (ref 0–0.44)
EOSINOPHIL # BLD: 0.1 K/UL (ref 0–0.44)
EOSINOPHILS RELATIVE PERCENT: 1 % (ref 1–4)
EOSINOPHILS RELATIVE PERCENT: 1 % (ref 1–4)
ERYTHROCYTE [DISTWIDTH] IN BLOOD BY AUTOMATED COUNT: 13.7 % (ref 11.8–14.4)
ERYTHROCYTE [DISTWIDTH] IN BLOOD BY AUTOMATED COUNT: 13.7 % (ref 11.8–14.4)
FERRITIN SERPL-MCNC: 33 NG/ML (ref 13–150)
HCT VFR BLD AUTO: 34.8 % (ref 36.3–47.1)
HCT VFR BLD AUTO: 35.2 % (ref 36.3–47.1)
HGB BLD-MCNC: 11.7 G/DL (ref 11.9–15.1)
HGB BLD-MCNC: 11.8 G/DL (ref 11.9–15.1)
IMM GRANULOCYTES # BLD AUTO: 0.03 K/UL (ref 0–0.3)
IMM GRANULOCYTES # BLD AUTO: <0.03 K/UL (ref 0–0.3)
IMM GRANULOCYTES NFR BLD: 0 %
IMM GRANULOCYTES NFR BLD: 0 %
LYMPHOCYTES # BLD: 19 % (ref 24–43)
LYMPHOCYTES # BLD: 19 % (ref 24–43)
LYMPHOCYTES NFR BLD: 1.69 K/UL (ref 1.1–3.7)
LYMPHOCYTES NFR BLD: 1.86 K/UL (ref 1.1–3.7)
MCH RBC QN AUTO: 29.1 PG (ref 25.2–33.5)
MCH RBC QN AUTO: 29.1 PG (ref 25.2–33.5)
MCHC RBC AUTO-ENTMCNC: 33.5 G/DL (ref 28.4–34.8)
MCHC RBC AUTO-ENTMCNC: 33.6 G/DL (ref 28.4–34.8)
MCV RBC AUTO: 86.6 FL (ref 82.6–102.9)
MCV RBC AUTO: 86.7 FL (ref 82.6–102.9)
MONOCYTES NFR BLD: 0.51 K/UL (ref 0.1–1.2)
MONOCYTES NFR BLD: 0.6 K/UL (ref 0.1–1.2)
MONOCYTES NFR BLD: 6 % (ref 3–12)
MONOCYTES NFR BLD: 6 % (ref 3–12)
NEUTROPHILS NFR BLD: 74 % (ref 36–65)
NEUTROPHILS NFR BLD: 74 % (ref 36–65)
NEUTS SEG NFR BLD: 6.69 K/UL (ref 1.5–8.1)
NEUTS SEG NFR BLD: 7.07 K/UL (ref 1.5–8.1)
NRBC AUTOMATED: 0 PER 100 WBC
NRBC AUTOMATED: 0 PER 100 WBC
PLATELET # BLD AUTO: 336 K/UL (ref 138–453)
PLATELET # BLD AUTO: 345 K/UL (ref 138–453)
PMV BLD AUTO: 10.1 FL (ref 8.1–13.5)
PMV BLD AUTO: 10.2 FL (ref 8.1–13.5)
RBC # BLD AUTO: 4.02 M/UL (ref 3.95–5.11)
RBC # BLD AUTO: 4.06 M/UL (ref 3.95–5.11)
WBC OTHER # BLD: 9 K/UL (ref 3.5–11.3)
WBC OTHER # BLD: 9.7 K/UL (ref 3.5–11.3)

## 2023-06-06 PROCEDURE — 86147 CARDIOLIPIN ANTIBODY EA IG: CPT

## 2023-06-06 PROCEDURE — 82728 ASSAY OF FERRITIN: CPT

## 2023-06-06 PROCEDURE — 85027 COMPLETE CBC AUTOMATED: CPT

## 2023-06-06 PROCEDURE — 36415 COLL VENOUS BLD VENIPUNCTURE: CPT

## 2023-06-06 PROCEDURE — 86146 BETA-2 GLYCOPROTEIN ANTIBODY: CPT

## 2023-06-07 ENCOUNTER — OFFICE VISIT (OUTPATIENT)
Dept: PODIATRY | Age: 48
End: 2023-06-07
Payer: COMMERCIAL

## 2023-06-07 VITALS
BODY MASS INDEX: 30.99 KG/M2 | HEIGHT: 65 IN | SYSTOLIC BLOOD PRESSURE: 132 MMHG | WEIGHT: 186 LBS | DIASTOLIC BLOOD PRESSURE: 88 MMHG | HEART RATE: 80 BPM

## 2023-06-07 DIAGNOSIS — M79.604 LEG PAIN, RIGHT: ICD-10-CM

## 2023-06-07 DIAGNOSIS — Z98.890 CRITICAL LIMB ISCHEMIA WITH HISTORY OF REVASCULARIZATION OF SAME EXTREMITY (HCC): ICD-10-CM

## 2023-06-07 DIAGNOSIS — I70.229 CRITICAL LIMB ISCHEMIA WITH HISTORY OF REVASCULARIZATION OF SAME EXTREMITY (HCC): ICD-10-CM

## 2023-06-07 DIAGNOSIS — L84 CORNS AND CALLOSITIES: Primary | ICD-10-CM

## 2023-06-07 LAB
B2 GLYCOPROT1 IGG SERPL IA-ACNC: <0.6 ELISA U/ML (ref 0–7)
B2 GLYCOPROT1 IGM SERPL IA-ACNC: 16 ELISA U/ML (ref 0–7)
CARDIOLIPIN IGA SER IA-ACNC: 1.3 APL (ref 0–14)
CARDIOLIPIN IGG SER IA-ACNC: <0.5 GPL (ref 0–10)
CARDIOLIPIN IGM SER IA-ACNC: 2.2 MPL (ref 0–10)

## 2023-06-07 PROCEDURE — 99212 OFFICE O/P EST SF 10 MIN: CPT | Performed by: STUDENT IN AN ORGANIZED HEALTH CARE EDUCATION/TRAINING PROGRAM

## 2023-06-07 PROCEDURE — 99213 OFFICE O/P EST LOW 20 MIN: CPT | Performed by: STUDENT IN AN ORGANIZED HEALTH CARE EDUCATION/TRAINING PROGRAM

## 2023-06-07 PROCEDURE — 1036F TOBACCO NON-USER: CPT | Performed by: STUDENT IN AN ORGANIZED HEALTH CARE EDUCATION/TRAINING PROGRAM

## 2023-06-07 PROCEDURE — G8417 CALC BMI ABV UP PARAM F/U: HCPCS | Performed by: STUDENT IN AN ORGANIZED HEALTH CARE EDUCATION/TRAINING PROGRAM

## 2023-06-07 PROCEDURE — 3074F SYST BP LT 130 MM HG: CPT | Performed by: STUDENT IN AN ORGANIZED HEALTH CARE EDUCATION/TRAINING PROGRAM

## 2023-06-07 PROCEDURE — G8427 DOCREV CUR MEDS BY ELIG CLIN: HCPCS | Performed by: STUDENT IN AN ORGANIZED HEALTH CARE EDUCATION/TRAINING PROGRAM

## 2023-06-07 PROCEDURE — 11056 PARNG/CUTG B9 HYPRKR LES 2-4: CPT | Performed by: STUDENT IN AN ORGANIZED HEALTH CARE EDUCATION/TRAINING PROGRAM

## 2023-06-07 PROCEDURE — 3078F DIAST BP <80 MM HG: CPT | Performed by: STUDENT IN AN ORGANIZED HEALTH CARE EDUCATION/TRAINING PROGRAM

## 2023-06-29 ENCOUNTER — HOSPITAL ENCOUNTER (OUTPATIENT)
Dept: PHARMACY | Age: 48
Setting detail: THERAPIES SERIES
Discharge: HOME | End: 2023-06-29
Payer: COMMERCIAL

## 2023-06-29 DIAGNOSIS — I74.3 ACUTE OCCLUSION OF ARTERY OF LOWER EXTREMITY DUE TO THROMBOSIS (HCC): Primary | ICD-10-CM

## 2023-06-29 LAB
INR BLD: 6.4
PROTIME: 76.6 SECONDS

## 2023-06-29 PROCEDURE — 85610 PROTHROMBIN TIME: CPT

## 2023-06-29 PROCEDURE — 99212 OFFICE O/P EST SF 10 MIN: CPT

## 2023-07-05 ENCOUNTER — HOSPITAL ENCOUNTER (OUTPATIENT)
Dept: PHARMACY | Age: 48
Setting detail: THERAPIES SERIES
Discharge: HOME | End: 2023-07-05
Payer: COMMERCIAL

## 2023-07-05 DIAGNOSIS — I74.3 ACUTE OCCLUSION OF ARTERY OF LOWER EXTREMITY DUE TO THROMBOSIS (HCC): Primary | ICD-10-CM

## 2023-07-05 LAB
INR BLD: 2.7
PROTIME: 32.4 SECONDS

## 2023-07-05 PROCEDURE — 99211 OFF/OP EST MAY X REQ PHY/QHP: CPT

## 2023-07-05 PROCEDURE — 85610 PROTHROMBIN TIME: CPT

## 2023-07-05 NOTE — PROGRESS NOTES
Medication Management Service, Warfarin Management  [facility], [phone]  Visit Date: 2023   Subjective:   Susy Mcgregor is a 50 y.o. female who presents to clinic today for anticoagulation monitoring and adjustment. Patient seen in clinic for warfarin management due to  Indication:    Acute occlusion of artery of lower extremity due to thrombosis. .   INR goal: of 2.5-3.5. Duration of therapy: indefinite. Assessment and PLAN   PT/INR done in office per protocol. INR today is 2.7, therapeutic. Patient's INR has been restored to normal range after holding a dose on , and taking a half-dose on  as directed. Patient's greens/vegetable intake has been restored to her normal quantity after taking a hiatus from this part of her diet in the week prior to her last INR check, which had resulted in an unusually high INR (6.4). Plan: Will continue maintenance regimen established at her visit with us last week: warfarin 3.75mg on ,  only; 7.5mg all other days of the week. Using warfarin 7.5 mg tablets. Recheck INR in ~3 weeks    Patient seen in room # 2.       Radha FeldmanD  Clinical Pharmacist Medication Management  2023  1:48 PM    For Pharmacy Admin Tracking Only    Intervention Detail: Adherence Monitorin  Total # of Interventions Recommended: 0  Total # of Interventions Accepted: 0  Time Spent (min): 20

## 2023-07-18 DIAGNOSIS — I74.3 FEMOROPOPLITEAL ARTERIAL THROMBOSIS OF LEFT LOWER EXTREMITY (HCC): ICD-10-CM

## 2023-07-18 NOTE — TELEPHONE ENCOUNTER
Last visit: 6/6/23  Last Med refill:   Does patient have enough medication for 72 hours: No:     Next Visit Date:  Future Appointments   Date Time Provider [address]   7/25/2023 10:00 AM [facility] MEDICATION MGMT [facility]   9/11/2023  1:45 PM MD Kiley Zheng FP [facility] [place]   9/13/2023  2:15 PM Alli Brandon MD [facility] [place] GI [facility] [place]   10/11/2023  1:45 PM Monique Quinteros DPM [facility] Podiatry [facility] [place]   11/30/2023  9:45 AM Sampson Lai MD heartvasc [facility] [place]   12/18/2023  3:30 PM Gogo Levine MD [address] [place] Maintenance   Topic Date Due    COVID-19 Vaccine (1) Never done    Pneumococcal 0-64 years Vaccine (1 - PCV) Never done    DTaP/Tdap/Td vaccine (1 - Tdap) Never done    Colorectal Cancer Screen  05/06/2020    Flu vaccine (1) 08/01/2023    A1C test (Diabetic or Prediabetic)  10/13/2023    Lipids  10/25/2023    Depression Monitoring  06/06/2024    Cervical cancer screen  05/24/2027    Hepatitis C screen  Completed    HIV screen  Completed    Hepatitis A vaccine  Aged Out    Hib vaccine  Aged Out    Meningococcal (ACWY) vaccine  Aged Out       Hemoglobin A1C (%)   Date Value   10/13/2022 6.1   03/05/2020 6.1   12/18/2017 5.9             ( goal A1C is < 7)   No results found for: LABMICR  LDL Cholesterol (mg/dL)   Date Value   10/25/2022 68   10/03/2018 42       (goal LDL is <100)   AST (U/L)   Date Value   05/17/2023 15     ALT (U/L)   Date Value   05/17/2023 23     BUN (mg/dL)   Date Value   05/17/2023 11     BP Readings from Last 3 Encounters:   06/07/23 132/88   06/06/23 (!) 147/92   06/05/23 (!) 148/88          (goal 120/80)    All Future Testing planned in [facility]  Lab Frequency Next Occurrence   CT ABDOMEN PELVIS WO CONTRAST Additional Contrast? Radiologist Recommendation Once 09/25/2022   Protime-INR Once 10/06/2022   VL ARTERIAL PVR LOWER WO EXERCISE Once 05/17/2023   Ferritin Once 06/05/2023   Iron and TIBC Once 06/05/2023   CBC with Auto Differential

## 2023-07-19 DIAGNOSIS — I74.3 FEMOROPOPLITEAL ARTERIAL THROMBOSIS OF LEFT LOWER EXTREMITY (HCC): ICD-10-CM

## 2023-07-19 NOTE — TELEPHONE ENCOUNTER
E-scribe request for med refills. Please review and e-scribe if applicable.      Last Visit Date:  6/6/23  Next Visit Date:  Visit date not found    Hemoglobin A1C (%)   Date Value   10/13/2022 6.1   03/05/2020 6.1   12/18/2017 5.9             ( goal A1C is < 7)   No results found for: LABMICR  LDL Cholesterol (mg/dL)   Date Value   10/25/2022 68       (goal LDL is <100)   AST (U/L)   Date Value   05/17/2023 15     ALT (U/L)   Date Value   05/17/2023 23     BUN (mg/dL)   Date Value   05/17/2023 11     BP Readings from Last 3 Encounters:   06/07/23 132/88   06/06/23 (!) 147/92   06/05/23 (!) 148/88          (goal 120/80)        Patient Active Problem List:     Migraine headache with aura     Primary hypertension     Obesity     Menometrorrhagia     Intertriginous candidiasis     Depression     Claudication in peripheral vascular disease ([address])     Left popliteal artery occlusion (HCC)     Hypercoagulable state ([address])     Hemorrhage of rectum and anus     Anemia     Foot pain     Wound of right leg     Takotsubo cardiomyopathy     Chest pain     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     Critical limb ischemia with history of revascularization of same extremity ([address])     Occlusion of right iliac artery ([address])     Acute occlusion of artery of lower extremity due to thrombosis ([address])     Arterial thrombosis ([address])      ----Rick Castillo

## 2023-07-25 ENCOUNTER — HOSPITAL ENCOUNTER (OUTPATIENT)
Dept: PHARMACY | Age: 48
Setting detail: THERAPIES SERIES
Discharge: HOME | End: 2023-07-25
Payer: COMMERCIAL

## 2023-07-25 DIAGNOSIS — I74.3 ACUTE OCCLUSION OF ARTERY OF LOWER EXTREMITY DUE TO THROMBOSIS (HCC): Primary | ICD-10-CM

## 2023-07-25 LAB
INR BLD: 2.3
PROTIME: 27.9 SECONDS

## 2023-07-25 PROCEDURE — 85610 PROTHROMBIN TIME: CPT

## 2023-07-25 PROCEDURE — 99211 OFF/OP EST MAY X REQ PHY/QHP: CPT

## 2023-08-24 ENCOUNTER — HOSPITAL ENCOUNTER (OUTPATIENT)
Dept: PHARMACY | Age: 48
Setting detail: THERAPIES SERIES
Discharge: HOME | End: 2023-08-24
Payer: COMMERCIAL

## 2023-08-24 DIAGNOSIS — I74.3 ACUTE OCCLUSION OF ARTERY OF LOWER EXTREMITY DUE TO THROMBOSIS (HCC): Primary | ICD-10-CM

## 2023-08-24 LAB
INR BLD: 2.3
PROTIME: 27.9 SECONDS

## 2023-08-24 PROCEDURE — 85610 PROTHROMBIN TIME: CPT

## 2023-08-24 PROCEDURE — 99212 OFFICE O/P EST SF 10 MIN: CPT

## 2023-08-24 RX ORDER — WARFARIN SODIUM 7.5 MG/1
TABLET ORAL
Qty: 80 TABLET | Refills: 1 | Status: SHIPPED | OUTPATIENT
Start: 2023-08-24

## 2023-08-24 NOTE — PROGRESS NOTES
Medication Management Service, Warfarin Management  [facility] FRANCO [facility] [phone]  Visit Date: 2023   Subjective:   Peter Peters is a 50 y.o. female who presents to clinic today for anticoagulation monitoring and adjustment. Patient seen in clinic for warfarin management due to  Indication:    Acute occlusion of artery of lower extremity due to thrombosis. .   INR goal: of 2.5-3.5. Duration of therapy: indefinite. Assessment and PLAN   PT/INR done in office per protocol. INR today is 2.3, subtherapeutic; patient reports usual intake of gv which includes greens and cabbage. Patient reports had one clot originally for which she was started on Eliquis, had issues with script so missed some doses and had subsequent clot for which she was started on warfarin. Does not have mechanical valve. Plan: Will continue regimen of warfarin 3.75mg on ,  only; 7.5mg all other days of the week. Using warfarin 7.5 mg tablets. Patient prefers to reduce gv slightly vs having increase in warfarin dose along with increase in green vegetables. Recheck INR in 4 weeks. Patient seen in room #3. New prescription sent electronically to patient's preferred pharmacy.        Vicky Sanders RPh, CACP  Clinical Pharmacist Medication Management  2023  10:49 AM      For Pharmacy Admin Tracking Only    Intervention Detail: Adherence Monitorin and Refill(s) Provided  Total # of Interventions Recommended: 1  Total # of Interventions Accepted: 2  Time Spent (min): 20

## 2023-09-07 ENCOUNTER — HOSPITAL ENCOUNTER (EMERGENCY)
Age: 48
Discharge: HOME | End: 2023-09-07
Attending: EMERGENCY MEDICINE
Payer: COMMERCIAL

## 2023-09-07 VITALS
SYSTOLIC BLOOD PRESSURE: 137 MMHG | OXYGEN SATURATION: 95 % | WEIGHT: 185 LBS | DIASTOLIC BLOOD PRESSURE: 68 MMHG | TEMPERATURE: 98.5 F | BODY MASS INDEX: 29.03 KG/M2 | RESPIRATION RATE: 18 BRPM | HEART RATE: 99 BPM | HEIGHT: 67 IN

## 2023-09-07 DIAGNOSIS — T63.441A ANAPHYLACTIC REACTION TO BEE STING, ACCIDENTAL OR UNINTENTIONAL, INITIAL ENCOUNTER: Primary | ICD-10-CM

## 2023-09-07 PROCEDURE — 2500000003 HC RX 250 WO HCPCS: Performed by: STUDENT IN AN ORGANIZED HEALTH CARE EDUCATION/TRAINING PROGRAM

## 2023-09-07 PROCEDURE — 96375 TX/PRO/DX INJ NEW DRUG ADDON: CPT

## 2023-09-07 PROCEDURE — 96372 THER/PROPH/DIAG INJ SC/IM: CPT

## 2023-09-07 PROCEDURE — 6360000002 HC RX W HCPCS: Performed by: STUDENT IN AN ORGANIZED HEALTH CARE EDUCATION/TRAINING PROGRAM

## 2023-09-07 PROCEDURE — 99284 EMERGENCY DEPT VISIT MOD MDM: CPT

## 2023-09-07 PROCEDURE — 96374 THER/PROPH/DIAG INJ IV PUSH: CPT

## 2023-09-07 RX ORDER — DEXAMETHASONE SODIUM PHOSPHATE 10 MG/ML
10 INJECTION, SOLUTION INTRAMUSCULAR; INTRAVENOUS ONCE
Status: COMPLETED | OUTPATIENT
Start: 2023-09-07 | End: 2023-09-07

## 2023-09-07 RX ORDER — EPINEPHRINE 0.3 MG/.3ML
0.3 INJECTION SUBCUTANEOUS ONCE
Qty: 0.3 ML | Refills: 0 | Status: SHIPPED | OUTPATIENT
Start: 2023-09-07 | End: 2023-09-07

## 2023-09-07 RX ORDER — FAMOTIDINE 10 MG/ML
20 INJECTION, SOLUTION INTRAVENOUS
Status: COMPLETED | OUTPATIENT
Start: 2023-09-07 | End: 2023-09-07

## 2023-09-07 RX ORDER — DIPHENHYDRAMINE HYDROCHLORIDE 50 MG/ML
25 INJECTION INTRAMUSCULAR; INTRAVENOUS ONCE
Status: COMPLETED | OUTPATIENT
Start: 2023-09-07 | End: 2023-09-07

## 2023-09-07 RX ORDER — EPINEPHRINE 1 MG/ML
0.3 INJECTION, SOLUTION INTRAMUSCULAR; SUBCUTANEOUS ONCE
Status: COMPLETED | OUTPATIENT
Start: 2023-09-07 | End: 2023-09-07

## 2023-09-07 RX ADMIN — DEXAMETHASONE SODIUM PHOSPHATE 10 MG: 10 INJECTION, SOLUTION INTRAMUSCULAR; INTRAVENOUS at 11:00

## 2023-09-07 RX ADMIN — EPINEPHRINE 0.3 MG: 1 INJECTION INTRAMUSCULAR; INTRAVENOUS; SUBCUTANEOUS at 11:00

## 2023-09-07 RX ADMIN — FAMOTIDINE 20 MG: 10 INJECTION, SOLUTION INTRAVENOUS at 11:00

## 2023-09-07 RX ADMIN — DIPHENHYDRAMINE HYDROCHLORIDE 25 MG: 50 INJECTION INTRAMUSCULAR; INTRAVENOUS at 11:00

## 2023-09-07 ASSESSMENT — ENCOUNTER SYMPTOMS
SHORTNESS OF BREATH: 1
ABDOMINAL PAIN: 0
NAUSEA: 1
VOMITING: 0

## 2023-09-07 ASSESSMENT — PAIN - FUNCTIONAL ASSESSMENT: PAIN_FUNCTIONAL_ASSESSMENT: NONE - DENIES PAIN

## 2023-09-07 NOTE — ED PROVIDER NOTES
[address] [place] [address] ED  Emergency Department Encounter  Emergency Medicine Resident     Pt Joseluis Form  MRN: [id number]  [facility] [place] 1975  Date of evaluation: 9/7/23  PCP:  Elvira Dasilva MD  Note Started: 10:38 AM EDT      CHIEF COMPLAINT       Chief Complaint   Patient presents with    Allergic Reaction     Stung by bees  NO known allergy; has been stung before without reaction  Reported stung on butt and ankle  Airway intact  Handling secretions well  Report itching and pain  Pt is anxious       HISTORY OF PRESENT ILLNESS  (Location/Symptom, Timing/Onset, Context/Setting, Quality, Duration, Modifying Factors, Severity.)      Delfina Cook is a 50 y.o. female who presents with concern for a reaction to a bee sting. Patient states he is attempted to go to her friend's house earlier today when she stopped and rang the doorbell and got attacked by multiple bees. States she had a sting on her right ankle and that of the flew up her pants and stung her left buttock. Patient states she is feeling generalized itchy and having some hives on her back. Patient states she also is feeling short of breath. Patient has no known allergies to bees however her sister is allergic.   Patient has never had an anaphylactic injury in the past.    PAST MEDICAL / SURGICAL / SOCIAL / FAMILY HISTORY      has a past medical history of Abnormal Pap smear of cervix, Anemia, Anxiety, Chronic back pain, Claudication (HCC), Claudication in peripheral vascular disease ([address]), Depression, Headache(784.0), Heart murmur, Hemorrhage of rectum and anus, History of blood transfusion, HTN (hypertension), Hx of blood clots, Hypercoagulable state ([address]), Hyperlipidemia, Hypertension, Intertriginous candidiasis, Left popliteal artery occlusion ([address]), Leg pain, Menometrorrhagia, Migraine headache with aura, Obesity, Osteoarthritis, PAD (peripheral artery disease) ([address]), Patient in clinical research study, PVD (peripheral vascular Physical Exam  Vitals reviewed. Constitutional:       Comments: Anxious appearing   HENT:      Head: Normocephalic and atraumatic. Right Ear: External ear normal.      Left Ear: External ear normal.      Nose: Nose normal.      Mouth/Throat:      Mouth: Mucous membranes are moist.      Pharynx: No oropharyngeal exudate or posterior oropharyngeal erythema. Comments: Tolerating secretions without difficulty  Eyes:      General:         Right eye: No discharge. Left eye: No discharge. Extraocular Movements: Extraocular movements intact. Pupils: Pupils are equal, round, and reactive to light. Cardiovascular:      Rate and Rhythm: Regular rhythm. Tachycardia present. Pulses: Normal pulses. Pulmonary:      Effort: Pulmonary effort is normal. No respiratory distress. Breath sounds: Normal breath sounds. Abdominal:      General: There is no distension. Palpations: Abdomen is soft. Tenderness: There is no abdominal tenderness. Musculoskeletal:      Cervical back: Normal range of motion. Comments: Moving all 4 extremities   Skin:     General: Skin is warm. Capillary Refill: Capillary refill takes less than 2 seconds. Comments: Mildly cardial rash noted on back. Area of mild swelling and tenderness on left buttock where patient was stung by bee   Neurological:      General: No focal deficit present. Mental Status: She is alert and oriented to person, place, and time. Cranial Nerves: No cranial nerve deficit. Psychiatric:      Comments: Anxious appearing         DDX/DIAGNOSTIC RESULTS / EMERGENCY DEPARTMENT COURSE / MDM     Medical Decision Making  DDx: Bee sting, allergic reaction, anaphylaxis    44-year-old female presents after being stung multiple times by bees. Feeling some shortness of breath, very anxious, having mild urticarial rash and feeling generally itchy. Patient has no known allergies to bees.   Initial evaluation patient

## 2023-09-07 NOTE — DISCHARGE INSTRUCTIONS
We evaluated you for your bee sting. We are concerned you have an allergic reaction. Please use the EpiPen if needed if have similar symptoms again. If you do use your EpiPen you do need to be evaluated the emergency department afterwards. Please return to the emergency department you develop any worsening or concerning symptoms. Please call your primary care provider as soon as possible.

## 2023-09-07 NOTE — ED TRIAGE NOTES
Pt reported she was stung on buttocks and right ankle this morning by bees  Pt reported itchiness and \"some\" chest pain and \"throat feels funny\"  Pt handling secretions well

## 2023-09-11 ENCOUNTER — OFFICE VISIT (OUTPATIENT)
Dept: FAMILY MEDICINE CLINIC | Age: 48
End: 2023-09-11
Payer: COMMERCIAL

## 2023-09-11 VITALS
DIASTOLIC BLOOD PRESSURE: 73 MMHG | HEIGHT: 67 IN | HEART RATE: 66 BPM | SYSTOLIC BLOOD PRESSURE: 152 MMHG | WEIGHT: 184.8 LBS | BODY MASS INDEX: 29 KG/M2

## 2023-09-11 DIAGNOSIS — I10 ESSENTIAL HYPERTENSION: Primary | ICD-10-CM

## 2023-09-11 DIAGNOSIS — R73.03 PREDIABETES: ICD-10-CM

## 2023-09-11 DIAGNOSIS — I10 ESSENTIAL HYPERTENSION: ICD-10-CM

## 2023-09-11 PROCEDURE — G8417 CALC BMI ABV UP PARAM F/U: HCPCS

## 2023-09-11 PROCEDURE — 99213 OFFICE O/P EST LOW 20 MIN: CPT

## 2023-09-11 PROCEDURE — 1036F TOBACCO NON-USER: CPT

## 2023-09-11 PROCEDURE — G8427 DOCREV CUR MEDS BY ELIG CLIN: HCPCS

## 2023-09-11 PROCEDURE — 99211 OFF/OP EST MAY X REQ PHY/QHP: CPT | Performed by: FAMILY MEDICINE

## 2023-09-11 PROCEDURE — 3077F SYST BP >= 140 MM HG: CPT

## 2023-09-11 PROCEDURE — 3078F DIAST BP <80 MM HG: CPT

## 2023-09-11 RX ORDER — LISINOPRIL 5 MG/1
5 TABLET ORAL DAILY
Qty: 30 TABLET | Refills: 2 | OUTPATIENT
Start: 2023-09-11

## 2023-09-11 RX ORDER — LISINOPRIL 10 MG/1
10 TABLET ORAL DAILY
Qty: 90 TABLET | Refills: 1 | Status: SHIPPED | OUTPATIENT
Start: 2023-09-11

## 2023-09-11 RX ORDER — LISINOPRIL 10 MG/1
TABLET ORAL
COMMUNITY
Start: 2023-06-06 | End: 2023-09-11 | Stop reason: SDUPTHER

## 2023-09-11 SDOH — ECONOMIC STABILITY: INCOME INSECURITY: HOW HARD IS IT FOR YOU TO PAY FOR THE VERY BASICS LIKE FOOD, HOUSING, MEDICAL CARE, AND HEATING?: NOT HARD AT ALL

## 2023-09-11 SDOH — ECONOMIC STABILITY: FOOD INSECURITY: WITHIN THE PAST 12 MONTHS, THE FOOD YOU BOUGHT JUST DIDN'T LAST AND YOU DIDN'T HAVE MONEY TO GET MORE.: NEVER TRUE

## 2023-09-11 SDOH — ECONOMIC STABILITY: HOUSING INSECURITY
IN THE LAST 12 MONTHS, WAS THERE A TIME WHEN YOU DID NOT HAVE A STEADY PLACE TO SLEEP OR SLEPT IN A SHELTER (INCLUDING NOW)?: NO

## 2023-09-11 SDOH — ECONOMIC STABILITY: FOOD INSECURITY: WITHIN THE PAST 12 MONTHS, YOU WORRIED THAT YOUR FOOD WOULD RUN OUT BEFORE YOU GOT MONEY TO BUY MORE.: NEVER TRUE

## 2023-09-11 ASSESSMENT — PATIENT HEALTH QUESTIONNAIRE - PHQ9
2. FEELING DOWN, DEPRESSED OR HOPELESS: 0
9. THOUGHTS THAT YOU WOULD BE BETTER OFF DEAD, OR OF HURTING YOURSELF: 0
SUM OF ALL RESPONSES TO PHQ QUESTIONS 1-9: 3
SUM OF ALL RESPONSES TO PHQ QUESTIONS 1-9: 3
4. FEELING TIRED OR HAVING LITTLE ENERGY: 0
7. TROUBLE CONCENTRATING ON THINGS, SUCH AS READING THE NEWSPAPER OR WATCHING TELEVISION: 0
SUM OF ALL RESPONSES TO PHQ9 QUESTIONS 1 & 2: 0
SUM OF ALL RESPONSES TO PHQ QUESTIONS 1-9: 3
5. POOR APPETITE OR OVEREATING: 0
6. FEELING BAD ABOUT YOURSELF - OR THAT YOU ARE A FAILURE OR HAVE LET YOURSELF OR YOUR FAMILY DOWN: 0
8. MOVING OR SPEAKING SO SLOWLY THAT OTHER PEOPLE COULD HAVE NOTICED. OR THE OPPOSITE, BEING SO FIGETY OR RESTLESS THAT YOU HAVE BEEN MOVING AROUND A LOT MORE THAN USUAL: 0
SUM OF ALL RESPONSES TO PHQ QUESTIONS 1-9: 3
3. TROUBLE FALLING OR STAYING ASLEEP: 3
10. IF YOU CHECKED OFF ANY PROBLEMS, HOW DIFFICULT HAVE THESE PROBLEMS MADE IT FOR YOU TO DO YOUR WORK, TAKE CARE OF THINGS AT HOME, OR GET ALONG WITH OTHER PEOPLE: 0
1. LITTLE INTEREST OR PLEASURE IN DOING THINGS: 0

## 2023-09-11 ASSESSMENT — ENCOUNTER SYMPTOMS
DIARRHEA: 0
SORE THROAT: 0
SHORTNESS OF BREATH: 0
COUGH: 0
ABDOMINAL PAIN: 0
CONSTIPATION: 0
NAUSEA: 0
VOMITING: 0

## 2023-09-11 NOTE — TELEPHONE ENCOUNTER
E-scribe request for lisinopril. Please review and e-scribe if applicable.      Last Visit Date:  06/06/23  Next Visit Date:  9/11/2023    Hemoglobin A1C (%)   Date Value   10/13/2022 6.1   03/05/2020 6.1   12/18/2017 5.9             ( goal A1C is < 7)   No components found for: \"LABMICR\"  LDL Cholesterol (mg/dL)   Date Value   10/25/2022 68       (goal LDL is <100)   AST (U/L)   Date Value   05/17/2023 15     ALT (U/L)   Date Value   05/17/2023 23     BUN (mg/dL)   Date Value   05/17/2023 11     BP Readings from Last 3 Encounters:   09/07/23 137/68   06/07/23 132/88   06/06/23 (!) 147/92          (goal 120/80)        Patient Active Problem List:     Migraine headache with aura     Primary hypertension     Obesity     Menometrorrhagia     Intertriginous candidiasis     Depression     Claudication in peripheral vascular disease (HCC)     Left popliteal artery occlusion (HCC)     Hypercoagulable state ([address])     Hemorrhage of rectum and anus     Anemia     Foot pain     Wound of right leg     Takotsubo cardiomyopathy     Chest pain     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     Critical limb ischemia with history of revascularization of same extremity ([address])     Occlusion of right iliac artery ([address])     Acute occlusion of artery of lower extremity due to thrombosis ([address])     Arterial thrombosis ([address])

## 2023-09-11 NOTE — PATIENT INSTRUCTIONS
Thank you for letting us take care of you today. We hope all your questions were addressed. If a question was overlooked or something else comes to mind after you return home, please contact a member of your Care Team listed below. Your Care Team at [address] [place] [address] is Team #  Abrahan Valencia, (Faculty)  Alexander Johns (Resident)  Doni Subramanian (Resident)  Everett Huerta (Resident)   Viraj Salgado (Resident)  Priti Dodd (Resident)  Jacqueline Sol., [facility]  Samuel Avendano., [facility], [facility]  Savanah Thurston, [facility]  Ricardo Schulte, [facility]  Vanessa Mclain, [facility]  Maryann Balderrama) [place], [place] (Clinical Practice Manager)  Jay Alfaro [facility] (Clinical Pharmacist)     Office phone number: [phone]    If you need to get in right away due to illness, please be advised we have \"Same Day\" appointments available Monday-Friday. Please call us at [phone] option #3 to schedule your \"Same Day\" appointment.

## 2023-09-11 NOTE — PROGRESS NOTES
Ines Larsen (:  1975) is a 50 y.o. female,Established patient, here for evaluation of the following chief complaint(s):  Hypertension, Allergic Reaction, Health Maintenance (Discuss colonoscopy, pt declined vaccines ), and Anemia    ASSESSMENT/PLAN:    1. Essential hypertension  -     lisinopril (PRINIVIL;ZESTRIL) 10 MG tablet; Take 1 tablet by mouth daily, Disp-90 tablet, R-1Normal  2. Prediabetes  -     Hemoglobin A1C; Future    Return in about 4 months (around 2024), or if symptoms worsen or fail to improve, for HTN. Subjective     SUBJECTIVE/OBJECTIVE:    HPI    Ms. Sariah Sanabria, 51 yo AA F, with previous medical history of chronic anemia possibly due to menorrhagia, bilateral lower extremity PAD with extensive surgeries and workup including thrombectomies and stent placements, and HTN. Presents to the [facility] clinic today for follow-up on HTN, and prediabetes. Patient blood pressure today is 152/73. Patient is taking lisinopril 5 mg oral daily. Patient is compliant with her medications. Patient does not report any symptoms at this time. Patient does not have any other acute concerns at this time. Patient was wondering if she can change her Coumadin to Eliquis. Discussed with patient that she has had extensive surgeries in the lower extremities and that she is also in a prothrombotic state. Patient should follow-up with oncology and vascular surgery regarding switching her anticoagulation. Patient voiced understanding. Patient does not have any other acute concerns at this time. Review of Systems   Constitutional:  Negative for activity change and fever. HENT:  Negative for congestion and sore throat. Respiratory:  Negative for cough and shortness of breath. Cardiovascular:  Negative for chest pain and leg swelling. Gastrointestinal:  Negative for abdominal pain, constipation, diarrhea, nausea and vomiting.    Genitourinary:  Negative for

## 2023-09-11 NOTE — PROGRESS NOTES
HYPERTENSION visit     BP Readings from Last 3 Encounters:   09/07/23 137/68   06/07/23 132/88   06/06/23 (!) 147/92       LDL Cholesterol (mg/dL)   Date Value   10/25/2022 68     HDL (mg/dL)   Date Value   10/25/2022 40 (L)     BUN (mg/dL)   Date Value   05/17/2023 11     Creatinine (mg/dL)   Date Value   05/17/2023 0.67     Glucose (mg/dL)   Date Value   05/17/2023 106 (H)              Have you changed or started any medications since your last visit including any over-the-counter medicines, vitamins, or herbal medicines? no   Have you stopped taking any of your medications? Is so, why? -  yes - see list  Are you having any side effects from any of your medications? - no  How often do you miss doses of your medication? no      Have you seen any other physician or provider since your last visit?  no   Have you had any other diagnostic tests since your last visit?  no   Have you been seen in the emergency room and/or had an admission in a hospital since we last saw you?  yes - [address] [place] [address] 9-7-23  Have you had your routine dental cleaning in the past 6 months?  no     Do you have an active MyChart account? If no, what is the barrier?   Yes    Patient Care Team:  Gayathri Yanez MD as PCP - General (Family Medicine)  Edward Azul MD as Consulting Physician (Gastroenterology)    Medical History Review  Past Medical, Family, and Social History reviewed and does not contribute to the patient presenting condition    Health Maintenance   Topic Date Due    Hepatitis B vaccine (1 of 3 - 3-dose series) Never done    COVID-19 Vaccine (1) Never done    Pneumococcal 0-64 years Vaccine (1 - PCV) Never done    DTaP/Tdap/Td vaccine (1 - Tdap) Never done    Colorectal Cancer Screen  05/06/2020    Flu vaccine (1) 08/01/2023    A1C test (Diabetic or Prediabetic)  10/13/2023    Lipids  10/25/2023    Depression Monitoring  06/06/2024    Cervical cancer screen  05/24/2027    Hepatitis C screen  Completed    HIV screen  Completed

## 2023-09-15 DIAGNOSIS — I74.3 FEMOROPOPLITEAL ARTERIAL THROMBOSIS OF LEFT LOWER EXTREMITY (HCC): ICD-10-CM

## 2023-09-18 RX ORDER — TICAGRELOR 90 MG/1
90 TABLET ORAL 2 TIMES DAILY
Qty: 60 TABLET | Refills: 1 | Status: SHIPPED | OUTPATIENT
Start: 2023-09-18

## 2023-09-18 NOTE — TELEPHONE ENCOUNTER
Last visit: 9/11/23  Last Med refill: 7/18/23  Does patient have enough medication for 72 hours: No:     Next Visit Date:  Future Appointments   Date Time Provider [address]   9/21/2023 10:00 AM [facility] MEDICATION MGMT [facility]   9/21/2023  3:15 PM Alli Dominguez MD [facility]   10/11/2023  1:45 PM Gurinder Pop D[facility]   11/30/2023  9:45 AM [facility]   12/18/2023  3:30 PM Joe Levine MD [address] [place] Maintenance   Topic Date Due    Hepatitis B vaccine (1 of 3 - 3-dose series) Never done    COVID-19 Vaccine (1) Never done    Pneumococcal 0-64 years Vaccine (1 - PCV) Never done    DTaP/Tdap/Td vaccine (1 - Tdap) Never done    Colorectal Cancer Screen  05/06/2020    Flu vaccine (1) 08/01/2023    A1C test (Diabetic or Prediabetic)  10/13/2023    Lipids  10/25/2023    Depression Monitoring  09/11/2024    Cervical cancer screen  05/24/2027    Hepatitis C screen  Completed    HIV screen  Completed    Hepatitis A vaccine  Aged Out    Hib vaccine  Aged Out    Meningococcal (ACWY) vaccine  Aged Out       Hemoglobin A1C (%)   Date Value   10/13/2022 6.1   03/05/2020 6.1   12/18/2017 5.9             ( goal A1C is < 7)   No components found for: \"LABMICR\"  LDL Cholesterol (mg/dL)   Date Value   10/25/2022 68   10/03/2018 42       (goal LDL is <100)   AST (U/L)   Date Value   05/17/2023 15     ALT (U/L)   Date Value   05/17/2023 23     BUN (mg/dL)   Date Value   05/17/2023 11     BP Readings from Last 3 Encounters:   09/11/23 (!) 152/73   09/07/23 137/68   06/07/23 132/88          (goal 120/80)    All Future Testing planned in [facility]  Lab Frequency Next Occurrence   Protime-INR Once 10/06/2022   VL ARTERIAL PVR LOWER WO EXERCISE Once 05/17/2023   Ferritin Once 06/05/2023   Iron and TIBC Once 06/05/2023   Hemoglobin A1C Once 09/11/2023   CBC with Auto Differential                 Patient Active Problem List:

## 2023-09-26 ENCOUNTER — HOSPITAL ENCOUNTER (OUTPATIENT)
Dept: PHARMACY | Age: 48
Setting detail: THERAPIES SERIES
Discharge: HOME | End: 2023-09-26
Payer: COMMERCIAL

## 2023-09-26 DIAGNOSIS — I74.3 ACUTE OCCLUSION OF ARTERY OF LOWER EXTREMITY DUE TO THROMBOSIS (HCC): Primary | ICD-10-CM

## 2023-09-26 LAB
INR BLD: 2.3
INR BLD: 27.9
PROTIME: 2.3 SECONDS
PROTIME: 27.9 SECONDS

## 2023-09-26 PROCEDURE — 99212 OFFICE O/P EST SF 10 MIN: CPT

## 2023-09-26 PROCEDURE — 85610 PROTHROMBIN TIME: CPT

## 2023-09-26 NOTE — PROGRESS NOTES
Medication Management Service, Warfarin Management  CHELSEA FRANCO [facility], [phone]  Visit Date: 9/26/2023   Subjective:   Latisha Hunt is a 50 y.o. female who presents to clinic today for anticoagulation monitoring and adjustment. Patient seen in clinic for warfarin management due to  Indication:  Acute occlusion of artery of lower extremity due to thrombosis . INR goal: of 2.5-3.5. Duration of therapy: indefinite. Assessment and PLAN   PT/INR done in office per protocol. INR today is 2.3, subtherapeutic. INR decreased due to increase in Vitamin K intake. Patient reports of having a large serving of spinach dip on 09/26. Plan: Will increase regimen by 8.3 % to warfarin 3.75 mg every Monday; 7.5 mg all other days of the week. Using warfarin 7.5 mg tablets. Recheck INR in 2 week(s). Patient seen in room # 2. ED. OP. = Educated patient on the importance of being consistent with green vegetable intake of 2 servings/week. Patient verbalized understanding of dosing directions and information discussed. Dosing schedule given to patient. Progress note sent to referring office.        Electronically signed by [facility] on 9/26/23 at 12:06 PM EDT     For Pharmacy Admin Tracking Only    Intervention Detail: Dose Adjustment: 1, reason: Therapy Optimization  Total # of Interventions Recommended: 1  Total # of Interventions Accepted: 1  Time Spent (min): 30

## 2023-09-27 NOTE — PROGRESS NOTES
Patient instructed to remove shoes and socks and instructed to sit in exam chair. Current PCP is Alannah Larose MD and date of last visit was 09/11/2023. Do you have a follow up visit scheduled? No  If yes, the date is unknown    Diabetic visit information    Blood pressure (Control is BP <140/90)  BP Readings from Last 3 Encounters:   09/11/23 (!) 152/73   09/07/23 137/68   06/07/23 132/88       BP taken with correct size cuff? - Yes   Repeated if > 140/90 Yes      Tobacco use:  Patient  reports that she has never smoked. She has never used smokeless tobacco.  If Smoker - Cessation materials given? - Yes       Diabetic Health Maintenance Items due  Diabetes Management   Topic Date Due    A1C test (Diabetic or Prediabetic)  10/13/2023    Lipids  10/25/2023       Diabetic retinal exam done in last year? - Yes   If No: remind patient that it is due and they should schedule an exam    Medications  Is patient taking any medications for diabetes? -   Yes  Have blood sugars been controlled? Fasting blood sugars under 120   -   Yes   Random home sugars or today's POCT glucose is under 180 -   Yes   []  If No to the above then patient should schedule appt with PCP. Diabetic Plan    A1C Plan  Lab Results   Component Value Date    LABA1C 6.1 10/13/2022    LABA1C 6.1 03/05/2020    LABA1C 5.9 12/18/2017      []  If A1C over 8 and last result >3 months ago - Order A1C and refer to PCP   []  If last A1C over 6 months ago - Order A1C and refer to PCP for follow up   []  If elevated blood sugars > 180 - refer to PCP for follow up    []  Blood sugar controlled - A1C under 8 and last check was < 6 months      Cholesterol Plan   Lab Results   Component Value Date    LDLCHOLESTEROL 68 10/25/2022      []  If LDL > 100 and last result >3 months ago - order Fasting lipids and refer to PCP for follow up   []  If LDL < 100 and over 1 year ago - Order Fasting lipids and refer to PCP for follow up   [] LDL is controlled.   LDL < 100 and

## 2023-10-10 DIAGNOSIS — D64.9 ANEMIA, UNSPECIFIED TYPE: ICD-10-CM

## 2023-10-10 RX ORDER — FERROUS SULFATE 325(65) MG
TABLET ORAL
Qty: 90 TABLET | Refills: 1 | Status: SHIPPED | OUTPATIENT
Start: 2023-10-10

## 2023-10-10 NOTE — TELEPHONE ENCOUNTER
Last visit: 9/11/23  Last Med refill: 4/20/23  Does patient have enough medication for 72 hours: No:     Next Visit Date:  Future Appointments   Date Time Provider [address]   10/11/2023  1:20 PM [facility] MEDICATION MGMT [facility]   10/11/2023  1:45 PM Rhae Josue [facility]   11/30/2023  9:45 AM Charu Hollis MD heartvasc [facility]   12/18/2023  3:30 PM Jesus Alberto Levine MD [facility]   12/28/2023  2:30 PM Alli Merlos MD [address] [facility]   Topic Date Due    Hepatitis B vaccine (1 of 3 - 3-dose series) Never done    COVID-19 Vaccine (1) Never done    Pneumococcal 0-64 years Vaccine (1 - PCV) Never done    DTaP/Tdap/Td vaccine (1 - Tdap) Never done    Colorectal Cancer Screen  05/06/2020    Flu vaccine (1) 08/01/2023    A1C test (Diabetic or Prediabetic)  10/13/2023    Lipids  10/25/2023    Depression Monitoring  09/11/2024    Cervical cancer screen  05/24/2027    Hepatitis C screen  Completed    HIV screen  Completed    Hepatitis A vaccine  Aged Out    Hib vaccine  Aged Out    Meningococcal (ACWY) vaccine  Aged Out       Hemoglobin A1C (%)   Date Value   10/13/2022 6.1   03/05/2020 6.1   12/18/2017 5.9             ( goal A1C is < 7)   No components found for: \"LABMICR\"  LDL Cholesterol (mg/dL)   Date Value   10/25/2022 68   10/03/2018 42       (goal LDL is <100)   AST (U/L)   Date Value   05/17/2023 15     ALT (U/L)   Date Value   05/17/2023 23     BUN (mg/dL)   Date Value   05/17/2023 11     BP Readings from Last 3 Encounters:   09/11/23 (!) 152/73   09/07/23 137/68   06/07/23 132/88          (goal 120/80)    All Future Testing planned in [facility]  Lab Frequency Next Occurrence   VL ARTERIAL PVR LOWER WO EXERCISE Once 05/17/2023   Iron and TIBC Once 06/05/2023   Hemoglobin A1C Once 09/11/2023   CBC with Auto Differential                 Patient Active Problem List:     Migraine headache with aura     Essential

## 2023-10-11 ENCOUNTER — OFFICE VISIT (OUTPATIENT)
Dept: PODIATRY | Age: 48
End: 2023-10-11
Payer: COMMERCIAL

## 2023-10-11 ENCOUNTER — HOSPITAL ENCOUNTER (OUTPATIENT)
Dept: PHARMACY | Age: 48
Setting detail: THERAPIES SERIES
Discharge: HOME | End: 2023-10-11
Payer: COMMERCIAL

## 2023-10-11 VITALS
SYSTOLIC BLOOD PRESSURE: 151 MMHG | BODY MASS INDEX: 28.88 KG/M2 | DIASTOLIC BLOOD PRESSURE: 82 MMHG | HEART RATE: 78 BPM | HEIGHT: 67 IN | WEIGHT: 184 LBS

## 2023-10-11 DIAGNOSIS — I73.9 PVD (PERIPHERAL VASCULAR DISEASE) (HCC): ICD-10-CM

## 2023-10-11 DIAGNOSIS — I74.3 ACUTE OCCLUSION OF ARTERY OF LOWER EXTREMITY DUE TO THROMBOSIS (HCC): Primary | ICD-10-CM

## 2023-10-11 DIAGNOSIS — M79.671 RIGHT FOOT PAIN: ICD-10-CM

## 2023-10-11 DIAGNOSIS — Z98.890 CRITICAL LIMB ISCHEMIA WITH HISTORY OF REVASCULARIZATION OF SAME EXTREMITY (HCC): ICD-10-CM

## 2023-10-11 DIAGNOSIS — L84 CORNS AND CALLOSITIES: Primary | ICD-10-CM

## 2023-10-11 DIAGNOSIS — I70.229 CRITICAL LIMB ISCHEMIA WITH HISTORY OF REVASCULARIZATION OF SAME EXTREMITY (HCC): ICD-10-CM

## 2023-10-11 LAB
INR BLD: 6
PROTIME: 71.7 SECONDS

## 2023-10-11 PROCEDURE — 11721 DEBRIDE NAIL 6 OR MORE: CPT

## 2023-10-11 PROCEDURE — 99212 OFFICE O/P EST SF 10 MIN: CPT

## 2023-10-11 PROCEDURE — 85610 PROTHROMBIN TIME: CPT

## 2023-10-11 NOTE — PROGRESS NOTES
Medication Management Service, Warfarin Management  CHELSEA FRANCO [facility], [phone]  Visit Date: 10/11/2023   Subjective:   Cecilia Reyes is a 50 y.o. female who presents to clinic today for anticoagulation monitoring and adjustment. Patient seen in clinic for warfarin management due to  Indication:    Acute occlusion of artery of lower extremity due to thrombosis  . INR goal: of 2.5-3.5. Duration of therapy: indefinite. Assessment and PLAN   PT/INR done in office per protocol. INR today is 6.0, supratherapeutic. Elevated INR due to an increase in alcohol consumption ( 4 shots of alcohol up from only 2 each month)       Plan:  She has already had warfarin dose today. Will hold warfarin dose tomorrow then resume usual regimen of 3.75 mg every Monday, 7.5 mg all other days. Patient agreed to have a large serving of broccoli today. Using warfarin 7.5 mg tablets. Recheck INR in 6 days. Patient seen in room # 1. ED. OP. = Advised that alcohol increases INR. Discussed bright red blood per rectum in detail. Patient reports this has happened before and started again four days ago. Today she is not experiencing any symptoms of bleeding. I advised that with her elevated INR she is at increased risk of bleeding. She will closely monitor and if she has any stomach pain, cramping, or rectal bleeding  she is to go directly to the emergency room. Patient verbalized understanding and agreement of the plan. Patient verbalized understanding of dosing directions and information discussed. Dosing schedule given to patient. Progress note sent to referring office.        Electronically signed by LIMA Lamb [facility] on 10/11/23 at 2:01 PM EDT    For Pharmacy Admin Tracking Only    Intervention Detail: Adherence Monitorin and Dose Adjustment: 1, reason: Therapy De-escalation  Total # of Interventions Recommended: 2  Total # of Interventions Accepted: 2  Time Spent (min): 20

## 2023-10-17 ENCOUNTER — HOSPITAL ENCOUNTER (OUTPATIENT)
Dept: PHARMACY | Age: 48
Setting detail: THERAPIES SERIES
Discharge: HOME | End: 2023-10-17
Payer: COMMERCIAL

## 2023-10-17 DIAGNOSIS — I74.3 ACUTE OCCLUSION OF ARTERY OF LOWER EXTREMITY DUE TO THROMBOSIS (HCC): Primary | ICD-10-CM

## 2023-10-17 LAB
INR BLD: 1.3
PROTIME: 16 SECONDS

## 2023-10-17 PROCEDURE — 85610 PROTHROMBIN TIME: CPT

## 2023-10-17 PROCEDURE — 99212 OFFICE O/P EST SF 10 MIN: CPT

## 2023-10-17 NOTE — PROGRESS NOTES
Medication Management Service, Warfarin Management  CHELSEA FRANCO [facility] [phone]  Visit Date: 10/17/2023   Subjective:   Eulalia Sotomayor is a 50 y.o. female who presents to clinic today for anticoagulation monitoring and adjustment. Patient seen in clinic for warfarin management due to  Indication:    Acute occlusion of artery of lower extremity due to thrombosis  . INR goal: of 2.5-3.5. Duration of therapy: indefinite. Assessment and PLAN   PT/INR done in office per protocol. INR today is 1.3, subtherapeutic. INR is low because a dose was held last week. Plan:  Increase dose to 1.5 tablets (11.25 mg) today only, then will reduce regimen (by 7.7%) to 3.75 mg every Monday and Thursday, 7.5 mg all other days. Using warfarin 7.5 mg tablets. Weekly regimen is being reduced due to trend in past to optimize INR. Recheck INR in 9 days. Patient seen in room # 1. Recommended flu shot but declined today. Patient verbalized understanding of dosing directions and information discussed. Dosing schedule given to patient. Progress note sent to referring office.        Electronically signed by LIMA Wallace [facility] on 10/17/23 at 10:01 AM EDT    For Pharmacy Admin Tracking Only    Intervention Detail: Dose Adjustment: 1, reason: Therapy Optimization  Total # of Interventions Recommended: 1  Total # of Interventions Accepted: 1  Time Spent (min): 20
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
## 2023-10-26 ENCOUNTER — HOSPITAL ENCOUNTER (OUTPATIENT)
Dept: PHARMACY | Age: 48
Setting detail: THERAPIES SERIES
Discharge: HOME | End: 2023-10-26
Payer: COMMERCIAL

## 2023-10-26 DIAGNOSIS — I74.3 ACUTE OCCLUSION OF ARTERY OF LOWER EXTREMITY DUE TO THROMBOSIS (HCC): Primary | ICD-10-CM

## 2023-10-26 LAB
INR BLD: 1.7
PROTIME: 20.2 SECONDS

## 2023-10-26 PROCEDURE — 85610 PROTHROMBIN TIME: CPT

## 2023-10-26 PROCEDURE — 99212 OFFICE O/P EST SF 10 MIN: CPT

## 2023-10-26 NOTE — PROGRESS NOTES
Medication Management Service, Warfarin Management  CHELSEA FRANCO [facility], [phone]  Visit Date: 10/26/2023   Subjective:   Janessa Gibson is a 50 y.o. female who presents to clinic today for anticoagulation monitoring and adjustment. Patient seen in clinic for warfarin management due to  Indication:    Acute occlusion of artery of lower extremity due to thrombosis  . INR goal: of 2.5-3.5. Duration of therapy: indefinite. Assessment and PLAN   PT/INR done in office per protocol. INR today is 1.7, subtherapeutic. Current regimen is too low for the patient. Plan:  Increase regimen by 8.3 % weekly to warfarin 3.75 mg Monday; 7.5 mg all other days of the week. Using warfarin 7.5 mg tablets. Recheck INR in 2 weeks. Patient seen in room # 2. I advised patient get her flu vaccine today but she refused. Patient verbalized understanding of dosing directions and information discussed. Dosing schedule given to patient. Progress note sent to referring office. [facility]  Ph., CACP, Clinical Pharmacist  Anticoagulation Services, [address] [place] [address] Coumadin Clinic  10/26/2023  10:33 AM      For Pharmacy Admin Tracking Only    Intervention Detail: Dose Adjustment: 1, reason: Therapy Optimization and Vaccine Recommended/Administered  Total # of Interventions Recommended: 2  Total # of Interventions Accepted: 1  Time Spent (min): 20

## 2023-10-27 ENCOUNTER — HOSPITAL ENCOUNTER (EMERGENCY)
Age: 48
Discharge: HOME | End: 2023-10-27
Attending: EMERGENCY MEDICINE
Payer: COMMERCIAL

## 2023-10-27 VITALS
OXYGEN SATURATION: 97 % | HEART RATE: 73 BPM | BODY MASS INDEX: 28.98 KG/M2 | TEMPERATURE: 97.5 F | WEIGHT: 185 LBS | DIASTOLIC BLOOD PRESSURE: 90 MMHG | RESPIRATION RATE: 18 BRPM | SYSTOLIC BLOOD PRESSURE: 179 MMHG

## 2023-10-27 DIAGNOSIS — N94.6 MENSTRUAL CRAMPS: Primary | ICD-10-CM

## 2023-10-27 LAB
BASOPHILS # BLD: <0.03 K/UL (ref 0–0.2)
BASOPHILS NFR BLD: 0 % (ref 0–2)
EOSINOPHIL # BLD: <0.03 K/UL (ref 0–0.44)
EOSINOPHILS RELATIVE PERCENT: 0 % (ref 1–4)
ERYTHROCYTE [DISTWIDTH] IN BLOOD BY AUTOMATED COUNT: 14.9 % (ref 11.8–14.4)
HCG SERPL QL: NEGATIVE
HCT VFR BLD AUTO: 37.5 % (ref 36.3–47.1)
HGB BLD-MCNC: 12.1 G/DL (ref 11.9–15.1)
IMM GRANULOCYTES # BLD AUTO: 0.03 K/UL (ref 0–0.3)
IMM GRANULOCYTES NFR BLD: 0 %
INR PPP: 1.5
LYMPHOCYTES NFR BLD: 1.04 K/UL (ref 1.1–3.7)
LYMPHOCYTES RELATIVE PERCENT: 12 % (ref 24–43)
MCH RBC QN AUTO: 28.1 PG (ref 25.2–33.5)
MCHC RBC AUTO-ENTMCNC: 32.3 G/DL (ref 28.4–34.8)
MCV RBC AUTO: 87.2 FL (ref 82.6–102.9)
MONOCYTES NFR BLD: 0.39 K/UL (ref 0.1–1.2)
MONOCYTES NFR BLD: 4 % (ref 3–12)
NEUTROPHILS NFR BLD: 84 % (ref 36–65)
NEUTS SEG NFR BLD: 7.34 K/UL (ref 1.5–8.1)
NRBC BLD-RTO: 0 PER 100 WBC
PLATELET # BLD AUTO: 388 K/UL (ref 138–453)
PMV BLD AUTO: 11 FL (ref 8.1–13.5)
PROTHROMBIN TIME: 17.5 SEC (ref 11.7–14.9)
RBC # BLD AUTO: 4.3 M/UL (ref 3.95–5.11)
RBC # BLD: ABNORMAL 10*6/UL
WBC OTHER # BLD: 8.8 K/UL (ref 3.5–11.3)

## 2023-10-27 PROCEDURE — 85610 PROTHROMBIN TIME: CPT

## 2023-10-27 PROCEDURE — 96374 THER/PROPH/DIAG INJ IV PUSH: CPT

## 2023-10-27 PROCEDURE — 99284 EMERGENCY DEPT VISIT MOD MDM: CPT

## 2023-10-27 PROCEDURE — 84703 CHORIONIC GONADOTROPIN ASSAY: CPT

## 2023-10-27 PROCEDURE — 6370000000 HC RX 637 (ALT 250 FOR IP)

## 2023-10-27 PROCEDURE — 85025 COMPLETE CBC W/AUTO DIFF WBC: CPT

## 2023-10-27 PROCEDURE — 6360000002 HC RX W HCPCS

## 2023-10-27 RX ORDER — OXYCODONE HYDROCHLORIDE 5 MG/1
5 TABLET ORAL ONCE
Status: COMPLETED | OUTPATIENT
Start: 2023-10-27 | End: 2023-10-27

## 2023-10-27 RX ORDER — ONDANSETRON 2 MG/ML
4 INJECTION INTRAMUSCULAR; INTRAVENOUS ONCE
Status: COMPLETED | OUTPATIENT
Start: 2023-10-27 | End: 2023-10-27

## 2023-10-27 RX ADMIN — OXYCODONE HYDROCHLORIDE 5 MG: 5 TABLET ORAL at 12:30

## 2023-10-27 RX ADMIN — ONDANSETRON 4 MG: 2 INJECTION INTRAMUSCULAR; INTRAVENOUS at 12:29

## 2023-10-27 ASSESSMENT — PAIN SCALES - GENERAL: PAINLEVEL_OUTOF10: 10

## 2023-10-27 ASSESSMENT — PAIN DESCRIPTION - LOCATION: LOCATION: ABDOMEN

## 2023-10-27 ASSESSMENT — ENCOUNTER SYMPTOMS
NAUSEA: 1
SHORTNESS OF BREATH: 0
VOMITING: 1
ABDOMINAL PAIN: 1

## 2023-10-27 ASSESSMENT — PAIN - FUNCTIONAL ASSESSMENT: PAIN_FUNCTIONAL_ASSESSMENT: 0-10

## 2023-10-27 ASSESSMENT — PAIN DESCRIPTION - ORIENTATION: ORIENTATION: MID

## 2023-10-27 ASSESSMENT — PAIN DESCRIPTION - DESCRIPTORS: DESCRIPTORS: ACHING

## 2023-10-27 NOTE — DISCHARGE INSTRUCTIONS
Seen in the emergency department for abdominal pain, cramping. Your lab work was unremarkable. Please follow-up with your primary care provider within a few days of discharge. For pain use ibuprofen (Motrin) or acetaminophen (Tylenol), unless prescribed medications that have acetaminophen in it. You can take over the counter acetaminophen tablets (1 - 2 tablets of the 500-mg strength every 6 hours) or ibuprofen tablets (2 tablets every 4 hours). PLEASE RETURN TO THE EMERGENCY DEPARTMENT IMMEDIATELY for worsening symptoms, or if you develop any concerning symptoms such as: high fever not relieved by acetaminophen (Tylenol) and/or ibuprofen (Motrin), chills, shortness of breath, chest pain, persistent nausea and/or vomiting, numbness, weakness or tingling in the arms or legs or change in color of the extremities, changes in mental status, persistent headache, blurry vision. Return within 8 - 12 hours if you have any of the following: worsening of pain in your abdomen, no food sounds good to you, you continue to vomit, pain goes to your back, have pain in the abdomen when going over a bump in the car or when you jump up and down, develop vaginal bleeding or discharge, inability to urinate, unable to follow up with your physician, or other any other care or concern.

## 2023-10-27 NOTE — ED PROVIDER NOTES
Gudelia Louie states they are going to continue PT 1x a week for 8 weeks. cases/documentation I have had a face to face evaluation of this patient and have completed at least one if not all key elements of the E/M (history, physical exam, and MDM). Additional findings are as noted. For APC cases I have personally evaluated and examined the patient in conjunction with the APC and agree with the treatment plan and disposition of the patient as recorded by the APC.     Chai Cleveland MD  Attending Emergency  Physician       Mayte Renner MD  10/27/23 2292

## 2023-10-27 NOTE — ED PROVIDER NOTES
[address] [place] [address] ED  Emergency Department Encounter  Emergency Medicine Resident     Pt Bayron Lei  MRN: [id number]  [facility] 1975  Date of evaluation: 10/27/23  PCP:  Ksenia Fletcher MD  Note Started: 12:05 PM EDT      CHIEF COMPLAINT       Chief Complaint   Patient presents with    Abdominal Cramping     Reports hx of severe cramping during cycles due to being on blood thinners        HISTORY OF PRESENT ILLNESS  (Location/Symptom, Timing/Onset, Context/Setting, Quality, Duration, Modifying Factors, Severity.)      Peter Peters is a 50 y.o. female who presents with abdominal cramping. States that the abdominal cramping has been going on since this morning. States that she was on her way to work, but when she got to work she had soaked through a pad and had significant abdominal cramping. Patient states she is currently on her menstrual period, states that she normally has very heavy menstrual periods with a lot of abdominal cramping. Patient is on Coumadin for previous DVTs. Rates her pain as 10/10, with diffuse abdominal pain with no radiation. Has associated nausea, vomiting. Denies any previous abdominal surgeries.   Has not taken any medications for symptoms prior to arrival.    PAST MEDICAL / SURGICAL / SOCIAL / FAMILY HISTORY      has a past medical history of Abnormal Pap smear of cervix, Anemia, Anxiety, Chronic back pain, Claudication (HCC), Claudication in peripheral vascular disease ([address]), Depression, Headache(784.0), Heart murmur, Hemorrhage of rectum and anus, History of blood transfusion, HTN (hypertension), Hx of blood clots, Hypercoagulable state ([address]), Hyperlipidemia, Hypertension, Intertriginous candidiasis, Left popliteal artery occlusion ([address]), Leg pain, Menometrorrhagia, Migraine headache with aura, Obesity, Osteoarthritis, PAD (peripheral artery disease) ([address]), Patient in clinical research study, PVD (peripheral vascular disease) ([address]), Takotsubo hypertension, remainder of vital signs are unremarkable. Differential diagnosis: Diverticulitis, menstrual cramping, pregnancy, UTI, pyelonephritis, acute blood loss anemia,  In order to evaluate this patient's symptoms obtain CBC, hCG, PT/INR. Additionally we will symptomatically treat with Zofran. On reassessment patient had significant relief in her symptoms. Patient's blood work was grossly unremarkable. At this time believe the most likely cause of patient's symptoms is menstrual cramping. The patient follow-up with her PCP and OB/GYN. Patient was given very strict return precautions, patient verbalized understanding. Amount and/or Complexity of Data Reviewed  Labs: ordered. Risk  Prescription drug management. EKG      All EKG's are interpreted by the Emergency Department Physician who either signs or Co-signs this chart in the absence of a cardiologist.    EMERGENCY DEPARTMENT COURSE:           PROCEDURES:      CONSULTS:  None    CRITICAL CARE:  There was significant risk of life threatening deterioration of patient's condition requiring my direct management. Critical care time  minutes, excluding any documented procedures. FINAL IMPRESSION      1.  Menstrual cramps          DISPOSITION / PLAN     DISPOSITION Decision To Discharge 10/27/2023 01:18:51 PM      PATIENT REFERRED TO:  Chace Payan MD  [address] [facility]  [phone]    Schedule an appointment as soon as possible for a visit       [facility] ED  [address] [id number] [phone]  Go to   If symptoms worsen      DISCHARGE MEDICATIONS:  Discharge Medication List as of 10/27/2023  1:19 PM          Caryl Jarquin MD  Emergency Medicine Resident    (Please note that portions of this note were completed with a voice recognition program.  Efforts were made to edit the dictations but occasionally words are mis-transcribed.)       Caryl Jarquin MD  Resident  10/27/23 [id number]

## 2023-10-27 NOTE — ED TRIAGE NOTES
Pt reported known HX DVTs and presently is taking Coumadin for this  Pt reported her menstrual cycle began this morning; due for cycle; but bleeding is heavier than usual  Pt reported cramping and passing clots  Pt sts \"I am soaking pads after pads\"  Pt clothing are bloody   Pt reported dizziness

## 2023-11-06 DIAGNOSIS — I74.3 FEMOROPOPLITEAL ARTERIAL THROMBOSIS OF LEFT LOWER EXTREMITY (HCC): ICD-10-CM

## 2023-11-06 NOTE — TELEPHONE ENCOUNTER
Last visit: 9/11/23  Last Med refill: 9/18/23  Does patient have enough medication for 72 hours: no    Next Visit Date:  Future Appointments   Date Time Provider [address]   11/9/2023 10:00 AM [facility] MEDICATION MGMT [facility]   11/30/2023  9:45 AM Ana Sheikh MD heartvasc [facility]   12/18/2023  3:30 PM [facility]   12/28/2023  2:30 PM Alli Merlos MD T [facility]  [address] Maintenance   Topic Date Due    Hepatitis B vaccine (1 of 3 - 3-dose series) Never done    COVID-19 Vaccine (1) Never done    Pneumococcal 0-64 years Vaccine (1 - PCV) Never done    DTaP/Tdap/Td vaccine (1 - Tdap) Never done    Colorectal Cancer Screen  05/06/2020    Flu vaccine (1) 08/01/2023    A1C test (Diabetic or Prediabetic)  10/13/2023    Lipids  10/25/2023    Depression Monitoring  09/11/2024    Cervical cancer screen  05/24/2027    Hepatitis C screen  Completed    HIV screen  Completed    Hepatitis A vaccine  Aged Out    Hib vaccine  Aged Out    Meningococcal (ACWY) vaccine  Aged Out       Hemoglobin A1C (%)   Date Value   10/13/2022 6.1   03/05/2020 6.1   12/18/2017 5.9             ( goal A1C is < 7)   No components found for: \"LABMICR\"  LDL Cholesterol (mg/dL)   Date Value   10/25/2022 68   10/03/2018 42       (goal LDL is <100)   AST (U/L)   Date Value   05/17/2023 15     ALT (U/L)   Date Value   05/17/2023 23     BUN (mg/dL)   Date Value   05/17/2023 11     BP Readings from Last 3 Encounters:   10/27/23 (!) 179/90   10/11/23 (!) 151/82   09/11/23 (!) 152/73          (goal 120/80)    All Future Testing planned in [facility]  Lab Frequency Next Occurrence   VL ARTERIAL PVR LOWER WO EXERCISE Once 05/17/2023   Iron and TIBC Once 06/05/2023   Hemoglobin A1C Once 09/11/2023   CBC with Auto Differential                 Patient Active Problem List:     Migraine headache with aura     Essential hypertension     Obesity     Menometrorrhagia     Intertriginous

## 2023-11-09 ENCOUNTER — HOSPITAL ENCOUNTER (OUTPATIENT)
Dept: PHARMACY | Age: 48
Setting detail: THERAPIES SERIES
Discharge: HOME | End: 2023-11-09
Payer: COMMERCIAL

## 2023-11-09 DIAGNOSIS — I74.3 ACUTE OCCLUSION OF ARTERY OF LOWER EXTREMITY DUE TO THROMBOSIS (HCC): Primary | ICD-10-CM

## 2023-11-09 LAB
INR BLD: 3.1
PROTIME: 37.2 SECONDS

## 2023-11-09 PROCEDURE — 99211 OFF/OP EST MAY X REQ PHY/QHP: CPT

## 2023-11-09 PROCEDURE — 85610 PROTHROMBIN TIME: CPT

## 2023-11-09 NOTE — PROGRESS NOTES
Medication Management Service, Warfarin Management  CHELSEA FRANCO [facility], [phone]  Visit Date: 11/9/2023   Subjective:   Ivory Reyes is a 50 y.o. female who presents to clinic today for anticoagulation monitoring and adjustment. Patient seen in clinic for warfarin management due to  Indication:    Acute occlusion of artery of lower extremity due to thrombosis. .   INR goal: of 2.5-3.5. Duration of therapy: indefinite. Assessment and PLAN   PT/INR done in office per protocol. INR today is 2.8, therapeutic. Nela Juan Plan:   Continue current regimen of warfarin 3.75 mg on Mondays; 7.5 mg all other days of the week. Using warfarin 7.5 mg tablets. Recheck INR in 4 week(s). Patient seen in room #2. Patient verbalized understanding of dosing directions and information discussed. Dosing schedule given to patient. Progress note sent to referring office. 35 [facility]  Ph., CACP, Clinical Pharmacist  Anticoagulation Services, [address] [place] [address] Coumadin Clinic  11/9/2023  11:53 AM  ====================================================================    For Pharmacy Admin Tracking Only    Intervention Detail:   Total # of Interventions Recommended: 0  Total # of Interventions Accepted: 0  Time Spent (min): 20

## 2023-12-07 ENCOUNTER — OFFICE VISIT (OUTPATIENT)
Dept: GASTROENTEROLOGY | Age: 48
End: 2023-12-07
Payer: COMMERCIAL

## 2023-12-07 ENCOUNTER — HOSPITAL ENCOUNTER (OUTPATIENT)
Dept: PHARMACY | Age: 48
Setting detail: THERAPIES SERIES
Discharge: HOME | End: 2023-12-07
Payer: COMMERCIAL

## 2023-12-07 ENCOUNTER — OFFICE VISIT (OUTPATIENT)
Dept: VASCULAR SURGERY | Age: 48
End: 2023-12-07
Payer: COMMERCIAL

## 2023-12-07 VITALS
DIASTOLIC BLOOD PRESSURE: 96 MMHG | OXYGEN SATURATION: 96 % | HEIGHT: 67 IN | RESPIRATION RATE: 18 BRPM | SYSTOLIC BLOOD PRESSURE: 163 MMHG | HEART RATE: 80 BPM | WEIGHT: 184 LBS | BODY MASS INDEX: 28.88 KG/M2

## 2023-12-07 VITALS
HEART RATE: 71 BPM | WEIGHT: 185.8 LBS | BODY MASS INDEX: 29.1 KG/M2 | SYSTOLIC BLOOD PRESSURE: 158 MMHG | TEMPERATURE: 97.6 F | DIASTOLIC BLOOD PRESSURE: 80 MMHG

## 2023-12-07 DIAGNOSIS — M79.671 RIGHT FOOT PAIN: ICD-10-CM

## 2023-12-07 DIAGNOSIS — I74.3 ACUTE OCCLUSION OF ARTERY OF LOWER EXTREMITY DUE TO THROMBOSIS (HCC): Primary | ICD-10-CM

## 2023-12-07 DIAGNOSIS — I73.9 PAD (PERIPHERAL ARTERY DISEASE) (HCC): Primary | ICD-10-CM

## 2023-12-07 DIAGNOSIS — K62.5 HEMORRHAGE OF RECTUM AND ANUS: Primary | ICD-10-CM

## 2023-12-07 LAB
INR BLD: 3.8
PROTIME: 45.4 SECONDS

## 2023-12-07 PROCEDURE — 3077F SYST BP >= 140 MM HG: CPT | Performed by: SURGERY

## 2023-12-07 PROCEDURE — 85610 PROTHROMBIN TIME: CPT

## 2023-12-07 PROCEDURE — G8427 DOCREV CUR MEDS BY ELIG CLIN: HCPCS | Performed by: SURGERY

## 2023-12-07 PROCEDURE — 1036F TOBACCO NON-USER: CPT | Performed by: INTERNAL MEDICINE

## 2023-12-07 PROCEDURE — 99214 OFFICE O/P EST MOD 30 MIN: CPT | Performed by: SURGERY

## 2023-12-07 PROCEDURE — G8427 DOCREV CUR MEDS BY ELIG CLIN: HCPCS | Performed by: INTERNAL MEDICINE

## 2023-12-07 PROCEDURE — 1036F TOBACCO NON-USER: CPT | Performed by: SURGERY

## 2023-12-07 PROCEDURE — G8484 FLU IMMUNIZE NO ADMIN: HCPCS | Performed by: INTERNAL MEDICINE

## 2023-12-07 PROCEDURE — 3077F SYST BP >= 140 MM HG: CPT | Performed by: INTERNAL MEDICINE

## 2023-12-07 PROCEDURE — 3080F DIAST BP >= 90 MM HG: CPT | Performed by: SURGERY

## 2023-12-07 PROCEDURE — 99204 OFFICE O/P NEW MOD 45 MIN: CPT | Performed by: INTERNAL MEDICINE

## 2023-12-07 PROCEDURE — G8417 CALC BMI ABV UP PARAM F/U: HCPCS | Performed by: SURGERY

## 2023-12-07 PROCEDURE — 3079F DIAST BP 80-89 MM HG: CPT | Performed by: INTERNAL MEDICINE

## 2023-12-07 PROCEDURE — 99212 OFFICE O/P EST SF 10 MIN: CPT

## 2023-12-07 PROCEDURE — G8484 FLU IMMUNIZE NO ADMIN: HCPCS | Performed by: SURGERY

## 2023-12-07 PROCEDURE — G8417 CALC BMI ABV UP PARAM F/U: HCPCS | Performed by: INTERNAL MEDICINE

## 2023-12-07 ASSESSMENT — ENCOUNTER SYMPTOMS
TROUBLE SWALLOWING: 0
CHEST TIGHTNESS: 0
ABDOMINAL DISTENTION: 0
NAUSEA: 0
SHORTNESS OF BREATH: 0
CONSTIPATION: 0
ANAL BLEEDING: 1
COLOR CHANGE: 0
ABDOMINAL PAIN: 0
BLOOD IN STOOL: 1
ALLERGIC/IMMUNOLOGIC NEGATIVE: 1
RECTAL PAIN: 0
DIARRHEA: 0
ABDOMINAL PAIN: 0
VOMITING: 0

## 2023-12-07 NOTE — PROGRESS NOTES
Division of Vascular Surgery        Follow Up    Right lower extremity angiogram, initiation of catheter directed thrombolysis (9/7/22) Dr. Sharri Maddox  RLE angiogram, angioplasty popliteal, TPT, peroneal, jetstream atherectomy pop, TPT, peroneal, Right common and external iliac stent (8 x 40 mm self expanding) 9/8/22 Dr. Sharri Maddox  RLE angiogram, percutaneous mechanical thrombectomy penumbra iliac, popliteal TPT, 8x59 Sabra Budmeghan in common and external iliac artery, PTA popliteal, TPT, peroneal (9/24/22) Dr. Sharri Maddox     Left leg angiogram with initiation of catheter directed thrombolysis (8/2/19)  Left leg angiogram, mechanical thrombectomy and angioplasty of CFA/SFA (8/3/19)  Bilateral lower extremity angiogram (10/3/18)  Right leg catheter directed thrombolysis and fasciotomy (September 2018 @ [facility] 22)  Left femoral to peroneal artery bypass thrombectomy (1/16/18)  Left femoral to peroneal artery bypass with reversed GSV (1/16/18)  Left lower extremity angiogram (12/19/17)    Chief Complaint:      Toe pain    History of Present Illness:      Asia Medel is a 50 y.o. woman who presents for 6 month follow up regarding her peripheral vascular disease. She has undergone numerous procedures for likely cardio embolic events to her lower extremities. She has been maintained on anticoagulation with coumadin and follows at the coumadin clinic. Her walking distance has improved even more, she can go 7 blocks now without having much discomfort. When she does have pain its really just in her two remaining toe nails on her right foot. I advised her to go to podiatry clinic to have them removed but they have shown some hesitance due to her vascular history. Honestly she denies symptoms of claudication or ischemic rest pain. She does not smoke and is compliant with her medications, she stays active and tries to walk daily.     (5/25/23) mckay Colby is a 50 y.o. woman who presents for follow up regarding her peripheral
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
## 2023-12-07 NOTE — PROGRESS NOTES
Medication Management Service, Warfarin Management  CHELSEA FRANCO [facility], [phone]  Visit Date: 2023   Subjective:   Sebastián Schwab is a 50 y.o. female who presents to clinic today for anticoagulation monitoring and adjustment. Patient seen in clinic for warfarin management due to  Indication:    Acute occlusion of artery of lower extremity due to thrombosis. .   INR goal: of 2.5-3.5. Duration of therapy: indefinite. Assessment and PLAN   PT/INR done in office per protocol. INR today is 3.8, supra therapeutic. Increase in INR due to decrease in vitamin intake associated with loss of appetite. Plan:   Reduce warfarin from 7.5 mg to 3.75 mg for today only. Then continue current regimen of warfarin 3.75 mg on ; 7.5 mg all other days of the week. Using warfarin 7.5 mg tablets. Recheck INR in 4 week(s). Patient seen in room #2. Patient verbalized understanding of dosing directions and information discussed. Dosing schedule given to patient. Progress note sent to referring office. Of Note:  Patient has an appointment with her physician due to sudden loss of appetite for the past several weeks. 35 [facility]  Ph., CACP, Clinical Pharmacist  Anticoagulation Services, [address] [place] [address] Coumadin Clinic  2023  10:23 AM  ====================================================================  For Pharmacy Admin Tracking Only    Intervention Detail: Adherence Monitorin and Dose Adjustment: 1, reason: Therapy De-escalation  Total # of Interventions Recommended: 2  Total # of Interventions Accepted: 2  Time Spent (min): 20

## 2023-12-10 RX ORDER — BISACODYL 5 MG/1
TABLET, DELAYED RELEASE ORAL
Qty: 4 TABLET | Refills: 0 | Status: SHIPPED | OUTPATIENT
Start: 2023-12-10

## 2023-12-10 RX ORDER — POLYETHYLENE GLYCOL 3350 17 G/17G
POWDER, FOR SOLUTION ORAL
Qty: 238 G | Refills: 0 | Status: SHIPPED | OUTPATIENT
Start: 2023-12-10

## 2023-12-12 ENCOUNTER — TELEPHONE (OUTPATIENT)
Dept: GASTROENTEROLOGY | Age: 48
End: 2023-12-12

## 2023-12-12 NOTE — TELEPHONE ENCOUNTER
Patient called stating that Dr. Flores Arms office does not do clearances and that her refills on her Coumadin are prescribed by her PCP. Please reroute clearance to correct physician for patients upcoming procedure and advise.

## 2023-12-12 NOTE — TELEPHONE ENCOUNTER
Writer called pt back and advised her Dr. Jamir Aviles office does do clearances the hospital will not accept clearance coming from her PCP. Writer did call Dr. Ezekiel Snow office and they confirmed they rec'd the clearance request, Dr. Jamir Aviles will be in the office 12/14/23 to sign.

## 2023-12-12 NOTE — TELEPHONE ENCOUNTER
Writer rec'd call back from Dr. Jamir Aviles office, they stated pt was put on dual antiplatelet therapy per Dr. David Salazar so pt is taking both coumadin and Brilinta, writer sent Dr. David Salazar clearance request for Brilinta and Dr. Jamir Aviles will take care of the coumadin clearance.

## 2023-12-15 ENCOUNTER — TELEPHONE (OUTPATIENT)
Dept: VASCULAR SURGERY | Age: 48
End: 2023-12-15

## 2023-12-15 NOTE — TELEPHONE ENCOUNTER
Faxed Vascular clearance to Elva HUMPHREYS , to hold coumadin/brillinta with a lovenox bridge prior to procedure. [facility] anticoagulation clinic to manage the lovenox bridge. Information faxed to the [facility] [address] clinic.

## 2023-12-15 NOTE — TELEPHONE ENCOUNTER
Rec'd coumadin clearance from Dr. Rick Conn, pt is can hold coumadin but needs bridged with lovenox injections. Writer notes pt goes to the coumadin clinic at [facility] where they manage her doses, writer called and spoke with [facility], they did receive clearance and will schedule pt for appt to receive bridging instructions. Writer is still waiting for Brilinta clearance from Dr. Luis Mosley.

## 2023-12-21 ENCOUNTER — HOSPITAL ENCOUNTER (OUTPATIENT)
Dept: PREADMISSION TESTING | Age: 48
Discharge: HOME | End: 2023-12-25

## 2023-12-21 VITALS — HEIGHT: 65 IN | WEIGHT: 180 LBS | BODY MASS INDEX: 29.99 KG/M2

## 2023-12-21 NOTE — PROGRESS NOTES
Pre-op Instructions For Out-Patient Endoscopy Surgery    Medication Instructions:  Please stop herbs and any supplements now (includes vitamins and minerals). Hold iron a few days  Please contact your surgeon and prescribing physician for pre-op instructions for any blood thinners. 12/21/2023 appt today with coumadin clinic, will bridge preop  If you have inhalers/aerosol treatments at home, please use them the morning of your surgery and bring the inhalers with you to the hospital.    Please take the following medications the morning of your surgery with a sip of water:    none    Surgery Instructions:  After midnight before surgery:  Do not eat or drink anything, including water, mints, gum, and hard candy. You may brush your teeth without swallowing. No smoking, chewing tobacco, or street drugs. Please shower or bathe before surgery. Please do not wear any cologne, lotion, powder, jewelry, piercings, perfume, makeup, nail polish, hair accessories, or hair spray on the day of surgery. Wear loose comfortable clothing. Leave your valuables at home but bring a payment source for any after-surgery prescriptions you plan to fill at [facility]. Bring a storage case for any glasses/contacts. An adult who is responsible for you MUST drive you home and should be with you for the first 24 hours after surgery. The Day of Surgery:  Arrive at [facility] Surgery Entrance at the time directed by your surgeon and check in at the desk. If you have a living will or healthcare power of , please bring a copy. You will be taken to the pre-op holding area where you will be prepared for surgery. A physical assessment will be performed by a nurse practitioner or house officer.   Your IV will be started and you will meet your anesthesiologist.    When you go to surgery, your family will be directed to the surgical waiting room, where the doctor should speak with them after

## 2024-01-02 NOTE — PRE-PROCEDURE INSTRUCTIONS
Have you received your Prep? Any questions with prep instructions? Y  Only Clear Liquid Diet day before.Y  Nothing to eat after midnight day before procedure.Y  Are you taking any blood thinners? If so, you need to Stop.STOPPED  Remove any jewelry and body piercings.Y  Do you wear glasses? If so, please bring a case to store them in.  Are you having any Covid symptoms?N  Do you have any new rashes, infections, etc. that we should be aware of?N  Do you have a ride home the day of surgery? It cannot be a cab or medical transportation.Y  Verify surgery time/date and what time to arrive at hospital.[id number]

## 2024-01-03 ENCOUNTER — ANESTHESIA EVENT (OUTPATIENT)
Dept: ENDOSCOPY | Age: 49
End: 2024-01-03
Payer: COMMERCIAL

## 2024-01-04 ENCOUNTER — ANESTHESIA (OUTPATIENT)
Dept: ENDOSCOPY | Age: 49
End: 2024-01-04
Payer: COMMERCIAL

## 2024-01-04 ENCOUNTER — HOSPITAL ENCOUNTER (OUTPATIENT)
Age: 49
Setting detail: OUTPATIENT SURGERY
Discharge: HOME | End: 2024-01-04
Attending: INTERNAL MEDICINE | Admitting: INTERNAL MEDICINE
Payer: COMMERCIAL

## 2024-01-04 ENCOUNTER — HOSPITAL ENCOUNTER (OUTPATIENT)
Age: 49
Discharge: HOME | End: 2024-01-04
Payer: COMMERCIAL

## 2024-01-04 VITALS
HEIGHT: 65 IN | OXYGEN SATURATION: 97 % | SYSTOLIC BLOOD PRESSURE: 130 MMHG | TEMPERATURE: 98 F | BODY MASS INDEX: 29.99 KG/M2 | DIASTOLIC BLOOD PRESSURE: 86 MMHG | HEART RATE: 69 BPM | WEIGHT: 180 LBS

## 2024-01-04 DIAGNOSIS — I70.229 CRITICAL LIMB ISCHEMIA WITH HISTORY OF REVASCULARIZATION OF SAME EXTREMITY (HCC): ICD-10-CM

## 2024-01-04 DIAGNOSIS — Z98.890 CRITICAL LIMB ISCHEMIA WITH HISTORY OF REVASCULARIZATION OF SAME EXTREMITY (HCC): ICD-10-CM

## 2024-01-04 DIAGNOSIS — I74.9 ARTERIAL THROMBOSIS (HCC): ICD-10-CM

## 2024-01-04 DIAGNOSIS — K62.5 HEMORRHAGE OF RECTUM AND ANUS: ICD-10-CM

## 2024-01-04 LAB
BASOPHILS # BLD: 0.1 K/UL (ref 0–0.2)
BASOPHILS NFR BLD: 1 % (ref 0–2)
EOSINOPHIL # BLD: 0.1 K/UL (ref 0–0.4)
EOSINOPHILS RELATIVE PERCENT: 1 % (ref 0–4)
ERYTHROCYTE [DISTWIDTH] IN BLOOD BY AUTOMATED COUNT: 15.4 % (ref 11.5–14.9)
FERRITIN SERPL-MCNC: 35 NG/ML (ref 13–150)
HCT VFR BLD AUTO: 36 % (ref 36–46)
HGB BLD-MCNC: 11.4 G/DL (ref 12–16)
IRON SATN MFR SERPL: 10 % (ref 20–55)
IRON SERPL-MCNC: 32 UG/DL (ref 37–145)
LYMPHOCYTES NFR BLD: 2.3 K/UL (ref 1–4.8)
LYMPHOCYTES RELATIVE PERCENT: 25 % (ref 24–44)
MCH RBC QN AUTO: 27 PG (ref 26–34)
MCHC RBC AUTO-ENTMCNC: 31.5 G/DL (ref 31–37)
MCV RBC AUTO: 85.7 FL (ref 80–100)
MONOCYTES NFR BLD: 0.4 K/UL (ref 0.1–1.3)
MONOCYTES NFR BLD: 4 % (ref 1–7)
NEUTROPHILS NFR BLD: 69 % (ref 36–66)
NEUTS SEG NFR BLD: 6.4 K/UL (ref 1.3–9.1)
PLATELET # BLD AUTO: 321 K/UL (ref 150–450)
PMV BLD AUTO: 9.1 FL (ref 6–12)
RBC # BLD AUTO: 4.2 M/UL (ref 4–5.2)
TIBC SERPL-MCNC: 322 UG/DL (ref 250–450)
UNSATURATED IRON BINDING CAPACITY: 290 UG/DL (ref 112–347)
WBC OTHER # BLD: 9.3 K/UL (ref 3.5–11)

## 2024-01-04 PROCEDURE — 7100000001 HC PACU RECOVERY - ADDTL 15 MIN: Performed by: INTERNAL MEDICINE

## 2024-01-04 PROCEDURE — 6360000002 HC RX W HCPCS: Performed by: NURSE ANESTHETIST, CERTIFIED REGISTERED

## 2024-01-04 PROCEDURE — 81025 URINE PREGNANCY TEST: CPT

## 2024-01-04 PROCEDURE — 36415 COLL VENOUS BLD VENIPUNCTURE: CPT

## 2024-01-04 PROCEDURE — 85025 COMPLETE CBC W/AUTO DIFF WBC: CPT

## 2024-01-04 PROCEDURE — 3700000001 HC ADD 15 MINUTES (ANESTHESIA): Performed by: INTERNAL MEDICINE

## 2024-01-04 PROCEDURE — 83540 ASSAY OF IRON: CPT

## 2024-01-04 PROCEDURE — 88305 TISSUE EXAM BY PATHOLOGIST: CPT

## 2024-01-04 PROCEDURE — 2500000003 HC RX 250 WO HCPCS: Performed by: NURSE ANESTHETIST, CERTIFIED REGISTERED

## 2024-01-04 PROCEDURE — 2709999900 HC NON-CHARGEABLE SUPPLY: Performed by: INTERNAL MEDICINE

## 2024-01-04 PROCEDURE — 2580000003 HC RX 258: Performed by: ANESTHESIOLOGY

## 2024-01-04 PROCEDURE — 82728 ASSAY OF FERRITIN: CPT

## 2024-01-04 PROCEDURE — 45385 COLONOSCOPY W/LESION REMOVAL: CPT | Performed by: INTERNAL MEDICINE

## 2024-01-04 PROCEDURE — 3609010600 HC COLONOSCOPY POLYPECTOMY SNARE/COLD BIOPSY: Performed by: INTERNAL MEDICINE

## 2024-01-04 PROCEDURE — 3700000000 HC ANESTHESIA ATTENDED CARE: Performed by: INTERNAL MEDICINE

## 2024-01-04 PROCEDURE — 6360000002 HC RX W HCPCS: Performed by: ANESTHESIOLOGY

## 2024-01-04 PROCEDURE — 83550 IRON BINDING TEST: CPT

## 2024-01-04 PROCEDURE — 7100000000 HC PACU RECOVERY - FIRST 15 MIN: Performed by: INTERNAL MEDICINE

## 2024-01-04 PROCEDURE — 2720000010 HC SURG SUPPLY STERILE: Performed by: INTERNAL MEDICINE

## 2024-01-04 DEVICE — WORKING LENGTH 235CM, WORKING CHANNEL 2.8MM
Type: IMPLANTABLE DEVICE | Status: FUNCTIONAL
Brand: RESOLUTION 360 CLIP

## 2024-01-04 RX ORDER — PROPOFOL 10 MG/ML
INJECTION, EMULSION INTRAVENOUS CONTINUOUS PRN
Status: DISCONTINUED | OUTPATIENT
Start: 2024-01-04 | End: 2024-01-04 | Stop reason: SDUPTHER

## 2024-01-04 RX ORDER — FENTANYL CITRATE 0.05 MG/ML
25 INJECTION, SOLUTION INTRAMUSCULAR; INTRAVENOUS ONCE
Status: COMPLETED | OUTPATIENT
Start: 2024-01-04 | End: 2024-01-04

## 2024-01-04 RX ORDER — SODIUM CHLORIDE, SODIUM LACTATE, POTASSIUM CHLORIDE, CALCIUM CHLORIDE 600; 310; 30; 20 MG/100ML; MG/100ML; MG/100ML; MG/100ML
INJECTION, SOLUTION INTRAVENOUS CONTINUOUS
Status: DISCONTINUED | OUTPATIENT
Start: 2024-01-04 | End: 2024-01-04 | Stop reason: HOSPADM

## 2024-01-04 RX ORDER — FENTANYL CITRATE 0.05 MG/ML
50 INJECTION, SOLUTION INTRAMUSCULAR; INTRAVENOUS ONCE
Status: COMPLETED | OUTPATIENT
Start: 2024-01-04 | End: 2024-01-04

## 2024-01-04 RX ORDER — PROPOFOL 10 MG/ML
INJECTION, EMULSION INTRAVENOUS PRN
Status: DISCONTINUED | OUTPATIENT
Start: 2024-01-04 | End: 2024-01-04 | Stop reason: SDUPTHER

## 2024-01-04 RX ORDER — LIDOCAINE HYDROCHLORIDE 10 MG/ML
1 INJECTION, SOLUTION EPIDURAL; INFILTRATION; INTRACAUDAL; PERINEURAL
Status: DISCONTINUED | OUTPATIENT
Start: 2024-01-04 | End: 2024-01-04 | Stop reason: HOSPADM

## 2024-01-04 RX ORDER — SODIUM CHLORIDE 9 MG/ML
INJECTION, SOLUTION INTRAVENOUS PRN
Status: DISCONTINUED | OUTPATIENT
Start: 2024-01-04 | End: 2024-01-04 | Stop reason: HOSPADM

## 2024-01-04 RX ORDER — LIDOCAINE HYDROCHLORIDE 20 MG/ML
INJECTION, SOLUTION EPIDURAL; INFILTRATION; INTRACAUDAL; PERINEURAL PRN
Status: DISCONTINUED | OUTPATIENT
Start: 2024-01-04 | End: 2024-01-04 | Stop reason: SDUPTHER

## 2024-01-04 RX ORDER — SODIUM CHLORIDE 0.9 % (FLUSH) 0.9 %
5-40 SYRINGE (ML) INJECTION PRN
Status: DISCONTINUED | OUTPATIENT
Start: 2024-01-04 | End: 2024-01-04 | Stop reason: HOSPADM

## 2024-01-04 RX ORDER — ONDANSETRON 2 MG/ML
INJECTION INTRAMUSCULAR; INTRAVENOUS PRN
Status: DISCONTINUED | OUTPATIENT
Start: 2024-01-04 | End: 2024-01-04 | Stop reason: SDUPTHER

## 2024-01-04 RX ORDER — SODIUM CHLORIDE 0.9 % (FLUSH) 0.9 %
5-40 SYRINGE (ML) INJECTION EVERY 12 HOURS SCHEDULED
Status: DISCONTINUED | OUTPATIENT
Start: 2024-01-04 | End: 2024-01-04 | Stop reason: HOSPADM

## 2024-01-04 RX ORDER — FENTANYL CITRATE 50 UG/ML
INJECTION, SOLUTION INTRAMUSCULAR; INTRAVENOUS PRN
Status: DISCONTINUED | OUTPATIENT
Start: 2024-01-04 | End: 2024-01-04 | Stop reason: SDUPTHER

## 2024-01-04 RX ADMIN — PROPOFOL 50 MG: 10 INJECTION, EMULSION INTRAVENOUS at 11:19

## 2024-01-04 RX ADMIN — FENTANYL CITRATE 50 MCG: 50 INJECTION, SOLUTION INTRAMUSCULAR; INTRAVENOUS at 11:25

## 2024-01-04 RX ADMIN — PROPOFOL 150 MCG/KG/MIN: 10 INJECTION, EMULSION INTRAVENOUS at 11:16

## 2024-01-04 RX ADMIN — PROPOFOL 100 MG: 10 INJECTION, EMULSION INTRAVENOUS at 11:16

## 2024-01-04 RX ADMIN — LIDOCAINE HYDROCHLORIDE 60 MG: 20 INJECTION, SOLUTION EPIDURAL; INFILTRATION; INTRACAUDAL; PERINEURAL at 11:16

## 2024-01-04 RX ADMIN — FENTANYL CITRATE 25 MCG: 0.05 INJECTION, SOLUTION INTRAMUSCULAR; INTRAVENOUS at 10:20

## 2024-01-04 RX ADMIN — FENTANYL CITRATE 50 MCG: 50 INJECTION, SOLUTION INTRAMUSCULAR; INTRAVENOUS at 11:28

## 2024-01-04 RX ADMIN — FENTANYL CITRATE 25 MCG: 0.05 INJECTION, SOLUTION INTRAMUSCULAR; INTRAVENOUS at 10:05

## 2024-01-04 RX ADMIN — ONDANSETRON 4 MG: 2 INJECTION INTRAMUSCULAR; INTRAVENOUS at 11:38

## 2024-01-04 RX ADMIN — SODIUM CHLORIDE, POTASSIUM CHLORIDE, SODIUM LACTATE AND CALCIUM CHLORIDE: 600; 310; 30; 20 INJECTION, SOLUTION INTRAVENOUS at 09:57

## 2024-01-04 ASSESSMENT — PAIN SCALES - GENERAL
PAINLEVEL_OUTOF10: 8
PAINLEVEL_OUTOF10: 10
PAINLEVEL_OUTOF10: 0
PAINLEVEL_OUTOF10: 8

## 2024-01-04 ASSESSMENT — PAIN DESCRIPTION - DESCRIPTORS
DESCRIPTORS: CRAMPING
DESCRIPTORS: CRAMPING

## 2024-01-04 ASSESSMENT — PAIN - FUNCTIONAL ASSESSMENT: PAIN_FUNCTIONAL_ASSESSMENT: 0-10

## 2024-01-04 ASSESSMENT — PAIN DESCRIPTION - LOCATION: LOCATION: ABDOMEN

## 2024-01-04 ASSESSMENT — PAIN DESCRIPTION - ORIENTATION: ORIENTATION: LOWER

## 2024-01-04 NOTE — ANESTHESIA PRE PROCEDURE
Department of Anesthesiology  Preprocedure Note       Name:  Gale Morley   Age:  48 y.o.  :  1975                                          MRN:  [id number]         Date:  2024      Surgeon: Surgeon(s):  Alli Merlos MD    Procedure: Procedure(s):  COLONOSCOPY DIAGNOSTIC    Medications prior to admission:   Prior to Admission medications    Medication Sig Start Date End Date Taking? Authorizing Provider   enoxaparin (LOVENOX) 80 MG/0.8ML Inject 0.8 mLs into the skin 2 times daily 23   Sampson Streeter MD   polyethylene glycol (GLYCOLAX) 17 GM/SCOOP powder Please follow instructions given to you by your provider  Patient not taking: Reported on 2023 12/10/23   Alli Merlos MD   bisacodyl 5 MG EC tablet Please follow instructions given to you by your provider  Patient not taking: Reported on 2023 12/10/23   Alli Merlos MD   ticagrelor (BRILINTA) 90 MG TABS tablet Take 1 tablet by mouth 2 times daily 23   Luis Lorenzo MD   FEROSUL 325 (65 Fe) MG tablet take 1 tablet by mouth every morning with breakfast 10/10/23   Luis Lorenzo MD   lisinopril (PRINIVIL;ZESTRIL) 10 MG tablet Take 1 tablet by mouth daily 23   Luis Lorenzo MD   EPINEPHrine (EPIPEN 2-FAUSTINO) 0.3 MG/0.3ML SOAJ injection Inject 0.3 mLs into the muscle once for 1 dose Use as directed for allergic reaction  Patient not taking: Reported on 2023  Kourtney Silva DO   warfarin (COUMADIN) 7.5 MG tablet Take 1/2 or 1 whole tablet (or as directed by Medication Management) by mouth once daily.  90DS 23   Sampson Streeter MD   atorvastatin (LIPITOR) 40 MG tablet take 1 tablet by mouth once daily 23   Lius Lorenzo MD   acetaminophen (TYLENOL) 500 MG tablet Take 2 tablets by mouth every 8 hours as needed for Pain 23   Jenelle Storm MD   metFORMIN (GLUCOPHAGE) 500 MG tablet take 1 tablet by mouth once daily WITH BREAKFAST 22   Luis Lorenzo MD

## 2024-01-04 NOTE — OP NOTE
PROCEDURE NOTE    DATE OF PROCEDURE: 1/4/2024    SURGEON: Alli Merlos MD  Facility : \"[facility]  ASSISTANT: None  Anesthesia: MAC  PREOPERATIVE DIAGNOSIS:   Screening  Rectal bleeding    POSTOPERATIVE DIAGNOSIS: as described below    OPERATION: Total colonoscopy     ANESTHESIA: Moderate Sedation    ESTIMATED BLOOD LOSS: less than 50     COMPLICATIONS: None.     SPECIMENS:  Was Obtained:     Large pedunculated polyp measuring 1.5 cm with a long stalk a clip was placed on the start when we remove the polyp with a hot snare    Severe diverticulosis specially in the left colon    Hemorrhoids      HISTORY: The patient is a 48 y.o. year old female with history of above preop diagnosis.  I recommended colonoscopy with possible biopsy or polypectomy and I explained the risk, benefits, expected outcome, and alternatives to the procedure.  Risks included but are not limited to bleeding, infection, respiratory distress, hypotension, and perforation of the colon and possibility of missing a lesion.  The patient understands and is in agreement.        The patient was counseled at length about the risks of izzy Covid-19 during their perioperative period and any recovery window from their procedure.  The patient was made aware that izzy Covid-19  may worsen their prognosis for recovering from their procedure  and lend to a higher morbidity and/or mortality risk.  All material risks, benefits, and reasonable alternatives including postponing the procedure were discussed. The patient does wish to proceed with the procedure at this time.       PROCEDURE: The patient was given IV conscious sedation.  The patient's SPO2 remained above 90% throughout the procedure.     The colonoscope was inserted per rectum and advanced under direct vision to the cecum without difficulty.      Post sedation note :The patient's SPO2 remained above 90% throughout the procedure.the vital signs remained stable , and no immediate  complication form the procedure noted, patient will be ready for d/c when criteria is met .        The prep was fair.      Findings:  Terminal ileum: normal    Cecum/Ascending colon: normal    Transverse colon: abnormal: diverticulosis     Descending/Sigmoid colon: abnormal:     Large pedunculated polyp measuring 1.5 cm with a long stalk a clip was placed on the start when we remove the polyp with a hot snare    Severe diverticulosis specially in the left colon      Rectum/Anus: examined in normal and retroflexed positions and was abnormal: Hemorrhoids    Withdrawal Time was (minutes): 10    The colon was decompressed and the scope was removed.  The patient tolerated the procedure well.     Recommendations/Plan:   Lifestyle and dietary modifications as discussed  F/U Biopsies  F/U In OfficeYes  Discussed with the family  Repeat colonoscopy [id number]    Electronically signed by Alli Merlos MD  on 1/4/2024 at 11:47 AM
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
## 2024-01-04 NOTE — ANESTHESIA POSTPROCEDURE EVALUATION
Department of Anesthesiology  Postprocedure Note    Patient: Gale Morley  MRN: [id number]  YOB: 1975  Date of evaluation: 1/4/2024    Procedure Summary       Date: 01/04/24 Room / Location: [place] [id number] / [facility]    Anesthesia Start: 1111 Anesthesia Stop: 1156    Procedure: COLONOSCOPY SNARE POLYPECTOMY HOT BIOPSY WITH CLIP APPLICATION Diagnosis:       Hemorrhage of rectum and anus      (Hemorrhage of rectum and anus [K62.5])    Surgeons: Alli Merlos MD Responsible Provider: Napoleon Garay MD    Anesthesia Type: general, TIVA ASA Status: 2            Anesthesia Type: No value filed.    Kristy Phase I:      Kristy Phase II: Kristy Score: 10    Anesthesia Post Evaluation    Comments: POST- ANESTHESIA EVALUATION       Pt Name: Gale Morley  MRN: [id number]  YOB: 1975  Date of evaluation: 1/4/2024  Time:  3:39 PM      /86   Pulse 69   Temp 98 °F (36.7 °C)   Resp (!) 0   Ht 1.651 m (5' 5\")   Wt 81.6 kg (180 lb)   LMP 11/28/2023   SpO2 97%   BMI 29.95 kg/m²      Consciousness Level  Awake  Cardiopulmonary Status  Stable  Pain Adequately Treated YES  Nausea / Vomiting  NO  Adequate Hydration  YES  Anesthesia Related Complications NONE      Electronically signed by Napoleon Garay MD on 1/4/2024 at 3:39 PM           No notable events documented.

## 2024-01-04 NOTE — DISCHARGE INSTRUCTIONS
prevalent in the industrialized world where we eat a lot of processed, low fiber foods.  Diverticuli develop due to firm stool and high pressures in the colon, along with secondary spasm, which causes outpouchings to occus where the small arteries penetrate the wall of the colon to feed the internal lining.  These occur most commonly on the left side and lower portions of the colon.  Diverticuli can then cause bleeding from the arteries at these sites of weakness when they rupture or the diveritculi can get blocked with stool and debris and become obstructed, causing diverticulosis, which is due to an infection.  When these are infected, diverticulitis, it is often treated with antibiotics and bowel rest, but when severe, recurrent, or if rupture of the colon occurs, it may require surgery.  Following appropriate dietary changes and taking the proper precautions is therefore very important.    DIET:  You should increase your dietary fiber intake and take a fiber supplement twice a day.  Make sure that you are taking a supplement that is just fiber and is not a laxative, which should be noted on the package.  Starting a fiber supplement may cause increased gas, more frequent bowel movements, and distension at first but this should improve after a couple of weeks.  Try to eat whole wheat breads and pasta, more fruits and vegetables, along with brown rice and plenty of fluids.  Avoid small undigestible food items that could get stuck in these outpouchings, such as unpopped popcorn kernals, whole corn, small undigestible seeds, etc..    ACTIVITY:  Exercise is also a great way to prevent constipation and is encouraged.  It may also help prevent progression of your diverticulosis.  Always make sure you take in plenty of fluids when exercising.    MEDICATIONS:  Take an over-the-counter fiber supplement as noted above twice daily.  If your symptoms don't improve with fiber and dietary changes alone your physician may also  removal will relieve these symptoms.   Possible Complications   Complications are rare, but no procedure is completely free of risk. If you are planning to have a polypectomy, your doctor will review a list of possible complications, which may include:   Damage to the colon wall   Bleeding   Infection   Adverse reaction to the sedative   Factors that may increase the risk of complications include:   Type, size, and location of the polyp   Patient factors, such as blood-clotting disorders, substance abuse, or other diseases (eg, obesity , diabetes )   What to Expect   Prior to Procedure    Your doctor will likely do the following:   Physical exam and health history   Review of medicines   Test your stool for hidden blood (called \"occult blood\")   X-rays an exam that uses small amounts of radiation to make a picture of the inside of the body   Barium enema x-ray exam that uses contrast to help better see the colon   Diagnostic colonoscopy or sigmoidoscopyexamination of the inside of the intestine with an endoscope   Your colon must be completely cleaned before the procedure. Any stool left in the intestine will block the view. This preparation may start several days before the procedure. Follow your doctor's instructions, which may include any of the following cleansing methods:   Enemas fluid introduced into the rectum to stimulate a bowel movement   Laxativesmedicines that cause you to have soft bowel movements   A clear-liquid diet   Oral cathartic medicinesa large container of fluid to drink, which stimulates a bowel movement   Leading up to your procedure:   Talk to your doctor about your medicines. You may be asked to stop taking some medicines up to one week before the procedure, like:   Anti-inflammatory drugs (eg, aspirin)   Blood thinners, like clopidogrel (Plavix) or warfarin (Coumadin)   Iron supplements or vitamins containing iron.   The night before, eat a light meal. Do not eat or drink anything after

## 2024-01-05 DIAGNOSIS — I74.3 FEMOROPOPLITEAL ARTERIAL THROMBOSIS OF LEFT LOWER EXTREMITY (HCC): ICD-10-CM

## 2024-01-05 LAB — SURGICAL PATHOLOGY REPORT: NORMAL

## 2024-01-05 RX ORDER — TICAGRELOR 90 MG/1
90 TABLET ORAL 2 TIMES DAILY
Qty: 60 TABLET | Refills: 1 | Status: SHIPPED | OUTPATIENT
Start: 2024-01-05

## 2024-01-05 NOTE — TELEPHONE ENCOUNTER
Last visit: 9/11/23  Last Med refill: 11/7/23  Does patient have enough medication for 72 hours: No: \    Next Visit Date:  Future Appointments   Date Time Provider Department Center   1/10/2024  1:20 PM [facility]   2/1/2024  3:30 PM Alli Merlos MD [facility]   6/6/2024  1:30 PM Sampson Streeter MD [facility]   6/28/2024  3:00 PM Evelia Levine MD [facility]       Health Maintenance   Topic Date Due    Hepatitis B vaccine (1 of 3 - 3-dose series) Never done    COVID-19 Vaccine (1) Never done    Pneumococcal 0-64 years Vaccine (1 - PCV) Never done    DTaP/Tdap/Td vaccine (1 - Tdap) Never done    Flu vaccine (1) 08/01/2023    A1C test (Diabetic or Prediabetic)  10/13/2023    Lipids  10/25/2023    Depression Monitoring  09/11/2024    Cervical cancer screen  05/24/2027    Colorectal Cancer Screen  01/04/2034    Hepatitis C screen  Completed    HIV screen  Completed    Hepatitis A vaccine  Aged Out    Hib vaccine  Aged Out    Polio vaccine  Aged Out    Meningococcal (ACWY) vaccine  Aged Out       Hemoglobin A1C (%)   Date Value   10/13/2022 6.1   03/05/2020 6.1   12/18/2017 5.9             ( goal A1C is < 7)   No components found for: \"LABMICR\"  LDL Cholesterol (mg/dL)   Date Value   10/25/2022 68   10/03/2018 42       (goal LDL is <100)   AST (U/L)   Date Value   05/17/2023 15     ALT (U/L)   Date Value   05/17/2023 23     BUN (mg/dL)   Date Value   05/17/2023 11     BP Readings from Last 3 Encounters:   01/04/24 130/86   12/18/23 123/80   12/07/23 (!) 158/80          (goal 120/80)    All Future Testing planned in [facility]  Lab Frequency Next Occurrence   Iron and TIBC Once 06/05/2023   Hemoglobin A1C Once 09/11/2023   Vascular duplex lower extremity arteries bilateral with complete physiologic Doppler Once 06/07/2024   COLONOSCOPY W/ OR W/O BIOPSY Once 12/07/2023   CBC with Auto Differential     CBC with Auto Differential     Ferritin

## 2024-01-10 ENCOUNTER — HOSPITAL ENCOUNTER (OUTPATIENT)
Dept: PHARMACY | Age: 49
Setting detail: THERAPIES SERIES
Discharge: HOME | End: 2024-01-10
Payer: COMMERCIAL

## 2024-01-10 DIAGNOSIS — I74.3 ACUTE OCCLUSION OF ARTERY OF LOWER EXTREMITY DUE TO THROMBOSIS (HCC): Primary | ICD-10-CM

## 2024-01-10 DIAGNOSIS — K62.5 HEMORRHAGE OF RECTUM AND ANUS: ICD-10-CM

## 2024-01-10 LAB
INR BLD: 1.7
PROTIME: 20 SECONDS

## 2024-01-10 PROCEDURE — 85610 PROTHROMBIN TIME: CPT

## 2024-01-10 PROCEDURE — 99212 OFFICE O/P EST SF 10 MIN: CPT

## 2024-01-10 PROCEDURE — 99213 OFFICE O/P EST LOW 20 MIN: CPT

## 2024-01-10 RX ORDER — ENOXAPARIN SODIUM 100 MG/ML
80 INJECTION SUBCUTANEOUS 2 TIMES DAILY
Qty: 18 EACH | Refills: 0 | Status: SHIPPED | OUTPATIENT
Start: 2024-01-10

## 2024-01-10 NOTE — PROGRESS NOTES
Medication Management Service, Warfarin Management  [facility], [phone]  Visit Date: 1/10/2024   Subjective:   Gale Morley is a 48 y.o. female who presents to clinic today for anticoagulation monitoring and adjustment.  Patient seen in clinic for warfarin management due to  Indication:    Acute occlusion of artery of lower extremity due to thrombosis. .   INR goal: of 2.5-3.5.  Duration of therapy: indefinite.    Assessment and PLAN   PT/INR done in office per protocol.   INR today is 1.7, subtherapeutic after holding warfarin for a procedure.   Plan:   Patient to increase warfarin regimen to take an additional 1/2 tablet today to total 11.25 mg then take 7.5 mg daily. Patient to continue Lovenox until INR at goal.     Patient has already taken today's dose of warfarin (7.5 mg). Lovenox refill sent to pharmacy.     Recheck INR in 5 days  Patient seen in room #1    Patient verbalized understanding of dosing directions and information discussed. Dosing schedule given to patient. Progress note sent to referring office.   Of Note:  Patient appetite continues to be decrease    Kourtney Kearney, Pharm.D., PGY2 Ambulatory Care Resident  01/10/24    ====================================================================  For Pharmacy Admin Tracking Only    Intervention Detail: Dose Adjustment: 1, reason: Therapy Optimization and Refill(s) Provided  Total # of Interventions Recommended: 2  Total # of Interventions Accepted: 2  Time Spent (min): 20

## 2024-01-16 ENCOUNTER — HOSPITAL ENCOUNTER (OUTPATIENT)
Dept: PHARMACY | Age: 49
Setting detail: THERAPIES SERIES
Discharge: HOME | End: 2024-01-16
Payer: COMMERCIAL

## 2024-01-16 DIAGNOSIS — I74.3 ACUTE OCCLUSION OF ARTERY OF LOWER EXTREMITY DUE TO THROMBOSIS (HCC): Primary | ICD-10-CM

## 2024-01-16 LAB
INR BLD: 2.2
PROTIME: 26.9 SECONDS

## 2024-01-16 PROCEDURE — 99213 OFFICE O/P EST LOW 20 MIN: CPT

## 2024-01-16 PROCEDURE — 99212 OFFICE O/P EST SF 10 MIN: CPT

## 2024-01-16 PROCEDURE — 85610 PROTHROMBIN TIME: CPT

## 2024-01-16 RX ORDER — ENOXAPARIN SODIUM 100 MG/ML
80 INJECTION SUBCUTANEOUS 2 TIMES DAILY
Qty: 11 EACH | Refills: 0 | Status: SHIPPED | OUTPATIENT
Start: 2024-01-16 | End: 2024-01-22

## 2024-01-16 NOTE — PROGRESS NOTES
Medication Management Service, Warfarin Management  [facility], [phone]  Visit Date: 1/16/2024   Subjective:   Gale Morley is a 48 y.o. female who presents to clinic today for anticoagulation monitoring and adjustment.  Patient seen in clinic for warfarin management due to  Indication:    acute occlusion of artery of lower extremity due to thrombosis .   INR goal: of 2.5-3.5.  Duration of therapy: indefinite.    Assessment and PLAN   PT/INR done in office per protocol.   INR today is 2.2, slightly below goal.  Patient reports having more servings of green vegetables over the weekend. Patient continues lovenox injections until INR is in therapeutic range.       Plan:  Patient will refrain from eating foods high in vitamin K until after Friday. Increase today's dose of warfarin to 11.25 mg; then continue 7.5 mg all other days of the week until next appointment. Patient's current warfarin regimen is 3.75 mg every Monday, Thursday; 7.5mg all other days of the week.     Using warfarin 7.5 mg tablets. Lovenox refill sent in today.   Recheck INR in 3 days.  Patient seen in room # 2.    ED. OP. = Patient reports having a whole can of spinach on Saturday and a whole bowel of cabbage on Sunday. Normal is 2 servings per week. Discussed impact of green vegetables on INR.     Patient verbalized understanding of dosing directions and information discussed. Dosing schedule given to patient. Progress note sent to referring office.     Electronically signed by LISA WHITT on 1/16/24 at 3:08 PM EST     For Pharmacy Admin Tracking Only    Intervention Detail: Dose Adjustment: 1, reason: Therapy Optimization and Refill(s) Provided  Total # of Interventions Recommended: 2  Total # of Interventions Accepted: 2  Time Spent (min): 20

## 2024-01-19 ENCOUNTER — HOSPITAL ENCOUNTER (OUTPATIENT)
Dept: PHARMACY | Age: 49
Setting detail: THERAPIES SERIES
Discharge: HOME | End: 2024-01-19
Payer: COMMERCIAL

## 2024-01-19 DIAGNOSIS — I74.3 ACUTE OCCLUSION OF ARTERY OF LOWER EXTREMITY DUE TO THROMBOSIS (HCC): Primary | ICD-10-CM

## 2024-01-19 LAB
INR BLD: 3.5
PROTIME: 42.4 SECONDS

## 2024-01-19 PROCEDURE — 85610 PROTHROMBIN TIME: CPT

## 2024-01-19 PROCEDURE — 99211 OFF/OP EST MAY X REQ PHY/QHP: CPT

## 2024-01-19 NOTE — PROGRESS NOTES
Medication Management Service, Warfarin Management  [facility], [phone]  Visit Date: 1/19/2024   Subjective:   Gale Morley is a 48 y.o. female who presents to clinic today for anticoagulation monitoring and adjustment.  Patient seen in clinic for warfarin management due to  Indication:   acute occlusion of artery of lower extremity due to thrombosis .   INR goal: of 2.5-3.5.  Duration of therapy: indefinite.    Assessment and PLAN   PT/INR done in office per protocol.   INR today is 3.5, therapeutic.        Plan: Stop Lovenox injections. Will continue current regimen of warfarin 3.75 mg every Monday, Thursday; 7.5 mg all other days of the week.       Using warfarin 7.5 mg tablets.  Recheck INR in 2 weeks.   Patient seen in room # 1.    Patient verbalized understanding of dosing directions and information discussed. Dosing schedule given to patient. Progress note sent to referring office.     Electronically signed by LISA WHITT on 1/19/24 at 8:46 AM EST     For Pharmacy Admin Tracking Only    Intervention Detail:   Total # of Interventions Recommended: 0  Total # of Interventions Accepted: 0  Time Spent (min): 20

## 2024-02-01 ENCOUNTER — OFFICE VISIT (OUTPATIENT)
Dept: GASTROENTEROLOGY | Age: 49
End: 2024-02-01
Payer: COMMERCIAL

## 2024-02-01 VITALS
OXYGEN SATURATION: 99 % | SYSTOLIC BLOOD PRESSURE: 130 MMHG | WEIGHT: 184.2 LBS | HEART RATE: 53 BPM | BODY MASS INDEX: 30.65 KG/M2 | DIASTOLIC BLOOD PRESSURE: 74 MMHG

## 2024-02-01 DIAGNOSIS — K62.5 HEMORRHAGE OF RECTUM AND ANUS: ICD-10-CM

## 2024-02-01 DIAGNOSIS — D12.5 ADENOMA OF SIGMOID COLON: Primary | ICD-10-CM

## 2024-02-01 PROCEDURE — G8427 DOCREV CUR MEDS BY ELIG CLIN: HCPCS | Performed by: INTERNAL MEDICINE

## 2024-02-01 PROCEDURE — 3078F DIAST BP <80 MM HG: CPT | Performed by: INTERNAL MEDICINE

## 2024-02-01 PROCEDURE — 3075F SYST BP GE 130 - 139MM HG: CPT | Performed by: INTERNAL MEDICINE

## 2024-02-01 PROCEDURE — 99214 OFFICE O/P EST MOD 30 MIN: CPT | Performed by: INTERNAL MEDICINE

## 2024-02-01 PROCEDURE — G8417 CALC BMI ABV UP PARAM F/U: HCPCS | Performed by: INTERNAL MEDICINE

## 2024-02-01 PROCEDURE — G8484 FLU IMMUNIZE NO ADMIN: HCPCS | Performed by: INTERNAL MEDICINE

## 2024-02-01 PROCEDURE — 1036F TOBACCO NON-USER: CPT | Performed by: INTERNAL MEDICINE

## 2024-02-01 ASSESSMENT — ENCOUNTER SYMPTOMS
ABDOMINAL PAIN: 0
CONSTIPATION: 0
NAUSEA: 0
ANAL BLEEDING: 0
TROUBLE SWALLOWING: 0
BLOOD IN STOOL: 0
DIARRHEA: 0
RECTAL PAIN: 0
ABDOMINAL DISTENTION: 0
VOMITING: 0

## 2024-02-01 NOTE — PROGRESS NOTES
is well-developed. She is not diaphoretic.   HENT:      Head: Normocephalic.      Mouth/Throat:      Pharynx: No oropharyngeal exudate.   Eyes:      General: No scleral icterus.     Pupils: Pupils are equal, round, and reactive to light.   Neck:      Thyroid: No thyromegaly.      Vascular: No JVD.      Trachea: No tracheal deviation.   Cardiovascular:      Rate and Rhythm: Normal rate and regular rhythm.      Heart sounds: Normal heart sounds. No murmur heard.  Pulmonary:      Effort: Pulmonary effort is normal. No respiratory distress.      Breath sounds: Normal breath sounds. No wheezing.   Abdominal:      General: Bowel sounds are normal. There is no distension.      Palpations: Abdomen is soft.      Tenderness: There is no abdominal tenderness. There is no guarding or rebound.      Comments: No ascites   Musculoskeletal:         General: Normal range of motion.      Cervical back: Normal range of motion and neck supple.   Skin:     General: Skin is warm.      Coloration: Skin is not pale.      Findings: No erythema or rash.      Comments: She is not diaphoretic   Neurological:      Mental Status: She is alert and oriented to person, place, and time.      Deep Tendon Reflexes: Reflexes are normal and symmetric.   Psychiatric:         Behavior: Behavior normal.         Thought Content: Thought content normal.         Judgment: Judgment normal.           IMPRESSION: Ms. Morley is a 48 y.o. female with      Diagnosis Orders   1. Adenoma of sigmoid colon        2. Hemorrhage of rectum and anus        As long the patient is asymptomatic I told her to watch to let us know if there is any recurrence of the symptoms  We will repeat her colonoscopy in December 2024      Medication where reviewed, side effects from the GI medication were reviewed with the patient  We did refill the GI medications            Diet/life style/natural hx /complication of the dx were all explained in details   Past medical, past surgical, social

## 2024-02-05 ENCOUNTER — HOSPITAL ENCOUNTER (OUTPATIENT)
Dept: PHARMACY | Age: 49
Setting detail: THERAPIES SERIES
Discharge: HOME | End: 2024-02-05
Payer: COMMERCIAL

## 2024-02-05 DIAGNOSIS — I74.3 ACUTE OCCLUSION OF ARTERY OF LOWER EXTREMITY DUE TO THROMBOSIS (HCC): Primary | ICD-10-CM

## 2024-02-05 LAB
INR BLD: 2.2
PROTIME: 26.9 SECONDS

## 2024-02-05 PROCEDURE — 85610 PROTHROMBIN TIME: CPT

## 2024-02-05 PROCEDURE — 99212 OFFICE O/P EST SF 10 MIN: CPT

## 2024-02-05 NOTE — PROGRESS NOTES
Medication Management Service, Warfarin Management  [facility], [phone]  Visit Date: 2024   Subjective:   Gale Morley is a 48 y.o. female who presents to clinic today for anticoagulation monitoring and adjustment.  Patient seen in clinic for warfarin management due to  Indication:   acute occlusion of artery of lower extremity due to thrombosis .   INR goal: of 2.5-3.5.  Duration of therapy: indefinite.    Assessment and PLAN   PT/INR done in office per protocol.   INR today is 2.2, subtherapeutic. Patient admits to eating an additional serving of spinach this week, her sister was in town and she makes a spinach casserole that is hard to resist.         Plan:  Boosting today's dose to 7.5mg (normally 3.75mg). Then continue current regimen of warfarin 3.75 mg every Monday, Thursday; 7.5 mg all other days of the week.           Using warfarin 7.5 mg tablets.  Recheck INR in 2 weeks.   Patient seen in room # 2    Patient verbalized understanding of dosing directions and information discussed. Dosing schedule given to patient. Progress note sent to referring office.     Electronically signed by Roderick Frank on 24 at 8:46 AM EST     For Pharmacy Admin Tracking Only    Intervention Detail: Adherence Monitorin and Dose Adjustment: 1, reason: Therapy Optimization  Total # of Interventions Recommended: 1  Total # of Interventions Accepted: 1  Time Spent (min): 20

## 2024-02-14 ENCOUNTER — APPOINTMENT (OUTPATIENT)
Dept: GENERAL RADIOLOGY | Age: 49
End: 2024-02-14
Payer: COMMERCIAL

## 2024-02-14 ENCOUNTER — HOSPITAL ENCOUNTER (EMERGENCY)
Age: 49
Discharge: HOME | End: 2024-02-15
Attending: EMERGENCY MEDICINE
Payer: COMMERCIAL

## 2024-02-14 DIAGNOSIS — S46.912A STRAIN OF LEFT SHOULDER, INITIAL ENCOUNTER: Primary | ICD-10-CM

## 2024-02-14 DIAGNOSIS — M79.601 RIGHT ARM PAIN: ICD-10-CM

## 2024-02-14 LAB
ALBUMIN SERPL-MCNC: 4.4 G/DL (ref 3.5–5.2)
ALP SERPL-CCNC: 108 U/L (ref 35–104)
ALT SERPL-CCNC: 20 U/L (ref 5–33)
ANION GAP SERPL CALCULATED.3IONS-SCNC: 11 MMOL/L (ref 9–17)
AST SERPL-CCNC: 18 U/L
BASOPHILS # BLD: 0.1 K/UL (ref 0–0.2)
BASOPHILS NFR BLD: 1 % (ref 0–2)
BILIRUB DIRECT SERPL-MCNC: <0.1 MG/DL
BILIRUB INDIRECT SERPL-MCNC: ABNORMAL MG/DL (ref 0–1)
BILIRUB SERPL-MCNC: 0.3 MG/DL (ref 0.3–1.2)
BUN SERPL-MCNC: 10 MG/DL (ref 6–20)
CALCIUM SERPL-MCNC: 9.8 MG/DL (ref 8.6–10.4)
CHLORIDE SERPL-SCNC: 100 MMOL/L (ref 98–107)
CREAT SERPL-MCNC: 0.6 MG/DL (ref 0.5–0.9)
EOSINOPHIL # BLD: 0.1 K/UL (ref 0–0.4)
EOSINOPHILS RELATIVE PERCENT: 1 % (ref 0–4)
ERYTHROCYTE [DISTWIDTH] IN BLOOD BY AUTOMATED COUNT: 17.3 % (ref 11.5–14.9)
GFR SERPL CREATININE-BSD FRML MDRD: >60 ML/MIN/1.73M2
GLUCOSE SERPL-MCNC: 106 MG/DL (ref 70–99)
HCT VFR BLD AUTO: 38.4 % (ref 36–46)
HGB BLD-MCNC: 12.6 G/DL (ref 12–16)
INR PPP: 2
LIPASE SERPL-CCNC: 24 U/L (ref 13–60)
LYMPHOCYTES NFR BLD: 2.7 K/UL (ref 1–4.8)
LYMPHOCYTES RELATIVE PERCENT: 26 % (ref 24–44)
MCH RBC QN AUTO: 28 PG (ref 26–34)
MCHC RBC AUTO-ENTMCNC: 32.7 G/DL (ref 31–37)
MCV RBC AUTO: 85.7 FL (ref 80–100)
MONOCYTES NFR BLD: 0.7 K/UL (ref 0.1–1.3)
MONOCYTES NFR BLD: 7 % (ref 1–7)
NEUTROPHILS NFR BLD: 65 % (ref 36–66)
NEUTS SEG NFR BLD: 6.7 K/UL (ref 1.3–9.1)
PLATELET # BLD AUTO: 339 K/UL (ref 150–450)
PMV BLD AUTO: 8.2 FL (ref 6–12)
POTASSIUM SERPL-SCNC: 3.8 MMOL/L (ref 3.7–5.3)
PROT SERPL-MCNC: 7.7 G/DL (ref 6.4–8.3)
PROTHROMBIN TIME: 22.5 SEC (ref 11.8–14.6)
RBC # BLD AUTO: 4.48 M/UL (ref 4–5.2)
SODIUM SERPL-SCNC: 136 MMOL/L (ref 135–144)
TROPONIN I SERPL HS-MCNC: 10 NG/L (ref 0–14)
TROPONIN I SERPL HS-MCNC: 8 NG/L (ref 0–14)
WBC OTHER # BLD: 10.4 K/UL (ref 3.5–11)

## 2024-02-14 PROCEDURE — 80048 BASIC METABOLIC PNL TOTAL CA: CPT

## 2024-02-14 PROCEDURE — 96372 THER/PROPH/DIAG INJ SC/IM: CPT

## 2024-02-14 PROCEDURE — 80076 HEPATIC FUNCTION PANEL: CPT

## 2024-02-14 PROCEDURE — 96374 THER/PROPH/DIAG INJ IV PUSH: CPT

## 2024-02-14 PROCEDURE — 6360000002 HC RX W HCPCS

## 2024-02-14 PROCEDURE — 2500000003 HC RX 250 WO HCPCS: Performed by: EMERGENCY MEDICINE

## 2024-02-14 PROCEDURE — 6370000000 HC RX 637 (ALT 250 FOR IP)

## 2024-02-14 PROCEDURE — 2580000003 HC RX 258: Performed by: EMERGENCY MEDICINE

## 2024-02-14 PROCEDURE — 36415 COLL VENOUS BLD VENIPUNCTURE: CPT

## 2024-02-14 PROCEDURE — A4216 STERILE WATER/SALINE, 10 ML: HCPCS | Performed by: EMERGENCY MEDICINE

## 2024-02-14 PROCEDURE — 99285 EMERGENCY DEPT VISIT HI MDM: CPT

## 2024-02-14 PROCEDURE — 83690 ASSAY OF LIPASE: CPT

## 2024-02-14 PROCEDURE — 85025 COMPLETE CBC W/AUTO DIFF WBC: CPT

## 2024-02-14 PROCEDURE — 85610 PROTHROMBIN TIME: CPT

## 2024-02-14 PROCEDURE — 84484 ASSAY OF TROPONIN QUANT: CPT

## 2024-02-14 PROCEDURE — 6370000000 HC RX 637 (ALT 250 FOR IP): Performed by: EMERGENCY MEDICINE

## 2024-02-14 PROCEDURE — 71046 X-RAY EXAM CHEST 2 VIEWS: CPT

## 2024-02-14 RX ORDER — LIDOCAINE HYDROCHLORIDE 20 MG/ML
15 SOLUTION OROPHARYNGEAL ONCE
Status: COMPLETED | OUTPATIENT
Start: 2024-02-14 | End: 2024-02-14

## 2024-02-14 RX ORDER — ACETAMINOPHEN 500 MG
1000 TABLET ORAL ONCE
Status: COMPLETED | OUTPATIENT
Start: 2024-02-14 | End: 2024-02-14

## 2024-02-14 RX ORDER — ORPHENADRINE CITRATE 30 MG/ML
60 INJECTION INTRAMUSCULAR; INTRAVENOUS ONCE
Status: COMPLETED | OUTPATIENT
Start: 2024-02-14 | End: 2024-02-14

## 2024-02-14 RX ORDER — MAGNESIUM HYDROXIDE/ALUMINUM HYDROXICE/SIMETHICONE 120; 1200; 1200 MG/30ML; MG/30ML; MG/30ML
30 SUSPENSION ORAL ONCE
Status: COMPLETED | OUTPATIENT
Start: 2024-02-14 | End: 2024-02-14

## 2024-02-14 RX ORDER — ACETAMINOPHEN 500 MG
1000 TABLET ORAL EVERY 8 HOURS PRN
Qty: 120 TABLET | Refills: 0 | Status: SHIPPED | OUTPATIENT
Start: 2024-02-14

## 2024-02-14 RX ORDER — LIDOCAINE 4 G/G
1 PATCH TOPICAL DAILY
Qty: 3 EACH | Refills: 0 | Status: SHIPPED | OUTPATIENT
Start: 2024-02-14 | End: 2024-02-17

## 2024-02-14 RX ORDER — LIDOCAINE 4 G/G
1 PATCH TOPICAL ONCE
Status: DISCONTINUED | OUTPATIENT
Start: 2024-02-14 | End: 2024-02-14

## 2024-02-14 RX ADMIN — ACETAMINOPHEN 1000 MG: 500 TABLET ORAL at 22:06

## 2024-02-14 RX ADMIN — ORPHENADRINE CITRATE 60 MG: 60 INJECTION INTRAMUSCULAR; INTRAVENOUS at 22:07

## 2024-02-14 RX ADMIN — LIDOCAINE HYDROCHLORIDE 15 ML: 20 SOLUTION ORAL at 23:52

## 2024-02-14 RX ADMIN — ALUMINUM HYDROXIDE, MAGNESIUM HYDROXIDE, AND SIMETHICONE 30 ML: 1200; 120; 1200 SUSPENSION ORAL at 23:52

## 2024-02-14 RX ADMIN — FAMOTIDINE 20 MG: 10 INJECTION, SOLUTION INTRAVENOUS at 23:53

## 2024-02-15 VITALS
DIASTOLIC BLOOD PRESSURE: 90 MMHG | WEIGHT: 185 LBS | HEART RATE: 77 BPM | OXYGEN SATURATION: 98 % | SYSTOLIC BLOOD PRESSURE: 165 MMHG | RESPIRATION RATE: 17 BRPM | TEMPERATURE: 97.7 F | BODY MASS INDEX: 30.79 KG/M2

## 2024-02-15 NOTE — ED PROVIDER NOTES
EMERGENCY DEPARTMENT ENCOUNTER   ATTENDING ATTESTATION     Pt Name: Gale Morley  MRN: [id number]  Birthdate 1975  Date of evaluation: 2/14/24       Gale Morley is a 48 y.o. female who presents with Shoulder Pain and Nausea      MDM: 48-year-old female presents for complaints of nausea, shoulder pain reports symptoms started yesterday states that she is having some epigastric discomfort, nausea and left shoulder pain    On exam patient in no acute distress, vital stable    Will check labs    EKG showing normal sinus rhythm with sinus arrhythmia rate of 78, normal axis, normal intervals, no ST segment elevation or depression nonspecific T wave change    Labs reviewed unremarkable x-ray negative    Patient reevaluated after meds reports she is feeling better suspect symptoms are related to reflux and GERD, given that she has no significant findings on labs improved after meds stable for discharge home    Patient/Guardian was informed of their diagnosis and told to follow up with PCP  in 1-3 days. Patient demonstrates understanding and agreement with the plan. They were given the opportunity to ask questions and those questions were answered to the best of our ability with the available information. Patient/Guardian told to return to the ED for any new, worsening, changing or persistent symptoms.       This dictation was prepared using Dragon Medical voice recognition software.       Vitals:   Vitals:    02/14/24 2135 02/14/24 2138 02/14/24 2255 02/14/24 2355   BP:  (!) 187/112 (!) 155/108 (!) 165/90   Pulse:  73 80 77   Resp:  20 27 17   Temp:  97.7 °F (36.5 °C)     TempSrc:  Oral     SpO2:  99% 98% 98%   Weight: 83.9 kg (185 lb)            I personally saw and examined the patient. I have reviewed and agree with the resident's findings, including all diagnostic interpretations and treatment plan as written. I was present for the key portions of any procedures performed and the inclusive time noted for any

## 2024-02-15 NOTE — DISCHARGE INSTRUCTIONS
You were seen in the emergency department for left shoulder pain and nausea.  Your blood pressure was elevated on arrival.  Other vital signs were stable.  No fever noted.  EKG was within normal limits.  Chest x-ray showed no signs of pneumonia.  Lab work was within normal limits.     This is likely musculoskeletal in nature.  You may continue to take Tylenol every 6 hours as needed for pain.  I have also sent some lidocaine patches to the pharmacy.  These may remain in place for 12 hours prior to removal.  You must then give yourself a 12-hour break before applying the next patch.    Please follow-up with your primary care provider in 1 week given recent ER visit.  Please go for your appointment tomorrow for monitoring of your warfarin.    Please return to the emergency department if your pain worsens or if you develop any numbness or weakness in your arms or if you develop any fevers, persistent nausea/vomiting, or difficulty breathing.

## 2024-02-15 NOTE — ED TRIAGE NOTES
Mode of arrival (squad #, walk in, police, etc) : walk in        Chief complaint(s): shoulder pain, nausea        Arrival Note (brief scenario, treatment PTA, etc).: pt states yesterday she developed left shoulder blade pain and back pain. States yesterday she had chest pain and heartburn and nausea and emesis. Pt states she has no chest pain or emesis today. No recent injury or trauma        C= \"Have you ever felt that you should Cut down on your drinking?\"  No  A= \"Have people Annoyed you by criticizing your drinking?\"  No  G= \"Have you ever felt bad or Guilty about your drinking?\"  No  E= \"Have you ever had a drink as an Eye-opener first thing in the morning to steady your nerves or to help a hangover?\"  No      Deferred []      Reason for deferring: N/A    *If yes to two or more: probable alcohol abuse.*

## 2024-02-15 NOTE — ED PROVIDER NOTES
[facility] ED  Emergency Department Encounter  Emergency Medicine Resident     Pt Name:Gale Morley  MRN: [id number]  Birthdate 1975  Date of evaluation: 2/14/24  PCP:  Luis Lorenzo MD  Note Started: 9:33 PM EST      CHIEF COMPLAINT       Chief Complaint   Patient presents with    Shoulder Pain    Nausea       HISTORY OF PRESENT ILLNESS  (Location/Symptom, Timing/Onset, Context/Setting, Quality, Duration, Modifying Factors, Severity.)      Gale Morley is a 48 y.o. female with history of vascular disease on warfarin, hyperlipidemia, hypertension who presents with left shoulder blade pain since yesterday.  Patient states yesterday she had some midsternal chest pain that she thought was some indigestion.  She also had some nausea and vomiting and then today she woke up with this left shoulder pain that has been persistent.  Currently complaining of some nausea.  She did place a lidocaine patch on her shoulder earlier today with minimal improvement.  Patient is scheduled to have her warfarin checked tomorrow.  Denies missing any doses.  Denies any lower extremity swelling or calf tenderness.  Denies any cardiac history.    PAST MEDICAL / SURGICAL / SOCIAL / FAMILY HISTORY      has a past medical history of Abnormal Pap smear of cervix, Anemia, Anxiety, Borderline diabetes, Chronic back pain, Claudication ([facility]), Claudication in peripheral vascular disease ([facility]), Depression, Headache(784.0), Heart murmur, Hemorrhage of rectum and anus, History of blood transfusion, HTN (hypertension), Hx of blood clots, Hypercoagulable state ([facility]), Hyperlipidemia, Hypertension, Intertriginous candidiasis, Left popliteal artery occlusion ([facility]), Leg pain, Menometrorrhagia, Migraine headache with aura, Obesity, Osteoarthritis, PAD (peripheral artery disease) ([facility]), Patient in clinical research study, PVD (peripheral vascular disease) ([facility]), Takotsubo cardiomyopathy, Under care of service provider, Under care of service

## 2024-02-19 ENCOUNTER — HOSPITAL ENCOUNTER (OUTPATIENT)
Dept: PHARMACY | Age: 49
Setting detail: THERAPIES SERIES
Discharge: HOME | End: 2024-02-19
Payer: COMMERCIAL

## 2024-02-19 DIAGNOSIS — I74.3 ACUTE OCCLUSION OF ARTERY OF LOWER EXTREMITY DUE TO THROMBOSIS (HCC): Primary | ICD-10-CM

## 2024-02-19 LAB
INR BLD: 3.1
PROTIME: 37.5 SECONDS

## 2024-02-19 PROCEDURE — 99212 OFFICE O/P EST SF 10 MIN: CPT

## 2024-02-19 PROCEDURE — 85610 PROTHROMBIN TIME: CPT

## 2024-02-19 RX ORDER — WARFARIN SODIUM 7.5 MG/1
TABLET ORAL
Qty: 80 TABLET | Refills: 1 | Status: SHIPPED | OUTPATIENT
Start: 2024-02-19

## 2024-02-19 NOTE — PROGRESS NOTES
Medication Management Service, Warfarin Management  [facility], [phone]  Visit Date: 2024   Subjective:   Gale Morley is a 48 y.o. female who presents to clinic today for anticoagulation monitoring and adjustment.  Patient seen in clinic for warfarin management due to  Indication:   acute occlusion of artery of lower extremity due to thrombosis .   INR goal: of 2.5-3.5.  Duration of therapy: indefinite.    Assessment and PLAN   PT/INR done in office per protocol.   INR today is 3.1, therapeutic.   Plan:  Continue current regimen of warfarin 3.75 mg every Monday, Thursday; 7.5 mg all other days of the week. Using warfarin 7.5 mg tablets.  New script sent in today.   Recheck INR in 4 weeks.   Patient seen in room # 3.    Patient reports that prior to warfarin she was on Eliquis, but MD declines to move forward with Eliquis because she is also on Brilinta.  I have encouraged her to discuss with both MD (vascular and hem/onc) at upcoming appt.  She developed a clot and subsequently had stents placed after she did not have Eliquis for a month.  That is also when Brilinta was started.    Patient verbalized understanding of dosing directions and information discussed. Dosing schedule given to patient. Progress note sent to referring office.     Sandra aSntos, [facility], CACP  Clinical Pharmacist Medication Management  2024  2:12 PM      For Pharmacy Admin Tracking Only    Intervention Detail: Adherence Monitorin and Refill(s) Provided  Total # of Interventions Recommended: 1  Total # of Interventions Accepted: 2  Time Spent (min): 20

## 2024-02-22 LAB
EKG ATRIAL RATE: 78 BPM
EKG P AXIS: 56 DEGREES
EKG P-R INTERVAL: 162 MS
EKG Q-T INTERVAL: 408 MS
EKG QRS DURATION: 90 MS
EKG QTC CALCULATION (BAZETT): 465 MS
EKG R AXIS: -22 DEGREES
EKG T AXIS: -5 DEGREES
EKG VENTRICULAR RATE: 78 BPM

## 2024-02-23 DIAGNOSIS — I74.5 OCCLUSION OF RIGHT ILIAC ARTERY (HCC): ICD-10-CM

## 2024-02-23 DIAGNOSIS — I74.3 POPLITEAL ARTERY EMBOLISM, RIGHT (HCC): ICD-10-CM

## 2024-02-23 DIAGNOSIS — Z98.890 CRITICAL LIMB ISCHEMIA WITH HISTORY OF REVASCULARIZATION OF SAME EXTREMITY (HCC): ICD-10-CM

## 2024-02-23 DIAGNOSIS — I70.229 CRITICAL LIMB ISCHEMIA WITH HISTORY OF REVASCULARIZATION OF SAME EXTREMITY (HCC): ICD-10-CM

## 2024-02-23 DIAGNOSIS — I73.9 PAD (PERIPHERAL ARTERY DISEASE) (HCC): Primary | ICD-10-CM

## 2024-02-26 PROBLEM — I73.9 PAD (PERIPHERAL ARTERY DISEASE) (HCC): Status: RESOLVED | Noted: 2018-11-26 | Resolved: 2020-11-03

## 2024-02-29 DIAGNOSIS — I74.3 FEMOROPOPLITEAL ARTERIAL THROMBOSIS OF LEFT LOWER EXTREMITY (HCC): ICD-10-CM

## 2024-02-29 RX ORDER — TICAGRELOR 90 MG/1
90 TABLET ORAL 2 TIMES DAILY
Qty: 60 TABLET | Refills: 1 | Status: SHIPPED | OUTPATIENT
Start: 2024-02-29

## 2024-02-29 NOTE — TELEPHONE ENCOUNTER
Last visit: 9/11/23  Last Med refill: 1/5/24  Does patient have enough medication for 72 hours: Yes    Next Visit Date:  Future Appointments   Date Time Provider Department Center   3/18/2024  2:00 PM [facility] MEDICATION MGMT [facility]   6/6/2024  1:30 PM Sampson Streeter MD heartvasc [facility]   6/28/2024  3:00 PM Evelia Levine MD [facility]       Health Maintenance   Topic Date Due    Hepatitis B vaccine (1 of 3 - 3-dose series) Never done    COVID-19 Vaccine (1) Never done    Pneumococcal 0-64 years Vaccine (1 - PCV) Never done    DTaP/Tdap/Td vaccine (1 - Tdap) Never done    Flu vaccine (1) 08/01/2023    A1C test (Diabetic or Prediabetic)  10/13/2023    Lipids  10/25/2023    Depression Monitoring  09/11/2024    Colorectal Cancer Screen  01/04/2025    Cervical cancer screen  05/24/2027    Hepatitis C screen  Completed    HIV screen  Completed    Hepatitis A vaccine  Aged Out    Hib vaccine  Aged Out    Polio vaccine  Aged Out    Meningococcal (ACWY) vaccine  Aged Out       Hemoglobin A1C (%)   Date Value   10/13/2022 6.1   03/05/2020 6.1   12/18/2017 5.9             ( goal A1C is < 7)   No components found for: \"LABMICR\"  LDL Cholesterol (mg/dL)   Date Value   10/25/2022 68   10/03/2018 42       (goal LDL is <100)   AST (U/L)   Date Value   02/14/2024 18     ALT (U/L)   Date Value   02/14/2024 20     BUN (mg/dL)   Date Value   02/14/2024 10     BP Readings from Last 3 Encounters:   02/14/24 (!) 165/90   02/01/24 130/74   01/04/24 130/86          (goal 120/80)    All Future Testing planned in [facility]  Lab Frequency Next Occurrence   Iron and TIBC Once 06/05/2023   Hemoglobin A1C Once 09/11/2023   Vascular duplex lower extremity arteries bilateral with complete physiologic Doppler Once 06/07/2024   CBC with Auto Differential     CBC with Auto Differential     Ferritin     Iron and TIBC                 Patient Active Problem List:     Migraine headache with aura     Essential

## 2024-03-02 DIAGNOSIS — R73.03 PREDIABETES: ICD-10-CM

## 2024-03-03 DIAGNOSIS — I10 ESSENTIAL HYPERTENSION: ICD-10-CM

## 2024-03-04 RX ORDER — LISINOPRIL 10 MG/1
10 TABLET ORAL DAILY
Qty: 90 TABLET | Refills: 1 | Status: SHIPPED | OUTPATIENT
Start: 2024-03-04

## 2024-03-04 NOTE — TELEPHONE ENCOUNTER
Essential hypertension     Obesity     Menometrorrhagia     Intertriginous candidiasis     Depression     Claudication in peripheral vascular disease (HCC)     Left popliteal artery occlusion (HCC)     Hypercoagulable state (HCC)     Hemorrhage of rectum and anus     Anemia     Foot pain     Wound of right leg     Takotsubo cardiomyopathy     Chest pain     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     Critical limb ischemia with history of revascularization of same extremity (HCC)     Occlusion of right iliac artery (HCC)     Acute occlusion of artery of lower extremity due to thrombosis (HCC)     Arterial thrombosis (HCC)

## 2024-03-04 NOTE — TELEPHONE ENCOUNTER
Problem List:     Migraine headache with aura     Essential hypertension     Obesity     Menometrorrhagia     Intertriginous candidiasis     Depression     Claudication in peripheral vascular disease (HCC)     Left popliteal artery occlusion (HCC)     Hypercoagulable state (HCC)     Hemorrhage of rectum and anus     Anemia     Foot pain     Wound of right leg     Takotsubo cardiomyopathy     Chest pain     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     Critical limb ischemia with history of revascularization of same extremity (HCC)     Occlusion of right iliac artery (HCC)     Acute occlusion of artery of lower extremity due to thrombosis (HCC)     Arterial thrombosis (HCC)

## 2024-03-14 ENCOUNTER — HOSPITAL ENCOUNTER (OUTPATIENT)
Dept: PHARMACY | Age: 49
Setting detail: THERAPIES SERIES
Discharge: HOME | End: 2024-03-14
Payer: COMMERCIAL

## 2024-03-14 DIAGNOSIS — Z98.890 CRITICAL LIMB ISCHEMIA WITH HISTORY OF REVASCULARIZATION OF SAME EXTREMITY (HCC): Primary | ICD-10-CM

## 2024-03-14 DIAGNOSIS — I74.3 ACUTE OCCLUSION OF ARTERY OF LOWER EXTREMITY DUE TO THROMBOSIS (HCC): ICD-10-CM

## 2024-03-14 DIAGNOSIS — I70.229 CRITICAL LIMB ISCHEMIA WITH HISTORY OF REVASCULARIZATION OF SAME EXTREMITY (HCC): Primary | ICD-10-CM

## 2024-03-14 DIAGNOSIS — I74.9 ARTERIAL THROMBOSIS (HCC): ICD-10-CM

## 2024-03-14 LAB
INR BLD: 2.7
PROTIME: 32.7 SECONDS

## 2024-03-14 PROCEDURE — 85610 PROTHROMBIN TIME: CPT

## 2024-03-14 PROCEDURE — 99211 OFF/OP EST MAY X REQ PHY/QHP: CPT

## 2024-03-14 NOTE — PROGRESS NOTES
Medication Management Service, Warfarin Management  [facility], [phone]  Visit Date: 3/14/2024   Subjective:   Gale Morley is a 48 y.o. female who presents to clinic today for anticoagulation monitoring and adjustment.  Patient seen in clinic for warfarin management due to  Indication:    acute occlusion of artery of lower extremity due to thrombosis .   INR goal: of 2.5-3.5.  Duration of therapy: indefinite.    Assessment and PLAN   PT/INR done in office per protocol.   INR today is 2.7, therapeutic.          Plan:  Will continue current regimen of warfarin 3.75 mg every Mon, Thu; 7.5 mg all other days of the week.  Using warfarin 7.5 mg tablets.    Recheck INR in 5 week(s).   Patient seen in room # 1.    ED. OP. = Patient stated she hadn't had any servings of green vegetables this week. Counseled patient on importance of keeping intake consistent.    Patient verbalized understanding of dosing directions and information discussed. Dosing schedule given to patient. Progress note sent to referring office.     Electronically signed by Jose Cruz Hightower on 3/14/24 at 2:23 PM EDT    For Pharmacy Admin Tracking Only    Intervention Detail:   Total # of Interventions Recommended: 0  Total # of Interventions Accepted: 0  Time Spent (min): 15

## 2024-03-27 RX ORDER — WARFARIN SODIUM 7.5 MG/1
TABLET ORAL
Qty: 80 TABLET | Refills: 1 | Status: SHIPPED | OUTPATIENT
Start: 2024-03-27

## 2024-03-27 NOTE — TELEPHONE ENCOUNTER
Last visit: 9-11-23  Last Med refill: 2-19-24  Does patient have enough medication for 72 hours: Yes    Next Visit Date:  Future Appointments   Date Time Provider Department Center   4/18/2024  1:40 PM [facility] MEDICATION MGMT [facility]   6/6/2024  1:30 PM Sampson Streeter MD heartvasc [facility]   6/28/2024  3:00 PM Evelia Levine MD [facility]       Health Maintenance   Topic Date Due    Hepatitis B vaccine (1 of 3 - 3-dose series) Never done    COVID-19 Vaccine (1) Never done    Pneumococcal 0-64 years Vaccine (1 of 2 - PCV) Never done    DTaP/Tdap/Td vaccine (1 - Tdap) Never done    Flu vaccine (1) 08/01/2023    A1C test (Diabetic or Prediabetic)  10/13/2023    Lipids  10/25/2023    Depression Monitoring  09/11/2024    Colorectal Cancer Screen  01/04/2025    Cervical cancer screen  05/24/2027    Hepatitis C screen  Completed    HIV screen  Completed    Hepatitis A vaccine  Aged Out    Hib vaccine  Aged Out    Polio vaccine  Aged Out    Meningococcal (ACWY) vaccine  Aged Out       Hemoglobin A1C (%)   Date Value   10/13/2022 6.1   03/05/2020 6.1   12/18/2017 5.9             ( goal A1C is < 7)   No components found for: \"LABMICR\"  LDL Cholesterol (mg/dL)   Date Value   10/25/2022 68   10/03/2018 42       (goal LDL is <100)   AST (U/L)   Date Value   02/14/2024 18     ALT (U/L)   Date Value   02/14/2024 20     BUN (mg/dL)   Date Value   02/14/2024 10     BP Readings from Last 3 Encounters:   02/14/24 (!) 165/90   02/01/24 130/74   01/04/24 130/86          (goal 120/80)    All Future Testing planned in [facility]  Lab Frequency Next Occurrence   Iron and TIBC Once 06/05/2023   Hemoglobin A1C Once 09/11/2023   Vascular duplex lower extremity arteries bilateral with complete physiologic Doppler Once 06/07/2024   CBC with Auto Differential     CBC with Auto Differential     Ferritin     Iron and TIBC                 Patient Active Problem List:     Migraine headache with aura

## 2024-04-09 ENCOUNTER — OFFICE VISIT (OUTPATIENT)
Dept: FAMILY MEDICINE CLINIC | Age: 49
End: 2024-04-09
Payer: COMMERCIAL

## 2024-04-09 VITALS
OXYGEN SATURATION: 99 % | HEIGHT: 65 IN | TEMPERATURE: 97.7 F | WEIGHT: 183 LBS | SYSTOLIC BLOOD PRESSURE: 164 MMHG | DIASTOLIC BLOOD PRESSURE: 80 MMHG | HEART RATE: 70 BPM | BODY MASS INDEX: 30.49 KG/M2

## 2024-04-09 DIAGNOSIS — Z12.31 ENCOUNTER FOR SCREENING MAMMOGRAM FOR MALIGNANT NEOPLASM OF BREAST: ICD-10-CM

## 2024-04-09 DIAGNOSIS — R10.30 LOWER ABDOMINAL PAIN: Primary | ICD-10-CM

## 2024-04-09 DIAGNOSIS — R73.03 PREDIABETES: ICD-10-CM

## 2024-04-09 DIAGNOSIS — Z13.220 SCREENING FOR HYPERLIPIDEMIA: ICD-10-CM

## 2024-04-09 DIAGNOSIS — I10 ESSENTIAL HYPERTENSION: ICD-10-CM

## 2024-04-09 DIAGNOSIS — N92.6 IRREGULAR MENSTRUAL BLEEDING: ICD-10-CM

## 2024-04-09 LAB
BILIRUBIN, POC: NEGATIVE
BLOOD URINE, POC: NORMAL
CLARITY, POC: CLEAR
COLOR, POC: YELLOW
CONTROL: NORMAL
GLUCOSE URINE, POC: NEGATIVE
HBA1C MFR BLD: 6.2 %
KETONES, POC: NEGATIVE
LEUKOCYTE EST, POC: NEGATIVE
NITRITE, POC: NEGATIVE
PH, POC: 5.5
PREGNANCY TEST URINE, POC: NEGATIVE
PROTEIN, POC: NORMAL
SPECIFIC GRAVITY, POC: >=1.03
UROBILINOGEN, POC: NORMAL

## 2024-04-09 PROCEDURE — 3079F DIAST BP 80-89 MM HG: CPT

## 2024-04-09 PROCEDURE — 99213 OFFICE O/P EST LOW 20 MIN: CPT

## 2024-04-09 PROCEDURE — 83036 HEMOGLOBIN GLYCOSYLATED A1C: CPT

## 2024-04-09 PROCEDURE — 81002 URINALYSIS NONAUTO W/O SCOPE: CPT

## 2024-04-09 PROCEDURE — 3077F SYST BP >= 140 MM HG: CPT

## 2024-04-09 PROCEDURE — 81025 URINE PREGNANCY TEST: CPT

## 2024-04-09 PROCEDURE — 99211 OFF/OP EST MAY X REQ PHY/QHP: CPT | Performed by: STUDENT IN AN ORGANIZED HEALTH CARE EDUCATION/TRAINING PROGRAM

## 2024-04-09 RX ORDER — LANOLIN ALCOHOL/MO/W.PET/CERES
3 CREAM (GRAM) TOPICAL NIGHTLY PRN
Qty: 30 TABLET | Refills: 3 | Status: SHIPPED | OUTPATIENT
Start: 2024-04-09

## 2024-04-09 RX ORDER — LISINOPRIL 20 MG/1
20 TABLET ORAL DAILY
Qty: 90 TABLET | Refills: 1 | Status: SHIPPED | OUTPATIENT
Start: 2024-04-09

## 2024-04-09 RX ORDER — NAPROXEN 500 MG/1
500 TABLET ORAL 2 TIMES DAILY WITH MEALS
Qty: 14 TABLET | Refills: 0 | Status: SHIPPED | OUTPATIENT
Start: 2024-04-09 | End: 2024-04-16

## 2024-04-09 RX ORDER — ACETAMINOPHEN 500 MG
500 TABLET ORAL 4 TIMES DAILY PRN
Qty: 180 TABLET | Refills: 0 | Status: SHIPPED | OUTPATIENT
Start: 2024-04-09

## 2024-04-09 ASSESSMENT — PATIENT HEALTH QUESTIONNAIRE - PHQ9
7. TROUBLE CONCENTRATING ON THINGS, SUCH AS READING THE NEWSPAPER OR WATCHING TELEVISION: NOT AT ALL
8. MOVING OR SPEAKING SO SLOWLY THAT OTHER PEOPLE COULD HAVE NOTICED. OR THE OPPOSITE, BEING SO FIGETY OR RESTLESS THAT YOU HAVE BEEN MOVING AROUND A LOT MORE THAN USUAL: NOT AT ALL
10. IF YOU CHECKED OFF ANY PROBLEMS, HOW DIFFICULT HAVE THESE PROBLEMS MADE IT FOR YOU TO DO YOUR WORK, TAKE CARE OF THINGS AT HOME, OR GET ALONG WITH OTHER PEOPLE: NOT DIFFICULT AT ALL
SUM OF ALL RESPONSES TO PHQ QUESTIONS 1-9: 6
1. LITTLE INTEREST OR PLEASURE IN DOING THINGS: NOT AT ALL
SUM OF ALL RESPONSES TO PHQ QUESTIONS 1-9: 6
SUM OF ALL RESPONSES TO PHQ QUESTIONS 1-9: 6
5. POOR APPETITE OR OVEREATING: NOT AT ALL
SUM OF ALL RESPONSES TO PHQ9 QUESTIONS 1 & 2: 0
4. FEELING TIRED OR HAVING LITTLE ENERGY: NEARLY EVERY DAY
2. FEELING DOWN, DEPRESSED OR HOPELESS: NOT AT ALL
6. FEELING BAD ABOUT YOURSELF - OR THAT YOU ARE A FAILURE OR HAVE LET YOURSELF OR YOUR FAMILY DOWN: NOT AT ALL
3. TROUBLE FALLING OR STAYING ASLEEP: NEARLY EVERY DAY
SUM OF ALL RESPONSES TO PHQ QUESTIONS 1-9: 6
9. THOUGHTS THAT YOU WOULD BE BETTER OFF DEAD, OR OF HURTING YOURSELF: NOT AT ALL

## 2024-04-09 ASSESSMENT — ENCOUNTER SYMPTOMS
NAUSEA: 0
SHORTNESS OF BREATH: 0
VOMITING: 0
ABDOMINAL PAIN: 1
COUGH: 0

## 2024-04-09 NOTE — PROGRESS NOTES
Writer didn't check out patient nor give any orders or AVS. Medical assistant Kalyn checked out patient.

## 2024-04-09 NOTE — PROGRESS NOTES
Attending Physician Statement  I  have discussed the care of Gale Morley including pertinent history and exam findings with the resident. I agree with the assessment, plan and orders as documented by the resident.      BP (!) 164/80 (Site: Left Upper Arm, Position: Sitting, Cuff Size: Medium Adult)   Pulse 70   Temp 97.7 °F (36.5 °C) (Oral)   Ht 1.651 m (5' 5\")   Wt 83 kg (183 lb)   LMP 04/02/2024   SpO2 99%   BMI 30.45 kg/m²    BP Readings from Last 3 Encounters:   04/09/24 (!) 164/80   02/14/24 (!) 165/90   02/01/24 130/74     Wt Readings from Last 3 Encounters:   04/09/24 83 kg (183 lb)   02/14/24 83.9 kg (185 lb)   02/01/24 83.6 kg (184 lb 3.2 oz)          Diagnosis Orders   1. Lower abdominal pain  POCT Urinalysis no Micro    POCT urine pregnancy    naproxen (NAPROSYN) 500 MG tablet    acetaminophen (TYLENOL) 500 MG tablet      2. Screening for hyperlipidemia  Lipid Panel      3. Prediabetes  POCT glycosylated hemoglobin (Hb A1C)      4. Encounter for screening mammogram for malignant neoplasm of breast  [facility] DIGITAL SCREEN W OR WO CAD BILATERAL      5. Irregular menstrual bleeding  US NON OB TRANSVAGINAL    External Referral To Ob-Gyn    CBC      6. Essential hypertension  lisinopril (PRINIVIL;ZESTRIL) 20 MG tablet              Ana Berkowitz DO 4/9/2024 2:29 PM

## 2024-04-09 NOTE — TELEPHONE ENCOUNTER
ATTEMPT TO SCHEDULE [facility] ED F/U- Left v-message to call office to schedule appointment, pt treated on 06/08/20 for palpitations. DATE OF PROCEDURE:  4/9/24     SURGEON:  Rocky Herrmann M.D.     CO-SURGEON:  Ahmet Samano M.D. of the Neurosurgery Service.     PREOPERATIVE DIAGNOSES:  Lumbosacral pseudoarthrosis status post T11 to pelvis   posterior spinal fusion.  History of L2 ependymoma status post corpectomy.     POSTOPERATIVE DIAGNOSES:  Lumbosacral pseudoarthrosis status post T11 to pelvis   posterior spinal fusion.  History of L2 ependymoma status post corpectomy.     PROCEDURES PERFORMED:  1.  Revision posterior spinal fusion, L2 to pelvis.  2.  Removal and reinsertion of posterior segmental instrumentation.  3.  Iliac crest bone graft, harvested through a separate fascial incision    ANESTHESIA:  General endotracheal anesthesia.     ESTIMATED BLOOD LOSS:  150 cc     IMPLANTS:  Ombitronium      SPONGE AND NEEDLE COUNT:  Correct x2.     FINDINGS:  Pseudoarthrosis at the L3-4 level and bilaterally     DRAINS:  Per Plastic Surgery.     NEUROLOGICAL MONITORING NOTES:  Motor evoked potentials, somatosensory evoked   potentials and free-run EMG as well as EMG stimulation of lumbar screws.  The   patient had absent right lower extremity motor potentials from pre-incisional   baseline throughout the entire procedure.  There was no significant EMG   activity.  All new screws stimulated are greater than 20 milliamperes.     REASON FOR OPERATION/BRIEF HISTORY AND PHYSICAL:  Genia Davis is a 54-year-old   female with a history of multiple prior spinal operations stemming from lumbar   ependymoma as a child.  Recently, she has developed new onset back pain and left lower extremity radiculopathy, imaging demonstrates a pseudoarthrosis at L3-4 bilaterally with layton fracture, and caudal migration of the left L5 screw into the foramen.  She is failed extensive conservative management and is here for surgery today.     DESCRIPTION OF INFORMED CONSENT: I had a  discussion with the patient and her  regarding the planned procedure. We specifically  discussed the risks, benefits, and alternatives to surgery. We discussed the surgical procedure including the skin incision, discectomy and nerve decompression: they understand the risks include but are not limited to bleeding, infection, damage to arteries, veins and nerves, re-herniation, death, paralysis, blindness, spinal fluid leak, continued or worsening pain, the possible need for more surgery in the future as well as the possibility other unforseen and unknown complications. We talked about expected hospital stay and recovery period. All questions were answered; they understand and wish to proceed.       DESCRIPTION OF PROCEDURE:  The patient was met in the preoperative area where   midline back was marked as the operative site.  All final questions were   answered.  Subsequently, she was brought to the Operating Room where general   endotracheal anesthesia was induced.  A Carpio catheter was placed in standard   sterile fashion.  The patient was then flipped prone onto a spinal navigation   table.  Head was secured on a facial pillow and the arms were held in 90/90   position.  All bony prominences were padded and then personally checked by me,   paying special attention to the eyes, nose, mouth, axilla, elbows, hands, chest,   hips, genitalia, knees and feet.  Being satisfied with positioning and   confirmed this to be adequate with Anesthesia, the patient's lower back was   prepped and draped in normal sterile fashion.     A full timeout was then closed identifying the patient, the procedural site and   levels, the availability of all instruments and implants, no specific nursing,   surgical, anesthetic or neurological monitoring concerns.  Finding that it was   safe to use proceed with surgery, the patient was given weight appropriate dose   of Ancef and I infiltrated the planned incision with 10 mL of 1% lidocaine with   epinephrine.  Next, I made a midline lumbar skin incision, and performed a super  fascial dissection out to the patient's right-sided posterior superior iliac spine.  There we made a fascial incision overlying the PSIS, and performed a right-sided iliac crest bone graft harvest in a standard fashion.  The iliac crest bone graft harvest site was then copiously irrigated, packed with Gelfoam, and closed with a 1. Vicryl in a running fashion.  We then performed a   revision exposure of the patient's hardware essentially from the level of the   crosslink down.  Great care was taken of the patient's known wide central   laminectomy.  We then exposed the patient's hardware bilaterally from L2 to the pelvis, as well as the patient's bony fusion mass from L2 to the pelvis.  On the right side we removed the layton from L 2 to the pelvis.  The right-sided S1 screw was loose, it was removed and upsized.  On the left side we removed the layton from L2 to the pelvis.  The left-sided L5 pedicle screw was removed and not replaced.  With the rods removed we exposed the patient's pseudoarthrosis, and decorticate with a high-speed drill, and packed it with her iliac crest bone graft as well as local bone graft.  Rods were then measured contoured and locked into place with set plugs, torque wrench, and inline connector.  The  wound was turned over to the   Plastic Surgery team for closure.     JUSTIFICATION OF MARIANNA, 22 MODIFIER:  Plastic Surgery closure:  This is a   complex revision operation.  The patient is with a history of multiple prior   spinal operations as well as radiation.  All aspects of this patient's care more   difficult from preoperative decision making to intraoperative dissection,   revision, instrumentation, laminectomy and interbody fusion.  The combined   efforts of two attending surgeons indicated to help expedite surgery, decrease   blood loss and improve outcomes.

## 2024-04-09 NOTE — PATIENT INSTRUCTIONS
Thank you for letting us take care of you today. We hope all your questions were addressed. If a question was overlooked or something else comes to mind after you return home, please contact a member of your Care Team listed below.        Your Care Team at [facility] is Team #4  Semaj Contreras (Faculty)  Heri Hare (Resident)  Romina Gonzalez (Resident)  Arturo Garcia (Resident)  Demetrio Dangelo (Resident)  Kalyn Starr, RIVER Anguiano, RIVER Escobar, RIVER Chiu, [facility]  Gisselle Ma, [facility]  Aicha Forbes, [facility]  Latoya Magallon, [facility]  Lina Bhardwaj, RIVER Wolf () HUSAM Nickerson (Clinical Practice Manager)  Isabelle Joyner [facility] (Clinical Pharmacist)       Office phone number: [phone]    If you need to get in right away due to illness, please be advised we have \"Same Day\" appointments available Monday-Friday. Please call us at [phone] option #3 to schedule your \"Same Day\" appointment.

## 2024-04-09 NOTE — PROGRESS NOTES
[facility] Residency Program - Outpatient Note      Subjective:    Gale Morley is a 48 y.o. female with  has a past medical history of Abnormal Pap smear of cervix, Anemia, Anxiety, Borderline diabetes, Chronic back pain, Claudication ([facility]), Claudication in peripheral vascular disease ([facility]), Depression, Headache(784.0), Heart murmur, Hemorrhage of rectum and anus, History of blood transfusion, HTN (hypertension), Hx of blood clots, Hypercoagulable state ([facility]), Hyperlipidemia, Hypertension, Intertriginous candidiasis, Left popliteal artery occlusion ([facility]), Leg pain, Menometrorrhagia, Migraine headache with aura, Obesity, Osteoarthritis, PAD (peripheral artery disease) ([facility]), Patient in clinical research study, PVD (peripheral vascular disease) ([facility]), Takotsubo cardiomyopathy, Under care of service provider, Under care of service provider, Under care of service provider, and Wound of right leg.    Presented to the office today for:  Chief Complaint   Patient presents with    Menstrual Problem     Cramps/ referral to a new referral     Health Maintenance     Declined medication refills at this time. Declined vaccines.     Sleep Problem       HPI    -Patient is a 48-year-old female presenting today with menstrual issues.  Patient states that since last year patient has been having menstrual cramps and has been worsening since last couple of months  -She also noted that her cycle is irregular, she will menstruate for 4 to 5 days usually but now she is having prolonged bleeding for 8 days which is heavy and irregular   -  last pregnancy was 27 years ago, not sexually active  -Takes Tylenol, still in pain  -Also reports that due to these issues she is having sleep issues  -Patient is also requesting referral to OB/GYN and she does not want to go to [place]      Review of Systems   Constitutional:  Negative for activity change and appetite change.   Respiratory:  Negative

## 2024-04-09 NOTE — PROGRESS NOTES
Visit Information    Have you changed or started any medications since your last visit including any over-the-counter medicines, vitamins, or herbal medicines? no   Have you stopped taking any of your medications? Is so, why? -  no  Are you having any side effects from any of your medications? - no    Have you seen any other physician or provider since your last visit?  no   Have you had any other diagnostic tests since your last visit?  no   Have you been seen in the emergency room and/or had an admission in a hospital since we last saw you?  no   Have you had your routine dental cleaning in the past 6 months?  no     Do you have an active MyChart account? If no, what is the barrier?  Yes    Patient Care Team:  Luis Lorenzo MD as PCP - General (Family Medicine)  Alli Merlos MD as Consulting Physician (Gastroenterology)    Medical History Review  Past Medical, Family, and Social History reviewed and does not contribute to the patient presenting condition    Health Maintenance   Topic Date Due    Hepatitis B vaccine (1 of 3 - 3-dose series) Never done    COVID-19 Vaccine (1) Never done    Pneumococcal 0-64 years Vaccine (1 of 2 - PCV) Never done    DTaP/Tdap/Td vaccine (1 - Tdap) Never done    A1C test (Diabetic or Prediabetic)  10/13/2023    Lipids  10/25/2023    Flu vaccine (Season Ended) 08/01/2024    Depression Monitoring  09/11/2024    Colorectal Cancer Screen  01/04/2025    Cervical cancer screen  05/24/2027    Hepatitis C screen  Completed    HIV screen  Completed    Hepatitis A vaccine  Aged Out    Hib vaccine  Aged Out    Polio vaccine  Aged Out    Meningococcal (ACWY) vaccine  Aged Out

## 2024-04-16 ENCOUNTER — HOSPITAL ENCOUNTER (OUTPATIENT)
Dept: MAMMOGRAPHY | Age: 49
Discharge: HOME | End: 2024-04-18
Payer: COMMERCIAL

## 2024-04-16 ENCOUNTER — HOSPITAL ENCOUNTER (OUTPATIENT)
Dept: ULTRASOUND IMAGING | Age: 49
Discharge: HOME | End: 2024-04-18
Payer: COMMERCIAL

## 2024-04-16 VITALS — HEIGHT: 65 IN | BODY MASS INDEX: 30.49 KG/M2 | WEIGHT: 183 LBS

## 2024-04-16 DIAGNOSIS — Z12.31 ENCOUNTER FOR SCREENING MAMMOGRAM FOR MALIGNANT NEOPLASM OF BREAST: ICD-10-CM

## 2024-04-16 DIAGNOSIS — N92.6 IRREGULAR MENSTRUAL BLEEDING: ICD-10-CM

## 2024-04-16 PROCEDURE — 76830 TRANSVAGINAL US NON-OB: CPT

## 2024-04-16 PROCEDURE — 77063 BREAST TOMOSYNTHESIS BI: CPT

## 2024-04-18 ENCOUNTER — HOSPITAL ENCOUNTER (OUTPATIENT)
Dept: PHARMACY | Age: 49
Setting detail: THERAPIES SERIES
Discharge: HOME | End: 2024-04-18
Payer: COMMERCIAL

## 2024-04-18 DIAGNOSIS — I70.229 CRITICAL LIMB ISCHEMIA WITH HISTORY OF REVASCULARIZATION OF SAME EXTREMITY (HCC): Primary | ICD-10-CM

## 2024-04-18 DIAGNOSIS — I74.9 ARTERIAL THROMBOSIS (HCC): ICD-10-CM

## 2024-04-18 DIAGNOSIS — Z98.890 CRITICAL LIMB ISCHEMIA WITH HISTORY OF REVASCULARIZATION OF SAME EXTREMITY (HCC): Primary | ICD-10-CM

## 2024-04-18 DIAGNOSIS — I74.3 ACUTE OCCLUSION OF ARTERY OF LOWER EXTREMITY DUE TO THROMBOSIS (HCC): ICD-10-CM

## 2024-04-18 LAB
INR BLD: 2.3
PROTIME: 28.1 SECONDS

## 2024-04-18 PROCEDURE — 85610 PROTHROMBIN TIME: CPT

## 2024-04-18 PROCEDURE — 99212 OFFICE O/P EST SF 10 MIN: CPT

## 2024-04-18 NOTE — PROGRESS NOTES
Medication Management Service, Warfarin Management  [facility], [phone]  Visit Date: 2024   Subjective:   Gale Morley is a 48 y.o. female who presents to clinic today for anticoagulation monitoring and adjustment.  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   INR goal: of 2.5-3.5.  Duration of therapy: indefinite.    Assessment and PLAN   PT/INR done in office per protocol.   INR today is 2.3, subtherapeutic due to increase in vitamin K intake.    Plan:  Will take extra 3.75 mg (total 7.5 mg) today only. Then continue current regimen of warfarin 3.75 mg every Mon, Thu; 7.5 mg all other days of the week.   Using warfarin 7.5 mg tablets.  Recheck INR in 2 week(s).   Patient seen in room # 1.    ED. OP. = Advised patient to be consistent on green vegetable consumption.     Patient verbalized understanding of dosing directions and information discussed. Dosing schedule given to patient. Progress note sent to referring office.       Electronically signed by Elliot Live on 24 at 1:59 PM EDT    For Pharmacy Admin Tracking Only    Intervention Detail: Adherence Monitorin and Dose Adjustment: 1, reason: Therapy Optimization  Total # of Interventions Recommended: 1  Total # of Interventions Accepted: 1  Time Spent (min): 15

## 2024-04-27 DIAGNOSIS — I74.3 FEMOROPOPLITEAL ARTERIAL THROMBOSIS OF LEFT LOWER EXTREMITY (HCC): ICD-10-CM

## 2024-04-29 RX ORDER — TICAGRELOR 90 MG/1
90 TABLET ORAL 2 TIMES DAILY
Qty: 60 TABLET | Refills: 1 | Status: SHIPPED | OUTPATIENT
Start: 2024-04-29

## 2024-04-29 NOTE — TELEPHONE ENCOUNTER
Last visit: 04/09/2024  Last Med refill: 02/29/2024  Does patient have enough medication for 72 hours: No:     Next Visit Date:  Future Appointments   Date Time Provider Department Center   5/2/2024  1:40 PM [facility] MEDICATION MGMT [facility]   6/6/2024  1:30 PM Sampson Streeter MD heartvasc [facility]   6/28/2024  3:00 PM Evelia Levine MD [facility]       Health Maintenance   Topic Date Due    Hepatitis B vaccine (1 of 3 - 3-dose series) Never done    COVID-19 Vaccine (1) Never done    Pneumococcal 0-64 years Vaccine (1 of 2 - PCV) Never done    DTaP/Tdap/Td vaccine (1 - Tdap) Never done    Lipids  10/25/2023    Flu vaccine (Season Ended) 08/01/2024    Colorectal Cancer Screen  01/04/2025    A1C test (Diabetic or Prediabetic)  04/09/2025    Depression Monitoring  04/09/2025    Cervical cancer screen  05/24/2027    Hepatitis C screen  Completed    HIV screen  Completed    Hepatitis A vaccine  Aged Out    Hib vaccine  Aged Out    Polio vaccine  Aged Out    Meningococcal (ACWY) vaccine  Aged Out       Hemoglobin A1C (%)   Date Value   04/09/2024 6.2   10/13/2022 6.1   03/05/2020 6.1             ( goal A1C is < 7)   No components found for: \"LABMICR\"  LDL Cholesterol (mg/dL)   Date Value   10/25/2022 68   10/03/2018 42       (goal LDL is <100)   AST (U/L)   Date Value   02/14/2024 18     ALT (U/L)   Date Value   02/14/2024 20     BUN (mg/dL)   Date Value   02/14/2024 10     BP Readings from Last 3 Encounters:   04/09/24 (!) 164/80   02/14/24 (!) 165/90   02/01/24 130/74          (goal 120/80)    All Future Testing planned in [facility]  Lab Frequency Next Occurrence   Iron and TIBC Once 06/05/2023   Hemoglobin A1C Once 09/11/2023   Vascular duplex lower extremity arteries bilateral with complete physiologic Doppler Once 06/07/2024   Lipid Panel Once 04/09/2024   CBC Once 04/09/2024   CBC with Auto Differential     CBC with Auto Differential     Ferritin     Iron and TIBC

## 2024-05-02 ENCOUNTER — HOSPITAL ENCOUNTER (OUTPATIENT)
Dept: PHARMACY | Age: 49
Setting detail: THERAPIES SERIES
Discharge: HOME | End: 2024-05-02
Payer: COMMERCIAL

## 2024-05-02 DIAGNOSIS — I74.3 ACUTE OCCLUSION OF ARTERY OF LOWER EXTREMITY DUE TO THROMBOSIS (HCC): ICD-10-CM

## 2024-05-02 DIAGNOSIS — I70.229 CRITICAL LIMB ISCHEMIA WITH HISTORY OF REVASCULARIZATION OF SAME EXTREMITY (HCC): Primary | ICD-10-CM

## 2024-05-02 DIAGNOSIS — I74.9 ARTERIAL THROMBOSIS (HCC): ICD-10-CM

## 2024-05-02 DIAGNOSIS — Z98.890 CRITICAL LIMB ISCHEMIA WITH HISTORY OF REVASCULARIZATION OF SAME EXTREMITY (HCC): Primary | ICD-10-CM

## 2024-05-02 LAB
INR BLD: 3
PROTIME: 36.4 SECONDS

## 2024-05-02 PROCEDURE — 85610 PROTHROMBIN TIME: CPT

## 2024-05-02 PROCEDURE — 99211 OFF/OP EST MAY X REQ PHY/QHP: CPT

## 2024-05-02 NOTE — PROGRESS NOTES
Medication Management Service, Warfarin Management  [facility], [phone]  Visit Date: 5/2/2024   Subjective:   Gale Morley is a 48 y.o. female who presents to clinic today for anticoagulation monitoring and adjustment.  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   INR goal: of 2.5-3.5.  Duration of therapy: indefinite.    Assessment and PLAN   PT/INR done in office per protocol.   INR today is 3.0, therapeutic.     Plan:  Continue current regimen of warfarin 3.75 mg every Mon, Thu; 7.5 mg all other days of the week.   Using warfarin 7.5 mg tablets.  Recheck INR in 4 week(s).   Patient seen in room # 1.    ED. OP. = Advised patient to be consistent on green vegetable consumption.     Patient verbalized understanding of dosing directions and information discussed. Dosing schedule given to patient. Progress note sent to referring office.     Ladi ESPINO. Ph., CACP, Clinical Pharmacist  Anticoagulation Services, [facility]  5/2/2024  1:55 PM      For Pharmacy Admin Tracking Only    Intervention Detail:   Total # of Interventions Recommended: 0  Total # of Interventions Accepted: 0  Time Spent (min): 20

## 2024-05-10 ENCOUNTER — OFFICE VISIT (OUTPATIENT)
Dept: FAMILY MEDICINE CLINIC | Age: 49
End: 2024-05-10
Payer: COMMERCIAL

## 2024-05-10 VITALS
DIASTOLIC BLOOD PRESSURE: 81 MMHG | WEIGHT: 185 LBS | HEIGHT: 65 IN | SYSTOLIC BLOOD PRESSURE: 160 MMHG | BODY MASS INDEX: 30.82 KG/M2 | HEART RATE: 62 BPM

## 2024-05-10 DIAGNOSIS — N94.6 MENSTRUAL CRAMP: ICD-10-CM

## 2024-05-10 DIAGNOSIS — Z00.00 ENCOUNTER FOR WELL ADULT EXAM WITHOUT ABNORMAL FINDINGS: ICD-10-CM

## 2024-05-10 DIAGNOSIS — I10 ESSENTIAL HYPERTENSION: ICD-10-CM

## 2024-05-10 DIAGNOSIS — Z00.00 ANNUAL PHYSICAL EXAM: Primary | ICD-10-CM

## 2024-05-10 PROBLEM — Z71.89 ACP (ADVANCE CARE PLANNING): Status: ACTIVE | Noted: 2024-05-10

## 2024-05-10 PROCEDURE — 99396 PREV VISIT EST AGE 40-64: CPT

## 2024-05-10 PROCEDURE — 3077F SYST BP >= 140 MM HG: CPT

## 2024-05-10 PROCEDURE — 3078F DIAST BP <80 MM HG: CPT

## 2024-05-10 RX ORDER — LISINOPRIL 40 MG/1
40 TABLET ORAL DAILY
Qty: 90 TABLET | Refills: 0 | Status: SHIPPED | OUTPATIENT
Start: 2024-05-10

## 2024-05-10 RX ORDER — IBUPROFEN 600 MG/1
600 TABLET ORAL DAILY PRN
Qty: 30 TABLET | Refills: 0 | Status: SHIPPED | OUTPATIENT
Start: 2024-05-10

## 2024-05-10 ASSESSMENT — PATIENT HEALTH QUESTIONNAIRE - PHQ9
8. MOVING OR SPEAKING SO SLOWLY THAT OTHER PEOPLE COULD HAVE NOTICED. OR THE OPPOSITE, BEING SO FIGETY OR RESTLESS THAT YOU HAVE BEEN MOVING AROUND A LOT MORE THAN USUAL: NOT AT ALL
7. TROUBLE CONCENTRATING ON THINGS, SUCH AS READING THE NEWSPAPER OR WATCHING TELEVISION: NOT AT ALL
10. IF YOU CHECKED OFF ANY PROBLEMS, HOW DIFFICULT HAVE THESE PROBLEMS MADE IT FOR YOU TO DO YOUR WORK, TAKE CARE OF THINGS AT HOME, OR GET ALONG WITH OTHER PEOPLE: NOT DIFFICULT AT ALL
6. FEELING BAD ABOUT YOURSELF - OR THAT YOU ARE A FAILURE OR HAVE LET YOURSELF OR YOUR FAMILY DOWN: NOT AT ALL
1. LITTLE INTEREST OR PLEASURE IN DOING THINGS: NOT AT ALL
SUM OF ALL RESPONSES TO PHQ QUESTIONS 1-9: 0
4. FEELING TIRED OR HAVING LITTLE ENERGY: NOT AT ALL
2. FEELING DOWN, DEPRESSED OR HOPELESS: NOT AT ALL
SUM OF ALL RESPONSES TO PHQ9 QUESTIONS 1 & 2: 0
9. THOUGHTS THAT YOU WOULD BE BETTER OFF DEAD, OR OF HURTING YOURSELF: NOT AT ALL
SUM OF ALL RESPONSES TO PHQ QUESTIONS 1-9: 0
3. TROUBLE FALLING OR STAYING ASLEEP: NOT AT ALL
5. POOR APPETITE OR OVEREATING: NOT AT ALL
SUM OF ALL RESPONSES TO PHQ QUESTIONS 1-9: 0
SUM OF ALL RESPONSES TO PHQ QUESTIONS 1-9: 0

## 2024-05-10 ASSESSMENT — ENCOUNTER SYMPTOMS
COUGH: 0
SHORTNESS OF BREATH: 0
DIARRHEA: 0
CONSTIPATION: 0
ABDOMINAL PAIN: 1
SORE THROAT: 0
VOMITING: 0
NAUSEA: 0

## 2024-05-10 NOTE — PATIENT INSTRUCTIONS
your goals, values, and preferences known.     This puts you in the ’s seat and helps others know what matters most to you so they won’t have to guess.      Where can you learn more?    Go to [email or web address]   to learn how to:    Name someone you trust to make healthcare decisions for you, only if you can’t. (Healthcare Power of )    Document your wishes for care if you were seriously ill and not expected to recover or are approaching end of life. (Advance Directive or Living Will)    The same page can be found using the QR code below.                Starting a Weight Loss Plan: Care Instructions  Overview     It can be a challenge to lose weight. But your doctor can help you make a weight-loss plan that meets your needs.  You don't have to make a lot of big changes at once. A better idea might be to focus on small changes and stick with them. When those changes become habit, you can add a few more changes.  Some people find it helpful to take an exercise or nutrition class. If you have questions, ask your doctor about seeing a registered dietitian or an exercise specialist. You might also think about joining a weight-loss support group.  If you're not ready to make changes right now, try to pick a date in the future. Then make an appointment with your doctor to talk about when and how you'll get started with a plan.  Follow-up care is a key part of your treatment and safety. Be sure to make and go to all appointments, and call your doctor if you are having problems. It's also a good idea to know your test results and keep a list of the medicines you take.  How can you care for yourself at home?  Set realistic goals. Many people expect to lose much more weight than is likely. A weight loss of 5% to 10% of your body weight may be enough to improve your health.  Get family and friends involved to provide support. Talk to them about why you are trying to lose

## 2024-05-10 NOTE — PROGRESS NOTES
Well Adult Note  Name: Gale Morley Today’s Date: 5/10/2024   MRN: [id number] Sex: Female   Age: 49 y.o. Ethnicity: Non- / Non    : 1975 Race: Black /       Gale Morley is here for well adult exam.    History:    Ms. Gale Morley, 50 yo AA F, with previous medical history of chronic anemia possibly due to menorrhagia, bilateral lower extremity PAD with extensive surgeries and workup including thrombectomies and stent placements, and HTN.    Presents today for a well adult exam. Patient is doing well. Taking medications as prescribed. Asymptomatic. Medications reviewed with patient. Patient is taking tylenol for period cramps.    Discussed with patient that NSAIDs may be more beneficial for her menstrual cramps compared to Tylenol alone. Patient has no history of renal failure. Is on warfarin and lisinopril. Patient only needs the ibuprofen at time of period. Allergies lists rash however patient is not sure if ibuprofen was actually the cause of her rash and would like to try it again. Discussed side of effects of ibuprofen including GI bleed and discussed with patient to discontinue use immediately and go to the ER. Patient voiced understanding.    HTN - will need to increase patient's lisinopril to 40 mg oral daily. Previous dose was 20 mg oral daily.    Patient refused all vaccinations.    Review of Systems   Constitutional:  Negative for activity change and fever.   HENT:  Negative for congestion and sore throat.    Respiratory:  Negative for cough and shortness of breath.    Cardiovascular:  Negative for chest pain and leg swelling.   Gastrointestinal:  Positive for abdominal pain (lower abdominal during periods). Negative for constipation, diarrhea, nausea and vomiting.   Genitourinary:  Negative for difficulty urinating and dysuria.   Neurological:  Negative for syncope and headaches.   Psychiatric/Behavioral:  Negative for agitation, behavioral problems and

## 2024-05-10 NOTE — PROGRESS NOTES
Visit Information    Have you changed or started any medications since your last visit including any over-the-counter medicines, vitamins, or herbal medicines? no   Are you having any side effects from any of your medications? -  no  Have you stopped taking any of your medications? Is so, why? -  no    Have you seen any other physician or provider since your last visit? No  Have you had any other diagnostic tests since your last visit? No  Have you been seen in the emergency room and/or had an admission to a hospital since we last saw you? No  Have you had your routine dental cleaning in the past 6 months? no    Have you activated your [employer] account? If not, what are your barriers? Yes     Patient Care Team:  Luis Lorenzo MD as PCP - General (Family Medicine)  Alli Merlos MD as Consulting Physician (Gastroenterology)    Medical History Review  Past Medical, Family, and Social History reviewed and does not contribute to the patient presenting condition    Health Maintenance   Topic Date Due    Hepatitis B vaccine (1 of 3 - 3-dose series) Never done    COVID-19 Vaccine (1) Never done    Pneumococcal 0-64 years Vaccine (1 of 2 - PCV) Never done    DTaP/Tdap/Td vaccine (1 - Tdap) Never done    Lipids  10/25/2023    Flu vaccine (Season Ended) 08/01/2024    Colorectal Cancer Screen  01/04/2025    A1C test (Diabetic or Prediabetic)  04/09/2025    Depression Monitoring  04/09/2025    Cervical cancer screen  05/24/2027    Hepatitis C screen  Completed    HIV screen  Completed    Hepatitis A vaccine  Aged Out    Hib vaccine  Aged Out    Polio vaccine  Aged Out    Meningococcal (ACWY) vaccine  Aged Out

## 2024-05-10 NOTE — PROGRESS NOTES
Attending Physician Statement  I have discussed the care of Gale Morley, including pertinent history and exam findings,  with the resident. I have reviewed the key elements of all parts of the encounter with the resident.  I agree with the assessment, plan and orders as documented by the resident.  (GE Modifier)    Reinaldo Burden MD

## 2024-05-29 DIAGNOSIS — I74.3 FEMOROPOPLITEAL ARTERIAL THROMBOSIS OF LEFT LOWER EXTREMITY (HCC): ICD-10-CM

## 2024-05-29 RX ORDER — ATORVASTATIN CALCIUM 40 MG/1
TABLET, FILM COATED ORAL
Qty: 90 TABLET | Refills: 0 | Status: SHIPPED | OUTPATIENT
Start: 2024-05-29

## 2024-05-29 NOTE — TELEPHONE ENCOUNTER
Last visit: 5/10/2024  Last Med refill: 6/18/2023  Does patient have enough medication for 72 hours: no    Next Visit Date:  Future Appointments   Date Time Provider Department Center   5/30/2024 10:40 AM [facility] MEDICATION MGMT [facility] MED [facility]   6/5/2024  1:45 PM Brandon Ford DPM [facility] Pod[facility]ry [facility]   6/6/2024  1:30 PM Sampson Streeter MD heartvasc [facility]   6/28/2024  3:00 PM Evelia Levine MD [facility]       Health Maintenance   Topic Date Due    Hepatitis B vaccine (1 of 3 - 3-dose series) Never done    COVID-19 Vaccine (1) Never done    Pneumococcal 0-64 years Vaccine (1 of 2 - PCV) Never done    DTaP/Tdap/Td vaccine (1 - Tdap) Never done    Lipids  10/25/2023    Flu vaccine (Season Ended) 08/01/2024    Colorectal Cancer Screen  01/04/2025    A1C test (Diabetic or Prediabetic)  04/09/2025    Depression Monitoring  05/10/2025    Breast cancer screen  04/16/2026    Cervical cancer screen  05/24/2027    Hepatitis C screen  Completed    HIV screen  Completed    Hepatitis A vaccine  Aged Out    Hib vaccine  Aged Out    Polio vaccine  Aged Out    Meningococcal (ACWY) vaccine  Aged Out       Hemoglobin A1C (%)   Date Value   04/09/2024 6.2   10/13/2022 6.1   03/05/2020 6.1             ( goal A1C is < 7)   No components found for: \"LABMICR\"  No components found for: \"LDLCHOLESTEROL\", \"LDLCALC\"    (goal LDL is <100)   AST (U/L)   Date Value   02/14/2024 18     ALT (U/L)   Date Value   02/14/2024 20     BUN (mg/dL)   Date Value   02/14/2024 10     BP Readings from Last 3 Encounters:   05/10/24 (!) 160/81   04/09/24 (!) 164/80   02/14/24 (!) 165/90          (goal 120/80)    All Future Testing planned in [facility]  Lab Frequency Next Occurrence   Iron and TIBC Once 06/05/2023   Hemoglobin A1C Once 09/11/2023   Vascular duplex lower extremity arteries bilateral with complete physiologic Doppler Once 06/07/2024   Lipid Panel Once 04/09/2024   CBC Once 04/09/2024   CBC with Auto

## 2024-05-30 ENCOUNTER — HOSPITAL ENCOUNTER (OUTPATIENT)
Dept: PHARMACY | Age: 49
Setting detail: THERAPIES SERIES
Discharge: HOME | End: 2024-05-30
Payer: COMMERCIAL

## 2024-05-30 DIAGNOSIS — I74.9 ARTERIAL THROMBOSIS (HCC): ICD-10-CM

## 2024-05-30 DIAGNOSIS — I70.229 CRITICAL LIMB ISCHEMIA WITH HISTORY OF REVASCULARIZATION OF SAME EXTREMITY (HCC): Primary | ICD-10-CM

## 2024-05-30 DIAGNOSIS — Z98.890 CRITICAL LIMB ISCHEMIA WITH HISTORY OF REVASCULARIZATION OF SAME EXTREMITY (HCC): Primary | ICD-10-CM

## 2024-05-30 DIAGNOSIS — I74.3 ACUTE OCCLUSION OF ARTERY OF LOWER EXTREMITY DUE TO THROMBOSIS (HCC): ICD-10-CM

## 2024-05-30 LAB
INR BLD: 3.1
PROTIME: 37.1 SECONDS

## 2024-05-30 PROCEDURE — 99211 OFF/OP EST MAY X REQ PHY/QHP: CPT

## 2024-05-30 PROCEDURE — 85610 PROTHROMBIN TIME: CPT

## 2024-05-30 NOTE — PROGRESS NOTES
Medication Management Service, Warfarin Management  [facility], [phone]  Visit Date: 2024   Subjective:   Gale Morley is a 49 y.o. female who presents to clinic today for anticoagulation monitoring and adjustment.  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   INR goal: of 2.5-3.5.  Duration of therapy: indefinite.    Assessment and PLAN   PT/INR done in office per protocol.   INR today is 3.1, therapeutic.     Plan:  Continue current regimen of warfarin 3.75 mg every Mon, Thu; 7.5 mg all other days of the week.   Using warfarin 7.5 mg tablets.  Recheck INR in 6 week(s).   Patient seen in room # 3.    ED. OP.   Advised patient to be consistent on green vegetable consumption.   Discussed need for metformin - for \"borderline Diabetes\".  She will discuss with PCP as she reports that she has started exercising, A1c was 6.2 in April and reduction in dietary intake.  Encouraged patient to watch her carbohydrate intake, especially with crackers - which she reports that she eats.   Reviewed use of ibuprofen for menstrual cramps.     Patient verbalized understanding of dosing directions and information discussed. Dosing schedule given to patient. Progress note sent to referring office.    Sandra Santos RPh, RODP  Clinical Pharmacist Medication Management  2024  11:07 AM      For Pharmacy Admin Tracking Only    Intervention Detail: Adherence Monitorin  Total # of Interventions Recommended: 2  Total # of Interventions Accepted: 2  Time Spent (min): 20

## 2024-06-09 PROBLEM — Z00.00 ANNUAL PHYSICAL EXAM: Status: RESOLVED | Noted: 2024-05-10 | Resolved: 2024-06-09

## 2024-06-12 ENCOUNTER — HOSPITAL ENCOUNTER (OUTPATIENT)
Dept: VASCULAR LAB | Age: 49
Discharge: HOME | End: 2024-06-14
Attending: SURGERY
Payer: COMMERCIAL

## 2024-06-12 DIAGNOSIS — I73.9 PAD (PERIPHERAL ARTERY DISEASE) (HCC): ICD-10-CM

## 2024-06-12 PROCEDURE — 93923 UPR/LXTR ART STDY 3+ LVLS: CPT

## 2024-06-13 ENCOUNTER — OFFICE VISIT (OUTPATIENT)
Dept: VASCULAR SURGERY | Age: 49
End: 2024-06-13
Payer: COMMERCIAL

## 2024-06-13 VITALS
DIASTOLIC BLOOD PRESSURE: 85 MMHG | WEIGHT: 180.1 LBS | SYSTOLIC BLOOD PRESSURE: 149 MMHG | HEIGHT: 65 IN | HEART RATE: 80 BPM | OXYGEN SATURATION: 97 % | BODY MASS INDEX: 30.01 KG/M2

## 2024-06-13 DIAGNOSIS — I73.9 PAD (PERIPHERAL ARTERY DISEASE) (HCC): Primary | ICD-10-CM

## 2024-06-13 LAB
VAS LEFT ABI: 0.61
VAS LEFT ARM BP: 193 MMHG
VAS LEFT ATA MID PSV: 41.7 CM/S
VAS LEFT CFA PROX PSV: 138 CM/S
VAS LEFT CFA VEL RATIO: 0.79
VAS LEFT DORSALIS PEDIS BP: 117 MMHG
VAS LEFT EXT ILIAC DIST PSV: 175 CM/S
VAS LEFT PERONEAL MID PSV: 49 CM/S
VAS LEFT PFA PROX PSV: 190 CM/S
VAS LEFT POP A DIST PSV: 64.5 CM/S
VAS LEFT POP A PROX PSV: 61.9 CM/S
VAS LEFT POP A PROX VEL RATIO: 0.48
VAS LEFT PTA BP: 113 MMHG
VAS LEFT PTA MID PSV: 39 CM/S
VAS LEFT SFA DIST PSV: 128 CM/S
VAS LEFT SFA DIST VEL RATIO: 1.65
VAS LEFT SFA MID PSV: 77.7 CM/S
VAS LEFT SFA MID VEL RATIO: 0.56
VAS LEFT SFA PROX PSV: 140 CM/S
VAS LEFT SFA PROX VEL RATIO: 1.01
VAS LEFT TBI: 0.7
VAS LEFT TOE PRESSURE: 135 MMHG
VAS RIGHT ABI: 0.64
VAS RIGHT ARM BP: 180 MMHG
VAS RIGHT ATA MID PSV: 24.5 CM/S
VAS RIGHT CFA PROX PSV: 127 CM/S
VAS RIGHT CFA VEL RATIO: 0.8
VAS RIGHT DORSALIS PEDIS BP: 124 MMHG
VAS RIGHT EXT ILIAC DIST PSV: 155 CM/S
VAS RIGHT PERONEAL MID PSV: 27.1 CM/S
VAS RIGHT PFA PROX PSV: 92.6 CM/S
VAS RIGHT POP A DIST PSV: 91.3 CM/S
VAS RIGHT POP A PROX PSV: 70.2 CM/S
VAS RIGHT POP A PROX VEL RATIO: 0.43
VAS RIGHT SFA DIST PSV: 165 CM/S
VAS RIGHT SFA DIST VEL RATIO: 2.46
VAS RIGHT SFA MID PSV: 67.1 CM/S
VAS RIGHT SFA MID VEL RATIO: 0.6
VAS RIGHT SFA PROX PSV: 106 CM/S
VAS RIGHT SFA PROX VEL RATIO: 0.8
VAS RIGHT TBI: 0.64
VAS RIGHT TOE PRESSURE: 124 MMHG

## 2024-06-13 PROCEDURE — 3077F SYST BP >= 140 MM HG: CPT | Performed by: SURGERY

## 2024-06-13 PROCEDURE — 99213 OFFICE O/P EST LOW 20 MIN: CPT | Performed by: SURGERY

## 2024-06-13 PROCEDURE — 1036F TOBACCO NON-USER: CPT | Performed by: SURGERY

## 2024-06-13 PROCEDURE — 3079F DIAST BP 80-89 MM HG: CPT | Performed by: SURGERY

## 2024-06-13 PROCEDURE — G8427 DOCREV CUR MEDS BY ELIG CLIN: HCPCS | Performed by: SURGERY

## 2024-06-13 PROCEDURE — G8417 CALC BMI ABV UP PARAM F/U: HCPCS | Performed by: SURGERY

## 2024-06-13 NOTE — PROGRESS NOTES
Negative.    Skin:  Negative for color change and wound.   Allergic/Immunologic: Negative.    Neurological:  Positive for numbness. Negative for facial asymmetry, speech difficulty and weakness.   Hematological: Negative.    Psychiatric/Behavioral: Negative.       Physical Exam:     Vitals:  BP (!) 149/85 (Site: Right Upper Arm, Position: Sitting, Cuff Size: Large Adult)   Pulse 80   Ht 1.651 m (5' 5\")   Wt 81.7 kg (180 lb 1.6 oz)   SpO2 97%   BMI 29.97 kg/m²     Physical Exam  Constitutional:       Appearance: She is well-developed and well-groomed.   Eyes:      Extraocular Movements: Extraocular movements intact.      Conjunctiva/sclera: Conjunctivae normal.   Neck:      Vascular: No carotid bruit.   Cardiovascular:      Rate and Rhythm: Normal rate and regular rhythm.      Pulses:           Radial pulses are 2+ on the right side and 2+ on the left side.        Femoral pulses are 2+ on the right side and 2+ on the left side.       Popliteal pulses are 1+ on the right side and 1+ on the left side.        Dorsalis pedis pulses are detected w/ Doppler on the right side and detected w/ Doppler on the left side.        Posterior tibial pulses are detected w/ Doppler on the right side and detected w/ Doppler on the left side.   Pulmonary:      Effort: Pulmonary effort is normal. No respiratory distress.   Abdominal:      Palpations: Abdomen is soft.      Tenderness: There is no abdominal tenderness.   Musculoskeletal:      Cervical back: Full passive range of motion without pain.      Right lower leg: No swelling or tenderness. No edema.      Left lower leg: No swelling or tenderness. No edema.      Right foot: Normal capillary refill. No swelling or tenderness.      Left foot: Normal capillary refill. No swelling or tenderness.   Feet:      Right foot:      Skin integrity: No ulcer or skin breakdown.      Toenail Condition: Right toenails are long.      Left foot:      Skin integrity: No ulcer or skin breakdown.

## 2024-06-18 ASSESSMENT — ENCOUNTER SYMPTOMS
ABDOMINAL PAIN: 0
SHORTNESS OF BREATH: 0
CHEST TIGHTNESS: 0
COLOR CHANGE: 0
ALLERGIC/IMMUNOLOGIC NEGATIVE: 1

## 2024-06-23 DIAGNOSIS — D64.9 ANEMIA, UNSPECIFIED TYPE: ICD-10-CM

## 2024-06-24 RX ORDER — FERROUS SULFATE 325(65) MG
TABLET ORAL
Qty: 90 TABLET | Refills: 0 | Status: SHIPPED | OUTPATIENT
Start: 2024-06-24

## 2024-06-24 NOTE — TELEPHONE ENCOUNTER
E-scribe request for FEROSUL 325. Please review and e-scribe if applicable.     Last Visit Date:  5/10/24  Next Visit Date:  Visit date not found    Hemoglobin A1C (%)   Date Value   04/09/2024 6.2   10/13/2022 6.1   03/05/2020 6.1             ( goal A1C is < 7)   No components found for: \"LABMICR\"  No components found for: \"LDLCHOLESTEROL\", \"LDLCALC\"    (goal LDL is <100)   AST (U/L)   Date Value   02/14/2024 18     ALT (U/L)   Date Value   02/14/2024 20     BUN (mg/dL)   Date Value   02/14/2024 10     BP Readings from Last 3 Encounters:   06/13/24 (!) 149/85   05/10/24 (!) 160/81   04/09/24 (!) 164/80          (goal 120/80)        Patient Active Problem List:     Migraine headache with aura     Essential hypertension     Obesity     Menometrorrhagia     Intertriginous candidiasis     Depression     Claudication in peripheral vascular disease (HCC)     Left popliteal artery occlusion (HCC)     Hypercoagulable state (HCC)     Hemorrhage of rectum and anus     Anemia     Foot pain     Wound of right leg     Takotsubo cardiomyopathy     Chest pain     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     Critical limb ischemia with history of revascularization of same extremity (HCC)     Occlusion of right iliac artery (HCC)     Acute occlusion of artery of lower extremity due to thrombosis (HCC)     Arterial thrombosis (HCC)     Menstrual cramp      ----JF

## 2024-06-26 DIAGNOSIS — I74.3 FEMOROPOPLITEAL ARTERIAL THROMBOSIS OF LEFT LOWER EXTREMITY (HCC): ICD-10-CM

## 2024-06-26 RX ORDER — TICAGRELOR 90 MG/1
90 TABLET ORAL 2 TIMES DAILY
Qty: 60 TABLET | Refills: 1 | Status: SHIPPED | OUTPATIENT
Start: 2024-06-26

## 2024-06-26 NOTE — TELEPHONE ENCOUNTER
E-scribe request for Brilinta. Please review and e-scribe if applicable.     Last Visit Date:  5/10/24  Next Visit Date:  Visit date not found    Hemoglobin A1C (%)   Date Value   04/09/2024 6.2   10/13/2022 6.1   03/05/2020 6.1             ( goal A1C is < 7)   No components found for: \"LABMICR\"  No components found for: \"LDLCHOLESTEROL\", \"LDLCALC\"    (goal LDL is <100)   AST (U/L)   Date Value   02/14/2024 18     ALT (U/L)   Date Value   02/14/2024 20     BUN (mg/dL)   Date Value   02/14/2024 10     BP Readings from Last 3 Encounters:   06/13/24 (!) 149/85   05/10/24 (!) 160/81   04/09/24 (!) 164/80          (goal 120/80)        Patient Active Problem List:     Migraine headache with aura     Essential hypertension     Obesity     Menometrorrhagia     Intertriginous candidiasis     Depression     Claudication in peripheral vascular disease (HCC)     Left popliteal artery occlusion (HCC)     Hypercoagulable state (HCC)     Hemorrhage of rectum and anus     Anemia     Foot pain     Wound of right leg     Takotsubo cardiomyopathy     Chest pain     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     Critical limb ischemia with history of revascularization of same extremity (HCC)     Occlusion of right iliac artery (HCC)     Acute occlusion of artery of lower extremity due to thrombosis (HCC)     Arterial thrombosis (HCC)     Menstrual cramp

## 2024-06-28 ENCOUNTER — OFFICE VISIT (OUTPATIENT)
Dept: ONCOLOGY | Age: 49
End: 2024-06-28
Payer: COMMERCIAL

## 2024-06-28 ENCOUNTER — HOSPITAL ENCOUNTER (OUTPATIENT)
Age: 49
Setting detail: SPECIMEN
Discharge: HOME | End: 2024-06-28
Payer: COMMERCIAL

## 2024-06-28 ENCOUNTER — TELEPHONE (OUTPATIENT)
Dept: ONCOLOGY | Age: 49
End: 2024-06-28

## 2024-06-28 VITALS
RESPIRATION RATE: 18 BRPM | SYSTOLIC BLOOD PRESSURE: 121 MMHG | TEMPERATURE: 96.8 F | WEIGHT: 181.9 LBS | DIASTOLIC BLOOD PRESSURE: 69 MMHG | HEART RATE: 61 BPM | BODY MASS INDEX: 30.27 KG/M2

## 2024-06-28 DIAGNOSIS — I70.229 CRITICAL LIMB ISCHEMIA WITH HISTORY OF REVASCULARIZATION OF SAME EXTREMITY (HCC): ICD-10-CM

## 2024-06-28 DIAGNOSIS — I74.9 ARTERIAL THROMBOSIS (HCC): ICD-10-CM

## 2024-06-28 DIAGNOSIS — Z98.890 CRITICAL LIMB ISCHEMIA WITH HISTORY OF REVASCULARIZATION OF SAME EXTREMITY (HCC): ICD-10-CM

## 2024-06-28 DIAGNOSIS — I74.9 ARTERIAL THROMBOSIS (HCC): Primary | ICD-10-CM

## 2024-06-28 DIAGNOSIS — D50.0 IRON DEFICIENCY ANEMIA DUE TO CHRONIC BLOOD LOSS: ICD-10-CM

## 2024-06-28 LAB
BASOPHILS # BLD: 0.04 K/UL (ref 0–0.2)
BASOPHILS NFR BLD: 1 % (ref 0–2)
EOSINOPHIL # BLD: 0.13 K/UL (ref 0–0.44)
EOSINOPHILS RELATIVE PERCENT: 2 % (ref 1–4)
ERYTHROCYTE [DISTWIDTH] IN BLOOD BY AUTOMATED COUNT: 14 % (ref 11.8–14.4)
FERRITIN SERPL-MCNC: 44 NG/ML (ref 13–150)
HCT VFR BLD AUTO: 32.3 % (ref 36.3–47.1)
HGB BLD-MCNC: 10.5 G/DL (ref 11.9–15.1)
IMM GRANULOCYTES # BLD AUTO: 0.02 K/UL (ref 0–0.3)
IMM GRANULOCYTES NFR BLD: 0 %
IRON SATN MFR SERPL: 12 % (ref 20–55)
IRON SERPL-MCNC: 39 UG/DL (ref 37–145)
LYMPHOCYTES NFR BLD: 2.14 K/UL (ref 1.1–3.7)
LYMPHOCYTES RELATIVE PERCENT: 26 % (ref 24–43)
MCH RBC QN AUTO: 28.6 PG (ref 25.2–33.5)
MCHC RBC AUTO-ENTMCNC: 32.5 G/DL (ref 28.4–34.8)
MCV RBC AUTO: 88 FL (ref 82.6–102.9)
MONOCYTES NFR BLD: 0.52 K/UL (ref 0.1–1.2)
MONOCYTES NFR BLD: 6 % (ref 3–12)
NEUTROPHILS NFR BLD: 65 % (ref 36–65)
NEUTS SEG NFR BLD: 5.26 K/UL (ref 1.5–8.1)
NRBC BLD-RTO: 0 PER 100 WBC
PLATELET # BLD AUTO: 301 K/UL (ref 138–453)
PMV BLD AUTO: 10.4 FL (ref 8.1–13.5)
RBC # BLD AUTO: 3.67 M/UL (ref 3.95–5.11)
TIBC SERPL-MCNC: 315 UG/DL (ref 250–450)
UNSATURATED IRON BINDING CAPACITY: 276 UG/DL (ref 112–347)
WBC OTHER # BLD: 8.1 K/UL (ref 3.5–11.3)

## 2024-06-28 PROCEDURE — G8417 CALC BMI ABV UP PARAM F/U: HCPCS | Performed by: INTERNAL MEDICINE

## 2024-06-28 PROCEDURE — 99214 OFFICE O/P EST MOD 30 MIN: CPT | Performed by: INTERNAL MEDICINE

## 2024-06-28 PROCEDURE — G8427 DOCREV CUR MEDS BY ELIG CLIN: HCPCS | Performed by: INTERNAL MEDICINE

## 2024-06-28 PROCEDURE — 83550 IRON BINDING TEST: CPT

## 2024-06-28 PROCEDURE — 3078F DIAST BP <80 MM HG: CPT | Performed by: INTERNAL MEDICINE

## 2024-06-28 PROCEDURE — 36415 COLL VENOUS BLD VENIPUNCTURE: CPT

## 2024-06-28 PROCEDURE — 83540 ASSAY OF IRON: CPT

## 2024-06-28 PROCEDURE — 85025 COMPLETE CBC W/AUTO DIFF WBC: CPT

## 2024-06-28 PROCEDURE — 82728 ASSAY OF FERRITIN: CPT

## 2024-06-28 PROCEDURE — 3074F SYST BP LT 130 MM HG: CPT | Performed by: INTERNAL MEDICINE

## 2024-06-28 PROCEDURE — 1036F TOBACCO NON-USER: CPT | Performed by: INTERNAL MEDICINE

## 2024-06-28 RX ORDER — FERROUS SULFATE 325(65) MG
325 TABLET ORAL 2 TIMES DAILY
Qty: 60 TABLET | Refills: 5 | Status: SHIPPED | OUTPATIENT
Start: 2024-06-28

## 2024-06-28 NOTE — PROGRESS NOTES
DIAGNOSIS:   Recurrent arterial thrombosis and ischemia  CURRENT THERAPY:  Hypercoagulability work-up underway  BRIEF CASE HISTORY:   Gale Morley is a very pleasant 49 y.o. female who is referred to us for consultation regarding her recurrent arterial thrombosis  Vascular history and procedures  Right lower extremity angiogram, initiation of catheter directed thrombolysis (9/7/22) Dr. Ugarte  RLRAVIN angiogram, angioplasty popliteal, TPT, peroneal, jetstream atherectomy pop, TPT, peroneal, Right common and external iliac stent (8 x 40 mm self expanding) 9/8/22 Dr. Ugarte  RLE angiogram, percutaneous mechanical thrombectomy penumbra iliac, popliteal TPT, 8x59 Viabhan in common and external iliac artery, PTA popliteal, TPT, peroneal (9/24/22) Dr. Ugarte   Left leg angiogram with initiation of catheter directed thrombolysis (8/2/19)  Left leg angiogram, mechanical thrombectomy and angioplasty of CFA/SFA (8/3/19)  Bilateral lower extremity angiogram (10/3/18)  Right leg catheter directed thrombolysis and fasciotomy (September [facility])  Left femoral to peroneal artery bypass thrombectomy (1/16/18)  Left femoral to peroneal artery bypass with reversed GSV (1/16/18)  Left lower extremity angiogram (12/19/17  She is sent to us for a consultation, complaining of pain in the lower extremity.  She is on aspirin, warfarin, and Brilinta    Basic workup was done and was essentially negative.  We decided to continue follow her conservatively.  INTERIM HISTORY  Patient comes in today for follow-up, she feels well and has no complaints.   She continues to complain of heavy menstrual bleeding.  PAST MEDICAL HISTORY: has a past medical history of Abnormal Pap smear of cervix, Anemia, Anxiety, Borderline diabetes, Chronic back pain, Claudication (HCC), Claudication in peripheral vascular disease (HCC), Depression, Headache(784.0), Heart murmur, Hemorrhage of rectum and anus, History of blood transfusion, HTN (hypertension),

## 2024-06-28 NOTE — TELEPHONE ENCOUNTER
SVETA HERE FOR FOLLOW UP   Rv in 6 months , need cbc and iron studies prior   MD VISIT: 12/20/24 @ 2:15PM   LABS PRINTED AND GIVEN ON EXIT   AVS PRINTED AND GIVEN ON EXIT

## 2024-07-11 ENCOUNTER — HOSPITAL ENCOUNTER (OUTPATIENT)
Dept: PHARMACY | Age: 49
Setting detail: THERAPIES SERIES
Discharge: HOME | End: 2024-07-11
Payer: COMMERCIAL

## 2024-07-11 DIAGNOSIS — I74.9 ARTERIAL THROMBOSIS (HCC): ICD-10-CM

## 2024-07-11 DIAGNOSIS — I74.3 ACUTE OCCLUSION OF ARTERY OF LOWER EXTREMITY DUE TO THROMBOSIS (HCC): ICD-10-CM

## 2024-07-11 DIAGNOSIS — Z98.890 CRITICAL LIMB ISCHEMIA WITH HISTORY OF REVASCULARIZATION OF SAME EXTREMITY (HCC): Primary | ICD-10-CM

## 2024-07-11 DIAGNOSIS — I70.229 CRITICAL LIMB ISCHEMIA WITH HISTORY OF REVASCULARIZATION OF SAME EXTREMITY (HCC): Primary | ICD-10-CM

## 2024-07-11 LAB
INR BLD: 1.5
PROTIME: 17.8 SECONDS

## 2024-07-11 PROCEDURE — 99212 OFFICE O/P EST SF 10 MIN: CPT

## 2024-07-11 PROCEDURE — 85610 PROTHROMBIN TIME: CPT

## 2024-07-11 NOTE — PROGRESS NOTES
Medication Management Service, Warfarin Management  [facility], [phone]  Visit Date: 2024   Subjective:   Gale Morley is a 49 y.o. female who presents to clinic today for anticoagulation monitoring and adjustment.  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   INR goal: of 2.5-3.5.  Duration of therapy: indefinite.    Assessment and PLAN   PT/INR done in office per protocol.   INR today is 1.5 subtherapeutic.  Patient reports that she had a reduction in green vegetable intake recently.  Once INR was resulted it was revealed during discussion that patient has stopped drinking alcohol in any form, previously had been drinking several days per week.  Her intention is to continue with abstinence from alcohol.  She plans to resume her usual intake of gv.     Plan:  Increase regimen by 16.7% to warfarin 7.5mg once daily.  Patient already took 3.75mg dose for today so will take an additional dose of 3.75mg immediately upon arrival back home from [facility].  Patient to change administration time to evening after today.  Using warfarin 7.5 mg tablets.  Recheck INR in 1.5 week(s).   Patient seen in room # 3.    Stressed importance of informing us if she resumes intake of alcohol in any form.     Patient verbalized understanding of dosing directions and information discussed. Dosing schedule given to patient. Progress note sent to referring office.    Sandra Santos RP, CACP  Clinical Pharmacist Medication Management  2024  11:01 AM      For Pharmacy Admin Tracking Only    Intervention Detail: Adherence Monitorin and Dose Adjustment: 1, reason: Therapy Optimization  Total # of Interventions Recommended: 3  Total # of Interventions Accepted: 3  Time Spent (min): 20

## 2024-07-22 ENCOUNTER — HOSPITAL ENCOUNTER (OUTPATIENT)
Dept: PHARMACY | Age: 49
Setting detail: THERAPIES SERIES
Discharge: HOME | End: 2024-07-22
Payer: COMMERCIAL

## 2024-07-22 DIAGNOSIS — I74.9 ARTERIAL THROMBOSIS (HCC): ICD-10-CM

## 2024-07-22 DIAGNOSIS — Z98.890 CRITICAL LIMB ISCHEMIA WITH HISTORY OF REVASCULARIZATION OF SAME EXTREMITY (HCC): Primary | ICD-10-CM

## 2024-07-22 DIAGNOSIS — I74.3 ACUTE OCCLUSION OF ARTERY OF LOWER EXTREMITY DUE TO THROMBOSIS (HCC): ICD-10-CM

## 2024-07-22 DIAGNOSIS — I70.229 CRITICAL LIMB ISCHEMIA WITH HISTORY OF REVASCULARIZATION OF SAME EXTREMITY (HCC): Primary | ICD-10-CM

## 2024-07-22 LAB
INR BLD: 5
PROTIME: 59.6 SECONDS

## 2024-07-22 PROCEDURE — 85610 PROTHROMBIN TIME: CPT

## 2024-07-22 PROCEDURE — 99212 OFFICE O/P EST SF 10 MIN: CPT

## 2024-07-22 NOTE — PROGRESS NOTES
Medication Management Service, Warfarin Management  [facility], [phone]  Visit Date: 2024   Subjective:   Gale Morley is a 49 y.o. female who presents to clinic today for anticoagulation monitoring and adjustment.  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   INR goal: of 2.5-3.5.  Duration of therapy: indefinite.    Assessment and PLAN   PT/INR done in office per protocol.   INR today is 5.0, supratherapeutic and up from 1.5 at last visit.  Patient reports continued abstinence from alcohol, plans to continue the same.  She has been taking acetaminophen multiple times daily for the past 1 - 1.5 weeks, last dose was yesterday.    Plan:  Will hold dose today, and reduce regimen by 7% to warfarin 3.75mg on  only, 7.5mg all other days of the week.   Patient changed administration time to evening.  Using warfarin 7.5 mg tablets.  Recheck INR in 1.5 week(s).   Patient seen in room # 3.    Patient verbalized understanding of dosing directions and information discussed. Dosing schedule given to patient. Progress note sent to referring office.    Sonia Streeter and Rosendo concur to continue warfarin use.     Sandra Santos, [facility], CACP  Clinical Pharmacist Medication Management  2024  2:22 PM      For Pharmacy Admin Tracking Only    Intervention Detail: Adherence Monitorin and Dose Adjustment: 1, reason: Improve Adherence, Therapy De-escalation  Total # of Interventions Recommended: 2  Total # of Interventions Accepted: 2  Time Spent (min): 20

## 2024-08-01 ENCOUNTER — ANTI-COAG VISIT (OUTPATIENT)
Age: 49
End: 2024-08-01
Payer: COMMERCIAL

## 2024-08-01 DIAGNOSIS — I74.9 ARTERIAL THROMBOSIS (HCC): ICD-10-CM

## 2024-08-01 DIAGNOSIS — Z98.890 CRITICAL LIMB ISCHEMIA WITH HISTORY OF REVASCULARIZATION OF SAME EXTREMITY (HCC): Primary | ICD-10-CM

## 2024-08-01 DIAGNOSIS — I74.3 ACUTE OCCLUSION OF ARTERY OF LOWER EXTREMITY DUE TO THROMBOSIS (HCC): ICD-10-CM

## 2024-08-01 DIAGNOSIS — I70.229 CRITICAL LIMB ISCHEMIA WITH HISTORY OF REVASCULARIZATION OF SAME EXTREMITY (HCC): Primary | ICD-10-CM

## 2024-08-01 DIAGNOSIS — I10 ESSENTIAL HYPERTENSION: ICD-10-CM

## 2024-08-01 LAB
INR BLD: 2
PROTIME: 23.4 SECONDS

## 2024-08-01 PROCEDURE — 85610 PROTHROMBIN TIME: CPT

## 2024-08-01 PROCEDURE — 99212 OFFICE O/P EST SF 10 MIN: CPT

## 2024-08-01 RX ORDER — LISINOPRIL 40 MG/1
40 TABLET ORAL DAILY
Qty: 90 TABLET | Refills: 0 | Status: SHIPPED | OUTPATIENT
Start: 2024-08-01

## 2024-08-01 NOTE — PROGRESS NOTES
Medication Management Service, Warfarin Management  [facility], [phone]  Visit Date: 2024   Subjective:   Gale Morley is a 48 y.o. female who presents to clinic today for anticoagulation monitoring and adjustment.  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   INR goal: of 2.5-3.5.  Duration of therapy: indefinite.    Assessment and PLAN   PT/INR done in office per protocol.   INR today is 2.0, subtherapeutic due to missed warfarin dose.  Plan:  Will take extra 3.75 mg (total 7.5 mg) today only. Then continue current regimen of warfarin 3.75 mg every Thu; 7.5 mg all other days of the week.   Using warfarin 7.5 mg tablets.  Recheck INR in 2 week(s).   Patient seen in room # 1.    ED. OP. = Advised patient to be consistent on green vegetable consumption.     Of note patient has an upcoming dental extraction at the end of August. I advised her to prior to that appointment to let dentist know she is on warfarin.  I explained that a single extraction can be done without holding warfarin but that the dentist may want a preprocedure INR.     Patient verbalized understanding of dosing directions and information discussed. Dosing schedule given to patient. Progress note sent to referring office.     Ladi ESPINO. Ph., CACP, Clinical Pharmacist  Anticoagulation Services, [facility]  2024  11:46 AM      For Pharmacy Admin Tracking Only    Intervention Detail: Adherence Monitorin and Dose Adjustment: 1, reason: Therapy Optimization  Total # of Interventions Recommended: 2  Total # of Interventions Accepted: 2  Time Spent (min): 20

## 2024-08-01 NOTE — TELEPHONE ENCOUNTER
Last visit: 05/10/2024  Last Med refill: 05/10/2024  Does patient have enough medication for 72 hours: No:     Next Visit Date:  Future Appointments   Date Time Provider Department Center   8/15/2024 10:40 AM [facility]   12/20/2024  2:15 PM Evelia Levine MD [facility]   6/26/2025  1:15 PM Sampson Streeter MD [facility]       Health Maintenance   Topic Date Due    Hepatitis B vaccine (1 of 3 - 3-dose series) Never done    COVID-19 Vaccine (1) Never done    Pneumococcal 0-64 years Vaccine (1 of 2 - PCV) Never done    DTaP/Tdap/Td vaccine (1 - Tdap) Never done    Lipids  10/25/2023    Flu vaccine (1) 08/01/2024    Colorectal Cancer Screen  01/04/2025    A1C test (Diabetic or Prediabetic)  04/09/2025    Depression Monitoring  05/10/2025    Breast cancer screen  04/16/2026    Cervical cancer screen  05/24/2027    Hepatitis C screen  Completed    HIV screen  Completed    Hepatitis A vaccine  Aged Out    Hib vaccine  Aged Out    Polio vaccine  Aged Out    Meningococcal (ACWY) vaccine  Aged Out       Hemoglobin A1C (%)   Date Value   04/09/2024 6.2   10/13/2022 6.1   03/05/2020 6.1             ( goal A1C is < 7)   No components found for: \"LABMICR\"  No components found for: \"LDLCHOLESTEROL\", \"LDLCALC\"    (goal LDL is <100)   AST (U/L)   Date Value   02/14/2024 18     ALT (U/L)   Date Value   02/14/2024 20     BUN (mg/dL)   Date Value   02/14/2024 10     BP Readings from Last 3 Encounters:   06/28/24 121/69   06/13/24 (!) 149/85   05/10/24 (!) 160/81          (goal 120/80)    All Future Testing planned in [facility]  Lab Frequency Next Occurrence   Hemoglobin A1C Once 09/11/2023   Lipid Panel Once 04/09/2024   CBC Once 04/09/2024   Vascular lower arterial complete physiologic Doppler at rest Once 06/12/2025   CBC with Auto Differential     Ferritin     CBC with Auto Differential     Iron and TIBC                 Patient Active Problem List:     Migraine

## 2024-08-15 ENCOUNTER — ANTI-COAG VISIT (OUTPATIENT)
Age: 49
End: 2024-08-15
Payer: COMMERCIAL

## 2024-08-15 DIAGNOSIS — I70.229 CRITICAL LIMB ISCHEMIA WITH HISTORY OF REVASCULARIZATION OF SAME EXTREMITY (HCC): Primary | ICD-10-CM

## 2024-08-15 DIAGNOSIS — Z98.890 CRITICAL LIMB ISCHEMIA WITH HISTORY OF REVASCULARIZATION OF SAME EXTREMITY (HCC): Primary | ICD-10-CM

## 2024-08-15 DIAGNOSIS — I74.3 ACUTE OCCLUSION OF ARTERY OF LOWER EXTREMITY DUE TO THROMBOSIS (HCC): ICD-10-CM

## 2024-08-15 DIAGNOSIS — I74.9 ARTERIAL THROMBOSIS (HCC): ICD-10-CM

## 2024-08-15 LAB
INTERNATIONAL NORMALIZATION RATIO, POC: 4.2
PROTHROMBIN TIME, POC: 50.2

## 2024-08-15 PROCEDURE — 99212 OFFICE O/P EST SF 10 MIN: CPT

## 2024-08-15 PROCEDURE — 85610 PROTHROMBIN TIME: CPT

## 2024-08-15 NOTE — PROGRESS NOTES
Medication Management Service, Warfarin Management  [facility], [phone]  Visit Date: 2024   Subjective:   Gale Morley is a 48 y.o. female who presents to clinic today for anticoagulation monitoring and adjustment.  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   INR goal: of 2.5-3.5.  Duration of therapy: indefinite.    Assessment and PLAN   PT/INR done in office per protocol.   INR today is 4.2, supra therapeutic due to not having any green vegetables.    Plan:  Patient will have quiche with spinach today. Hold warfarin today only.  Then continue current regimen of warfarin 3.75 mg every Thu; 7.5 mg all other days of the week.   Using warfarin 7.5 mg tablets.  Recheck INR in 1 & 1/2 week(s).   Patient seen in room # 1.    ED. OP. = Advised patient to be consistent on green vegetable consumption. She had only one serving this month. She usually has 2 servings weekly.     Patient verbalized understanding of dosing directions and information discussed. Dosing schedule given to patient. Progress note sent to referring office.     Ladi ESPINO. Ph., CACP, Clinical Pharmacist  Anticoagulation Services, [facility]  8/15/2024  11:28 AM      For Pharmacy Admin Tracking Only    Intervention Detail: Adherence Monitorin and Dose Adjustment: 1, reason: Therapy De-escalation  Total # of Interventions Recommended: 2  Total # of Interventions Accepted: 2  Time Spent (min): 20

## 2024-08-20 ENCOUNTER — HOSPITAL ENCOUNTER (EMERGENCY)
Age: 49
Discharge: HOME | End: 2024-08-21
Attending: EMERGENCY MEDICINE
Payer: COMMERCIAL

## 2024-08-20 DIAGNOSIS — R07.9 CHEST PAIN, UNSPECIFIED TYPE: Primary | ICD-10-CM

## 2024-08-20 PROCEDURE — 99285 EMERGENCY DEPT VISIT HI MDM: CPT

## 2024-08-20 PROCEDURE — 93005 ELECTROCARDIOGRAM TRACING: CPT | Performed by: EMERGENCY MEDICINE

## 2024-08-20 ASSESSMENT — LIFESTYLE VARIABLES
HOW MANY STANDARD DRINKS CONTAINING ALCOHOL DO YOU HAVE ON A TYPICAL DAY: PATIENT DOES NOT DRINK
HOW OFTEN DO YOU HAVE A DRINK CONTAINING ALCOHOL: NEVER

## 2024-08-21 ENCOUNTER — APPOINTMENT (OUTPATIENT)
Dept: CT IMAGING | Age: 49
End: 2024-08-21
Payer: COMMERCIAL

## 2024-08-21 VITALS
HEIGHT: 65 IN | DIASTOLIC BLOOD PRESSURE: 85 MMHG | SYSTOLIC BLOOD PRESSURE: 151 MMHG | RESPIRATION RATE: 20 BRPM | OXYGEN SATURATION: 99 % | HEART RATE: 71 BPM | TEMPERATURE: 98 F | WEIGHT: 180 LBS | BODY MASS INDEX: 29.99 KG/M2

## 2024-08-21 LAB
ALBUMIN SERPL-MCNC: 4.3 G/DL (ref 3.5–5.2)
ALP SERPL-CCNC: 119 U/L (ref 35–104)
ALT SERPL-CCNC: 13 U/L (ref 5–33)
ANION GAP SERPL CALCULATED.3IONS-SCNC: 12 MMOL/L (ref 9–17)
AST SERPL-CCNC: 12 U/L
BASOPHILS # BLD: 0.1 K/UL (ref 0–0.2)
BASOPHILS NFR BLD: 1 % (ref 0–2)
BILIRUB SERPL-MCNC: 0.2 MG/DL (ref 0.3–1.2)
BUN SERPL-MCNC: 14 MG/DL (ref 6–20)
CALCIUM SERPL-MCNC: 9.3 MG/DL (ref 8.6–10.4)
CHLORIDE SERPL-SCNC: 102 MMOL/L (ref 98–107)
CO2 SERPL-SCNC: 23 MMOL/L (ref 20–31)
CREAT SERPL-MCNC: 0.8 MG/DL (ref 0.5–0.9)
EOSINOPHIL # BLD: 0.2 K/UL (ref 0–0.4)
EOSINOPHILS RELATIVE PERCENT: 2 % (ref 0–4)
ERYTHROCYTE [DISTWIDTH] IN BLOOD BY AUTOMATED COUNT: 15.4 % (ref 11.5–14.9)
GFR, ESTIMATED: >90 ML/MIN/1.73M2
GLUCOSE SERPL-MCNC: 119 MG/DL (ref 70–99)
HCT VFR BLD AUTO: 34.2 % (ref 36–46)
HGB BLD-MCNC: 11.4 G/DL (ref 12–16)
INR PPP: 2.1
LYMPHOCYTES NFR BLD: 2.6 K/UL (ref 1–4.8)
LYMPHOCYTES RELATIVE PERCENT: 25 % (ref 24–44)
MAGNESIUM SERPL-MCNC: 1.9 MG/DL (ref 1.6–2.6)
MCH RBC QN AUTO: 29.2 PG (ref 26–34)
MCHC RBC AUTO-ENTMCNC: 33.2 G/DL (ref 31–37)
MCV RBC AUTO: 87.8 FL (ref 80–100)
MONOCYTES NFR BLD: 0.8 K/UL (ref 0.1–1.3)
MONOCYTES NFR BLD: 8 % (ref 1–7)
NEUTROPHILS NFR BLD: 64 % (ref 36–66)
NEUTS SEG NFR BLD: 6.6 K/UL (ref 1.3–9.1)
PARTIAL THROMBOPLASTIN TIME: 39 SEC (ref 24–36)
PLATELET # BLD AUTO: 353 K/UL (ref 150–450)
PMV BLD AUTO: 8.7 FL (ref 6–12)
POTASSIUM SERPL-SCNC: 3.8 MMOL/L (ref 3.7–5.3)
PROT SERPL-MCNC: 7.9 G/DL (ref 6.4–8.3)
PROTHROMBIN TIME: 23.8 SEC (ref 11.8–14.6)
RBC # BLD AUTO: 3.9 M/UL (ref 4–5.2)
SODIUM SERPL-SCNC: 137 MMOL/L (ref 135–144)
TROPONIN I SERPL HS-MCNC: 8 NG/L (ref 0–14)
TROPONIN I SERPL HS-MCNC: 8 NG/L (ref 0–14)
WBC OTHER # BLD: 10.3 K/UL (ref 3.5–11)

## 2024-08-21 PROCEDURE — 96374 THER/PROPH/DIAG INJ IV PUSH: CPT

## 2024-08-21 PROCEDURE — 80053 COMPREHEN METABOLIC PANEL: CPT

## 2024-08-21 PROCEDURE — 83735 ASSAY OF MAGNESIUM: CPT

## 2024-08-21 PROCEDURE — 85730 THROMBOPLASTIN TIME PARTIAL: CPT

## 2024-08-21 PROCEDURE — 6360000002 HC RX W HCPCS: Performed by: EMERGENCY MEDICINE

## 2024-08-21 PROCEDURE — 85025 COMPLETE CBC W/AUTO DIFF WBC: CPT

## 2024-08-21 PROCEDURE — 36415 COLL VENOUS BLD VENIPUNCTURE: CPT

## 2024-08-21 PROCEDURE — 74174 CTA ABD&PLVS W/CONTRAST: CPT

## 2024-08-21 PROCEDURE — 2580000003 HC RX 258: Performed by: EMERGENCY MEDICINE

## 2024-08-21 PROCEDURE — 84484 ASSAY OF TROPONIN QUANT: CPT

## 2024-08-21 PROCEDURE — 85610 PROTHROMBIN TIME: CPT

## 2024-08-21 PROCEDURE — 6360000004 HC RX CONTRAST MEDICATION: Performed by: EMERGENCY MEDICINE

## 2024-08-21 RX ORDER — SODIUM CHLORIDE 0.9 % (FLUSH) 0.9 %
10 SYRINGE (ML) INJECTION PRN
Status: COMPLETED | OUTPATIENT
Start: 2024-08-21 | End: 2024-08-21

## 2024-08-21 RX ORDER — 0.9 % SODIUM CHLORIDE 0.9 %
100 INTRAVENOUS SOLUTION INTRAVENOUS ONCE
Status: COMPLETED | OUTPATIENT
Start: 2024-08-21 | End: 2024-08-21

## 2024-08-21 RX ORDER — FENTANYL CITRATE 0.05 MG/ML
25 INJECTION, SOLUTION INTRAMUSCULAR; INTRAVENOUS ONCE
Status: COMPLETED | OUTPATIENT
Start: 2024-08-21 | End: 2024-08-21

## 2024-08-21 RX ADMIN — IOPAMIDOL 100 ML: 755 INJECTION, SOLUTION INTRAVENOUS at 01:26

## 2024-08-21 RX ADMIN — FENTANYL CITRATE 25 MCG: 0.05 INJECTION, SOLUTION INTRAMUSCULAR; INTRAVENOUS at 00:41

## 2024-08-21 RX ADMIN — SODIUM CHLORIDE 100 ML: 9 INJECTION, SOLUTION INTRAVENOUS at 01:26

## 2024-08-21 RX ADMIN — SODIUM CHLORIDE, PRESERVATIVE FREE 10 ML: 5 INJECTION INTRAVENOUS at 01:26

## 2024-08-21 ASSESSMENT — PAIN DESCRIPTION - ORIENTATION: ORIENTATION: MID;UPPER

## 2024-08-21 ASSESSMENT — PAIN - FUNCTIONAL ASSESSMENT: PAIN_FUNCTIONAL_ASSESSMENT: PREVENTS OR INTERFERES WITH MANY ACTIVE NOT PASSIVE ACTIVITIES

## 2024-08-21 ASSESSMENT — ENCOUNTER SYMPTOMS
SHORTNESS OF BREATH: 0
NAUSEA: 0
VOMITING: 0
BACK PAIN: 1
COUGH: 0
ABDOMINAL PAIN: 0

## 2024-08-21 ASSESSMENT — PAIN DESCRIPTION - DESCRIPTORS: DESCRIPTORS: PRESSURE

## 2024-08-21 ASSESSMENT — PAIN DESCRIPTION - LOCATION: LOCATION: CHEST;BACK

## 2024-08-21 ASSESSMENT — PAIN SCALES - GENERAL
PAINLEVEL_OUTOF10: 10
PAINLEVEL_OUTOF10: 4

## 2024-08-21 ASSESSMENT — HEART SCORE: ECG: NORMAL

## 2024-08-21 NOTE — ED TRIAGE NOTES
Mode of arrival (squad #, walk in, police, etc) : walk in        Chief complaint(s): chest pain, back pain        Arrival Note (brief scenario, treatment PTA, etc).: Patient reports she started having back pain that radiated into her left arm yesterday and today the pain has gotten worse and is causing pressure in her chest.         C= \"Have you ever felt that you should Cut down on your drinking?\"  No  A= \"Have people Annoyed you by criticizing your drinking?\"  No  G= \"Have you ever felt bad or Guilty about your drinking?\"  No  E= \"Have you ever had a drink as an Eye-opener first thing in the morning to steady your nerves or to help a hangover?\"  No      Deferred []      Reason for deferring: N/A    *If yes to two or more: probable alcohol abuse.*
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
## 2024-08-22 LAB
EKG ATRIAL RATE: 68 BPM
EKG P AXIS: 39 DEGREES
EKG P-R INTERVAL: 142 MS
EKG Q-T INTERVAL: 394 MS
EKG QRS DURATION: 90 MS
EKG QTC CALCULATION (BAZETT): 418 MS
EKG R AXIS: -20 DEGREES
EKG T AXIS: 8 DEGREES
EKG VENTRICULAR RATE: 68 BPM

## 2024-08-27 ENCOUNTER — ANTI-COAG VISIT (OUTPATIENT)
Age: 49
End: 2024-08-27
Payer: COMMERCIAL

## 2024-08-27 DIAGNOSIS — I70.229 CRITICAL LIMB ISCHEMIA WITH HISTORY OF REVASCULARIZATION OF SAME EXTREMITY (HCC): Primary | ICD-10-CM

## 2024-08-27 DIAGNOSIS — Z98.890 CRITICAL LIMB ISCHEMIA WITH HISTORY OF REVASCULARIZATION OF SAME EXTREMITY (HCC): Primary | ICD-10-CM

## 2024-08-27 DIAGNOSIS — I74.3 ACUTE OCCLUSION OF ARTERY OF LOWER EXTREMITY DUE TO THROMBOSIS (HCC): ICD-10-CM

## 2024-08-27 DIAGNOSIS — I74.9 ARTERIAL THROMBOSIS (HCC): ICD-10-CM

## 2024-08-27 LAB
INTERNATIONAL NORMALIZATION RATIO, POC: 2
PROTHROMBIN TIME, POC: 23.9

## 2024-08-27 PROCEDURE — 85610 PROTHROMBIN TIME: CPT

## 2024-08-27 PROCEDURE — 99212 OFFICE O/P EST SF 10 MIN: CPT

## 2024-08-27 NOTE — PROGRESS NOTES
Medication Management Service, Warfarin Management  [facility], [phone]  Visit Date: 2024   Subjective:   Gale Morley is a 48 y.o. female who presents to clinic today for anticoagulation monitoring and adjustment.  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   INR goal: of 2.5-3.5.  Duration of therapy: indefinite.    Assessment and PLAN   PT/INR done in office per protocol.   INR today is 2.0, sub therapeutic.  Drop in INR due to missing her warfarin dose on Saturday.     Plan:   Increase warfarin from 7.5 mg to 11.25 mg for today only.  Then continue current regimen of warfarin 3.75 mg every Thu; 7.5 mg all other days of the week.   Using warfarin 7.5 mg tablets.  Recheck INR in 2 week(s).   Patient seen in room # 1.    ED. OP. =  She usually has 2 servings of green vegetables weekly. Patient has been taking ibuprofen 800 mg every 4 hours foe back pain.  I advised reducing dose but patient reports she already got rid of the medication and will not be taking it.     HIPAA and CPA forms signed in office today.     Patient verbalized understanding of dosing directions and information discussed. Dosing schedule given to patient. Progress note sent to referring office.     Ladi ESPINO. Ph., CACP, Clinical Pharmacist  Anticoagulation Services, [facility]  2024  11:43 AM      For Pharmacy Admin Tracking Only    Intervention Detail: Adherence Monitorin and Dose Adjustment: 1, reason: Therapy Optimization  Total # of Interventions Recommended: 2  Total # of Interventions Accepted: 2  Time Spent (min): 20

## 2024-09-10 ENCOUNTER — ANTI-COAG VISIT (OUTPATIENT)
Age: 49
End: 2024-09-10
Payer: COMMERCIAL

## 2024-09-10 DIAGNOSIS — Z98.890 CRITICAL LIMB ISCHEMIA WITH HISTORY OF REVASCULARIZATION OF SAME EXTREMITY (HCC): Primary | ICD-10-CM

## 2024-09-10 DIAGNOSIS — I70.229 CRITICAL LIMB ISCHEMIA WITH HISTORY OF REVASCULARIZATION OF SAME EXTREMITY (HCC): Primary | ICD-10-CM

## 2024-09-10 DIAGNOSIS — I74.3 ACUTE OCCLUSION OF ARTERY OF LOWER EXTREMITY DUE TO THROMBOSIS (HCC): ICD-10-CM

## 2024-09-10 DIAGNOSIS — I74.9 ARTERIAL THROMBOSIS (HCC): ICD-10-CM

## 2024-09-10 LAB
INTERNATIONAL NORMALIZATION RATIO, POC: 4.2
PROTHROMBIN TIME, POC: 50.4

## 2024-09-10 PROCEDURE — 85610 PROTHROMBIN TIME: CPT

## 2024-09-10 PROCEDURE — 99212 OFFICE O/P EST SF 10 MIN: CPT

## 2024-09-20 ENCOUNTER — ANTI-COAG VISIT (OUTPATIENT)
Age: 49
End: 2024-09-20
Payer: COMMERCIAL

## 2024-09-20 DIAGNOSIS — I74.3 ACUTE OCCLUSION OF ARTERY OF LOWER EXTREMITY DUE TO THROMBOSIS (HCC): ICD-10-CM

## 2024-09-20 DIAGNOSIS — Z98.890 CRITICAL LIMB ISCHEMIA WITH HISTORY OF REVASCULARIZATION OF SAME EXTREMITY (HCC): Primary | ICD-10-CM

## 2024-09-20 DIAGNOSIS — I70.229 CRITICAL LIMB ISCHEMIA WITH HISTORY OF REVASCULARIZATION OF SAME EXTREMITY (HCC): Primary | ICD-10-CM

## 2024-09-20 DIAGNOSIS — I74.9 ARTERIAL THROMBOSIS (HCC): ICD-10-CM

## 2024-09-20 LAB
INTERNATIONAL NORMALIZATION RATIO, POC: 2.1
PROTHROMBIN TIME, POC: 24.7

## 2024-09-20 PROCEDURE — 99212 OFFICE O/P EST SF 10 MIN: CPT

## 2024-09-20 PROCEDURE — 85610 PROTHROMBIN TIME: CPT

## 2024-10-03 ENCOUNTER — ANTI-COAG VISIT (OUTPATIENT)
Age: 49
End: 2024-10-03
Payer: COMMERCIAL

## 2024-10-03 DIAGNOSIS — I70.229 CRITICAL LIMB ISCHEMIA WITH HISTORY OF REVASCULARIZATION OF SAME EXTREMITY (HCC): Primary | ICD-10-CM

## 2024-10-03 DIAGNOSIS — Z98.890 CRITICAL LIMB ISCHEMIA WITH HISTORY OF REVASCULARIZATION OF SAME EXTREMITY (HCC): Primary | ICD-10-CM

## 2024-10-03 DIAGNOSIS — I74.3 ACUTE OCCLUSION OF ARTERY OF LOWER EXTREMITY DUE TO THROMBOSIS (HCC): ICD-10-CM

## 2024-10-03 DIAGNOSIS — I74.9 ARTERIAL THROMBOSIS (HCC): ICD-10-CM

## 2024-10-03 LAB
INTERNATIONAL NORMALIZATION RATIO, POC: 6.7
PROTHROMBIN TIME, POC: 80.8

## 2024-10-03 PROCEDURE — 85610 PROTHROMBIN TIME: CPT

## 2024-10-03 PROCEDURE — 99212 OFFICE O/P EST SF 10 MIN: CPT

## 2024-10-03 NOTE — PROGRESS NOTES
Medication Management Service, Warfarin Management  [facility], [phone]  Visit Date: 2024   Subjective:   Gale Morley is a 48 y.o. female who presents to clinic today for anticoagulation monitoring and adjustment.  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   INR goal: of 2.5-3.5.  Duration of therapy: indefinite.    Assessment and PLAN   PT/INR done in office per protocol.   INR today is 6.7, supra therapeutic.   Increase in INR due to interaction with metronidazole.  Patient will take her last metronidazole dose this evening.   Plan:  HOLD warfarin for today and Friday.  She will also have coleslaw today. Then continue current regimen of warfarin 3.75 mg every Thu; 7.5 mg all other days of the week.   Patient cautioned to take it easy and avoid all risky behaviors.  Patient to report to ED for any signs or symptoms of bleeding.   Using warfarin 7.5 mg tablets.  Recheck INR in 5 days.   Patient seen in room # 1.    ED. OP. =  PATIENT STARTED METRONIDAZOLE LAST WEEK (THIS EVENING IS THE LAST DOSE).  I REMINDED PATIENT TO CALL WITH ANY NEW MEDICATIONS.   She reports maintaining her usual intake of 2 servings of green vegetables weekly.   Patient verbalized understanding of dosing directions and information discussed.   Dosing schedule given to patient. Progress note sent to referring office.     Ladi ESPINO. Lizbeth., CACP, Clinical Pharmacist  Anticoagulation Services, [facility]  10/3/2024  9:49 AM      For Pharmacy Admin Tracking Only    Intervention Detail: Adherence Monitorin and Dose Adjustment: 1, reason: Therapy De-escalation  Total # of Interventions Recommended: 2  Total # of Interventions Accepted: 2  Time Spent (min): 30

## 2024-10-08 ENCOUNTER — ANTI-COAG VISIT (OUTPATIENT)
Age: 49
End: 2024-10-08
Payer: COMMERCIAL

## 2024-10-08 DIAGNOSIS — I70.229 CRITICAL LIMB ISCHEMIA WITH HISTORY OF REVASCULARIZATION OF SAME EXTREMITY (HCC): Primary | ICD-10-CM

## 2024-10-08 DIAGNOSIS — Z98.890 CRITICAL LIMB ISCHEMIA WITH HISTORY OF REVASCULARIZATION OF SAME EXTREMITY (HCC): Primary | ICD-10-CM

## 2024-10-08 DIAGNOSIS — I74.3 ACUTE OCCLUSION OF ARTERY OF LOWER EXTREMITY DUE TO THROMBOSIS (HCC): ICD-10-CM

## 2024-10-08 DIAGNOSIS — I74.9 ARTERIAL THROMBOSIS (HCC): ICD-10-CM

## 2024-10-08 LAB
INTERNATIONAL NORMALIZATION RATIO, POC: 5.5
PROTHROMBIN TIME, POC: 65.4

## 2024-10-08 PROCEDURE — 85610 PROTHROMBIN TIME: CPT

## 2024-10-08 PROCEDURE — 99213 OFFICE O/P EST LOW 20 MIN: CPT

## 2024-10-08 RX ORDER — WARFARIN SODIUM 7.5 MG/1
TABLET ORAL
Qty: 80 TABLET | Refills: 1 | Status: SHIPPED | OUTPATIENT
Start: 2024-10-08 | End: 2024-10-10 | Stop reason: SDUPTHER

## 2024-10-08 NOTE — PROGRESS NOTES
Medication Management Service, Warfarin Management  [facility], [phone]  Visit Date: 10/8/2024   Subjective:   Gale Morley is a 48 y.o. female who presents to clinic today for anticoagulation monitoring and adjustment.  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   INR goal: of 2.5-3.5.  Duration of therapy: indefinite.    Assessment and PLAN   PT/INR done in office per protocol.   INR today is 5.5, supra therapeutic. Likely still secondary to recent metronidazole course of therapy, just completed 4 days ago.  Plan:  HOLD warfarin for today only, then resume current regimen of warfarin 3.75 mg every Thu; 7.5 mg all other days of the week.   Using warfarin 7.5 mg tablets.  Recheck INR in ~ 1 week.    Patient seen in room # 3.     New prescription sent electronically to patient's preferred pharmacy.       Stressed importance of calling us with any and all medication changes.  Counseled patient on safety precautions associated with elevated INR.    She reports maintaining her usual intake of 2 servings of green vegetables weekly.   Patient verbalized understanding of dosing directions and information discussed.   Dosing schedule given to patient. Progress note sent to referring office.     Sandra Santos, [facility], CACP  Clinical Pharmacist Medication Management  10/8/2024  12:00 PM      For Pharmacy Admin Tracking Only    Intervention Detail: Adherence Monitorin, Dose Adjustment: 1, reason: Improve Adherence, Interaction, Therapy Optimization, and Refill(s) Provided  Total # of Interventions Recommended: 3  Total # of Interventions Accepted: 3  Time Spent (min):  25

## 2024-10-10 DIAGNOSIS — R10.30 LOWER ABDOMINAL PAIN: ICD-10-CM

## 2024-10-10 DIAGNOSIS — D64.9 ANEMIA, UNSPECIFIED TYPE: ICD-10-CM

## 2024-10-10 DIAGNOSIS — N94.6 MENSTRUAL CRAMP: ICD-10-CM

## 2024-10-10 DIAGNOSIS — I10 ESSENTIAL HYPERTENSION: ICD-10-CM

## 2024-10-10 DIAGNOSIS — R73.03 PREDIABETES: ICD-10-CM

## 2024-10-10 DIAGNOSIS — I74.3 FEMOROPOPLITEAL ARTERIAL THROMBOSIS OF LEFT LOWER EXTREMITY (HCC): ICD-10-CM

## 2024-10-10 RX ORDER — FERROUS SULFATE 325(65) MG
325 TABLET ORAL
Qty: 90 TABLET | Refills: 0 | Status: SHIPPED | OUTPATIENT
Start: 2024-10-10 | End: 2024-10-11 | Stop reason: SDUPTHER

## 2024-10-10 RX ORDER — LANOLIN ALCOHOL/MO/W.PET/CERES
3 CREAM (GRAM) TOPICAL NIGHTLY PRN
Qty: 30 TABLET | Refills: 3 | Status: SHIPPED | OUTPATIENT
Start: 2024-10-10

## 2024-10-10 RX ORDER — WARFARIN SODIUM 7.5 MG/1
TABLET ORAL
Qty: 80 TABLET | Refills: 1 | Status: SHIPPED | OUTPATIENT
Start: 2024-10-10

## 2024-10-10 RX ORDER — LISINOPRIL 40 MG/1
40 TABLET ORAL DAILY
Qty: 90 TABLET | Refills: 0 | Status: SHIPPED | OUTPATIENT
Start: 2024-10-10 | End: 2024-10-11 | Stop reason: SDUPTHER

## 2024-10-10 RX ORDER — ACETAMINOPHEN 500 MG
500 TABLET ORAL 4 TIMES DAILY PRN
Qty: 180 TABLET | Refills: 0 | Status: SHIPPED | OUTPATIENT
Start: 2024-10-10 | End: 2024-10-11 | Stop reason: SDUPTHER

## 2024-10-10 RX ORDER — ATORVASTATIN CALCIUM 40 MG/1
TABLET, FILM COATED ORAL
Qty: 90 TABLET | Refills: 0 | Status: SHIPPED | OUTPATIENT
Start: 2024-10-10

## 2024-10-10 RX ORDER — FERROUS SULFATE 325(65) MG
325 TABLET ORAL 2 TIMES DAILY
Qty: 60 TABLET | Refills: 5 | OUTPATIENT
Start: 2024-10-10

## 2024-10-10 RX ORDER — IBUPROFEN 600 MG/1
600 TABLET, FILM COATED ORAL DAILY PRN
Qty: 30 TABLET | Refills: 0 | OUTPATIENT
Start: 2024-10-10

## 2024-10-10 NOTE — PROGRESS NOTES
Subjective:      Chief Complaint   Patient presents with    Hypertension     New to provider - discuss blood pressure and medication     Medication Refill     Patient is in need of all of her medications      HPI  Gale Morley is a 49 y.o. female with a past medical history of essential hypertension, prediabetes, borderline hypertriglyceridemia, PAD, arterial thrombosis, migraine with aura, menorrhagia with irregular cycle, dysmenorrhea, uterine fibroids and DAVID-1 who is presenting today for a follow-up visit. She has been doing well overall, however is concerned about the following issues:     Essential hypertension:  -Endorses frequent headaches, attributes them to elevated blood pressure.  -Does not measure blood pressure at home, however reports increased episodes of headaches, lightheadedness, visual changes (spots in the visual field) and nausea, which she attributes to high blood pressure.  -Currently on lisinopril 40 mg daily.  Denies any side effects on the medication.  -Previously on lisinopril 5 mg as well as amlodipine 10 mg with good control of blood pressure, however was told that she was on \"too many medications\", and amlodipine was discontinued.  - patient recently experienced an episode of lightheadedness, nausea while driving approximately 1 week ago, pulled over and waited for almost an hour for symptoms to resolve.    -Denies any vomiting.  -feels like BP high all the time, sees spots in front of the eyes, even sees then now, started 4-5 months ago.   -Denies any shortness of breath, chest pain, abdominal pain, diarrhea or constipation.    Dysmenorrhea:  -Menarche: Age 11-12.   -Started experiencing cramps at age 16.  Rates the pain 10/10.  Also endorses vomiting.  -Has tried Tylenol, ibuprofen, heating pad, hot showers, with no relief.  -LMP: 9/30-10/4, with spotting till 10/8.  -Patient states that she was recently evaluated by GYN who recommended hysterectomy, patient declined at this time.

## 2024-10-11 ENCOUNTER — OFFICE VISIT (OUTPATIENT)
Dept: FAMILY MEDICINE CLINIC | Age: 49
End: 2024-10-11

## 2024-10-11 VITALS
OXYGEN SATURATION: 100 % | HEIGHT: 65 IN | TEMPERATURE: 97.1 F | HEART RATE: 58 BPM | BODY MASS INDEX: 30.39 KG/M2 | WEIGHT: 182.4 LBS | SYSTOLIC BLOOD PRESSURE: 151 MMHG | DIASTOLIC BLOOD PRESSURE: 76 MMHG

## 2024-10-11 DIAGNOSIS — E78.1 HYPERTRIGLYCERIDEMIA: ICD-10-CM

## 2024-10-11 DIAGNOSIS — I74.9 ARTERIAL THROMBOSIS (HCC): ICD-10-CM

## 2024-10-11 DIAGNOSIS — G47.00 INSOMNIA, UNSPECIFIED TYPE: ICD-10-CM

## 2024-10-11 DIAGNOSIS — D64.9 ANEMIA, UNSPECIFIED TYPE: ICD-10-CM

## 2024-10-11 DIAGNOSIS — N94.6 DYSMENORRHEA: ICD-10-CM

## 2024-10-11 DIAGNOSIS — I10 ESSENTIAL HYPERTENSION: Primary | ICD-10-CM

## 2024-10-11 PROBLEM — R73.03 PREDIABETES: Status: ACTIVE | Noted: 2024-10-11

## 2024-10-11 RX ORDER — IBUPROFEN 600 MG/1
600 TABLET, FILM COATED ORAL DAILY PRN
Qty: 30 TABLET | Refills: 0 | Status: CANCELLED | OUTPATIENT
Start: 2024-10-11

## 2024-10-11 RX ORDER — LANOLIN ALCOHOL/MO/W.PET/CERES
3 CREAM (GRAM) TOPICAL NIGHTLY PRN
Qty: 30 TABLET | Refills: 3 | Status: CANCELLED | OUTPATIENT
Start: 2024-10-11

## 2024-10-11 RX ORDER — FERROUS SULFATE 325(65) MG
325 TABLET ORAL
Qty: 90 TABLET | Refills: 0 | Status: SHIPPED | OUTPATIENT
Start: 2024-10-11

## 2024-10-11 RX ORDER — NORETHINDRONE ACETATE 5 MG
5 TABLET ORAL DAILY
Qty: 90 TABLET | Refills: 0 | Status: SHIPPED | OUTPATIENT
Start: 2024-10-11

## 2024-10-11 RX ORDER — AMLODIPINE BESYLATE 5 MG/1
5 TABLET ORAL DAILY
Qty: 90 TABLET | Refills: 0 | Status: SHIPPED | OUTPATIENT
Start: 2024-10-11

## 2024-10-11 RX ORDER — PHENOL 1.4 %
1 AEROSOL, SPRAY (ML) MUCOUS MEMBRANE NIGHTLY PRN
Qty: 90 TABLET | Refills: 0 | Status: SHIPPED | OUTPATIENT
Start: 2024-10-11

## 2024-10-11 RX ORDER — ACETAMINOPHEN 500 MG
500 TABLET ORAL 4 TIMES DAILY PRN
Qty: 180 TABLET | Refills: 0 | Status: SHIPPED | OUTPATIENT
Start: 2024-10-11 | End: 2024-10-17 | Stop reason: ALTCHOICE

## 2024-10-11 RX ORDER — LISINOPRIL 40 MG/1
40 TABLET ORAL DAILY
Qty: 90 TABLET | Refills: 0 | Status: SHIPPED | OUTPATIENT
Start: 2024-10-11

## 2024-10-11 RX ORDER — WARFARIN SODIUM 7.5 MG/1
TABLET ORAL
Qty: 80 TABLET | Refills: 1 | Status: CANCELLED | OUTPATIENT
Start: 2024-10-11

## 2024-10-11 SDOH — ECONOMIC STABILITY: FOOD INSECURITY: WITHIN THE PAST 12 MONTHS, THE FOOD YOU BOUGHT JUST DIDN'T LAST AND YOU DIDN'T HAVE MONEY TO GET MORE.: NEVER TRUE

## 2024-10-11 SDOH — ECONOMIC STABILITY: INCOME INSECURITY: HOW HARD IS IT FOR YOU TO PAY FOR THE VERY BASICS LIKE FOOD, HOUSING, MEDICAL CARE, AND HEATING?: NOT HARD AT ALL

## 2024-10-11 SDOH — ECONOMIC STABILITY: FOOD INSECURITY: WITHIN THE PAST 12 MONTHS, YOU WORRIED THAT YOUR FOOD WOULD RUN OUT BEFORE YOU GOT MONEY TO BUY MORE.: NEVER TRUE
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
## 2024-10-11 NOTE — PROGRESS NOTES
[facility]     BP Readings from Last 3 Encounters:   08/21/24 (!) 151/85   06/28/24 121/69   06/13/24 (!) 149/85       HDL (mg/dL)   Date Value   10/25/2022 40 (L)     BUN (mg/dL)   Date Value   08/21/2024 14     Creatinine (mg/dL)   Date Value   08/21/2024 0.8     Glucose (mg/dL)   Date Value   08/21/2024 119 (H)              Have you changed or started any medications since your last visit including any over-the-counter medicines, vitamins, or herbal medicines? no   Have you stopped taking any of your medications? Is so, why? -  no  Are you having any side effects from any of your medications? - no  How often do you miss doses of your medication? no      Have you seen any other physician or provider since your last visit?  yes - Oncology , Physical therapy , Anticoalation clinic,Vascular Surgery ob/gyn   Have you had any other diagnostic tests since your last visit?  yes - labs   Have you been seen in the emergency room and/or had an admission in a hospital since we last saw you?  yes - [facility]   Have you had your routine dental cleaning in the past 6 months?  no     Do you have an active MyChart account? If no, what is the barrier?  Yes    Patient Care Team:  Alli Merlos MD as Consulting Physician (Gastroenterology)    Medical History Review  Past Medical, Family, and Social History reviewed and does not contribute to the patient presenting condition    Health Maintenance   Topic Date Due    Pneumococcal 0-64 years Vaccine (1 of 2 - PCV) Never done    Hepatitis B vaccine (1 of 3 - 19+ 3-dose series) Never done    DTaP/Tdap/Td vaccine (1 - Tdap) Never done    Lipids  10/25/2023    Flu vaccine (1) 08/01/2024    COVID-19 Vaccine (1 - 2023-24 season) Never done    Colorectal Cancer Screen  01/04/2025    A1C test (Diabetic or Prediabetic)  04/09/2025    Breast cancer screen  04/16/2025    Depression Monitoring  05/10/2025    Cervical cancer screen  05/24/2027    Hepatitis C screen  Completed

## 2024-10-11 NOTE — PATIENT INSTRUCTIONS
Thank you for letting us take care of you today. We hope all your questions were addressed. If a question was overlooked or something else comes to mind after you return home, please contact a member of your Care Team listed below.      Your Care Team at [facility] is Team #  Semaj Contreras M.D. (Faculty)  Romina Gonzalez M.D. (Resident)  Alicia Michelle M.D. (Resident)   Delicia Rodriguez M.D. (Resident)  Rosendo Gutierrez M.D. (Resident)  Lola Chawla M.D. (Resident)  Latoya Magallon., [facility]  Judith Escobar, RIVER Ma, [facility]  Elliot Anguiano, RIVER Chiu, [facility]  Aicha Forbes, KATHI Bhardwaj, HUSAM Wolf () Liya RIVER (Clinical Practice Manager)  Isabelle Joyner [facility] (Clinical Pharmacist)     Office phone number: [phone]    If you need to get in right away due to illness, please be advised we have \"Same Day\" appointments available Monday-Friday. Please call us at [phone] option #3 to schedule your \"Same Day\" appointment.   Thank you for letting us take care of you today. We hope all your questions were addressed. If a question was overlooked or something else comes to mind after you return home, please contact a member of your Care Team listed below.      Your Care Team at [facility] is Team #  Semaj Contreras M.D. (Faculty)  Rmoina Gonzalez M.D. (Resident)  Alicia Michelle M.D. (Resident)   Delicia Rodriguez M.D. (Resident)  Rosendo Gutierrez M.D. (Resident)  Lola Chawla M.D. (Resident)  Latoya Magallon., RIVER Escobar, RIVER Ma, [facility]  Elliot Anguiano, HUSAM Chiu, KATHI Forbes, KATHI Bhardwaj, HUSAM Wolf () HUSAM Nickerson (Clinical Practice Manager)  Isabelle Joyner RP (Clinical Pharmacist)     Office phone number: [phone]    If you need to get in right away due to illness, please be advised we have \"Same Day\" appointments available Monday-Friday. Please call us at [phone] option #3 to schedule your \"Same Day\" appointment.

## 2024-10-17 ENCOUNTER — ANTI-COAG VISIT (OUTPATIENT)
Age: 49
End: 2024-10-17
Payer: COMMERCIAL

## 2024-10-17 DIAGNOSIS — Z98.890 CRITICAL LIMB ISCHEMIA WITH HISTORY OF REVASCULARIZATION OF SAME EXTREMITY (HCC): Primary | ICD-10-CM

## 2024-10-17 DIAGNOSIS — I74.3 ACUTE OCCLUSION OF ARTERY OF LOWER EXTREMITY DUE TO THROMBOSIS (HCC): ICD-10-CM

## 2024-10-17 DIAGNOSIS — I70.229 CRITICAL LIMB ISCHEMIA WITH HISTORY OF REVASCULARIZATION OF SAME EXTREMITY (HCC): Primary | ICD-10-CM

## 2024-10-17 DIAGNOSIS — I74.9 ARTERIAL THROMBOSIS (HCC): ICD-10-CM

## 2024-10-17 LAB
INTERNATIONAL NORMALIZATION RATIO, POC: 2.7
PROTHROMBIN TIME, POC: 32.5

## 2024-10-17 PROCEDURE — 99211 OFF/OP EST MAY X REQ PHY/QHP: CPT

## 2024-10-17 PROCEDURE — 85610 PROTHROMBIN TIME: CPT

## 2024-10-17 NOTE — PROGRESS NOTES
Medication Management Service, Warfarin Management  [facility], [phone]  Visit Date: 2024   Subjective:   Gale Morley is a 48 y.o. female who presents to clinic today for anticoagulation monitoring and adjustment.  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   INR goal: of 2.5-3.5.  Duration of therapy: indefinite.    Assessment and PLAN   PT/INR done in office per protocol.   INR today is 2.7,  therapeutic.   Patient will start BCP today which can increase or decrease serum warfarin concentrations. Cautioned patient to watch for any signs of elevated INR and call with any concerns.   Plan:  Continue current regimen of warfarin 3.75 mg every Thu; 7.5 mg all other days of the week.   Using warfarin 7.5 mg tablets.  Recheck INR in 2 weeks   Patient seen in room # 1.    Influenza vaccine offered today but, patient refused.     ED. OP. =    She reports maintaining her usual intake of 2 servings of green vegetables weekly.   Patient verbalized understanding of dosing directions and information discussed.   Dosing schedule given to patient. Progress note sent to referring office.     Ladi ESPINO. Ph., CACP, Clinical Pharmacist  Anticoagulation Services, [facility]  10/17/2024  10:17 AM    For Pharmacy Admin Tracking Only    Intervention Detail: Adherence Monitorin  Total # of Interventions Recommended: 1  Total # of Interventions Accepted: 1  Time Spent (min): 30

## 2024-10-31 ENCOUNTER — ANTI-COAG VISIT (OUTPATIENT)
Age: 49
End: 2024-10-31
Payer: COMMERCIAL

## 2024-10-31 DIAGNOSIS — Z98.890 CRITICAL LIMB ISCHEMIA WITH HISTORY OF REVASCULARIZATION OF SAME EXTREMITY (HCC): Primary | ICD-10-CM

## 2024-10-31 DIAGNOSIS — I74.9 ARTERIAL THROMBOSIS (HCC): ICD-10-CM

## 2024-10-31 DIAGNOSIS — I74.3 ACUTE OCCLUSION OF ARTERY OF LOWER EXTREMITY DUE TO THROMBOSIS (HCC): ICD-10-CM

## 2024-10-31 DIAGNOSIS — I70.229 CRITICAL LIMB ISCHEMIA WITH HISTORY OF REVASCULARIZATION OF SAME EXTREMITY (HCC): Primary | ICD-10-CM

## 2024-10-31 LAB
INTERNATIONAL NORMALIZATION RATIO, POC: 1.4
PROTHROMBIN TIME, POC: 16.9

## 2024-10-31 PROCEDURE — 85610 PROTHROMBIN TIME: CPT

## 2024-10-31 PROCEDURE — 99212 OFFICE O/P EST SF 10 MIN: CPT

## 2024-10-31 NOTE — PROGRESS NOTES
Medication Management Service, Warfarin Management  [facility], [phone]  Visit Date: 2024   Subjective:   Gale Morley is a 48 y.o. female who presents to clinic today for anticoagulation monitoring and adjustment.  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   INR goal: of 2.5-3.5.  Duration of therapy: indefinite.    Assessment and PLAN   PT/INR done in office per protocol.   INR today is 1.4,  sub therapeutic.   Patient will started BCP 2 weeks ago which can increase or decrease serum warfarin concentrations. No change in diet or other possible cause for dramatic drop in INR found.     Plan:  Increase maintenance regimen by 15.4% (7.5 mg) weekly to warfarin from 11.25 mg ;  7.5 mg all other days of the week.  Patient complains of headache for the past 3 days. She stopped drinking coffee about 3 weeks ago and initially attributed it to that.  Today she woke up seeing spots in both eyes. I advised going to ED for evaluation of symptoms in setting of sub therapeutic INR.  Patient agreed and is heading there now.   Using warfarin 7.5 mg tablets.  Recheck INR in 1 weeks   Patient seen in room # 1.      ED. OP. =    She reports maintaining her usual intake of 2 servings of green vegetables weekly.     Patient verbalized understanding of dosing directions and information discussed.   Dosing schedule given to patient. Progress note sent to referring office.     Ladi ESPINO. Lizbeth., CACP, Clinical Pharmacist  Anticoagulation Services, [facility]  10/31/2024  10:39 AM      For Pharmacy Admin Tracking Only    Intervention Detail: Adherence Monitorin and Dose Adjustment: 1, reason: Therapy Optimization  Total # of Interventions Recommended: 2  Total # of Interventions Accepted: 2  Time Spent (min): 15

## 2024-11-07 ENCOUNTER — ANTI-COAG VISIT (OUTPATIENT)
Age: 49
End: 2024-11-07
Payer: COMMERCIAL

## 2024-11-07 ENCOUNTER — APPOINTMENT (OUTPATIENT)
Dept: CT IMAGING | Age: 49
End: 2024-11-07
Payer: COMMERCIAL

## 2024-11-07 ENCOUNTER — HOSPITAL ENCOUNTER (EMERGENCY)
Age: 49
Discharge: HOME | End: 2024-11-07
Attending: EMERGENCY MEDICINE
Payer: COMMERCIAL

## 2024-11-07 ENCOUNTER — APPOINTMENT (OUTPATIENT)
Dept: GENERAL RADIOLOGY | Age: 49
End: 2024-11-07
Payer: COMMERCIAL

## 2024-11-07 VITALS
DIASTOLIC BLOOD PRESSURE: 113 MMHG | OXYGEN SATURATION: 94 % | RESPIRATION RATE: 26 BRPM | HEIGHT: 65 IN | HEART RATE: 99 BPM | SYSTOLIC BLOOD PRESSURE: 149 MMHG | WEIGHT: 185 LBS | TEMPERATURE: 99.2 F | BODY MASS INDEX: 30.82 KG/M2

## 2024-11-07 DIAGNOSIS — I74.3 ACUTE OCCLUSION OF ARTERY OF LOWER EXTREMITY DUE TO THROMBOSIS (HCC): ICD-10-CM

## 2024-11-07 DIAGNOSIS — R51.9 FREQUENT HEADACHES: ICD-10-CM

## 2024-11-07 DIAGNOSIS — R42 EPISODIC LIGHTHEADEDNESS: Primary | ICD-10-CM

## 2024-11-07 DIAGNOSIS — I74.9 ARTERIAL THROMBOSIS (HCC): ICD-10-CM

## 2024-11-07 DIAGNOSIS — Z98.890 CRITICAL LIMB ISCHEMIA WITH HISTORY OF REVASCULARIZATION OF SAME EXTREMITY (HCC): Primary | ICD-10-CM

## 2024-11-07 DIAGNOSIS — I70.229 CRITICAL LIMB ISCHEMIA WITH HISTORY OF REVASCULARIZATION OF SAME EXTREMITY (HCC): Primary | ICD-10-CM

## 2024-11-07 LAB
ANION GAP SERPL CALCULATED.3IONS-SCNC: 13 MMOL/L (ref 9–16)
BASOPHILS # BLD: 0.03 K/UL (ref 0–0.2)
BASOPHILS NFR BLD: 0 % (ref 0–2)
BUN SERPL-MCNC: 10 MG/DL (ref 6–20)
CALCIUM SERPL-MCNC: 8.6 MG/DL (ref 8.6–10.4)
CHLORIDE SERPL-SCNC: 104 MMOL/L (ref 98–107)
CO2 SERPL-SCNC: 21 MMOL/L (ref 20–31)
CREAT SERPL-MCNC: 0.8 MG/DL (ref 0.6–0.9)
EOSINOPHIL # BLD: 0.07 K/UL (ref 0–0.44)
EOSINOPHILS RELATIVE PERCENT: 1 % (ref 1–4)
ERYTHROCYTE [DISTWIDTH] IN BLOOD BY AUTOMATED COUNT: 14.6 % (ref 11.8–14.4)
GFR, ESTIMATED: >90 ML/MIN/1.73M2
GLUCOSE SERPL-MCNC: 143 MG/DL (ref 74–99)
HCT VFR BLD AUTO: 40.5 % (ref 36.3–47.1)
HGB BLD-MCNC: 13.1 G/DL (ref 11.9–15.1)
IMM GRANULOCYTES # BLD AUTO: 0.03 K/UL (ref 0–0.3)
IMM GRANULOCYTES NFR BLD: 0 %
INR PPP: 2.5
INTERNATIONAL NORMALIZATION RATIO, POC: 3.1
LYMPHOCYTES NFR BLD: 1.89 K/UL (ref 1.1–3.7)
LYMPHOCYTES RELATIVE PERCENT: 20 % (ref 24–43)
MCH RBC QN AUTO: 28.9 PG (ref 25.2–33.5)
MCHC RBC AUTO-ENTMCNC: 32.3 G/DL (ref 28.4–34.8)
MCV RBC AUTO: 89.2 FL (ref 82.6–102.9)
MONOCYTES NFR BLD: 0.58 K/UL (ref 0.1–1.2)
MONOCYTES NFR BLD: 6 % (ref 3–12)
NEUTROPHILS NFR BLD: 73 % (ref 36–65)
NEUTS SEG NFR BLD: 6.67 K/UL (ref 1.5–8.1)
NRBC BLD-RTO: 0 PER 100 WBC
PLATELET # BLD AUTO: 415 K/UL (ref 138–453)
PMV BLD AUTO: 9.9 FL (ref 8.1–13.5)
POTASSIUM SERPL-SCNC: 3.6 MMOL/L (ref 3.7–5.3)
PROTHROMBIN TIME, POC: 37
PROTHROMBIN TIME: 26.4 SEC (ref 11.7–14.9)
RBC # BLD AUTO: 4.54 M/UL (ref 3.95–5.11)
RBC # BLD: ABNORMAL 10*6/UL
SODIUM SERPL-SCNC: 138 MMOL/L (ref 136–145)
TROPONIN I SERPL HS-MCNC: 7 NG/L (ref 0–14)
TROPONIN I SERPL HS-MCNC: 8 NG/L (ref 0–14)
WBC OTHER # BLD: 9.3 K/UL (ref 3.5–11.3)

## 2024-11-07 PROCEDURE — 80048 BASIC METABOLIC PNL TOTAL CA: CPT

## 2024-11-07 PROCEDURE — 6360000002 HC RX W HCPCS

## 2024-11-07 PROCEDURE — 99285 EMERGENCY DEPT VISIT HI MDM: CPT

## 2024-11-07 PROCEDURE — 85610 PROTHROMBIN TIME: CPT

## 2024-11-07 PROCEDURE — 93005 ELECTROCARDIOGRAM TRACING: CPT

## 2024-11-07 PROCEDURE — 71046 X-RAY EXAM CHEST 2 VIEWS: CPT

## 2024-11-07 PROCEDURE — 70450 CT HEAD/BRAIN W/O DYE: CPT

## 2024-11-07 PROCEDURE — 6360000004 HC RX CONTRAST MEDICATION

## 2024-11-07 PROCEDURE — 85025 COMPLETE CBC W/AUTO DIFF WBC: CPT

## 2024-11-07 PROCEDURE — 96374 THER/PROPH/DIAG INJ IV PUSH: CPT

## 2024-11-07 PROCEDURE — 96375 TX/PRO/DX INJ NEW DRUG ADDON: CPT

## 2024-11-07 PROCEDURE — 99212 OFFICE O/P EST SF 10 MIN: CPT

## 2024-11-07 PROCEDURE — 84484 ASSAY OF TROPONIN QUANT: CPT

## 2024-11-07 PROCEDURE — 70496 CT ANGIOGRAPHY HEAD: CPT

## 2024-11-07 PROCEDURE — 6370000000 HC RX 637 (ALT 250 FOR IP)

## 2024-11-07 RX ORDER — IOPAMIDOL 755 MG/ML
75 INJECTION, SOLUTION INTRAVASCULAR
Status: COMPLETED | OUTPATIENT
Start: 2024-11-07 | End: 2024-11-07

## 2024-11-07 RX ORDER — BUTALBITAL, ACETAMINOPHEN AND CAFFEINE 300; 40; 50 MG/1; MG/1; MG/1
1 CAPSULE ORAL EVERY 4 HOURS PRN
Qty: 84 CAPSULE | Refills: 0 | Status: SHIPPED | OUTPATIENT
Start: 2024-11-07

## 2024-11-07 RX ORDER — DIPHENHYDRAMINE HYDROCHLORIDE 50 MG/ML
25 INJECTION INTRAMUSCULAR; INTRAVENOUS ONCE
Status: COMPLETED | OUTPATIENT
Start: 2024-11-07 | End: 2024-11-07

## 2024-11-07 RX ORDER — ACETAMINOPHEN 500 MG
1000 TABLET ORAL ONCE
Status: COMPLETED | OUTPATIENT
Start: 2024-11-07 | End: 2024-11-07

## 2024-11-07 RX ORDER — PROCHLORPERAZINE EDISYLATE 5 MG/ML
10 INJECTION INTRAMUSCULAR; INTRAVENOUS ONCE
Status: COMPLETED | OUTPATIENT
Start: 2024-11-07 | End: 2024-11-07

## 2024-11-07 RX ADMIN — ACETAMINOPHEN 1000 MG: 500 TABLET ORAL at 18:13

## 2024-11-07 RX ADMIN — IOPAMIDOL 75 ML: 755 INJECTION, SOLUTION INTRAVENOUS at 20:23

## 2024-11-07 RX ADMIN — PROCHLORPERAZINE EDISYLATE 10 MG: 5 INJECTION INTRAMUSCULAR; INTRAVENOUS at 18:14

## 2024-11-07 RX ADMIN — DIPHENHYDRAMINE HYDROCHLORIDE 25 MG: 50 INJECTION INTRAMUSCULAR; INTRAVENOUS at 18:14

## 2024-11-07 ASSESSMENT — ENCOUNTER SYMPTOMS
SHORTNESS OF BREATH: 1
SORE THROAT: 0
NAUSEA: 0
DIARRHEA: 0
COUGH: 0
ABDOMINAL PAIN: 0
VOMITING: 0

## 2024-11-07 ASSESSMENT — PAIN DESCRIPTION - DESCRIPTORS: DESCRIPTORS: ACHING

## 2024-11-07 ASSESSMENT — PAIN SCALES - GENERAL
PAINLEVEL_OUTOF10: 7
PAINLEVEL_OUTOF10: 9

## 2024-11-07 ASSESSMENT — PAIN DESCRIPTION - LOCATION: LOCATION: HEAD

## 2024-11-07 ASSESSMENT — PAIN - FUNCTIONAL ASSESSMENT: PAIN_FUNCTIONAL_ASSESSMENT: 0-10

## 2024-11-07 NOTE — ED PROVIDER NOTES
[facility] ED  Emergency Department Encounter  Emergency Medicine Resident     Pt Name:Gale Morley  MRN: [id number]  Birthdate 1975  Date of evaluation: 11/7/24  PCP:  Lola Chawla MD  Note Started: 5:54 PM EST      CHIEF COMPLAINT       Chief Complaint   Patient presents with    Dizziness    Chest Pain    Shortness of Breath       HISTORY OF PRESENT ILLNESS  (Location/Symptom, Timing/Onset, Context/Setting, Quality, Duration, Modifying Factors, Severity.)      Gale Morley is a 49 y.o. female with history of anemia, hyperlipidemia, PAD status post arterial mechanical thrombectomy as well as femoral peroneal bypass surgeries, blood clots on warfarin and Brilinta who presents with dizziness that is more described as lightheadedness, headache, and intermittent left face and left arm numbness.  Patient states that while she was driving to drop off her grandson earlier today around 2 PM she felt like she was about to pass out.  She states that she felt lightheaded/dizzy until about 330 and then this resolved.  During that episode she also felt like she could not catch her breath and felt like her heart was racing.  She also had some spots in her vision.  She denies any chest pain or shortness of breath.  She is currently complaining of a left-sided headache with some subjective numbness and tingling in her left upper extremity.  She states that she has been having intermittent headaches for the past 4 days.  The headache always seems to be on the left side and is associated with intermittent left arm numbness and tingling as well as left face numbness and tingling.  She denies any fall or trauma.  She is on warfarin and Brilinta and reports compliance.  She denies any fevers or chills.  No upper respiratory symptoms.  Has not had any vomiting or diarrhea.  She has diminished sensation to her right lower extremity at baseline.  Denies any new numbness or tingling to her lower extremities.   and independent interpretation performed. Decision-making details documented in ED Course.  ECG/medicine tests: ordered and independent interpretation performed. Decision-making details documented in ED Course.    Risk  OTC drugs.  Prescription drug management.    Critical Care  Total time providing critical care: 0 minutes      EKG    EKG shows no acute ST elevations or depressions, similar to previous EKG    All EKG's are interpreted by the Emergency Department Physician who either signs or Co-signs this chart in the absence of a cardiologist.    EMERGENCY DEPARTMENT COURSE:  ED Course as of 11/07/24 2108   u Nov 07, 2024   1756 Normal echo and negative stress test in May 2023 [KR]   1821 On chart review, patient did see her primary care provider on 10/18 and was complaining of these exact same symptoms of intermittent migraines, lightheadedness, and vision changes.  It does appear that they think this might be related to her blood pressure.  Patient was restarted on amlodipine during that visit. [KR]   1823 WBC: 9.3 [KR]   1823 Hemoglobin Quant: 13.1 [KR]   1823 Platelet Count: 415 [KR]   1833 No abnormality seen on chest x-ray [KR]   1841 INR: 2.5 [KR]   1845 Sodium: 138 [KR]   1845 Glucose(!): 143 [KR]   1845 Creatinine: 0.8 [KR]   1845 Troponin, High Sensitivity: 7  At baseline [KR]   1910 IMPRESSION:  No acute cardiopulmonary disease. [KR]   1945 Troponin, High Sensitivity: 8 [KR]   2050 IMPRESSION:  No acute intracranial abnormality   [KR]   2107 Follow-up CTA.  If patient is still symptomatic with a headache and subjective paresthesias, consider neuroconsult as patient has been complaining of the symptoms at her most recent office visit in October with her PCP.  Signed out to Dr. Rhodes. [KR]      ED Course User Index  [KR] Sheng Rizo MD     CONSULTS:  None    CRITICAL CARE:  There was significant risk of life threatening deterioration of patient's condition requiring my direct management.

## 2024-11-07 NOTE — PROGRESS NOTES
Medication Management Service, Warfarin Management  [facility], [phone]  Visit Date: 2024   Subjective:   Gale Morley is a 48 y.o. female who presents to clinic today for anticoagulation monitoring and adjustment.  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   INR goal: of 2.5-3.5.  Duration of therapy: indefinite.    Assessment and PLAN   PT/INR done in office per protocol.   INR today is 3.1,  therapeutic.      Plan:  Due to dramatic increase in INR of 1.6 points over one week I will reduce weekly regimen by 6.7 %.  New regimen will be warfarin 7.5 mg by mouth once daily.     Of Note:  Patient did not return to the ED as planned.  She reports drinking coffee and then her headache went away.   Using warfarin 7.5 mg tablets.  Recheck INR in 2 weeks   Patient seen in room # 1.      ED. OP. =    She reports having only 1 serving of green vegetables and not maintaining her usual intake of 2 servings of green vegetables weekly.     Patient verbalized understanding of dosing directions and information discussed.   Dosing schedule given to patient. Progress note sent to referring office.     Ladi ESPINO. Lizbeth., CACP, Clinical Pharmacist  Anticoagulation Services, [facility]  2024  10:31 AM      For Pharmacy Admin Tracking Only    Intervention Detail: Adherence Monitorin and Dose Adjustment: 1, reason: Therapy Optimization  Total # of Interventions Recommended: 2  Total # of Interventions Accepted: 2  Time Spent (min): 15

## 2024-11-07 NOTE — ED PROVIDER NOTES
[facility] ED     Emergency Department     Faculty Attestation    I performed a history and physical examination of the patient and discussed management with the resident. I reviewed the resident’s note and agree with the documented findings and plan of care. Any areas of disagreement are noted on the chart. I was personally present for the key portions of any procedures. I have documented in the chart those procedures where I was not present during the key portions. I have reviewed the emergency nurses triage note. I agree with the chief complaint, past medical history, past surgical history, allergies, medications, social and family history as documented unless otherwise noted below. For Physician Assistant/ Nurse Practitioner cases/documentation I have personally evaluated this patient and have completed at least one if not all key elements of the E/M (history, physical exam, and MDM). Additional findings are as noted.    Note Started: 5:55 PM EST    Patient here with lightheadedness began today while driving.  No chest pain did not lose consciousness.  Also complains of headache she is anticoagulant on Coumadin.  No injury or trauma.  Also concerned about paresthesias in her left arm only no face no leg.  On exam resting comfortably watching TV.  Lungs clear and equal heart regular equal for extremity pulses.  No facial asymmetry normal speech normal mental status.  Cranial nerves intact but she does have subjective decreased light sensation on the left face and left upper extremity none of the left lower extremity but no motor deficits throughout.  Will image labs reevaluate probable admitn    EKG interpretation: Sinus tachycardia 1 4 normal intervals with left axis there is LVH.  No acute ST or T changes.  No acute findings, similar to prior on 8/20/2024.    Critical Care     none    Semaj Salvador MD, FACEP, FAAEM  Attending Emergency  Physician           Semaj Salvador MD  11/07/24

## 2024-11-07 NOTE — ED NOTES
Pt presents to the ER via triage  Pt is alert and orientedx4 pt is a good historian  Pt reports shortness of breath, dizziness/lightheaded, and chest pain but states it feels more like indigestion, pain radiating down her left shoulder, headache, and nausea  Pt has a history of blood clots, takes blood thinners as prescribed  Pt is ambulatory without concerns  RR is regular and unlabored  No other complaints at this time

## 2024-11-08 LAB
EKG ATRIAL RATE: 104 BPM
EKG P AXIS: 50 DEGREES
EKG P-R INTERVAL: 158 MS
EKG Q-T INTERVAL: 360 MS
EKG QRS DURATION: 96 MS
EKG QTC CALCULATION (BAZETT): 473 MS
EKG R AXIS: -17 DEGREES
EKG T AXIS: 23 DEGREES
EKG VENTRICULAR RATE: 104 BPM

## 2024-11-08 PROCEDURE — 93010 ELECTROCARDIOGRAM REPORT: CPT | Performed by: INTERNAL MEDICINE

## 2024-11-08 NOTE — ED PROVIDER NOTES
[facility] ED  Emergency Department  Emergency Medicine Resident Sign-out     Care of Gale Morley was assumed from Dr. Rizo and is being seen for Dizziness, Chest Pain, and Shortness of Breath  .  The patient's initial evaluation and plan have been discussed with the prior provider who initially evaluated the patient.     EMERGENCY DEPARTMENT COURSE / MEDICAL DECISION MAKING:       MEDICATIONS GIVEN:  Orders Placed This Encounter   Medications    acetaminophen (TYLENOL) tablet 1,000 mg    diphenhydrAMINE (BENADRYL) injection 25 mg    prochlorperazine (COMPAZINE) injection 10 mg    iopamidol (ISOVUE-370) 76 % injection 75 mL    butalbital-APAP-caffeine -40 MG CAPS per capsule     Sig: Take 1 capsule by mouth every 4 hours as needed for Headaches Max Daily Amount: 6 capsules     Dispense:  84 capsule     Refill:  0       LABS / RADIOLOGY:     Labs Reviewed   CBC WITH AUTO DIFFERENTIAL - Abnormal; Notable for the following components:       Result Value    RDW 14.6 (*)     Neutrophils % 73 (*)     Lymphocytes % 20 (*)     All other components within normal limits   BASIC METABOLIC PANEL - Abnormal; Notable for the following components:    Potassium 3.6 (*)     Glucose 143 (*)     All other components within normal limits   PROTIME-INR - Abnormal; Notable for the following components:    Protime 26.4 (*)     All other components within normal limits   TROPONIN   TROPONIN       CT HEAD WO CONTRAST    Result Date: 11/7/2024  EXAMINATION: CT OF THE HEAD WITHOUT CONTRAST  11/7/2024 8:21 pm TECHNIQUE: CT of the head was performed without the administration of intravenous contrast. Automated exposure control, iterative reconstruction, and/or weight based adjustment of the mA/kV was utilized to reduce the radiation dose to as low as reasonably achievable. COMPARISON: None. HISTORY: ORDERING SYSTEM PROVIDED HISTORY: Dizziness, on warfarin, headache with subjective left arm numbness  chart review, patient did see her primary care provider on 10/18 and was complaining of these exact same symptoms of intermittent migraines, lightheadedness, and vision changes.  It does appear that they think this might be related to her blood pressure.  Patient was restarted on amlodipine during that visit. [KR]   1823 WBC: 9.3 [KR]   1823 Hemoglobin Quant: 13.1 [KR]   1823 Platelet Count: 415 [KR]   1833 No abnormality seen on chest x-ray [KR]   1841 INR: 2.5 [KR]   1845 Sodium: 138 [KR]   1845 Glucose(!): 143 [KR]   1845 Creatinine: 0.8 [KR]   1845 Troponin, High Sensitivity: 7  At baseline [KR]   1910 IMPRESSION:  No acute cardiopulmonary disease. [KR]   1945 Troponin, High Sensitivity: 8 [KR]   2050 IMPRESSION:  No acute intracranial abnormality   [KR]   2107 Follow-up CTA.  If patient is still symptomatic with a headache and subjective paresthesias, consider neuroconsult as patient has been complaining of the symptoms at her most recent office visit in October with her PCP.  Signed out to Dr. Rhodes. [KR]   2136 Reassessed patient.  Patient resting comfortably.  Updated patient on current plan [AS]   2147 IMPRESSION:  No acute abnormality or flow limiting stenosis of the major arteries of the  head and neck.   [AS]   2200 .  Reassessed patient.  Patient resting comfortably bed.  Patient states that she has had full resolution of her headache.  Discussed results with patient regarding CT imaging.  Patient is amenable to following up outpatient [AS]      ED Course User Index  [AS] Deshawn Rhodes, DO  [KR] Sheng Rizo MD       OUTSTANDING TASKS / RECOMMENDATIONS:    Discharge vs neuro consult based on re-evaluation     FINAL IMPRESSION:     1. Episodic lightheadedness    2. Frequent headaches        DISPOSITION:         DISPOSITION:  [x]  Discharge   []  Transfer -    []  Admission -     []  Against Medical Advice   []  Eloped   FOLLOW-UP: [facility] NEUROL  [facility]

## 2024-11-08 NOTE — DISCHARGE INSTRUCTIONS
-You were seen and evaluated by emergency medicine physicians at [facility].    -Please follow-up with your primary care physician and/or with the referrals to specialist.    -You were diagnosed with: Frequent headaches, episodic lightheadedness    -CT imaging was negative for any acute abnormality.  You had improvement in your symptoms with a migraine cocktail.  A prescription of Fioricet has been sent to your listed pharmacy.  Please take as prescribed and as needed for headaches.  Continue to hydrate throughout the day.  -A referral to neurology has been included in your discharge paperwork to follow-up with their service if you have recurrent headaches    -Please return to the Emergency Department if you are experiencing the following symptoms acutely: Headache, fever, chills, nausea, vomiting, chest pain, shortness of breath, abdominal pain, change with urination, change with bowel movements, change in your skin/hair/nail, weakness, fatigue, altered mental status and/or any change from baseline health.    -Thank you for coming to [facility].

## 2024-11-21 ENCOUNTER — ANTI-COAG VISIT (OUTPATIENT)
Age: 49
End: 2024-11-21
Payer: COMMERCIAL

## 2024-11-21 DIAGNOSIS — I74.9 ARTERIAL THROMBOSIS (HCC): ICD-10-CM

## 2024-11-21 DIAGNOSIS — I70.229 CRITICAL LIMB ISCHEMIA WITH HISTORY OF REVASCULARIZATION OF SAME EXTREMITY (HCC): Primary | ICD-10-CM

## 2024-11-21 DIAGNOSIS — I74.3 ACUTE OCCLUSION OF ARTERY OF LOWER EXTREMITY DUE TO THROMBOSIS (HCC): ICD-10-CM

## 2024-11-21 DIAGNOSIS — Z98.890 CRITICAL LIMB ISCHEMIA WITH HISTORY OF REVASCULARIZATION OF SAME EXTREMITY (HCC): Primary | ICD-10-CM

## 2024-11-21 LAB
INTERNATIONAL NORMALIZATION RATIO, POC: 2.3
PROTHROMBIN TIME, POC: 27.8

## 2024-11-21 PROCEDURE — 85610 PROTHROMBIN TIME: CPT

## 2024-11-21 PROCEDURE — 99212 OFFICE O/P EST SF 10 MIN: CPT

## 2024-11-21 NOTE — PROGRESS NOTES
Medication Management Service, Warfarin Management  [facility], [phone]  Visit Date: 2024   Subjective:   Gale Morley is a 48 y.o. female who presents to clinic today for anticoagulation monitoring and adjustment.  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   INR goal: of 2.5-3.5.  Duration of therapy: indefinite.    Assessment and PLAN   PT/INR done in office per protocol.   INR today is 2.3, just below goal range. Regimen was was recently decreased.   Patient is on OC, which may increase or decrease warfarin effectiveness and impact INR.  Also known that OC can increase risk of DV, patient is non-smoker.    Plan:  Due to risk of DVT associated with OC, would like INR to be at least mid range, therefore will increase regimen back to warfarin 11.25mg on  only; 7.5mg all other days of the week.  Using warfarin 7.5 mg tablets.  Recheck INR in 2 weeks   Patient seen in room # 3.     ED. OP. =    Reminded of importance of consistency with green vegetables.     Patient verbalized understanding of dosing directions and information discussed.   Dosing schedule given to patient. Progress note sent to referring office.     Sandra Santos, [facility], CACP  Clinical Pharmacist Medication Management  2024  11:24 AM      For Pharmacy Admin Tracking Only    Intervention Detail: Adherence Monitorin and Dose Adjustment: 1, reason: Improve Adherence, Therapy Optimization  Total # of Interventions Recommended: 2  Total # of Interventions Accepted: 2  Time Spent (min):  25

## 2024-12-05 ENCOUNTER — ANTI-COAG VISIT (OUTPATIENT)
Age: 49
End: 2024-12-05
Payer: COMMERCIAL

## 2024-12-05 DIAGNOSIS — Z98.890 CRITICAL LIMB ISCHEMIA WITH HISTORY OF REVASCULARIZATION OF SAME EXTREMITY (HCC): Primary | ICD-10-CM

## 2024-12-05 DIAGNOSIS — I74.3 ACUTE OCCLUSION OF ARTERY OF LOWER EXTREMITY DUE TO THROMBOSIS (HCC): ICD-10-CM

## 2024-12-05 DIAGNOSIS — I70.229 CRITICAL LIMB ISCHEMIA WITH HISTORY OF REVASCULARIZATION OF SAME EXTREMITY (HCC): Primary | ICD-10-CM

## 2024-12-05 DIAGNOSIS — I74.9 ARTERIAL THROMBOSIS (HCC): ICD-10-CM

## 2024-12-05 LAB
INTERNATIONAL NORMALIZATION RATIO, POC: 3
PROTHROMBIN TIME, POC: 36.3

## 2024-12-05 PROCEDURE — 99211 OFF/OP EST MAY X REQ PHY/QHP: CPT

## 2024-12-05 PROCEDURE — 85610 PROTHROMBIN TIME: CPT

## 2024-12-05 NOTE — PROGRESS NOTES
Medication Management Service, Warfarin Management  [facility], [phone]  Visit Date: 4/18/2024   Subjective:   Gale Morley is a 48 y.o. female who presents to clinic today for anticoagulation monitoring and adjustment.  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   INR goal: of 2.5-3.5.  Duration of therapy: indefinite.    Assessment and PLAN   PT/INR done in office per protocol.   INR today is 3.0,  therapeutic.      Plan: Continue current regimen of warfarin 11.25 mg Thursday and 7.5 mg daily all other days of the week.   Using warfarin 7.5 mg tablets.  Recheck INR in 3& 1/2 weeks   Patient seen in room # 1.      ED. OP. =    She reports maintaining her usual intake of 2 servings of green vegetables weekly.     Patient verbalized understanding of dosing directions and information discussed.   Dosing schedule given to patient. Progress note sent to referring office.     Ladi ESPINO. Ph., CACP, Clinical Pharmacist  Anticoagulation Services, [facility]  12/5/2024  10:40 AM    For Pharmacy Admin Tracking Only    Intervention Detail:   Total # of Interventions Recommended: 0  Total # of Interventions Accepted: 0  Time Spent (min): 20

## 2024-12-27 DIAGNOSIS — I74.9 ARTERIAL THROMBOSIS (HCC): ICD-10-CM

## 2024-12-31 ENCOUNTER — ANTI-COAG VISIT (OUTPATIENT)
Age: 49
End: 2024-12-31
Payer: COMMERCIAL

## 2024-12-31 DIAGNOSIS — I74.9 ARTERIAL THROMBOSIS (HCC): ICD-10-CM

## 2024-12-31 DIAGNOSIS — I70.229 CRITICAL LIMB ISCHEMIA WITH HISTORY OF REVASCULARIZATION OF SAME EXTREMITY (HCC): Primary | ICD-10-CM

## 2024-12-31 DIAGNOSIS — I74.3 ACUTE OCCLUSION OF ARTERY OF LOWER EXTREMITY DUE TO THROMBOSIS (HCC): ICD-10-CM

## 2024-12-31 DIAGNOSIS — Z98.890 CRITICAL LIMB ISCHEMIA WITH HISTORY OF REVASCULARIZATION OF SAME EXTREMITY (HCC): Primary | ICD-10-CM

## 2024-12-31 LAB
INTERNATIONAL NORMALIZATION RATIO, POC: 5.3
PROTHROMBIN TIME, POC: 63.4

## 2024-12-31 PROCEDURE — 99212 OFFICE O/P EST SF 10 MIN: CPT

## 2024-12-31 PROCEDURE — 85610 PROTHROMBIN TIME: CPT

## 2024-12-31 NOTE — PROGRESS NOTES
Medication Management Service, Warfarin Management  [facility], [phone]  Visit Date: 2024   Subjective:   Gale Morley is a 48 y.o. female who presents to clinic today for anticoagulation monitoring and adjustment.  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   INR goal: of 2.5-3.5.  Duration of therapy: indefinite.    Assessment and PLAN   PT/INR done in office per protocol.   INR today is 5.3, supratherapeutic, cause not clearly identified.  Patient reports one very strong pat.  Patient is on OC, which may increase or decrease warfarin effectiveness and impact INR.  Also known that OC can increase risk of DVT, patient is non-smoker.    Plan:  Will hold dose today, reduce dose tomorrow to 3.75mg then continue  warfarin 11.25mg on  only; 7.5mg all other days of the week.  Using warfarin 7.5 mg tablets.  Recheck INR in 6 days.    Patient seen in room # 3.     Counseled patient on safety precautions associated with elevated INR.      ED. OP. =    Reminded of importance of consistency with green vegetables.     Patient verbalized understanding of dosing directions and information discussed.   Dosing schedule given to patient. Progress note sent to referring office.     Sandra Santos RPh, CACP  Clinical Pharmacist Medication Management  2024  10:55 AM      For Pharmacy Admin Tracking Only    Intervention Detail: Adherence Monitorin and Dose Adjustment: 1, reason: Improve Adherence, Therapy De-escalation  Total # of Interventions Recommended: 2  Total # of Interventions Accepted: 2  Time Spent (min): 20

## 2025-01-03 ENCOUNTER — HOSPITAL ENCOUNTER (OUTPATIENT)
Age: 50
Discharge: HOME | End: 2025-01-03
Payer: COMMERCIAL

## 2025-01-03 ENCOUNTER — TELEPHONE (OUTPATIENT)
Dept: ONCOLOGY | Age: 50
End: 2025-01-03

## 2025-01-03 ENCOUNTER — OFFICE VISIT (OUTPATIENT)
Dept: ONCOLOGY | Age: 50
End: 2025-01-03

## 2025-01-03 VITALS
RESPIRATION RATE: 18 BRPM | SYSTOLIC BLOOD PRESSURE: 135 MMHG | TEMPERATURE: 97.9 F | BODY MASS INDEX: 31.78 KG/M2 | DIASTOLIC BLOOD PRESSURE: 80 MMHG | WEIGHT: 191 LBS | HEART RATE: 74 BPM

## 2025-01-03 DIAGNOSIS — I74.9 ARTERIAL THROMBOSIS (HCC): ICD-10-CM

## 2025-01-03 DIAGNOSIS — D68.59 HYPERCOAGULABLE STATE (HCC): ICD-10-CM

## 2025-01-03 DIAGNOSIS — N92.0 MENORRHAGIA WITH REGULAR CYCLE: ICD-10-CM

## 2025-01-03 DIAGNOSIS — I74.9 ARTERIAL THROMBOSIS (HCC): Primary | ICD-10-CM

## 2025-01-03 DIAGNOSIS — N94.6 DYSMENORRHEA: ICD-10-CM

## 2025-01-03 DIAGNOSIS — D50.0 IRON DEFICIENCY ANEMIA DUE TO CHRONIC BLOOD LOSS: ICD-10-CM

## 2025-01-03 LAB
BASOPHILS # BLD: 0.04 K/UL (ref 0–0.2)
BASOPHILS NFR BLD: 1 % (ref 0–2)
EOSINOPHIL # BLD: 0.07 K/UL (ref 0–0.44)
EOSINOPHILS RELATIVE PERCENT: 1 % (ref 1–4)
ERYTHROCYTE [DISTWIDTH] IN BLOOD BY AUTOMATED COUNT: 14.6 % (ref 11.8–14.4)
FERRITIN SERPL-MCNC: 27 NG/ML
HCT VFR BLD AUTO: 38.2 % (ref 36.3–47.1)
HGB BLD-MCNC: 12.6 G/DL (ref 11.9–15.1)
IMM GRANULOCYTES # BLD AUTO: 0.01 K/UL (ref 0–0.3)
IMM GRANULOCYTES NFR BLD: 0 %
IRON SATN MFR SERPL: 13 % (ref 20–55)
IRON SERPL-MCNC: 51 UG/DL (ref 37–145)
LYMPHOCYTES NFR BLD: 1.79 K/UL (ref 1.1–3.7)
LYMPHOCYTES RELATIVE PERCENT: 25 % (ref 24–43)
MCH RBC QN AUTO: 29.3 PG (ref 25.2–33.5)
MCHC RBC AUTO-ENTMCNC: 33 G/DL (ref 28.4–34.8)
MCV RBC AUTO: 88.8 FL (ref 82.6–102.9)
MONOCYTES NFR BLD: 0.59 K/UL (ref 0.1–1.2)
MONOCYTES NFR BLD: 8 % (ref 3–12)
NEUTROPHILS NFR BLD: 65 % (ref 36–65)
NEUTS SEG NFR BLD: 4.79 K/UL (ref 1.5–8.1)
NRBC BLD-RTO: 0 PER 100 WBC
PLATELET # BLD AUTO: 401 K/UL (ref 138–453)
PMV BLD AUTO: 9.7 FL (ref 8.1–13.5)
RBC # BLD AUTO: 4.3 M/UL (ref 3.95–5.11)
RBC # BLD: ABNORMAL 10*6/UL
TIBC SERPL-MCNC: 381 UG/DL (ref 250–450)
UNSATURATED IRON BINDING CAPACITY: 330 UG/DL (ref 112–347)
WBC OTHER # BLD: 7.3 K/UL (ref 3.5–11.3)

## 2025-01-03 PROCEDURE — 83550 IRON BINDING TEST: CPT

## 2025-01-03 PROCEDURE — 85025 COMPLETE CBC W/AUTO DIFF WBC: CPT

## 2025-01-03 PROCEDURE — 36415 COLL VENOUS BLD VENIPUNCTURE: CPT

## 2025-01-03 PROCEDURE — 83540 ASSAY OF IRON: CPT

## 2025-01-03 PROCEDURE — 82728 ASSAY OF FERRITIN: CPT

## 2025-01-03 NOTE — PROGRESS NOTES
axillary, cervical or inguinal area    REVIEW OF LABORATORY DATA:   Lab Results   Component Value Date    WBC 7.3 01/03/2025    HGB 12.6 01/03/2025    HCT 38.2 01/03/2025    MCV 88.8 01/03/2025     01/03/2025     Lab Results   Component Value Date    FERRITIN 27 01/03/2025     Lab Results   Component Value Date    IRON 39 06/28/2024    TIBC 315 06/28/2024    FERRITIN 27 01/03/2025       REVIEW OF RADIOLOGICAL RESULTS:     IMPRESSION:   Arterial ischemia, recurrent  Possible hypercoagulability  Doing well on on aspirin and warfarin nonpurulent  Dropping ferritin and iron studies, responded well to oral iron  Menorrhagia and possible endometrial hyperplasia versus malignancy  PLAN:   Continue iron supplementation  Will watch hemoglobin and iron studies closely  Await GYN oncology workup and colposcopy and a biopsy.  Will reevaluate her after the biopsy    Evelia Levine MD  Hematologist/Medical Oncologist  [facility]

## 2025-01-03 NOTE — TELEPHONE ENCOUNTER
Last visit: 10/11/2024  Last Med refill: 10/11/2024  Does patient have enough medication for 72 hours: No:     Next Visit Date:  Future Appointments   Date Time Provider Department Center   1/6/2025  9:40 AM [facility] MEDICATION MGMT [facility]   1/27/2025  3:00 PM Evelia Levine MD [facility]   6/26/2025  1:15 PM [facility]       Health Maintenance   Topic Date Due    Pneumococcal 0-64 years Vaccine (1 of 2 - PCV) Never done    Hepatitis B vaccine (1 of 3 - 19+ 3-dose series) Never done    DTaP/Tdap/Td vaccine (1 - Tdap) Never done    Lipids  10/25/2023    Flu vaccine (1) 08/01/2024    COVID-19 Vaccine (1 - 2023-24 season) Never done    Colorectal Cancer Screen  01/04/2025    A1C test (Diabetic or Prediabetic)  04/09/2025    Breast cancer screen  04/16/2025    Depression Monitoring  05/10/2025    Cervical cancer screen  05/24/2027    Hepatitis C screen  Completed    HIV screen  Completed    Hepatitis A vaccine  Aged Out    Hib vaccine  Aged Out    Polio vaccine  Aged Out    Meningococcal (ACWY) vaccine  Aged Out       Hemoglobin A1C (%)   Date Value   04/09/2024 6.2   10/13/2022 6.1   03/05/2020 6.1             ( goal A1C is < 7)   No components found for: \"LABMICR\"  No components found for: \"LDLCHOLESTEROL\", \"LDLCALC\"    (goal LDL is <100)   AST (U/L)   Date Value   08/21/2024 12     ALT (U/L)   Date Value   08/21/2024 13     BUN (mg/dL)   Date Value   11/07/2024 10     BP Readings from Last 3 Encounters:   01/03/25 135/80   11/07/24 (!) 149/113   10/11/24 (!) 151/76          (goal 120/80)    All Future Testing planned in [facility]  Lab Frequency Next Occurrence   Lipid Panel Once 04/09/2024   CBC Once 04/09/2024   Vascular lower arterial complete physiologic Doppler at rest Once 06/12/2025   Ferritin     CBC with Auto Differential     Iron and TIBC                 Patient Active Problem List:     Migraine headache with aura     Essential hypertension

## 2025-01-03 NOTE — TELEPHONE ENCOUNTER
[facility] HERE FOR MD VISIT  Rv in 3-4 weeks. No labs   MD VISIT 1/27/25 @ 3PM  AVS PRINTED W/ INSTRUCTIONS AND GIVEN TO PT ON EXIT

## 2025-01-06 ENCOUNTER — ANTI-COAG VISIT (OUTPATIENT)
Age: 50
End: 2025-01-06
Payer: COMMERCIAL

## 2025-01-06 DIAGNOSIS — I74.9 ARTERIAL THROMBOSIS (HCC): ICD-10-CM

## 2025-01-06 DIAGNOSIS — Z98.890 CRITICAL LIMB ISCHEMIA WITH HISTORY OF REVASCULARIZATION OF SAME EXTREMITY (HCC): Primary | ICD-10-CM

## 2025-01-06 DIAGNOSIS — I74.3 ACUTE OCCLUSION OF ARTERY OF LOWER EXTREMITY DUE TO THROMBOSIS (HCC): ICD-10-CM

## 2025-01-06 DIAGNOSIS — I70.229 CRITICAL LIMB ISCHEMIA WITH HISTORY OF REVASCULARIZATION OF SAME EXTREMITY (HCC): Primary | ICD-10-CM

## 2025-01-06 LAB
INTERNATIONAL NORMALIZATION RATIO, POC: 1.7
PROTHROMBIN TIME, POC: 20.4

## 2025-01-06 PROCEDURE — 99212 OFFICE O/P EST SF 10 MIN: CPT

## 2025-01-06 PROCEDURE — 85610 PROTHROMBIN TIME: CPT

## 2025-01-06 NOTE — PROGRESS NOTES
Medication Management Service, Warfarin Management  [facility], [phone]  Visit Date:  2025  Subjective:   Gale Morley is a 48 y.o. female who presents to clinic today for anticoagulation monitoring and adjustment.  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   INR goal: of 2.5-3.5.  Duration of therapy: indefinite.    Assessment and PLAN   PT/INR done in office per protocol.   INR today is 1.7, subtherapeutic, cause not clearly identified.  Patient reports one bottle of wine over the weekend, denies usual causes for low INR today.  She reports that the OC has been stopped.  Patient with recent Dx of Ovarian CA.   Plan:  Will boost dose today to 11.25mg then continue warfarin 11.25mg on  only; 7.5mg all other days of the week.  Using warfarin 7.5 mg tablets.  Recheck INR in 9 days.    Patient seen in room # 3.     Counseled patient on safety precautions associated with elevated INR.      ED. OP. =    Reminded of importance of consistency with green vegetables.     Patient verbalized understanding of dosing directions and information discussed.   Dosing schedule given to patient. Progress note sent to referring office.     Sandra Santos, [facility], CACP  Clinical Pharmacist Medication Management  2025  10:51 AM    For Pharmacy Admin Tracking Only    Intervention Detail: Adherence Monitorin and Dose Adjustment: 1, reason: Improve Adherence, Therapy Optimization  Total # of Interventions Recommended: 2  Total # of Interventions Accepted: 2  Time Spent (min):  25

## 2025-01-07 NOTE — TELEPHONE ENCOUNTER
Last visit: 10/11/24  Last Med refill:   Does patient have enough medication for 72 hours: No:     Next Visit Date:  Future Appointments   Date Time Provider Department Center   1/15/2025 10:00 AM [facility] MEDICATION MGMT [facility]   1/27/2025  3:00 PM Evelia Levine MD [facility]   6/26/2025  1:15 PM Sampson Streeter MD [facility]       Health Maintenance   Topic Date Due    Pneumococcal 0-64 years Vaccine (1 of 2 - PCV) Never done    Hepatitis B vaccine (1 of 3 - 19+ 3-dose series) Never done    DTaP/Tdap/Td vaccine (1 - Tdap) Never done    Lipids  10/25/2023    Flu vaccine (1) 08/01/2024    COVID-19 Vaccine (1 - 2023-24 season) Never done    Colorectal Cancer Screen  01/04/2025    A1C test (Diabetic or Prediabetic)  04/09/2025    Breast cancer screen  04/16/2025    Depression Monitoring  05/10/2025    Cervical cancer screen  05/24/2027    Hepatitis C screen  Completed    HIV screen  Completed    Hepatitis A vaccine  Aged Out    Hib vaccine  Aged Out    Polio vaccine  Aged Out    Meningococcal (ACWY) vaccine  Aged Out       Hemoglobin A1C (%)   Date Value   04/09/2024 6.2   10/13/2022 6.1   03/05/2020 6.1             ( goal A1C is < 7)   No components found for: \"LABMICR\"  No components found for: \"LDLCHOLESTEROL\", \"LDLCALC\"    (goal LDL is <100)   AST (U/L)   Date Value   08/21/2024 12     ALT (U/L)   Date Value   08/21/2024 13     BUN (mg/dL)   Date Value   11/07/2024 10     BP Readings from Last 3 Encounters:   01/03/25 135/80   11/07/24 (!) 149/113   10/11/24 (!) 151/76          (goal 120/80)    All Future Testing planned in [facility]  Lab Frequency Next Occurrence   Lipid Panel Once 04/09/2024   CBC Once 04/09/2024   Vascular lower arterial complete physiologic Doppler at rest Once 06/12/2025   Ferritin     CBC with Auto Differential     Iron and TIBC                 Patient Active Problem List:     Migraine headache with aura     Essential hypertension     Obesity     No

## 2025-01-08 RX ORDER — WARFARIN SODIUM 7.5 MG/1
TABLET ORAL
Qty: 90 TABLET | Refills: 0 | Status: SHIPPED | OUTPATIENT
Start: 2025-01-08

## 2025-01-08 RX ORDER — NORETHINDRONE 5 MG/1
5 TABLET ORAL DAILY
Qty: 30 TABLET | Refills: 0 | Status: SHIPPED | OUTPATIENT
Start: 2025-01-08 | End: 2025-01-10 | Stop reason: HOSPADM

## 2025-01-09 DIAGNOSIS — N94.6 DYSMENORRHEA: ICD-10-CM

## 2025-01-09 RX ORDER — NORETHINDRONE 5 MG/1
5 TABLET ORAL DAILY
Qty: 90 TABLET | Refills: 0 | OUTPATIENT
Start: 2025-01-09

## 2025-01-10 ENCOUNTER — HOSPITAL ENCOUNTER (EMERGENCY)
Age: 50
Discharge: HOME | End: 2025-01-10
Attending: EMERGENCY MEDICINE
Payer: COMMERCIAL

## 2025-01-10 VITALS
DIASTOLIC BLOOD PRESSURE: 91 MMHG | TEMPERATURE: 98.1 F | SYSTOLIC BLOOD PRESSURE: 200 MMHG | HEART RATE: 76 BPM | RESPIRATION RATE: 16 BRPM | OXYGEN SATURATION: 100 %

## 2025-01-10 DIAGNOSIS — N93.9 VAGINAL BLEEDING: ICD-10-CM

## 2025-01-10 DIAGNOSIS — R10.9 ABDOMINAL CRAMPING: Primary | ICD-10-CM

## 2025-01-10 DIAGNOSIS — N93.9 ABNORMAL UTERINE BLEEDING (AUB): ICD-10-CM

## 2025-01-10 LAB
ANION GAP SERPL CALCULATED.3IONS-SCNC: 12 MMOL/L (ref 9–16)
BACTERIA URNS QL MICRO: NORMAL
BASOPHILS # BLD: 0.04 K/UL (ref 0–0.2)
BASOPHILS NFR BLD: 1 % (ref 0–2)
BILIRUB UR QL STRIP: NEGATIVE
BUN SERPL-MCNC: 12 MG/DL (ref 6–20)
C TRACH DNA SPEC QL PROBE+SIG AMP: NORMAL
CALCIUM SERPL-MCNC: 8.9 MG/DL (ref 8.6–10.4)
CANDIDA SPECIES: NEGATIVE
CASTS #/AREA URNS LPF: NORMAL /LPF (ref 0–8)
CHLORIDE SERPL-SCNC: 104 MMOL/L (ref 98–107)
CLARITY UR: ABNORMAL
CO2 SERPL-SCNC: 22 MMOL/L (ref 20–31)
COLOR UR: YELLOW
CREAT SERPL-MCNC: 0.9 MG/DL (ref 0.6–0.9)
EOSINOPHIL # BLD: 0.12 K/UL (ref 0–0.44)
EOSINOPHILS RELATIVE PERCENT: 2 % (ref 1–4)
EPI CELLS #/AREA URNS HPF: NORMAL /HPF (ref 0–5)
ERYTHROCYTE [DISTWIDTH] IN BLOOD BY AUTOMATED COUNT: 14.3 % (ref 11.8–14.4)
GARDNERELLA VAGINALIS: NEGATIVE
GFR, ESTIMATED: 78 ML/MIN/1.73M2
GLUCOSE SERPL-MCNC: 111 MG/DL (ref 74–99)
GLUCOSE UR STRIP-MCNC: NEGATIVE MG/DL
HCG SERPL QL: NEGATIVE
HCT VFR BLD AUTO: 40.6 % (ref 36.3–47.1)
HGB BLD-MCNC: 13.4 G/DL (ref 11.9–15.1)
HGB UR QL STRIP.AUTO: ABNORMAL
IMM GRANULOCYTES # BLD AUTO: 0.03 K/UL (ref 0–0.3)
IMM GRANULOCYTES NFR BLD: 0 %
INR PPP: 2.3
KETONES UR STRIP-MCNC: NEGATIVE MG/DL
LEUKOCYTE ESTERASE UR QL STRIP: ABNORMAL
LYMPHOCYTES NFR BLD: 1.78 K/UL (ref 1.1–3.7)
LYMPHOCYTES RELATIVE PERCENT: 22 % (ref 24–43)
MCH RBC QN AUTO: 28.2 PG (ref 25.2–33.5)
MCHC RBC AUTO-ENTMCNC: 33 G/DL (ref 28.4–34.8)
MCV RBC AUTO: 85.3 FL (ref 82.6–102.9)
MONOCYTES NFR BLD: 0.6 K/UL (ref 0.1–1.2)
MONOCYTES NFR BLD: 7 % (ref 3–12)
N GONORRHOEA DNA SPEC QL PROBE+SIG AMP: NORMAL
NEUTROPHILS NFR BLD: 68 % (ref 36–65)
NEUTS SEG NFR BLD: 5.5 K/UL (ref 1.5–8.1)
NITRITE UR QL STRIP: NEGATIVE
NRBC BLD-RTO: 0 PER 100 WBC
PH UR STRIP: 6 [PH] (ref 5–8)
PLATELET # BLD AUTO: 416 K/UL (ref 138–453)
PMV BLD AUTO: 9.9 FL (ref 8.1–13.5)
POTASSIUM SERPL-SCNC: 3.7 MMOL/L (ref 3.7–5.3)
PROT UR STRIP-MCNC: ABNORMAL MG/DL
PROTHROMBIN TIME: 24.3 SEC (ref 11.7–14.9)
RBC # BLD AUTO: 4.76 M/UL (ref 3.95–5.11)
RBC #/AREA URNS HPF: NORMAL /HPF (ref 0–4)
SODIUM SERPL-SCNC: 138 MMOL/L (ref 136–145)
SOURCE: NORMAL
SP GR UR STRIP: 1.03 (ref 1–1.03)
SPECIMEN DESCRIPTION: NORMAL
TRICHOMONAS: NEGATIVE
UROBILINOGEN UR STRIP-ACNC: NORMAL EU/DL (ref 0–1)
WBC #/AREA URNS HPF: NORMAL /HPF (ref 0–5)
WBC OTHER # BLD: 8.1 K/UL (ref 3.5–11.3)

## 2025-01-10 PROCEDURE — 96375 TX/PRO/DX INJ NEW DRUG ADDON: CPT

## 2025-01-10 PROCEDURE — 87591 N.GONORRHOEAE DNA AMP PROB: CPT

## 2025-01-10 PROCEDURE — 85610 PROTHROMBIN TIME: CPT

## 2025-01-10 PROCEDURE — 99284 EMERGENCY DEPT VISIT MOD MDM: CPT

## 2025-01-10 PROCEDURE — 81001 URINALYSIS AUTO W/SCOPE: CPT

## 2025-01-10 PROCEDURE — 84703 CHORIONIC GONADOTROPIN ASSAY: CPT

## 2025-01-10 PROCEDURE — 87480 CANDIDA DNA DIR PROBE: CPT

## 2025-01-10 PROCEDURE — 85025 COMPLETE CBC W/AUTO DIFF WBC: CPT

## 2025-01-10 PROCEDURE — 87491 CHLMYD TRACH DNA AMP PROBE: CPT

## 2025-01-10 PROCEDURE — 6360000002 HC RX W HCPCS

## 2025-01-10 PROCEDURE — 96374 THER/PROPH/DIAG INJ IV PUSH: CPT

## 2025-01-10 PROCEDURE — 6370000000 HC RX 637 (ALT 250 FOR IP)

## 2025-01-10 PROCEDURE — 87660 TRICHOMONAS VAGIN DIR PROBE: CPT

## 2025-01-10 PROCEDURE — 87510 GARDNER VAG DNA DIR PROBE: CPT

## 2025-01-10 PROCEDURE — 80048 BASIC METABOLIC PNL TOTAL CA: CPT

## 2025-01-10 RX ORDER — MORPHINE SULFATE 4 MG/ML
4 INJECTION, SOLUTION INTRAMUSCULAR; INTRAVENOUS ONCE
Status: COMPLETED | OUTPATIENT
Start: 2025-01-10 | End: 2025-01-10

## 2025-01-10 RX ORDER — KETOROLAC TROMETHAMINE 15 MG/ML
30 INJECTION, SOLUTION INTRAMUSCULAR; INTRAVENOUS ONCE
Status: DISCONTINUED | OUTPATIENT
Start: 2025-01-10 | End: 2025-01-10

## 2025-01-10 RX ORDER — NORETHINDRONE 5 MG/1
5 TABLET ORAL DAILY
Qty: 15 TABLET | Refills: 3 | Status: SHIPPED | OUTPATIENT
Start: 2025-01-10

## 2025-01-10 RX ORDER — DIPHENHYDRAMINE HCL 25 MG
12.5 TABLET ORAL ONCE
Status: COMPLETED | OUTPATIENT
Start: 2025-01-10 | End: 2025-01-10

## 2025-01-10 RX ORDER — DICYCLOMINE HYDROCHLORIDE 10 MG/1
10 CAPSULE ORAL ONCE
Status: COMPLETED | OUTPATIENT
Start: 2025-01-10 | End: 2025-01-10

## 2025-01-10 RX ORDER — ONDANSETRON 2 MG/ML
4 INJECTION INTRAMUSCULAR; INTRAVENOUS ONCE
Status: COMPLETED | OUTPATIENT
Start: 2025-01-10 | End: 2025-01-10

## 2025-01-10 RX ORDER — FENTANYL CITRATE 50 UG/ML
50 INJECTION, SOLUTION INTRAMUSCULAR; INTRAVENOUS ONCE
Status: COMPLETED | OUTPATIENT
Start: 2025-01-10 | End: 2025-01-10

## 2025-01-10 RX ADMIN — FENTANYL CITRATE 50 MCG: 50 INJECTION, SOLUTION INTRAMUSCULAR; INTRAVENOUS at 16:10

## 2025-01-10 RX ADMIN — DIPHENHYDRAMINE HYDROCHLORIDE 12.5 MG: 25 TABLET ORAL at 17:53

## 2025-01-10 RX ADMIN — MORPHINE SULFATE 4 MG: 4 INJECTION INTRAVENOUS at 17:55

## 2025-01-10 RX ADMIN — DICYCLOMINE HYDROCHLORIDE 10 MG: 10 CAPSULE ORAL at 17:53

## 2025-01-10 RX ADMIN — ONDANSETRON 4 MG: 2 INJECTION INTRAMUSCULAR; INTRAVENOUS at 14:45

## 2025-01-10 ASSESSMENT — LIFESTYLE VARIABLES
HOW OFTEN DO YOU HAVE A DRINK CONTAINING ALCOHOL: MONTHLY OR LESS
HOW MANY STANDARD DRINKS CONTAINING ALCOHOL DO YOU HAVE ON A TYPICAL DAY: 1 OR 2

## 2025-01-10 ASSESSMENT — PAIN DESCRIPTION - LOCATION
LOCATION: ABDOMEN
LOCATION: ABDOMEN

## 2025-01-10 ASSESSMENT — ENCOUNTER SYMPTOMS
RESPIRATORY NEGATIVE: 1
ABDOMINAL PAIN: 1
EYES NEGATIVE: 1
ALLERGIC/IMMUNOLOGIC NEGATIVE: 1

## 2025-01-10 ASSESSMENT — PAIN SCALES - GENERAL: PAINLEVEL_OUTOF10: 10

## 2025-01-10 ASSESSMENT — PAIN - FUNCTIONAL ASSESSMENT: PAIN_FUNCTIONAL_ASSESSMENT: 0-10

## 2025-01-10 ASSESSMENT — PAIN DESCRIPTION - ORIENTATION: ORIENTATION: RIGHT;LEFT

## 2025-01-10 ASSESSMENT — PAIN DESCRIPTION - DESCRIPTORS: DESCRIPTORS: CRAMPING;DISCOMFORT

## 2025-01-10 NOTE — ED PROVIDER NOTES
[facility] EMERGENCY DEPARTMENT  Emergency Department Encounter  Emergency Medicine Resident     Pt Name:Gale Morley  MRN: [id number]  Birthdate 1975  Date of evaluation: 1/10/25  PCP:  Lola Chawla MD  Note Started: 2:26 PM EST      CHIEF COMPLAINT       Chief Complaint   Patient presents with    Abdominal Cramping       HISTORY OF PRESENT ILLNESS  (Location/Symptom, Timing/Onset, Context/Setting, Quality, Duration, Modifying Factors, Severity.)      Gale Morley is a 49 y.o. female  with PMH of depression, HTN, HLD, PVD (on Coumadin and Brilinta), obesity, DAVID-1, possible cardiomyopathy who presents with lower abdominal cramping.  Patient states she has lower abdominal cramps similar to her cramping that occurs during her menses however this is significantly worse.  Patient states she recently had darker brown shadowing of her pads and for the last 2-3 days she has had lighter more bright red of her perineal pads.  Patient is going through about 3 pads per day.  Patient is following with OB/GYN and had with colposcopy on 2024.  Had endometrial biopsy on 1/3/2025 noted to have contacted her provider by telephone on 2024.  Note recommends being evaluated by ER if bleeding changes or increases.    PAST MEDICAL / SURGICAL / SOCIAL / FAMILY HISTORY      has a past medical history of Abnormal Pap smear of cervix, Anemia, Anxiety, Borderline diabetes, Chronic back pain, Claudication ([facility]), Claudication in peripheral vascular disease ([facility]), Depression, Headache(784.0), Heart murmur, Hemorrhage of rectum and anus, History of blood transfusion, HTN (hypertension), Hx of blood clots, Hypercoagulable state ([facility]), Hyperlipidemia, Hypertension, Intertriginous candidiasis, Left popliteal artery occlusion (HCC), Leg pain, Menometrorrhagia, Migraine headache with aura, Obesity, Osteoarthritis, PAD (peripheral artery disease) ([facility]), Patient in clinical research study, PVD (peripheral vascular  drugs.  Prescription drug management.        EKG      All EKG's are interpreted by the Emergency Department Physician who either signs or Co-signs this chart in the absence of a cardiologist.    EMERGENCY DEPARTMENT COURSE:      ED Course as of 01/10/25 1815   Fri Tereso 10, 2025   1520 WBC 8.1, hemoglobin 13.4 [SP]   1541 Pelvic exam done with Mamie patient care technician at bedside as assistant and chaperone  Patient has normal-appearing vulva, exquisitely tender on penetration with speculum however vaginal vault has no abnormalities.  Cervical os appears closed with moderate amount of sanguinous/serosanguineous discharge, pelvic swabs obtained.  On bimanual exam patient has positive chandelier sign, and no left or right adnexal tenderness. [SP]   1630 Preg, Serum: NEGATIVE [SP]   1702 DNA vaginitis negative [SP]   1705 Signout to Dr. Salvador [SP]   1745 Patient reevaluated endorsing that she has had continued vaginal bleeding that has been going on since October.  Patient was seen at [facility] due to inability to get in with [facility].  Patient has had a colposcopy for cervical dysplasia, endometrial biopsy and has been on estrogen which she has not taken for the past 3 days.  Patient is presenting to ED due to continued bleeding, abdominal cramping which has been worsening over the past 3 days.  Seeking secondary opinion.  Complicated by the fact that she follows with vascular for peripheral vascular disease and is on anticoagulation for DVT with warfarin and Brilinta. [TRANG]   1801 Discussed case with OB/GYN.  OB/GYN endorsing/agreeing with current care provided and in plan.  OB/GYN to come and evaluate the pain to dictate next steps forward. [TRANG]      ED Course User Index  [TRANG] Bakari Salvador DO  [SP] Uday Holland MD       PROCEDURES:      CONSULTS:  IP CONSULT TO OB GYN    CRITICAL CARE:  There was significant risk of life threatening deterioration of patient's condition requiring my direct

## 2025-01-10 NOTE — ED TRIAGE NOTES
Patient with abdominal pain and cramping with vaginal bleeding. Patient had procedures x 2 at OB/GYN within past 2 months. Was having decrease in bleeding and drainage changed to brownish in color like old drainage then 2-3 days ago it started to become more red and with clots again.  Does not think this is normal time for any periods to occur.

## 2025-01-10 NOTE — ED PROVIDER NOTES
[facility] EMERGENCY DEPARTMENT  Emergency Department  Emergency Medicine Resident Turn-Over     Note Started: 5:14 PM EST    Care of Gael Morley was assumed from Dr. Holland and is being seen for Abdominal Cramping  .  The patient's initial evaluation and plan have been discussed with the prior provider who initially evaluated the patient.     EMERGENCY DEPARTMENT COURSE / MEDICAL DECISION MAKING:       MEDICATIONS GIVEN:  Orders Placed This Encounter   Medications    ondansetron (ZOFRAN) injection 4 mg    fentaNYL (SUBLIMAZE) injection 50 mcg    DISCONTD: ketorolac (TORADOL) injection 30 mg    diphenhydrAMINE (BENADRYL) tablet 12.5 mg    dicyclomine (BENTYL) capsule 10 mg    morphine injection 4 mg    norethindrone (AYGESTIN) 5 MG tablet     Sig: Take 1 tablet by mouth daily Take as a taper  4 Pills first day, 3 pills second day, 2 pills third day, 1 pill daily until bleeding stabilizes     Dispense:  15 tablet     Refill:  3       LABS / RADIOLOGY:     Labs Reviewed   CBC WITH AUTO DIFFERENTIAL - Abnormal; Notable for the following components:       Result Value    Neutrophils % 68 (*)     Lymphocytes % 22 (*)     All other components within normal limits   URINALYSIS WITH REFLEX TO CULTURE - Abnormal; Notable for the following components:    Turbidity UA Cloudy (*)     Urine Hgb LARGE (*)     Protein, UA 1+ (*)     Leukocyte Esterase, Urine SMALL (*)     All other components within normal limits   BASIC METABOLIC PANEL - Abnormal; Notable for the following components:    Glucose 111 (*)     All other components within normal limits   PROTIME-INR - Abnormal; Notable for the following components:    Protime 24.3 (*)     All other components within normal limits   C.TRACHOMATIS N.GONORRHOEAE DNA   VAGINITIS DNA PROBE   HCG, SERUM, QUALITATIVE   MICROSCOPIC URINALYSIS   PREVIOUS SPECIMEN       No results found.    RECENT VITALS:     Temp: 98.1 °F (36.7 °C),  Pulse: 76, Respirations: 16, BP: (!) 200/91,         Bakari Salvador DO  Emergency Medicine Resident  [facility]

## 2025-01-10 NOTE — CONSULTS
for non-centrifuged specimen.    Epithelial Cells, UA 2 TO 5 0 - 5 /HPF    Bacteria, UA None None   Basic Metabolic Panel   Result Value Ref Range    Sodium 138 136 - 145 mmol/L    Potassium 3.7 3.7 - 5.3 mmol/L    Chloride 104 98 - 107 mmol/L    CO2 22 20 - 31 mmol/L    Anion Gap 12 9 - 16 mmol/L    Glucose 111 (H) 74 - 99 mg/dL    BUN 12 6 - 20 mg/dL    Creatinine 0.9 0.6 - 0.9 mg/dL    Est, Glom Filt Rate 78 >60 mL/min/1.73m2    Calcium 8.9 8.6 - 10.4 mg/dL   Protime-INR   Result Value Ref Range    Protime 24.3 (H) 11.7 - 14.9 sec    INR 2.3        ASSESSMENT & PLAN:    Gale Morley is a 49 y.o. female  presented to the ED with abdominal cramping and vaginal bleeding   - BP severe range on admission, otherwise VSS afebrile. Repeat vitals not documented, however while evaluation patient BP was elevated and non severe range.   - Hgb stable 13.4   - Vaginitis negative   - GCC pending   - Abdominal exam: benign   - SSE: performed by ED resident, see documentation for details   - Counseled patient that at this time her vitals, labs and vaginal bleeding are stable. Patient has close outpatient follow up scheduled for 25 with primary OBGYN that she plans to continue to follow with. Discussed with patient that we can send an Aygestin taper to her pharmacy to help control bleeding in the short term, however encouraged patient to discuss long term management of her vaginal bleeding with primary physician. Patient states understanding.   - Return precautions provided and patient states understanding. From an OBGYN standpoint the patient is stable for discharge at this time.      Patient Active Problem List    Diagnosis Date Noted    Arterial thrombosis (Former[facility]) 10/01/2022     Priority: Medium    Critical limb ischemia with history of revascularization of same extremity (Former[facility]) 2022     Priority: Medium    Occlusion of right iliac artery ([facility]) 2022     Priority: Medium    Acute occlusion of artery of  lower extremity due to thrombosis (HCC) 09/24/2022     Priority: Medium    PAD (peripheral artery disease) (HCC) 09/07/2022     Priority: Medium    Abnormal CT of the abdomen 09/07/2022     Priority: Medium    Popliteal artery embolism, right (HCC) 09/07/2022     Priority: Medium    Leg pain, right 09/07/2022     Priority: Medium    Lymphadenopathy, mesenteric 09/07/2022     Priority: Medium    Hypertriglyceridemia 10/11/2024    Prediabetes 10/11/2024    Insomnia 10/11/2024    Dysmenorrhea 05/10/2024    Dysplasia of cervix, low grade (DAVID 1) 09/17/2020     Follow up 1 year      Normal cervical cytology with positive HPV16 09/04/2020     Colposcopy with biopsies and ECC 9/4/2020: **      Pre-syncope 11/16/2019    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) 08/02/2019    Pain of left lower extremity due to ischemia     Left leg pain     Chest pain 04/05/2019    Wound of right leg 11/26/2018    Anemia 10/19/2018    Hemorrhage of rectum and anus     Hypercoagulable state (HCC)     Takotsubo cardiomyopathy 09/14/2018    Left popliteal artery occlusion (HCC) 01/16/2018    Claudication in peripheral vascular disease (HCC)     Foot pain 02/10/2017    Depression 06/16/2014    Menometrorrhagia 07/23/2013    Intertriginous candidiasis 07/23/2013    Migraine headache with aura 05/13/2013    Essential hypertension 05/13/2013    Obesity 05/13/2013       Plan discussed with Dr. Goss, who is agreeable.     Attending's Name: Dr. Ana Paula Franco DO  Ob/Gyn Resident  [facility]  1/10/2025, 11:33 PM

## 2025-01-10 NOTE — DISCHARGE INSTRUCTIONS
You have been evaluated in the Emergency Department at [facility] 1/10/2025     Your recommendations and if necessary prescriptions from today's visit:   -Follow-up with OB/GYN  -Utilize prescription provided via OB/GYN  -Take medication as prescribed  -Follow-up with your PCP    If you do not have a primary care provider, establish care with a provider that you can begin to regularly follow-up with.    Should you have any questions regarding your care or further treatment, please call [facility] Emergency Department at [phone].    PLEASE RETURN TO THE EMERGENCY DEPARTMENT IMMEDIATELY for   -Shortness of breath, feeling in a pass out, chest pain, passing clots larger than a half dollar and using greater than 2 pads per hour    OR    -Changing symptoms  -Worsening symptoms  -Unable to follow up with your primary care provider  -Any other care or concern

## 2025-01-11 NOTE — ED PROVIDER NOTES
Care of Gale Morley was assumed from previous attending and is being seen for Abdominal Cramping  .  The patient's initial evaluation and plan have been discussed with the prior provider who initially evaluated the patient.    Handoff taken on the following patient from prior Attending Physician:Maximo 7:11 PM EST      Attestation    I was available and discussed any additional care issues that arose and coordinated the management plans with the resident(s) caring for the patient during my duty period. Any areas of disagreement with resident’s documentation of care or procedures are noted on the chart. I was personally present for the key portions of any/all procedures during my duty period. I have documented in the chart those procedures where I was not present during the key portions.      EMERGENCY DEPARTMENT COURSE / MEDICAL DECISION MAKING:       MEDICATIONS GIVEN:  Orders Placed This Encounter   Medications    ondansetron (ZOFRAN) injection 4 mg    fentaNYL (SUBLIMAZE) injection 50 mcg    DISCONTD: ketorolac (TORADOL) injection 30 mg    diphenhydrAMINE (BENADRYL) tablet 12.5 mg    dicyclomine (BENTYL) capsule 10 mg    morphine injection 4 mg    norethindrone (AYGESTIN) 5 MG tablet     Sig: Take 1 tablet by mouth daily Take as a taper  4 Pills first day, 3 pills second day, 2 pills third day, 1 pill daily until bleeding stabilizes     Dispense:  15 tablet     Refill:  3       LABS / RADIOLOGY:     Labs Reviewed   CBC WITH AUTO DIFFERENTIAL - Abnormal; Notable for the following components:       Result Value    Neutrophils % 68 (*)     Lymphocytes % 22 (*)     All other components within normal limits   URINALYSIS WITH REFLEX TO CULTURE - Abnormal; Notable for the following components:    Turbidity UA Cloudy (*)     Urine Hgb LARGE (*)     Protein, UA 1+ (*)     Leukocyte Esterase, Urine SMALL (*)     All other components within normal limits   BASIC METABOLIC PANEL - Abnormal; Notable for the following

## 2025-01-11 NOTE — ED PROVIDER NOTES
[facility]     Emergency Department     Faculty Attestation    I performed a history and physical examination of the patient and discussed management with the resident. I reviewed the resident’s note and agree with the documented findings including all diagnostic interpretations and plan of care. Any areas of disagreement are noted on the chart. I was personally present for the key portions of any procedures. I have documented in the chart those procedures where I was not present during the key portions. I have reviewed the emergency nurses triage note. I agree with the chief complaint, past medical history, past surgical history, allergies, medications, social and family history as documented unless otherwise noted below. Documentation of the HPI, Physical Exam and Medical Decision Making performed by dewayneibyue is based on my personal performance of the HPI, PE and MDM. For Physician Assistant/ Nurse Practitioner cases/documentation I have personally evaluated this patient and have completed at least one if not all key elements of the E/M (history, physical exam, and MDM). Additional findings are as noted.    Primary Care Physician: Lola Chawla MD    Note Started: 10:12 PM EST     VITAL SIGNS:   oral temperature is 98.1 °F (36.7 °C). Her blood pressure is 200/91 (abnormal) and her pulse is 76. Her respiration is 16 and oxygen saturation is 100%.      Medical Decision Making  Abdominal pain, predominantly lower abdominal associated with vaginal bleeding.  Patient had recent colposcopy with biopsy 1 week ago.  On examination abdomen is soft, tender predominantly in suprapubic region no rebound no guarding.  Pelvic exam per the resident.  Possible eschar breaking away causing uterine bleeding.  GYN consultation.    Amount and/or Complexity of Data Reviewed  External Data Reviewed: notes.  Labs: ordered. Decision-making details documented in ED

## 2025-01-13 LAB
C TRACH DNA SPEC QL PROBE+SIG AMP: NEGATIVE
N GONORRHOEA DNA SPEC QL PROBE+SIG AMP: NEGATIVE
SPECIMEN DESCRIPTION: NORMAL

## 2025-01-15 ENCOUNTER — ANTI-COAG VISIT (OUTPATIENT)
Age: 50
End: 2025-01-15
Payer: COMMERCIAL

## 2025-01-15 DIAGNOSIS — Z98.890 CRITICAL LIMB ISCHEMIA WITH HISTORY OF REVASCULARIZATION OF SAME EXTREMITY (HCC): Primary | ICD-10-CM

## 2025-01-15 DIAGNOSIS — I74.9 ARTERIAL THROMBOSIS (HCC): ICD-10-CM

## 2025-01-15 DIAGNOSIS — I70.229 CRITICAL LIMB ISCHEMIA WITH HISTORY OF REVASCULARIZATION OF SAME EXTREMITY (HCC): Primary | ICD-10-CM

## 2025-01-15 DIAGNOSIS — I74.3 ACUTE OCCLUSION OF ARTERY OF LOWER EXTREMITY DUE TO THROMBOSIS (HCC): ICD-10-CM

## 2025-01-15 LAB
INTERNATIONAL NORMALIZATION RATIO, POC: 2
PROTHROMBIN TIME, POC: 23.6

## 2025-01-15 PROCEDURE — 99212 OFFICE O/P EST SF 10 MIN: CPT

## 2025-01-15 PROCEDURE — 85610 PROTHROMBIN TIME: CPT

## 2025-01-15 NOTE — PROGRESS NOTES
Medication Management Service, Warfarin Management  [facility], [phone]  Visit Date: 2024   Subjective:   Gale Morley is a 48 y.o. female who presents to clinic today for anticoagulation monitoring and adjustment.  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   INR goal: of 2.5-3.5.  Duration of therapy: indefinite.    Assessment and PLAN   PT/INR done in office per protocol.   INR today is 2.0, sub therapeutic.  Low INR due to missing a dose and taking a reduced dose of warfarin this past weekend.     Plan: Increase warfarin to 11.25 mg today only.  Then continue current regimen of warfarin 11.25 mg Thursday and 7.5 mg daily all other days of the week.   Using warfarin 7.5 mg tablets.  Recheck INR in 1& 1/2 weeks   Patient seen in room # 1.  ED OP:  Cautioned patient about increased risk of clotting with active cancer.  Patient verbalized understanding of dosing directions and information discussed.   Dosing schedule given to patient. Progress note sent to referring office.     Ladi ESPINO. Ph., CACP, Clinical Pharmacist  Anticoagulation Services, [facility]  1/15/2025  10:00 AM    For Pharmacy Admin Tracking Only    Intervention Detail: Adherence Monitorin and Dose Adjustment: 1, reason: Therapy Optimization  Total # of Interventions Recommended: 2  Total # of Interventions Accepted: 2  Time Spent (min): 20

## 2025-01-16 ENCOUNTER — TELEPHONE (OUTPATIENT)
Dept: ONCOLOGY | Age: 50
End: 2025-01-16

## 2025-01-16 NOTE — TELEPHONE ENCOUNTER
PATIENT CALLED ON 1/16/25 TO CANCEL APPOINTMENT FOR 1/27/25 BECAUSE HER OB-DR LAWSON (THE [facility]), SAID THAT SHE DOESN'T NEED TO SEE DR SOLO (PATIENT DID NOT WANT TO RESCHEDULE).

## 2025-01-27 ENCOUNTER — ANTI-COAG VISIT (OUTPATIENT)
Age: 50
End: 2025-01-27
Payer: COMMERCIAL

## 2025-01-27 DIAGNOSIS — I70.229 CRITICAL LIMB ISCHEMIA WITH HISTORY OF REVASCULARIZATION OF SAME EXTREMITY (HCC): Primary | ICD-10-CM

## 2025-01-27 DIAGNOSIS — I74.9 ARTERIAL THROMBOSIS (HCC): ICD-10-CM

## 2025-01-27 DIAGNOSIS — Z98.890 CRITICAL LIMB ISCHEMIA WITH HISTORY OF REVASCULARIZATION OF SAME EXTREMITY (HCC): Primary | ICD-10-CM

## 2025-01-27 DIAGNOSIS — I74.3 ACUTE OCCLUSION OF ARTERY OF LOWER EXTREMITY DUE TO THROMBOSIS (HCC): ICD-10-CM

## 2025-01-27 LAB
INTERNATIONAL NORMALIZATION RATIO, POC: 3.3
PROTHROMBIN TIME, POC: 39.6

## 2025-01-27 PROCEDURE — 85610 PROTHROMBIN TIME: CPT

## 2025-01-27 PROCEDURE — 99211 OFF/OP EST MAY X REQ PHY/QHP: CPT

## 2025-01-27 NOTE — PROGRESS NOTES
Medication Management Service, Warfarin Management  [facility], [phone]  Visit Date: 4/18/2024   Subjective:   Gale Morley is a 48 y.o. female who presents to clinic today for anticoagulation monitoring and adjustment.  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   INR goal: of 2.5-3.5.  Duration of therapy: indefinite.    Assessment and PLAN   PT/INR done in office per protocol.   INR today is 3.3, therapeutic.      Plan:  Continue current regimen of warfarin 11.25 mg Thursday and 7.5 mg daily all other days of the week.   Using warfarin 7.5 mg tablets.  Recheck INR in 3 weeks   Patient seen in room # 2.    Of Note: Medication list reviewed. Six medications were removed that patient is not longer on.     Patient verbalized understanding of dosing directions and information discussed.   Dosing schedule given to patient. Progress note sent to referring office.     Ladi ESPINO. Ph., CACP, Clinical Pharmacist  Anticoagulation Services, [facility]  1/27/2025  10:43 AM    For Pharmacy Admin Tracking Only    Intervention Detail:   Total # of Interventions Recommended: 0  Total # of Interventions Accepted: 0  Time Spent (min): 20

## 2025-01-31 DIAGNOSIS — I73.9 PAD (PERIPHERAL ARTERY DISEASE) (HCC): Primary | ICD-10-CM

## 2025-02-19 ENCOUNTER — ANTI-COAG VISIT (OUTPATIENT)
Age: 50
End: 2025-02-19
Payer: COMMERCIAL

## 2025-02-19 DIAGNOSIS — I74.3 ACUTE OCCLUSION OF ARTERY OF LOWER EXTREMITY DUE TO THROMBOSIS (HCC): ICD-10-CM

## 2025-02-19 DIAGNOSIS — I70.229 CRITICAL LIMB ISCHEMIA WITH HISTORY OF REVASCULARIZATION OF SAME EXTREMITY (HCC): Primary | ICD-10-CM

## 2025-02-19 DIAGNOSIS — Z98.890 CRITICAL LIMB ISCHEMIA WITH HISTORY OF REVASCULARIZATION OF SAME EXTREMITY (HCC): Primary | ICD-10-CM

## 2025-02-19 DIAGNOSIS — I74.9 ARTERIAL THROMBOSIS (HCC): ICD-10-CM

## 2025-02-19 LAB
INR BLD: 2.3
PROTIME: 28.2

## 2025-02-19 PROCEDURE — 85610 PROTHROMBIN TIME: CPT

## 2025-02-19 PROCEDURE — 99211 OFF/OP EST MAY X REQ PHY/QHP: CPT

## 2025-02-19 NOTE — PROGRESS NOTES
Medication Management Service, Warfarin Management  [facility] Medication Management, [phone]  Visit Date: 2/19/2025   Subjective:   Gale Morley is a 49 y.o. female who presents to clinic today for anticoagulation monitoring and adjustment.  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  .   INR goal: of 2.0-3.0.  Duration of therapy: indefinite.    Assessment and PLAN   PT/INR done in office per protocol.   INR today is 2.3, therapeutic.        Plan:  Will continue current regimen of warfarin 11.25 mg on Thursdays and 7.5 mg on all other days of the week.   Using warfarin 7.5 mg tablets.  Recheck INR in 3 week(s).   Patient seen in room # 1.      Patient verbalized understanding of dosing directions and information discussed. Dosing schedule given to patient. Progress note sent to referring office.       Electronically signed by LUCIAN ROONEY on 2/19/25 at 10:53 AM EST     For Pharmacy Admin Tracking Only    Intervention Detail:   Total # of Interventions Recommended: 0  Total # of Interventions Accepted: 0  Time Spent (min): 15

## 2025-03-12 ENCOUNTER — ANTI-COAG VISIT (OUTPATIENT)
Age: 50
End: 2025-03-12
Payer: COMMERCIAL

## 2025-03-12 DIAGNOSIS — I70.229 CRITICAL LIMB ISCHEMIA WITH HISTORY OF REVASCULARIZATION OF SAME EXTREMITY (HCC): Primary | ICD-10-CM

## 2025-03-12 DIAGNOSIS — Z98.890 CRITICAL LIMB ISCHEMIA WITH HISTORY OF REVASCULARIZATION OF SAME EXTREMITY (HCC): Primary | ICD-10-CM

## 2025-03-12 DIAGNOSIS — I74.3 ACUTE OCCLUSION OF ARTERY OF LOWER EXTREMITY DUE TO THROMBOSIS (HCC): ICD-10-CM

## 2025-03-12 DIAGNOSIS — I74.9 ARTERIAL THROMBOSIS (HCC): ICD-10-CM

## 2025-03-12 LAB
INR BLD: 3
PROTIME: 35.8

## 2025-03-12 PROCEDURE — 85610 PROTHROMBIN TIME: CPT

## 2025-03-12 PROCEDURE — 99211 OFF/OP EST MAY X REQ PHY/QHP: CPT

## 2025-03-12 NOTE — PROGRESS NOTES
Medication Management Service, Warfarin Management  [facility] Medication Management, [phone]  Visit Date: 3/12/2025   Subjective:   Gael Morley is a 49 y.o. female who presents to clinic today for anticoagulation monitoring and adjustment.  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  .   INR goal: of 2.0-3.0.  Duration of therapy: indefinite.    Assessment and PLAN   PT/INR done in office per protocol.   INR today is 3.0, therapeutic.        Plan:  Will continue current regimen of warfarin 11.25 mg on  and 7.5 mg on all other days of the week.   Using warfarin 7.5 mg tablets.  Recheck INR in 4 week(s).   Patient seen in room # 1.    ED OP: 2-3 servings of green vegetables per week.     Patient verbalized understanding of dosing directions and information discussed. Dosing schedule given to patient. Progress note sent to referring office.     Electronically signed by FABIAN DUNN on 3/12/25 at 11:13 AM EST     For Pharmacy Admin Tracking Only    Intervention Detail: Adherence Monitorin  Total # of Interventions Recommended: 1  Total # of Interventions Accepted: 1  Time Spent (min): 15

## 2025-03-19 ENCOUNTER — TELEPHONE (OUTPATIENT)
Dept: FAMILY MEDICINE CLINIC | Age: 50
End: 2025-03-19

## 2025-03-19 NOTE — TELEPHONE ENCOUNTER
Patient was scheduled with another office by scheduling dept. Patient didn't want to be scheduled with a new office. Writer scheduled patient with care team. Patient is scheduled on 3-21-25 with care team as pcp no openings until end of the month. Patient was upset that scheduling dept schedule her with a new location as she didn't want to switch providers.

## 2025-03-21 ENCOUNTER — OFFICE VISIT (OUTPATIENT)
Dept: FAMILY MEDICINE CLINIC | Age: 50
End: 2025-03-21
Payer: COMMERCIAL

## 2025-03-21 VITALS
SYSTOLIC BLOOD PRESSURE: 178 MMHG | HEIGHT: 65 IN | DIASTOLIC BLOOD PRESSURE: 96 MMHG | WEIGHT: 186.6 LBS | HEART RATE: 81 BPM | BODY MASS INDEX: 31.09 KG/M2 | OXYGEN SATURATION: 98 % | TEMPERATURE: 97.7 F

## 2025-03-21 DIAGNOSIS — I10 ESSENTIAL HYPERTENSION: Primary | ICD-10-CM

## 2025-03-21 DIAGNOSIS — R42 LIGHTHEADEDNESS: ICD-10-CM

## 2025-03-21 DIAGNOSIS — R73.03 PREDIABETES: ICD-10-CM

## 2025-03-21 LAB — HBA1C MFR BLD: 6.4 %

## 2025-03-21 PROCEDURE — 99211 OFF/OP EST MAY X REQ PHY/QHP: CPT | Performed by: FAMILY MEDICINE

## 2025-03-21 PROCEDURE — 83036 HEMOGLOBIN GLYCOSYLATED A1C: CPT

## 2025-03-21 PROCEDURE — 3079F DIAST BP 80-89 MM HG: CPT

## 2025-03-21 PROCEDURE — 3077F SYST BP >= 140 MM HG: CPT

## 2025-03-21 PROCEDURE — G8427 DOCREV CUR MEDS BY ELIG CLIN: HCPCS

## 2025-03-21 PROCEDURE — G8417 CALC BMI ABV UP PARAM F/U: HCPCS

## 2025-03-21 PROCEDURE — 1036F TOBACCO NON-USER: CPT

## 2025-03-21 PROCEDURE — 99213 OFFICE O/P EST LOW 20 MIN: CPT

## 2025-03-21 RX ORDER — AMLODIPINE BESYLATE 5 MG/1
5 TABLET ORAL DAILY
Qty: 90 TABLET | Refills: 0 | Status: SHIPPED | OUTPATIENT
Start: 2025-03-21

## 2025-03-21 SDOH — ECONOMIC STABILITY: FOOD INSECURITY: WITHIN THE PAST 12 MONTHS, THE FOOD YOU BOUGHT JUST DIDN'T LAST AND YOU DIDN'T HAVE MONEY TO GET MORE.: NEVER TRUE

## 2025-03-21 SDOH — ECONOMIC STABILITY: FOOD INSECURITY: WITHIN THE PAST 12 MONTHS, YOU WORRIED THAT YOUR FOOD WOULD RUN OUT BEFORE YOU GOT MONEY TO BUY MORE.: NEVER TRUE

## 2025-03-21 ASSESSMENT — PATIENT HEALTH QUESTIONNAIRE - PHQ9
3. TROUBLE FALLING OR STAYING ASLEEP: SEVERAL DAYS
1. LITTLE INTEREST OR PLEASURE IN DOING THINGS: NOT AT ALL
SUM OF ALL RESPONSES TO PHQ QUESTIONS 1-9: 5
7. TROUBLE CONCENTRATING ON THINGS, SUCH AS READING THE NEWSPAPER OR WATCHING TELEVISION: NOT AT ALL
SUM OF ALL RESPONSES TO PHQ QUESTIONS 1-9: 5
9. THOUGHTS THAT YOU WOULD BE BETTER OFF DEAD, OR OF HURTING YOURSELF: NOT AT ALL
4. FEELING TIRED OR HAVING LITTLE ENERGY: NEARLY EVERY DAY
5. POOR APPETITE OR OVEREATING: SEVERAL DAYS
2. FEELING DOWN, DEPRESSED OR HOPELESS: NOT AT ALL
10. IF YOU CHECKED OFF ANY PROBLEMS, HOW DIFFICULT HAVE THESE PROBLEMS MADE IT FOR YOU TO DO YOUR WORK, TAKE CARE OF THINGS AT HOME, OR GET ALONG WITH OTHER PEOPLE: NOT DIFFICULT AT ALL
SUM OF ALL RESPONSES TO PHQ QUESTIONS 1-9: 5
8. MOVING OR SPEAKING SO SLOWLY THAT OTHER PEOPLE COULD HAVE NOTICED. OR THE OPPOSITE, BEING SO FIGETY OR RESTLESS THAT YOU HAVE BEEN MOVING AROUND A LOT MORE THAN USUAL: NOT AT ALL
SUM OF ALL RESPONSES TO PHQ QUESTIONS 1-9: 5
6. FEELING BAD ABOUT YOURSELF - OR THAT YOU ARE A FAILURE OR HAVE LET YOURSELF OR YOUR FAMILY DOWN: NOT AT ALL

## 2025-03-21 ASSESSMENT — ENCOUNTER SYMPTOMS
NAUSEA: 0
CONSTIPATION: 0
DIARRHEA: 0
VOMITING: 0
ABDOMINAL PAIN: 0
SHORTNESS OF BREATH: 0
WHEEZING: 0

## 2025-03-21 NOTE — PROGRESS NOTES
Attending Physician Statement  I  have discussed the care of Gale Morley including pertinent history and exam findings with the resident. I agree with the assessment, plan and orders as documented by the resident.      BP (!) 178/96 (BP Site: Left Upper Arm, Patient Position: Sitting, BP Cuff Size: Medium Adult)   Pulse 81   Temp 97.7 °F (36.5 °C) (Oral)   Ht 1.651 m (5' 5\")   Wt 84.6 kg (186 lb 9.6 oz)   LMP 12/15/2024 (Approximate)   SpO2 98%   BMI 31.05 kg/m²    BP Readings from Last 3 Encounters:   03/21/25 (!) 178/96   01/10/25 (!) 200/91   01/03/25 135/80     Wt Readings from Last 3 Encounters:   03/21/25 84.6 kg (186 lb 9.6 oz)   01/03/25 86.6 kg (191 lb)   11/07/24 83.9 kg (185 lb)          Diagnosis Orders   1. Essential hypertension  TSH    amLODIPine (NORVASC) 5 MG tablet      2. Prediabetes  POCT glycosylated hemoglobin (Hb A1C)      3. Lightheadedness                Brandon Montenegro DO 3/21/2025 4:23 PM      
Visit Information    Have you changed or started any medications since your last visit including any over-the-counter medicines, vitamins, or herbal medicines? no   Have you stopped taking any of your medications? Is so, why? -  yes - see list  Are you having any side effects from any of your medications? - no    Have you seen any other physician or provider since your last visit?  no   Have you had any other diagnostic tests since your last visit?  no   Have you been seen in the emergency room and/or had an admission in a hospital since we last saw you?  no   Have you had your routine dental cleaning in the past 6 months?  no     Do you have an active MyChart account? If no, what is the barrier?  No: pending    Patient Care Team:  Lola Chawla MD as PCP - General (Family Medicine)  Alli Merlos MD as Consulting Physician (Gastroenterology)    Medical History Review  Past Medical, Family, and Social History reviewed and does not contribute to the patient presenting condition    Health Maintenance   Topic Date Due    Hepatitis B vaccine (1 of 3 - 19+ 3-dose series) Never done    DTaP/Tdap/Td vaccine (1 - Tdap) Never done    Flu vaccine (1) 08/01/2024    COVID-19 Vaccine (1 - 2024-25 season) Never done    Annual Wellness Visit (Medicare Advantage)  Never done    Colorectal Cancer Screen  01/04/2025    A1C test (Diabetic or Prediabetic)  04/09/2025    Breast cancer screen  04/16/2025    Depression Monitoring  05/10/2025    Cervical cancer screen  05/24/2027    Lipids  10/25/2027    Hepatitis C screen  Completed    HIV screen  Completed    Hepatitis A vaccine  Aged Out    Hib vaccine  Aged Out    Polio vaccine  Aged Out    Meningococcal (ACWY) vaccine  Aged Out    Meningococcal B vaccine  Aged Out             
exception of lightheadedness and headaches.  Currently patient mentions she is not taking any antihypertensive medications.  At this time I will refill her Norvasc and advised her to start taking.  Additionally I will also order TSH.  Patient will follow-up in 4 weeks for hypertension recheck.  - TSH; Future  - amLODIPine (NORVASC) 5 MG tablet; Take 1 tablet by mouth daily  Dispense: 90 tablet; Refill: 0    2. Prediabetes  - POCT glycosylated hemoglobin (Hb A1C)    3. Lightheadedness  -See essential hypertension note above.  Lightheadedness and headaches started 1 month ago however headaches have worsened since past 2 months.  No red flag signs.  CT head and CTA reviewed.  Patient is advised to start antihypertensive medications.  Patient will follow-up in 4 weeks.  Patient may also have a component of obstructive sleep apnea which may be contributing to her headaches.  If symptoms persist, consider neurology referral and/or sleep study.          Requested Prescriptions     Signed Prescriptions Disp Refills    amLODIPine (NORVASC) 5 MG tablet 90 tablet 0     Sig: Take 1 tablet by mouth daily       Medications Discontinued During This Encounter   Medication Reason    amLODIPine (NORVASC) 5 MG tablet REORDER       Gale received counseling on the following healthy behaviors: nutrition, exercise and medication adherence    Discussed use,benefit, and side effects of prescribed medications.  Barriers to medication compliance addressed.      All patient questions answered.  Pt voiced understanding.     Return in about 1 month (around 4/21/2025).        Disclaimer: Some orall of this note was transcribed using voice-recognition software.This may cause typographical errors occasionally. Although all effort is made to fix these errors, please do not hesitate to contact our office if there isany concern with the understanding of this note.

## 2025-03-21 NOTE — PATIENT INSTRUCTIONS
Thank you for letting us take care of you today. We hope all your questions were addressed. If a question was overlooked or something else comes to mind after you return home, please contact a member of your Care Team listed below.      Your Care Team at [facility] is Team #  Semaj Contreras M.D. (Faculty)  Romina Gonzalez M.D. (Resident)  Alicia Michelle M.D. (Resident)   Delicia Rodriguez M.D. (Resident)  Rosendo Gutierrez M.D. (Resident)  Lola Chawla M.D. (Resident)  Latoya Magallon., [facility]  Judith Escobar, Critical access hospital  Gisselle Ma, [facility]  Ernst Chiu, [facility]  Aicha Forbes, [facility]  Lina Bhardwaj, Critical access hospital  Shahbaz Wolf (LJ) HUSAM Nickerson (Clinical Practice Manager)  Isabelle Joyner [facility] (Clinical Pharmacist)     Office phone number: [phone]    If you need to get in right away due to illness, please be advised we have \"Same Day\" appointments available Monday-Friday. Please call us at [phone] option #3 to schedule your \"Same Day\" appointment.

## 2025-03-28 ENCOUNTER — HOSPITAL ENCOUNTER (EMERGENCY)
Age: 50
Discharge: HOME | End: 2025-03-29
Payer: COMMERCIAL

## 2025-03-28 DIAGNOSIS — I10 HYPERTENSION, UNSPECIFIED TYPE: Primary | ICD-10-CM

## 2025-03-28 DIAGNOSIS — R42 DIZZINESS: ICD-10-CM

## 2025-03-28 PROCEDURE — 99285 EMERGENCY DEPT VISIT HI MDM: CPT

## 2025-03-28 PROCEDURE — 93005 ELECTROCARDIOGRAM TRACING: CPT

## 2025-03-28 ASSESSMENT — PAIN SCALES - GENERAL: PAINLEVEL_OUTOF10: 8

## 2025-03-28 ASSESSMENT — PAIN DESCRIPTION - LOCATION: LOCATION: HEAD

## 2025-03-29 ENCOUNTER — APPOINTMENT (OUTPATIENT)
Dept: CT IMAGING | Age: 50
End: 2025-03-29
Payer: COMMERCIAL

## 2025-03-29 ENCOUNTER — APPOINTMENT (OUTPATIENT)
Dept: GENERAL RADIOLOGY | Age: 50
End: 2025-03-29
Payer: COMMERCIAL

## 2025-03-29 VITALS
OXYGEN SATURATION: 92 % | BODY MASS INDEX: 31.49 KG/M2 | SYSTOLIC BLOOD PRESSURE: 145 MMHG | HEART RATE: 70 BPM | WEIGHT: 189 LBS | DIASTOLIC BLOOD PRESSURE: 79 MMHG | RESPIRATION RATE: 28 BRPM | HEIGHT: 65 IN | TEMPERATURE: 98.1 F

## 2025-03-29 LAB
ALBUMIN SERPL-MCNC: 4.5 G/DL (ref 3.5–5.2)
ALBUMIN/GLOB SERPL: 1.4 {RATIO} (ref 1–2.5)
ALP SERPL-CCNC: 90 U/L (ref 35–104)
ALT SERPL-CCNC: 19 U/L (ref 10–35)
ANION GAP SERPL CALCULATED.3IONS-SCNC: 14 MMOL/L (ref 9–16)
AST SERPL-CCNC: 20 U/L (ref 10–35)
BASOPHILS # BLD: 0.03 K/UL (ref 0–0.2)
BASOPHILS NFR BLD: 0 % (ref 0–2)
BILIRUB DIRECT SERPL-MCNC: <0.1 MG/DL (ref 0–0.2)
BILIRUB INDIRECT SERPL-MCNC: NORMAL MG/DL
BILIRUB SERPL-MCNC: <0.2 MG/DL (ref 0–1.2)
BUN SERPL-MCNC: 14 MG/DL (ref 6–20)
CALCIUM SERPL-MCNC: 9.5 MG/DL (ref 8.6–10.4)
CHLORIDE SERPL-SCNC: 103 MMOL/L (ref 98–107)
CO2 SERPL-SCNC: 21 MMOL/L (ref 20–31)
CREAT SERPL-MCNC: 0.8 MG/DL (ref 0.5–0.9)
D DIMER PPP FEU-MCNC: <0.27 UG/ML FEU (ref 0–0.59)
EKG ATRIAL RATE: 74 BPM
EKG P AXIS: 30 DEGREES
EKG P-R INTERVAL: 152 MS
EKG Q-T INTERVAL: 412 MS
EKG QRS DURATION: 92 MS
EKG QTC CALCULATION (BAZETT): 457 MS
EKG R AXIS: -24 DEGREES
EKG T AXIS: -3 DEGREES
EKG VENTRICULAR RATE: 74 BPM
EOSINOPHIL # BLD: 0.12 K/UL (ref 0–0.44)
EOSINOPHILS RELATIVE PERCENT: 2 % (ref 1–4)
ERYTHROCYTE [DISTWIDTH] IN BLOOD BY AUTOMATED COUNT: 14.7 % (ref 11.8–14.4)
GFR, ESTIMATED: >90 ML/MIN/1.73M2
GLUCOSE SERPL-MCNC: 117 MG/DL (ref 74–99)
HCG SERPL QL: NEGATIVE
HCT VFR BLD AUTO: 39.3 % (ref 36.3–47.1)
HGB BLD-MCNC: 13.4 G/DL (ref 11.9–15.1)
IMM GRANULOCYTES # BLD AUTO: 0.02 K/UL (ref 0–0.3)
IMM GRANULOCYTES NFR BLD: 0 %
INR PPP: 2.4
LYMPHOCYTES NFR BLD: 1.77 K/UL (ref 1.1–3.7)
LYMPHOCYTES RELATIVE PERCENT: 24 % (ref 24–43)
MAGNESIUM SERPL-MCNC: 1.9 MG/DL (ref 1.6–2.6)
MCH RBC QN AUTO: 29.5 PG (ref 25.2–33.5)
MCHC RBC AUTO-ENTMCNC: 34.1 G/DL (ref 28.4–34.8)
MCV RBC AUTO: 86.4 FL (ref 82.6–102.9)
MONOCYTES NFR BLD: 0.64 K/UL (ref 0.1–1.2)
MONOCYTES NFR BLD: 9 % (ref 3–12)
NEUTROPHILS NFR BLD: 65 % (ref 36–65)
NEUTS SEG NFR BLD: 4.85 K/UL (ref 1.5–8.1)
NRBC BLD-RTO: 0 PER 100 WBC
PLATELET # BLD AUTO: 316 K/UL (ref 138–453)
PMV BLD AUTO: 10.3 FL (ref 8.1–13.5)
POTASSIUM SERPL-SCNC: 3.8 MMOL/L (ref 3.7–5.3)
PROT SERPL-MCNC: 7.7 G/DL (ref 6.6–8.7)
PROTHROMBIN TIME: 27 SEC (ref 11.5–14.2)
RBC # BLD AUTO: 4.55 M/UL (ref 3.95–5.11)
RBC # BLD: ABNORMAL 10*6/UL
SODIUM SERPL-SCNC: 138 MMOL/L (ref 136–145)
TROPONIN I SERPL HS-MCNC: 7 NG/L (ref 0–14)
WBC OTHER # BLD: 7.4 K/UL (ref 3.5–11.3)

## 2025-03-29 PROCEDURE — 6370000000 HC RX 637 (ALT 250 FOR IP)

## 2025-03-29 PROCEDURE — 80048 BASIC METABOLIC PNL TOTAL CA: CPT

## 2025-03-29 PROCEDURE — 71045 X-RAY EXAM CHEST 1 VIEW: CPT

## 2025-03-29 PROCEDURE — 84703 CHORIONIC GONADOTROPIN ASSAY: CPT

## 2025-03-29 PROCEDURE — 70450 CT HEAD/BRAIN W/O DYE: CPT

## 2025-03-29 PROCEDURE — 83735 ASSAY OF MAGNESIUM: CPT

## 2025-03-29 PROCEDURE — 85025 COMPLETE CBC W/AUTO DIFF WBC: CPT

## 2025-03-29 PROCEDURE — 85379 FIBRIN DEGRADATION QUANT: CPT

## 2025-03-29 PROCEDURE — 85610 PROTHROMBIN TIME: CPT

## 2025-03-29 PROCEDURE — 84484 ASSAY OF TROPONIN QUANT: CPT

## 2025-03-29 PROCEDURE — 80076 HEPATIC FUNCTION PANEL: CPT

## 2025-03-29 RX ORDER — AMLODIPINE BESYLATE 5 MG/1
5 TABLET ORAL ONCE
Status: COMPLETED | OUTPATIENT
Start: 2025-03-29 | End: 2025-03-29

## 2025-03-29 RX ADMIN — AMLODIPINE BESYLATE 5 MG: 5 TABLET ORAL at 02:03

## 2025-03-29 ASSESSMENT — ENCOUNTER SYMPTOMS
ABDOMINAL PAIN: 0
NAUSEA: 0
VOMITING: 0
SHORTNESS OF BREATH: 0

## 2025-03-29 NOTE — ED PROVIDER NOTES
[facility] EMERGENCY DEPARTMENT  Emergency Department Encounter  Emergency Medicine Attending     Pt Name:Gale Morley  MRN: [id number]  Birthdate 1975  Date of evaluation: 3/29/25  PCP:  Lola Chawla MD  Note Started: 5:09 AM EDT      CHIEF COMPLAINT       Chief Complaint   Patient presents with    Dizziness    Palpitations    Headache    Nausea       HISTORY OF PRESENT ILLNESS  (Location/Symptom, Timing/Onset, Context/Setting, Quality, Duration, Modifying Factors, Severity.)      49-year-old female presenting for evaluation of dizziness and palpitations.  Patient states her dizziness has been intermittent over the last few weeks.  She does state that she has been out of her Norvasc for the past 3 days or so.  She has significant up from the pharmacy.  No chest pain but does feel her heart racing and feels diaphoretic at times.            PAST MEDICAL / SURGICAL / SOCIAL / FAMILY HISTORY      has a past medical history of Abnormal Pap smear of cervix, Anemia, Anxiety, Borderline diabetes, Chronic back pain, Claudication, Claudication in peripheral vascular disease, Depression, Headache(784.0), Heart murmur, Hemorrhage of rectum and anus, History of blood transfusion, HTN (hypertension), Hx of blood clots, Hypercoagulable state, Hyperlipidemia, Hypertension, Intertriginous candidiasis, Left popliteal artery occlusion, Leg pain, Menometrorrhagia, Migraine headache with aura, Obesity, Osteoarthritis, PAD (peripheral artery disease), Patient in clinical research study, PVD (peripheral vascular disease), Takotsubo cardiomyopathy, Under care of service provider, Under care of service provider, Under care of service provider, and Wound of right leg.     has a past surgical history that includes Tubal ligation ();  section (); other surgical history (2017); other surgical history (Left, 2018); other surgical history (Left, 2018); vascular surgery (Left, 2018); pr roprtj >    Constitutional:       General: She is not in acute distress.     Appearance: Normal appearance. She is not ill-appearing, toxic-appearing or diaphoretic.   HENT:      Head: Normocephalic and atraumatic.   Eyes:      Extraocular Movements: Extraocular movements intact.      Pupils: Pupils are equal, round, and reactive to light.   Cardiovascular:      Rate and Rhythm: Normal rate and regular rhythm.      Pulses: Normal pulses.      Heart sounds: Normal heart sounds. No murmur heard.  Pulmonary:      Effort: Pulmonary effort is normal. No respiratory distress.      Breath sounds: Normal breath sounds. No wheezing or rales.   Abdominal:      General: Abdomen is flat.      Palpations: Abdomen is soft.      Tenderness: There is no abdominal tenderness.   Skin:     General: Skin is warm and dry.      Capillary Refill: Capillary refill takes less than 2 seconds.   Neurological:      General: No focal deficit present.      Mental Status: She is alert and oriented to person, place, and time. Mental status is at baseline.      GCS: GCS eye subscore is 4. GCS verbal subscore is 5. GCS motor subscore is 6.      Cranial Nerves: Cranial nerves 2-12 are intact.      Sensory: Sensation is intact.      Motor: Motor function is intact. No weakness.           DDX/DIAGNOSTIC RESULTS / EMERGENCY DEPARTMENT COURSE / MDM     Medical Decision Making  49-year-old female presenting for evaluation of dizziness and palpitations and nausea.  EKG unremarkable patient in no distress and vitals appear stable.  Labs and imaging ordered and reviewed.  Labs unremarkable similar to baseline.  INR therapeutic.  CT head shows no acute abnormality chest x-ray normal.  Patient has been experiencing intermittent symptoms for the past few weeks.  Will plan for discharge and follow-up with her PCP.  No acute findings at this time    Shared decision performed with patient/family/friends regarding discharge.  They are in agreement at this time for plan for
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
## 2025-03-31 DIAGNOSIS — I74.9 ARTERIAL THROMBOSIS (HCC): ICD-10-CM

## 2025-03-31 LAB
EKG ATRIAL RATE: 74 BPM
EKG P AXIS: 30 DEGREES
EKG P-R INTERVAL: 152 MS
EKG Q-T INTERVAL: 412 MS
EKG QRS DURATION: 92 MS
EKG QTC CALCULATION (BAZETT): 457 MS
EKG R AXIS: -24 DEGREES
EKG T AXIS: -3 DEGREES
EKG VENTRICULAR RATE: 74 BPM

## 2025-03-31 RX ORDER — TICAGRELOR 90 MG/1
90 TABLET ORAL 2 TIMES DAILY
Qty: 180 TABLET | Refills: 0 | Status: SHIPPED | OUTPATIENT
Start: 2025-03-31

## 2025-03-31 NOTE — TELEPHONE ENCOUNTER
Please address the medication refill and close the encounter.  If I can be of assistance, please route to the applicable pool.      Thank you.      Last visit: 3-  Last Med refill: 12-  Does patient have enough medication for 72 hours: No:     Next Visit Date:  Future Appointments   Date Time Provider Department Center   4/9/2025 10:00 AM [facility] MEDICATION MGMT [facility]   4/16/2025  1:45 PM STA DIAG MAMMO RM 3 [facility] MAMMO [facility]iolog   4/21/2025  1:30 PM Lola Chawla MD [facility] ECC DEP   6/26/2025  1:15 PM [facility]       Health Maintenance   Topic Date Due    Hepatitis B vaccine (1 of 3 - 19+ 3-dose series) Never done    DTaP/Tdap/Td vaccine (1 - Tdap) Never done    Flu vaccine (1) 08/01/2024    COVID-19 Vaccine (1 - 2024-25 season) Never done    Annual Wellness Visit (Medicare Advantage)  Never done    Colorectal Cancer Screen  01/04/2025    Breast cancer screen  04/16/2025    A1C test (Diabetic or Prediabetic)  03/21/2026    Depression Monitoring  03/21/2026    Cervical cancer screen  05/24/2027    Lipids  10/25/2027    Hepatitis C screen  Completed    HIV screen  Completed    Hepatitis A vaccine  Aged Out    Hib vaccine  Aged Out    Polio vaccine  Aged Out    Meningococcal (ACWY) vaccine  Aged Out    Meningococcal B vaccine  Aged Out       Hemoglobin A1C (%)   Date Value   03/21/2025 6.4   04/09/2024 6.2   10/13/2022 6.1             ( goal A1C is < 7)   No components found for: \"LABMICR\"  No components found for: \"LDLCHOLESTEROL\", \"LDLCALC\"    (goal LDL is <100)   AST (U/L)   Date Value   03/29/2025 20     ALT (U/L)   Date Value   03/29/2025 19     BUN (mg/dL)   Date Value   03/29/2025 14     BP Readings from Last 3 Encounters:   03/29/25 (!) 145/79   03/21/25 (!) 178/96   01/10/25 (!) 200/91          (goal 120/80)    All Future Testing planned in [facility]  Lab Frequency Next Occurrence   Lipid Panel Once 04/09/2024   CBC Once 04/09/2024

## 2025-04-01 ENCOUNTER — HOSPITAL ENCOUNTER (INPATIENT)
Age: 50
LOS: 2 days | Discharge: HOME | End: 2025-04-03
Attending: EMERGENCY MEDICINE | Admitting: PSYCHIATRY & NEUROLOGY
Payer: COMMERCIAL

## 2025-04-01 DIAGNOSIS — R07.9 CHEST PAIN, UNSPECIFIED TYPE: ICD-10-CM

## 2025-04-01 DIAGNOSIS — I63.9 CEREBROVASCULAR ACCIDENT (CVA), UNSPECIFIED MECHANISM (HCC): ICD-10-CM

## 2025-04-01 DIAGNOSIS — I42.9 CARDIOMYOPATHY, UNSPECIFIED TYPE (HCC): ICD-10-CM

## 2025-04-01 DIAGNOSIS — R51.9 NONINTRACTABLE HEADACHE, UNSPECIFIED CHRONICITY PATTERN, UNSPECIFIED HEADACHE TYPE: Primary | ICD-10-CM

## 2025-04-01 DIAGNOSIS — R40.4 STARING EPISODES: ICD-10-CM

## 2025-04-01 PROBLEM — R46.89 SPELL OF ABNORMAL BEHAVIOR: Status: ACTIVE | Noted: 2025-04-01

## 2025-04-01 LAB
25(OH)D3 SERPL-MCNC: 16.9 NG/ML (ref 30–100)
ANION GAP SERPL CALCULATED.3IONS-SCNC: 15 MMOL/L (ref 9–16)
BASOPHILS # BLD: 0.03 K/UL (ref 0–0.2)
BASOPHILS NFR BLD: 0 % (ref 0–2)
BUN SERPL-MCNC: 12 MG/DL (ref 6–20)
CALCIUM SERPL-MCNC: 10.1 MG/DL (ref 8.6–10.4)
CHLORIDE SERPL-SCNC: 105 MMOL/L (ref 98–107)
CO2 SERPL-SCNC: 21 MMOL/L (ref 20–31)
CREAT SERPL-MCNC: 0.9 MG/DL (ref 0.6–0.9)
EOSINOPHIL # BLD: 0.13 K/UL (ref 0–0.44)
EOSINOPHILS RELATIVE PERCENT: 2 % (ref 1–4)
ERYTHROCYTE [DISTWIDTH] IN BLOOD BY AUTOMATED COUNT: 15 % (ref 11.8–14.4)
FOLATE SERPL-MCNC: >40 NG/ML (ref 4.8–24.2)
GFR, ESTIMATED: 78 ML/MIN/1.73M2
GLUCOSE BLD-MCNC: 116 MG/DL (ref 65–105)
GLUCOSE SERPL-MCNC: 120 MG/DL (ref 74–99)
HCG SERPL QL: NEGATIVE
HCT VFR BLD AUTO: 43.8 % (ref 36.3–47.1)
HGB BLD-MCNC: 14.3 G/DL (ref 11.9–15.1)
HIV 1+2 AB+HIV1 P24 AG SERPL QL IA: NONREACTIVE
IMM GRANULOCYTES # BLD AUTO: <0.03 K/UL (ref 0–0.3)
IMM GRANULOCYTES NFR BLD: 0 %
INR PPP: 2.5
LYMPHOCYTES NFR BLD: 2.15 K/UL (ref 1.1–3.7)
LYMPHOCYTES RELATIVE PERCENT: 27 % (ref 24–43)
MCH RBC QN AUTO: 28.3 PG (ref 25.2–33.5)
MCHC RBC AUTO-ENTMCNC: 32.6 G/DL (ref 28.4–34.8)
MCV RBC AUTO: 86.6 FL (ref 82.6–102.9)
MONOCYTES NFR BLD: 0.51 K/UL (ref 0.1–1.2)
MONOCYTES NFR BLD: 6 % (ref 3–12)
NEUTROPHILS NFR BLD: 65 % (ref 36–65)
NEUTS SEG NFR BLD: 5.1 K/UL (ref 1.5–8.1)
NRBC BLD-RTO: 0 PER 100 WBC
PLATELET # BLD AUTO: 329 K/UL (ref 138–453)
PMV BLD AUTO: 10.1 FL (ref 8.1–13.5)
POTASSIUM SERPL-SCNC: 4.4 MMOL/L (ref 3.7–5.3)
PROTHROMBIN TIME: 28.1 SEC (ref 11.7–14.9)
RBC # BLD AUTO: 5.06 M/UL (ref 3.95–5.11)
RBC # BLD: ABNORMAL 10*6/UL
SODIUM SERPL-SCNC: 141 MMOL/L (ref 136–145)
T PALLIDUM AB SER QL IA: NONREACTIVE
TROPONIN I SERPL HS-MCNC: 6 NG/L (ref 0–14)
TROPONIN I SERPL HS-MCNC: 7 NG/L (ref 0–14)
TSH SERPL DL<=0.05 MIU/L-ACNC: 0.96 UIU/ML (ref 0.27–4.2)
VIT B12 SERPL-MCNC: 1127 PG/ML (ref 232–1245)
WBC OTHER # BLD: 7.9 K/UL (ref 3.5–11.3)

## 2025-04-01 PROCEDURE — 95712 VEEG 2-12 HR INTMT MNTR: CPT

## 2025-04-01 PROCEDURE — 84703 CHORIONIC GONADOTROPIN ASSAY: CPT

## 2025-04-01 PROCEDURE — 99285 EMERGENCY DEPT VISIT HI MDM: CPT

## 2025-04-01 PROCEDURE — 85610 PROTHROMBIN TIME: CPT

## 2025-04-01 PROCEDURE — 96374 THER/PROPH/DIAG INJ IV PUSH: CPT

## 2025-04-01 PROCEDURE — 2500000003 HC RX 250 WO HCPCS: Performed by: PSYCHIATRY & NEUROLOGY

## 2025-04-01 PROCEDURE — 82947 ASSAY GLUCOSE BLOOD QUANT: CPT

## 2025-04-01 PROCEDURE — 6370000000 HC RX 637 (ALT 250 FOR IP)

## 2025-04-01 PROCEDURE — 6370000000 HC RX 637 (ALT 250 FOR IP): Performed by: PSYCHIATRY & NEUROLOGY

## 2025-04-01 PROCEDURE — 84443 ASSAY THYROID STIM HORMONE: CPT

## 2025-04-01 PROCEDURE — 86780 TREPONEMA PALLIDUM: CPT

## 2025-04-01 PROCEDURE — 2060000000 HC ICU INTERMEDIATE R&B

## 2025-04-01 PROCEDURE — 82306 VITAMIN D 25 HYDROXY: CPT

## 2025-04-01 PROCEDURE — 87389 HIV-1 AG W/HIV-1&-2 AB AG IA: CPT

## 2025-04-01 PROCEDURE — 95718 EEG PHYS/QHP 2-12 HR W/VEEG: CPT | Performed by: PSYCHIATRY & NEUROLOGY

## 2025-04-01 PROCEDURE — 82607 VITAMIN B-12: CPT

## 2025-04-01 PROCEDURE — 82746 ASSAY OF FOLIC ACID SERUM: CPT

## 2025-04-01 PROCEDURE — 84425 ASSAY OF VITAMIN B-1: CPT

## 2025-04-01 PROCEDURE — 36415 COLL VENOUS BLD VENIPUNCTURE: CPT

## 2025-04-01 PROCEDURE — 93005 ELECTROCARDIOGRAM TRACING: CPT

## 2025-04-01 PROCEDURE — 99222 1ST HOSP IP/OBS MODERATE 55: CPT | Performed by: PSYCHIATRY & NEUROLOGY

## 2025-04-01 PROCEDURE — 95700 EEG CONT REC W/VID EEG TECH: CPT

## 2025-04-01 PROCEDURE — 6360000002 HC RX W HCPCS

## 2025-04-01 PROCEDURE — 80048 BASIC METABOLIC PNL TOTAL CA: CPT

## 2025-04-01 PROCEDURE — 84484 ASSAY OF TROPONIN QUANT: CPT

## 2025-04-01 PROCEDURE — 85025 COMPLETE CBC W/AUTO DIFF WBC: CPT

## 2025-04-01 RX ORDER — POTASSIUM CHLORIDE 1500 MG/1
40 TABLET, EXTENDED RELEASE ORAL PRN
Status: DISCONTINUED | OUTPATIENT
Start: 2025-04-01 | End: 2025-04-03 | Stop reason: HOSPADM

## 2025-04-01 RX ORDER — ONDANSETRON 2 MG/ML
4 INJECTION INTRAMUSCULAR; INTRAVENOUS EVERY 6 HOURS PRN
Status: DISCONTINUED | OUTPATIENT
Start: 2025-04-01 | End: 2025-04-03 | Stop reason: HOSPADM

## 2025-04-01 RX ORDER — POLYETHYLENE GLYCOL 3350 17 G/17G
17 POWDER, FOR SOLUTION ORAL DAILY PRN
Status: DISCONTINUED | OUTPATIENT
Start: 2025-04-01 | End: 2025-04-03 | Stop reason: HOSPADM

## 2025-04-01 RX ORDER — SODIUM CHLORIDE 0.9 % (FLUSH) 0.9 %
5-40 SYRINGE (ML) INJECTION EVERY 12 HOURS SCHEDULED
Status: DISCONTINUED | OUTPATIENT
Start: 2025-04-01 | End: 2025-04-03 | Stop reason: HOSPADM

## 2025-04-01 RX ORDER — PROCHLORPERAZINE EDISYLATE 5 MG/ML
10 INJECTION INTRAMUSCULAR; INTRAVENOUS ONCE
Status: COMPLETED | OUTPATIENT
Start: 2025-04-01 | End: 2025-04-01

## 2025-04-01 RX ORDER — LABETALOL HYDROCHLORIDE 5 MG/ML
5 INJECTION, SOLUTION INTRAVENOUS ONCE
Status: COMPLETED | OUTPATIENT
Start: 2025-04-01 | End: 2025-04-01

## 2025-04-01 RX ORDER — MIDAZOLAM HYDROCHLORIDE 2 MG/2ML
2 INJECTION, SOLUTION INTRAMUSCULAR; INTRAVENOUS PRN
Status: DISCONTINUED | OUTPATIENT
Start: 2025-04-01 | End: 2025-04-03 | Stop reason: HOSPADM

## 2025-04-01 RX ORDER — AMLODIPINE BESYLATE 5 MG/1
5 TABLET ORAL DAILY
Status: DISCONTINUED | OUTPATIENT
Start: 2025-04-01 | End: 2025-04-03 | Stop reason: HOSPADM

## 2025-04-01 RX ORDER — ACETAMINOPHEN 650 MG/1
650 SUPPOSITORY RECTAL EVERY 6 HOURS PRN
Status: DISCONTINUED | OUTPATIENT
Start: 2025-04-01 | End: 2025-04-03 | Stop reason: HOSPADM

## 2025-04-01 RX ORDER — MAGNESIUM SULFATE IN WATER 40 MG/ML
2000 INJECTION, SOLUTION INTRAVENOUS ONCE
Status: COMPLETED | OUTPATIENT
Start: 2025-04-01 | End: 2025-04-01

## 2025-04-01 RX ORDER — WARFARIN SODIUM 7.5 MG/1
7.5 TABLET ORAL
Status: DISPENSED | OUTPATIENT
Start: 2025-04-01 | End: 2025-04-02

## 2025-04-01 RX ORDER — ACETAMINOPHEN 325 MG/1
650 TABLET ORAL EVERY 6 HOURS PRN
Status: DISCONTINUED | OUTPATIENT
Start: 2025-04-01 | End: 2025-04-03 | Stop reason: HOSPADM

## 2025-04-01 RX ORDER — POTASSIUM CHLORIDE 7.45 MG/ML
10 INJECTION INTRAVENOUS PRN
Status: DISCONTINUED | OUTPATIENT
Start: 2025-04-01 | End: 2025-04-03 | Stop reason: HOSPADM

## 2025-04-01 RX ORDER — KETOROLAC TROMETHAMINE 30 MG/ML
15 INJECTION, SOLUTION INTRAMUSCULAR; INTRAVENOUS ONCE
Status: COMPLETED | OUTPATIENT
Start: 2025-04-01 | End: 2025-04-01

## 2025-04-01 RX ORDER — ACETAMINOPHEN 500 MG
1000 TABLET ORAL ONCE
Status: COMPLETED | OUTPATIENT
Start: 2025-04-01 | End: 2025-04-01

## 2025-04-01 RX ORDER — DIPHENHYDRAMINE HYDROCHLORIDE 50 MG/ML
25 INJECTION, SOLUTION INTRAMUSCULAR; INTRAVENOUS ONCE
Status: COMPLETED | OUTPATIENT
Start: 2025-04-01 | End: 2025-04-01

## 2025-04-01 RX ORDER — MAGNESIUM SULFATE IN WATER 40 MG/ML
2000 INJECTION, SOLUTION INTRAVENOUS PRN
Status: DISCONTINUED | OUTPATIENT
Start: 2025-04-01 | End: 2025-04-03 | Stop reason: HOSPADM

## 2025-04-01 RX ORDER — LABETALOL HYDROCHLORIDE 5 MG/ML
10 INJECTION, SOLUTION INTRAVENOUS
Status: DISCONTINUED | OUTPATIENT
Start: 2025-04-01 | End: 2025-04-03 | Stop reason: HOSPADM

## 2025-04-01 RX ORDER — HYDRALAZINE HYDROCHLORIDE 20 MG/ML
10 INJECTION INTRAMUSCULAR; INTRAVENOUS
Status: DISCONTINUED | OUTPATIENT
Start: 2025-04-01 | End: 2025-04-03 | Stop reason: HOSPADM

## 2025-04-01 RX ORDER — SODIUM CHLORIDE 9 MG/ML
INJECTION, SOLUTION INTRAVENOUS PRN
Status: DISCONTINUED | OUTPATIENT
Start: 2025-04-01 | End: 2025-04-03 | Stop reason: HOSPADM

## 2025-04-01 RX ORDER — ONDANSETRON 4 MG/1
4 TABLET, ORALLY DISINTEGRATING ORAL EVERY 8 HOURS PRN
Status: DISCONTINUED | OUTPATIENT
Start: 2025-04-01 | End: 2025-04-03 | Stop reason: HOSPADM

## 2025-04-01 RX ORDER — ENOXAPARIN SODIUM 100 MG/ML
40 INJECTION SUBCUTANEOUS DAILY
Status: DISCONTINUED | OUTPATIENT
Start: 2025-04-01 | End: 2025-04-02

## 2025-04-01 RX ORDER — DEXAMETHASONE SODIUM PHOSPHATE 4 MG/ML
4 INJECTION, SOLUTION INTRA-ARTICULAR; INTRALESIONAL; INTRAMUSCULAR; INTRAVENOUS; SOFT TISSUE ONCE
Status: COMPLETED | OUTPATIENT
Start: 2025-04-01 | End: 2025-04-01

## 2025-04-01 RX ORDER — SODIUM CHLORIDE 0.9 % (FLUSH) 0.9 %
5-40 SYRINGE (ML) INJECTION PRN
Status: DISCONTINUED | OUTPATIENT
Start: 2025-04-01 | End: 2025-04-03 | Stop reason: HOSPADM

## 2025-04-01 RX ADMIN — DIPHENHYDRAMINE HYDROCHLORIDE 25 MG: 50 INJECTION INTRAMUSCULAR; INTRAVENOUS at 17:17

## 2025-04-01 RX ADMIN — KETOROLAC TROMETHAMINE 15 MG: 30 INJECTION, SOLUTION INTRAMUSCULAR at 17:16

## 2025-04-01 RX ADMIN — Medication 5 MG: at 12:44

## 2025-04-01 RX ADMIN — PROCHLORPERAZINE EDISYLATE 10 MG: 5 INJECTION INTRAMUSCULAR; INTRAVENOUS at 17:16

## 2025-04-01 RX ADMIN — MAGNESIUM SULFATE HEPTAHYDRATE 2000 MG: 40 INJECTION, SOLUTION INTRAVENOUS at 17:26

## 2025-04-01 RX ADMIN — Medication 10 MG: at 20:36

## 2025-04-01 RX ADMIN — ACETAMINOPHEN 1000 MG: 500 TABLET ORAL at 23:24

## 2025-04-01 RX ADMIN — HYDRALAZINE HYDROCHLORIDE 10 MG: 20 INJECTION INTRAMUSCULAR; INTRAVENOUS at 18:36

## 2025-04-01 RX ADMIN — PROCHLORPERAZINE EDISYLATE 10 MG: 5 INJECTION INTRAMUSCULAR; INTRAVENOUS at 23:21

## 2025-04-01 RX ADMIN — DIPHENHYDRAMINE HYDROCHLORIDE 25 MG: 50 INJECTION INTRAMUSCULAR; INTRAVENOUS at 23:20

## 2025-04-01 RX ADMIN — TICAGRELOR 90 MG: 90 TABLET ORAL at 20:29

## 2025-04-01 RX ADMIN — AMLODIPINE BESYLATE 5 MG: 5 TABLET ORAL at 15:46

## 2025-04-01 RX ADMIN — SODIUM CHLORIDE, PRESERVATIVE FREE 10 ML: 5 INJECTION INTRAVENOUS at 20:31

## 2025-04-01 RX ADMIN — DEXAMETHASONE SODIUM PHOSPHATE 4 MG: 4 INJECTION INTRA-ARTICULAR; INTRALESIONAL; INTRAMUSCULAR; INTRAVENOUS; SOFT TISSUE at 23:20

## 2025-04-01 ASSESSMENT — ENCOUNTER SYMPTOMS
EYE PAIN: 0
TROUBLE SWALLOWING: 0
WHEEZING: 0
COUGH: 0
NAUSEA: 0
ABDOMINAL DISTENTION: 0
DIARRHEA: 0
EYE ITCHING: 0
VOMITING: 0
CHOKING: 0
BACK PAIN: 0
CHEST TIGHTNESS: 0
CONSTIPATION: 0
PHOTOPHOBIA: 0
APNEA: 0
VOICE CHANGE: 0
ABDOMINAL PAIN: 0
EYE DISCHARGE: 0
SHORTNESS OF BREATH: 0
EYE REDNESS: 0

## 2025-04-01 ASSESSMENT — PAIN DESCRIPTION - DESCRIPTORS
DESCRIPTORS: HEAVINESS
DESCRIPTORS: ACHING;THROBBING;SHARP
DESCRIPTORS: PRESSURE
DESCRIPTORS: ACHING

## 2025-04-01 ASSESSMENT — PAIN SCALES - GENERAL
PAINLEVEL_OUTOF10: 10
PAINLEVEL_OUTOF10: 3
PAINLEVEL_OUTOF10: 7
PAINLEVEL_OUTOF10: 10
PAINLEVEL_OUTOF10: 0

## 2025-04-01 ASSESSMENT — PAIN - FUNCTIONAL ASSESSMENT: PAIN_FUNCTIONAL_ASSESSMENT: ACTIVITIES ARE NOT PREVENTED

## 2025-04-01 ASSESSMENT — PAIN DESCRIPTION - LOCATION
LOCATION: HEAD
LOCATION: HEAD
LOCATION: CHEST;GENERALIZED
LOCATION: HEAD

## 2025-04-01 ASSESSMENT — PAIN DESCRIPTION - PAIN TYPE: TYPE: ACUTE PAIN

## 2025-04-01 ASSESSMENT — PAIN DESCRIPTION - ORIENTATION
ORIENTATION: LEFT
ORIENTATION: ANTERIOR
ORIENTATION: ANTERIOR

## 2025-04-01 ASSESSMENT — PAIN DESCRIPTION - FREQUENCY: FREQUENCY: INTERMITTENT
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
## 2025-04-01 NOTE — ED NOTES
The following labs were labeled with appropriate pt sticker and tubed to lab:     [] Blue     [] Lavender   [] on ice  [x] Green/yellow  [] Green/black [] on ice  [] Jimenez  [] on ice  [x] Yellow  [] Red  [] Pink  [] Type/ Screen  [] ABG  [] VBG    [] COVID-19 swab    [] Rapid  [] PCR  [] Flu swab  [] Peds Viral Panel     [] Urine Sample  [] Fecal Sample  [] Pelvic Cultures  [] Blood Cultures  [] X 2  [] STREP Cultures  [] Wound Cultures

## 2025-04-01 NOTE — ED PROVIDER NOTES
°C) (Oral)   Resp 21   Ht 1.651 m (5' 5\")   Wt 83.9 kg (185 lb)   LMP 12/15/2024 (Approximate)   SpO2 99%   BMI 30.79 kg/m²     Physical Exam  HENT:      Head: Normocephalic and atraumatic.      Mouth/Throat:      Mouth: Mucous membranes are moist.      Pharynx: Oropharynx is clear.   Eyes:      Extraocular Movements: Extraocular movements intact.      Pupils: Pupils are equal, round, and reactive to light.   Cardiovascular:      Rate and Rhythm: Normal rate.   Pulmonary:      Effort: Pulmonary effort is normal.   Abdominal:      Palpations: Abdomen is soft.   Musculoskeletal:         General: Normal range of motion.      Cervical back: Normal range of motion.   Skin:     General: Skin is warm and dry.      Capillary Refill: Capillary refill takes less than 2 seconds.   Neurological:      General: No focal deficit present.      Mental Status: She is alert and oriented to person, place, and time.       DDX/DIAGNOSTIC RESULTS / EMERGENCY DEPARTMENT COURSE / MDM     Medical Decision Making  Patient presents with brain fog, staring episode, bifrontal headache and chest discomfort.  This is her third such episode in the last 6 months.  She remains conscious during episodes and has full control of her muscles.  She denies generalized infectious symptoms.  There is no associated syncope.  Differential includes seizure, complex migraine, POTS, arrhythmia, dehydration, electrolyte imbalance.  Patient recently evaluated at OSH and had CT imaging of head which was negative.  Patient will be requiring neurology evaluation and likely MRI for symptoms. Will obtain basic labs and ACS workup as well.     Amount and/or Complexity of Data Reviewed  Labs: ordered.  ECG/medicine tests: ordered.    Risk  Prescription drug management.      EKG  EKG Interpretation    Interpreted by emergency department physician    Rhythm: normal sinus   Rate: normal  Axis: Left  Ectopy: none  Conduction: normal  ST Segments: no acute change  T  Waves: T wave inversion leads III and aVF, seen on prior EKG obtained on 3/28/2025  Q Waves: none    Clinical Impression: no acute changes, compared to prior EKG obtained on 3/28/2025    Vidal Kim MD      All EKG's are interpreted by the Emergency Department Physician who either signs or Co-signs this chart in the absence of a cardiologist.    EMERGENCY DEPARTMENT COURSE:  ED Course as of 04/01/25 1410   Tue Apr 01, 2025   1400 Preg, Serum: NEGATIVE [BG]   1400 hCG Qual negative.  Initial troponin 6, awaiting repeat.  Labs overall reassuring, no leukocytosis, no extreme electrolyte derangements.  Mild elevation glucose 120, no increase in anion gap. [BG]   1409 Neurology accepting patient for admission. [BG]   1409 Much improved blood pressure after 5 mg labetalol, 118/94. [BG]      ED Course User Index  [BG] Vidal Kim MD       PROCEDURES:  None    CONSULTS:  IP CONSULT TO NEUROLOGY  IP CONSULT TO CARDIOLOGY      FINAL IMPRESSION      1. Nonintractable headache, unspecified chronicity pattern, unspecified headache type    2. Staring episodes    3. Chest pain, unspecified type    4. Cardiomyopathy, unspecified type (HCC)          DISPOSITION / PLAN     DISPOSITION Admitted 04/01/2025 02:01:38 PM         PATIENT REFERRED TO:  No follow-up provider specified.    DISCHARGE MEDICATIONS:  New Prescriptions    No medications on file       Vidal Kim MD  Emergency Medicine Resident    (Please note that portions of thisnote were completed with a voice recognition program.  Efforts were made to edit the dictations but occasionally words are mis-transcribed.)
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
## 2025-04-01 NOTE — H&P
General Neurology H&P Note    Patient: Gale Morley : 1975  MRN: [id number]   Date of admission: 25   Reason for admission: Spells of abnormal behavior  Information obtained from: Patient, chart review  Allergies:  Asa [aspirin], Bee venom, Lopid [gemfibrozil], Nortriptyline, Pcn [penicillins], Sulfa antibiotics, and Ibuprofen    History of Presenting Illness      Gale Morley is a 49-year-old female with a medical history significant for anemia, hypertension, hyperlipidemia, peripheral arterial disease status post arterial mechanical thrombectomy and femoral-peroneal bypass surgeries, a hypercoagulable state (currently on warfarin and Brilinta), Takotsubo cardiomyopathy, migraine headaches (retro-orbital pain), and a recent presentation on 3/29/2025 for evaluation of several weeks of recurrent dizziness, palpitations, and nausea. At that time, a CT head was unremarkable, and her symptoms were attributed to hypertension, with a recommendation for follow-up with her primary care provider.     The patient presents today with a prolonged episode of an out-of-body experience, nausea, lightheadedness, diffuse diaphoresis, and cognitive clouding. The episode lasted for about one hour.  No prodromal symptoms, not elicited by positional changes.  She reports having experienced a similar episode approximately six months ago, which lasted only a few minutes. Since then, these episodes have recurred with increasing frequency and duration. The most recent prolonged episode occurred this past Friday, lasting about an hour.     The patient denies any history of tongue biting or urinary incontinence upon awakening. On arrival, her systolic blood pressure (SBP) was 182. Initial serologic tests were unremarkable.     Review of Systems   Constitutional:  Negative for activity change, appetite change, chills, fatigue, fever and unexpected weight change.   HENT:  Negative for congestion, drooling, hearing loss,     VASCULAR SURGERY Left 01/16/2018    re-exploration femerol-perioneal vein bypass/intra op angiogram    VASCULAR SURGERY Right 9/8/2022    RIGHT LOWER EXTREMITY ANGIOGRAM WITH ANGIOJET AND JETSTREAM performed by Roderick Ugarte MD at [facility]    VASCULAR SURGERY Bilateral 9/24/2022    ARTERIAL MECHANICAL THROMBECTOMY COMMON FEMORAL AND ILIAC performed by Roderick Ugarte MD at [facility]     Social History     Socioeconomic History    Marital status: Single     Spouse name: Not on file    Number of children: Not on file    Years of education: Not on file    Highest education level: Not on file   Occupational History    Not on file   Tobacco Use    Smoking status: Never    Smokeless tobacco: Never   Vaping Use    Vaping status: Never Used   Substance and Sexual Activity    Alcohol use: Not Currently     Comment: occasionally     Drug use: Yes     Types: Marijuana (Weed)    Sexual activity: Not Currently     Partners: Male   Other Topics Concern    Not on file   Social History Narrative    Not on file     Social Drivers of Health     Financial Resource Strain: Low Risk  (10/11/2024)    Overall Financial Resource Strain (CARDIA)     Difficulty of Paying Living Expenses: Not hard at all   Food Insecurity: No Food Insecurity (3/21/2025)    Hunger Vital Sign     Worried About Running Out of Food in the Last Year: Never true     Ran Out of Food in the Last Year: Never true   Transportation Needs: No Transportation Needs (3/21/2025)    PRAPARE - Transportation     Lack of Transportation (Medical): No     Lack of Transportation (Non-Medical): No   Physical Activity: Not on file   Stress: Not on file   Social Connections: Not on file   Intimate Partner Violence: Unknown (4/11/2024)    [facility]    [place] [facility]     Fear of Current or Ex-Partner: Not on file     Emotionally Abused: Not on file     Physically Abused: Not on file     Sexually Abused: Not

## 2025-04-01 NOTE — ED PROVIDER NOTES
[facility] EMERGENCY DEPARTMENT     Emergency Department     Faculty Attestation        I performed a history and physical examination of the patient and discussed management with the resident. I reviewed the resident’s note and agree with the documented findings and plan of care. Any areas of disagreement are noted on the chart. I was personally present for the key portions of any procedures. I have documented in the chart those procedures where I was not present during the key portions. I have reviewed the emergency nurses triage note. I agree with the chief complaint, past medical history, past surgical history, allergies, medications, social and family history as documented unless otherwise noted below.    For mid-level providers such as nurse practitioners as well as physicians assistants:    I have personally seen and evaluated the patient.    I find the patient's history and physical exam are consistent with NP/PA documentation.  I agree with the care provided, treatment rendered, disposition, & follow-up plan.     Additional findings are as noted.    Vital Signs: BP (!) 182/95   Pulse 62   Temp 97.5 °F (36.4 °C) (Oral)   Resp 19   Ht 1.651 m (5' 5\")   Wt 83.9 kg (185 lb)   LMP 12/15/2024 (Approximate)   SpO2 99%   BMI 30.79 kg/m²   PCP:  Lola Chawla MD    Pertinent Comments:     Patient complains of feeling \"out of it and feeling like she is \"having an out of body experience\".  This been happening intermittently for the past 6 months she has no chest pain cough shortness of breath headache numbness tingling  EKG Interpretation    Interpreted by me    Rhythm: normal sinus   Rate: normal  Axis: normal  Ectopy: none  Conduction: normal  ST Segments: no acute change  T Waves: no acute change  Q Waves: none    Clinical Impression: no acute changes and normal EKG          Farhat Aguillon MD    Attending Emergency Medicine Physician            Scotty Aguillon,

## 2025-04-02 ENCOUNTER — APPOINTMENT (OUTPATIENT)
Dept: MRI IMAGING | Age: 50
End: 2025-04-02
Payer: COMMERCIAL

## 2025-04-02 PROBLEM — R40.4 STARING EPISODES: Status: ACTIVE | Noted: 2025-04-02

## 2025-04-02 LAB
ALBUMIN SERPL-MCNC: 4.5 G/DL (ref 3.5–5.2)
ALBUMIN/GLOB SERPL: 1.3 {RATIO} (ref 1–2.5)
ALP SERPL-CCNC: 100 U/L (ref 35–104)
ALT SERPL-CCNC: 21 U/L (ref 10–35)
AMPHET UR QL SCN: NEGATIVE
ANION GAP SERPL CALCULATED.3IONS-SCNC: 16 MMOL/L (ref 9–16)
AST SERPL-CCNC: 23 U/L (ref 10–35)
BARBITURATES UR QL SCN: NEGATIVE
BASOPHILS # BLD: 0.04 K/UL (ref 0–0.2)
BASOPHILS NFR BLD: 0 % (ref 0–2)
BENZODIAZ UR QL: NEGATIVE
BILIRUB SERPL-MCNC: 0.3 MG/DL (ref 0–1.2)
BUN SERPL-MCNC: 10 MG/DL (ref 6–20)
CALCIUM SERPL-MCNC: 9.8 MG/DL (ref 8.6–10.4)
CANNABINOIDS UR QL SCN: POSITIVE
CHLORIDE SERPL-SCNC: 103 MMOL/L (ref 98–107)
CO2 SERPL-SCNC: 19 MMOL/L (ref 20–31)
COCAINE UR QL SCN: NEGATIVE
CREAT SERPL-MCNC: 0.8 MG/DL (ref 0.6–0.9)
EKG ATRIAL RATE: 74 BPM
EKG P AXIS: 44 DEGREES
EKG P-R INTERVAL: 148 MS
EKG Q-T INTERVAL: 408 MS
EKG QRS DURATION: 88 MS
EKG QTC CALCULATION (BAZETT): 452 MS
EKG R AXIS: -20 DEGREES
EKG T AXIS: -6 DEGREES
EKG VENTRICULAR RATE: 74 BPM
EOSINOPHIL # BLD: <0.03 K/UL (ref 0–0.44)
EOSINOPHILS RELATIVE PERCENT: 0 % (ref 1–4)
ERYTHROCYTE [DISTWIDTH] IN BLOOD BY AUTOMATED COUNT: 15 % (ref 11.8–14.4)
FENTANYL UR QL: NEGATIVE
GFR, ESTIMATED: >90 ML/MIN/1.73M2
GLUCOSE SERPL-MCNC: 169 MG/DL (ref 74–99)
HCT VFR BLD AUTO: 44.5 % (ref 36.3–47.1)
HGB BLD-MCNC: 14.3 G/DL (ref 11.9–15.1)
IMM GRANULOCYTES # BLD AUTO: 0.03 K/UL (ref 0–0.3)
IMM GRANULOCYTES NFR BLD: 0 %
INR PPP: 2.5
LYMPHOCYTES NFR BLD: 1.14 K/UL (ref 1.1–3.7)
LYMPHOCYTES RELATIVE PERCENT: 11 % (ref 24–43)
MAGNESIUM SERPL-MCNC: 2.1 MG/DL (ref 1.6–2.6)
MCH RBC QN AUTO: 28.3 PG (ref 25.2–33.5)
MCHC RBC AUTO-ENTMCNC: 32.1 G/DL (ref 28.4–34.8)
MCV RBC AUTO: 88.1 FL (ref 82.6–102.9)
METHADONE UR QL: NEGATIVE
MONOCYTES NFR BLD: 0.12 K/UL (ref 0.1–1.2)
MONOCYTES NFR BLD: 1 % (ref 3–12)
NEUTROPHILS NFR BLD: 88 % (ref 36–65)
NEUTS SEG NFR BLD: 8.9 K/UL (ref 1.5–8.1)
NRBC BLD-RTO: 0 PER 100 WBC
OPIATES UR QL SCN: NEGATIVE
OXYCODONE UR QL SCN: NEGATIVE
PCP UR QL SCN: NEGATIVE
PLATELET # BLD AUTO: 368 K/UL (ref 138–453)
PMV BLD AUTO: 10.1 FL (ref 8.1–13.5)
POTASSIUM SERPL-SCNC: 4.2 MMOL/L (ref 3.7–5.3)
PROT SERPL-MCNC: 8.1 G/DL (ref 6.6–8.7)
PROTHROMBIN TIME: 27.9 SEC (ref 11.7–14.9)
RBC # BLD AUTO: 5.05 M/UL (ref 3.95–5.11)
RBC # BLD: ABNORMAL 10*6/UL
SODIUM SERPL-SCNC: 138 MMOL/L (ref 136–145)
TEST INFORMATION: ABNORMAL
WBC OTHER # BLD: 10.2 K/UL (ref 3.5–11.3)

## 2025-04-02 PROCEDURE — 83735 ASSAY OF MAGNESIUM: CPT

## 2025-04-02 PROCEDURE — 81001 URINALYSIS AUTO W/SCOPE: CPT

## 2025-04-02 PROCEDURE — 6360000004 HC RX CONTRAST MEDICATION: Performed by: PSYCHIATRY & NEUROLOGY

## 2025-04-02 PROCEDURE — A9579 GAD-BASE MR CONTRAST NOS,1ML: HCPCS | Performed by: PSYCHIATRY & NEUROLOGY

## 2025-04-02 PROCEDURE — 93010 ELECTROCARDIOGRAM REPORT: CPT | Performed by: INTERNAL MEDICINE

## 2025-04-02 PROCEDURE — 6370000000 HC RX 637 (ALT 250 FOR IP): Performed by: PSYCHIATRY & NEUROLOGY

## 2025-04-02 PROCEDURE — 95718 EEG PHYS/QHP 2-12 HR W/VEEG: CPT | Performed by: PSYCHIATRY & NEUROLOGY

## 2025-04-02 PROCEDURE — 6370000000 HC RX 637 (ALT 250 FOR IP)

## 2025-04-02 PROCEDURE — 85025 COMPLETE CBC W/AUTO DIFF WBC: CPT

## 2025-04-02 PROCEDURE — 80053 COMPREHEN METABOLIC PANEL: CPT

## 2025-04-02 PROCEDURE — 2500000003 HC RX 250 WO HCPCS: Performed by: PSYCHIATRY & NEUROLOGY

## 2025-04-02 PROCEDURE — 36415 COLL VENOUS BLD VENIPUNCTURE: CPT

## 2025-04-02 PROCEDURE — 80307 DRUG TEST PRSMV CHEM ANLYZR: CPT

## 2025-04-02 PROCEDURE — 95714 VEEG EA 12-26 HR UNMNTR: CPT

## 2025-04-02 PROCEDURE — 95711 VEEG 2-12 HR UNMONITORED: CPT

## 2025-04-02 PROCEDURE — 99232 SBSQ HOSP IP/OBS MODERATE 35: CPT | Performed by: PSYCHIATRY & NEUROLOGY

## 2025-04-02 PROCEDURE — 99222 1ST HOSP IP/OBS MODERATE 55: CPT | Performed by: PSYCHIATRY & NEUROLOGY

## 2025-04-02 PROCEDURE — 6360000002 HC RX W HCPCS

## 2025-04-02 PROCEDURE — 85610 PROTHROMBIN TIME: CPT

## 2025-04-02 PROCEDURE — 70553 MRI BRAIN STEM W/O & W/DYE: CPT

## 2025-04-02 PROCEDURE — 2060000000 HC ICU INTERMEDIATE R&B

## 2025-04-02 RX ORDER — PROPRANOLOL HYDROCHLORIDE 60 MG/1
60 CAPSULE, EXTENDED RELEASE ORAL DAILY
Status: DISCONTINUED | OUTPATIENT
Start: 2025-04-03 | End: 2025-04-03 | Stop reason: HOSPADM

## 2025-04-02 RX ORDER — WARFARIN SODIUM 7.5 MG/1
7.5 TABLET ORAL
Status: COMPLETED | OUTPATIENT
Start: 2025-04-02 | End: 2025-04-02

## 2025-04-02 RX ORDER — NAPROXEN 500 MG/1
500 TABLET ORAL 2 TIMES DAILY PRN
Qty: 30 TABLET | Refills: 3 | Status: SHIPPED | OUTPATIENT
Start: 2025-04-02

## 2025-04-02 RX ORDER — METOPROLOL SUCCINATE 25 MG/1
25 TABLET, EXTENDED RELEASE ORAL DAILY
Status: DISCONTINUED | OUTPATIENT
Start: 2025-04-02 | End: 2025-04-02

## 2025-04-02 RX ORDER — ERGOCALCIFEROL 1.25 MG/1
50000 CAPSULE, LIQUID FILLED ORAL WEEKLY
Status: DISCONTINUED | OUTPATIENT
Start: 2025-04-02 | End: 2025-04-03 | Stop reason: HOSPADM

## 2025-04-02 RX ORDER — LORAZEPAM 2 MG/ML
1 INJECTION INTRAMUSCULAR
Status: DISCONTINUED | OUTPATIENT
Start: 2025-04-02 | End: 2025-04-03 | Stop reason: HOSPADM

## 2025-04-02 RX ORDER — PROPRANOLOL HYDROCHLORIDE 60 MG/1
60 CAPSULE, EXTENDED RELEASE ORAL DAILY
Qty: 30 CAPSULE | Refills: 3 | Status: SHIPPED | OUTPATIENT
Start: 2025-04-03

## 2025-04-02 RX ORDER — ERGOCALCIFEROL 1.25 MG/1
50000 CAPSULE ORAL WEEKLY
Qty: 15 CAPSULE | Refills: 2 | Status: SHIPPED | OUTPATIENT
Start: 2025-04-09

## 2025-04-02 RX ADMIN — TICAGRELOR 90 MG: 90 TABLET ORAL at 08:47

## 2025-04-02 RX ADMIN — ERGOCALCIFEROL 50000 UNITS: 1.25 CAPSULE ORAL at 10:33

## 2025-04-02 RX ADMIN — Medication 10 MG: at 23:57

## 2025-04-02 RX ADMIN — Medication 1 MG: at 15:36

## 2025-04-02 RX ADMIN — METOPROLOL SUCCINATE 25 MG: 25 TABLET, EXTENDED RELEASE ORAL at 10:33

## 2025-04-02 RX ADMIN — WARFARIN SODIUM 7.5 MG: 7.5 TABLET ORAL at 18:39

## 2025-04-02 RX ADMIN — SODIUM CHLORIDE, PRESERVATIVE FREE 10 ML: 5 INJECTION INTRAVENOUS at 21:01

## 2025-04-02 RX ADMIN — TICAGRELOR 90 MG: 90 TABLET ORAL at 21:01

## 2025-04-02 RX ADMIN — SODIUM CHLORIDE, PRESERVATIVE FREE 10 ML: 5 INJECTION INTRAVENOUS at 08:47

## 2025-04-02 RX ADMIN — AMLODIPINE BESYLATE 5 MG: 5 TABLET ORAL at 08:46

## 2025-04-02 RX ADMIN — GADOTERIDOL 16 ML: 279.3 INJECTION, SOLUTION INTRAVENOUS at 16:30

## 2025-04-02 ASSESSMENT — ENCOUNTER SYMPTOMS
ABDOMINAL PAIN: 0
PHOTOPHOBIA: 1
SHORTNESS OF BREATH: 0

## 2025-04-02 ASSESSMENT — PAIN SCALES - GENERAL
PAINLEVEL_OUTOF10: 0

## 2025-04-02 NOTE — PROCEDURES
LONG-TERM EEG-VIDEO MONITORING   CLINICAL NEUROPHYSIOLOGY LABORATORY  DEPARTMENT OF NEUROLOGY  [facility]    Patient: Gale Morley  Age: 49 y.o.  MRN: [id number]    Referring Physician: No ref. provider found  History: The patient is a 49 y.o. female who has been having episodes concerning for seizures. This long-term video-EEG monitoring study was performed to determine the nature of the patient's clinical events. The patient is not on neuroactive medications.   Gale Morley   Current Facility-Administered Medications   Medication Dose Route Frequency Provider Last Rate Last Admin    sodium chloride flush 0.9 % injection 5-40 mL  5-40 mL IntraVENous 2 times per day Tolla, Richard S, DO   10 mL at 04/01/25 2031    sodium chloride flush 0.9 % injection 5-40 mL  5-40 mL IntraVENous PRN Tolla, Richard S, DO        0.9 % sodium chloride infusion   IntraVENous PRN Tolla, Richard S, DO        potassium chloride (KLOR-CON M) extended release tablet 40 mEq  40 mEq Oral PRN Tolla, Richard S, DO        Or    potassium bicarb-citric acid (EFFER-K) effervescent tablet 40 mEq  40 mEq Oral PRN Tolla, Richard S, DO        Or    potassium chloride 10 mEq/100 mL IVPB (Peripheral Line)  10 mEq IntraVENous PRN Tolla, Richard S, DO        magnesium sulfate 2000 mg in 50 mL IVPB premix  2,000 mg IntraVENous PRN Tolla, Richard S, DO        enoxaparin (LOVENOX) injection 40 mg  40 mg SubCUTAneous Daily Tolla, Richard S, DO        ondansetron (ZOFRAN-ODT) disintegrating tablet 4 mg  4 mg Oral Q8H PRN Tolla, Richard S, DO        Or    ondansetron (ZOFRAN) injection 4 mg  4 mg IntraVENous Q6H PRN Tolla, Richard S, DO        polyethylene glycol (GLYCOLAX) packet 17 g  17 g Oral Daily PRN Tolla, Richard S, DO        acetaminophen (TYLENOL) tablet 650 mg  650 mg Oral Q6H PRN Tolla, Richard S, DO        Or    acetaminophen (TYLENOL) suppository 650 mg  650 mg Rectal Q6H PRN Tolla, Richard S, DO        amLODIPine (NORVASC) tablet 5 mg  5 mg Oral Daily

## 2025-04-02 NOTE — PROCEDURES
LONG-TERM EEG-VIDEO MONITORING   CLINICAL NEUROPHYSIOLOGY LABORATORY  DEPARTMENT OF NEUROLOGY  [facility]    Patient: Gale Morley  Age: 49 y.o.  MRN: [id number]    Referring Physician: No ref. provider found  History: The patient is a 49 y.o. female who has been having episodes concerning for seizures. This long-term video-EEG monitoring study was performed to determine the nature of the patient's clinical events. The patient is not on neuroactive medications.   Gale Morley   Current Facility-Administered Medications   Medication Dose Route Frequency Provider Last Rate Last Admin    sodium chloride flush 0.9 % injection 5-40 mL  5-40 mL IntraVENous 2 times per day Tolla, Richard S, DO   10 mL at 04/01/25 2031    sodium chloride flush 0.9 % injection 5-40 mL  5-40 mL IntraVENous PRN Tolla, Richard S, DO        0.9 % sodium chloride infusion   IntraVENous PRN Tolla, Richard S, DO        potassium chloride (KLOR-CON M) extended release tablet 40 mEq  40 mEq Oral PRN Tolla, Richard S, DO        Or    potassium bicarb-citric acid (EFFER-K) effervescent tablet 40 mEq  40 mEq Oral PRN Tolla, Richard S, DO        Or    potassium chloride 10 mEq/100 mL IVPB (Peripheral Line)  10 mEq IntraVENous PRN Tolla, Richard S, DO        magnesium sulfate 2000 mg in 50 mL IVPB premix  2,000 mg IntraVENous PRN Tolla, Richard S, DO        enoxaparin (LOVENOX) injection 40 mg  40 mg SubCUTAneous Daily Tolla, Richard S, DO        ondansetron (ZOFRAN-ODT) disintegrating tablet 4 mg  4 mg Oral Q8H PRN Tolla, Richard S, DO        Or    ondansetron (ZOFRAN) injection 4 mg  4 mg IntraVENous Q6H PRN Tolla, Richard S, DO        polyethylene glycol (GLYCOLAX) packet 17 g  17 g Oral Daily PRN Tolla, Richard S, DO        acetaminophen (TYLENOL) tablet 650 mg  650 mg Oral Q6H PRN Tolla, Richard S, DO        Or    acetaminophen (TYLENOL) suppository 650 mg  650 mg Rectal Q6H PRN Tolla, Richard S, DO        amLODIPine (NORVASC) tablet 5 mg  5 mg Oral Daily

## 2025-04-02 NOTE — PROGRESS NOTES
Pharmacy Note  Warfarin Consult follow-up    Warfarin dose prior to admission: 11.25 mg Thu, 7.5 mg all other days (per [facility], last visit 3/12)  Warfarin indication: PAD, h/o arterial thrombosis   Target INR range: 2-3    Recent Labs     04/02/25  1320   INR 2.5     Recent Labs     04/01/25  1237 04/02/25  0352   HGB 14.3 14.3   HCT 43.8 44.5    368       Current warfarin drug-drug interactions: ticagrelor     Date INR Dose   4/1 2.5 7.5 mg (patient took prior to admission)   4/2 2.5       Notes:  - INR has been therapeutic, will continue patient's home regimen  - Warfarin 7.5 mg x1 today                  Daily PT/INR while inpatient.     Arnoldo Parson, PharmD, 4/2/2025 2:40 PM

## 2025-04-02 NOTE — PLAN OF CARE
Problem: Pain  Goal: Verbalizes/displays adequate comfort level or baseline comfort level  4/2/2025 1643 by Yumi Em RN  Outcome: Progressing  4/2/2025 0606 by Emily Hickman RN  Outcome: Progressing  Flowsheets  Taken 4/1/2025 2327 by Emily Hickman RN  Verbalizes/displays adequate comfort level or baseline comfort level: Encourage patient to monitor pain and request assistance  Taken 4/1/2025 2029 by Emily Hickman RN  Verbalizes/displays adequate comfort level or baseline comfort level: Encourage patient to monitor pain and request assistance  Taken 4/1/2025 1635 by Viki Spears RN  Verbalizes/displays adequate comfort level or baseline comfort level: Encourage patient to monitor pain and request assistance

## 2025-04-02 NOTE — DISCHARGE SUMMARY
[facility]     Department of Neurology    INPATIENT DISCHARGE SUMMARY        Patient Identification:  Gale Morley is a 49 y.o. female.  :  1975  MRN: [id number]     Acct: [id number]   Admit Date:  2025  Discharge date: 4/3/2025   Attending Provider: Judy Berry DO                                     Admission Diagnoses:   Spell of abnormal behavior [R46.89]  Nonintractable headache, unspecified chronicity pattern, unspecified headache type [R51.9]  Chest pain, unspecified type [R07.9]  Cardiomyopathy, unspecified type (HCC) [I42.9]  Staring episodes [R40.4]    Discharge Diagnoses:   Principal Problem:    Spell of abnormal behavior  Active Problems:    Nonintractable headache    Staring episodes  Resolved Problems:    * No resolved hospital problems. *       Consults:   cardiology    Brief Inpatient course:    Gale Morley is a 49 y.o. female with past medical history of anemia, hypertension, hyperlipidemia, severe peripheral vascular disease s/p bilateral femoral-peroneal bypass surgeries, failure s/p mechanical thrombectomy and right external iliac stenting, hypercoagulable state (on warfarin and Brilinta), chronic migraines, and Takotsubo cardiomyopathy who presented for transient episodes of out-of-body experience with associated new onset migraine headache maximally located above the left eye.  Patient's headache resolved with migraine cocktail.  Started on prophylactic propranolol due to comorbid uncontrolled hypertension.  MRI brain with and without showed chronic encephalomalacia in the right frontal lobe watershed territory likely remote infarct.  Patient also found to have low vitamin D which was replaced.  UDS positive for cannabinoids.  B12 and folate within normal limits.  HIV, treponema, qualitative beta-hCG negative.  Vitamin B1 pending.    Patient is stable from neurological standpoint to be discharged.    Review of Systems   Constitutional:  Negative for

## 2025-04-02 NOTE — DISCHARGE INSTRUCTIONS
Continue propranolol 60 mg daily for migraine prophylaxis. This will also help treat your high blood pressure.     Your vitamin D level was low, and you were started on vitamin D replacement. 50,000 units once per week.      Continue other current medications.      Follow up in the neurology clinic in 2 months regarding your migraine headaches and secondary stroke prevention.     Follow up with your PCP in 2 weeks regarding your high blood pressure and hospitalization.

## 2025-04-02 NOTE — PROGRESS NOTES
0.9 0.6 - 0.9 mg/dL    Est, Glom Filt Rate 78 >60 mL/min/1.73m2    Calcium 10.1 8.6 - 10.4 mg/dL   HCG Qualitative, Serum    Collection Time: 04/01/25 12:37 PM   Result Value Ref Range    Preg, Serum NEGATIVE NEGATIVE   Troponin    Collection Time: 04/01/25 12:37 PM   Result Value Ref Range    Troponin, High Sensitivity 6 0 - 14 ng/L   Troponin    Collection Time: 04/01/25  2:29 PM   Result Value Ref Range    Troponin, High Sensitivity 7 0 - 14 ng/L   TSH    Collection Time: 04/01/25  2:29 PM   Result Value Ref Range    TSH 0.96 0.27 - 4.20 uIU/mL   Vitamin B12 & Folate    Collection Time: 04/01/25  2:29 PM   Result Value Ref Range    Vitamin B-12 1127 232 - 1245 pg/mL    Folate >40.0 (H) 4.8 - 24.2 ng/mL   T. PALLIDUM AB    Collection Time: 04/01/25  2:29 PM   Result Value Ref Range    T. pallidum, IgG NONREACTIVE NONREACTIVE   HIV Screen    Collection Time: 04/01/25  2:29 PM   Result Value Ref Range    HIV Ag/Ab NONREACTIVE NONREACTIVE   Vitamin D 25 Hydroxy    Collection Time: 04/01/25  2:29 PM   Result Value Ref Range    Vit D, 25-Hydroxy 16.9 (L) 30.0 - 100.0 ng/mL   Protime-INR    Collection Time: 04/01/25  5:00 PM   Result Value Ref Range    Protime 28.1 (H) 11.7 - 14.9 sec    INR 2.5    Magnesium    Collection Time: 04/02/25 12:01 AM   Result Value Ref Range    Magnesium 2.1 1.6 - 2.6 mg/dL   Comprehensive Metabolic Panel w/ Reflex to MG    Collection Time: 04/02/25  3:52 AM   Result Value Ref Range    Sodium 138 136 - 145 mmol/L    Potassium 4.2 3.7 - 5.3 mmol/L    Chloride 103 98 - 107 mmol/L    CO2 19 (L) 20 - 31 mmol/L    Anion Gap 16 9 - 16 mmol/L    Glucose 169 (H) 74 - 99 mg/dL    BUN 10 6 - 20 mg/dL    Creatinine 0.8 0.6 - 0.9 mg/dL    Est, Glom Filt Rate >90 >60 mL/min/1.73m2    Calcium 9.8 8.6 - 10.4 mg/dL    Total Protein 8.1 6.6 - 8.7 g/dL    Albumin 4.5 3.5 - 5.2 g/dL    Albumin/Globulin Ratio 1.3 1.0 - 2.5    Total Bilirubin 0.3 0.0 - 1.2 mg/dL    Alkaline Phosphatase 100 35 - 104 U/L    ALT 21        Assessment & Plan     Impression: Gale Morley is a 49 y.o.  female with a history of anemia, hypertension, hyperlipidemia, PAD, Takotsubo cardiomyopathy, PAD, and headaches who presented with 1 hour episode of out of body experience, lightheadedness, cognitive clouding, and diaphoresis.  Patient reports associated headache with migrainous features.  Seems to have undiagnosed migraine headaches in the setting of hypertensive urgency.  Out of body sensation could be a migraine aur; although, seizure activity cannot entirely be excluded, but thought to be less likely d/t normal EEG.    Migraine headache  Relieved after migraine cocktail (Toradol, Mg, Benadryl, Compazine)  Start prophylactic agent  Start Toprol XL 25 mg p.o. daily    Hypertensive urgency   on arrival  Resume home Norvasc 5 mg p.o. daily  IV hydralazine and labetalol 10 mg PRN for SBP>160  Cardiology was consulted for possible arrhythmia  TTE with bubbles ordered    Transient dissociative state  LTM  MRI brain with and without contrast, epilepsy protocol  EEG normal so far, monitor off ASMs    PAD s/p bilateral iliac stenting, fem-peroneal bypass  Resume home Brilinta 90 mg p.o. twice daily  Resume home warfarin, pharmacy to dose      PT/OT/SLP/Social work all consulted  Diet: Regular adult diet  DVT Prophylaxis: On warfarin  Discharge Planning: Likely home    Patient to be discussed with attending physician, Judy Velásquez DO Austin J Lee, MD  PGY-2 Neurology Resident  4/2/2025  9:06 AM      Copy sent to Lola Kruse MD    This note is created with the assistance of a speech-recognition program. While intending to generate a document that actually reflects the content of the visit, the document can still have some errors including those of syntax and sound a- like substitutions which may escape proofreading. In such instances, actual meaning can be extrapolated by contextual derivation.

## 2025-04-02 NOTE — PLAN OF CARE
Problem: Discharge Planning  Goal: Discharge to home or other facility with appropriate resources  Outcome: Progressing  Flowsheets  Taken 4/1/2025 2000 by Emily Hickman RN  Discharge to home or other facility with appropriate resources: Identify barriers to discharge with patient and caregiver  Taken 4/1/2025 1635 by Viki Spears RN  Discharge to home or other facility with appropriate resources:   Identify barriers to discharge with patient and caregiver   Arrange for needed discharge resources and transportation as appropriate   Identify discharge learning needs (meds, wound care, etc)   Refer to discharge planning if patient needs post-hospital services based on physician order or complex needs related to functional status, cognitive ability or social support system     Problem: Pain  Goal: Verbalizes/displays adequate comfort level or baseline comfort level  Outcome: Progressing  Flowsheets  Taken 4/1/2025 2327 by Emily Hickman RN  Verbalizes/displays adequate comfort level or baseline comfort level: Encourage patient to monitor pain and request assistance  Taken 4/1/2025 2029 by Emily Hickman RN  Verbalizes/displays adequate comfort level or baseline comfort level: Encourage patient to monitor pain and request assistance  Taken 4/1/2025 1635 by Viki Spears RN  Verbalizes/displays adequate comfort level or baseline comfort level: Encourage patient to monitor pain and request assistance

## 2025-04-03 VITALS
DIASTOLIC BLOOD PRESSURE: 79 MMHG | WEIGHT: 185 LBS | OXYGEN SATURATION: 98 % | TEMPERATURE: 98 F | SYSTOLIC BLOOD PRESSURE: 133 MMHG | BODY MASS INDEX: 30.82 KG/M2 | HEIGHT: 65 IN | HEART RATE: 89 BPM | RESPIRATION RATE: 23 BRPM

## 2025-04-03 LAB
ANION GAP SERPL CALCULATED.3IONS-SCNC: 13 MMOL/L (ref 9–16)
BACTERIA URNS QL MICRO: ABNORMAL
BASOPHILS # BLD: <0.03 K/UL (ref 0–0.2)
BASOPHILS NFR BLD: 0 % (ref 0–2)
BILIRUB UR QL STRIP: NEGATIVE
BUN SERPL-MCNC: 14 MG/DL (ref 6–20)
CALCIUM SERPL-MCNC: 8.9 MG/DL (ref 8.6–10.4)
CASTS #/AREA URNS LPF: ABNORMAL /LPF (ref 0–8)
CHLORIDE SERPL-SCNC: 104 MMOL/L (ref 98–107)
CHOLEST SERPL-MCNC: 237 MG/DL (ref 0–199)
CHOLESTEROL/HDL RATIO: 6.8
CLARITY UR: CLEAR
CO2 SERPL-SCNC: 21 MMOL/L (ref 20–31)
COLOR UR: YELLOW
CREAT SERPL-MCNC: 0.9 MG/DL (ref 0.6–0.9)
EOSINOPHIL # BLD: 0.08 K/UL (ref 0–0.44)
EOSINOPHILS RELATIVE PERCENT: 1 % (ref 1–4)
EPI CELLS #/AREA URNS HPF: ABNORMAL /HPF (ref 0–5)
ERYTHROCYTE [DISTWIDTH] IN BLOOD BY AUTOMATED COUNT: 15.6 % (ref 11.8–14.4)
EST. AVERAGE GLUCOSE BLD GHB EST-MCNC: 131 MG/DL
GFR, ESTIMATED: 78 ML/MIN/1.73M2
GLUCOSE SERPL-MCNC: 117 MG/DL (ref 74–99)
GLUCOSE UR STRIP-MCNC: NEGATIVE MG/DL
HBA1C MFR BLD: 6.2 % (ref 4–6)
HCT VFR BLD AUTO: 41.2 % (ref 36.3–47.1)
HDLC SERPL-MCNC: 35 MG/DL
HGB BLD-MCNC: 12.8 G/DL (ref 11.9–15.1)
HGB UR QL STRIP.AUTO: ABNORMAL
IMM GRANULOCYTES # BLD AUTO: 0.04 K/UL (ref 0–0.3)
IMM GRANULOCYTES NFR BLD: 0 %
INR PPP: 2.9
KETONES UR STRIP-MCNC: NEGATIVE MG/DL
LDLC SERPL CALC-MCNC: 168 MG/DL (ref 0–100)
LEUKOCYTE ESTERASE UR QL STRIP: NEGATIVE
LYMPHOCYTES NFR BLD: 2.33 K/UL (ref 1.1–3.7)
LYMPHOCYTES RELATIVE PERCENT: 18 % (ref 24–43)
MCH RBC QN AUTO: 27.7 PG (ref 25.2–33.5)
MCHC RBC AUTO-ENTMCNC: 31.1 G/DL (ref 28.4–34.8)
MCV RBC AUTO: 89.2 FL (ref 82.6–102.9)
MONOCYTES NFR BLD: 0.75 K/UL (ref 0.1–1.2)
MONOCYTES NFR BLD: 6 % (ref 3–12)
NEUTROPHILS NFR BLD: 75 % (ref 36–65)
NEUTS SEG NFR BLD: 9.48 K/UL (ref 1.5–8.1)
NITRITE UR QL STRIP: NEGATIVE
NRBC BLD-RTO: 0 PER 100 WBC
PH UR STRIP: 6.5 [PH] (ref 5–8)
PLATELET # BLD AUTO: 341 K/UL (ref 138–453)
PMV BLD AUTO: 10.4 FL (ref 8.1–13.5)
POTASSIUM SERPL-SCNC: 4 MMOL/L (ref 3.7–5.3)
PROT UR STRIP-MCNC: NEGATIVE MG/DL
PROTHROMBIN TIME: 31.5 SEC (ref 11.7–14.9)
RBC # BLD AUTO: 4.62 M/UL (ref 3.95–5.11)
RBC # BLD: ABNORMAL 10*6/UL
RBC #/AREA URNS HPF: ABNORMAL /HPF (ref 0–4)
SODIUM SERPL-SCNC: 138 MMOL/L (ref 136–145)
SP GR UR STRIP: 1.02 (ref 1–1.03)
TRIGL SERPL-MCNC: 172 MG/DL
UROBILINOGEN UR STRIP-ACNC: NORMAL EU/DL (ref 0–1)
VLDLC SERPL CALC-MCNC: 34 MG/DL (ref 1–30)
WBC #/AREA URNS HPF: ABNORMAL /HPF (ref 0–5)
WBC OTHER # BLD: 12.7 K/UL (ref 3.5–11.3)

## 2025-04-03 PROCEDURE — 83036 HEMOGLOBIN GLYCOSYLATED A1C: CPT

## 2025-04-03 PROCEDURE — 85610 PROTHROMBIN TIME: CPT

## 2025-04-03 PROCEDURE — 2500000003 HC RX 250 WO HCPCS: Performed by: PSYCHIATRY & NEUROLOGY

## 2025-04-03 PROCEDURE — 80048 BASIC METABOLIC PNL TOTAL CA: CPT

## 2025-04-03 PROCEDURE — 6370000000 HC RX 637 (ALT 250 FOR IP)

## 2025-04-03 PROCEDURE — 36415 COLL VENOUS BLD VENIPUNCTURE: CPT

## 2025-04-03 PROCEDURE — 6370000000 HC RX 637 (ALT 250 FOR IP): Performed by: PSYCHIATRY & NEUROLOGY

## 2025-04-03 PROCEDURE — 80061 LIPID PANEL: CPT

## 2025-04-03 PROCEDURE — 85025 COMPLETE CBC W/AUTO DIFF WBC: CPT

## 2025-04-03 PROCEDURE — 99239 HOSP IP/OBS DSCHRG MGMT >30: CPT | Performed by: PSYCHIATRY & NEUROLOGY

## 2025-04-03 RX ORDER — WARFARIN SODIUM 7.5 MG/1
7.5 TABLET ORAL
Status: DISCONTINUED | OUTPATIENT
Start: 2025-04-03 | End: 2025-04-03 | Stop reason: HOSPADM

## 2025-04-03 RX ORDER — ROSUVASTATIN CALCIUM 40 MG/1
40 TABLET, COATED ORAL NIGHTLY
Qty: 30 TABLET | Refills: 3 | Status: SHIPPED | OUTPATIENT
Start: 2025-04-03

## 2025-04-03 RX ORDER — ROSUVASTATIN CALCIUM 20 MG/1
40 TABLET, COATED ORAL NIGHTLY
Status: DISCONTINUED | OUTPATIENT
Start: 2025-04-03 | End: 2025-04-03 | Stop reason: HOSPADM

## 2025-04-03 RX ORDER — LAMOTRIGINE 25 MG/1
25 TABLET ORAL DAILY
Qty: 30 TABLET | Refills: 3 | Status: SHIPPED | OUTPATIENT
Start: 2025-04-03

## 2025-04-03 RX ADMIN — SODIUM CHLORIDE, PRESERVATIVE FREE 10 ML: 5 INJECTION INTRAVENOUS at 10:09

## 2025-04-03 RX ADMIN — PROPRANOLOL HYDROCHLORIDE 60 MG: 60 CAPSULE, EXTENDED RELEASE ORAL at 10:08

## 2025-04-03 RX ADMIN — TICAGRELOR 90 MG: 90 TABLET ORAL at 10:08

## 2025-04-03 RX ADMIN — AMLODIPINE BESYLATE 5 MG: 5 TABLET ORAL at 10:08

## 2025-04-03 ASSESSMENT — ENCOUNTER SYMPTOMS
SINUS PRESSURE: 0
NAUSEA: 0
DIARRHEA: 0
ABDOMINAL PAIN: 0
SHORTNESS OF BREATH: 0
SORE THROAT: 0
VOMITING: 0
SINUS PAIN: 0

## 2025-04-03 ASSESSMENT — PAIN SCALES - GENERAL: PAINLEVEL_OUTOF10: 0

## 2025-04-03 NOTE — PLAN OF CARE
Problem: Discharge Planning  Goal: Discharge to home or other facility with appropriate resources  Outcome: Progressing  Flowsheets (Taken 4/2/2025 2000)  Discharge to home or other facility with appropriate resources: Identify barriers to discharge with patient and caregiver     Problem: Pain  Goal: Verbalizes/displays adequate comfort level or baseline comfort level  4/3/2025 0605 by Emily Hickman, RN  Outcome: Progressing  4/2/2025 1643 by Yumi Em, RN  Outcome: Progressing

## 2025-04-03 NOTE — PROGRESS NOTES
Pharmacy delivered patients meds to beds. Discharge instructions given, verbalized understanding of discharge instructions. Home with family.

## 2025-04-03 NOTE — PROGRESS NOTES
Pharmacy Note  Warfarin Consult follow-up    Warfarin dose prior to admission: 11.25 mg Thu, 7.5 mg all other days (per [facility], last visit 3/12)  Warfarin indication: PAD, h/o arterial thrombosis   Target INR range: 2-3    Recent Labs     04/03/25  0638   INR 2.9     Recent Labs     04/01/25  1237 04/02/25  0352 04/03/25  0638   HGB 14.3 14.3 12.8   HCT 43.8 44.5 41.2    368 341       Current warfarin drug-drug interactions: ticagrelor     Date INR Dose   4/1 2.5 7.5 mg (patient took prior to admission)   4/2 2.5 7.5 mg   4/3 2.9      Notes:  - INR is therapeutic  - Based on INR trend will order Warfarin 7.5 mg x1 today instead of home 11.25 mg dose                  Daily PT/INR while inpatient.     Arnoldo Parson, PharmD, 4/3/2025 2:11 PM

## 2025-04-03 NOTE — PROGRESS NOTES
CLINICAL PHARMACY NOTE: MEDS TO BEDS    Total # of Prescriptions Filled: 5   The following medications were delivered to the patient:  Propranolol er 60m tabs  Vitamin D2 1.25mg caps  Naproxen 500mg tabs  **2nd Delivery**  Rosuvastatin 40mg tabs  Lamotrigine 25mg tabs    Additional Documentation:  Delivered to pt in room 136 on 4/3 at 11:40A and 1:50P. No copay.

## 2025-04-05 LAB — VIT B1 PYROPHOSHATE BLD-SCNC: 83 NMOL/L (ref 70–180)

## 2025-04-09 ENCOUNTER — ANTI-COAG VISIT (OUTPATIENT)
Age: 50
End: 2025-04-09
Payer: COMMERCIAL

## 2025-04-09 DIAGNOSIS — I74.9 ARTERIAL THROMBOSIS (HCC): ICD-10-CM

## 2025-04-09 DIAGNOSIS — I70.229 CRITICAL LIMB ISCHEMIA WITH HISTORY OF REVASCULARIZATION OF SAME EXTREMITY: Primary | ICD-10-CM

## 2025-04-09 DIAGNOSIS — I74.3 ACUTE OCCLUSION OF ARTERY OF LOWER EXTREMITY DUE TO THROMBOSIS: ICD-10-CM

## 2025-04-09 DIAGNOSIS — Z98.890 CRITICAL LIMB ISCHEMIA WITH HISTORY OF REVASCULARIZATION OF SAME EXTREMITY: Primary | ICD-10-CM

## 2025-04-09 LAB
INTERNATIONAL NORMALIZATION RATIO, POC: 4
PROTHROMBIN TIME, POC: 48

## 2025-04-09 PROCEDURE — 99212 OFFICE O/P EST SF 10 MIN: CPT

## 2025-04-09 PROCEDURE — 85610 PROTHROMBIN TIME: CPT

## 2025-04-09 NOTE — PROGRESS NOTES
Medication Management Service, Warfarin Management  [facility], [phone]  Visit Date: 2024   Subjective:   Gale Morley is a 48 y.o. female who presents to clinic today for anticoagulation monitoring and adjustment.  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   INR goal: of 2.0-3.0.  Duration of therapy: indefinite.    Assessment and PLAN   PT/INR done in office per protocol.   INR today is 4.0, supra therapeutic.  Elevated INR due to receiving 15 mg of warfarin on last Tuesday and Thursday while hospitalized.    Plan:  Hold warfarin today only.  Then continue current regimen of warfarin 11.25 mg Thursday and 7.5 mg daily all other days of the week.   Using warfarin 7.5 mg tablets.  Recheck INR in 1 weeks   Patient seen in room # 1.    Patient verbalized understanding of dosing directions and information discussed.   Dosing schedule given to patient. Progress note sent to referring office.     Ladi ESPINO. Ph., CACP, Clinical Pharmacist  Anticoagulation Services, [facility]  2025  10:01 AM      For Pharmacy Admin Tracking Only    Intervention Detail: Adherence Monitorin and Dose Adjustment: 1, reason: Therapy De-escalation  Total # of Interventions Recommended: 2  Total # of Interventions Accepted: 2  Time Spent (min): 20

## 2025-04-16 ENCOUNTER — ANTI-COAG VISIT (OUTPATIENT)
Age: 50
End: 2025-04-16
Payer: COMMERCIAL

## 2025-04-16 DIAGNOSIS — I74.3 ACUTE OCCLUSION OF ARTERY OF LOWER EXTREMITY DUE TO THROMBOSIS: ICD-10-CM

## 2025-04-16 DIAGNOSIS — I74.9 ARTERIAL THROMBOSIS (HCC): ICD-10-CM

## 2025-04-16 DIAGNOSIS — Z98.890 CRITICAL LIMB ISCHEMIA WITH HISTORY OF REVASCULARIZATION OF SAME EXTREMITY: Primary | ICD-10-CM

## 2025-04-16 DIAGNOSIS — I70.229 CRITICAL LIMB ISCHEMIA WITH HISTORY OF REVASCULARIZATION OF SAME EXTREMITY: Primary | ICD-10-CM

## 2025-04-16 LAB
INTERNATIONAL NORMALIZATION RATIO, POC: 3.6
PROTHROMBIN TIME, POC: 43.7

## 2025-04-16 PROCEDURE — 99213 OFFICE O/P EST LOW 20 MIN: CPT

## 2025-04-16 PROCEDURE — 85610 PROTHROMBIN TIME: CPT

## 2025-04-16 RX ORDER — WARFARIN SODIUM 7.5 MG/1
TABLET ORAL
Qty: 95 TABLET | Refills: 1 | Status: SHIPPED | OUTPATIENT
Start: 2025-04-16

## 2025-04-16 NOTE — PROGRESS NOTES
Medication Management Service, Warfarin Management  [facility], [phone]  Visit Date: 2024   Subjective:   Gale Morley is a 48 y.o. female who presents to clinic today for anticoagulation monitoring and adjustment.  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   INR goal: of 2.0-3.0.  Duration of therapy: indefinite.    Assessment and PLAN   PT/INR done in office per protocol.   INR today is 3.6, supra therapeutic.  Elevated INR due to reduction in vitamin K intake this past week.   Plan:  Reduce warfarin to 3.75 mg today only.  Then continue current regimen of warfarin 11.25 mg Thursday and 7.5 mg daily all other days of the week.   Using warfarin 7.5 mg tablets. Refills sent in.   Recheck INR in 2 weeks   Patient seen in room # 1.    Patient verbalized understanding of dosing directions and information discussed.   Dosing schedule given to patient. Progress note sent to referring office.     Ladi ESPINO. Ph., CACP, Clinical Pharmacist  Anticoagulation Services, [facility]  2025  10:07 AM      For Pharmacy Admin Tracking Only    Intervention Detail: Adherence Monitorin, Dose Adjustment: 1, reason: Therapy De-escalation, and Refill(s) Provided  Total # of Interventions Recommended: 3  Total # of Interventions Accepted: 3  Time Spent (min): 20

## 2025-04-21 ENCOUNTER — OFFICE VISIT (OUTPATIENT)
Dept: FAMILY MEDICINE CLINIC | Age: 50
End: 2025-04-21
Payer: COMMERCIAL

## 2025-04-21 VITALS — HEART RATE: 62 BPM | TEMPERATURE: 97.6 F | SYSTOLIC BLOOD PRESSURE: 138 MMHG | DIASTOLIC BLOOD PRESSURE: 81 MMHG

## 2025-04-21 DIAGNOSIS — I74.9 ARTERIAL THROMBOSIS (HCC): ICD-10-CM

## 2025-04-21 DIAGNOSIS — Z09 HOSPITAL DISCHARGE FOLLOW-UP: Primary | ICD-10-CM

## 2025-04-21 DIAGNOSIS — I10 ESSENTIAL HYPERTENSION: ICD-10-CM

## 2025-04-21 DIAGNOSIS — Z12.11 COLON CANCER SCREENING: ICD-10-CM

## 2025-04-21 PROCEDURE — 3079F DIAST BP 80-89 MM HG: CPT

## 2025-04-21 PROCEDURE — G8417 CALC BMI ABV UP PARAM F/U: HCPCS

## 2025-04-21 PROCEDURE — 99213 OFFICE O/P EST LOW 20 MIN: CPT

## 2025-04-21 PROCEDURE — 1111F DSCHRG MED/CURRENT MED MERGE: CPT

## 2025-04-21 PROCEDURE — 99211 OFF/OP EST MAY X REQ PHY/QHP: CPT

## 2025-04-21 PROCEDURE — 1036F TOBACCO NON-USER: CPT

## 2025-04-21 PROCEDURE — G8427 DOCREV CUR MEDS BY ELIG CLIN: HCPCS

## 2025-04-21 PROCEDURE — 3075F SYST BP GE 130 - 139MM HG: CPT

## 2025-04-21 RX ORDER — CHOLECALCIFEROL (VITAMIN D3) 10(400)/ML
1 DROPS ORAL DAILY
Qty: 1 EACH | Refills: 0 | Status: SHIPPED | OUTPATIENT
Start: 2025-04-21

## 2025-04-21 RX ORDER — WARFARIN SODIUM 7.5 MG/1
TABLET ORAL
Qty: 95 TABLET | Refills: 1 | Status: CANCELLED | OUTPATIENT
Start: 2025-04-21

## 2025-04-21 RX ORDER — AMLODIPINE BESYLATE 5 MG/1
5 TABLET ORAL DAILY
Qty: 90 TABLET | Refills: 0 | Status: CANCELLED | OUTPATIENT
Start: 2025-04-21

## 2025-04-21 NOTE — PATIENT INSTRUCTIONS
Thank you for letting us take care of you today. We hope all your questions were addressed. If a question was overlooked or something else comes to mind after you return home, please contact a member of your Care Team listed below.      Your Care Team at [facility] is Team #  Semaj Contreras M.D. (Faculty)  Romina Gonzalez M.D. (Resident)  Alicia Michelle M.D. (Resident)   Delicia Rodriguez M.D. (Resident)  Rosendo Gutierrez M.D. (Resident)  Lola Chawla M.D. (Resident)  Latoya Magallon., [facility]  Judith Escobar, [facility]  Gisselle Ma, [facility]  Ernst Chiu, [facility]  Aicha Forbes, [facility]  Lina Bhardwaj, [facility]  Shahbaz Wolf (LJ) HUSAM Nickerson (Clinical Practice Manager)  Isabelle Joyner [facility] (Clinical Pharmacist)     Office phone number: [phone]    If you need to get in right away due to illness, please be advised we have \"Same Day\" appointments available Monday-Friday. Please call us at [phone] option #3 to schedule your \"Same Day\" appointment.

## 2025-04-21 NOTE — PROGRESS NOTES
Subjective:      Chief Complaint   Patient presents with    Headache     Gale Morley is a 49 y.o. female with essential hypertension, prediabetes, borderline hypertriglyceridemia, PAD, arterial thrombosis, migraine with aura, menorrhagia with irregular cycle, dysmenorrhea, uterine fibroids and DAVID-1 who is presenting today for a 4-week follow-up visit for BP check and dysmenorrhea.    Hospital follow-up  -Patient was admitted to the hospital on 4/1/2025 for headaches.  Seen by neurology.  MRI showed no acute intracranial abnormality, chronic encephalomalacia in the right frontal lobe watershed territory.  Started on propranolol for blood pressure control and Lamictal for headaches.    Essential hypertension  -BP of 138/81 mmHg today.  -Currently on amlodipine 5 mg daily, propranolol 60 mg daily. Compliant.  Denies any side effects on the medication.  -Denies any shortness of breath, chest pain, palpitations, headaches, dizziness, lightheadedness, nausea or vomiting.    History of arterial thrombosis  -2018  -Currently on warfarin, Brilinta 90 mg twice daily.  Compliant.  Denies any side effects of medication.  -Followed by hematology/oncology.  -Patient states that she has an upcoming 4-hour flight to [place] on 4/29/2025 and would like to know if she should be wearing compression stockings.    Dysmenorrhea  -Followed by OB/GYN, last visit on 1/23/2025, with plan to return for repeat ultrasound and Mirena IUD insertion if the fibroid has not grown significantly.  -LMP 12/25/2024.      Dyslipidemia  -Lipid panel with elevated cholesterol of 237 and triglycerides of 172 on 4/3/2025.  -Currently on rosuvastatin 40 mg daily.  Compliant.  Denies any side effects of medication.  -Endorsing lifestyle changes.  Reports going to the gym 3 times a week (weightlifting and cardio).  Endorses healthy diet     Social history  -Cigarette smoking: Never.  -Marijuana: Occasional.  -Alcohol: Socially.  -Illicit drug use:

## 2025-04-21 NOTE — PROGRESS NOTES
Visit Information    Have you changed or started any medications since your last visit including any over-the-counter medicines, vitamins, or herbal medicines? no   Have you stopped taking any of your medications? Is so, why? -  no  Are you having any side effects from any of your medications? - no    Have you seen any other physician or provider since your last visit?  no   Have you had any other diagnostic tests since your last visit?  Yes MRI  Have you been seen in the emergency room and/or had an admission in a hospital since we last saw you?  yes - [place]    Have you had your routine dental cleaning in the past 6 months?  no     Do you have an active MyChart account? If no, what is the barrier?  Yes    Patient Care Team:  Lola Chawla MD as PCP - General (Family Medicine)  Alli Merlos MD as Consulting Physician (Gastroenterology)    Medical History Review  Past Medical, Family, and Social History reviewed and does not contribute to the patient presenting condition    Health Maintenance   Topic Date Due    Hepatitis B vaccine (1 of 3 - 19+ 3-dose series) Never done    DTaP/Tdap/Td vaccine (1 - Tdap) Never done    Pneumococcal 0-49 years Vaccine (1 of 2 - PCV) Never done    COVID-19 Vaccine (1 - 2024-25 season) Never done    Annual Wellness Visit (Medicare Advantage)  Never done    Colorectal Cancer Screen  01/04/2025    Breast cancer screen  04/16/2025    Flu vaccine (Season Ended) 08/01/2025    Depression Monitoring  03/21/2026    A1C test (Diabetic or Prediabetic)  04/03/2026    Lipids  04/03/2026    Cervical cancer screen  05/24/2027    Hepatitis C screen  Completed    HIV screen  Completed    Hepatitis A vaccine  Aged Out    Hib vaccine  Aged Out    Polio vaccine  Aged Out    Meningococcal (ACWY) vaccine  Aged Out    Meningococcal B vaccine  Aged Out

## 2025-04-21 NOTE — PROGRESS NOTES
Attending Physician Statement  I  have discussed the care of Gale Morley including pertinent history and exam findings with the resident. I agree with the assessment, plan and orders as documented by the resident.      /81 (BP Site: Left Upper Arm, Patient Position: Sitting, BP Cuff Size: Medium Adult)   Pulse 62   Temp 97.6 °F (36.4 °C) (Oral)    BP Readings from Last 3 Encounters:   04/21/25 138/81   04/03/25 133/79   03/29/25 (!) 145/79     Wt Readings from Last 3 Encounters:   04/01/25 83.9 kg (185 lb)   03/28/25 85.7 kg (189 lb)   03/21/25 84.6 kg (186 lb 9.6 oz)          Diagnosis Orders   1. Arterial thrombosis (HCC)        2. Essential hypertension                Brandon Montenegro DO 4/21/2025 2:21 PM

## 2025-04-22 RX ORDER — WARFARIN SODIUM 7.5 MG/1
TABLET ORAL
Qty: 90 TABLET | Refills: 0 | OUTPATIENT
Start: 2025-04-22

## 2025-04-28 ENCOUNTER — ANTI-COAG VISIT (OUTPATIENT)
Age: 50
End: 2025-04-28
Payer: COMMERCIAL

## 2025-04-28 DIAGNOSIS — I74.9 ARTERIAL THROMBOSIS (HCC): ICD-10-CM

## 2025-04-28 DIAGNOSIS — I74.3 ACUTE OCCLUSION OF ARTERY OF LOWER EXTREMITY DUE TO THROMBOSIS (HCC): ICD-10-CM

## 2025-04-28 DIAGNOSIS — I70.229 CRITICAL LIMB ISCHEMIA WITH HISTORY OF REVASCULARIZATION OF SAME EXTREMITY (HCC): Primary | ICD-10-CM

## 2025-04-28 DIAGNOSIS — Z98.890 CRITICAL LIMB ISCHEMIA WITH HISTORY OF REVASCULARIZATION OF SAME EXTREMITY (HCC): Primary | ICD-10-CM

## 2025-04-28 LAB
INTERNATIONAL NORMALIZATION RATIO, POC: 2
PROTHROMBIN TIME, POC: 23.5

## 2025-04-28 PROCEDURE — 85610 PROTHROMBIN TIME: CPT

## 2025-04-28 PROCEDURE — 99211 OFF/OP EST MAY X REQ PHY/QHP: CPT

## 2025-04-28 NOTE — PROGRESS NOTES
Medication Management Service, Warfarin Management  [facility], [phone]  Visit Date: 2025   Subjective:   Gale Morley is a 48 y.o. female who presents to clinic today for anticoagulation monitoring and adjustment.  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   INR goal: of 2.0-3.0.  Duration of therapy: indefinite.    Assessment and PLAN   PT/INR done in office per protocol.   INR today is 2.0, therapeutic.  Patient reports a bit extra green vegetables this past week.    Plan:  Will continue current regimen of warfarin 11.25 mg  only; 7.5 mg daily all other days of the week.   Using warfarin 7.5 mg tablets.   Recheck INR in 2 weeks   Patient seen in room # 3.     Patient verbalized understanding of dosing directions and information discussed.   Dosing schedule given to patient. Progress note sent to referring office.     Sandra Santos, [facility], CACP  Clinical Pharmacist Medication Management  2025  10:47 AM      For Pharmacy Admin Tracking Only    Intervention Detail: Adherence Monitorin  Total # of Interventions Recommended: 1  Total # of Interventions Accepted: 1  Time Spent (min): 20

## 2025-05-05 PROBLEM — E78.5 DYSLIPIDEMIA: Status: ACTIVE | Noted: 2024-10-11

## 2025-05-13 ENCOUNTER — ANTI-COAG VISIT (OUTPATIENT)
Age: 50
End: 2025-05-13
Payer: COMMERCIAL

## 2025-05-13 DIAGNOSIS — I74.9 ARTERIAL THROMBOSIS (HCC): ICD-10-CM

## 2025-05-13 DIAGNOSIS — I70.229 CRITICAL LIMB ISCHEMIA WITH HISTORY OF REVASCULARIZATION OF SAME EXTREMITY (HCC): Primary | ICD-10-CM

## 2025-05-13 DIAGNOSIS — Z98.890 CRITICAL LIMB ISCHEMIA WITH HISTORY OF REVASCULARIZATION OF SAME EXTREMITY (HCC): Primary | ICD-10-CM

## 2025-05-13 DIAGNOSIS — I74.3 ACUTE OCCLUSION OF ARTERY OF LOWER EXTREMITY DUE TO THROMBOSIS (HCC): ICD-10-CM

## 2025-05-13 LAB
INTERNATIONAL NORMALIZATION RATIO, POC: 5.9
PROTHROMBIN TIME, POC: 71

## 2025-05-13 PROCEDURE — 99212 OFFICE O/P EST SF 10 MIN: CPT

## 2025-05-13 PROCEDURE — 85610 PROTHROMBIN TIME: CPT

## 2025-05-13 NOTE — PROGRESS NOTES
Medication Management Service, Warfarin Management  [facility], [phone]  Visit Date: 2025   Subjective:   Gale Morley is a 48 y.o. female who presents to clinic today for anticoagulation monitoring and adjustment.  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   INR goal: of 2.0-3.0.  Duration of therapy: indefinite.    Assessment and PLAN   PT/INR done in office per protocol.   INR today is 5.9, supratherapeutic.  Patient reports extra wine over the weekend.  Wine plus regimen may both be contributing to INR today.     Plan:  Will hold ramírez today and reduce regimen by 6.7% to warfarin 7.5mg once daily.  Using warfarin 7.5 mg tablets.   Recheck INR in 9 days.     Patient seen in room # 3.     Counseled patient on safety precautions associated with elevated INR.    Patient verbalized understanding of dosing directions and information discussed.   Dosing schedule given to patient. Progress note sent to referring office.  Discussed long term use of Brillinta, possibly could reduce dose to 60mg, but needs to be discussed with Dr. Streeter as the vascular specialist.      Sandra Santos, [facility], CACP  Clinical Pharmacist Medication Management  2025  9:36 AM      For Pharmacy Admin Tracking Only    Intervention Detail: Adherence Monitorin and Dose Adjustment: 1, reason: Improve Adherence, Therapy De-escalation, Therapy Optimization  Total # of Interventions Recommended: 2  Total # of Interventions Accepted: 2  Time Spent (min): 20

## 2025-05-19 NOTE — PROGRESS NOTES
Spoke with patient.   She has been on the famotidine since March.   Last week she started to notice puffiness in her left side of face and was painful  Puffiness in face has come down some but pain is resolved  Nose puffiness is now resolved  Eyes she has been washing around eyes with cornelia's baby shampoo   Conjunctiva is not affected   Eyes have been itching for about two weeks - was gardening a lot     Today in her urine she endorses blood  Is not having any pain with urination  Feeling like she is emptying bladder completely  New left lower back pain but now resolved   Ate beets Friday or Saturday    Stool is also present in stools   Blood is bright-red blood   Does not feel or see any obvious hemorrhoids present  Blood was on stools as well is toilet water   Has not been constipated or feeling need to strain to pass BM.     Nose bleeds significant episodes x2 both on the same day 3-4 days ago.   Was able to get them to stop after holding pressure for 10-15 minutes     Denies any difficulty breathing, dizziness, bleeding gums, or abdominal pain.     Has not tried any antihistamines     She has call out to GI to discuss as well as she saw all of her symptoms on side effect list for famotidine.  She is awaiting recommendations from GI MANOJ.   Writer does verbalize concern to her given multiple location of possible bleeding and may need evaluation in ED.     Routed to PCP to advise on further.      appearance - alert, well appearing and in no acute distress  Mental status - oriented to person, place and time with normal affect  Heart - normal rate, regular rhythm, no murmurs  Lungs: CTAB, no rhonchi, no wheezing  Extremities - There is a right medial leg incision well approximated with nylon sutures and no evidence for infection. Incisional wound Vac to the lateral incision on leg. TTP around fasciotomy incisions. Foot Exam - Multiple blackened toes on the right foot with associated blisters. Dysesthesias to the toes. Motor function diminished. doppler signals to PT/AT on the right    Assessment/Plan: 1. Plan for angiogram today at 0900 to evaluate outflow in RLE  2. Patient NPO since midnight. 3.Reccomend antibiotics and wound vac  4. Keep gauze between toes  5. Will discuss case with Mane Friedman and update plan accordingly    Electronically signed by Leonie Maier DO on 10/3/18 at 5:12 AM      [id number] Reyes Cross DO  Orthopedic Surgery Resident, PGY-1  [address] [facility]

## 2025-05-21 ENCOUNTER — ANTI-COAG VISIT (OUTPATIENT)
Age: 50
End: 2025-05-21
Payer: COMMERCIAL

## 2025-05-21 DIAGNOSIS — I74.3 ACUTE OCCLUSION OF ARTERY OF LOWER EXTREMITY DUE TO THROMBOSIS (HCC): ICD-10-CM

## 2025-05-21 DIAGNOSIS — I74.9 ARTERIAL THROMBOSIS (HCC): ICD-10-CM

## 2025-05-21 DIAGNOSIS — Z98.890 CRITICAL LIMB ISCHEMIA WITH HISTORY OF REVASCULARIZATION OF SAME EXTREMITY (HCC): Primary | ICD-10-CM

## 2025-05-21 DIAGNOSIS — I70.229 CRITICAL LIMB ISCHEMIA WITH HISTORY OF REVASCULARIZATION OF SAME EXTREMITY (HCC): Primary | ICD-10-CM

## 2025-05-21 LAB
INTERNATIONAL NORMALIZATION RATIO, POC: 3.7
PROTHROMBIN TIME, POC: 44.3

## 2025-05-21 PROCEDURE — 85610 PROTHROMBIN TIME: CPT

## 2025-05-21 PROCEDURE — 99212 OFFICE O/P EST SF 10 MIN: CPT

## 2025-05-21 NOTE — PROGRESS NOTES
Medication Management Service, Warfarin Management  [facility], [phone]  Visit Date: 2025   Subjective:   Gale Morley is a 48 y.o. female who presents to clinic today for anticoagulation monitoring and adjustment.  Patient seen in clinic for warfarin management due to  Indication: PAD, Critical limb ischemia with history of revascularization of same extremity, Arterial thrombosis and occlusion due to thrombosis.   INR goal: of 2.0-3.0.  Duration of therapy: indefinite.    Assessment and PLAN   PT/INR done in office per protocol.   INR today is 3.7, supra-therapeutic. Patient reports having two servings of green beans last week without cabbage; usually has cabbage w/ her green beans. Plans to get back to two servings of higher vitamin K green vegetables (spinach, kaylynn slaw, or green beans w/ cabbage) per week.    Plan: Reduce today's dose of warfarin to 3.75 mg, then continue previously decreased regimen of warfarin 7.5 mg daily. Using warfarin 7.5 mg tablets. Educated on the importance of consistency of high-vitamin K foods like spinach, kaylynn slaw, and cabbage in the diet.    Recheck INR in 2 weeks.  Patient seen in room # 2.     Counseled patient on safety precautions associated with elevated INR.    Patient verbalized understanding of dosing directions and information discussed.   Dosing schedule given to patient. Progress note sent to referring office.    Again discussed long term use of Brillinta, possibly could reduce dose to 60mg; patient will discuss with Dr. Streeter 2025.    Roderick Toure (Robbie), PharmD  PGY2 Ambulatory Care Pharmacy Resident  Anticoagulation Services, [facility]  25  9:43 AM    For Pharmacy Admin Tracking Only    Intervention Detail: Adherence Monitorin and Dose Adjustment: 1, reason: Therapy De-escalation  Total # of Interventions Recommended: 1  Total # of Interventions Accepted: 1  Time Spent (min): 20

## 2025-06-04 ENCOUNTER — ANTI-COAG VISIT (OUTPATIENT)
Age: 50
End: 2025-06-04
Payer: COMMERCIAL

## 2025-06-04 DIAGNOSIS — I74.9 ARTERIAL THROMBOSIS (HCC): ICD-10-CM

## 2025-06-04 DIAGNOSIS — Z98.890 CRITICAL LIMB ISCHEMIA WITH HISTORY OF REVASCULARIZATION OF SAME EXTREMITY (HCC): Primary | ICD-10-CM

## 2025-06-04 DIAGNOSIS — I70.229 CRITICAL LIMB ISCHEMIA WITH HISTORY OF REVASCULARIZATION OF SAME EXTREMITY (HCC): Primary | ICD-10-CM

## 2025-06-04 DIAGNOSIS — I74.3 ACUTE OCCLUSION OF ARTERY OF LOWER EXTREMITY DUE TO THROMBOSIS (HCC): ICD-10-CM

## 2025-06-04 LAB
INTERNATIONAL NORMALIZATION RATIO, POC: 5.2
PROTHROMBIN TIME, POC: 62.4

## 2025-06-04 PROCEDURE — 85610 PROTHROMBIN TIME: CPT

## 2025-06-04 PROCEDURE — 99212 OFFICE O/P EST SF 10 MIN: CPT

## 2025-06-04 NOTE — PROGRESS NOTES
Medication Management Service, Warfarin Management  [facility], [phone]  Visit Date: 2024   Subjective:   Gale Morley is a 48 y.o. female who presents to clinic today for anticoagulation monitoring and adjustment.  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   INR goal: of 2.0-3.0.  Duration of therapy: indefinite.    Assessment and PLAN   PT/INR done in office per protocol.   INR today is 5.2, supra therapeutic.  Elevated INR due to three consecutive days of diarrhea starting .  She also had chills but did not check her temperature so, possible fevers as well.     Plan:  Hold warfarin today only.  Then reduce regimen by 7.1 % weekly to warfarin 3.75 mg : 7.5 mg all other days of the week. Explained to patient, depending on next INR may be taking warfarin 3.75 mg or 7.5 mg on  going forward.   Using warfarin 7.5 mg tablets.   Recheck INR in 1 weeks   Patient seen in room # 1.    Patient verbalized understanding of dosing directions and information discussed.   Dosing schedule given to patient. Progress note sent to referring office.     Ladi ESPINO. Ph., CACP, Clinical Pharmacist  Anticoagulation Services, [facility]  2025  10:08 AM  ================================================    For Pharmacy Admin Tracking Only    Intervention Detail: Adherence Monitorin and Dose Adjustment: 1, reason: Therapy De-escalation  Total # of Interventions Recommended: 2  Total # of Interventions Accepted: 2  Time Spent (min): 20

## 2025-06-12 ENCOUNTER — ANTI-COAG VISIT (OUTPATIENT)
Age: 50
End: 2025-06-12
Payer: COMMERCIAL

## 2025-06-12 DIAGNOSIS — I74.3 ACUTE OCCLUSION OF ARTERY OF LOWER EXTREMITY DUE TO THROMBOSIS (HCC): ICD-10-CM

## 2025-06-12 DIAGNOSIS — I70.229 CRITICAL LIMB ISCHEMIA WITH HISTORY OF REVASCULARIZATION OF SAME EXTREMITY (HCC): Primary | ICD-10-CM

## 2025-06-12 DIAGNOSIS — I74.9 ARTERIAL THROMBOSIS (HCC): ICD-10-CM

## 2025-06-12 DIAGNOSIS — Z98.890 CRITICAL LIMB ISCHEMIA WITH HISTORY OF REVASCULARIZATION OF SAME EXTREMITY (HCC): Primary | ICD-10-CM

## 2025-06-12 LAB
INTERNATIONAL NORMALIZATION RATIO, POC: 4.1
PROTHROMBIN TIME, POC: 48.7

## 2025-06-12 PROCEDURE — 99212 OFFICE O/P EST SF 10 MIN: CPT

## 2025-06-12 PROCEDURE — 85610 PROTHROMBIN TIME: CPT

## 2025-06-12 NOTE — PROGRESS NOTES
Medication Management Service, Warfarin Management  [facility], [phone]  Visit Date:  25   Subjective:   Gale Morley is a 48 y.o. female who presents to clinic today for anticoagulation monitoring and adjustment.  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   INR goal: of 2.0-3.0.  Duration of therapy: indefinite.    Assessment and PLAN   PT/INR done in office per protocol.   INR today is 4.1,supratherapeutic and likely due to routine acetaminophen use for multiple consecutive days, no longer using routinely.   Additionally, had only 1 serving of gv this past week, vs the usual 2.   Plan:  Will hold dose today and reduce dose tomorrow to 3.75, then continue current regimen of warfarin 3.75mg on  only; 7.5mg once daily all other days of the week.  Using warfarin 7.5 mg tablets.   Recheck INR in 10 days.     Patient seen in room # 3.     Counseled patient on safety precautions associated with elevated INR.      Patient verbalized understanding of dosing directions and information discussed.   Escorted patient to PCP office on main floor to establish as new patient.   Dosing schedule given to patient. Progress note sent to referring office.    Sandra Santos RP, CACP  Clinical Pharmacist Medication Management  2025  10:19 AM      For Pharmacy Admin Tracking Only    Intervention Detail: Adherence Monitorin and Dose Adjustment: 1, reason: Improve Adherence, Therapy De-escalation, Therapy Optimization  Total # of Interventions Recommended: 2  Total # of Interventions Accepted: 2  Time Spent (min): 20

## 2025-06-16 ENCOUNTER — TELEPHONE (OUTPATIENT)
Dept: GASTROENTEROLOGY | Age: 50
End: 2025-06-16

## 2025-06-16 NOTE — TELEPHONE ENCOUNTER
Patient calling said she had colonoscopy last year and dr. Merlos wanted her to have another one this year.  Please contact her anytime at [phone]

## 2025-06-17 NOTE — TELEPHONE ENCOUNTER
Writer reached out to patient to assist with scheduling her procedure. The patient answered the phone and spoke briefly and then stopped talking mid sentence when asked to confirm her date of birth. I called her back she answered and then went silent again once I started talking.

## 2025-06-20 ENCOUNTER — TELEPHONE (OUTPATIENT)
Dept: VASCULAR SURGERY | Age: 50
End: 2025-06-20

## 2025-06-20 DIAGNOSIS — I73.9 PAD (PERIPHERAL ARTERY DISEASE): Primary | ICD-10-CM

## 2025-06-20 NOTE — TELEPHONE ENCOUNTER
Spoke with patient in regards to her up coming appt on 6/26 letting her know that we need testing prior to her appointment on 6/26, patient was transferred to central scheduling

## 2025-06-23 ENCOUNTER — ANTI-COAG VISIT (OUTPATIENT)
Age: 50
End: 2025-06-23
Payer: COMMERCIAL

## 2025-06-23 DIAGNOSIS — I74.9 ARTERIAL THROMBOSIS (HCC): ICD-10-CM

## 2025-06-23 DIAGNOSIS — Z98.890 CRITICAL LIMB ISCHEMIA WITH HISTORY OF REVASCULARIZATION OF SAME EXTREMITY (HCC): Primary | ICD-10-CM

## 2025-06-23 DIAGNOSIS — I70.229 CRITICAL LIMB ISCHEMIA WITH HISTORY OF REVASCULARIZATION OF SAME EXTREMITY (HCC): Primary | ICD-10-CM

## 2025-06-23 DIAGNOSIS — I74.3 ACUTE OCCLUSION OF ARTERY OF LOWER EXTREMITY DUE TO THROMBOSIS (HCC): ICD-10-CM

## 2025-06-23 LAB
INTERNATIONAL NORMALIZATION RATIO, POC: 3.9
PROTHROMBIN TIME, POC: 47

## 2025-06-23 PROCEDURE — 99212 OFFICE O/P EST SF 10 MIN: CPT

## 2025-06-23 PROCEDURE — 85610 PROTHROMBIN TIME: CPT

## 2025-06-23 NOTE — PROGRESS NOTES
Medication Management Service, Warfarin Management  [facility], [phone]  Visit Date:  25   Subjective:   Gale Morley is a 48 y.o. female who presents to clinic today for anticoagulation monitoring and adjustment.  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   INR goal: of 2.0-3.0.  Duration of therapy: indefinite.    Assessment and PLAN   PT/INR done in office per protocol.   INR today is 3.9, supratherapeutic and likely due to not having usual intake of greens, had only green beans.  Patient expressed desire of having fewer greens, does not necessarily want to have on a routine basis. No longer using routine acetaminophen.     Plan:  Will hold dose today and reduce dose regimen by 7.7% to warfarin 3.75mg on ,  only; 7.5mg once daily all other days of the week.   Using warfarin 7.5 mg tablets.   Recheck INR in 10 days.     Patient seen in room # 3.     Counseled patient on safety precautions associated with elevated INR.      Patient verbalized understanding of dosing directions and information discussed.   Escorted patient to PCP office on main floor to establish as new patient.   Dosing schedule given to patient. Progress note sent to referring office.    Sandra Santos RPh, CACP  Clinical Pharmacist Medication Management  2025  11:08 AM      For Pharmacy Admin Tracking Only    Intervention Detail: Adherence Monitorin and Dose Adjustment: 1, reason: Improve Adherence, Therapy De-escalation, Therapy Optimization  Total # of Interventions Recommended: 2  Total # of Interventions Accepted: 2  Time Spent (min): 20

## 2025-06-24 ENCOUNTER — HOSPITAL ENCOUNTER (OUTPATIENT)
Dept: VASCULAR LAB | Age: 50
Discharge: HOME | End: 2025-06-26
Attending: SURGERY
Payer: COMMERCIAL

## 2025-06-24 DIAGNOSIS — I73.9 PAD (PERIPHERAL ARTERY DISEASE): ICD-10-CM

## 2025-06-24 LAB
VAS LEFT ABI: 0.7
VAS LEFT ARM BP: 141 MMHG
VAS LEFT DORSALIS PEDIS BP: 85 MMHG
VAS LEFT PTA BP: 102 MMHG
VAS LEFT TBI: 0.19
VAS LEFT TOE PRESSURE: 28 MMHG
VAS RIGHT ABI: 0.61
VAS RIGHT ARM BP: 146 MMHG
VAS RIGHT DORSALIS PEDIS BP: 84 MMHG
VAS RIGHT PTA BP: 89 MMHG
VAS RIGHT TBI: 0.27
VAS RIGHT TOE PRESSURE: 39 MMHG

## 2025-06-24 PROCEDURE — 93923 UPR/LXTR ART STDY 3+ LVLS: CPT

## 2025-06-24 PROCEDURE — 93923 UPR/LXTR ART STDY 3+ LVLS: CPT | Performed by: SURGERY

## 2025-07-03 DIAGNOSIS — I10 ESSENTIAL HYPERTENSION: ICD-10-CM

## 2025-07-03 NOTE — TELEPHONE ENCOUNTER
Last visit: 04/21/25  Last Med refill: 03/21/25  Does patient have enough medication for 72 hours: No:     Next Visit Date:  Future Appointments   Date Time Provider Department Center   7/7/2025 11:00 AM [facility] ECHO 1 [facility]   7/9/2025  9:40 AM [facility] MEDICATION [facility]   7/10/2025  2:30 PM [facility]       Health Maintenance   Topic Date Due    Hepatitis B vaccine (1 of 3 - 19+ 3-dose series) Never done    DTaP/Tdap/Td vaccine (1 - Tdap) Never done    Pneumococcal 50+ years Vaccine (1 of 2 - PCV) Never done    COVID-19 Vaccine (1 - 2024-25 season) Never done    Annual Wellness Visit (Medicare Advantage)  Never done    Colorectal Cancer Screen  01/04/2025    Breast cancer screen  04/16/2025    Shingles vaccine (1 of 2) Never done    Flu vaccine (1) 08/01/2025    Depression Monitoring  03/21/2026    A1C test (Diabetic or Prediabetic)  04/03/2026    Lipids  04/03/2026    Cervical cancer screen  05/24/2027    Hepatitis C screen  Completed    HIV screen  Completed    Hepatitis A vaccine  Aged Out    Hib vaccine  Aged Out    Polio vaccine  Aged Out    Meningococcal (ACWY) vaccine  Aged Out    Meningococcal B vaccine  Aged Out       Hemoglobin A1C (%)   Date Value   04/03/2025 6.2 (H)   03/21/2025 6.4   04/09/2024 6.2             ( goal A1C is < 7)   No components found for: \"LABMICR\"  No components found for: \"LDLCHOLESTEROL\", \"LDLCALC\"    (goal LDL is <100)   AST (U/L)   Date Value   04/02/2025 23     ALT (U/L)   Date Value   04/02/2025 21     BUN (mg/dL)   Date Value   04/03/2025 14     BP Readings from Last 3 Encounters:   04/21/25 138/81   04/03/25 133/79   03/29/25 (!) 145/79          (goal 120/80)    All Future Testing planned in [facility]  Lab Frequency Next Occurrence   TSH Once 03/21/2025   Echo (TTE) complete (PRN contrast/bubble/strain/3D) Once 04/03/2025               Patient Active Problem List:     Migraine headache with aura     Essential

## 2025-07-04 RX ORDER — AMLODIPINE BESYLATE 5 MG/1
5 TABLET ORAL DAILY
Qty: 90 TABLET | Refills: 0 | Status: SHIPPED | OUTPATIENT
Start: 2025-07-04

## 2025-07-08 DIAGNOSIS — I74.9 ARTERIAL THROMBOSIS (HCC): ICD-10-CM

## 2025-07-09 ENCOUNTER — ANTI-COAG VISIT (OUTPATIENT)
Age: 50
End: 2025-07-09
Payer: COMMERCIAL

## 2025-07-09 DIAGNOSIS — Z98.890 CRITICAL LIMB ISCHEMIA WITH HISTORY OF REVASCULARIZATION OF SAME EXTREMITY (HCC): Primary | ICD-10-CM

## 2025-07-09 DIAGNOSIS — I74.9 ARTERIAL THROMBOSIS (HCC): ICD-10-CM

## 2025-07-09 DIAGNOSIS — I74.3 ACUTE OCCLUSION OF ARTERY OF LOWER EXTREMITY DUE TO THROMBOSIS (HCC): ICD-10-CM

## 2025-07-09 DIAGNOSIS — I70.229 CRITICAL LIMB ISCHEMIA WITH HISTORY OF REVASCULARIZATION OF SAME EXTREMITY (HCC): Primary | ICD-10-CM

## 2025-07-09 LAB
INTERNATIONAL NORMALIZATION RATIO, POC: 4.9
PROTHROMBIN TIME, POC: 58.7

## 2025-07-09 PROCEDURE — 85610 PROTHROMBIN TIME: CPT

## 2025-07-09 PROCEDURE — 99213 OFFICE O/P EST LOW 20 MIN: CPT

## 2025-07-09 RX ORDER — TICAGRELOR 90 MG/1
90 TABLET ORAL 2 TIMES DAILY
Qty: 180 TABLET | Refills: 0 | Status: SHIPPED | OUTPATIENT
Start: 2025-07-09

## 2025-07-09 NOTE — PROGRESS NOTES
Medication Management Service, Warfarin Management  [facility], [phone]  Visit Date:  25   Subjective:   Gale Morley is a 48 y.o. female who presents to clinic today for anticoagulation monitoring and adjustment.  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   INR goal: of 2.0-3.0.  Duration of therapy: indefinite.    Assessment and PLAN   PT/INR done in office per protocol.   INR today is 4.9, supratherapeutic and likely due to decrease in overall dietary intake, and recent severe diarrhea.  Uses acetaminophen very sparingly., although still c/o severe cramping associated with menstrual cycle.   Patient denies other usual causes for elevated INR today.     Plan:  Will hold dose today and tomorrow followed by new regimen (8.3% reduction) of warfarin 3.75mg on Tues, Th, Sat only; 7.5mg once daily all other days of the week.  Using warfarin 7.5 mg tablets.   Recheck INR in 9 days.      Patient seen in room # 3.     Counseled patient on safety precautions associated with elevated INR.      Patient verbalized understanding of dosing directions and information discussed.   Escorted patient to PCP office on main floor to establish as new patient.   Dosing schedule given to patient. Progress note sent to referring office.    Sandra Santos [facility], CACP  Clinical Pharmacist Medication Management  2025  10:22 AM      For Pharmacy Admin Tracking Only    Intervention Detail: Adherence Monitorin and Dose Adjustment: 1, reason: Improve Adherence, Therapy De-escalation, Therapy Optimization  Total # of Interventions Recommended: 2  Total # of Interventions Accepted: 2  Time Spent (min): 20

## 2025-07-09 NOTE — TELEPHONE ENCOUNTER
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     PAD (peripheral artery disease)     Abnormal CT of the abdomen     Popliteal artery embolism, right (HCC)     Leg pain, right     Lymphadenopathy, mesenteric     Critical limb ischemia with history of revascularization of same extremity (HCC)     Occlusion of right iliac artery (HCC)     Acute occlusion of artery of lower extremity due to thrombosis (HCC)     Arterial thrombosis (HCC)     Dysmenorrhea     Dyslipidemia     Prediabetes     Insomnia     Spell of abnormal behavior     Nonintractable headache     Staring episodes

## 2025-07-18 ENCOUNTER — ANTI-COAG VISIT (OUTPATIENT)
Age: 50
End: 2025-07-18
Payer: COMMERCIAL

## 2025-07-18 DIAGNOSIS — I70.229 CRITICAL LIMB ISCHEMIA WITH HISTORY OF REVASCULARIZATION OF SAME EXTREMITY (HCC): Primary | ICD-10-CM

## 2025-07-18 DIAGNOSIS — Z98.890 CRITICAL LIMB ISCHEMIA WITH HISTORY OF REVASCULARIZATION OF SAME EXTREMITY (HCC): Primary | ICD-10-CM

## 2025-07-18 DIAGNOSIS — I74.3 ACUTE OCCLUSION OF ARTERY OF LOWER EXTREMITY DUE TO THROMBOSIS (HCC): ICD-10-CM

## 2025-07-18 DIAGNOSIS — I74.9 ARTERIAL THROMBOSIS (HCC): ICD-10-CM

## 2025-07-18 LAB
INTERNATIONAL NORMALIZATION RATIO, POC: 2.8
PROTHROMBIN TIME, POC: 33.9

## 2025-07-18 PROCEDURE — 99211 OFF/OP EST MAY X REQ PHY/QHP: CPT

## 2025-07-18 PROCEDURE — 85610 PROTHROMBIN TIME: CPT

## 2025-07-18 NOTE — PROGRESS NOTES
Medication Management Service, Warfarin Management  [facility], [phone]  Visit Date: 2024   Subjective:   Gale Morley is a 48 y.o. female who presents to clinic today for anticoagulation monitoring and adjustment.  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   INR goal: of 2.0-3.0.  Duration of therapy: indefinite.    Assessment and PLAN   PT/INR done in office per protocol.   INR today is 2.8, therapeutic.     Plan:  Continue current regimen of warfarin 3.75 mg Sun, Tues, Thurs; 7.5 mg once daily all other days of the week.   Using warfarin 7.5 mg tablets.   Recheck INR in 1.5 weeks   Patient seen in room # 1.    Patient verbalized understanding of dosing directions and information discussed.   Dosing schedule given to patient. Progress note sent to referring office.     Ladi PALOMO Ph., CACP, Clinical Pharmacist  Anticoagulation Services, [facility]  2025  10:59 AM  ================================================    For Pharmacy Admin Tracking Only    Intervention Detail: Adherence Monitorin  Total # of Interventions Recommended: 1  Total # of Interventions Accepted: 1  Time Spent (min): 20

## 2025-07-22 ENCOUNTER — OFFICE VISIT (OUTPATIENT)
Dept: PRIMARY CARE CLINIC | Age: 50
End: 2025-07-22
Payer: COMMERCIAL

## 2025-07-22 VITALS
DIASTOLIC BLOOD PRESSURE: 71 MMHG | WEIGHT: 188.8 LBS | SYSTOLIC BLOOD PRESSURE: 121 MMHG | HEART RATE: 66 BPM | HEIGHT: 65 IN | OXYGEN SATURATION: 100 % | BODY MASS INDEX: 31.46 KG/M2

## 2025-07-22 DIAGNOSIS — I73.9 PAD (PERIPHERAL ARTERY DISEASE): ICD-10-CM

## 2025-07-22 DIAGNOSIS — G43.109 MIGRAINE WITH AURA AND WITHOUT STATUS MIGRAINOSUS, NOT INTRACTABLE: ICD-10-CM

## 2025-07-22 DIAGNOSIS — I10 ESSENTIAL HYPERTENSION: ICD-10-CM

## 2025-07-22 DIAGNOSIS — R73.03 PREDIABETES: ICD-10-CM

## 2025-07-22 DIAGNOSIS — Z76.89 ENCOUNTER TO ESTABLISH CARE: Primary | ICD-10-CM

## 2025-07-22 DIAGNOSIS — I74.9 ARTERIAL THROMBOSIS (HCC): ICD-10-CM

## 2025-07-22 DIAGNOSIS — Z12.31 ENCOUNTER FOR SCREENING MAMMOGRAM FOR MALIGNANT NEOPLASM OF BREAST: ICD-10-CM

## 2025-07-22 PROCEDURE — G8417 CALC BMI ABV UP PARAM F/U: HCPCS | Performed by: NURSE PRACTITIONER

## 2025-07-22 PROCEDURE — G8427 DOCREV CUR MEDS BY ELIG CLIN: HCPCS | Performed by: NURSE PRACTITIONER

## 2025-07-22 PROCEDURE — 3078F DIAST BP <80 MM HG: CPT | Performed by: NURSE PRACTITIONER

## 2025-07-22 PROCEDURE — 99214 OFFICE O/P EST MOD 30 MIN: CPT | Performed by: NURSE PRACTITIONER

## 2025-07-22 PROCEDURE — 3017F COLORECTAL CA SCREEN DOC REV: CPT | Performed by: NURSE PRACTITIONER

## 2025-07-22 PROCEDURE — 1036F TOBACCO NON-USER: CPT | Performed by: NURSE PRACTITIONER

## 2025-07-22 PROCEDURE — 3074F SYST BP LT 130 MM HG: CPT | Performed by: NURSE PRACTITIONER

## 2025-07-22 RX ORDER — TICAGRELOR 90 MG/1
90 TABLET, FILM COATED ORAL 2 TIMES DAILY
Qty: 180 TABLET | Refills: 2 | Status: SHIPPED | OUTPATIENT
Start: 2025-07-22

## 2025-07-22 RX ORDER — AMLODIPINE BESYLATE 5 MG/1
5 TABLET ORAL DAILY
Qty: 90 TABLET | Refills: 2 | Status: SHIPPED | OUTPATIENT
Start: 2025-07-22

## 2025-07-22 RX ORDER — ROSUVASTATIN CALCIUM 40 MG/1
40 TABLET, COATED ORAL NIGHTLY
Qty: 90 TABLET | Refills: 2 | Status: SHIPPED | OUTPATIENT
Start: 2025-07-22 | End: 2026-04-18

## 2025-07-22 RX ORDER — LAMOTRIGINE 25 MG/1
25 TABLET ORAL DAILY
Qty: 90 TABLET | Refills: 2 | Status: SHIPPED | OUTPATIENT
Start: 2025-07-22 | End: 2026-04-18

## 2025-07-22 RX ORDER — PROPRANOLOL HYDROCHLORIDE 60 MG/1
60 CAPSULE, EXTENDED RELEASE ORAL DAILY
Qty: 90 CAPSULE | Refills: 2 | Status: SHIPPED | OUTPATIENT
Start: 2025-07-22 | End: 2026-04-18

## 2025-07-22 RX ORDER — ERGOCALCIFEROL 1.25 MG/1
50000 CAPSULE ORAL WEEKLY
Qty: 15 CAPSULE | Refills: 2 | Status: SHIPPED | OUTPATIENT
Start: 2025-07-22

## 2025-07-22 ASSESSMENT — ENCOUNTER SYMPTOMS
SORE THROAT: 0
DIARRHEA: 0
CHEST TIGHTNESS: 0
SINUS PRESSURE: 0
COLOR CHANGE: 0
NAUSEA: 0
SINUS PAIN: 0
SHORTNESS OF BREATH: 0
VOMITING: 0
ABDOMINAL PAIN: 0
BACK PAIN: 0
PHOTOPHOBIA: 0
COUGH: 0

## 2025-07-22 NOTE — PROGRESS NOTES
[address] [facility] MAIN FLOOR  [facility] [id number]   7/22/2025    Gale Morley is a 50 y.o. female who presents today for her medical conditions and/or complaints as noted below.    Gale Morley is scheduled today for New Patient and Establish Care      HPI:     History of Present Illness    The patient is a 50-year-old female who presents today to establish care. She has an underlying history of hypertension, prediabetes, triglyceridemia, peripheral artery disease (PAD) with arterial thrombosis, chronic migraines with aura, uterine fibroids, dysmenorrhea, and irregular cycles.     She follows with the Coumadin clinic for chronic anticoagulation.     Followed with hematology and oncology previously for LALA.  Has not seen in quite some time.    She follows with OB/GYN for dysmenorrhea>> [facility]. She has a history of abnormal Pap smears in 2019, status post colposcopy in 2020, and was advised to follow up with OB/GYN for surveillance. Having menstrual cycles approx every 6 months.     She had a colonoscopy done in January 2024 with polypectomy in the sigmoid colon. The biopsy was suggestive of tubular adenoma with plans to repeat in 2025. Provided with number to GI to schedule follow up.    Due for mammogram.  Last done in 4/2024- normal/negative.  Order places today.    She reports experiencing severe foot pain after walking a certain distance, which has been ongoing for about a year. This pain forces her to stop and rest. Discuss with vascular at next appointment-- claudication?    Migraine HA are currently well controlled.  Does get tension headaches during acute stress. Alleviated with tylenol.     She was previously under the care of a hematologist at [facility] due to low iron levels but was informed that she no longer needs to see him.    Alcohol: Sometimes  Recreational Drugs: Smokes marijuana socially  Sexual Practices: No concerns for STDs    GYNECOLOGICAL HISTORY:  Last

## 2025-07-30 ENCOUNTER — ANTI-COAG VISIT (OUTPATIENT)
Age: 50
End: 2025-07-30
Payer: COMMERCIAL

## 2025-07-30 DIAGNOSIS — I74.3 ACUTE OCCLUSION OF ARTERY OF LOWER EXTREMITY DUE TO THROMBOSIS (HCC): ICD-10-CM

## 2025-07-30 DIAGNOSIS — I70.229 CRITICAL LIMB ISCHEMIA WITH HISTORY OF REVASCULARIZATION OF SAME EXTREMITY (HCC): Primary | ICD-10-CM

## 2025-07-30 DIAGNOSIS — Z98.890 CRITICAL LIMB ISCHEMIA WITH HISTORY OF REVASCULARIZATION OF SAME EXTREMITY (HCC): Primary | ICD-10-CM

## 2025-07-30 DIAGNOSIS — I74.9 ARTERIAL THROMBOSIS (HCC): ICD-10-CM

## 2025-07-30 LAB
INTERNATIONAL NORMALIZATION RATIO, POC: 2.1
PROTHROMBIN TIME, POC: 25.8

## 2025-07-30 PROCEDURE — 99212 OFFICE O/P EST SF 10 MIN: CPT

## 2025-07-30 PROCEDURE — 85610 PROTHROMBIN TIME: CPT

## 2025-07-30 RX ORDER — WARFARIN SODIUM 6 MG/1
TABLET ORAL
Qty: 90 TABLET | Refills: 1 | Status: SHIPPED | OUTPATIENT
Start: 2025-07-30

## 2025-07-30 NOTE — PROGRESS NOTES
Medication Management Service, Warfarin Management  [facility], [phone]  Visit Date: 2024   Subjective:   Gale Morley is a 48 y.o. female who presents to clinic today for anticoagulation monitoring and adjustment.  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   INR goal: of 2.0-3.0.  Duration of therapy: indefinite.    Assessment and PLAN   PT/INR done in office per protocol.   INR today is 2.1, therapeutic.     Plan:  Continue current regimen of warfarin 3.75 mg Sun, Tues, Th; 7.5 mg once daily all other days of the week until changing to warfarin 6mg orally once daily when current supply is depleted.  Using warfarin 7.5 mg tablets for now, changing to 6mg.  New prescription sent electronically to patient's preferred pharmacy.      Recheck INR in 3 weeks   Patient seen in room # 3.    Patient seeing Dr. Streeter tomorrow, will discuss duration of warfarin use.   Stressed no big changes in green vegetable intake.     Patient verbalized understanding of dosing directions and information discussed.   Dosing schedule given to patient. Progress note sent to referring office.     Sandra Santos RP, CACP  Clinical Pharmacist Medication Management  2025  11:54 AM      For Pharmacy Admin Tracking Only    Intervention Detail: Adherence Monitorin and New Rx: 1, reason: Cost/Formulary Change  Total # of Interventions Recommended: 2  Total # of Interventions Accepted: 2  Time Spent (min): 20

## 2025-08-14 ENCOUNTER — OFFICE VISIT (OUTPATIENT)
Dept: VASCULAR SURGERY | Age: 50
End: 2025-08-14
Payer: MEDICAID

## 2025-08-14 VITALS
OXYGEN SATURATION: 97 % | TEMPERATURE: 98.1 F | DIASTOLIC BLOOD PRESSURE: 85 MMHG | SYSTOLIC BLOOD PRESSURE: 146 MMHG | BODY MASS INDEX: 32.15 KG/M2 | WEIGHT: 193 LBS | HEART RATE: 78 BPM | HEIGHT: 65 IN

## 2025-08-14 DIAGNOSIS — I73.9 PAD (PERIPHERAL ARTERY DISEASE): Primary | ICD-10-CM

## 2025-08-14 DIAGNOSIS — I73.9 CLAUDICATION IN PERIPHERAL VASCULAR DISEASE: ICD-10-CM

## 2025-08-14 PROCEDURE — 99213 OFFICE O/P EST LOW 20 MIN: CPT | Performed by: SURGERY

## 2025-08-14 PROCEDURE — 3079F DIAST BP 80-89 MM HG: CPT | Performed by: SURGERY

## 2025-08-14 PROCEDURE — 3077F SYST BP >= 140 MM HG: CPT | Performed by: SURGERY

## 2025-08-14 RX ORDER — CILOSTAZOL 100 MG/1
100 TABLET ORAL 2 TIMES DAILY
Qty: 180 TABLET | Refills: 3 | Status: SHIPPED | OUTPATIENT
Start: 2025-08-14

## 2025-08-20 ENCOUNTER — TELEPHONE (OUTPATIENT)
Age: 50
End: 2025-08-20

## 2025-08-25 ENCOUNTER — ANTI-COAG VISIT (OUTPATIENT)
Age: 50
End: 2025-08-25
Payer: MEDICAID

## 2025-08-25 DIAGNOSIS — I70.229 CRITICAL LIMB ISCHEMIA WITH HISTORY OF REVASCULARIZATION OF SAME EXTREMITY (HCC): Primary | ICD-10-CM

## 2025-08-25 DIAGNOSIS — I74.9 ARTERIAL THROMBOSIS (HCC): ICD-10-CM

## 2025-08-25 DIAGNOSIS — Z98.890 CRITICAL LIMB ISCHEMIA WITH HISTORY OF REVASCULARIZATION OF SAME EXTREMITY (HCC): Primary | ICD-10-CM

## 2025-08-25 DIAGNOSIS — I74.3 ACUTE OCCLUSION OF ARTERY OF LOWER EXTREMITY DUE TO THROMBOSIS (HCC): ICD-10-CM

## 2025-08-25 LAB
INTERNATIONAL NORMALIZATION RATIO, POC: 1.2
PROTHROMBIN TIME, POC: 14.7

## 2025-08-25 PROCEDURE — 99212 OFFICE O/P EST SF 10 MIN: CPT

## 2025-08-25 PROCEDURE — 85610 PROTHROMBIN TIME: CPT

## 2025-09-03 ENCOUNTER — ANTI-COAG VISIT (OUTPATIENT)
Age: 50
End: 2025-09-03
Payer: MEDICAID

## 2025-09-03 DIAGNOSIS — I74.9 ARTERIAL THROMBOSIS (HCC): ICD-10-CM

## 2025-09-03 DIAGNOSIS — I70.229 CRITICAL LIMB ISCHEMIA WITH HISTORY OF REVASCULARIZATION OF SAME EXTREMITY (HCC): Primary | ICD-10-CM

## 2025-09-03 DIAGNOSIS — I74.3 ACUTE OCCLUSION OF ARTERY OF LOWER EXTREMITY DUE TO THROMBOSIS (HCC): ICD-10-CM

## 2025-09-03 DIAGNOSIS — Z98.890 CRITICAL LIMB ISCHEMIA WITH HISTORY OF REVASCULARIZATION OF SAME EXTREMITY (HCC): Primary | ICD-10-CM

## 2025-09-03 LAB
INTERNATIONAL NORMALIZATION RATIO, POC: 2
PROTHROMBIN TIME, POC: 24

## 2025-09-03 PROCEDURE — 99211 OFF/OP EST MAY X REQ PHY/QHP: CPT

## 2025-09-03 PROCEDURE — 85610 PROTHROMBIN TIME: CPT

## (undated) DEVICE — INTENDED FOR TISSUE SEPARATION, AND OTHER PROCEDURES THAT REQUIRE A SHARP SURGICAL BLADE TO PUNCTURE OR CUT.: Brand: BARD-PARKER ® CARBON RIB-BACK BLADES

## (undated) DEVICE — GLOVE SURG SZ 65 CRM LTX FREE POLYISOPRENE POLYMER BEAD ANTI

## (undated) DEVICE — SOLUTION IV IRRIG 500ML 0.9% SODIUM CHL 2F7123

## (undated) DEVICE — TTL1LYR 16FR10ML 100%SIL TMPST TR: Brand: MEDLINE

## (undated) DEVICE — TOWEL,OR,DSP,ST,BLUE,DLX,XR,4/PK,20PK/CS: Brand: MEDLINE

## (undated) DEVICE — CHLORAPREP 26ML ORANGE

## (undated) DEVICE — SUTURE NONABSORBABLE MONOFILAMENT 6-0 C-1 1X30 IN PROLENE 8706H

## (undated) DEVICE — GUIDEWIRE VASC L300CM DIA0.014IN STR LNG TAPR TIP SIL S STL

## (undated) DEVICE — SNARE ENDOSCP L240CM LOOP W13MM DIA2.4MM SHT THROW SM OVL

## (undated) DEVICE — PEN: MARKING STD 100/CS: Brand: MEDICAL ACTION INDUSTRIES

## (undated) DEVICE — SUTURE PDS II SZ 2-0 L27IN ABSRB UD FS-1 L24MM 3/8 CIR REV Z443H

## (undated) DEVICE — Device

## (undated) DEVICE — HOOKED-PRONG GRASPING FORCEPS: Brand: TRICEP

## (undated) DEVICE — GOWN,SIRUS,NONRNF,SETINSLV,2XL,18/CS: Brand: MEDLINE

## (undated) DEVICE — PINNACLE INTRODUCER SHEATH: Brand: PINNACLE

## (undated) DEVICE — INFLATION KIT CUST REV

## (undated) DEVICE — PACK SURG ABD SVMMC

## (undated) DEVICE — SUTURE PROL SZ 5-0 L36IN NONABSORBABLE BLU L13MM C-1 3/8 8720H

## (undated) DEVICE — GAUZE,SPONGE,FLUFF,6"X6.75",STRL,5/TRAY: Brand: MEDLINE

## (undated) DEVICE — POLYP TRAP: Brand: TRAPEASE®

## (undated) DEVICE — SPONGE LAP W18XL18IN WHT COT 4 PLY FLD STRUNG RADPQ DISP ST

## (undated) DEVICE — PRECISION THIN (5.5 X 0.38 X 11.5MM)

## (undated) DEVICE — GLOVE SURG SZ 7.5 L11.73IN FNGR THK9.8MIL STRW LTX POLYMER

## (undated) DEVICE — SUTURE PERMAHAND SZ 4-0 L18IN NONABSORBABLE BLK SILK BRAID A183H

## (undated) DEVICE — SNAP KAP: Brand: UNBRANDED

## (undated) DEVICE — GOWN,SIRUS,NONRNF,SETINSLV,XL,20/CS: Brand: MEDLINE

## (undated) DEVICE — CATHETER 7 INDIGO 130CM XTORQ + LIGHTNING ASPIRATION TUBING

## (undated) DEVICE — TUBING SUCT 12FR MAL ALUM SHFT FN CAP VENT UNIV CONN W/ OBT

## (undated) DEVICE — RADIFOCUS GLIDEWIRE ADVANTAGE GUIDEWIRE: Brand: GLIDEWIRE ADVANTAGE

## (undated) DEVICE — 3M™ IOBAN™ 2 ANTIMICROBIAL INCISE DRAPE 6650EZ: Brand: IOBAN™ 2

## (undated) DEVICE — C-ARM: Brand: UNBRANDED

## (undated) DEVICE — GLOVE SURG SZ 8 CRM LTX FREE POLYISOPRENE POLYMER BEAD ANTI

## (undated) DEVICE — CLIP INT SM WIDE RED TI TRNSVRS GRV CHEVRON SHP W/ PRECIS

## (undated) DEVICE — GOWN,AURORA,NONREINFORCED,LARGE: Brand: MEDLINE

## (undated) DEVICE — CONTAINER,SPECIMEN,OR STERILE,4OZ: Brand: MEDLINE

## (undated) DEVICE — FLEXOR, CHECK-FLO, INTRODUCER ANSEL MODIFICATION: Brand: FLEXOR

## (undated) DEVICE — ERBE NESSY® OMEGA PLATE USA (85+23)CM² , WITH CABLE 3 M: Brand: ERBE

## (undated) DEVICE — PRESSURE MONITORING LINES 4FT. (122CM) M/F: Brand: PRESSURE MONITORING LINES

## (undated) DEVICE — SKIN PREP TRAY W/CHG: Brand: MEDLINE INDUSTRIES, INC.

## (undated) DEVICE — CONTAINER SPEC 33OZ URIN COLL BIODEGRADABLE PAPER DISP

## (undated) DEVICE — GOWN,AURORA,NONRNF,XL,30/CS: Brand: MEDLINE

## (undated) DEVICE — YANKAUER,FLEXIBLE HANDLE,REGLR CAPACITY: Brand: MEDLINE INDUSTRIES, INC.

## (undated) DEVICE — STANDARD HYPODERMIC NEEDLE,POLYPROPYLENE HUB: Brand: MONOJECT

## (undated) DEVICE — BANDAGE COBAN 4 IN COMPR W4INXL5YD FOAM COHESIVE QUIK STK SELF ADH SFT

## (undated) DEVICE — PROVE COVER: Brand: UNBRANDED

## (undated) DEVICE — SKIN AFFIX SURG ADHESIVE 72/CS 0.55ML: Brand: MEDLINE

## (undated) DEVICE — NEEDLE HYPO 25GA L1.5IN BLU POLYPR HUB S STL REG BVL STR

## (undated) DEVICE — MICROPUNCTURE INTRODUCER SET SILHOUETTE TRANSITIONLESS PUSH-PLUS DESIGN - STIFFENED CANNULA WITH NITINOL WIRE GUIDE: Brand: MICROPUNCTURE

## (undated) DEVICE — GLOVE ORTHO 8   MSG9480

## (undated) DEVICE — TUBING, SUCTION, 1/4" X 12', STRAIGHT: Brand: MEDLINE

## (undated) DEVICE — DRAPE,UTILITY,XL,4/PK,STERILE: Brand: MEDLINE

## (undated) DEVICE — 1810 FOAM BLOCK NEEDLE COUNTER: Brand: DEVON

## (undated) DEVICE — CATHETER ATHRCTMY 7FR L135CM GWIRE 0.014IN TIP DIA2.1-3MM

## (undated) DEVICE — GAUZE,SPONGE,4"X4",16PLY,XRAY,STRL,LF: Brand: MEDLINE

## (undated) DEVICE — SUTURE NONABSORBABLE MONOFILAMENT 7-0 BV-1 1X24 IN PROLENE 8702H

## (undated) DEVICE — PTA BALLOON DILATATION CATHETER: Brand: COYOTE™

## (undated) DEVICE — SUTURE ETHLN SZ 3-0 L18IN NONABSORBABLE BLK L24MM FS-1 3/8 663G

## (undated) DEVICE — SUTURE ETHLN SZ 3-0 L18IN NONABSORBABLE BLK PS-2 L19MM 3/8 1669H

## (undated) DEVICE — SHEET, ORTHO, SPLIT, STERILE: Brand: MEDLINE

## (undated) DEVICE — SET PEN AND LBL AND L BWL LBL PAL PEN AND LBLS PAL700]

## (undated) DEVICE — 3M™ IOBAN™ 2 ANTIMICROBIAL INCISE DRAPE 6651EZ: Brand: IOBAN™ 2

## (undated) DEVICE — DRAPE,REIN 53X77,STERILE: Brand: MEDLINE

## (undated) DEVICE — SYRINGE 20ML LL S/C 50

## (undated) DEVICE — GLOVE SURG SZ 75 L12IN FNGR THK79MIL GRN LTX FREE

## (undated) DEVICE — COVER LT HNDL BLU PLAS

## (undated) DEVICE — BANDAGE,GAUZE,BULKEE II,4.5"X4.1YD,STRL: Brand: MEDLINE

## (undated) DEVICE — SUTURE ETHLN SZ 4-0 L18IN NONABSORBABLE BLK L19MM PS-2 3/8 1667H

## (undated) DEVICE — GLOVE SURG SZ 65 L12IN FNGR THK79MIL GRN LTX FREE

## (undated) DEVICE — DRAPE C ARM UNIV W41XL74IN CLR PLAS XR VELC CLSR POLY STRP

## (undated) DEVICE — SVMMC VASC MAJ PK

## (undated) DEVICE — SYRINGE, LUER LOCK, 10ML: Brand: MEDLINE

## (undated) DEVICE — SURGICAL PROCEDURE TRAY CRD CATH SVMMC

## (undated) DEVICE — CATHETER THROMCTMY L140CM RX L LUMN ASPIR TBNG INDIGO

## (undated) DEVICE — SUTURE PROL SZ 7-0 L30IN NONABSORBABLE BLU L9.3MM BV-1 1/2 8703H

## (undated) DEVICE — APPLICATOR MEDICATED 26 CC SOLUTION HI LT ORNG CHLORAPREP

## (undated) DEVICE — ANGIO-SEAL VIP VASCULAR CLOSURE DEVICE: Brand: ANGIO-SEAL

## (undated) DEVICE — SUTURE MCRYL SZ 5-0 L18IN ABSRB UD PC-3 L16MM 3/8 CIR Y844G

## (undated) DEVICE — THIN OFFSET (9.0 X 0.38 X 31.0MM)

## (undated) DEVICE — GLOVE SURG SZ 7 CRM LTX FREE POLYISOPRENE POLYMER BEAD ANTI

## (undated) DEVICE — SUTURE NONABSORBABLE MONOFILAMENT 6-0 BV-1 1X30 IN PROLENE 8709H

## (undated) DEVICE — DEFENDO AIR WATER SUCTION AND BIOPSY VALVE KIT FOR  OLYMPUS: Brand: DEFENDO AIR/WATER/SUCTION AND BIOPSY VALVE

## (undated) DEVICE — SUTURE VCRL SZ 4-0 L27IN ABSRB UD L19MM PS-2 3/8 CIR PRIM J426H

## (undated) DEVICE — LOOP VES W25MM THK1MM MAXI RED SIL FLD REPELLENT 100 PER

## (undated) DEVICE — SHEET DRAPE FULL 70X100

## (undated) DEVICE — BANDAGE COMPR W6INXL12FT SMOOTH FOR LIMB EXSANG ESMARCH

## (undated) DEVICE — THROMBECTOMY SET: Brand: ANGIOJET® SPIROFLEX® VG

## (undated) DEVICE — PAD GEN USE BORDERED ADH 14IN 2IN AND 12IN 4IN GZ UNIV ST

## (undated) DEVICE — 3M™ COBAN™ NL STERILE NON-LATEX SELF-ADHERENT WRAP, 2086S, 6 IN X 5 YD (15 CM X 4,5 M), 12 ROLLS/CASE: Brand: 3M™ COBAN™

## (undated) DEVICE — GLOVE SURG SZ 7 L12IN FNGR THK79MIL GRN LTX FREE

## (undated) DEVICE — NEEDLE ANGIO 18GA L6.98CM ART ENTRY W/O STYL 1 PART PERC

## (undated) DEVICE — STOPCOCK 3-WAY 1050 PSI HIGH OFF ROT L/L

## (undated) DEVICE — ENDO KIT W/SYRINGE: Brand: MEDLINE INDUSTRIES, INC.

## (undated) DEVICE — MARKER,SKIN,WI/RULER AND LABELS: Brand: MEDLINE

## (undated) DEVICE — CATHETER FOL 2 W 16 FR 5 CC URETH SPEC TIEM BARDX IC

## (undated) DEVICE — SUTURE PERMAHAND SZ 2-0 L18IN NONABSORBABLE BLK L26MM FS 685G

## (undated) DEVICE — AGENT HEMSTAT W2XL14IN OXIDIZED REGENERATED CELOS ABSRB FOR

## (undated) DEVICE — SOLUTION IV IRRIG POUR BRL 0.9% SODIUM CHL 2F7124

## (undated) DEVICE — HI-TORQUE SPARTACORE 14 PERIPHERAL GUIDE WIRE .014 5.0 CM X 300 CM: Brand: SPARTACORE

## (undated) DEVICE — SINGLE-USE BIOPSY FORCEPS: Brand: RADIAL JAW 4

## (undated) DEVICE — CONTAINER,SPECIMEN,4OZ,OR STRL: Brand: MEDLINE

## (undated) DEVICE — SUTURE PROL SZ 7-0 L24IN NONABSORBABLE BLU L9.3MM BV-1 3/8 M8702

## (undated) DEVICE — FIXED CORE WIRE GUIDE STRAIGHT: Brand: COOK

## (undated) DEVICE — DECANTER BAG 9": Brand: MEDLINE INDUSTRIES, INC.

## (undated) DEVICE — GLOVE ORANGE PI 8   MSG9080

## (undated) DEVICE — SUTURE PROL SZ 6-0 L24IN NONABSORBABLE BLU L9.3MM BV-1 3/8 8805H

## (undated) DEVICE — SYRINGE MED 10ML TRNSLUC BRL PLUNG BLK MRK POLYPR CTRL

## (undated) DEVICE — SUTURE VCRL SZ 2-0 L27IN ABSRB UD L26MM SH 1/2 CIR J417H

## (undated) DEVICE — BLADE SAW W7XL18.5MM THK0.51MM SAG OSC TPS 229633114] STRYKER INSTRUMENT DIV]

## (undated) DEVICE — LOOP VES W13MM THK09MM MINI RED SIL FLD REPELLENT

## (undated) DEVICE — HOOK LOCK LATEX FREE ELASTIC BANDAGE 4INX5YD

## (undated) DEVICE — SUTURE ETHLN SZ 3-0 L18IN NONABSORBABLE BLK L24MM PS-1 3/8 1663G

## (undated) DEVICE — BLADE CLIPPER GEN PURP NS

## (undated) DEVICE — 3M™ WARMING BLANKET, UPPER BODY, 10 PER CASE, 42268: Brand: BAIR HUGGER™

## (undated) DEVICE — GLOVE ORANGE PI 7   MSG9070

## (undated) DEVICE — DECANTER FLD 9IN ST BG FOR ASEP TRNSF OF FLD

## (undated) DEVICE — THE STERILE LIGHT HANDLE COVER IS USED WITH STERIS SURGICAL LIGHTING AND VISUALIZATION SYSTEMS.

## (undated) DEVICE — BLADE SAW  6 13X90X1.27MM

## (undated) DEVICE — GLOVE SURG SZ 65 THK91MIL LTX FREE SYN POLYISOPRENE

## (undated) DEVICE — SUTURE VCRL SZ 3-0 L27IN ABSRB UD L26MM SH 1/2 CIR J416H

## (undated) DEVICE — TOWEL,OR,DSP,ST,NATURAL,DLX,4/PK,20PK/CS: Brand: MEDLINE

## (undated) DEVICE — NAVICROSS SUPPORT CATHETER: Brand: NAVICROSS

## (undated) DEVICE — TRAY CATHETER 16FR F INCLUDE BARDX IC COMPLT CARE DRNGE BG

## (undated) DEVICE — CATHETER ANGIO 4FR L65CM 0.035IN VIS OMNI FLUSH-0 SFT TIP

## (undated) DEVICE — PENCIL ES L3M BTTN SWCH HOLSTER W/ BLDE ELECTRD EDGE

## (undated) DEVICE — CATHETER FOL 2 W 14 FR 5 CC URETH SPEC TIEM BARDX IC

## (undated) DEVICE — GLOVE ORANGE PI 7 1/2   MSG9075